# Patient Record
Sex: FEMALE | Race: WHITE | Employment: PART TIME | URBAN - METROPOLITAN AREA
[De-identification: names, ages, dates, MRNs, and addresses within clinical notes are randomized per-mention and may not be internally consistent; named-entity substitution may affect disease eponyms.]

---

## 2017-01-01 DIAGNOSIS — R25.2 CRAMP OF BOTH LOWER EXTREMITIES: ICD-10-CM

## 2017-01-01 DIAGNOSIS — R42 DIZZINESS: ICD-10-CM

## 2017-01-03 RX ORDER — MECLIZINE HCL 12.5 MG/1
TABLET ORAL
Qty: 30 TABLET | Refills: 0 | Status: SHIPPED | OUTPATIENT
Start: 2017-01-03 | End: 2017-02-11 | Stop reason: SDUPTHER

## 2017-01-09 RX ORDER — LEVOTHYROXINE SODIUM 88 UG/1
TABLET ORAL
Qty: 90 TABLET | Refills: 0 | Status: SHIPPED | OUTPATIENT
Start: 2017-01-09 | End: 2017-04-23 | Stop reason: SDUPTHER

## 2017-01-11 ENCOUNTER — TELEPHONE (OUTPATIENT)
Dept: WOUND CARE | Age: 57
End: 2017-01-11

## 2017-01-23 RX ORDER — ONDANSETRON 4 MG/1
TABLET, FILM COATED ORAL
Qty: 30 TABLET | Refills: 0 | Status: SHIPPED | OUTPATIENT
Start: 2017-01-23 | End: 2017-04-24

## 2017-01-27 ENCOUNTER — TELEPHONE (OUTPATIENT)
Dept: INTERNAL MEDICINE CLINIC | Age: 57
End: 2017-01-27

## 2017-01-27 RX ORDER — ATORVASTATIN CALCIUM 40 MG/1
TABLET, FILM COATED ORAL
Qty: 90 TABLET | Refills: 3 | Status: SHIPPED | OUTPATIENT
Start: 2017-01-27 | End: 2018-01-21 | Stop reason: SDUPTHER

## 2017-01-28 ENCOUNTER — OFFICE VISIT (OUTPATIENT)
Dept: INTERNAL MEDICINE CLINIC | Age: 57
End: 2017-01-28

## 2017-01-28 VITALS
SYSTOLIC BLOOD PRESSURE: 132 MMHG | TEMPERATURE: 98.9 F | HEIGHT: 64 IN | RESPIRATION RATE: 16 BRPM | BODY MASS INDEX: 43.54 KG/M2 | WEIGHT: 255 LBS | DIASTOLIC BLOOD PRESSURE: 76 MMHG

## 2017-01-28 DIAGNOSIS — J06.9 VIRAL UPPER RESPIRATORY TRACT INFECTION: ICD-10-CM

## 2017-01-28 PROCEDURE — 99213 OFFICE O/P EST LOW 20 MIN: CPT | Performed by: INTERNAL MEDICINE

## 2017-02-01 ENCOUNTER — IMMUNIZATION (OUTPATIENT)
Dept: INTERNAL MEDICINE CLINIC | Age: 57
End: 2017-02-01

## 2017-02-01 DIAGNOSIS — E53.8 VITAMIN B12 DEFICIENCY: Primary | ICD-10-CM

## 2017-02-01 PROCEDURE — 96372 THER/PROPH/DIAG INJ SC/IM: CPT | Performed by: INTERNAL MEDICINE

## 2017-02-01 RX ORDER — CYANOCOBALAMIN 1000 UG/ML
1000 INJECTION INTRAMUSCULAR; SUBCUTANEOUS
Status: DISCONTINUED | OUTPATIENT
Start: 2017-02-01 | End: 2019-10-01

## 2017-02-01 RX ADMIN — CYANOCOBALAMIN 1000 MCG: 1000 INJECTION INTRAMUSCULAR; SUBCUTANEOUS at 11:12

## 2017-02-10 ENCOUNTER — OFFICE VISIT (OUTPATIENT)
Dept: PAIN MANAGEMENT | Age: 57
End: 2017-02-10

## 2017-02-10 VITALS
SYSTOLIC BLOOD PRESSURE: 119 MMHG | BODY MASS INDEX: 43.43 KG/M2 | HEART RATE: 93 BPM | DIASTOLIC BLOOD PRESSURE: 66 MMHG | WEIGHT: 253 LBS

## 2017-02-10 DIAGNOSIS — G89.4 CHRONIC PAIN SYNDROME: ICD-10-CM

## 2017-02-10 DIAGNOSIS — M16.11 PRIMARY OSTEOARTHRITIS OF RIGHT HIP: ICD-10-CM

## 2017-02-10 DIAGNOSIS — M51.36 DDD (DEGENERATIVE DISC DISEASE), LUMBAR: ICD-10-CM

## 2017-02-10 DIAGNOSIS — R51.9 CHRONIC NONINTRACTABLE HEADACHE, UNSPECIFIED HEADACHE TYPE: ICD-10-CM

## 2017-02-10 DIAGNOSIS — M47.899 FACET SYNDROME: ICD-10-CM

## 2017-02-10 DIAGNOSIS — M79.7 FIBROMYALGIA: ICD-10-CM

## 2017-02-10 DIAGNOSIS — G89.29 CHRONIC NONINTRACTABLE HEADACHE, UNSPECIFIED HEADACHE TYPE: ICD-10-CM

## 2017-02-10 DIAGNOSIS — M17.10 PRIMARY LOCALIZED OSTEOARTHROSIS, LOWER LEG, UNSPECIFIED LATERALITY: ICD-10-CM

## 2017-02-10 PROCEDURE — 99213 OFFICE O/P EST LOW 20 MIN: CPT | Performed by: INTERNAL MEDICINE

## 2017-02-10 RX ORDER — FENTANYL 25 UG/H
1 PATCH TRANSDERMAL
Qty: 10 PATCH | Refills: 0 | Status: SHIPPED | OUTPATIENT
Start: 2017-02-10 | End: 2017-03-10 | Stop reason: ALTCHOICE

## 2017-02-10 RX ORDER — PREGABALIN 100 MG/1
CAPSULE ORAL
Qty: 90 CAPSULE | Refills: 0 | Status: SHIPPED | OUTPATIENT
Start: 2017-02-10 | End: 2017-03-10 | Stop reason: SDUPTHER

## 2017-02-10 RX ORDER — TIZANIDINE HYDROCHLORIDE 4 MG/1
CAPSULE, GELATIN COATED ORAL
Qty: 60 CAPSULE | Refills: 0 | Status: SHIPPED | OUTPATIENT
Start: 2017-02-10 | End: 2017-02-20 | Stop reason: SDUPTHER

## 2017-02-10 RX ORDER — MELOXICAM 15 MG/1
TABLET ORAL
Qty: 90 TABLET | Refills: 0 | Status: SHIPPED | OUTPATIENT
Start: 2017-02-10 | End: 2017-03-10 | Stop reason: SDUPTHER

## 2017-02-11 DIAGNOSIS — R25.2 CRAMP OF BOTH LOWER EXTREMITIES: ICD-10-CM

## 2017-02-11 DIAGNOSIS — R42 DIZZINESS: ICD-10-CM

## 2017-02-13 RX ORDER — MECLIZINE HCL 12.5 MG/1
TABLET ORAL
Qty: 30 TABLET | Refills: 0 | Status: SHIPPED | OUTPATIENT
Start: 2017-02-13 | End: 2017-03-19 | Stop reason: SDUPTHER

## 2017-02-18 ENCOUNTER — TELEPHONE (OUTPATIENT)
Dept: INTERNAL MEDICINE CLINIC | Age: 57
End: 2017-02-18

## 2017-02-20 ENCOUNTER — TELEPHONE (OUTPATIENT)
Dept: INTERNAL MEDICINE CLINIC | Age: 57
End: 2017-02-20

## 2017-02-20 ENCOUNTER — TELEPHONE (OUTPATIENT)
Dept: PAIN MANAGEMENT | Age: 57
End: 2017-02-20

## 2017-02-20 DIAGNOSIS — M79.7 FIBROMYALGIA: ICD-10-CM

## 2017-02-20 DIAGNOSIS — G89.4 CHRONIC PAIN SYNDROME: ICD-10-CM

## 2017-02-20 RX ORDER — TIZANIDINE HYDROCHLORIDE 4 MG/1
4 CAPSULE, GELATIN COATED ORAL 3 TIMES DAILY
Qty: 90 CAPSULE | Refills: 0 | Status: SHIPPED | OUTPATIENT
Start: 2017-02-20 | End: 2017-02-25 | Stop reason: SDUPTHER

## 2017-02-25 DIAGNOSIS — M79.7 FIBROMYALGIA: ICD-10-CM

## 2017-02-25 DIAGNOSIS — G89.4 CHRONIC PAIN SYNDROME: ICD-10-CM

## 2017-02-27 RX ORDER — TIZANIDINE 4 MG/1
TABLET ORAL
Qty: 270 TABLET | Refills: 0 | Status: SHIPPED | OUTPATIENT
Start: 2017-02-27 | End: 2017-03-10 | Stop reason: SDUPTHER

## 2017-03-06 RX ORDER — ONDANSETRON 4 MG/1
TABLET, FILM COATED ORAL
Qty: 30 TABLET | Refills: 0 | Status: SHIPPED | OUTPATIENT
Start: 2017-03-06 | End: 2017-04-04 | Stop reason: SDUPTHER

## 2017-03-10 ENCOUNTER — OFFICE VISIT (OUTPATIENT)
Dept: PAIN MANAGEMENT | Age: 57
End: 2017-03-10

## 2017-03-10 ENCOUNTER — TELEPHONE (OUTPATIENT)
Dept: INTERNAL MEDICINE CLINIC | Age: 57
End: 2017-03-10

## 2017-03-10 VITALS
WEIGHT: 253 LBS | DIASTOLIC BLOOD PRESSURE: 75 MMHG | BODY MASS INDEX: 43.43 KG/M2 | SYSTOLIC BLOOD PRESSURE: 126 MMHG | HEART RATE: 81 BPM

## 2017-03-10 DIAGNOSIS — R51.9 CHRONIC NONINTRACTABLE HEADACHE, UNSPECIFIED HEADACHE TYPE: ICD-10-CM

## 2017-03-10 DIAGNOSIS — D62 ACUTE BLOOD LOSS ANEMIA: Primary | ICD-10-CM

## 2017-03-10 DIAGNOSIS — M51.36 DDD (DEGENERATIVE DISC DISEASE), LUMBAR: ICD-10-CM

## 2017-03-10 DIAGNOSIS — M16.11 PRIMARY OSTEOARTHRITIS OF RIGHT HIP: ICD-10-CM

## 2017-03-10 DIAGNOSIS — G89.29 CHRONIC NONINTRACTABLE HEADACHE, UNSPECIFIED HEADACHE TYPE: ICD-10-CM

## 2017-03-10 DIAGNOSIS — G89.4 CHRONIC PAIN SYNDROME: ICD-10-CM

## 2017-03-10 DIAGNOSIS — M79.7 FIBROMYALGIA: ICD-10-CM

## 2017-03-10 DIAGNOSIS — M47.899 FACET SYNDROME: ICD-10-CM

## 2017-03-10 PROCEDURE — 99213 OFFICE O/P EST LOW 20 MIN: CPT | Performed by: INTERNAL MEDICINE

## 2017-03-10 RX ORDER — PREGABALIN 100 MG/1
CAPSULE ORAL
Qty: 90 CAPSULE | Refills: 0 | Status: SHIPPED | OUTPATIENT
Start: 2017-03-10 | End: 2017-04-07 | Stop reason: DRUGHIGH

## 2017-03-10 RX ORDER — MELOXICAM 15 MG/1
TABLET ORAL
Qty: 90 TABLET | Refills: 0 | Status: SHIPPED | OUTPATIENT
Start: 2017-03-10 | End: 2017-06-13 | Stop reason: SDUPTHER

## 2017-03-10 RX ORDER — TIZANIDINE 4 MG/1
TABLET ORAL
Qty: 270 TABLET | Refills: 0 | Status: SHIPPED | OUTPATIENT
Start: 2017-03-10 | End: 2017-06-13 | Stop reason: SDUPTHER

## 2017-03-11 LAB
A/G RATIO: 1.5 (ref 1.1–2.2)
ALBUMIN SERPL-MCNC: 3.9 G/DL (ref 3.4–5)
ALP BLD-CCNC: 129 U/L (ref 40–129)
ALT SERPL-CCNC: 12 U/L (ref 10–40)
ANION GAP SERPL CALCULATED.3IONS-SCNC: 15 MMOL/L (ref 3–16)
AST SERPL-CCNC: 13 U/L (ref 15–37)
BASOPHILS ABSOLUTE: 0 K/UL (ref 0–0.2)
BASOPHILS RELATIVE PERCENT: 0.6 %
BILIRUB SERPL-MCNC: <0.2 MG/DL (ref 0–1)
BUN BLDV-MCNC: 19 MG/DL (ref 7–20)
CALCIUM SERPL-MCNC: 9 MG/DL (ref 8.3–10.6)
CHLORIDE BLD-SCNC: 107 MMOL/L (ref 99–110)
CO2: 24 MMOL/L (ref 21–32)
CREAT SERPL-MCNC: 0.6 MG/DL (ref 0.6–1.1)
EOSINOPHILS ABSOLUTE: 0.3 K/UL (ref 0–0.6)
EOSINOPHILS RELATIVE PERCENT: 4.9 %
GFR AFRICAN AMERICAN: >60
GFR NON-AFRICAN AMERICAN: >60
GLOBULIN: 2.6 G/DL
GLUCOSE BLD-MCNC: 80 MG/DL (ref 70–99)
HCT VFR BLD CALC: 33.4 % (ref 36–48)
HEMOGLOBIN: 10.4 G/DL (ref 12–16)
LYMPHOCYTES ABSOLUTE: 1.5 K/UL (ref 1–5.1)
LYMPHOCYTES RELATIVE PERCENT: 27.3 %
MCH RBC QN AUTO: 22.7 PG (ref 26–34)
MCHC RBC AUTO-ENTMCNC: 31.1 G/DL (ref 31–36)
MCV RBC AUTO: 73 FL (ref 80–100)
MONOCYTES ABSOLUTE: 0.4 K/UL (ref 0–1.3)
MONOCYTES RELATIVE PERCENT: 7.8 %
NEUTROPHILS ABSOLUTE: 3.3 K/UL (ref 1.7–7.7)
NEUTROPHILS RELATIVE PERCENT: 59.4 %
PDW BLD-RTO: 21.7 % (ref 12.4–15.4)
PLATELET # BLD: 197 K/UL (ref 135–450)
PMV BLD AUTO: 9.7 FL (ref 5–10.5)
POTASSIUM SERPL-SCNC: 3.9 MMOL/L (ref 3.5–5.1)
RBC # BLD: 4.57 M/UL (ref 4–5.2)
SODIUM BLD-SCNC: 146 MMOL/L (ref 136–145)
TOTAL PROTEIN: 6.5 G/DL (ref 6.4–8.2)
WBC # BLD: 5.5 K/UL (ref 4–11)

## 2017-03-16 ENCOUNTER — OFFICE VISIT (OUTPATIENT)
Dept: ORTHOPEDIC SURGERY | Age: 57
End: 2017-03-16

## 2017-03-16 VITALS
HEIGHT: 64 IN | RESPIRATION RATE: 16 BRPM | HEART RATE: 86 BPM | DIASTOLIC BLOOD PRESSURE: 75 MMHG | SYSTOLIC BLOOD PRESSURE: 119 MMHG | BODY MASS INDEX: 43.19 KG/M2 | WEIGHT: 253 LBS | TEMPERATURE: 97.2 F

## 2017-03-16 DIAGNOSIS — M25.511 BILATERAL SHOULDER PAIN, UNSPECIFIED CHRONICITY: Primary | ICD-10-CM

## 2017-03-16 DIAGNOSIS — M25.512 BILATERAL SHOULDER PAIN, UNSPECIFIED CHRONICITY: Primary | ICD-10-CM

## 2017-03-16 PROCEDURE — 73030 X-RAY EXAM OF SHOULDER: CPT | Performed by: PHYSICIAN ASSISTANT

## 2017-03-16 PROCEDURE — 20610 DRAIN/INJ JOINT/BURSA W/O US: CPT | Performed by: PHYSICIAN ASSISTANT

## 2017-03-16 PROCEDURE — 99213 OFFICE O/P EST LOW 20 MIN: CPT | Performed by: PHYSICIAN ASSISTANT

## 2017-03-18 ASSESSMENT — ENCOUNTER SYMPTOMS
ABDOMINAL PAIN: 0
SHORTNESS OF BREATH: 0

## 2017-03-19 DIAGNOSIS — R25.2 CRAMP OF BOTH LOWER EXTREMITIES: ICD-10-CM

## 2017-03-19 DIAGNOSIS — R42 DIZZINESS: ICD-10-CM

## 2017-03-20 RX ORDER — MECLIZINE HCL 12.5 MG/1
TABLET ORAL
Qty: 30 TABLET | Refills: 0 | Status: SHIPPED | OUTPATIENT
Start: 2017-03-20 | End: 2017-04-24

## 2017-03-22 ENCOUNTER — OFFICE VISIT (OUTPATIENT)
Dept: INTERNAL MEDICINE CLINIC | Age: 57
End: 2017-03-22

## 2017-03-22 VITALS
BODY MASS INDEX: 45.93 KG/M2 | HEART RATE: 88 BPM | RESPIRATION RATE: 16 BRPM | HEIGHT: 64 IN | WEIGHT: 269 LBS | SYSTOLIC BLOOD PRESSURE: 130 MMHG | DIASTOLIC BLOOD PRESSURE: 76 MMHG

## 2017-03-22 DIAGNOSIS — E78.5 HYPERLIPIDEMIA, UNSPECIFIED HYPERLIPIDEMIA TYPE: Primary | Chronic | ICD-10-CM

## 2017-03-22 DIAGNOSIS — Z72.89 SELF MUTILATING BEHAVIOR: ICD-10-CM

## 2017-03-22 DIAGNOSIS — E03.4 HYPOTHYROIDISM DUE TO ACQUIRED ATROPHY OF THYROID: ICD-10-CM

## 2017-03-22 DIAGNOSIS — E53.8 VITAMIN B12 DEFICIENCY: ICD-10-CM

## 2017-03-22 LAB
CHOLESTEROL, TOTAL: 184 MG/DL (ref 0–199)
HDLC SERPL-MCNC: 96 MG/DL (ref 40–60)
LDL CHOLESTEROL CALCULATED: 72 MG/DL
T4 FREE: 1.1 NG/DL (ref 0.9–1.8)
TRIGL SERPL-MCNC: 78 MG/DL (ref 0–150)
TSH SERPL DL<=0.05 MIU/L-ACNC: 1.73 UIU/ML (ref 0.27–4.2)
VLDLC SERPL CALC-MCNC: 16 MG/DL

## 2017-03-22 PROCEDURE — 96372 THER/PROPH/DIAG INJ SC/IM: CPT | Performed by: INTERNAL MEDICINE

## 2017-03-22 PROCEDURE — 99214 OFFICE O/P EST MOD 30 MIN: CPT | Performed by: INTERNAL MEDICINE

## 2017-03-22 RX ORDER — CYANOCOBALAMIN 1000 UG/ML
1000 INJECTION INTRAMUSCULAR; SUBCUTANEOUS
Status: DISCONTINUED | OUTPATIENT
Start: 2017-03-22 | End: 2019-10-01

## 2017-03-22 RX ADMIN — CYANOCOBALAMIN 1000 MCG: 1000 INJECTION INTRAMUSCULAR; SUBCUTANEOUS at 14:13

## 2017-04-03 DIAGNOSIS — G89.4 CHRONIC PAIN SYNDROME: ICD-10-CM

## 2017-04-04 ENCOUNTER — TELEPHONE (OUTPATIENT)
Dept: INTERNAL MEDICINE CLINIC | Age: 57
End: 2017-04-04

## 2017-04-05 RX ORDER — ONDANSETRON 4 MG/1
TABLET, FILM COATED ORAL
Qty: 30 TABLET | Refills: 0 | Status: SHIPPED | OUTPATIENT
Start: 2017-04-05 | End: 2017-04-23 | Stop reason: SDUPTHER

## 2017-04-06 ENCOUNTER — OFFICE VISIT (OUTPATIENT)
Dept: INTERNAL MEDICINE CLINIC | Age: 57
End: 2017-04-06

## 2017-04-06 VITALS
BODY MASS INDEX: 46.74 KG/M2 | WEIGHT: 273.8 LBS | RESPIRATION RATE: 16 BRPM | DIASTOLIC BLOOD PRESSURE: 80 MMHG | SYSTOLIC BLOOD PRESSURE: 140 MMHG | HEIGHT: 64 IN | HEART RATE: 90 BPM

## 2017-04-06 DIAGNOSIS — K21.9 GASTROESOPHAGEAL REFLUX DISEASE, ESOPHAGITIS PRESENCE NOT SPECIFIED: ICD-10-CM

## 2017-04-06 PROCEDURE — 99213 OFFICE O/P EST LOW 20 MIN: CPT | Performed by: INTERNAL MEDICINE

## 2017-04-06 RX ORDER — ESOMEPRAZOLE MAGNESIUM 40 MG/1
40 CAPSULE, DELAYED RELEASE ORAL
Qty: 90 CAPSULE | Refills: 3 | Status: SHIPPED | OUTPATIENT
Start: 2017-04-06 | End: 2018-05-06 | Stop reason: SDUPTHER

## 2017-04-07 ENCOUNTER — OFFICE VISIT (OUTPATIENT)
Dept: PAIN MANAGEMENT | Age: 57
End: 2017-04-07

## 2017-04-07 VITALS
DIASTOLIC BLOOD PRESSURE: 68 MMHG | BODY MASS INDEX: 47.03 KG/M2 | HEART RATE: 88 BPM | WEIGHT: 274 LBS | SYSTOLIC BLOOD PRESSURE: 96 MMHG

## 2017-04-07 DIAGNOSIS — F33.1 MODERATE EPISODE OF RECURRENT MAJOR DEPRESSIVE DISORDER (HCC): ICD-10-CM

## 2017-04-07 DIAGNOSIS — M17.10 PRIMARY LOCALIZED OSTEOARTHROSIS, LOWER LEG, UNSPECIFIED LATERALITY: ICD-10-CM

## 2017-04-07 DIAGNOSIS — G89.29 CHRONIC NONINTRACTABLE HEADACHE, UNSPECIFIED HEADACHE TYPE: ICD-10-CM

## 2017-04-07 DIAGNOSIS — M51.36 DDD (DEGENERATIVE DISC DISEASE), LUMBAR: ICD-10-CM

## 2017-04-07 DIAGNOSIS — F51.01 PRIMARY INSOMNIA: ICD-10-CM

## 2017-04-07 DIAGNOSIS — M54.16 LUMBAR RADICULITIS: ICD-10-CM

## 2017-04-07 DIAGNOSIS — M79.7 FIBROMYALGIA: ICD-10-CM

## 2017-04-07 DIAGNOSIS — G89.4 CHRONIC PAIN SYNDROME: ICD-10-CM

## 2017-04-07 DIAGNOSIS — M16.11 PRIMARY OSTEOARTHRITIS OF RIGHT HIP: ICD-10-CM

## 2017-04-07 DIAGNOSIS — M47.899 FACET SYNDROME: ICD-10-CM

## 2017-04-07 DIAGNOSIS — R51.9 CHRONIC NONINTRACTABLE HEADACHE, UNSPECIFIED HEADACHE TYPE: ICD-10-CM

## 2017-04-07 DIAGNOSIS — G25.81 RESTLESS LEGS SYNDROME (RLS): ICD-10-CM

## 2017-04-07 PROCEDURE — 20553 NJX 1/MLT TRIGGER POINTS 3/>: CPT | Performed by: INTERNAL MEDICINE

## 2017-04-07 PROCEDURE — 99214 OFFICE O/P EST MOD 30 MIN: CPT | Performed by: INTERNAL MEDICINE

## 2017-04-07 RX ORDER — DULOXETIN HYDROCHLORIDE 30 MG/1
30 CAPSULE, DELAYED RELEASE ORAL DAILY
Qty: 30 CAPSULE | Refills: 0 | Status: SHIPPED | OUTPATIENT
Start: 2017-04-07 | End: 2017-05-05 | Stop reason: SDUPTHER

## 2017-04-07 RX ORDER — PREGABALIN 200 MG/1
200 CAPSULE ORAL 2 TIMES DAILY
Qty: 60 CAPSULE | Refills: 0 | Status: SHIPPED | OUTPATIENT
Start: 2017-04-07 | End: 2017-05-05 | Stop reason: SDUPTHER

## 2017-04-07 RX ORDER — TRIAMCINOLONE ACETONIDE 40 MG/ML
40 INJECTION, SUSPENSION INTRA-ARTICULAR; INTRAMUSCULAR ONCE
Status: COMPLETED | OUTPATIENT
Start: 2017-04-07 | End: 2017-04-07

## 2017-04-07 RX ADMIN — TRIAMCINOLONE ACETONIDE 40 MG: 40 INJECTION, SUSPENSION INTRA-ARTICULAR; INTRAMUSCULAR at 10:26

## 2017-04-12 RX ORDER — DULOXETIN HYDROCHLORIDE 30 MG/1
CAPSULE, DELAYED RELEASE ORAL
Qty: 90 CAPSULE | Refills: 0 | OUTPATIENT
Start: 2017-04-12

## 2017-04-12 RX ORDER — BUPROPION HYDROCHLORIDE 300 MG/1
TABLET ORAL
Qty: 90 TABLET | Refills: 0 | OUTPATIENT
Start: 2017-04-12

## 2017-04-17 ENCOUNTER — TELEPHONE (OUTPATIENT)
Dept: PAIN MANAGEMENT | Age: 57
End: 2017-04-17

## 2017-04-23 DIAGNOSIS — R42 DIZZINESS: ICD-10-CM

## 2017-04-23 DIAGNOSIS — R25.2 CRAMP OF BOTH LOWER EXTREMITIES: ICD-10-CM

## 2017-04-24 RX ORDER — LEVOTHYROXINE SODIUM 88 UG/1
TABLET ORAL
Qty: 90 TABLET | Refills: 0 | Status: SHIPPED | OUTPATIENT
Start: 2017-04-24 | End: 2017-07-21 | Stop reason: SDUPTHER

## 2017-04-24 RX ORDER — ONDANSETRON 4 MG/1
TABLET, FILM COATED ORAL
Qty: 30 TABLET | Refills: 0 | Status: SHIPPED | OUTPATIENT
Start: 2017-04-24 | End: 2017-05-29 | Stop reason: SDUPTHER

## 2017-04-24 RX ORDER — MECLIZINE HCL 12.5 MG/1
TABLET ORAL
Qty: 30 TABLET | Refills: 0 | Status: SHIPPED | OUTPATIENT
Start: 2017-04-24 | End: 2017-05-29 | Stop reason: SDUPTHER

## 2017-04-25 ENCOUNTER — TELEPHONE (OUTPATIENT)
Dept: INTERNAL MEDICINE CLINIC | Age: 57
End: 2017-04-25

## 2017-04-26 ENCOUNTER — TELEPHONE (OUTPATIENT)
Dept: ORTHOPEDIC SURGERY | Age: 57
End: 2017-04-26

## 2017-04-26 ENCOUNTER — TELEPHONE (OUTPATIENT)
Dept: PAIN MANAGEMENT | Age: 57
End: 2017-04-26

## 2017-04-26 RX ORDER — CEPHALEXIN 500 MG/1
CAPSULE ORAL
Qty: 12 CAPSULE | Refills: 1 | Status: SHIPPED | OUTPATIENT
Start: 2017-04-26 | End: 2017-11-30

## 2017-05-03 DIAGNOSIS — F33.1 MODERATE EPISODE OF RECURRENT MAJOR DEPRESSIVE DISORDER (HCC): ICD-10-CM

## 2017-05-03 DIAGNOSIS — G89.4 CHRONIC PAIN SYNDROME: ICD-10-CM

## 2017-05-04 RX ORDER — DULOXETIN HYDROCHLORIDE 30 MG/1
CAPSULE, DELAYED RELEASE ORAL
Qty: 30 CAPSULE | Refills: 0 | OUTPATIENT
Start: 2017-05-04

## 2017-05-05 ENCOUNTER — OFFICE VISIT (OUTPATIENT)
Dept: PAIN MANAGEMENT | Age: 57
End: 2017-05-05

## 2017-05-05 VITALS
SYSTOLIC BLOOD PRESSURE: 108 MMHG | BODY MASS INDEX: 48.06 KG/M2 | DIASTOLIC BLOOD PRESSURE: 74 MMHG | HEART RATE: 85 BPM | WEIGHT: 280 LBS

## 2017-05-05 DIAGNOSIS — M16.11 PRIMARY OSTEOARTHRITIS OF RIGHT HIP: ICD-10-CM

## 2017-05-05 DIAGNOSIS — G89.4 CHRONIC PAIN SYNDROME: ICD-10-CM

## 2017-05-05 DIAGNOSIS — M17.10 PRIMARY LOCALIZED OSTEOARTHROSIS, LOWER LEG, UNSPECIFIED LATERALITY: ICD-10-CM

## 2017-05-05 DIAGNOSIS — G25.81 RESTLESS LEGS SYNDROME (RLS): ICD-10-CM

## 2017-05-05 DIAGNOSIS — M54.16 LUMBAR RADICULITIS: ICD-10-CM

## 2017-05-05 DIAGNOSIS — M79.7 FIBROMYALGIA: ICD-10-CM

## 2017-05-05 DIAGNOSIS — M51.36 DDD (DEGENERATIVE DISC DISEASE), LUMBAR: ICD-10-CM

## 2017-05-05 DIAGNOSIS — M47.899 FACET SYNDROME: ICD-10-CM

## 2017-05-05 PROCEDURE — 99213 OFFICE O/P EST LOW 20 MIN: CPT | Performed by: INTERNAL MEDICINE

## 2017-05-05 RX ORDER — DULOXETIN HYDROCHLORIDE 30 MG/1
30 CAPSULE, DELAYED RELEASE ORAL DAILY
Qty: 30 CAPSULE | Refills: 0 | Status: SHIPPED | OUTPATIENT
Start: 2017-05-05 | End: 2017-05-05 | Stop reason: SDUPTHER

## 2017-05-05 RX ORDER — PREGABALIN 200 MG/1
200 CAPSULE ORAL 2 TIMES DAILY
Qty: 60 CAPSULE | Refills: 0 | Status: SHIPPED | OUTPATIENT
Start: 2017-05-05 | End: 2017-06-13 | Stop reason: SDUPTHER

## 2017-05-05 RX ORDER — HYDROCODONE BITARTRATE AND ACETAMINOPHEN 10; 325 MG/1; MG/1
1 TABLET ORAL EVERY 6 HOURS PRN
COMMUNITY
End: 2017-05-05

## 2017-05-05 RX ORDER — FENTANYL 25 UG/H
1 PATCH TRANSDERMAL
Qty: 10 PATCH | Refills: 0 | Status: SHIPPED | OUTPATIENT
Start: 2017-05-05 | End: 2017-06-04

## 2017-05-05 RX ORDER — DULOXETIN HYDROCHLORIDE 30 MG/1
CAPSULE, DELAYED RELEASE ORAL
Qty: 90 CAPSULE | Refills: 0 | Status: SHIPPED | OUTPATIENT
Start: 2017-05-05 | End: 2017-06-13 | Stop reason: SDUPTHER

## 2017-05-08 ENCOUNTER — TELEPHONE (OUTPATIENT)
Dept: INTERNAL MEDICINE CLINIC | Age: 57
End: 2017-05-08

## 2017-05-12 PROBLEM — L98.9 LEG SKIN LESION, LEFT: Status: ACTIVE | Noted: 2017-05-12

## 2017-05-12 ASSESSMENT — ENCOUNTER SYMPTOMS
SHORTNESS OF BREATH: 0
ABDOMINAL PAIN: 0

## 2017-05-13 ENCOUNTER — OFFICE VISIT (OUTPATIENT)
Dept: INTERNAL MEDICINE CLINIC | Age: 57
End: 2017-05-13

## 2017-05-13 VITALS
OXYGEN SATURATION: 97 % | BODY MASS INDEX: 49.09 KG/M2 | HEART RATE: 99 BPM | DIASTOLIC BLOOD PRESSURE: 80 MMHG | TEMPERATURE: 97.9 F | WEIGHT: 286 LBS | SYSTOLIC BLOOD PRESSURE: 130 MMHG | RESPIRATION RATE: 20 BRPM

## 2017-05-13 DIAGNOSIS — R07.89 CHEST DISCOMFORT: ICD-10-CM

## 2017-05-13 DIAGNOSIS — Z72.89 SELF-MUTILATION: Primary | ICD-10-CM

## 2017-05-13 DIAGNOSIS — I87.2 VENOUS (PERIPHERAL) INSUFFICIENCY: ICD-10-CM

## 2017-05-13 DIAGNOSIS — L98.9 LEG SKIN LESION, LEFT: ICD-10-CM

## 2017-05-13 PROCEDURE — 99213 OFFICE O/P EST LOW 20 MIN: CPT | Performed by: INTERNAL MEDICINE

## 2017-05-13 RX ORDER — FUROSEMIDE 40 MG/1
40 TABLET ORAL DAILY
Qty: 15 TABLET | Refills: 1 | Status: SHIPPED | OUTPATIENT
Start: 2017-05-13 | End: 2017-05-13 | Stop reason: SDUPTHER

## 2017-05-15 DIAGNOSIS — G89.4 CHRONIC PAIN SYNDROME: ICD-10-CM

## 2017-05-15 RX ORDER — FUROSEMIDE 40 MG/1
TABLET ORAL
Qty: 90 TABLET | Refills: 1 | Status: SHIPPED | OUTPATIENT
Start: 2017-05-15 | End: 2017-11-30

## 2017-05-16 RX ORDER — DULOXETIN HYDROCHLORIDE 30 MG/1
CAPSULE, DELAYED RELEASE ORAL
Qty: 30 CAPSULE | Refills: 0 | OUTPATIENT
Start: 2017-05-16

## 2017-05-29 DIAGNOSIS — R42 DIZZINESS: ICD-10-CM

## 2017-05-29 DIAGNOSIS — R25.2 CRAMP OF BOTH LOWER EXTREMITIES: ICD-10-CM

## 2017-05-30 RX ORDER — ONDANSETRON 4 MG/1
TABLET, FILM COATED ORAL
Qty: 30 TABLET | Refills: 0 | Status: SHIPPED | OUTPATIENT
Start: 2017-05-30 | End: 2017-07-03 | Stop reason: SDUPTHER

## 2017-05-30 RX ORDER — MECLIZINE HCL 12.5 MG/1
TABLET ORAL
Qty: 30 TABLET | Refills: 0 | Status: SHIPPED | OUTPATIENT
Start: 2017-05-30 | End: 2017-07-03 | Stop reason: SDUPTHER

## 2017-06-13 ENCOUNTER — OFFICE VISIT (OUTPATIENT)
Dept: PAIN MANAGEMENT | Age: 57
End: 2017-06-13

## 2017-06-13 VITALS
DIASTOLIC BLOOD PRESSURE: 100 MMHG | BODY MASS INDEX: 51.15 KG/M2 | SYSTOLIC BLOOD PRESSURE: 153 MMHG | HEART RATE: 86 BPM | WEIGHT: 293 LBS

## 2017-06-13 DIAGNOSIS — G89.4 CHRONIC PAIN SYNDROME: ICD-10-CM

## 2017-06-13 DIAGNOSIS — M17.10 PRIMARY LOCALIZED OSTEOARTHROSIS, LOWER LEG, UNSPECIFIED LATERALITY: ICD-10-CM

## 2017-06-13 DIAGNOSIS — G25.81 RESTLESS LEGS SYNDROME (RLS): ICD-10-CM

## 2017-06-13 DIAGNOSIS — M54.16 LUMBAR RADICULITIS: ICD-10-CM

## 2017-06-13 DIAGNOSIS — M79.7 FIBROMYALGIA: ICD-10-CM

## 2017-06-13 DIAGNOSIS — M47.899 FACET SYNDROME: ICD-10-CM

## 2017-06-13 DIAGNOSIS — M51.36 DDD (DEGENERATIVE DISC DISEASE), LUMBAR: ICD-10-CM

## 2017-06-13 DIAGNOSIS — M16.11 PRIMARY OSTEOARTHRITIS OF RIGHT HIP: ICD-10-CM

## 2017-06-13 PROCEDURE — 99213 OFFICE O/P EST LOW 20 MIN: CPT | Performed by: INTERNAL MEDICINE

## 2017-06-13 RX ORDER — DULOXETIN HYDROCHLORIDE 30 MG/1
CAPSULE, DELAYED RELEASE ORAL
Qty: 90 CAPSULE | Refills: 0 | Status: SHIPPED | OUTPATIENT
Start: 2017-06-13 | End: 2017-07-19 | Stop reason: SDUPTHER

## 2017-06-13 RX ORDER — MELOXICAM 15 MG/1
TABLET ORAL
Qty: 90 TABLET | Refills: 0 | Status: SHIPPED | OUTPATIENT
Start: 2017-06-13 | End: 2017-07-19 | Stop reason: SDUPTHER

## 2017-06-13 RX ORDER — HYDROCODONE BITARTRATE AND ACETAMINOPHEN 5; 325 MG/1; MG/1
1 TABLET ORAL EVERY 6 HOURS PRN
Qty: 50 TABLET | Refills: 0 | Status: SHIPPED | OUTPATIENT
Start: 2017-06-13 | End: 2017-10-03 | Stop reason: SDUPTHER

## 2017-06-13 RX ORDER — PREGABALIN 200 MG/1
200 CAPSULE ORAL 2 TIMES DAILY
Qty: 60 CAPSULE | Refills: 0 | Status: SHIPPED | OUTPATIENT
Start: 2017-06-13 | End: 2017-07-19 | Stop reason: ALTCHOICE

## 2017-06-13 RX ORDER — TIZANIDINE 4 MG/1
TABLET ORAL
Qty: 270 TABLET | Refills: 0 | Status: SHIPPED | OUTPATIENT
Start: 2017-06-13 | End: 2017-07-19 | Stop reason: SDUPTHER

## 2017-06-19 ENCOUNTER — TELEPHONE (OUTPATIENT)
Dept: PAIN MANAGEMENT | Age: 57
End: 2017-06-19

## 2017-06-28 ENCOUNTER — OFFICE VISIT (OUTPATIENT)
Dept: INTERNAL MEDICINE CLINIC | Age: 57
End: 2017-06-28

## 2017-06-28 VITALS
BODY MASS INDEX: 50.02 KG/M2 | OXYGEN SATURATION: 96 % | HEART RATE: 113 BPM | WEIGHT: 293 LBS | DIASTOLIC BLOOD PRESSURE: 80 MMHG | HEIGHT: 64 IN | SYSTOLIC BLOOD PRESSURE: 154 MMHG

## 2017-06-28 DIAGNOSIS — Z72.89 SELF MUTILATING BEHAVIOR: ICD-10-CM

## 2017-06-28 DIAGNOSIS — F33.2 MAJOR DEPRESSIVE DISORDER, RECURRENT, SEVERE WITHOUT PSYCHOTIC FEATURES (HCC): Primary | ICD-10-CM

## 2017-06-28 DIAGNOSIS — E66.01 MORBID OBESITY WITH BMI OF 50.0-59.9, ADULT (HCC): ICD-10-CM

## 2017-06-28 PROCEDURE — 99214 OFFICE O/P EST MOD 30 MIN: CPT | Performed by: INTERNAL MEDICINE

## 2017-06-28 RX ORDER — CEFUROXIME AXETIL 250 MG/1
250 TABLET ORAL 2 TIMES DAILY
Qty: 20 TABLET | Refills: 0 | Status: SHIPPED | OUTPATIENT
Start: 2017-06-28 | End: 2017-07-08

## 2017-06-28 ASSESSMENT — ENCOUNTER SYMPTOMS
EYES NEGATIVE: 1
GASTROINTESTINAL NEGATIVE: 1
RESPIRATORY NEGATIVE: 1

## 2017-07-03 DIAGNOSIS — R25.2 CRAMP OF BOTH LOWER EXTREMITIES: ICD-10-CM

## 2017-07-03 DIAGNOSIS — R42 DIZZINESS: ICD-10-CM

## 2017-07-03 RX ORDER — MECLIZINE HCL 12.5 MG/1
TABLET ORAL
Qty: 30 TABLET | Refills: 0 | Status: SHIPPED | OUTPATIENT
Start: 2017-07-03 | End: 2017-08-30 | Stop reason: SDUPTHER

## 2017-07-03 RX ORDER — ONDANSETRON 4 MG/1
TABLET, FILM COATED ORAL
Qty: 30 TABLET | Refills: 0 | Status: SHIPPED | OUTPATIENT
Start: 2017-07-03 | End: 2017-08-14 | Stop reason: SDUPTHER

## 2017-07-12 ENCOUNTER — OFFICE VISIT (OUTPATIENT)
Dept: INTERNAL MEDICINE CLINIC | Age: 57
End: 2017-07-12

## 2017-07-12 ENCOUNTER — TELEPHONE (OUTPATIENT)
Dept: INTERNAL MEDICINE CLINIC | Age: 57
End: 2017-07-12

## 2017-07-12 VITALS
SYSTOLIC BLOOD PRESSURE: 130 MMHG | RESPIRATION RATE: 16 BRPM | HEART RATE: 102 BPM | DIASTOLIC BLOOD PRESSURE: 72 MMHG | BODY MASS INDEX: 50.02 KG/M2 | OXYGEN SATURATION: 96 % | WEIGHT: 293 LBS | HEIGHT: 64 IN

## 2017-07-12 DIAGNOSIS — R60.9 EDEMA, UNSPECIFIED TYPE: ICD-10-CM

## 2017-07-12 DIAGNOSIS — I83.009 STASIS ULCER, UNSPECIFIED LATERALITY (HCC): ICD-10-CM

## 2017-07-12 DIAGNOSIS — L97.909 STASIS ULCER, UNSPECIFIED LATERALITY (HCC): ICD-10-CM

## 2017-07-12 DIAGNOSIS — I87.2 VENOUS (PERIPHERAL) INSUFFICIENCY: ICD-10-CM

## 2017-07-12 DIAGNOSIS — E78.5 HYPERLIPIDEMIA, UNSPECIFIED HYPERLIPIDEMIA TYPE: Primary | Chronic | ICD-10-CM

## 2017-07-12 LAB
ANION GAP SERPL CALCULATED.3IONS-SCNC: 14 MMOL/L (ref 3–16)
BUN BLDV-MCNC: 11 MG/DL (ref 7–20)
CALCIUM SERPL-MCNC: 8.7 MG/DL (ref 8.3–10.6)
CHLORIDE BLD-SCNC: 107 MMOL/L (ref 99–110)
CO2: 26 MMOL/L (ref 21–32)
CREAT SERPL-MCNC: 0.6 MG/DL (ref 0.6–1.1)
GFR AFRICAN AMERICAN: >60
GFR NON-AFRICAN AMERICAN: >60
GLUCOSE BLD-MCNC: 103 MG/DL (ref 70–99)
POTASSIUM SERPL-SCNC: 3.7 MMOL/L (ref 3.5–5.1)
SODIUM BLD-SCNC: 147 MMOL/L (ref 136–145)

## 2017-07-12 PROCEDURE — 99214 OFFICE O/P EST MOD 30 MIN: CPT | Performed by: INTERNAL MEDICINE

## 2017-07-12 ASSESSMENT — ENCOUNTER SYMPTOMS
ABDOMINAL PAIN: 0
SHORTNESS OF BREATH: 0

## 2017-07-19 ENCOUNTER — OFFICE VISIT (OUTPATIENT)
Dept: PAIN MANAGEMENT | Age: 57
End: 2017-07-19

## 2017-07-19 VITALS
SYSTOLIC BLOOD PRESSURE: 159 MMHG | BODY MASS INDEX: 52.7 KG/M2 | DIASTOLIC BLOOD PRESSURE: 84 MMHG | WEIGHT: 293 LBS | HEART RATE: 84 BPM

## 2017-07-19 DIAGNOSIS — M79.7 FIBROMYALGIA: ICD-10-CM

## 2017-07-19 DIAGNOSIS — M51.36 DDD (DEGENERATIVE DISC DISEASE), LUMBAR: ICD-10-CM

## 2017-07-19 DIAGNOSIS — M47.899 FACET SYNDROME: ICD-10-CM

## 2017-07-19 DIAGNOSIS — G89.4 CHRONIC PAIN SYNDROME: ICD-10-CM

## 2017-07-19 DIAGNOSIS — M16.11 PRIMARY OSTEOARTHRITIS OF RIGHT HIP: ICD-10-CM

## 2017-07-19 DIAGNOSIS — M54.16 LUMBAR RADICULITIS: ICD-10-CM

## 2017-07-19 DIAGNOSIS — G25.81 RESTLESS LEGS SYNDROME (RLS): ICD-10-CM

## 2017-07-19 PROCEDURE — 99213 OFFICE O/P EST LOW 20 MIN: CPT | Performed by: INTERNAL MEDICINE

## 2017-07-19 RX ORDER — MELOXICAM 15 MG/1
TABLET ORAL
Qty: 90 TABLET | Refills: 0 | Status: SHIPPED | OUTPATIENT
Start: 2017-07-19 | End: 2017-10-03 | Stop reason: SDUPTHER

## 2017-07-19 RX ORDER — TIZANIDINE 4 MG/1
TABLET ORAL
Qty: 270 TABLET | Refills: 0 | Status: SHIPPED | OUTPATIENT
Start: 2017-07-19 | End: 2017-10-03 | Stop reason: SDUPTHER

## 2017-07-19 RX ORDER — DULOXETIN HYDROCHLORIDE 30 MG/1
CAPSULE, DELAYED RELEASE ORAL
Qty: 90 CAPSULE | Refills: 0 | Status: SHIPPED | OUTPATIENT
Start: 2017-07-19 | End: 2017-11-30

## 2017-07-19 RX ORDER — PREGABALIN 100 MG/1
100 CAPSULE ORAL 2 TIMES DAILY
Qty: 60 CAPSULE | Refills: 3 | Status: SHIPPED | OUTPATIENT
Start: 2017-07-19 | End: 2017-10-03 | Stop reason: SDUPTHER

## 2017-07-22 RX ORDER — LEVOTHYROXINE SODIUM 88 UG/1
TABLET ORAL
Qty: 90 TABLET | Refills: 0 | Status: SHIPPED | OUTPATIENT
Start: 2017-07-22 | End: 2017-10-24 | Stop reason: SDUPTHER

## 2017-07-23 DIAGNOSIS — M79.7 FIBROMYALGIA: ICD-10-CM

## 2017-07-23 DIAGNOSIS — M47.899 FACET SYNDROME: ICD-10-CM

## 2017-07-23 DIAGNOSIS — M51.36 DDD (DEGENERATIVE DISC DISEASE), LUMBAR: ICD-10-CM

## 2017-07-23 DIAGNOSIS — M16.11 PRIMARY OSTEOARTHRITIS OF RIGHT HIP: ICD-10-CM

## 2017-07-23 DIAGNOSIS — G89.4 CHRONIC PAIN SYNDROME: ICD-10-CM

## 2017-07-25 ENCOUNTER — PROCEDURE VISIT (OUTPATIENT)
Dept: SURGERY | Age: 57
End: 2017-07-25

## 2017-07-25 ENCOUNTER — INITIAL CONSULT (OUTPATIENT)
Dept: SURGERY | Age: 57
End: 2017-07-25

## 2017-07-25 VITALS
HEART RATE: 74 BPM | SYSTOLIC BLOOD PRESSURE: 142 MMHG | WEIGHT: 291 LBS | DIASTOLIC BLOOD PRESSURE: 86 MMHG | BODY MASS INDEX: 49.68 KG/M2 | HEIGHT: 64 IN

## 2017-07-25 DIAGNOSIS — R60.0 BILATERAL LEG EDEMA: Primary | ICD-10-CM

## 2017-07-25 DIAGNOSIS — R60.0 LOCALIZED EDEMA: Primary | ICD-10-CM

## 2017-07-25 PROCEDURE — 93970 EXTREMITY STUDY: CPT | Performed by: SURGERY

## 2017-07-25 PROCEDURE — 99214 OFFICE O/P EST MOD 30 MIN: CPT | Performed by: SURGERY

## 2017-07-25 ASSESSMENT — ENCOUNTER SYMPTOMS
EYE ITCHING: 0
COLOR CHANGE: 0
EYE DISCHARGE: 0
ALLERGIC/IMMUNOLOGIC NEGATIVE: 1
RESPIRATORY NEGATIVE: 1
EYE PAIN: 0
EYE REDNESS: 0
PHOTOPHOBIA: 0
GASTROINTESTINAL NEGATIVE: 1

## 2017-07-26 RX ORDER — MELOXICAM 15 MG/1
TABLET ORAL
Qty: 90 TABLET | Refills: 0 | OUTPATIENT
Start: 2017-07-26

## 2017-07-31 RX ORDER — BETAMETHASONE DIPROPIONATE 0.5 MG/G
CREAM TOPICAL
Qty: 60 G | Refills: 0 | Status: SHIPPED | OUTPATIENT
Start: 2017-07-31 | End: 2018-10-25 | Stop reason: SDUPTHER

## 2017-08-08 ENCOUNTER — TELEPHONE (OUTPATIENT)
Dept: SURGERY | Age: 57
End: 2017-08-08

## 2017-08-14 DIAGNOSIS — R25.2 CRAMP OF BOTH LOWER EXTREMITIES: ICD-10-CM

## 2017-08-15 RX ORDER — ONDANSETRON 4 MG/1
TABLET, FILM COATED ORAL
Qty: 30 TABLET | Refills: 0 | Status: SHIPPED | OUTPATIENT
Start: 2017-08-15 | End: 2017-08-18 | Stop reason: SDUPTHER

## 2017-08-18 ENCOUNTER — TELEPHONE (OUTPATIENT)
Dept: BARIATRICS/WEIGHT MGMT | Age: 57
End: 2017-08-18

## 2017-08-18 RX ORDER — ONDANSETRON 4 MG/1
TABLET, FILM COATED ORAL
Qty: 60 TABLET | Refills: 5 | Status: SHIPPED | OUTPATIENT
Start: 2017-08-18 | End: 2018-09-14 | Stop reason: SDUPTHER

## 2017-08-25 ENCOUNTER — OFFICE VISIT (OUTPATIENT)
Dept: PAIN MANAGEMENT | Age: 57
End: 2017-08-25

## 2017-08-25 VITALS
BODY MASS INDEX: 49.26 KG/M2 | DIASTOLIC BLOOD PRESSURE: 72 MMHG | HEART RATE: 72 BPM | WEIGHT: 287 LBS | SYSTOLIC BLOOD PRESSURE: 112 MMHG

## 2017-08-25 DIAGNOSIS — M17.10 PRIMARY LOCALIZED OSTEOARTHROSIS, LOWER LEG, UNSPECIFIED LATERALITY: ICD-10-CM

## 2017-08-25 DIAGNOSIS — M47.899 FACET SYNDROME: ICD-10-CM

## 2017-08-25 DIAGNOSIS — M79.7 FIBROMYALGIA: ICD-10-CM

## 2017-08-25 DIAGNOSIS — M54.16 LUMBAR RADICULITIS: ICD-10-CM

## 2017-08-25 DIAGNOSIS — G89.4 CHRONIC PAIN SYNDROME: ICD-10-CM

## 2017-08-25 DIAGNOSIS — M16.11 PRIMARY OSTEOARTHRITIS OF RIGHT HIP: ICD-10-CM

## 2017-08-25 DIAGNOSIS — M51.36 DDD (DEGENERATIVE DISC DISEASE), LUMBAR: ICD-10-CM

## 2017-08-25 PROCEDURE — 99213 OFFICE O/P EST LOW 20 MIN: CPT | Performed by: INTERNAL MEDICINE

## 2017-08-30 ENCOUNTER — TELEPHONE (OUTPATIENT)
Dept: INTERNAL MEDICINE CLINIC | Age: 57
End: 2017-08-30

## 2017-08-30 DIAGNOSIS — R42 DIZZINESS: ICD-10-CM

## 2017-08-30 RX ORDER — ROPINIROLE 1 MG/1
TABLET, FILM COATED ORAL
Qty: 180 TABLET | Refills: 0 | Status: SHIPPED | OUTPATIENT
Start: 2017-08-30 | End: 2017-11-25 | Stop reason: SDUPTHER

## 2017-08-30 RX ORDER — DIPHENOXYLATE HYDROCHLORIDE AND ATROPINE SULFATE 2.5; .025 MG/1; MG/1
1 TABLET ORAL 4 TIMES DAILY PRN
Qty: 20 TABLET | Refills: 0 | OUTPATIENT
Start: 2017-08-30 | End: 2017-09-04

## 2017-08-30 RX ORDER — MECLIZINE HCL 12.5 MG/1
TABLET ORAL
Qty: 30 TABLET | Refills: 0 | Status: SHIPPED | OUTPATIENT
Start: 2017-08-30 | End: 2017-10-08 | Stop reason: SDUPTHER

## 2017-08-31 ENCOUNTER — OFFICE VISIT (OUTPATIENT)
Dept: ORTHOPEDIC SURGERY | Age: 57
End: 2017-08-31

## 2017-08-31 VITALS
RESPIRATION RATE: 16 BRPM | HEIGHT: 64 IN | TEMPERATURE: 97.7 F | HEART RATE: 95 BPM | WEIGHT: 287 LBS | BODY MASS INDEX: 49 KG/M2 | SYSTOLIC BLOOD PRESSURE: 151 MMHG | DIASTOLIC BLOOD PRESSURE: 93 MMHG

## 2017-08-31 DIAGNOSIS — M17.10 PRIMARY LOCALIZED OSTEOARTHROSIS, LOWER LEG, UNSPECIFIED LATERALITY: Primary | ICD-10-CM

## 2017-08-31 DIAGNOSIS — Z96.653 STATUS POST TOTAL BILATERAL KNEE REPLACEMENT: ICD-10-CM

## 2017-08-31 PROCEDURE — 99213 OFFICE O/P EST LOW 20 MIN: CPT | Performed by: ORTHOPAEDIC SURGERY

## 2017-09-07 ENCOUNTER — OFFICE VISIT (OUTPATIENT)
Dept: BARIATRICS/WEIGHT MGMT | Age: 57
End: 2017-09-07

## 2017-09-07 VITALS
BODY MASS INDEX: 47.46 KG/M2 | SYSTOLIC BLOOD PRESSURE: 124 MMHG | RESPIRATION RATE: 16 BRPM | HEIGHT: 64 IN | DIASTOLIC BLOOD PRESSURE: 76 MMHG | HEART RATE: 93 BPM | WEIGHT: 278 LBS

## 2017-09-07 DIAGNOSIS — E66.01 MORBID OBESITY WITH BMI OF 40.0-44.9, ADULT (HCC): Primary | ICD-10-CM

## 2017-09-07 DIAGNOSIS — Z98.84 S/P LAPAROSCOPIC SLEEVE GASTRECTOMY: ICD-10-CM

## 2017-09-07 DIAGNOSIS — E66.9 DIABETES MELLITUS TYPE 2 IN OBESE (HCC): ICD-10-CM

## 2017-09-07 DIAGNOSIS — E46 MALNUTRITION (HCC): ICD-10-CM

## 2017-09-07 DIAGNOSIS — E11.69 DIABETES MELLITUS TYPE 2 IN OBESE (HCC): ICD-10-CM

## 2017-09-07 DIAGNOSIS — E55.9 VITAMIN D DEFICIENCY: ICD-10-CM

## 2017-09-07 DIAGNOSIS — K21.9 GASTROESOPHAGEAL REFLUX DISEASE WITHOUT ESOPHAGITIS: ICD-10-CM

## 2017-09-07 PROCEDURE — 99213 OFFICE O/P EST LOW 20 MIN: CPT | Performed by: SURGERY

## 2017-09-08 DIAGNOSIS — G89.4 CHRONIC PAIN SYNDROME: ICD-10-CM

## 2017-09-08 DIAGNOSIS — M47.899 FACET SYNDROME: ICD-10-CM

## 2017-09-08 DIAGNOSIS — M79.7 FIBROMYALGIA: ICD-10-CM

## 2017-09-08 DIAGNOSIS — M16.11 PRIMARY OSTEOARTHRITIS OF RIGHT HIP: ICD-10-CM

## 2017-09-08 DIAGNOSIS — M51.36 DDD (DEGENERATIVE DISC DISEASE), LUMBAR: ICD-10-CM

## 2017-09-11 RX ORDER — DULOXETIN HYDROCHLORIDE 30 MG/1
CAPSULE, DELAYED RELEASE ORAL
Qty: 90 CAPSULE | Refills: 0 | Status: SHIPPED | OUTPATIENT
Start: 2017-09-11 | End: 2017-10-03 | Stop reason: SDUPTHER

## 2017-09-11 RX ORDER — MELOXICAM 15 MG/1
TABLET ORAL
Qty: 90 TABLET | Refills: 0 | Status: SHIPPED | OUTPATIENT
Start: 2017-09-11 | End: 2017-10-03 | Stop reason: SDUPTHER

## 2017-09-23 DIAGNOSIS — R25.2 CRAMP OF BOTH LOWER EXTREMITIES: ICD-10-CM

## 2017-09-26 ENCOUNTER — TELEPHONE (OUTPATIENT)
Dept: PAIN MANAGEMENT | Age: 57
End: 2017-09-26

## 2017-10-03 ENCOUNTER — OFFICE VISIT (OUTPATIENT)
Dept: PAIN MANAGEMENT | Age: 57
End: 2017-10-03

## 2017-10-03 VITALS
BODY MASS INDEX: 49.47 KG/M2 | DIASTOLIC BLOOD PRESSURE: 69 MMHG | HEART RATE: 91 BPM | WEIGHT: 288.2 LBS | SYSTOLIC BLOOD PRESSURE: 118 MMHG

## 2017-10-03 DIAGNOSIS — M54.16 LUMBAR RADICULITIS: ICD-10-CM

## 2017-10-03 DIAGNOSIS — M17.10 PRIMARY LOCALIZED OSTEOARTHROSIS, LOWER LEG, UNSPECIFIED LATERALITY: ICD-10-CM

## 2017-10-03 DIAGNOSIS — M51.36 DDD (DEGENERATIVE DISC DISEASE), LUMBAR: ICD-10-CM

## 2017-10-03 DIAGNOSIS — M79.7 FIBROMYALGIA: ICD-10-CM

## 2017-10-03 DIAGNOSIS — M16.11 PRIMARY OSTEOARTHRITIS OF RIGHT HIP: ICD-10-CM

## 2017-10-03 DIAGNOSIS — G89.4 CHRONIC PAIN SYNDROME: ICD-10-CM

## 2017-10-03 DIAGNOSIS — M47.899 FACET SYNDROME: ICD-10-CM

## 2017-10-03 PROCEDURE — 99213 OFFICE O/P EST LOW 20 MIN: CPT | Performed by: INTERNAL MEDICINE

## 2017-10-03 RX ORDER — DULOXETIN HYDROCHLORIDE 30 MG/1
CAPSULE, DELAYED RELEASE ORAL
Qty: 90 CAPSULE | Refills: 0 | Status: SHIPPED | OUTPATIENT
Start: 2017-10-03 | End: 2017-11-02 | Stop reason: SDUPTHER

## 2017-10-03 RX ORDER — MELOXICAM 15 MG/1
TABLET ORAL
Qty: 90 TABLET | Refills: 0 | Status: SHIPPED | OUTPATIENT
Start: 2017-10-03 | End: 2017-11-02 | Stop reason: SDUPTHER

## 2017-10-03 RX ORDER — MELOXICAM 15 MG/1
TABLET ORAL
Qty: 90 TABLET | Refills: 0 | Status: SHIPPED | OUTPATIENT
Start: 2017-10-03 | End: 2017-11-30

## 2017-10-03 RX ORDER — TIZANIDINE 4 MG/1
TABLET ORAL
Qty: 270 TABLET | Refills: 0 | Status: SHIPPED | OUTPATIENT
Start: 2017-10-03 | End: 2017-11-02 | Stop reason: SDUPTHER

## 2017-10-03 RX ORDER — PREGABALIN 100 MG/1
100 CAPSULE ORAL 2 TIMES DAILY
Qty: 60 CAPSULE | Refills: 0 | Status: SHIPPED | OUTPATIENT
Start: 2017-10-03 | End: 2017-11-02 | Stop reason: SDUPTHER

## 2017-10-03 RX ORDER — HYDROCODONE BITARTRATE AND ACETAMINOPHEN 5; 325 MG/1; MG/1
TABLET ORAL
Qty: 15 TABLET | Refills: 0 | Status: SHIPPED | OUTPATIENT
Start: 2017-10-03 | End: 2017-11-30

## 2017-10-03 NOTE — PROGRESS NOTES
Marcella Campbellhop  1960  [id number]    HISTORY OF PRESENT ILLNESS:  Ms. Kendy Serna is a 61 y.o. female returns for a follow up visit for multiple medical problems. Her current presenting problems are   1. Fibromyalgia    2. Chronic pain syndrome    3. Facet syndrome    4. DDD (degenerative disc disease), lumbar    5. Primary osteoarthritis of right hip    6. Lumbar radiculitis    . As per information/history obtained from the PADT(patient assessment and documentation tool) - She complains of pain in the shoulders Bilateral and lower back with radiation to the buttocks and knees Bilateral She rates the pain 8/10 and describes it as sharp, aching. Pain is made worse by: movement, walking, standing, sitting, bending, lifting. Current treatment regimen has helped relieve about 30% of the pain. She denies side effects from the current pain regimen. Patient reports that since the last follow up visit the physical functioning is worse, family/social relationships are unchanged, mood is worse and sleep patterns are worse, and that the overall functioning is worse. Patient denies neurological bowel or bladder. Patient denies misusing/abusing her narcotic pain medications or using any illegal drugs. There are No indicators for possible drug abuse, addiction or diversion problems. Upon obtaining the medical history from Ms. Kendy Serna regarding today's office visit for her presenting problems, Patient states she has been using Embeda 30 mg daily but has nothing for BTP. She states she was on Norco 1-2 per day before but did not get it last month, uses it PRN. Ms. Kendy Serna mentions her back hurts the most, it is hard to walk or stand for too long. She mentions she is working part time. ALLERGIES: Patients list of allergies were reviewed     MEDICATIONS: Ms. Kendy Serna list of medications were reviewed. Her current medications are   Outpatient Medications Prior to Visit   Medication Sig Dispense Refill    MAGNESIUM-OXIDE 400 (241.3 Mg) MG TABS tablet TAKE 1 TABLET BY MOUTH TWICE DAILY 60 tablet 0    DULoxetine (CYMBALTA) 30 MG extended release capsule TAKE 1 CAPSULE BY MOUTH DAILY 90 capsule 0    meloxicam (MOBIC) 15 MG tablet TAKE 1 TABLET BY MOUTH EVERY DAY 90 tablet 0    rOPINIRole (REQUIP) 1 MG tablet TAKE 2 TABLETS BY MOUTH EVERY NIGHT 180 tablet 0    meclizine (ANTIVERT) 12.5 MG tablet TAKE 1 TABLET BY MOUTH THREE TIMES DAILY AS NEEDED FOR DIZZINESS 30 tablet 0    morphine-naltrexone (EMBEDA) 30-1.2 MG CPCR Take 1 capsule by mouth daily as needed for Pain .  30 capsule 0    ondansetron (ZOFRAN) 4 MG tablet TAKE 1 TABLET BY MOUTH EVERY 8 HOURS AS NEEDED FOR NAUSEA OR VOMITING 60 tablet 5    betamethasone dipropionate (DIPROLENE) 0.05 % cream APPLY EXTERNALLY TO THE AFFECTED AREA TWICE DAILY 60 g 0    levothyroxine (SYNTHROID) 88 MCG tablet TAKE 1 TABLET BY MOUTH DAILY 90 tablet 0    pregabalin (LYRICA) 100 MG capsule Take 1 capsule by mouth 2 times daily 60 capsule 3    DULoxetine (CYMBALTA) 30 MG extended release capsule TAKE 1 CAPSULE BY MOUTH DAILY 90 capsule 0    tiZANidine (ZANAFLEX) 4 MG tablet TAKE 1 CAPSULE BY MOUTH THREE TIMES DAILY 270 tablet 0    meloxicam (MOBIC) 15 MG tablet TAKE 1 TABLET BY MOUTH DAILY 90 tablet 0    diclofenac (PENNSAID) 2 % SOLN Apply 2-3 pumps to hip BID 1 Bottle 0    furosemide (LASIX) 40 MG tablet TAKE 1 TABLET BY MOUTH DAILY AS NEEDED FOR SWELLING 90 tablet 1    cephALEXin (KEFLEX) 500 MG capsule 2 tablets by mouth 1 hour before and 1 hour after procedure 12 capsule 1    esomeprazole (NEXIUM) 40 MG delayed release capsule Take 1 capsule by mouth every morning (before breakfast) 90 capsule 3    atorvastatin (LIPITOR) 40 MG tablet TAKE 1 TABLET BY MOUTH DAILY 90 tablet 3    SLOW RELEASE IRON 47.5 MG TBCR TAKE 2 TABLETS BY MOUTH TWICE DAILY WITH MEALS 60 tablet 0    QUEtiapine (SEROQUEL) 400 MG tablet Take 500 mg by mouth nightly Takes a 100mg and 400mg tab- total 500mg      amphetamine-dextroamphetamine (ADDERALL) 20 MG tablet Take 20 mg by mouth 2 times daily       VORTIoxetine HBr (BRINTELLIX) 20 MG TABS tablet Take 20 mg by mouth daily       alosetron (LOTRONEX) 0.5 MG tablet Take 0.5 mg by mouth daily as needed (diarrhea). Facility-Administered Medications Prior to Visit   Medication Dose Route Frequency Provider Last Rate Last Dose    cyanocobalamin injection 1,000 mcg  1,000 mcg Intramuscular Q30 Days Sonia Plascencia MD   1,000 mcg at 03/22/17 1413    cyanocobalamin injection 1,000 mcg  1,000 mcg Intramuscular Q30 Days Sonia Plascencia MD   1,000 mcg at 02/01/17 1112       SOCIAL/FAMILY/PAST MEDICAL HISTORY: Ms. Baron Fontanez, family and past medical history was reviewed. REVIEW OF SYSTEMS:    Respiratory: Negative for apnea, chest tightness and shortness of breath or change in baseline breathing. Gastrointestinal: Negative for nausea, vomiting, abdominal pain, diarrhea, constipation, blood in stool and abdominal distention. PHYSICAL EXAM:   Nursing note and vitals reviewed. /69 (Site: Left Arm, Position: Sitting)  Pulse 91  Wt 288 lb 3.2 oz (130.7 kg)  LMP 01/15/2014  BMI 49.47 kg/m2  Constitutional: She appears well-developed and well-nourished. No acute distress. Left arm bandaged, self mutilation scars   Skin: Skin is warm and dry, good turgor. No rash noted. She is not diaphoretic. Cardiovascular: Normal rate, regular rhythm, normal heart sounds, and does not have murmur. Pulmonary/Chest: Effort normal. No respiratory distress. She does not have wheezes in the lung fields. She has no rales. Neurological/Psychiatric:She is alert and oriented to person, place, and time. Coordination is  normal. Her mood isAppropriate and affect is Flat/blunted . IMPRESSION:   1. Fibromyalgia    2. Chronic pain syndrome    3. Facet syndrome    4. DDD (degenerative disc disease), lumbar    5. Primary osteoarthritis of right hip    6.  Lumbar radiculitis        PLAN:  Informed verbal consent was obtained  -[place] record was obtained and reviewed  for the last one year and no indicators of drug misuse  were found. Any other controlled substance prescriptions  seen on the record have been accounted for, I am aware of the patient receiving these medications. Issac Goodrich [place] record will be rechecked as part of office protocol. -CBT techniques- relaxation therapies such as biofeedback, mindfulness based stress reduction, imagery, cognitive restructuring, problem solving discussed with patient  -Continue with current regimen, Norco PRN only for BTP   -She was advised weight reduction, diet changes- 800-1200 jessica diet, diet diary, exercising, nutritional  consult increased physical activity as tolerated  -Going for a S K walk, wants an injection in the back, will schedule her for a TPI   -Urine drug screen with GC/MS for opiates and drugs of abuse was ordered and will follow up on results. Current Outpatient Prescriptions   Medication Sig Dispense Refill    MAGNESIUM-OXIDE 400 (241.3 Mg) MG TABS tablet TAKE 1 TABLET BY MOUTH TWICE DAILY 60 tablet 0    DULoxetine (CYMBALTA) 30 MG extended release capsule TAKE 1 CAPSULE BY MOUTH DAILY 90 capsule 0    meloxicam (MOBIC) 15 MG tablet TAKE 1 TABLET BY MOUTH EVERY DAY 90 tablet 0    rOPINIRole (REQUIP) 1 MG tablet TAKE 2 TABLETS BY MOUTH EVERY NIGHT 180 tablet 0    meclizine (ANTIVERT) 12.5 MG tablet TAKE 1 TABLET BY MOUTH THREE TIMES DAILY AS NEEDED FOR DIZZINESS 30 tablet 0    morphine-naltrexone (EMBEDA) 30-1.2 MG CPCR Take 1 capsule by mouth daily as needed for Pain .  30 capsule 0    ondansetron (ZOFRAN) 4 MG tablet TAKE 1 TABLET BY MOUTH EVERY 8 HOURS AS NEEDED FOR NAUSEA OR VOMITING 60 tablet 5    betamethasone dipropionate (DIPROLENE) 0.05 % cream APPLY EXTERNALLY TO THE AFFECTED AREA TWICE DAILY 60 g 0    levothyroxine (SYNTHROID) 88 MCG tablet TAKE 1 TABLET BY MOUTH DAILY 90 tablet 0    pregabalin (LYRICA) 100 MG capsule Take 1 capsule by mouth 2 times daily 60 capsule 3    DULoxetine (CYMBALTA) 30 MG extended release capsule TAKE 1 CAPSULE BY MOUTH DAILY 90 capsule 0    tiZANidine (ZANAFLEX) 4 MG tablet TAKE 1 CAPSULE BY MOUTH THREE TIMES DAILY 270 tablet 0    meloxicam (MOBIC) 15 MG tablet TAKE 1 TABLET BY MOUTH DAILY 90 tablet 0    diclofenac (PENNSAID) 2 % SOLN Apply 2-3 pumps to hip BID 1 Bottle 0    furosemide (LASIX) 40 MG tablet TAKE 1 TABLET BY MOUTH DAILY AS NEEDED FOR SWELLING 90 tablet 1    cephALEXin (KEFLEX) 500 MG capsule 2 tablets by mouth 1 hour before and 1 hour after procedure 12 capsule 1    esomeprazole (NEXIUM) 40 MG delayed release capsule Take 1 capsule by mouth every morning (before breakfast) 90 capsule 3    atorvastatin (LIPITOR) 40 MG tablet TAKE 1 TABLET BY MOUTH DAILY 90 tablet 3    SLOW RELEASE IRON 47.5 MG TBCR TAKE 2 TABLETS BY MOUTH TWICE DAILY WITH MEALS 60 tablet 0    QUEtiapine (SEROQUEL) 400 MG tablet Take 500 mg by mouth nightly Takes a 100mg and 400mg tab- total 500mg      amphetamine-dextroamphetamine (ADDERALL) 20 MG tablet Take 20 mg by mouth 2 times daily       VORTIoxetine HBr (BRINTELLIX) 20 MG TABS tablet Take 20 mg by mouth daily       alosetron (LOTRONEX) 0.5 MG tablet Take 0.5 mg by mouth daily as needed (diarrhea). Current Facility-Administered Medications   Medication Dose Route Frequency Provider Last Rate Last Dose    cyanocobalamin injection 1,000 mcg  1,000 mcg Intramuscular Q30 Days Cinthia Bennett MD   1,000 mcg at 03/22/17 1413    cyanocobalamin injection 1,000 mcg  1,000 mcg Intramuscular Q30 Days Cinthia Bennett MD   1,000 mcg at 02/01/17 1112     I will continue her current medication regimen  which is part of the above treatment schedule. It has been helping with Ms. Yap's chronic  medical problems which for this visit include:   Diagnoses of Fibromyalgia, Chronic pain syndrome, Facet syndrome, DDD (degenerative disc this documentation as scribed by   Philomena Cervantes MA in my presence and it is both accurate and complete

## 2017-10-08 DIAGNOSIS — R42 DIZZINESS: ICD-10-CM

## 2017-10-09 DIAGNOSIS — E11.69 DIABETES MELLITUS TYPE 2 IN OBESE (HCC): ICD-10-CM

## 2017-10-09 DIAGNOSIS — Z98.84 S/P LAPAROSCOPIC SLEEVE GASTRECTOMY: ICD-10-CM

## 2017-10-09 DIAGNOSIS — E66.9 DIABETES MELLITUS TYPE 2 IN OBESE (HCC): ICD-10-CM

## 2017-10-09 DIAGNOSIS — K21.9 GASTROESOPHAGEAL REFLUX DISEASE WITHOUT ESOPHAGITIS: ICD-10-CM

## 2017-10-09 DIAGNOSIS — E55.9 VITAMIN D DEFICIENCY: ICD-10-CM

## 2017-10-09 DIAGNOSIS — E46 MALNUTRITION (HCC): ICD-10-CM

## 2017-10-09 DIAGNOSIS — E66.01 MORBID OBESITY WITH BMI OF 40.0-44.9, ADULT (HCC): ICD-10-CM

## 2017-10-09 LAB
A/G RATIO: 1.7 (ref 1.1–2.2)
ALBUMIN SERPL-MCNC: 4 G/DL (ref 3.4–5)
ALP BLD-CCNC: 128 U/L (ref 40–129)
ALT SERPL-CCNC: 28 U/L (ref 10–40)
ANION GAP SERPL CALCULATED.3IONS-SCNC: 15 MMOL/L (ref 3–16)
AST SERPL-CCNC: 31 U/L (ref 15–37)
BASOPHILS ABSOLUTE: 0 K/UL (ref 0–0.2)
BASOPHILS RELATIVE PERCENT: 0.5 %
BILIRUB SERPL-MCNC: 0.5 MG/DL (ref 0–1)
BUN BLDV-MCNC: 13 MG/DL (ref 7–20)
CALCIUM SERPL-MCNC: 9.3 MG/DL (ref 8.3–10.6)
CHLORIDE BLD-SCNC: 105 MMOL/L (ref 99–110)
CHOLESTEROL, TOTAL: 164 MG/DL (ref 0–199)
CO2: 26 MMOL/L (ref 21–32)
CREAT SERPL-MCNC: 0.7 MG/DL (ref 0.6–1.1)
EOSINOPHILS ABSOLUTE: 0.1 K/UL (ref 0–0.6)
EOSINOPHILS RELATIVE PERCENT: 2.7 %
FOLATE: 10.26 NG/ML (ref 4.78–24.2)
GFR AFRICAN AMERICAN: >60
GFR NON-AFRICAN AMERICAN: >60
GLOBULIN: 2.3 G/DL
GLUCOSE BLD-MCNC: 89 MG/DL (ref 70–99)
HCT VFR BLD CALC: 32.9 % (ref 36–48)
HDLC SERPL-MCNC: 60 MG/DL (ref 40–60)
HEMOGLOBIN: 10.2 G/DL (ref 12–16)
IRON SATURATION: 8 % (ref 15–50)
IRON: 32 UG/DL (ref 37–145)
LDL CHOLESTEROL CALCULATED: 80 MG/DL
LYMPHOCYTES ABSOLUTE: 1.4 K/UL (ref 1–5.1)
LYMPHOCYTES RELATIVE PERCENT: 25.6 %
MCH RBC QN AUTO: 22.2 PG (ref 26–34)
MCHC RBC AUTO-ENTMCNC: 31 G/DL (ref 31–36)
MCV RBC AUTO: 71.6 FL (ref 80–100)
MONOCYTES ABSOLUTE: 0.5 K/UL (ref 0–1.3)
MONOCYTES RELATIVE PERCENT: 8.7 %
NEUTROPHILS ABSOLUTE: 3.4 K/UL (ref 1.7–7.7)
NEUTROPHILS RELATIVE PERCENT: 62.5 %
PDW BLD-RTO: 22.9 % (ref 12.4–15.4)
PLATELET # BLD: 219 K/UL (ref 135–450)
PMV BLD AUTO: 9.2 FL (ref 5–10.5)
POTASSIUM SERPL-SCNC: 4 MMOL/L (ref 3.5–5.1)
RBC # BLD: 4.6 M/UL (ref 4–5.2)
SODIUM BLD-SCNC: 146 MMOL/L (ref 136–145)
TOTAL IRON BINDING CAPACITY: 404 UG/DL (ref 260–445)
TOTAL PROTEIN: 6.3 G/DL (ref 6.4–8.2)
TRIGL SERPL-MCNC: 120 MG/DL (ref 0–150)
TSH REFLEX: 1.45 UIU/ML (ref 0.27–4.2)
VITAMIN B-12: 303 PG/ML (ref 211–911)
VITAMIN D 25-HYDROXY: 18.9 NG/ML
VLDLC SERPL CALC-MCNC: 24 MG/DL
WBC # BLD: 5.5 K/UL (ref 4–11)

## 2017-10-09 RX ORDER — MECLIZINE HCL 12.5 MG/1
TABLET ORAL
Qty: 30 TABLET | Refills: 0 | Status: SHIPPED | OUTPATIENT
Start: 2017-10-09 | End: 2017-11-05 | Stop reason: SDUPTHER

## 2017-10-10 LAB
ESTIMATED AVERAGE GLUCOSE: 125.5 MG/DL
HBA1C MFR BLD: 6 %

## 2017-10-12 ENCOUNTER — TELEPHONE (OUTPATIENT)
Dept: BARIATRICS/WEIGHT MGMT | Age: 57
End: 2017-10-12

## 2017-10-16 ENCOUNTER — TELEPHONE (OUTPATIENT)
Dept: BARIATRICS/WEIGHT MGMT | Age: 57
End: 2017-10-16

## 2017-10-16 DIAGNOSIS — D50.9 IRON DEFICIENCY ANEMIA, UNSPECIFIED IRON DEFICIENCY ANEMIA TYPE: ICD-10-CM

## 2017-10-16 DIAGNOSIS — E55.9 VITAMIN D DEFICIENCY: ICD-10-CM

## 2017-10-16 DIAGNOSIS — E55.9 VITAMIN D DEFICIENCY: Primary | ICD-10-CM

## 2017-10-16 RX ORDER — ERGOCALCIFEROL 1.25 MG/1
50000 CAPSULE ORAL WEEKLY
Qty: 12 CAPSULE | Refills: 0 | Status: SHIPPED | OUTPATIENT
Start: 2017-10-16 | End: 2017-10-16 | Stop reason: SDUPTHER

## 2017-10-16 RX ORDER — ERGOCALCIFEROL 1.25 MG/1
CAPSULE ORAL
Qty: 13 CAPSULE | Refills: 0 | Status: SHIPPED | OUTPATIENT
Start: 2017-10-16 | End: 2019-02-18

## 2017-10-16 RX ORDER — FERROUS SULFATE 325(65) MG
325 TABLET ORAL
Qty: 90 TABLET | Refills: 0 | Status: SHIPPED | OUTPATIENT
Start: 2017-10-16 | End: 2019-02-18

## 2017-10-16 NOTE — TELEPHONE ENCOUNTER
Left message for patient in reference to reviewing her  lab results. If patient calls back please verify her pharmacy, the best number to reach her at and if they approve for me to leave a detailed voicemail if I am unable to reach them. Thanks.

## 2017-10-25 RX ORDER — LEVOTHYROXINE SODIUM 88 UG/1
TABLET ORAL
Qty: 90 TABLET | Refills: 0 | Status: SHIPPED | OUTPATIENT
Start: 2017-10-25 | End: 2018-01-23 | Stop reason: SDUPTHER

## 2017-10-26 ENCOUNTER — OFFICE VISIT (OUTPATIENT)
Dept: BARIATRICS/WEIGHT MGMT | Age: 57
End: 2017-10-26

## 2017-10-26 VITALS
BODY MASS INDEX: 47.89 KG/M2 | HEIGHT: 64 IN | DIASTOLIC BLOOD PRESSURE: 76 MMHG | SYSTOLIC BLOOD PRESSURE: 114 MMHG | HEART RATE: 84 BPM | WEIGHT: 280.5 LBS

## 2017-10-26 DIAGNOSIS — E66.01 MORBID OBESITY WITH BMI OF 40.0-44.9, ADULT (HCC): Primary | ICD-10-CM

## 2017-10-26 DIAGNOSIS — Z71.3 DIETARY COUNSELING AND SURVEILLANCE: ICD-10-CM

## 2017-10-26 PROCEDURE — 99214 OFFICE O/P EST MOD 30 MIN: CPT | Performed by: FAMILY MEDICINE

## 2017-10-26 RX ORDER — LURASIDONE HYDROCHLORIDE 120 MG/1
TABLET, FILM COATED ORAL
Refills: 0 | COMMUNITY
Start: 2017-10-10 | End: 2019-02-18

## 2017-10-26 ASSESSMENT — ENCOUNTER SYMPTOMS
RESPIRATORY NEGATIVE: 1
EYES NEGATIVE: 1
GASTROINTESTINAL NEGATIVE: 1

## 2017-10-26 NOTE — PROGRESS NOTES
Patient: Jessie Cruz                      Encounter Date: 10/26/2017    YOB: 1960               Age: 61 y.o.    /76   Pulse 84   Ht 5' 4\" (1.626 m)   Wt 280 lb 8 oz (127.2 kg)   LMP 01/15/2014   BMI 48.15 kg/m²                                          Body mass index is 48.15 kg/m². Chief Complaint   Patient presents with    Weight Management     New MWM, s/p sleeve 2014       HPI:    61 y.o. female presents to establish care and join our medical weight loss program. She has a long-standing  history of obesity which started gradually. The problem is severe. She is status post sleeve gastrectomy in May 2014 by Dr. Zach Larson. Net weight loss to date: 48 pounds. She has been gaining some of her weight back. Risk factors include annual weight gain of >2 lbs (1 kg)/ year and sedentary lifestyle. Aggravating factors include poor diet and lack of physical activity. She hasn't been following the postsurgical dietary recommendations. She is skipping a lot of her meals and not getting enough protein throughout the day. She still has restriction from the surgery, tolerates approximately 1 cup of food at a time. She is struggling with grazing in eating out of boredom. She is actively being treated by her psychiatrist for depression and self-mutilating behavior. Triggers for weight gain? [x] Stress   [] Illness   [] Medications   [] Travel    [] Injury     [] Nightshift work   [] Insomnia   [] Other    Food triggers:   [x] Stress   [x] Boredom   [] Fast food   [] Eating out   [] Seeking reward   [] Social     Have you ever taken medications to help you lose weight? [] Yes  [x] No    Dietitian's assessment reviewed and addressed with patient.        Reviewed:  [x] Nutrition and the importance of regular protein intake       [x] Hidden CHO/carbohydrate sources  [x] Alcohol use  [x] Tobacco use  [x] Drug use- Denies   [x] Importance of exercise and reducing sedentary time        Allergies   Allergen Reactions    Fish-Derived Products Anaphylaxis    Polysporin [Bacitracin-Polymyxin B] Other (See Comments) and Itching     Pt breaks out in blisters. Pt breaks out in blisters.     Lorazepam Other (See Comments)     \"went crazy\"    Neomycin-Polymyxin-Gramicidin     Shellfish-Derived Products Hives    Sulfa Antibiotics Hives         Current Outpatient Prescriptions:     LATUDA 120 MG tablet, TK 1 T PO QD AT DINNER, Disp: , Rfl: 0    levothyroxine (SYNTHROID) 88 MCG tablet, TAKE 1 TABLET BY MOUTH DAILY, Disp: 90 tablet, Rfl: 0    ferrous sulfate 325 (65 Fe) MG tablet, Take 1 tablet by mouth Daily with supper, Disp: 90 tablet, Rfl: 0    vitamin D (ERGOCALCIFEROL) 62177 units CAPS capsule, TAKE 1 CAPSULE BY MOUTH 1 TIME A WEEK FOR 12 DOSES, Disp: 13 capsule, Rfl: 0    meclizine (ANTIVERT) 12.5 MG tablet, TAKE 1 TABLET BY MOUTH THREE TIMES DAILY AS NEEDED FOR DIZZINESS, Disp: 30 tablet, Rfl: 0    morphine-naltrexone (EMBEDA) 30-1.2 MG CPCR, Take 1 capsule by mouth daily as needed for Pain ., Disp: 30 capsule, Rfl: 0    HYDROcodone-acetaminophen (NORCO) 5-325 MG per tablet, Take 1/2 tablet by mouth every 6 hours PRN max 2 per day., Disp: 15 tablet, Rfl: 0    meloxicam (MOBIC) 15 MG tablet, TAKE 1 TABLET BY MOUTH EVERY DAY, Disp: 90 tablet, Rfl: 0    pregabalin (LYRICA) 100 MG capsule, Take 1 capsule by mouth 2 times daily, Disp: 60 capsule, Rfl: 0    tiZANidine (ZANAFLEX) 4 MG tablet, TAKE 1 CAPSULE BY MOUTH THREE TIMES DAILY, Disp: 270 tablet, Rfl: 0    MAGNESIUM-OXIDE 400 (241.3 Mg) MG TABS tablet, TAKE 1 TABLET BY MOUTH TWICE DAILY, Disp: 60 tablet, Rfl: 0    rOPINIRole (REQUIP) 1 MG tablet, TAKE 2 TABLETS BY MOUTH EVERY NIGHT, Disp: 180 tablet, Rfl: 0    ondansetron (ZOFRAN) 4 MG tablet, TAKE 1 TABLET BY MOUTH EVERY 8 HOURS AS NEEDED FOR NAUSEA OR VOMITING, Disp: 60 tablet, Rfl: 5    betamethasone dipropionate (DIPROLENE) 0.05 % cream, APPLY EXTERNALLY TO THE Diarrhea    Rotator cuff (capsule) sprain    Knee joint replacement by other means    Pain in limb    Venous (peripheral) insufficiency    Ulcer of lower limb (HCC)    Pain    Chronic pain syndrome    Elbow tendinitis    Fibromyalgia    Depression    Atypical chest pain    Hiatal hernia    Osteoarthritis of hip    DDD (degenerative disc disease), lumbar    Facet syndrome    Sore throat    Fatigue    Allergic rhinitis    Prediabetes    Plantar fasciitis, right    S/P laparoscopic sleeve gastrectomy    Nausea & vomiting    Weakness    Major depressive disorder, recurrent episode, severe (HCC)    Bilateral leg edema    Morbid obesity with BMI of 40.0-44.9, adult ([facility])    Hypothyroid    Borderline personality disorder    Light headed    Acute blood loss anemia    Self-mutilation    Cellulitis    Dyspnea    Vertigo    Scalp cyst    Primary insomnia    BPPV (benign paroxysmal positional vertigo)    Cough    Dizzy    Laceration of left upper extremity    Acute cystitis without hematuria    Open wound of left upper arm    Viral upper respiratory tract infection    Lumbar radiculitis    Leg skin lesion, left    Edema       Past Medical History:   Diagnosis Date    Acquired hyperlipoproteinemia     Anxiety     Arthritis     Bilateral lower extremity edema     Bipolar 1 disorder (HCC)     Chronic pain syndrome     DDD (degenerative disc disease), lumbar     Depression     Depression     Disease of gallbladder     DJD (degenerative joint disease) of hip     Edema 12/29/2009    Elbow tendinitis     Facet syndrome     Fibromyalgia     GERD (gastroesophageal reflux disease)     Hiatal hernia     History of blood transfusion     anemia    Hyperlipidemia     Insomnia     Osteoarthritis     Prediabetes     Self mutilating behavior     cutter    Sleep apnea     no CPAP    Suicidal ideation     Thyroid disorder     hypothyroidism       Past Surgical History: Procedure Laterality Date    ABDOMEN SURGERY  5/28/2014    LAPAROSCOPIC SLEEVE GASTRECTOMY, EXTENSIVE LYSIS OF ADHESIONS    ANKLE SURGERY      ANKLE SURGERY      APPENDECTOMY      open , Ex-lap    CHOLECYSTECTOMY      open    COLONOSCOPY  4/16/2013    dr Anatoliy Frederick ENDOSCOPY, COLON, DIAGNOSTIC      FOOT SURGERY Right 05/27/2016    ARTHROPLASTY RIGHT 5TH DIGIT      HYSTEROSCOPY N/A 12-30-13    Hysteroscopy Dilitation and Curettage    JOINT REPLACEMENT Bilateral     knee    OTHER SURGICAL HISTORY  9/11/2015    ARTHROPLASTY 5TH DIGIT LEFT FOOT          SHOULDER SURGERY      right    SHOULDER SURGERY Right     SLEEVE GASTROPLASTY  May 28, 2014    [id number]    TOTAL KNEE ARTHROPLASTY  12/10/10    Right    TOTAL KNEE ARTHROPLASTY  3/30/10    Left    UPPER GASTROINTESTINAL ENDOSCOPY  4/16/2013    dr Troy Donaldson ENDOSCOPY  3/20/2014    dr Jass Alanis       Family History   Problem Relation Age of Onset    Heart Disease Mother     Heart Failure Mother     High Cholesterol Mother     High Blood Pressure Mother     Stroke Mother     Birth Defects Mother     Other Mother      VV    High Blood Pressure Father     COPD Father     Diabetes Brother     Stroke Brother     Stroke Maternal Grandmother     Arthritis Maternal Grandmother     Heart Failure Brother     Birth Defects Maternal Grandfather     Arthritis Paternal Grandmother        Review of Systems   HENT: Negative. Eyes: Negative. Respiratory: Negative. Cardiovascular: Negative. Gastrointestinal: Negative. Endocrine: Negative. Musculoskeletal: Negative. Neurological: Negative. Psychiatric/Behavioral: Negative. Physical Exam   Constitutional: She is oriented to person, place, and time. She appears well-developed and well-nourished. Neck: No thyromegaly present. Cardiovascular: Normal rate, regular rhythm and normal heart sounds.   Exam reveals no gallop

## 2017-10-26 NOTE — PATIENT INSTRUCTIONS
meal.  · Details about each meal (like eating out or at home, eating alone, or with friends or family). · What you do to be active. Look and plan. As you track, look for patterns that you may want to change. Take note of:  · When you eat and whether you skip meals. · How often you eat out. · How many fruits and vegetables you eat. · When you eat beyond feeling full. · When and why you eat for reasons other than being hungry. When you stray from your plan, don't get upset. Figure out what made you slip up and how you can fix it. Can you take medicines or have surgery to lose weight? Before your doctor will prescribe medicines or surgery, he or she will probably want you to be more active and follow your healthy eating plan for a period of time. These habits are key lifelong changes for managing your weight, with or without other medical treatment. And these changes can help you avoid weight-related health problems. Follow-up care is a key part of your treatment and safety. Be sure to make and go to all appointments, and call your doctor if you are having problems. It's also a good idea to know your test results and keep a list of the medicines you take. Where can you learn more? Go to https://[email or web address] [place] [employer] [email or web address]. org and sign in to your [employer] account. Enter N111 in the [employer]alar box to learn more about \"Learning About Obesity. \"     If you do not have an account, please click on the \"Sign Up Now\" link. Current as of: October 13, 2016  Content Version: 11.3  © 0550-2077 [employer]. Care instructions adapted under license by [facility] ([facility]). If you have questions about a medical condition or this instruction, always ask your healthcare professional. [address] any warranty or liability for your use of this information.

## 2017-10-26 NOTE — PROGRESS NOTES
Assessment  Nutritional Needs: RMR=(9.99 x 162.6) + (6.25 x 127.2) - (4.92 x 61 y.o.) -161= 1973 kcal x 1.4 (sedentary activity factor)= 2762 kcal - 1000 (for 2 lb weight loss/week)= 1762 kcal.    Plan  Plan/Recommendations: General weight loss/lifestyle modification strategies discussed (elicit support from others; identify saboteurs; non-food rewards, etc). Optifast:  Pt is not interested  Diet Medications:  Pt is interested     PES Statement:  Overweight/Obesity related to increased caloric intake and decreased physical activity as evidenced by BMI. Body mass index is 48.15 kg/m². .    Will follow up as necessary.     Raquel Anna

## 2017-10-27 ENCOUNTER — PROCEDURE VISIT (OUTPATIENT)
Dept: PAIN MANAGEMENT | Age: 57
End: 2017-10-27

## 2017-10-27 VITALS
WEIGHT: 280 LBS | HEART RATE: 91 BPM | BODY MASS INDEX: 47.8 KG/M2 | HEIGHT: 64 IN | DIASTOLIC BLOOD PRESSURE: 63 MMHG | SYSTOLIC BLOOD PRESSURE: 104 MMHG

## 2017-10-27 DIAGNOSIS — M79.7 FIBROMYALGIA: ICD-10-CM

## 2017-10-27 DIAGNOSIS — G89.4 CHRONIC PAIN SYNDROME: ICD-10-CM

## 2017-10-27 PROCEDURE — 20553 NJX 1/MLT TRIGGER POINTS 3/>: CPT | Performed by: INTERNAL MEDICINE

## 2017-10-27 PROCEDURE — 99024 POSTOP FOLLOW-UP VISIT: CPT | Performed by: INTERNAL MEDICINE

## 2017-10-27 RX ORDER — TRIAMCINOLONE ACETONIDE 40 MG/ML
40 INJECTION, SUSPENSION INTRA-ARTICULAR; INTRAMUSCULAR ONCE
Status: DISCONTINUED | OUTPATIENT
Start: 2017-10-27 | End: 2019-10-01

## 2017-10-27 NOTE — PROGRESS NOTES
Patient came in today with increase pain in low back pain. She complains of spasms, and difficulty to walk or do ADLs. Diagnosis Fibromyalgia. Informed verbal consent was obtained from the patient. Risks and benefits of the procedure were explained. Complications of the procedure and side effects of kenalog/ lidocaine were discussed with patient. Using 0.25% marcaine and 1cc of kenalog, the the tender trigger point areas in the  bilateral paraspinal lumbar muscles -erector spinae and longgissmus muscles were injected under aseptic condition. Mobilization attempted by stretching. Patient tolerated procedure well. Adv to apply ice today.

## 2017-10-29 DIAGNOSIS — R25.2 CRAMP OF BOTH LOWER EXTREMITIES: ICD-10-CM

## 2017-11-02 ENCOUNTER — OFFICE VISIT (OUTPATIENT)
Dept: PAIN MANAGEMENT | Age: 57
End: 2017-11-02

## 2017-11-02 VITALS
SYSTOLIC BLOOD PRESSURE: 106 MMHG | DIASTOLIC BLOOD PRESSURE: 62 MMHG | WEIGHT: 280 LBS | BODY MASS INDEX: 48.06 KG/M2 | HEART RATE: 94 BPM

## 2017-11-02 DIAGNOSIS — M47.899 FACET SYNDROME: ICD-10-CM

## 2017-11-02 DIAGNOSIS — M51.36 DDD (DEGENERATIVE DISC DISEASE), LUMBAR: ICD-10-CM

## 2017-11-02 DIAGNOSIS — G89.4 CHRONIC PAIN SYNDROME: ICD-10-CM

## 2017-11-02 DIAGNOSIS — M16.11 PRIMARY OSTEOARTHRITIS OF RIGHT HIP: ICD-10-CM

## 2017-11-02 DIAGNOSIS — M17.10 PRIMARY LOCALIZED OSTEOARTHROSIS, LOWER LEG, UNSPECIFIED LATERALITY: ICD-10-CM

## 2017-11-02 DIAGNOSIS — M54.16 LUMBAR RADICULITIS: ICD-10-CM

## 2017-11-02 DIAGNOSIS — M79.7 FIBROMYALGIA: ICD-10-CM

## 2017-11-02 PROCEDURE — 99213 OFFICE O/P EST LOW 20 MIN: CPT | Performed by: INTERNAL MEDICINE

## 2017-11-02 RX ORDER — MELOXICAM 15 MG/1
TABLET ORAL
Qty: 90 TABLET | Refills: 0 | Status: SHIPPED | OUTPATIENT
Start: 2017-11-02 | End: 2018-01-12

## 2017-11-02 RX ORDER — PREGABALIN 100 MG/1
100 CAPSULE ORAL 2 TIMES DAILY
Qty: 60 CAPSULE | Refills: 0 | Status: SHIPPED | OUTPATIENT
Start: 2017-11-02 | End: 2017-11-30 | Stop reason: SDUPTHER

## 2017-11-02 RX ORDER — DULOXETIN HYDROCHLORIDE 30 MG/1
CAPSULE, DELAYED RELEASE ORAL
Qty: 90 CAPSULE | Refills: 0 | Status: SHIPPED | OUTPATIENT
Start: 2017-11-02 | End: 2018-01-12 | Stop reason: SDUPTHER

## 2017-11-02 RX ORDER — TIZANIDINE 4 MG/1
TABLET ORAL
Qty: 270 TABLET | Refills: 0 | Status: SHIPPED | OUTPATIENT
Start: 2017-11-02 | End: 2018-01-12 | Stop reason: SDUPTHER

## 2017-11-02 NOTE — PROGRESS NOTES
Young Dose  1960  [id number]    HISTORY OF PRESENT ILLNESS:  Ms. Morris Sykes is a 61 y.o. female returns for a follow up visit for multiple medical problems. Her current presenting problems are   1. DDD (degenerative disc disease), lumbar    2. Chronic pain syndrome    3. Fibromyalgia    4. Primary osteoarthritis of right hip    5. Facet syndrome    6. Lumbar radiculitis    . As per information/history obtained from the PADT(patient assessment and documentation tool) - She complains of pain in the shoulders Bilateral and lower back with radiation to the buttocks and knees Right She rates the pain 6/10 and describes it as sharp, aching. Pain is made worse by: movement. Current treatment regimen has helped relieve about 50% of the pain. She denies side effects from the current pain regimen. Patient reports that since the last follow up visit the physical functioning is unchanged, family/social relationships are unchanged, mood is worse and sleep patterns are worse, and that the overall functioning is unchanged. Patient denies neurological bowel or bladder. Patient denies misusing/abusing her narcotic pain medications or using any illegal drugs. There are No indicators for possible drug abuse, addiction or diversion problems. Upon obtaining the medical history from Ms. Morris Sykes regarding today's office visit for her presenting problems, Patient states she did better with the injection. She states her back and legs have been doing better. Ms. Morris Sykes mentions using Embeda and Norco. She denies any side effects with the the medications. She mentions using Norco only very seldom, has 16 of the 15 pills left. Patient states her sleep is fair. Has fairly normal sleep latency. Averages about 4-6 hours of sleep a night. Denies any signs of sleep apnea. Feels somewhat rested in the morning. ALLERGIES: Patients list of allergies were reviewed     MEDICATIONS: Ms. Morris Sykes list of medications were reviewed. Her current medications are   Outpatient Medications Prior to Visit   Medication Sig Dispense Refill    MAGNESIUM-OXIDE 400 (241.3 Mg) MG TABS tablet TAKE 1 TABLET BY MOUTH TWICE DAILY 60 tablet 0    LATUDA 120 MG tablet TK 1 T PO QD AT DINNER  0    levothyroxine (SYNTHROID) 88 MCG tablet TAKE 1 TABLET BY MOUTH DAILY 90 tablet 0    ferrous sulfate 325 (65 Fe) MG tablet Take 1 tablet by mouth Daily with supper 90 tablet 0    vitamin D (ERGOCALCIFEROL) 87692 units CAPS capsule TAKE 1 CAPSULE BY MOUTH 1 TIME A WEEK FOR 12 DOSES 13 capsule 0    meclizine (ANTIVERT) 12.5 MG tablet TAKE 1 TABLET BY MOUTH THREE TIMES DAILY AS NEEDED FOR DIZZINESS 30 tablet 0    morphine-naltrexone (EMBEDA) 30-1.2 MG CPCR Take 1 capsule by mouth daily as needed for Pain . 30 capsule 0    HYDROcodone-acetaminophen (NORCO) 5-325 MG per tablet Take 1/2 tablet by mouth every 6 hours PRN max 2 per day.  15 tablet 0    DULoxetine (CYMBALTA) 30 MG extended release capsule TAKE 1 CAPSULE BY MOUTH DAILY 90 capsule 0    meloxicam (MOBIC) 15 MG tablet TAKE 1 TABLET BY MOUTH EVERY DAY 90 tablet 0    pregabalin (LYRICA) 100 MG capsule Take 1 capsule by mouth 2 times daily 60 capsule 0    tiZANidine (ZANAFLEX) 4 MG tablet TAKE 1 CAPSULE BY MOUTH THREE TIMES DAILY 270 tablet 0    meloxicam (MOBIC) 15 MG tablet TAKE 1 TABLET BY MOUTH DAILY 90 tablet 0    diclofenac (PENNSAID) 2 % SOLN Apply 2-3 pumps to hip BID 1 Bottle 0    rOPINIRole (REQUIP) 1 MG tablet TAKE 2 TABLETS BY MOUTH EVERY NIGHT 180 tablet 0    ondansetron (ZOFRAN) 4 MG tablet TAKE 1 TABLET BY MOUTH EVERY 8 HOURS AS NEEDED FOR NAUSEA OR VOMITING 60 tablet 5    betamethasone dipropionate (DIPROLENE) 0.05 % cream APPLY EXTERNALLY TO THE AFFECTED AREA TWICE DAILY 60 g 0    DULoxetine (CYMBALTA) 30 MG extended release capsule TAKE 1 CAPSULE BY MOUTH DAILY 90 capsule 0    furosemide (LASIX) 40 MG tablet TAKE 1 TABLET BY MOUTH DAILY AS NEEDED FOR SWELLING 90 tablet 1    cephALEXin (KEFLEX) meloxicam (MOBIC) 15 MG tablet TAKE 1 TABLET BY MOUTH EVERY DAY 90 tablet 0    pregabalin (LYRICA) 100 MG capsule Take 1 capsule by mouth 2 times daily 60 capsule 0    tiZANidine (ZANAFLEX) 4 MG tablet TAKE 1 CAPSULE BY MOUTH THREE TIMES DAILY 270 tablet 0    meloxicam (MOBIC) 15 MG tablet TAKE 1 TABLET BY MOUTH DAILY 90 tablet 0    diclofenac (PENNSAID) 2 % SOLN Apply 2-3 pumps to hip BID 1 Bottle 0    rOPINIRole (REQUIP) 1 MG tablet TAKE 2 TABLETS BY MOUTH EVERY NIGHT 180 tablet 0    ondansetron (ZOFRAN) 4 MG tablet TAKE 1 TABLET BY MOUTH EVERY 8 HOURS AS NEEDED FOR NAUSEA OR VOMITING 60 tablet 5    betamethasone dipropionate (DIPROLENE) 0.05 % cream APPLY EXTERNALLY TO THE AFFECTED AREA TWICE DAILY 60 g 0    DULoxetine (CYMBALTA) 30 MG extended release capsule TAKE 1 CAPSULE BY MOUTH DAILY 90 capsule 0    furosemide (LASIX) 40 MG tablet TAKE 1 TABLET BY MOUTH DAILY AS NEEDED FOR SWELLING 90 tablet 1    cephALEXin (KEFLEX) 500 MG capsule 2 tablets by mouth 1 hour before and 1 hour after procedure 12 capsule 1    esomeprazole (NEXIUM) 40 MG delayed release capsule Take 1 capsule by mouth every morning (before breakfast) 90 capsule 3    atorvastatin (LIPITOR) 40 MG tablet TAKE 1 TABLET BY MOUTH DAILY 90 tablet 3    QUEtiapine (SEROQUEL) 400 MG tablet Take 500 mg by mouth nightly Takes a 100mg and 400mg tab- total 500mg      amphetamine-dextroamphetamine (ADDERALL) 20 MG tablet Take 20 mg by mouth 2 times daily       VORTIoxetine HBr (BRINTELLIX) 20 MG TABS tablet Take 20 mg by mouth daily       alosetron (LOTRONEX) 0.5 MG tablet Take 0.5 mg by mouth daily as needed (diarrhea).        Current Facility-Administered Medications   Medication Dose Route Frequency Provider Last Rate Last Dose    triamcinolone acetonide (KENALOG-40) injection 40 mg  40 mg Intramuscular Once Mell Morales MD        cyanocobalamin injection 1,000 mcg  1,000 mcg Intramuscular Q30 Days Mabel Patel MD   1,000 mcg at 03/22/17 1413    cyanocobalamin injection 1,000 mcg  1,000 mcg Intramuscular Q30 Days Ermias Ochoa MD   1,000 mcg at 02/01/17 1112     I will continue her current medication regimen  which is part of the above treatment schedule. It has been helping with Ms. Yap's chronic  medical problems which for this visit include:   Diagnoses of DDD (degenerative disc disease), lumbar, Chronic pain syndrome, Fibromyalgia, Primary osteoarthritis of right hip, Facet syndrome, and Lumbar radiculitis were pertinent to this visit. Risks and benefits of the medications and other alternative treatments  including no treatment were discussed with the patient. The common side effects of these medications were also explained to the patient. Informed verbal consent was obtained. Goals of current treatment regimen include improvement in pain, restoration of functioning- with focus on improvement in physical performance, general activity, work or disability,emotional distress, health care utilization and  decreased medication consumption. Will continue to monitor progress towards achieving/maintaining therapeutic goals with special emphasis on  1. Improvement in perceived interfernce  of pain with ADL's. Ability to do home exercises independently. Ability to do household chores indoor and/or outdoor work and social and leisure activities. Improve psychosocial and physical functioning. - she is showing progression towards this treatment goal with the current regimen. She was advised against drinking alcohol with the narcotic pain medicines, advised against driving or handling machinery while adjusting the dose of medicines or if having cognitive  issues related to the current medications. Risk of overdose and death, if medicines not taken as prescribed, were also discussed. If the patient develops new symptoms or if the symptoms worsen, the patient should call the office.     While transcribing every attempt was made to maintain

## 2017-11-05 DIAGNOSIS — R42 DIZZINESS: ICD-10-CM

## 2017-11-06 RX ORDER — MECLIZINE HCL 12.5 MG/1
TABLET ORAL
Qty: 30 TABLET | Refills: 0 | Status: SHIPPED | OUTPATIENT
Start: 2017-11-06 | End: 2017-12-10 | Stop reason: SDUPTHER

## 2017-11-07 ENCOUNTER — TELEPHONE (OUTPATIENT)
Dept: INTERNAL MEDICINE CLINIC | Age: 57
End: 2017-11-07

## 2017-11-07 NOTE — TELEPHONE ENCOUNTER
Patient states she can see her heart beat in her right carotid artery but not her left, should she be concerned about it ? Please call her at [phone].

## 2017-11-25 RX ORDER — ROPINIROLE 1 MG/1
TABLET, FILM COATED ORAL
Qty: 180 TABLET | Refills: 0 | Status: SHIPPED | OUTPATIENT
Start: 2017-11-25 | End: 2018-03-05 | Stop reason: SDUPTHER

## 2017-11-30 ENCOUNTER — OFFICE VISIT (OUTPATIENT)
Dept: PAIN MANAGEMENT | Age: 57
End: 2017-11-30

## 2017-11-30 VITALS
HEART RATE: 83 BPM | DIASTOLIC BLOOD PRESSURE: 58 MMHG | SYSTOLIC BLOOD PRESSURE: 103 MMHG | WEIGHT: 281 LBS | BODY MASS INDEX: 48.23 KG/M2

## 2017-11-30 DIAGNOSIS — M51.36 DDD (DEGENERATIVE DISC DISEASE), LUMBAR: ICD-10-CM

## 2017-11-30 DIAGNOSIS — G89.4 CHRONIC PAIN SYNDROME: ICD-10-CM

## 2017-11-30 DIAGNOSIS — M16.11 PRIMARY OSTEOARTHRITIS OF RIGHT HIP: ICD-10-CM

## 2017-11-30 DIAGNOSIS — M47.899 FACET SYNDROME: ICD-10-CM

## 2017-11-30 DIAGNOSIS — M54.16 LUMBAR RADICULITIS: ICD-10-CM

## 2017-11-30 DIAGNOSIS — M79.7 FIBROMYALGIA: ICD-10-CM

## 2017-11-30 DIAGNOSIS — M17.10 PRIMARY LOCALIZED OSTEOARTHROSIS, LOWER LEG, UNSPECIFIED LATERALITY: ICD-10-CM

## 2017-11-30 DIAGNOSIS — M17.10 PRIMARY LOCALIZED OSTEOARTHROSIS OF LOWER LEG, UNSPECIFIED LATERALITY: ICD-10-CM

## 2017-11-30 PROCEDURE — 99213 OFFICE O/P EST LOW 20 MIN: CPT | Performed by: INTERNAL MEDICINE

## 2017-11-30 RX ORDER — PREGABALIN 100 MG/1
100 CAPSULE ORAL 2 TIMES DAILY
Qty: 60 CAPSULE | Refills: 0 | Status: SHIPPED | OUTPATIENT
Start: 2017-11-30 | End: 2018-01-12 | Stop reason: SDUPTHER

## 2017-11-30 RX ORDER — HYDROCODONE BITARTRATE AND ACETAMINOPHEN 5; 325 MG/1; MG/1
TABLET ORAL
Qty: 20 TABLET | Refills: 0 | Status: SHIPPED | OUTPATIENT
Start: 2017-11-30 | End: 2018-01-12 | Stop reason: SDUPTHER

## 2017-11-30 NOTE — PROGRESS NOTES
Take 20 mg by mouth daily       alosetron (LOTRONEX) 0.5 MG tablet Take 0.5 mg by mouth daily as needed (diarrhea).  HYDROcodone-acetaminophen (NORCO) 5-325 MG per tablet Take 1/2 tablet by mouth every 6 hours PRN max 2 per day. 15 tablet 0    meloxicam (MOBIC) 15 MG tablet TAKE 1 TABLET BY MOUTH DAILY 90 tablet 0    diclofenac (PENNSAID) 2 % SOLN Apply 2-3 pumps to hip BID 1 Bottle 0    DULoxetine (CYMBALTA) 30 MG extended release capsule TAKE 1 CAPSULE BY MOUTH DAILY 90 capsule 0    furosemide (LASIX) 40 MG tablet TAKE 1 TABLET BY MOUTH DAILY AS NEEDED FOR SWELLING 90 tablet 1    cephALEXin (KEFLEX) 500 MG capsule 2 tablets by mouth 1 hour before and 1 hour after procedure 12 capsule 1     Facility-Administered Medications Prior to Visit   Medication Dose Route Frequency Provider Last Rate Last Dose    triamcinolone acetonide (KENALOG-40) injection 40 mg  40 mg Intramuscular Once Salvador Villela MD        cyanocobalamin injection 1,000 mcg  1,000 mcg Intramuscular Q30 Days Janusz Marcos MD   1,000 mcg at 03/22/17 1413    cyanocobalamin injection 1,000 mcg  1,000 mcg Intramuscular Q30 Days Janusz Marcos MD   1,000 mcg at 02/01/17 1112       SOCIAL/FAMILY/PAST MEDICAL HISTORY: Ms. Denita Dneis, family and past medical history was reviewed. REVIEW OF SYSTEMS:    Respiratory: Negative for apnea, chest tightness and shortness of breath or change in baseline breathing. Gastrointestinal: Negative for nausea, vomiting, abdominal pain, diarrhea, constipation, blood in stool and abdominal distention. PHYSICAL EXAM:   Nursing note and vitals reviewed. BP (!) 103/58 (Site: Right Arm, Position: Sitting)   Pulse 83   Wt 281 lb (127.5 kg)   LMP 01/15/2014   Breastfeeding? No   BMI 48.23 kg/m²   Constitutional: She appears well-developed and well-nourished. No acute distress. Skin: Skin is warm and dry, good turgor. No rash noted. She is not diaphoretic.   Cardiovascular: Normal rate, regular rhythm, normal heart sounds, and does not have murmur. Pulmonary/Chest: Effort normal. No respiratory distress. She does not have wheezes in the lung fields. She has no rales. Neurological/Psychiatric:She is alert and oriented to person, place, and time. Coordination is  normal. Her mood isAppropriate and affect is Flat/blunted . IMPRESSION:   1. Chronic pain syndrome    2. Primary localized osteoarthrosis, lower leg, unspecified laterality    3. Facet syndrome    4. Lumbar radiculitis    5. DDD (degenerative disc disease), lumbar    6. Fibromyalgia    7. Primary osteoarthritis of right hip    8. Primary localized osteoarthrosis of lower leg, unspecified laterality        PLAN:  Informed verbal consent was obtained  -Continue with current regimen   -ROM/stretching exercises as advised   -Continue with Embeda and [place] PRN   -She was advised to increase fluids ( 5-7  glasses of fluid daily), limit caffeine, avoid cheese products, increase dietary fiber, increase activity and exercise as tolerated and relax regularly and enjoy meals  -Adv Biofeedback, relaxation and meditation techniques. Referral to psychologist for CBT was also discussed with patient     Current Outpatient Prescriptions   Medication Sig Dispense Refill    rOPINIRole (REQUIP) 1 MG tablet TAKE 2 TABLETS BY MOUTH EVERY NIGHT 180 tablet 0    meclizine (ANTIVERT) 12.5 MG tablet TAKE 1 TABLET BY MOUTH THREE TIMES DAILY AS NEEDED FOR DIZZINESS 30 tablet 0    morphine-naltrexone (EMBEDA) 30-1.2 MG CPCR Take 1 capsule by mouth daily as needed for Pain .  28 capsule 0    DULoxetine (CYMBALTA) 30 MG extended release capsule TAKE 1 CAPSULE BY MOUTH DAILY 90 capsule 0    meloxicam (MOBIC) 15 MG tablet TAKE 1 TABLET BY MOUTH EVERY DAY 90 tablet 0    pregabalin (LYRICA) 100 MG capsule Take 1 capsule by mouth 2 times daily 60 capsule 0    tiZANidine (ZANAFLEX) 4 MG tablet TAKE 1 CAPSULE BY MOUTH THREE TIMES DAILY 270 tablet 0    MAGNESIUM-OXIDE localized osteoarthrosis, lower leg, unspecified laterality, Facet syndrome, Lumbar radiculitis, DDD (degenerative disc disease), lumbar, Fibromyalgia, Primary osteoarthritis of right hip, and Primary localized osteoarthrosis of lower leg, unspecified laterality were pertinent to this visit. Risks and benefits of the medications and other alternative treatments  including no treatment were discussed with the patient. The common side effects of these medications were also explained to the patient. Informed verbal consent was obtained. Goals of current treatment regimen include improvement in pain, restoration of functioning- with focus on improvement in physical performance, general activity, work or disability,emotional distress, health care utilization and  decreased medication consumption. Will continue to monitor progress towards achieving/maintaining therapeutic goals with special emphasis on  1. Improvement in perceived interfernce  of pain with ADL's. Ability to do home exercises independently. Ability to do household chores indoor and/or outdoor work and social and leisure activities. Improve psychosocial and physical functioning. - she is showing progression towards this treatment goal with the current regimen. She was advised against drinking alcohol with the narcotic pain medicines, advised against driving or handling machinery while adjusting the dose of medicines or if having cognitive  issues related to the current medications. Risk of overdose and death, if medicines not taken as prescribed, were also discussed. If the patient develops new symptoms or if the symptoms worsen, the patient should call the office. While transcribing every attempt was made to maintain the accuracy of the note in terms of it's contents,there may have been some errors made inadvertently. Thank you for allowing me to participate in the care of this patient.     Liliane Mcclellan MD.    Cc: Maine Blanco MD    I, Philomena Cervantes am scribing for and in the presence of Dr. Lawyer Zhong.    11/30/17  9:27 AM  Morris Moses MA   I, Dr. Lawyer Zhong, personally performed the services described in this documentation as scribed by   Philomena Cervantes MA in my presence and it is both accurate and complete

## 2017-12-06 DIAGNOSIS — R25.2 CRAMP OF BOTH LOWER EXTREMITIES: ICD-10-CM

## 2017-12-10 DIAGNOSIS — R42 DIZZINESS: ICD-10-CM

## 2017-12-11 RX ORDER — MECLIZINE HCL 12.5 MG/1
TABLET ORAL
Qty: 30 TABLET | Refills: 0 | Status: SHIPPED | OUTPATIENT
Start: 2017-12-11 | End: 2019-03-05

## 2018-01-09 DIAGNOSIS — R25.2 CRAMP OF BOTH LOWER EXTREMITIES: ICD-10-CM

## 2018-01-12 ENCOUNTER — OFFICE VISIT (OUTPATIENT)
Dept: PAIN MANAGEMENT | Age: 58
End: 2018-01-12

## 2018-01-12 VITALS
BODY MASS INDEX: 48.06 KG/M2 | SYSTOLIC BLOOD PRESSURE: 109 MMHG | DIASTOLIC BLOOD PRESSURE: 71 MMHG | HEART RATE: 81 BPM | WEIGHT: 280 LBS

## 2018-01-12 DIAGNOSIS — G89.4 CHRONIC PAIN SYNDROME: ICD-10-CM

## 2018-01-12 DIAGNOSIS — M54.16 LUMBAR RADICULITIS: ICD-10-CM

## 2018-01-12 DIAGNOSIS — M47.899 FACET SYNDROME: ICD-10-CM

## 2018-01-12 DIAGNOSIS — M51.36 DDD (DEGENERATIVE DISC DISEASE), LUMBAR: ICD-10-CM

## 2018-01-12 DIAGNOSIS — Z72.89 SELF MUTILATING BEHAVIOR: ICD-10-CM

## 2018-01-12 DIAGNOSIS — F51.01 PRIMARY INSOMNIA: ICD-10-CM

## 2018-01-12 DIAGNOSIS — M16.11 PRIMARY OSTEOARTHRITIS OF RIGHT HIP: ICD-10-CM

## 2018-01-12 DIAGNOSIS — F33.1 MODERATE EPISODE OF RECURRENT MAJOR DEPRESSIVE DISORDER (HCC): ICD-10-CM

## 2018-01-12 DIAGNOSIS — M79.7 FIBROMYALGIA: ICD-10-CM

## 2018-01-12 LAB
BILIRUBIN URINE: NEGATIVE
BLOOD, URINE: NEGATIVE
CLARITY: ABNORMAL
COLOR: YELLOW
EPITHELIAL CELLS, UA: 3 /HPF (ref 0–5)
GLUCOSE URINE: NEGATIVE MG/DL
HYALINE CASTS: 1 /LPF (ref 0–8)
KETONES, URINE: NEGATIVE MG/DL
LEUKOCYTE ESTERASE, URINE: NEGATIVE
MICROSCOPIC EXAMINATION: YES
NITRITE, URINE: NEGATIVE
PH UA: 7.5
PROTEIN UA: NEGATIVE MG/DL
RBC UA: 3 /HPF (ref 0–4)
SPECIFIC GRAVITY UA: 1.02
URINE REFLEX TO CULTURE: ABNORMAL
URINE TYPE: ABNORMAL
UROBILINOGEN, URINE: 0.2 E.U./DL
WBC UA: 2 /HPF (ref 0–5)

## 2018-01-12 PROCEDURE — 20553 NJX 1/MLT TRIGGER POINTS 3/>: CPT | Performed by: INTERNAL MEDICINE

## 2018-01-12 PROCEDURE — 99214 OFFICE O/P EST MOD 30 MIN: CPT | Performed by: INTERNAL MEDICINE

## 2018-01-12 RX ORDER — HYDROCODONE BITARTRATE AND ACETAMINOPHEN 5; 325 MG/1; MG/1
TABLET ORAL
Qty: 20 TABLET | Refills: 0 | Status: SHIPPED | OUTPATIENT
Start: 2018-01-12 | End: 2018-02-09 | Stop reason: SDUPTHER

## 2018-01-12 RX ORDER — DICLOFENAC SODIUM 75 MG/1
75 TABLET, DELAYED RELEASE ORAL DAILY
Qty: 90 TABLET | Refills: 0 | Status: SHIPPED | OUTPATIENT
Start: 2018-01-12 | End: 2018-04-10 | Stop reason: SDUPTHER

## 2018-01-12 RX ORDER — TRIAMCINOLONE ACETONIDE 40 MG/ML
40 INJECTION, SUSPENSION INTRA-ARTICULAR; INTRAMUSCULAR ONCE
Status: COMPLETED | OUTPATIENT
Start: 2018-01-12 | End: 2018-01-12

## 2018-01-12 RX ORDER — TIZANIDINE 4 MG/1
TABLET ORAL
Qty: 270 TABLET | Refills: 0 | Status: SHIPPED | OUTPATIENT
Start: 2018-01-12 | End: 2018-05-03 | Stop reason: SDUPTHER

## 2018-01-12 RX ORDER — DICLOFENAC SODIUM 75 MG/1
75 TABLET, DELAYED RELEASE ORAL DAILY
Qty: 30 TABLET | Refills: 0 | Status: SHIPPED | OUTPATIENT
Start: 2018-01-12 | End: 2018-01-12 | Stop reason: SDUPTHER

## 2018-01-12 RX ORDER — PREGABALIN 100 MG/1
100 CAPSULE ORAL 2 TIMES DAILY
Qty: 60 CAPSULE | Refills: 0 | Status: SHIPPED | OUTPATIENT
Start: 2018-01-12 | End: 2018-02-09 | Stop reason: SDUPTHER

## 2018-01-12 RX ORDER — DULOXETIN HYDROCHLORIDE 30 MG/1
CAPSULE, DELAYED RELEASE ORAL
Qty: 90 CAPSULE | Refills: 0 | Status: SHIPPED | OUTPATIENT
Start: 2018-01-12 | End: 2018-02-09 | Stop reason: ALTCHOICE

## 2018-01-12 RX ADMIN — TRIAMCINOLONE ACETONIDE 40 MG: 40 INJECTION, SUSPENSION INTRA-ARTICULAR; INTRAMUSCULAR at 10:18

## 2018-01-12 NOTE — PROGRESS NOTES
acetonide (KENALOG-40) injection 40 mg; Inject 1 mL into the muscle once  -     pregabalin (LYRICA) 100 MG capsule; Take 1 capsule by mouth 2 times daily for 28 days. -     tiZANidine (ZANAFLEX) 4 MG tablet; TAKE 1 CAPSULE BY MOUTH THREE TIMES DAILY    Facet syndrome  -     morphine-naltrexone (EMBEDA) 30-1.2 MG CPCR; Take 1 capsule by mouth daily as needed for Pain for up to 28 days.  -     HYDROcodone-acetaminophen (NORCO) 5-325 MG per tablet; Take 1/2 tablet to 1 tablet by mouth every 6-8 hours PRN. Chronic pain syndrome  -     morphine-naltrexone (EMBEDA) 30-1.2 MG CPCR; Take 1 capsule by mouth daily as needed for Pain for up to 28 days.  -     HYDROcodone-acetaminophen (NORCO) 5-325 MG per tablet; Take 1/2 tablet to 1 tablet by mouth every 6-8 hours PRN.  -     Cancel: Urinalysis Reflex to Culture  -     [place] INJECT TRIGGER POINTS, 3 OR GREATER  -     triamcinolone acetonide (KENALOG-40) injection 40 mg; Inject 1 mL into the muscle once  -     Discontinue: diclofenac (VOLTAREN) 75 MG EC tablet; Take 1 tablet by mouth daily  -     pregabalin (LYRICA) 100 MG capsule; Take 1 capsule by mouth 2 times daily for 28 days. -     DULoxetine (CYMBALTA) 30 MG extended release capsule; TAKE 1 CAPSULE BY MOUTH DAILY  -     tiZANidine (ZANAFLEX) 4 MG tablet; TAKE 1 CAPSULE BY MOUTH THREE TIMES DAILY  -     Urinalysis Reflex to Culture    Primary osteoarthritis of right hip  -     morphine-naltrexone (EMBEDA) 30-1.2 MG CPCR; Take 1 capsule by mouth daily as needed for Pain for up to 28 days.  -     HYDROcodone-acetaminophen (NORCO) 5-325 MG per tablet; Take 1/2 tablet to 1 tablet by mouth every 6-8 hours PRN. Lumbar radiculitis  -     morphine-naltrexone (EMBEDA) 30-1.2 MG CPCR; Take 1 capsule by mouth daily as needed for Pain for up to 28 days.  -     HYDROcodone-acetaminophen (NORCO) 5-325 MG per tablet; Take 1/2 tablet to 1 tablet by mouth every 6-8 hours PRN.   -     [place] INJECT TRIGGER POINTS, 3 OR GREATER  -

## 2018-01-22 ENCOUNTER — TELEPHONE (OUTPATIENT)
Dept: PAIN MANAGEMENT | Age: 58
End: 2018-01-22

## 2018-01-22 RX ORDER — ATORVASTATIN CALCIUM 40 MG/1
TABLET, FILM COATED ORAL
Qty: 90 TABLET | Refills: 0 | Status: SHIPPED | OUTPATIENT
Start: 2018-01-22 | End: 2018-05-03 | Stop reason: SDUPTHER

## 2018-01-22 NOTE — TELEPHONE ENCOUNTER
Following up on overdue results for the urinalysis  ordered during her last office visit. [facility] will review the need for testing with the patient at her next monthly office visit.

## 2018-01-24 RX ORDER — LEVOTHYROXINE SODIUM 88 UG/1
TABLET ORAL
Qty: 90 TABLET | Refills: 0 | Status: SHIPPED | OUTPATIENT
Start: 2018-01-24 | End: 2018-05-06 | Stop reason: SDUPTHER

## 2018-01-30 ENCOUNTER — TELEPHONE (OUTPATIENT)
Dept: PAIN MANAGEMENT | Age: 58
End: 2018-01-30

## 2018-01-30 NOTE — TELEPHONE ENCOUNTER
Patient states at her last visit, she reported right sided pain and was given a shot as a result. She states her current pain regimen is not relieving this pain and she is wanting to know what else she can do. pls advise She would like to come to the [facility] to  scripts  she would like scripts sent to her [facility].

## 2018-01-31 RX ORDER — METHYLPREDNISOLONE 4 MG/1
4 TABLET ORAL SEE ADMIN INSTRUCTIONS
Qty: 1 KIT | Refills: 0 | Status: SHIPPED | OUTPATIENT
Start: 2018-01-31 | End: 2018-02-06

## 2018-02-05 DIAGNOSIS — M16.11 PRIMARY OSTEOARTHRITIS OF RIGHT HIP: ICD-10-CM

## 2018-02-05 DIAGNOSIS — M47.899 FACET SYNDROME: ICD-10-CM

## 2018-02-05 DIAGNOSIS — G89.4 CHRONIC PAIN SYNDROME: ICD-10-CM

## 2018-02-05 DIAGNOSIS — M51.36 DDD (DEGENERATIVE DISC DISEASE), LUMBAR: ICD-10-CM

## 2018-02-05 DIAGNOSIS — M79.7 FIBROMYALGIA: ICD-10-CM

## 2018-02-05 RX ORDER — MELOXICAM 15 MG/1
TABLET ORAL
Qty: 90 TABLET | Refills: 0 | OUTPATIENT
Start: 2018-02-05

## 2018-02-09 ENCOUNTER — OFFICE VISIT (OUTPATIENT)
Dept: PAIN MANAGEMENT | Age: 58
End: 2018-02-09

## 2018-02-09 VITALS
WEIGHT: 280 LBS | DIASTOLIC BLOOD PRESSURE: 65 MMHG | SYSTOLIC BLOOD PRESSURE: 97 MMHG | HEART RATE: 80 BPM | BODY MASS INDEX: 48.06 KG/M2

## 2018-02-09 DIAGNOSIS — M54.16 LUMBAR RADICULITIS: ICD-10-CM

## 2018-02-09 DIAGNOSIS — G89.4 CHRONIC PAIN SYNDROME: ICD-10-CM

## 2018-02-09 DIAGNOSIS — M51.36 DDD (DEGENERATIVE DISC DISEASE), LUMBAR: ICD-10-CM

## 2018-02-09 DIAGNOSIS — M16.11 PRIMARY OSTEOARTHRITIS OF RIGHT HIP: ICD-10-CM

## 2018-02-09 DIAGNOSIS — M47.899 FACET SYNDROME: ICD-10-CM

## 2018-02-09 DIAGNOSIS — M79.7 FIBROMYALGIA: ICD-10-CM

## 2018-02-09 PROCEDURE — 99213 OFFICE O/P EST LOW 20 MIN: CPT | Performed by: INTERNAL MEDICINE

## 2018-02-09 RX ORDER — HYDROCODONE BITARTRATE AND ACETAMINOPHEN 5; 325 MG/1; MG/1
TABLET ORAL
Qty: 20 TABLET | Refills: 0 | Status: SHIPPED | OUTPATIENT
Start: 2018-02-09 | End: 2018-03-12 | Stop reason: SDUPTHER

## 2018-02-09 RX ORDER — PREGABALIN 100 MG/1
100 CAPSULE ORAL 2 TIMES DAILY
Qty: 60 CAPSULE | Refills: 0 | Status: SHIPPED | OUTPATIENT
Start: 2018-02-09 | End: 2018-03-12 | Stop reason: SDUPTHER

## 2018-02-09 NOTE — PROGRESS NOTES
[facility]  1960  [id number]    HISTORY OF PRESENT ILLNESS:  Ms. Zoë Orellana is a 61 y.o. female returns for a follow up visit for multiple medical problems. Her current presenting problems are   1. Chronic pain syndrome    2. Fibromyalgia    3. Facet syndrome    4. Severe episode of recurrent major depressive disorder, without psychotic features ([address]    5. Moderate episode of recurrent major depressive disorder ([address]    6. Restless legs syndrome (RLS)    7. Gastroesophageal reflux disease without esophagitis    8. Primary osteoarthritis of right hip    9. DDD (degenerative disc disease), lumbar    10. Lumbar radiculitis    . As per information/history obtained from the PADT(patient assessment and documentation tool) - She complains of pain in the neck and lower back with radiation to the knees Bilateral She rates the pain 5/10 and describes it as sharp. Pain is made worse by: movement, walking, standing, sitting, bending, lifting. Current treatment regimen has helped relieve about 50% of the pain. She denies side effects from the current pain regimen. Patient reports that since the last follow up visit the physical functioning is unchanged, family/social relationships are unchanged, mood is worse and sleep patterns are unchanged, and that the overall functioning is unchanged. Patient denies neurological bowel or bladder. Patient denies misusing/abusing her narcotic pain medications or using any illegal drugs. There are No indicators for possible drug abuse, addiction or diversion problems. Upon obtaining the medical history from Ms. Zoë Orellana regarding today's office visit for her presenting problems, Patient reports she ran out of her medications, due to scheduling issues. She mentions she has a weekly pill box that she fills . She says she is using Emeda and norco       ALLERGIES: Patients list of allergies were reviewed     MEDICATIONS: Ms. Zoë Orellana list of medications were reviewed. Her current medications are   Outpatient Medications Prior to Visit   Medication Sig Dispense Refill    levothyroxine (SYNTHROID) 88 MCG tablet TAKE 1 TABLET BY MOUTH DAILY 90 tablet 0    atorvastatin (LIPITOR) 40 MG tablet TAKE 1 TABLET BY MOUTH DAILY 90 tablet 0    morphine-naltrexone (EMBEDA) 30-1.2 MG CPCR Take 1 capsule by mouth daily as needed for Pain for up to 28 days. 28 capsule 0    HYDROcodone-acetaminophen (NORCO) 5-325 MG per tablet Take 1/2 tablet to 1 tablet by mouth every 6-8 hours PRN. 20 tablet 0    pregabalin (LYRICA) 100 MG capsule Take 1 capsule by mouth 2 times daily for 28 days.  60 capsule 0    DULoxetine (CYMBALTA) 30 MG extended release capsule TAKE 1 CAPSULE BY MOUTH DAILY 90 capsule 0    tiZANidine (ZANAFLEX) 4 MG tablet TAKE 1 CAPSULE BY MOUTH THREE TIMES DAILY 270 tablet 0    diclofenac (VOLTAREN) 75 MG EC tablet TAKE 1 TABLET BY MOUTH DAILY 90 tablet 0    MAGNESIUM-OXIDE 400 (241.3 Mg) MG TABS tablet TAKE 1 TABLET BY MOUTH TWICE DAILY 60 tablet 0    meclizine (ANTIVERT) 12.5 MG tablet TAKE 1 TABLET BY MOUTH THREE TIMES DAILY AS NEEDED FOR DIZZINESS 30 tablet 0    rOPINIRole (REQUIP) 1 MG tablet TAKE 2 TABLETS BY MOUTH EVERY NIGHT 180 tablet 0    LATUDA 120 MG tablet TK 1 T PO QD AT DINNER  0    vitamin D (ERGOCALCIFEROL) 18521 units CAPS capsule TAKE 1 CAPSULE BY MOUTH 1 TIME A WEEK FOR 12 DOSES 13 capsule 0    ondansetron (ZOFRAN) 4 MG tablet TAKE 1 TABLET BY MOUTH EVERY 8 HOURS AS NEEDED FOR NAUSEA OR VOMITING 60 tablet 5    betamethasone dipropionate (DIPROLENE) 0.05 % cream APPLY EXTERNALLY TO THE AFFECTED AREA TWICE DAILY 60 g 0    esomeprazole (NEXIUM) 40 MG delayed release capsule Take 1 capsule by mouth every morning (before breakfast) 90 capsule 3    QUEtiapine (SEROQUEL) 400 MG tablet Take 500 mg by mouth nightly Takes a 100mg and 400mg tab- total 500mg      amphetamine-dextroamphetamine (ADDERALL) 20 MG tablet Take 20 mg by mouth 2 times daily       VORTIoxetine HBr (BRINTELLIX) vitamin D (ERGOCALCIFEROL) 04386 units CAPS capsule TAKE 1 CAPSULE BY MOUTH 1 TIME A WEEK FOR 12 DOSES 13 capsule 0    ondansetron (ZOFRAN) 4 MG tablet TAKE 1 TABLET BY MOUTH EVERY 8 HOURS AS NEEDED FOR NAUSEA OR VOMITING 60 tablet 5    betamethasone dipropionate (DIPROLENE) 0.05 % cream APPLY EXTERNALLY TO THE AFFECTED AREA TWICE DAILY 60 g 0    esomeprazole (NEXIUM) 40 MG delayed release capsule Take 1 capsule by mouth every morning (before breakfast) 90 capsule 3    QUEtiapine (SEROQUEL) 400 MG tablet Take 500 mg by mouth nightly Takes a 100mg and 400mg tab- total 500mg      amphetamine-dextroamphetamine (ADDERALL) 20 MG tablet Take 20 mg by mouth 2 times daily       VORTIoxetine HBr (BRINTELLIX) 20 MG TABS tablet Take 20 mg by mouth daily       alosetron (LOTRONEX) 0.5 MG tablet Take 0.5 mg by mouth daily as needed (diarrhea).  ferrous sulfate 325 (65 Fe) MG tablet Take 1 tablet by mouth Daily with supper 90 tablet 0     Current Facility-Administered Medications   Medication Dose Route Frequency Provider Last Rate Last Dose    triamcinolone acetonide (KENALOG-40) injection 40 mg  40 mg Intramuscular Once Jovita Washington MD        cyanocobalamin injection 1,000 mcg  1,000 mcg Intramuscular Q30 Days Han Murrieta MD   1,000 mcg at 03/22/17 1413    cyanocobalamin injection 1,000 mcg  1,000 mcg Intramuscular Q30 Days Han Murrieta MD   1,000 mcg at 02/01/17 1112     I will continue her current medication regimen  which is part of the above treatment schedule. It has been helping with Ms. Yap's chronic  medical problems which for this visit include:   Diagnoses of Chronic pain syndrome, Fibromyalgia, Facet syndrome, Severe episode of recurrent major depressive disorder, without psychotic features ([facility] [address] Moderate episode of recurrent major depressive disorder (HCC), Restless legs syndrome (RLS), Gastroesophageal reflux disease without esophagitis, Primary osteoarthritis of right hip, DDD (degenerative disc disease), lumbar, and Lumbar radiculitis were pertinent to this visit. Risks and benefits of the medications and other alternative treatments  including no treatment were discussed with the patient. The common side effects of these medications were also explained to the patient. Informed verbal consent was obtained. Goals of current treatment regimen include improvement in pain, restoration of functioning- with focus on improvement in physical performance, general activity, work or disability,emotional distress, health care utilization and  decreased medication consumption. Will continue to monitor progress towards achieving/maintaining therapeutic goals with special emphasis on  1. Improvement in perceived interfernce  of pain with ADL's. Ability to do home exercises independently. Ability to do household chores indoor and/or outdoor work and social and leisure activities. Improve psychosocial and physical functioning.- she is showing progression towards this treatment goal with the current regimen. She was advised against drinking alcohol with the narcotic pain medicines, advised against driving or handling machinery while adjusting the dose of medicines or if having cognitive  issues related to the current medications. Risk of overdose and death, if medicines not taken as prescribed, were also discussed. If the patient develops new symptoms or if the symptoms worsen, the patient should call the office. While transcribing every attempt was made to maintain the accuracy of the note in terms of it's contents,there may have been some errors made inadvertently. Thank you for allowing me to participate in the care of this patient. Patricia Pagan MD.    Cc: Terra Garcia MD    I, Tamia Robbins, am scribing for and in the presence of Dr. Patricia Pagan.    02/09/18  9:32 AM  SUMEET Munoz, Dr. Patricia Pagan, personally performed the services described in this

## 2018-02-18 DIAGNOSIS — R25.2 CRAMP OF BOTH LOWER EXTREMITIES: ICD-10-CM

## 2018-02-23 ENCOUNTER — OFFICE VISIT (OUTPATIENT)
Dept: INTERNAL MEDICINE CLINIC | Age: 58
End: 2018-02-23

## 2018-02-23 ENCOUNTER — HOSPITAL ENCOUNTER (OUTPATIENT)
Dept: OTHER | Age: 58
Discharge: HOME | End: 2018-02-23
Attending: INTERNAL MEDICINE | Admitting: INTERNAL MEDICINE

## 2018-02-23 VITALS
BODY MASS INDEX: 48.06 KG/M2 | RESPIRATION RATE: 16 BRPM | SYSTOLIC BLOOD PRESSURE: 154 MMHG | HEART RATE: 80 BPM | DIASTOLIC BLOOD PRESSURE: 84 MMHG | WEIGHT: 280 LBS

## 2018-02-23 DIAGNOSIS — S09.92XA INJURY OF NOSE, INITIAL ENCOUNTER: ICD-10-CM

## 2018-02-23 DIAGNOSIS — K21.9 GASTROESOPHAGEAL REFLUX DISEASE WITHOUT ESOPHAGITIS: Primary | ICD-10-CM

## 2018-02-23 PROCEDURE — 99213 OFFICE O/P EST LOW 20 MIN: CPT | Performed by: INTERNAL MEDICINE

## 2018-02-23 ASSESSMENT — ENCOUNTER SYMPTOMS
ABDOMINAL PAIN: 0
SHORTNESS OF BREATH: 0

## 2018-02-23 NOTE — PROGRESS NOTES
Subjective:      Patient ID: Treasure Faith is a 61 y.o. female. HPI  1. Gastroesophageal reflux disease without esophagitis --Stable--no new issues      2. Injury of nose, initial encounter --she ?? Fx?? --elbow to nose--2 weeks ago--persisting pain--no bleeding-- no prior issues--no blood thinners -- on chr narcotics for chr lbp --called for emergent appt        Review of Systems   Respiratory: Negative for shortness of breath. Cardiovascular: Negative for chest pain. Gastrointestinal: Negative for abdominal pain. Objective:   Physical Exam   HENT:   Lt bridge of nose--tr tender---no bruise-- air mvmt --ok    Eyes: Pupils are equal, round, and reactive to light. Pulmonary/Chest: Effort normal.   Abdominal: Soft. Assessment:      1. Gastroesophageal reflux disease without esophagitis --Continue current therapy      2.  Injury of nose, initial encounter --2 weeks trauma--persisting pain--xray --if fx--see ent --ice and aleve             Plan:

## 2018-02-24 ENCOUNTER — TELEPHONE (OUTPATIENT)
Dept: INTERNAL MEDICINE CLINIC | Age: 58
End: 2018-02-24

## 2018-02-26 ENCOUNTER — TELEPHONE (OUTPATIENT)
Dept: INTERNAL MEDICINE CLINIC | Age: 58
End: 2018-02-26

## 2018-03-06 RX ORDER — ROPINIROLE 1 MG/1
TABLET, FILM COATED ORAL
Qty: 180 TABLET | Refills: 0 | Status: SHIPPED | OUTPATIENT
Start: 2018-03-06 | End: 2018-04-10

## 2018-03-06 RX ORDER — ROPINIROLE 1 MG/1
TABLET, FILM COATED ORAL
Qty: 180 TABLET | Refills: 0 | Status: SHIPPED | OUTPATIENT
Start: 2018-03-06 | End: 2018-08-30 | Stop reason: SDUPTHER

## 2018-03-12 ENCOUNTER — OFFICE VISIT (OUTPATIENT)
Dept: PAIN MANAGEMENT | Age: 58
End: 2018-03-12

## 2018-03-12 VITALS
SYSTOLIC BLOOD PRESSURE: 139 MMHG | WEIGHT: 277 LBS | DIASTOLIC BLOOD PRESSURE: 88 MMHG | HEART RATE: 98 BPM | BODY MASS INDEX: 47.55 KG/M2

## 2018-03-12 DIAGNOSIS — G25.81 RESTLESS LEGS SYNDROME (RLS): ICD-10-CM

## 2018-03-12 DIAGNOSIS — M54.16 LUMBAR RADICULITIS: ICD-10-CM

## 2018-03-12 DIAGNOSIS — M16.11 PRIMARY OSTEOARTHRITIS OF RIGHT HIP: ICD-10-CM

## 2018-03-12 DIAGNOSIS — M47.899 FACET SYNDROME: ICD-10-CM

## 2018-03-12 DIAGNOSIS — M51.36 DDD (DEGENERATIVE DISC DISEASE), LUMBAR: ICD-10-CM

## 2018-03-12 DIAGNOSIS — M79.7 FIBROMYALGIA: ICD-10-CM

## 2018-03-12 DIAGNOSIS — G89.4 CHRONIC PAIN SYNDROME: ICD-10-CM

## 2018-03-12 PROCEDURE — 99213 OFFICE O/P EST LOW 20 MIN: CPT | Performed by: INTERNAL MEDICINE

## 2018-03-12 RX ORDER — PREGABALIN 100 MG/1
100 CAPSULE ORAL 2 TIMES DAILY
Qty: 60 CAPSULE | Refills: 0 | Status: SHIPPED | OUTPATIENT
Start: 2018-03-12 | End: 2018-04-10 | Stop reason: SDUPTHER

## 2018-03-12 RX ORDER — HYDROCODONE BITARTRATE AND ACETAMINOPHEN 5; 325 MG/1; MG/1
TABLET ORAL
Qty: 20 TABLET | Refills: 0 | Status: SHIPPED | OUTPATIENT
Start: 2018-03-12 | End: 2018-05-15 | Stop reason: SDUPTHER

## 2018-03-12 NOTE — PROGRESS NOTES
Supamindy Stauffer  1960  [id number]    HISTORY OF PRESENT ILLNESS:  Ms. Zoë Orellana is a 61 y.o. female returns for a follow up visit for multiple medical problems. Her current presenting problems are   1. Chronic pain syndrome    2. Fibromyalgia    3. Facet syndrome    4. Moderate episode of recurrent major depressive disorder ([facility] [address]    5. Restless legs syndrome (RLS)    6. Gastroesophageal reflux disease without esophagitis    7. DDD (degenerative disc disease), lumbar    8. Primary osteoarthritis of right hip    9. Primary insomnia    10. Lumbar radiculitis    . As per information/history obtained from the PADT(patient assessment and documentation tool) - She complains of pain in the neck and lower back with radiation to the shoulders Bilateral, buttocks and knees Bilateral She rates the pain 8/10 and describes it as sharp, aching. Pain is made worse by: movement, walking, standing, sitting, bending, lifting. Current treatment regimen has helped relieve about 40% of the pain. She denies side effects from the current pain regimen. Patient reports that since the last follow up visit the physical functioning is unchanged, family/social relationships are unchanged, mood is worse and sleep patterns are unchanged, and that the overall functioning is unchanged. Patient denies neurological bowel or bladder. Patient denies misusing/abusing her narcotic pain medications or using any illegal drugs. There are No indicators for possible drug abuse, addiction or diversion problems. Upon obtaining the medical history from Ms. Zoë Orellana regarding today's office visit for her presenting problems, Patient states she has been doing somewhat ok with the medications. She states her pain gets worse in the back is the neck, builds up to an headache and then goes away. Ms. Zoë Orellana mentions using muscle relaxer's with Voltaren. She mentions using Embeda with [place] PRN. Patient reports her weight has been stable.  She reports she is working

## 2018-03-21 ENCOUNTER — TELEPHONE (OUTPATIENT)
Dept: INTERNAL MEDICINE CLINIC | Age: 58
End: 2018-03-21

## 2018-04-10 ENCOUNTER — OFFICE VISIT (OUTPATIENT)
Dept: PAIN MANAGEMENT | Age: 58
End: 2018-04-10

## 2018-04-10 VITALS — HEART RATE: 102 BPM | DIASTOLIC BLOOD PRESSURE: 87 MMHG | SYSTOLIC BLOOD PRESSURE: 151 MMHG

## 2018-04-10 DIAGNOSIS — G25.81 RESTLESS LEGS SYNDROME (RLS): ICD-10-CM

## 2018-04-10 DIAGNOSIS — G89.4 CHRONIC PAIN SYNDROME: ICD-10-CM

## 2018-04-10 DIAGNOSIS — M51.36 DDD (DEGENERATIVE DISC DISEASE), LUMBAR: ICD-10-CM

## 2018-04-10 DIAGNOSIS — M54.16 LUMBAR RADICULITIS: ICD-10-CM

## 2018-04-10 DIAGNOSIS — M47.899 FACET SYNDROME: ICD-10-CM

## 2018-04-10 DIAGNOSIS — M16.11 PRIMARY OSTEOARTHRITIS OF RIGHT HIP: ICD-10-CM

## 2018-04-10 DIAGNOSIS — M79.7 FIBROMYALGIA: ICD-10-CM

## 2018-04-10 PROCEDURE — 99213 OFFICE O/P EST LOW 20 MIN: CPT | Performed by: INTERNAL MEDICINE

## 2018-04-10 RX ORDER — PREGABALIN 100 MG/1
100 CAPSULE ORAL 3 TIMES DAILY
Qty: 90 CAPSULE | Refills: 1 | Status: SHIPPED | OUTPATIENT
Start: 2018-04-10 | End: 2018-05-15 | Stop reason: SDUPTHER

## 2018-04-10 RX ORDER — DICLOFENAC SODIUM 75 MG/1
75 TABLET, DELAYED RELEASE ORAL DAILY
Qty: 90 TABLET | Refills: 0 | Status: SHIPPED | OUTPATIENT
Start: 2018-04-10 | End: 2018-05-15 | Stop reason: SDUPTHER

## 2018-04-13 ENCOUNTER — OFFICE VISIT (OUTPATIENT)
Dept: INTERNAL MEDICINE CLINIC | Age: 58
End: 2018-04-13

## 2018-04-13 VITALS
OXYGEN SATURATION: 98 % | HEART RATE: 86 BPM | BODY MASS INDEX: 47.63 KG/M2 | WEIGHT: 279 LBS | HEIGHT: 64 IN | DIASTOLIC BLOOD PRESSURE: 82 MMHG | TEMPERATURE: 98.6 F | RESPIRATION RATE: 16 BRPM | SYSTOLIC BLOOD PRESSURE: 140 MMHG

## 2018-04-13 DIAGNOSIS — L53.8 PALMAR ERYTHEMA: ICD-10-CM

## 2018-04-13 DIAGNOSIS — R31.9 HEMATURIA, UNSPECIFIED TYPE: Primary | ICD-10-CM

## 2018-04-13 LAB
BILIRUBIN, POC: NORMAL
BLOOD URINE, POC: NORMAL
CLARITY, POC: NORMAL
COLOR, POC: YELLOW
GLUCOSE URINE, POC: NEGATIVE
KETONES, POC: NORMAL
LEUKOCYTE EST, POC: NORMAL
NITRITE, POC: POSITIVE
PH, POC: 6
PROTEIN, POC: 100
SPECIFIC GRAVITY, POC: 1.02
UROBILINOGEN, POC: 1

## 2018-04-13 PROCEDURE — 99213 OFFICE O/P EST LOW 20 MIN: CPT | Performed by: INTERNAL MEDICINE

## 2018-04-13 PROCEDURE — 81002 URINALYSIS NONAUTO W/O SCOPE: CPT | Performed by: INTERNAL MEDICINE

## 2018-04-13 RX ORDER — DIAPER,BRIEF,INFANT-TODD,DISP
EACH MISCELLANEOUS
Qty: 1 TUBE | Refills: 1 | Status: SHIPPED | OUTPATIENT
Start: 2018-04-13 | End: 2018-04-20

## 2018-04-13 RX ORDER — PHENAZOPYRIDINE HYDROCHLORIDE 100 MG/1
100 TABLET, FILM COATED ORAL 3 TIMES DAILY PRN
Qty: 9 TABLET | Refills: 0 | Status: SHIPPED | OUTPATIENT
Start: 2018-04-13 | End: 2018-04-16

## 2018-04-13 RX ORDER — NITROFURANTOIN 25; 75 MG/1; MG/1
100 CAPSULE ORAL 2 TIMES DAILY
Qty: 6 CAPSULE | Refills: 0 | Status: SHIPPED | OUTPATIENT
Start: 2018-04-13 | End: 2018-04-16

## 2018-04-13 ASSESSMENT — PATIENT HEALTH QUESTIONNAIRE - PHQ9
SUM OF ALL RESPONSES TO PHQ9 QUESTIONS 1 & 2: 0
1. LITTLE INTEREST OR PLEASURE IN DOING THINGS: 0
2. FEELING DOWN, DEPRESSED OR HOPELESS: 0
SUM OF ALL RESPONSES TO PHQ QUESTIONS 1-9: 0

## 2018-04-13 ASSESSMENT — ENCOUNTER SYMPTOMS
EYES NEGATIVE: 1
GASTROINTESTINAL NEGATIVE: 1
RESPIRATORY NEGATIVE: 1

## 2018-04-15 LAB
ORGANISM: ABNORMAL
URINE CULTURE, ROUTINE: ABNORMAL
URINE CULTURE, ROUTINE: ABNORMAL

## 2018-04-16 ENCOUNTER — TELEPHONE (OUTPATIENT)
Dept: INTERNAL MEDICINE CLINIC | Age: 58
End: 2018-04-16

## 2018-05-03 DIAGNOSIS — F51.01 PRIMARY INSOMNIA: ICD-10-CM

## 2018-05-03 DIAGNOSIS — G89.4 CHRONIC PAIN SYNDROME: ICD-10-CM

## 2018-05-03 DIAGNOSIS — M79.7 FIBROMYALGIA: ICD-10-CM

## 2018-05-04 RX ORDER — ATORVASTATIN CALCIUM 40 MG/1
TABLET, FILM COATED ORAL
Qty: 90 TABLET | Refills: 0 | Status: SHIPPED | OUTPATIENT
Start: 2018-05-04 | End: 2018-07-31 | Stop reason: SDUPTHER

## 2018-05-04 RX ORDER — TIZANIDINE 4 MG/1
TABLET ORAL
Qty: 270 TABLET | Refills: 0 | Status: SHIPPED | OUTPATIENT
Start: 2018-05-04 | End: 2018-05-15 | Stop reason: SDUPTHER

## 2018-05-06 DIAGNOSIS — M51.36 DDD (DEGENERATIVE DISC DISEASE), LUMBAR: ICD-10-CM

## 2018-05-06 DIAGNOSIS — M47.899 FACET SYNDROME: ICD-10-CM

## 2018-05-06 DIAGNOSIS — M16.11 PRIMARY OSTEOARTHRITIS OF RIGHT HIP: ICD-10-CM

## 2018-05-06 DIAGNOSIS — K21.9 GASTROESOPHAGEAL REFLUX DISEASE, ESOPHAGITIS PRESENCE NOT SPECIFIED: ICD-10-CM

## 2018-05-06 DIAGNOSIS — M79.7 FIBROMYALGIA: ICD-10-CM

## 2018-05-06 DIAGNOSIS — G89.4 CHRONIC PAIN SYNDROME: ICD-10-CM

## 2018-05-07 RX ORDER — MELOXICAM 15 MG/1
TABLET ORAL
Qty: 90 TABLET | Refills: 0 | OUTPATIENT
Start: 2018-05-07

## 2018-05-07 RX ORDER — LEVOTHYROXINE SODIUM 88 UG/1
TABLET ORAL
Qty: 90 TABLET | Refills: 0 | Status: SHIPPED | OUTPATIENT
Start: 2018-05-07 | End: 2018-08-11 | Stop reason: SDUPTHER

## 2018-05-07 RX ORDER — ESOMEPRAZOLE MAGNESIUM 40 MG/1
CAPSULE, DELAYED RELEASE ORAL
Qty: 90 CAPSULE | Refills: 0 | Status: SHIPPED | OUTPATIENT
Start: 2018-05-07 | End: 2018-08-11 | Stop reason: SDUPTHER

## 2018-05-11 ENCOUNTER — OFFICE VISIT (OUTPATIENT)
Dept: INTERNAL MEDICINE CLINIC | Age: 58
End: 2018-05-11

## 2018-05-11 VITALS
BODY MASS INDEX: 47.94 KG/M2 | HEIGHT: 64 IN | DIASTOLIC BLOOD PRESSURE: 70 MMHG | WEIGHT: 280.8 LBS | HEART RATE: 130 BPM | RESPIRATION RATE: 16 BRPM | SYSTOLIC BLOOD PRESSURE: 130 MMHG

## 2018-05-11 DIAGNOSIS — H81.12 BENIGN PAROXYSMAL POSITIONAL VERTIGO OF LEFT EAR: Primary | ICD-10-CM

## 2018-05-11 PROCEDURE — 99213 OFFICE O/P EST LOW 20 MIN: CPT | Performed by: INTERNAL MEDICINE

## 2018-05-15 ENCOUNTER — OFFICE VISIT (OUTPATIENT)
Dept: PAIN MANAGEMENT | Age: 58
End: 2018-05-15

## 2018-05-15 VITALS
DIASTOLIC BLOOD PRESSURE: 73 MMHG | SYSTOLIC BLOOD PRESSURE: 118 MMHG | WEIGHT: 280 LBS | BODY MASS INDEX: 48.06 KG/M2 | HEART RATE: 82 BPM

## 2018-05-15 DIAGNOSIS — M79.7 FIBROMYALGIA: ICD-10-CM

## 2018-05-15 DIAGNOSIS — F51.01 PRIMARY INSOMNIA: ICD-10-CM

## 2018-05-15 DIAGNOSIS — M16.11 PRIMARY OSTEOARTHRITIS OF RIGHT HIP: ICD-10-CM

## 2018-05-15 DIAGNOSIS — G89.29 CHRONIC NONINTRACTABLE HEADACHE, UNSPECIFIED HEADACHE TYPE: ICD-10-CM

## 2018-05-15 DIAGNOSIS — G89.4 CHRONIC PAIN SYNDROME: ICD-10-CM

## 2018-05-15 DIAGNOSIS — R51.9 CHRONIC NONINTRACTABLE HEADACHE, UNSPECIFIED HEADACHE TYPE: ICD-10-CM

## 2018-05-15 DIAGNOSIS — M47.899 FACET SYNDROME: ICD-10-CM

## 2018-05-15 DIAGNOSIS — M51.36 DDD (DEGENERATIVE DISC DISEASE), LUMBAR: ICD-10-CM

## 2018-05-15 DIAGNOSIS — K59.03 DRUG-INDUCED CONSTIPATION: ICD-10-CM

## 2018-05-15 DIAGNOSIS — M54.16 LUMBAR RADICULITIS: ICD-10-CM

## 2018-05-15 PROCEDURE — 99214 OFFICE O/P EST MOD 30 MIN: CPT | Performed by: INTERNAL MEDICINE

## 2018-05-15 RX ORDER — HYDROCODONE BITARTRATE AND ACETAMINOPHEN 5; 325 MG/1; MG/1
TABLET ORAL
Qty: 15 TABLET | Refills: 0 | Status: SHIPPED | OUTPATIENT
Start: 2018-05-15 | End: 2018-06-12

## 2018-05-15 RX ORDER — TIZANIDINE 4 MG/1
TABLET ORAL
Qty: 270 TABLET | Refills: 0 | Status: SHIPPED | OUTPATIENT
Start: 2018-05-15 | End: 2018-06-26 | Stop reason: SDUPTHER

## 2018-05-15 RX ORDER — DICLOFENAC SODIUM 75 MG/1
75 TABLET, DELAYED RELEASE ORAL DAILY
Qty: 90 TABLET | Refills: 0 | Status: SHIPPED | OUTPATIENT
Start: 2018-05-15 | End: 2018-06-26 | Stop reason: SDUPTHER

## 2018-05-15 RX ORDER — LUBIPROSTONE 24 UG/1
24 CAPSULE, GELATIN COATED ORAL 2 TIMES DAILY WITH MEALS
Qty: 60 CAPSULE | Refills: 0 | Status: SHIPPED | OUTPATIENT
Start: 2018-05-15 | End: 2018-06-12 | Stop reason: SDUPTHER

## 2018-05-15 RX ORDER — PREGABALIN 100 MG/1
100 CAPSULE ORAL 3 TIMES DAILY
Qty: 90 CAPSULE | Refills: 0 | Status: SHIPPED | OUTPATIENT
Start: 2018-05-15 | End: 2018-06-26 | Stop reason: SDUPTHER

## 2018-06-12 DIAGNOSIS — K59.03 DRUG-INDUCED CONSTIPATION: ICD-10-CM

## 2018-06-12 DIAGNOSIS — G89.4 CHRONIC PAIN SYNDROME: ICD-10-CM

## 2018-06-14 RX ORDER — LUBIPROSTONE 24 UG/1
CAPSULE, GELATIN COATED ORAL
Qty: 60 CAPSULE | Refills: 0 | Status: SHIPPED | OUTPATIENT
Start: 2018-06-14 | End: 2018-06-26 | Stop reason: SDUPTHER

## 2018-06-19 ENCOUNTER — OFFICE VISIT (OUTPATIENT)
Dept: INTERNAL MEDICINE CLINIC | Age: 58
End: 2018-06-19

## 2018-06-19 VITALS
RESPIRATION RATE: 16 BRPM | DIASTOLIC BLOOD PRESSURE: 84 MMHG | HEART RATE: 90 BPM | OXYGEN SATURATION: 95 % | SYSTOLIC BLOOD PRESSURE: 130 MMHG | WEIGHT: 276 LBS | BODY MASS INDEX: 47.12 KG/M2 | TEMPERATURE: 98.4 F | HEIGHT: 64 IN

## 2018-06-19 DIAGNOSIS — E55.9 VITAMIN D DEFICIENCY: ICD-10-CM

## 2018-06-19 DIAGNOSIS — F60.3 BORDERLINE PERSONALITY DISORDER (HCC): Primary | ICD-10-CM

## 2018-06-19 DIAGNOSIS — E66.01 MORBID OBESITY WITH BMI OF 45.0-49.9, ADULT (HCC): ICD-10-CM

## 2018-06-19 DIAGNOSIS — R10.9 ABDOMINAL PAIN, UNSPECIFIED ABDOMINAL LOCATION: ICD-10-CM

## 2018-06-19 DIAGNOSIS — E03.4 HYPOTHYROIDISM DUE TO ACQUIRED ATROPHY OF THYROID: ICD-10-CM

## 2018-06-19 DIAGNOSIS — E78.00 HYPERCHOLESTEREMIA: ICD-10-CM

## 2018-06-19 DIAGNOSIS — J30.9 ALLERGIC RHINITIS, UNSPECIFIED CHRONICITY, UNSPECIFIED SEASONALITY, UNSPECIFIED TRIGGER: ICD-10-CM

## 2018-06-19 LAB
A/G RATIO: 2 (ref 1.1–2.2)
ALBUMIN SERPL-MCNC: 3.9 G/DL (ref 3.4–5)
ALP BLD-CCNC: 122 U/L (ref 40–129)
ALT SERPL-CCNC: 14 U/L (ref 10–40)
ANION GAP SERPL CALCULATED.3IONS-SCNC: 14 MMOL/L (ref 3–16)
AST SERPL-CCNC: 17 U/L (ref 15–37)
BILIRUB SERPL-MCNC: 0.4 MG/DL (ref 0–1)
BUN BLDV-MCNC: 13 MG/DL (ref 7–20)
CALCIUM SERPL-MCNC: 9 MG/DL (ref 8.3–10.6)
CHLORIDE BLD-SCNC: 104 MMOL/L (ref 99–110)
CHOLESTEROL, TOTAL: 157 MG/DL (ref 0–199)
CO2: 25 MMOL/L (ref 21–32)
CREAT SERPL-MCNC: 0.7 MG/DL (ref 0.6–1.1)
GFR AFRICAN AMERICAN: >60
GFR NON-AFRICAN AMERICAN: >60
GLOBULIN: 2 G/DL
GLUCOSE BLD-MCNC: 111 MG/DL (ref 70–99)
HDLC SERPL-MCNC: 74 MG/DL (ref 40–60)
LDL CHOLESTEROL CALCULATED: 70 MG/DL
POTASSIUM SERPL-SCNC: 3.9 MMOL/L (ref 3.5–5.1)
SODIUM BLD-SCNC: 143 MMOL/L (ref 136–145)
T4 FREE: 1.3 NG/DL (ref 0.9–1.8)
TOTAL PROTEIN: 5.9 G/DL (ref 6.4–8.2)
TRIGL SERPL-MCNC: 67 MG/DL (ref 0–150)
TSH SERPL DL<=0.05 MIU/L-ACNC: 2.07 UIU/ML (ref 0.27–4.2)
VITAMIN D 25-HYDROXY: 20.6 NG/ML
VLDLC SERPL CALC-MCNC: 13 MG/DL

## 2018-06-19 PROCEDURE — 99214 OFFICE O/P EST MOD 30 MIN: CPT | Performed by: INTERNAL MEDICINE

## 2018-06-19 ASSESSMENT — ENCOUNTER SYMPTOMS
ABDOMINAL PAIN: 0
SHORTNESS OF BREATH: 0

## 2018-06-22 ENCOUNTER — TELEPHONE (OUTPATIENT)
Dept: INTERNAL MEDICINE CLINIC | Age: 58
End: 2018-06-22

## 2018-06-26 ENCOUNTER — OFFICE VISIT (OUTPATIENT)
Dept: PAIN MANAGEMENT | Age: 58
End: 2018-06-26

## 2018-06-26 VITALS
WEIGHT: 276 LBS | SYSTOLIC BLOOD PRESSURE: 130 MMHG | HEART RATE: 76 BPM | BODY MASS INDEX: 47.38 KG/M2 | DIASTOLIC BLOOD PRESSURE: 80 MMHG

## 2018-06-26 DIAGNOSIS — G89.29 CHRONIC NONINTRACTABLE HEADACHE, UNSPECIFIED HEADACHE TYPE: ICD-10-CM

## 2018-06-26 DIAGNOSIS — M54.16 LUMBAR RADICULITIS: ICD-10-CM

## 2018-06-26 DIAGNOSIS — R51.9 CHRONIC NONINTRACTABLE HEADACHE, UNSPECIFIED HEADACHE TYPE: ICD-10-CM

## 2018-06-26 DIAGNOSIS — M17.10 PRIMARY LOCALIZED OSTEOARTHROSIS OF LOWER LEG, UNSPECIFIED LATERALITY: ICD-10-CM

## 2018-06-26 DIAGNOSIS — M79.7 FIBROMYALGIA: ICD-10-CM

## 2018-06-26 DIAGNOSIS — M47.899 FACET SYNDROME: ICD-10-CM

## 2018-06-26 DIAGNOSIS — G89.4 CHRONIC PAIN SYNDROME: ICD-10-CM

## 2018-06-26 DIAGNOSIS — K59.03 DRUG-INDUCED CONSTIPATION: ICD-10-CM

## 2018-06-26 DIAGNOSIS — M16.11 PRIMARY OSTEOARTHRITIS OF RIGHT HIP: ICD-10-CM

## 2018-06-26 DIAGNOSIS — M51.36 DDD (DEGENERATIVE DISC DISEASE), LUMBAR: ICD-10-CM

## 2018-06-26 DIAGNOSIS — F51.01 PRIMARY INSOMNIA: ICD-10-CM

## 2018-06-26 PROCEDURE — 99213 OFFICE O/P EST LOW 20 MIN: CPT | Performed by: INTERNAL MEDICINE

## 2018-06-26 RX ORDER — PREGABALIN 100 MG/1
100 CAPSULE ORAL 3 TIMES DAILY
Qty: 90 CAPSULE | Refills: 0 | Status: SHIPPED | OUTPATIENT
Start: 2018-06-26 | End: 2018-07-24 | Stop reason: SDUPTHER

## 2018-06-26 RX ORDER — FLUOXETINE HYDROCHLORIDE 20 MG/1
20 CAPSULE ORAL DAILY
Refills: 0 | COMMUNITY
Start: 2018-06-06 | End: 2018-06-26 | Stop reason: SDUPTHER

## 2018-06-26 RX ORDER — DICLOFENAC SODIUM 75 MG/1
75 TABLET, DELAYED RELEASE ORAL DAILY
Qty: 90 TABLET | Refills: 0 | Status: SHIPPED | OUTPATIENT
Start: 2018-06-26 | End: 2018-07-24 | Stop reason: SDUPTHER

## 2018-06-26 RX ORDER — FLUOXETINE HYDROCHLORIDE 20 MG/1
20 CAPSULE ORAL DAILY
Qty: 30 CAPSULE | Refills: 0 | Status: SHIPPED | OUTPATIENT
Start: 2018-06-26 | End: 2018-07-24 | Stop reason: SDUPTHER

## 2018-06-26 RX ORDER — LUBIPROSTONE 24 UG/1
CAPSULE, GELATIN COATED ORAL
Qty: 60 CAPSULE | Refills: 0 | Status: SHIPPED | OUTPATIENT
Start: 2018-06-26 | End: 2018-07-24 | Stop reason: SDUPTHER

## 2018-06-26 RX ORDER — TIZANIDINE 4 MG/1
TABLET ORAL
Qty: 270 TABLET | Refills: 0 | Status: SHIPPED | OUTPATIENT
Start: 2018-06-26 | End: 2018-09-04 | Stop reason: SDUPTHER

## 2018-07-11 ENCOUNTER — TELEPHONE (OUTPATIENT)
Dept: INTERNAL MEDICINE CLINIC | Age: 58
End: 2018-07-11

## 2018-07-11 NOTE — TELEPHONE ENCOUNTER
Pt asking if Dr Marly Trotter can see her Friday, she states Dr Marly Trotter said he wanted to see her because she fell and went to urgent care today and they were concerned about why she fell. She feels dizzy at times when she tries to stand up and this is why she fell, but she does not know what is causing the dizziness. She states she called today to try to see Dr Marly Trotter but was told he was full. I do not see any phone notes requesting appointment today, but she is aware Dr Marly Trotter is off tomorrow. I offered her to come in tomorrow since the dizziness is causing her to fall but she states she only wants to see Dr Marly Trotter for this. Can pt be worked in Friday? Only same day available.       #[phone]

## 2018-07-12 ENCOUNTER — OFFICE VISIT (OUTPATIENT)
Dept: INTERNAL MEDICINE CLINIC | Age: 58
End: 2018-07-12

## 2018-07-12 VITALS
TEMPERATURE: 98.2 F | HEART RATE: 72 BPM | BODY MASS INDEX: 46.2 KG/M2 | DIASTOLIC BLOOD PRESSURE: 60 MMHG | SYSTOLIC BLOOD PRESSURE: 100 MMHG | OXYGEN SATURATION: 97 % | WEIGHT: 270.6 LBS | HEIGHT: 64 IN

## 2018-07-12 DIAGNOSIS — H81.12 BENIGN PAROXYSMAL POSITIONAL VERTIGO OF LEFT EAR: Primary | ICD-10-CM

## 2018-07-12 PROCEDURE — 99214 OFFICE O/P EST MOD 30 MIN: CPT | Performed by: NURSE PRACTITIONER

## 2018-07-12 NOTE — PROGRESS NOTES
Department of Internal Medicine  Clinic Note    Date: 7/12/2018                                               Subjective/Objective:     Chief Complaint   Patient presents with    Fall     follow up to urgent care, had dizziness leading up to fal, urgent care called Dr. Roderick Augustin and Dr. Maicol Zurita wanted pt. to come in today, x2       HPI (location/radiation, quality, severity)   Patient is here for follow-up after becoming dizzy and falling. She was seen in urgent care ([facility]) for this yesterday. She did have a left ankle injury. Xray neg. Sts dizzy x3-4 months. Mostly with getting up from seated position. Starts within a couple min of standing. Increases with bending over. Describes as a weird \"whoozy\" feeling in her head. No spinning. Happens on and off, not daily. Approx 0-1 episodes per week. Lasts 4 minutes each time. Not assoc with cp, sob, headaches. No recent medication changes. No recent illnesses. No tinnitus, ear pain. Tobacco:  reports that she has never smoked. She has never used smokeless tobacco.  ETOH:  reports that she does not drink alcohol. Current Outpatient Prescriptions   Medication Sig Dispense Refill    morphine-naltrexone (EMBEDA) 30-1.2 MG CPCR Take 1 capsule by mouth daily as needed for Pain for up to 28 days. Saray Staples Date: 6/26/18 28 capsule 0    FLUoxetine (PROZAC) 20 MG capsule Take 1 capsule by mouth daily 30 capsule 0    lubiprostone (AMITIZA) 24 MCG capsule TAKE 1 CAPSULE BY MOUTH TWICE DAILY WITH MEALS 60 capsule 0    tiZANidine (ZANAFLEX) 4 MG tablet TAKE 1 TABLET BY MOUTH THREE TIMES DAILY 270 tablet 0    pregabalin (LYRICA) 100 MG capsule Take 1 capsule by mouth 3 times daily for 30 days. . 90 capsule 0    diclofenac (VOLTAREN) 75 MG EC tablet Take 1 tablet by mouth daily 90 tablet 0    esomeprazole (NEXIUM) 40 MG delayed release capsule TAKE 1 CAPSULE BY MOUTH EVERY MORNING BEFORE BREAKFAST 90 capsule 0    levothyroxine (SYNTHROID) 88

## 2018-07-24 ENCOUNTER — OFFICE VISIT (OUTPATIENT)
Dept: PAIN MANAGEMENT | Age: 58
End: 2018-07-24

## 2018-07-24 VITALS — DIASTOLIC BLOOD PRESSURE: 85 MMHG | HEART RATE: 72 BPM | SYSTOLIC BLOOD PRESSURE: 137 MMHG

## 2018-07-24 DIAGNOSIS — M16.0 PRIMARY OSTEOARTHRITIS OF BOTH HIPS: ICD-10-CM

## 2018-07-24 DIAGNOSIS — G89.4 CHRONIC PAIN SYNDROME: ICD-10-CM

## 2018-07-24 DIAGNOSIS — K59.03 DRUG-INDUCED CONSTIPATION: ICD-10-CM

## 2018-07-24 DIAGNOSIS — M16.11 PRIMARY OSTEOARTHRITIS OF RIGHT HIP: ICD-10-CM

## 2018-07-24 DIAGNOSIS — M79.7 FIBROMYALGIA: ICD-10-CM

## 2018-07-24 DIAGNOSIS — M17.10 PRIMARY LOCALIZED OSTEOARTHROSIS OF LOWER LEG, UNSPECIFIED LATERALITY: ICD-10-CM

## 2018-07-24 DIAGNOSIS — M54.16 LUMBAR RADICULITIS: ICD-10-CM

## 2018-07-24 DIAGNOSIS — M51.36 DDD (DEGENERATIVE DISC DISEASE), LUMBAR: ICD-10-CM

## 2018-07-24 DIAGNOSIS — M47.899 FACET SYNDROME: ICD-10-CM

## 2018-07-24 PROCEDURE — 99213 OFFICE O/P EST LOW 20 MIN: CPT | Performed by: INTERNAL MEDICINE

## 2018-07-24 RX ORDER — LUBIPROSTONE 24 UG/1
CAPSULE, GELATIN COATED ORAL
Qty: 60 CAPSULE | Refills: 0 | Status: SHIPPED | OUTPATIENT
Start: 2018-07-24 | End: 2018-09-04 | Stop reason: SDUPTHER

## 2018-07-24 RX ORDER — FLUOXETINE HYDROCHLORIDE 20 MG/1
20 CAPSULE ORAL DAILY
Qty: 30 CAPSULE | Refills: 0 | Status: SHIPPED | OUTPATIENT
Start: 2018-07-24 | End: 2018-10-30 | Stop reason: SDUPTHER

## 2018-07-24 RX ORDER — DICLOFENAC SODIUM 75 MG/1
75 TABLET, DELAYED RELEASE ORAL DAILY
Qty: 90 TABLET | Refills: 0 | Status: SHIPPED | OUTPATIENT
Start: 2018-07-24 | End: 2018-09-04 | Stop reason: SDUPTHER

## 2018-07-24 RX ORDER — PREGABALIN 100 MG/1
100 CAPSULE ORAL 3 TIMES DAILY
Qty: 90 CAPSULE | Refills: 0 | Status: SHIPPED | OUTPATIENT
Start: 2018-07-24 | End: 2018-09-04 | Stop reason: SDUPTHER

## 2018-07-24 NOTE — PROGRESS NOTES
John Patterson  1960  [id number]    HISTORY OF PRESENT ILLNESS:  Ms. Edd Villareal is a 61 y.o. female returns for a follow up visit for multiple medical problems. Her current presenting problems are   1. Fibromyalgia    2. Chronic pain syndrome    3. Facet syndrome    4. Primary osteoarthritis of both hips    5. DDD (degenerative disc disease), lumbar    . As per information/history obtained from the PADT(patient assessment and documentation tool) - She complains of pain in the shoulders Right, lower back and knees Bilateral with radiation to the buttocks, hips Right, ankles Right and feet Right She rates the pain 7/10 and describes it as throbbing. Pain is made worse by: nothing, movement, walking, standing, sitting, bending, lifting. Current treatment regimen has helped relieve about 50% of the pain. She denies side effects from the current pain regimen. Patient reports that since the last follow up visit the physical functioning is unchanged, family/social relationships are unchanged, mood is worse and sleep patterns are unchanged, and that the overall functioning is unchanged. Patient denies neurological bowel or bladder. Patient denies misusing/abusing her narcotic pain medications or using any illegal drugs. There are No indicators for possible drug abuse, addiction or diversion problems. Upon obtaining the medical history from Ms. Edd Villareal regarding today's office visit for her presenting problems, patient states she sprained her right ankle, she says she twisted and fell and hit her arm also. She has been complaint with her medications, she says she occasionally gets dizzy. She says she is using Lyrica 3 per day along with Zanaflex and Amitiza for constipation which is helping some. ALLERGIES: Patients list of allergies were reviewed     MEDICATIONS: Ms. Edd Villareal list of medications were reviewed. Her current medications are   Outpatient Medications Prior to Visit   Medication Sig Dispense Refill    mouth daily as needed (diarrhea).  ferrous sulfate 325 (65 Fe) MG tablet Take 1 tablet by mouth Daily with supper 90 tablet 0     Facility-Administered Medications Prior to Visit   Medication Dose Route Frequency Provider Last Rate Last Dose    triamcinolone acetonide (KENALOG-40) injection 40 mg  40 mg Intramuscular Once Geri Ortiz MD        cyanocobalamin injection 1,000 mcg  1,000 mcg Intramuscular Q30 Days Nilson Cmaarillo MD   1,000 mcg at 03/22/17 1413    cyanocobalamin injection 1,000 mcg  1,000 mcg Intramuscular Q30 Days Nilson Camarillo MD   1,000 mcg at 02/01/17 1112       SOCIAL/FAMILY/PAST MEDICAL HISTORY: Ms. Day Bush, family and past medical history was reviewed. REVIEW OF SYSTEMS:    Respiratory: Negative for apnea, chest tightness and shortness of breath or change in baseline breathing. Gastrointestinal: Negative for nausea, vomiting, abdominal pain, diarrhea, constipation, blood in stool and abdominal distention. PHYSICAL EXAM:   Nursing note and vitals reviewed. /85 (Site: Left Arm, Position: Sitting, Cuff Size: Large Adult)   Pulse 72   LMP 01/15/2014   Constitutional: She appears well-developed and well-nourished. No acute distress. Skin: Skin is warm and dry, good turgor. No rash noted. She is not diaphoretic. Cardiovascular: Normal rate, regular rhythm, normal heart sounds, and does not have murmur. Pulmonary/Chest: Effort normal. No respiratory distress. She does not have wheezes in the lung fields. She has no rales. Neurological/Psychiatric:She is alert and oriented to person, place, and time. Coordination is  normal. Her mood isAppropriate and affect is Neutral/Euthymic(normal) . IMPRESSION:   1. Fibromyalgia    2. Chronic pain syndrome    3. Facet syndrome    4. Primary osteoarthritis of both hips    5. DDD (degenerative disc disease), lumbar    6. Primary osteoarthritis of right hip    7. Lumbar radiculitis    8.  Primary localized TABLET BY MOUTH THREE TIMES DAILY AS NEEDED FOR DIZZINESS 30 tablet 0    LATUDA 120 MG tablet TK 1 T PO QD AT DINNER  0    vitamin D (ERGOCALCIFEROL) 76718 units CAPS capsule TAKE 1 CAPSULE BY MOUTH 1 TIME A WEEK FOR 12 DOSES 13 capsule 0    ondansetron (ZOFRAN) 4 MG tablet TAKE 1 TABLET BY MOUTH EVERY 8 HOURS AS NEEDED FOR NAUSEA OR VOMITING 60 tablet 5    betamethasone dipropionate (DIPROLENE) 0.05 % cream APPLY EXTERNALLY TO THE AFFECTED AREA TWICE DAILY 60 g 0    QUEtiapine (SEROQUEL) 400 MG tablet Take 500 mg by mouth nightly Takes a 100mg and 400mg tab- total 500mg      amphetamine-dextroamphetamine (ADDERALL) 20 MG tablet Take 20 mg by mouth 2 times daily       VORTIoxetine HBr (BRINTELLIX) 20 MG TABS tablet Take 10 mg by mouth daily       alosetron (LOTRONEX) 0.5 MG tablet Take 0.5 mg by mouth daily as needed (diarrhea).  ferrous sulfate 325 (65 Fe) MG tablet Take 1 tablet by mouth Daily with supper 90 tablet 0     Current Facility-Administered Medications   Medication Dose Route Frequency Provider Last Rate Last Dose    triamcinolone acetonide (KENALOG-40) injection 40 mg  40 mg Intramuscular Once Krystal Galvan MD        cyanocobalamin injection 1,000 mcg  1,000 mcg Intramuscular Q30 Days Iain Oneil MD   1,000 mcg at 03/22/17 1413    cyanocobalamin injection 1,000 mcg  1,000 mcg Intramuscular Q30 Days Iain Oneil MD   1,000 mcg at 02/01/17 1112     I will continue her current medication regimen  which is part of the above treatment schedule. It has been helping with Ms. Yap's chronic  medical problems which for this visit include:   Diagnoses of Fibromyalgia, Chronic pain syndrome, Facet syndrome, Primary osteoarthritis of both hips, and DDD (degenerative disc disease), lumbar were pertinent to this visit. Risks and benefits of the medications and other alternative treatments  including no treatment were discussed with the patient. The common side effects of these medications were also explained to the patient. Informed verbal consent was obtained. Goals of current treatment regimen include improvement in pain, restoration of functioning- with focus on improvement in physical performance, general activity, work or disability,emotional distress, health care utilization and  decreased medication consumption. Will continue to monitor progress towards achieving/maintaining therapeutic goals with special emphasis on  1. Improvement in perceived interfernce  of pain with ADL's. Ability to do home exercises independently. Ability to do household chores indoor and/or outdoor work and social and leisure activities. Improve psychosocial and physical functioning. - she is showing progression towards this treatment goal with the current regimen    She was advised against drinking alcohol with the narcotic pain medicines, advised against driving or handling machinery while adjusting the dose of medicines or if having cognitive  issues related to the current medications. Risk of overdose and death, if medicines not taken as prescribed, were also discussed. If the patient develops new symptoms or if the symptoms worsen, the patient should call the office. While transcribing every attempt was made to maintain the accuracy of the note in terms of it's contents,there may have been some errors made inadvertently. Thank you for allowing me to participate in the care of this patient. Radha Jaramillo MD.    Cc: MD PEDRO Rich Olden Aline, scribing for in the presence  of Dr. Radha Jaramillo.   07/24/18  3:22 PM  Fly Creekmorena Cain.  Ghazala Garcia Assistant  I, Dr. Radha Jaramillo, personally performed the services described in this documentation as scribed by  Maxime Mai MA in my presence and it is both accurate and complete

## 2018-07-31 RX ORDER — ATORVASTATIN CALCIUM 40 MG/1
TABLET, FILM COATED ORAL
Qty: 90 TABLET | Refills: 0 | Status: SHIPPED | OUTPATIENT
Start: 2018-07-31 | End: 2018-11-08 | Stop reason: SDUPTHER

## 2018-08-02 ENCOUNTER — OFFICE VISIT (OUTPATIENT)
Dept: INTERNAL MEDICINE CLINIC | Age: 58
End: 2018-08-02

## 2018-08-02 VITALS
WEIGHT: 269 LBS | BODY MASS INDEX: 46.17 KG/M2 | OXYGEN SATURATION: 98 % | DIASTOLIC BLOOD PRESSURE: 66 MMHG | SYSTOLIC BLOOD PRESSURE: 108 MMHG | HEART RATE: 70 BPM

## 2018-08-02 DIAGNOSIS — S93.401A SPRAIN OF RIGHT ANKLE, UNSPECIFIED LIGAMENT, INITIAL ENCOUNTER: ICD-10-CM

## 2018-08-02 DIAGNOSIS — R73.03 PREDIABETES: ICD-10-CM

## 2018-08-02 DIAGNOSIS — M25.571 ACUTE RIGHT ANKLE PAIN: Primary | ICD-10-CM

## 2018-08-02 PROCEDURE — 99214 OFFICE O/P EST MOD 30 MIN: CPT | Performed by: INTERNAL MEDICINE

## 2018-08-02 ASSESSMENT — ENCOUNTER SYMPTOMS
ABDOMINAL PAIN: 0
DIARRHEA: 0
SORE THROAT: 0
WHEEZING: 0
VOMITING: 0
SHORTNESS OF BREATH: 0
TROUBLE SWALLOWING: 0
NAUSEA: 0

## 2018-08-02 NOTE — PROGRESS NOTES
Department of Internal Medicine  Clinic Note    Date: 8/2/2018                                               Subjective/Objective:     Chief Complaint   Patient presents with    Ankle Injury     sprained ankle 2 wks ago, seen in [facility] ER, no fx, given a boot to wear for 1 wk. still having pain and sl swelling rt lateral malleolus. HPI: Pt presents today for evaluation of recent ankle injury. She was dizzy and lost her balance and fell in the bathroom. She twisted her ankle. Pt is currently endorsing lateral ankle pain that radiates proximally. Initial injury occurred ~2.5 wks ago and the pan has not improved. Pt currently following with pain specialist. She was advised to refer to them for pain management. Pt will follow up with podiatry and have repeat Xray done in their office.           Patient Active Problem List    Diagnosis Date Noted    Cellulitis 05/06/2015     Priority: High    Acute blood loss anemia 04/10/2015     Priority: High    Major depressive disorder, recurrent episode, severe ([address] 08/30/2014     Priority: High    Self mutilating behavior 09/27/2011     Priority: High    Hypothyroid 03/05/2015     Priority: Medium    Chronic pain syndrome      Priority: Medium    Depression      Priority: Medium    Abdominal pain 06/19/2018    Drug-induced constipation 05/15/2018    Nasal trauma 02/23/2018    Edema 07/12/2017    Leg skin lesion, left 05/12/2017    Lumbar radiculitis 04/07/2017    Viral upper respiratory tract infection 01/28/2017    Open wound of left upper arm     Acute cystitis without hematuria 08/09/2016    Laceration of left upper extremity 06/28/2016    Cough 04/06/2016    Dizzy 04/06/2016    BPPV (benign paroxysmal positional vertigo) 03/29/2016    Primary insomnia 11/12/2015    Scalp cyst     Vertigo 09/21/2015    Dyspnea 08/01/2015    Self-mutilation     Light headed 03/30/2015    Borderline personality disorder 03/06/2015    Morbid obesity with BMI of 40.0-44.9, adult ([facility] [address] 02/10/2015    Bilateral leg edema 09/09/2014    Nausea & vomiting 06/25/2014    Weakness 06/25/2014    S/P laparoscopic sleeve gastrectomy 06/17/2014    Plantar fasciitis, right 04/16/2014    Prediabetes     Sore throat 03/04/2014    Fatigue 03/04/2014    Allergic rhinitis 03/04/2014    Osteoarthritis of hip     DDD (degenerative disc disease), lumbar     Facet syndrome     Atypical chest pain 01/15/2014    Hiatal hernia 01/15/2014    Elbow tendinitis     Fibromyalgia     Pain 10/29/2013    Pain in limb 09/12/2013    Venous (peripheral) insufficiency 09/12/2013    Ulcer of lower limb ([facility] [address] 09/12/2013    Rotator cuff (capsule) sprain 07/08/2013    Knee joint replacement by other means 07/08/2013    Diarrhea 03/26/2013    GERD (gastroesophageal reflux disease)     Radial nerve entrapment 01/08/2013    Left Lateral epicondylitis (tennis elbow) 12/17/2012    Headache 08/13/2012    Bilateral lower extremity edema     Leg swelling 07/09/2012    Vitamin B12 deficiency 06/12/2012    Paresthesia 06/08/2012    Shoulder pain 05/15/2012    Suicidal ideation 04/22/2012    Posterior tibial tendonitis, bilateral 03/13/2012    URTI (acute upper respiratory infection) 01/24/2012    Skin growth 01/20/2012    Restless legs syndrome (RLS) 11/21/2011    Pre-op evaluation 10/24/2011    Hypothyroidism     Hyperlipidemia 09/23/2011    Primary localized osteoarthrosis, lower leg 01/29/2010       Social History:   TOBACCO:   reports that she has never smoked. She has never used smokeless tobacco.     ETOH:   reports that she does not drink alcohol.     Past Medical History:   Diagnosis Date    Acquired hyperlipoproteinemia     Anxiety     Arthritis     Bilateral lower extremity edema     Bipolar 1 disorder (HCC)     Chronic pain syndrome     DDD (degenerative disc disease), lumbar     Depression     Depression     Disease of gallbladder     DJD (degenerative joint Exam   Constitutional: She is oriented to person, place, and time. She appears well-developed and well-nourished. No distress. HENT:   Head: Normocephalic and atraumatic. Right Ear: Hearing, tympanic membrane and external ear normal.   Left Ear: Hearing, tympanic membrane and external ear normal.   Eyes: Conjunctivae and lids are normal. Pupils are equal, round, and reactive to light. No scleral icterus. Neck: Trachea normal and normal range of motion. No hepatojugular reflux and no JVD present. Carotid bruit is not present. No thyromegaly present. Cardiovascular: Normal rate, regular rhythm, normal heart sounds and intact distal pulses. Exam reveals no friction rub. No murmur heard. Pulmonary/Chest: Effort normal and breath sounds normal. No respiratory distress. Abdominal: Soft. Normal appearance and bowel sounds are normal. She exhibits no distension. There is no tenderness. Musculoskeletal: Normal range of motion. She exhibits no edema. Feet:    Lymphadenopathy:     She has no cervical adenopathy. Neurological: She is alert and oriented to person, place, and time. She has normal strength and normal reflexes. No cranial nerve deficit or sensory deficit. Skin: Skin is warm and dry. No rash noted. She is not diaphoretic. No cyanosis. Nails show no clubbing. Psychiatric: She has a normal mood and affect. Her speech is normal and behavior is normal.       Assessment/Plan     1. Acute right ankle pain  - will f/u with podiatry and have repeat xray done in their office  - will f/u with pain management specialist for breakthrough pain    2. Sprain of right ankle, unspecified ligament, initial encounter  - will f/u with podiatry and have repeat xray done in their office  - will f/u with pain management specialist for breakthrough pain    3.  Prediabetes  - Diabetic Foot Exam    Orders Placed This Encounter   Procedures    Diabetic Foot Exam     Return if symptoms worsen or fail to improve. Sameera Pierson MD     8/2/2018  12:33 PM    Documentation was done using voice recognition dragon software. Every effort was made to ensure accuracy; however, inadvertent unintentional computerized transcription errors may be present.

## 2018-08-11 DIAGNOSIS — K21.9 GASTROESOPHAGEAL REFLUX DISEASE, ESOPHAGITIS PRESENCE NOT SPECIFIED: ICD-10-CM

## 2018-08-13 RX ORDER — LEVOTHYROXINE SODIUM 88 UG/1
TABLET ORAL
Qty: 90 TABLET | Refills: 0 | Status: SHIPPED | OUTPATIENT
Start: 2018-08-13 | End: 2018-11-14 | Stop reason: SDUPTHER

## 2018-08-13 RX ORDER — ESOMEPRAZOLE MAGNESIUM 40 MG/1
CAPSULE, DELAYED RELEASE ORAL
Qty: 90 CAPSULE | Refills: 0 | Status: SHIPPED | OUTPATIENT
Start: 2018-08-13 | End: 2018-11-14 | Stop reason: SDUPTHER

## 2018-08-27 ENCOUNTER — OFFICE VISIT (OUTPATIENT)
Dept: ORTHOPEDIC SURGERY | Age: 58
End: 2018-08-27

## 2018-08-27 VITALS
TEMPERATURE: 97.2 F | HEIGHT: 64 IN | WEIGHT: 269 LBS | SYSTOLIC BLOOD PRESSURE: 95 MMHG | DIASTOLIC BLOOD PRESSURE: 66 MMHG | HEART RATE: 74 BPM | BODY MASS INDEX: 45.93 KG/M2

## 2018-08-27 DIAGNOSIS — Z96.653 HISTORY OF TOTAL BILATERAL KNEE REPLACEMENT: Primary | ICD-10-CM

## 2018-08-27 PROCEDURE — 99212 OFFICE O/P EST SF 10 MIN: CPT | Performed by: PHYSICIAN ASSISTANT

## 2018-08-29 PROBLEM — Z96.653 HISTORY OF TOTAL BILATERAL KNEE REPLACEMENT: Status: ACTIVE | Noted: 2018-08-29

## 2018-08-30 RX ORDER — ROPINIROLE 1 MG/1
TABLET, FILM COATED ORAL
Qty: 180 TABLET | Refills: 0 | Status: SHIPPED | OUTPATIENT
Start: 2018-08-30 | End: 2018-11-25 | Stop reason: SDUPTHER

## 2018-09-04 ENCOUNTER — OFFICE VISIT (OUTPATIENT)
Dept: PAIN MANAGEMENT | Age: 58
End: 2018-09-04

## 2018-09-04 VITALS
WEIGHT: 269 LBS | DIASTOLIC BLOOD PRESSURE: 82 MMHG | BODY MASS INDEX: 46.17 KG/M2 | HEART RATE: 79 BPM | SYSTOLIC BLOOD PRESSURE: 126 MMHG

## 2018-09-04 DIAGNOSIS — M16.11 PRIMARY OSTEOARTHRITIS OF RIGHT HIP: ICD-10-CM

## 2018-09-04 DIAGNOSIS — M54.16 LUMBAR RADICULITIS: ICD-10-CM

## 2018-09-04 DIAGNOSIS — K59.03 DRUG-INDUCED CONSTIPATION: ICD-10-CM

## 2018-09-04 DIAGNOSIS — M16.0 PRIMARY OSTEOARTHRITIS OF BOTH HIPS: ICD-10-CM

## 2018-09-04 DIAGNOSIS — M79.7 FIBROMYALGIA: ICD-10-CM

## 2018-09-04 DIAGNOSIS — G89.4 CHRONIC PAIN SYNDROME: ICD-10-CM

## 2018-09-04 DIAGNOSIS — M77.8 ELBOW TENDINITIS: ICD-10-CM

## 2018-09-04 DIAGNOSIS — M17.10 PRIMARY LOCALIZED OSTEOARTHROSIS OF LOWER LEG, UNSPECIFIED LATERALITY: ICD-10-CM

## 2018-09-04 DIAGNOSIS — M51.36 DDD (DEGENERATIVE DISC DISEASE), LUMBAR: ICD-10-CM

## 2018-09-04 DIAGNOSIS — M47.899 FACET SYNDROME: ICD-10-CM

## 2018-09-04 PROCEDURE — 99213 OFFICE O/P EST LOW 20 MIN: CPT | Performed by: INTERNAL MEDICINE

## 2018-09-04 RX ORDER — PREGABALIN 100 MG/1
100 CAPSULE ORAL 3 TIMES DAILY
Qty: 90 CAPSULE | Refills: 0 | Status: SHIPPED | OUTPATIENT
Start: 2018-09-04 | End: 2018-10-02 | Stop reason: SDUPTHER

## 2018-09-04 RX ORDER — DICLOFENAC SODIUM 75 MG/1
75 TABLET, DELAYED RELEASE ORAL DAILY
Qty: 90 TABLET | Refills: 0 | Status: SHIPPED | OUTPATIENT
Start: 2018-09-04 | End: 2018-11-28 | Stop reason: SDUPTHER

## 2018-09-04 RX ORDER — TIZANIDINE 4 MG/1
TABLET ORAL
Qty: 270 TABLET | Refills: 0 | Status: SHIPPED | OUTPATIENT
Start: 2018-09-04 | End: 2018-10-30 | Stop reason: SDUPTHER

## 2018-09-04 RX ORDER — LUBIPROSTONE 24 UG/1
CAPSULE, GELATIN COATED ORAL
Qty: 60 CAPSULE | Refills: 0 | Status: SHIPPED | OUTPATIENT
Start: 2018-09-04 | End: 2018-10-02 | Stop reason: SDUPTHER

## 2018-09-04 NOTE — PROGRESS NOTES
using Amitza. ALLERGIES: Patients list of allergies were reviewed     MEDICATIONS: Ms. Deepika Musa list of medications were reviewed. Her current medications are   Outpatient Medications Prior to Visit   Medication Sig Dispense Refill    rOPINIRole (REQUIP) 1 MG tablet TAKE 2 TABLETS BY MOUTH EVERY NIGHT 180 tablet 0    esomeprazole (NEXIUM) 40 MG delayed release capsule TAKE 1 CAPSULE BY MOUTH EVERY MORNING BEFORE BREAKFAST 90 capsule 0    levothyroxine (SYNTHROID) 88 MCG tablet TAKE 1 TABLET BY MOUTH DAILY 90 tablet 0    atorvastatin (LIPITOR) 40 MG tablet TAKE 1 TABLET BY MOUTH DAILY 90 tablet 0    FLUoxetine (PROZAC) 20 MG capsule Take 1 capsule by mouth daily 30 capsule 0    lubiprostone (AMITIZA) 24 MCG capsule TAKE 1 CAPSULE BY MOUTH TWICE DAILY WITH MEALS 60 capsule 0    diclofenac (VOLTAREN) 75 MG EC tablet Take 1 tablet by mouth daily 90 tablet 0    tiZANidine (ZANAFLEX) 4 MG tablet TAKE 1 TABLET BY MOUTH THREE TIMES DAILY 270 tablet 0    MAGNESIUM-OXIDE 400 (241.3 Mg) MG TABS tablet TAKE 1 TABLET BY MOUTH TWICE DAILY 60 tablet 0    meclizine (ANTIVERT) 12.5 MG tablet TAKE 1 TABLET BY MOUTH THREE TIMES DAILY AS NEEDED FOR DIZZINESS 30 tablet 0    LATUDA 120 MG tablet TK 1 T PO QD AT DINNER  0    vitamin D (ERGOCALCIFEROL) 66801 units CAPS capsule TAKE 1 CAPSULE BY MOUTH 1 TIME A WEEK FOR 12 DOSES 13 capsule 0    ondansetron (ZOFRAN) 4 MG tablet TAKE 1 TABLET BY MOUTH EVERY 8 HOURS AS NEEDED FOR NAUSEA OR VOMITING 60 tablet 5    betamethasone dipropionate (DIPROLENE) 0.05 % cream APPLY EXTERNALLY TO THE AFFECTED AREA TWICE DAILY 60 g 0    QUEtiapine (SEROQUEL) 400 MG tablet Take 500 mg by mouth nightly Takes a 100mg and 400mg tab- total 500mg      amphetamine-dextroamphetamine (ADDERALL) 20 MG tablet Take 20 mg by mouth 2 times daily       pregabalin (LYRICA) 100 MG capsule Take 1 capsule by mouth 3 times daily for 30 days. . 90 capsule 0    ferrous sulfate 325 (65 Fe) MG tablet Take 1 tablet by (ZOFRAN) 4 MG tablet TAKE 1 TABLET BY MOUTH EVERY 8 HOURS AS NEEDED FOR NAUSEA OR VOMITING 60 tablet 5    betamethasone dipropionate (DIPROLENE) 0.05 % cream APPLY EXTERNALLY TO THE AFFECTED AREA TWICE DAILY 60 g 0    QUEtiapine (SEROQUEL) 400 MG tablet Take 500 mg by mouth nightly Takes a 100mg and 400mg tab- total 500mg      amphetamine-dextroamphetamine (ADDERALL) 20 MG tablet Take 20 mg by mouth 2 times daily       pregabalin (LYRICA) 100 MG capsule Take 1 capsule by mouth 3 times daily for 30 days. . 90 capsule 0    ferrous sulfate 325 (65 Fe) MG tablet Take 1 tablet by mouth Daily with supper 90 tablet 0     Current Facility-Administered Medications   Medication Dose Route Frequency Provider Last Rate Last Dose    triamcinolone acetonide (KENALOG-40) injection 40 mg  40 mg Intramuscular Once Lu Norwood MD        cyanocobalamin injection 1,000 mcg  1,000 mcg Intramuscular Q30 Days Shira Katz MD   1,000 mcg at 03/22/17 1413    cyanocobalamin injection 1,000 mcg  1,000 mcg Intramuscular Q30 Days Shira Katz MD   1,000 mcg at 02/01/17 1112     I will continue her current medication regimen  which is part of the above treatment schedule. It has been helping with Ms. Yap's chronic  medical problems which for this visit include:   Diagnoses of Chronic pain syndrome, Facet syndrome (HCC), Restless legs syndrome (RLS), Gastroesophageal reflux disease without esophagitis, Elbow tendinitis, Fibromyalgia, DDD (degenerative disc disease), lumbar, Primary osteoarthritis of both hips, S/P laparoscopic sleeve gastrectomy, Primary insomnia, and Drug-induced constipation were pertinent to this visit. Risks and benefits of the medications and other alternative treatments  including no treatment were discussed with the patient. The common side effects of these medications were also explained to the patient. Informed verbal consent was obtained.    Goals of current treatment regimen include improvement in pain, restoration of functioning- with focus on improvement in physical performance, general activity, work or disability,emotional distress, health care utilization and  decreased medication consumption. Will continue to monitor progress towards achieving/maintaining therapeutic goals with special emphasis on  1. Improvement in perceived interfernce  of pain with ADL's. Ability to do home exercises independently. Ability to do household chores indoor and/or outdoor work and social and leisure activities. Improve psychosocial and physical functioning. - she is showing progression towards this treatment goal with the current regimen. She was advised against drinking alcohol with the narcotic pain medicines, advised against driving or handling machinery while adjusting the dose of medicines or if having cognitive  issues related to the current medications. Risk of overdose and death, if medicines not taken as prescribed, were also discussed. If the patient develops new symptoms or if the symptoms worsen, the patient should call the office. While transcribing every attempt was made to maintain the accuracy of the note in terms of it's contents,there may have been some errors made inadvertently. Thank you for allowing me to participate in the care of this patient. Sanjuanita Gale MD.    Cc: MD PEDRO Vance, Beti Rodriges, scribing for in the presence  of Dr. Sanjuanita Gale. 09/04/18  3:27 PM  Marilynn Burton Assistant  I, Dr. Sanjuanita Gale, personally performed the services described in this documentation as scribed by  Beti Rodriges MA in my presence and it is both accurate and complete

## 2018-09-14 RX ORDER — ONDANSETRON 4 MG/1
TABLET, FILM COATED ORAL
Qty: 60 TABLET | Refills: 0 | Status: SHIPPED | OUTPATIENT
Start: 2018-09-14 | End: 2018-11-14 | Stop reason: SDUPTHER

## 2018-09-18 ENCOUNTER — TELEPHONE (OUTPATIENT)
Dept: INTERNAL MEDICINE CLINIC | Age: 58
End: 2018-09-18

## 2018-09-18 NOTE — TELEPHONE ENCOUNTER
Priscilla Read   #[phone]  Fax #[phone]    Sending revised care plan and intervention that patient will be working on for health goals and intervention  Need feedback and recommendations

## 2018-10-02 ENCOUNTER — OFFICE VISIT (OUTPATIENT)
Dept: PAIN MANAGEMENT | Age: 58
End: 2018-10-02
Payer: COMMERCIAL

## 2018-10-02 VITALS
WEIGHT: 269 LBS | DIASTOLIC BLOOD PRESSURE: 76 MMHG | HEART RATE: 75 BPM | SYSTOLIC BLOOD PRESSURE: 116 MMHG | BODY MASS INDEX: 46.17 KG/M2

## 2018-10-02 DIAGNOSIS — M54.16 LUMBAR RADICULITIS: ICD-10-CM

## 2018-10-02 DIAGNOSIS — M79.7 FIBROMYALGIA: ICD-10-CM

## 2018-10-02 DIAGNOSIS — M47.899 FACET SYNDROME: ICD-10-CM

## 2018-10-02 DIAGNOSIS — M51.36 DDD (DEGENERATIVE DISC DISEASE), LUMBAR: ICD-10-CM

## 2018-10-02 DIAGNOSIS — K59.03 DRUG-INDUCED CONSTIPATION: ICD-10-CM

## 2018-10-02 DIAGNOSIS — M16.11 PRIMARY OSTEOARTHRITIS OF RIGHT HIP: ICD-10-CM

## 2018-10-02 DIAGNOSIS — G89.4 CHRONIC PAIN SYNDROME: ICD-10-CM

## 2018-10-02 DIAGNOSIS — M17.10 PRIMARY LOCALIZED OSTEOARTHROSIS OF LOWER LEG, UNSPECIFIED LATERALITY: ICD-10-CM

## 2018-10-02 PROCEDURE — 99213 OFFICE O/P EST LOW 20 MIN: CPT | Performed by: INTERNAL MEDICINE

## 2018-10-02 RX ORDER — LUBIPROSTONE 24 UG/1
CAPSULE, GELATIN COATED ORAL
Qty: 60 CAPSULE | Refills: 0 | Status: SHIPPED | OUTPATIENT
Start: 2018-10-02 | End: 2018-10-30 | Stop reason: SDUPTHER

## 2018-10-02 RX ORDER — PREGABALIN 100 MG/1
100 CAPSULE ORAL 3 TIMES DAILY
Qty: 90 CAPSULE | Refills: 0 | Status: SHIPPED | OUTPATIENT
Start: 2018-10-02 | End: 2018-10-30 | Stop reason: SDUPTHER

## 2018-10-02 NOTE — PROGRESS NOTES
osteoarthrosis of lower leg, unspecified laterality, Moderate episode of recurrent major depressive disorder (HCC), Restless legs syndrome (RLS), Gastroesophageal reflux disease without esophagitis, and Primary insomnia were pertinent to this visit. Risks and benefits of the medications and other alternative treatments  including no treatment were discussed with the patient. The common side effects of these medications were also explained to the patient. Informed verbal consent was obtained. Goals of current treatment regimen include improvement in pain, restoration of functioning- with focus on improvement in physical performance, general activity, work or disability,emotional distress, health care utilization and  decreased medication consumption. Will continue to monitor progress towards achieving/maintaining therapeutic goals with special emphasis on  1. Improvement in perceived interfernce  of pain with ADL's. Ability to do home exercises independently. Ability to do household chores indoor and/or outdoor work and social and leisure activities. Improve psychosocial and physical functioning. - she is showing progression towards this treatment goal with the current regimen. She was advised against drinking alcohol with the narcotic pain medicines, advised against driving or handling machinery while adjusting the dose of medicines or if having cognitive  issues related to the current medications. Risk of overdose and death, if medicines not taken as prescribed, were also discussed. If the patient develops new symptoms or if the symptoms worsen, the patient should call the office. While transcribing every attempt was made to maintain the accuracy of the note in terms of it's contents,there may have been some errors made inadvertently. Thank you for allowing me to participate in the care of this patient.     Kenneth Suárez MD.    Cc: Lane Bray MD    I, Wily Manzano, scribing for in the presence  of

## 2018-10-26 RX ORDER — BETAMETHASONE DIPROPIONATE 0.5 MG/G
CREAM TOPICAL
Qty: 60 G | Refills: 0 | Status: SHIPPED | OUTPATIENT
Start: 2018-10-26 | End: 2019-02-28 | Stop reason: SDUPTHER

## 2018-10-30 ENCOUNTER — OFFICE VISIT (OUTPATIENT)
Dept: PAIN MANAGEMENT | Age: 58
End: 2018-10-30
Payer: COMMERCIAL

## 2018-10-30 VITALS
BODY MASS INDEX: 46.35 KG/M2 | DIASTOLIC BLOOD PRESSURE: 83 MMHG | HEART RATE: 83 BPM | SYSTOLIC BLOOD PRESSURE: 134 MMHG | WEIGHT: 270 LBS

## 2018-10-30 DIAGNOSIS — M17.10 PRIMARY LOCALIZED OSTEOARTHROSIS OF LOWER LEG, UNSPECIFIED LATERALITY: ICD-10-CM

## 2018-10-30 DIAGNOSIS — M79.7 FIBROMYALGIA: ICD-10-CM

## 2018-10-30 DIAGNOSIS — M16.11 PRIMARY OSTEOARTHRITIS OF RIGHT HIP: ICD-10-CM

## 2018-10-30 DIAGNOSIS — M51.36 DDD (DEGENERATIVE DISC DISEASE), LUMBAR: ICD-10-CM

## 2018-10-30 DIAGNOSIS — M47.899 FACET SYNDROME: ICD-10-CM

## 2018-10-30 DIAGNOSIS — M54.16 LUMBAR RADICULITIS: ICD-10-CM

## 2018-10-30 DIAGNOSIS — G89.4 CHRONIC PAIN SYNDROME: ICD-10-CM

## 2018-10-30 PROCEDURE — 99213 OFFICE O/P EST LOW 20 MIN: CPT | Performed by: INTERNAL MEDICINE

## 2018-10-30 RX ORDER — PREGABALIN 100 MG/1
100 CAPSULE ORAL 3 TIMES DAILY
Qty: 90 CAPSULE | Refills: 0 | Status: SHIPPED | OUTPATIENT
Start: 2018-10-30 | End: 2018-11-28 | Stop reason: SDUPTHER

## 2018-10-30 RX ORDER — FLUOXETINE HYDROCHLORIDE 20 MG/1
20 CAPSULE ORAL DAILY
Qty: 30 CAPSULE | Refills: 0 | Status: SHIPPED | OUTPATIENT
Start: 2018-10-30 | End: 2019-01-15

## 2018-10-30 RX ORDER — TIZANIDINE 4 MG/1
TABLET ORAL
Qty: 270 TABLET | Refills: 0 | Status: SHIPPED | OUTPATIENT
Start: 2018-10-30 | End: 2018-11-28 | Stop reason: SDUPTHER

## 2018-10-30 RX ORDER — LUBIPROSTONE 24 UG/1
CAPSULE, GELATIN COATED ORAL
Qty: 60 CAPSULE | Refills: 0 | Status: SHIPPED | OUTPATIENT
Start: 2018-10-30 | End: 2018-11-28 | Stop reason: SDUPTHER

## 2018-10-30 NOTE — PROGRESS NOTES
(KENALOG-40) injection 40 mg  40 mg Intramuscular Once Dorota Foster MD        cyanocobalamin injection 1,000 mcg  1,000 mcg Intramuscular Q30 Days Gabriella Singer MD   1,000 mcg at 03/22/17 1413    cyanocobalamin injection 1,000 mcg  1,000 mcg Intramuscular Q30 Days Gabriella Singer MD   1,000 mcg at 02/01/17 1112       SOCIAL/FAMILY/PAST MEDICAL HISTORY: Ms. Ovi Lane, family and past medical history was reviewed. REVIEW OF SYSTEMS:    Respiratory: Negative for apnea, chest tightness and shortness of breath or change in baseline breathing. Gastrointestinal: Negative for nausea, vomiting, abdominal pain, diarrhea, constipation, blood in stool and abdominal distention. PHYSICAL EXAM:   Nursing note and vitals reviewed. /83   Pulse 83   Wt 270 lb (122.5 kg)   LMP 01/15/2014   BMI 46.35 kg/m²   Constitutional: She appears well-developed and well-nourished. No acute distress. Skin: Skin is warm and dry, good turgor. No rash noted. She is not diaphoretic. Cardiovascular: Normal rate, regular rhythm, normal heart sounds, and does not have murmur. Pulmonary/Chest: Effort normal. No respiratory distress. She does not have wheezes in the lung fields. She has no rales. Neurological/Psychiatric:She is alert and oriented to person, place, and time. Coordination is  normal. Her mood isAppropriate and affect is Flat/blunted and Anxious . IMPRESSION:   1. Chronic pain syndrome    2. Facet syndrome ([address]    3. Primary localized osteoarthrosis of lower leg, unspecified laterality    4. Fibromyalgia    5. DDD (degenerative disc disease), lumbar    6. Lumbar radiculitis    7. Primary osteoarthritis of right hip        PLAN:  Informed verbal consent was obtained  -[place] was obtained and reviewed  for the last one year and no indicators of drug misuse  were found.  Any other controlled substance prescriptions  seen on the record have been accounted for, I am aware of the patient receiving these medications. Duane Bourdon [place] record will be rechecked as part of office protocol.   -She was advised weight reduction, diet changes- 800-1200 jessica diet, diet diary, exercising, nutritional  consult increased physical activity as tolerated  -She was advised to increase fluids ( 5-7  glasses of fluid daily), limit caffeine, avoid cheese products, increase dietary fiber, increase activity and exercise as tolerated and relax regularly and enjoy meals, continue with Amitiza   -Patient's urine drug screen results with GC/MS confirmation were obtained and reviewed and were negative for any illicit drugs. Prescribed medications were within acceptable range.  -Discussed use, benefit, and side effects of prescribed medications. Barriers to medication compliance addressed. All patient questions answered. Pt voiced understanding.   -Duragesic patches were discarded per office protocol  -advised to walk 20-30 minutes daily     Current Outpatient Prescriptions   Medication Sig Dispense Refill    betamethasone dipropionate (DIPROLENE) 0.05 % cream APPLY EXTERNALLY TO THE AFFECTED AREA TWICE DAILY 60 g 0    lubiprostone (AMITIZA) 24 MCG capsule TAKE 1 CAPSULE BY MOUTH TWICE DAILY WITH MEALS 60 capsule 0    pregabalin (LYRICA) 100 MG capsule Take 1 capsule by mouth 3 times daily for 30 days. . 90 capsule 0    morphine-naltrexone (EMBEDA) 30-1.2 MG CPCR Take 1 capsule by mouth daily as needed for Pain for up to 28 days. . 28 capsule 0    ondansetron (ZOFRAN) 4 MG tablet TAKE 1 TABLET BY MOUTH EVERY 8 HOURS AS NEEDED FOR NAUSEA OR VOMITING 60 tablet 0    diclofenac (VOLTAREN) 75 MG EC tablet Take 1 tablet by mouth daily 90 tablet 0    tiZANidine (ZANAFLEX) 4 MG tablet TAKE 1 TABLET BY MOUTH THREE TIMES DAILY 270 tablet 0    rOPINIRole (REQUIP) 1 MG tablet TAKE 2 TABLETS BY MOUTH EVERY NIGHT 180 tablet 0    esomeprazole (NEXIUM) 40 MG delayed release capsule TAKE 1 CAPSULE BY MOUTH EVERY MORNING BEFORE BREAKFAST 90 Drug-induced constipation were pertinent to this visit. Risks and benefits of the medications and other alternative treatments  including no treatment were discussed with the patient. The common side effects of these medications were also explained to the patient. Informed verbal consent was obtained. Goals of current treatment regimen include improvement in pain, restoration of functioning- with focus on improvement in physical performance, general activity, work or disability,emotional distress, health care utilization and  decreased medication consumption. Will continue to monitor progress towards achieving/maintaining therapeutic goals with special emphasis on  1. Improvement in perceived interfernce  of pain with ADL's. Ability to do home exercises independently. Ability to do household chores indoor and/or outdoor work and social and leisure activities. Improve psychosocial and physical functioning. - she is showing progression towards this treatment goal with the current regimen. She was advised against drinking alcohol with the narcotic pain medicines, advised against driving or handling machinery while adjusting the dose of medicines or if having cognitive  issues related to the current medications. Risk of overdose and death, if medicines not taken as prescribed, were also discussed. If the patient develops new symptoms or if the symptoms worsen, the patient should call the office. While transcribing every attempt was made to maintain the accuracy of the note in terms of it's contents,there may have been some errors made inadvertently. Thank you for allowing me to participate in the care of this patient.     Dominick Reynolds MD.    Cc: Sharri Mccann MD

## 2018-11-08 RX ORDER — ATORVASTATIN CALCIUM 40 MG/1
TABLET, FILM COATED ORAL
Qty: 90 TABLET | Refills: 0 | Status: SHIPPED | OUTPATIENT
Start: 2018-11-08 | End: 2019-02-04 | Stop reason: SDUPTHER

## 2018-11-12 ENCOUNTER — HOSPITAL ENCOUNTER (OUTPATIENT)
Dept: CT IMAGING | Age: 58
Discharge: HOME | End: 2018-11-12
Payer: COMMERCIAL

## 2018-11-12 DIAGNOSIS — R31.9 HEMATURIA, UNSPECIFIED TYPE: ICD-10-CM

## 2018-11-12 DIAGNOSIS — R10.9 FLANK PAIN: ICD-10-CM

## 2018-11-12 PROCEDURE — 74176 CT ABD & PELVIS W/O CONTRAST: CPT

## 2018-11-14 DIAGNOSIS — K21.9 GASTROESOPHAGEAL REFLUX DISEASE, ESOPHAGITIS PRESENCE NOT SPECIFIED: ICD-10-CM

## 2018-11-14 RX ORDER — ESOMEPRAZOLE MAGNESIUM 40 MG/1
CAPSULE, DELAYED RELEASE ORAL
Qty: 90 CAPSULE | Refills: 0 | Status: SHIPPED | OUTPATIENT
Start: 2018-11-14 | End: 2019-02-13 | Stop reason: SDUPTHER

## 2018-11-14 RX ORDER — LEVOTHYROXINE SODIUM 88 UG/1
TABLET ORAL
Qty: 90 TABLET | Refills: 0 | Status: SHIPPED | OUTPATIENT
Start: 2018-11-14 | End: 2019-02-09 | Stop reason: SDUPTHER

## 2018-11-14 RX ORDER — ONDANSETRON 4 MG/1
TABLET, FILM COATED ORAL
Qty: 60 TABLET | Refills: 0 | Status: SHIPPED | OUTPATIENT
Start: 2018-11-14 | End: 2019-01-14 | Stop reason: SDUPTHER

## 2018-11-26 RX ORDER — ROPINIROLE 1 MG/1
TABLET, FILM COATED ORAL
Qty: 180 TABLET | Refills: 0 | Status: SHIPPED | OUTPATIENT
Start: 2018-11-26 | End: 2019-02-11 | Stop reason: SDUPTHER

## 2018-11-28 ENCOUNTER — OFFICE VISIT (OUTPATIENT)
Dept: PAIN MANAGEMENT | Age: 58
End: 2018-11-28
Payer: COMMERCIAL

## 2018-11-28 VITALS
WEIGHT: 270 LBS | SYSTOLIC BLOOD PRESSURE: 94 MMHG | HEART RATE: 80 BPM | DIASTOLIC BLOOD PRESSURE: 59 MMHG | BODY MASS INDEX: 46.35 KG/M2

## 2018-11-28 DIAGNOSIS — M54.16 LUMBAR RADICULITIS: ICD-10-CM

## 2018-11-28 DIAGNOSIS — M51.36 DDD (DEGENERATIVE DISC DISEASE), LUMBAR: ICD-10-CM

## 2018-11-28 DIAGNOSIS — G89.4 CHRONIC PAIN SYNDROME: ICD-10-CM

## 2018-11-28 DIAGNOSIS — M16.11 PRIMARY OSTEOARTHRITIS OF RIGHT HIP: ICD-10-CM

## 2018-11-28 DIAGNOSIS — M47.899 FACET SYNDROME: ICD-10-CM

## 2018-11-28 DIAGNOSIS — M79.7 FIBROMYALGIA: ICD-10-CM

## 2018-11-28 PROCEDURE — 99213 OFFICE O/P EST LOW 20 MIN: CPT | Performed by: INTERNAL MEDICINE

## 2018-11-28 RX ORDER — LUBIPROSTONE 24 UG/1
CAPSULE, GELATIN COATED ORAL
Qty: 60 CAPSULE | Refills: 0 | Status: SHIPPED | OUTPATIENT
Start: 2018-11-28 | End: 2019-01-15

## 2018-11-28 RX ORDER — TIZANIDINE 4 MG/1
TABLET ORAL
Qty: 270 TABLET | Refills: 0 | Status: SHIPPED | OUTPATIENT
Start: 2018-11-28 | End: 2019-01-15 | Stop reason: SDUPTHER

## 2018-11-28 RX ORDER — PREGABALIN 100 MG/1
100 CAPSULE ORAL 3 TIMES DAILY
Qty: 90 CAPSULE | Refills: 0 | Status: SHIPPED | OUTPATIENT
Start: 2018-11-28 | End: 2018-12-27 | Stop reason: SDUPTHER

## 2018-11-28 RX ORDER — DICLOFENAC SODIUM 75 MG/1
75 TABLET, DELAYED RELEASE ORAL DAILY
Qty: 90 TABLET | Refills: 0 | Status: SHIPPED | OUTPATIENT
Start: 2018-11-28 | End: 2019-01-15 | Stop reason: SDUPTHER

## 2018-11-28 NOTE — PROGRESS NOTES
rOPINIRole (REQUIP) 1 MG tablet TAKE 2 TABLETS BY MOUTH EVERY NIGHT 180 tablet 0    levothyroxine (SYNTHROID) 88 MCG tablet TAKE 1 TABLET BY MOUTH DAILY 90 tablet 0    ondansetron (ZOFRAN) 4 MG tablet TAKE 1 TABLET BY MOUTH EVERY 8 HOURS AS NEEDED FOR NAUSEA OR VOMITING 60 tablet 0    esomeprazole (NEXIUM) 40 MG delayed release capsule TAKE 1 CAPSULE BY MOUTH EVERY MORNING BEFORE BREAKFAST 90 capsule 0    atorvastatin (LIPITOR) 40 MG tablet TAKE 1 TABLET BY MOUTH DAILY 90 tablet 0    FLUoxetine (PROZAC) 20 MG capsule Take 1 capsule by mouth daily 30 capsule 0    betamethasone dipropionate (DIPROLENE) 0.05 % cream APPLY EXTERNALLY TO THE AFFECTED AREA TWICE DAILY 60 g 0    MAGNESIUM-OXIDE 400 (241.3 Mg) MG TABS tablet TAKE 1 TABLET BY MOUTH TWICE DAILY 60 tablet 0    meclizine (ANTIVERT) 12.5 MG tablet TAKE 1 TABLET BY MOUTH THREE TIMES DAILY AS NEEDED FOR DIZZINESS 30 tablet 0    LATUDA 120 MG tablet TK 1 T PO QD AT DINNER  0    vitamin D (ERGOCALCIFEROL) 78827 units CAPS capsule TAKE 1 CAPSULE BY MOUTH 1 TIME A WEEK FOR 12 DOSES 13 capsule 0    QUEtiapine (SEROQUEL) 400 MG tablet Take 500 mg by mouth nightly Takes a 100mg and 400mg tab- total 500mg      amphetamine-dextroamphetamine (ADDERALL) 20 MG tablet Take 20 mg by mouth 2 times daily       lubiprostone (AMITIZA) 24 MCG capsule TAKE 1 CAPSULE BY MOUTH TWICE DAILY WITH MEALS 60 capsule 0    pregabalin (LYRICA) 100 MG capsule Take 1 capsule by mouth 3 times daily for 30 days. . 90 capsule 0    tiZANidine (ZANAFLEX) 4 MG tablet TAKE 1 TABLET BY MOUTH THREE TIMES DAILY 270 tablet 0    diclofenac (VOLTAREN) 75 MG EC tablet Take 1 tablet by mouth daily 90 tablet 0    ferrous sulfate 325 (65 Fe) MG tablet Take 1 tablet by mouth Daily with supper 90 tablet 0     Facility-Administered Medications Prior to Visit   Medication Dose Route Frequency Provider Last Rate Last Dose    triamcinolone acetonide (KENALOG-40) injection 40 mg  40 mg Intramuscular mouth Daily with supper 90 tablet 0     Current Facility-Administered Medications   Medication Dose Route Frequency Provider Last Rate Last Dose    triamcinolone acetonide (KENALOG-40) injection 40 mg  40 mg Intramuscular Once Inna Mendoza MD        cyanocobalamin injection 1,000 mcg  1,000 mcg Intramuscular Q30 Days Ericka Escobar MD   1,000 mcg at 03/22/17 1413    cyanocobalamin injection 1,000 mcg  1,000 mcg Intramuscular Q30 Days Ericka Escobar MD   1,000 mcg at 02/01/17 1112     I will continue her current medication regimen  which is part of the above treatment schedule. It has been helping with Ms. Yap's chronic  medical problems which for this visit include:   Diagnoses of Chronic pain syndrome, Facet syndrome ([address] Severe episode of recurrent major depressive disorder, without psychotic features ([address] Moderate episode of recurrent major depressive disorder (HCC), Restless legs syndrome (RLS), Gastroesophageal reflux disease without esophagitis, Fibromyalgia, DDD (degenerative disc disease), lumbar, Lumbar radiculitis, Drug-induced constipation, Primary localized osteoarthrosis of lower leg, unspecified laterality, and Primary osteoarthritis of right hip were pertinent to this visit. Risks and benefits of the medications and other alternative treatments  including no treatment were discussed with the patient. The common side effects of these medications were also explained to the patient. Informed verbal consent was obtained. Goals of current treatment regimen include improvement in pain, restoration of functioning- with focus on improvement in physical performance, general activity, work or disability,emotional distress, health care utilization and  decreased medication consumption. Will continue to monitor progress towards achieving/maintaining therapeutic goals with special emphasis on  1. Improvement in perceived interfernce  of pain with ADL's.  Ability to do home exercises

## 2018-12-13 PROBLEM — M54.9 BACK PAIN: Status: ACTIVE | Noted: 2018-12-13

## 2018-12-13 PROBLEM — R06.02 SOB (SHORTNESS OF BREATH): Status: ACTIVE | Noted: 2018-12-13

## 2018-12-21 ENCOUNTER — HOSPITAL ENCOUNTER (EMERGENCY)
Age: 58
Discharge: HOME | End: 2018-12-22
Attending: EMERGENCY MEDICINE
Payer: COMMERCIAL

## 2018-12-21 ENCOUNTER — APPOINTMENT (OUTPATIENT)
Dept: CT IMAGING | Age: 58
End: 2018-12-21
Payer: COMMERCIAL

## 2018-12-21 ENCOUNTER — APPOINTMENT (OUTPATIENT)
Dept: GENERAL RADIOLOGY | Age: 58
End: 2018-12-21
Payer: COMMERCIAL

## 2018-12-21 DIAGNOSIS — R07.9 CHEST PAIN, UNSPECIFIED TYPE: Primary | ICD-10-CM

## 2018-12-21 LAB
B-TYPE NATRIURETIC PEPTIDE: 41 PG/ML (ref 0–99.9)
BACTERIA: ABNORMAL /HPF
BASOPHILS ABSOLUTE: 0 K/UL (ref 0–0.2)
BASOPHILS RELATIVE PERCENT: 0.6 %
BILIRUBIN URINE: ABNORMAL MG/DL
BLOOD, URINE: NEGATIVE
CALCIUM IONIZED: 1.12 MMOL/L (ref 1.12–1.32)
CLARITY: ABNORMAL
CO2: 23 MMOL/L (ref 21–32)
COLOR: ABNORMAL
CRYSTALS, UA: ABNORMAL /HPF
EKG ATRIAL RATE: 81 BPM
EKG ATRIAL RATE: 88 BPM
EKG DIAGNOSIS: NORMAL
EKG DIAGNOSIS: NORMAL
EKG P AXIS: 35 DEGREES
EKG P AXIS: 67 DEGREES
EKG P-R INTERVAL: 130 MS
EKG P-R INTERVAL: 140 MS
EKG Q-T INTERVAL: 380 MS
EKG Q-T INTERVAL: 402 MS
EKG QRS DURATION: 76 MS
EKG QRS DURATION: 76 MS
EKG QTC CALCULATION (BAZETT): 459 MS
EKG QTC CALCULATION (BAZETT): 466 MS
EKG R AXIS: 32 DEGREES
EKG R AXIS: 62 DEGREES
EKG T AXIS: 29 DEGREES
EKG T AXIS: 59 DEGREES
EKG VENTRICULAR RATE: 81 BPM
EKG VENTRICULAR RATE: 88 BPM
EOSINOPHILS ABSOLUTE: 0 K/UL (ref 0–0.6)
EOSINOPHILS RELATIVE PERCENT: 0.6 %
EPITHELIAL CELLS, UA: ABNORMAL /HPF
GFR AFRICAN AMERICAN: >60
GFR NON-AFRICAN AMERICAN: >60
GLUCOSE BLD-MCNC: 129 MG/DL (ref 70–99)
GLUCOSE URINE: NEGATIVE MG/DL
HCT VFR BLD CALC: 36.1 % (ref 36–48)
HEMOGLOBIN: 11.2 G/DL (ref 12–16)
KETONES, URINE: 15 MG/DL
LEUKOCYTE ESTERASE, URINE: ABNORMAL
LYMPHOCYTES ABSOLUTE: 1.4 K/UL (ref 1–5.1)
LYMPHOCYTES RELATIVE PERCENT: 19 %
MCH RBC QN AUTO: 23.5 PG (ref 26–34)
MCHC RBC AUTO-ENTMCNC: 31.1 G/DL (ref 31–36)
MCV RBC AUTO: 75.6 FL (ref 80–100)
MICROSCOPIC EXAMINATION: YES
MONOCYTES ABSOLUTE: 0.6 K/UL (ref 0–1.3)
MONOCYTES RELATIVE PERCENT: 8.1 %
MUCUS: ABNORMAL /LPF
NEUTROPHILS ABSOLUTE: 5.1 K/UL (ref 1.7–7.7)
NEUTROPHILS RELATIVE PERCENT: 71.7 %
NITRITE, URINE: NEGATIVE
PDW BLD-RTO: 20.1 % (ref 12.4–15.4)
PERFORMED ON: ABNORMAL
PERFORMED ON: NORMAL
PH UA: 6
PLATELET # BLD: 298 K/UL (ref 135–450)
PMV BLD AUTO: 8.2 FL (ref 5–10.5)
POC ANION GAP: 13 (ref 10–20)
POC BUN: 14 MG/DL (ref 7–18)
POC CHLORIDE: 105 MMOL/L (ref 99–110)
POC CREATININE: 0.8 MG/DL (ref 0.6–1.1)
POC POTASSIUM: 3.4 MMOL/L (ref 3.5–5.1)
POC SAMPLE TYPE: ABNORMAL
POC SAMPLE TYPE: NORMAL
POC SODIUM: 141 MMOL/L (ref 136–145)
POC TROPONIN I: 0 NG/ML (ref 0–0.1)
PROTEIN UA: 30 MG/DL
RBC # BLD: 4.77 M/UL (ref 4–5.2)
RBC UA: ABNORMAL /HPF (ref 0–2)
SPECIFIC GRAVITY UA: >=1.03
TROPONIN: <0.01 NG/ML
UROBILINOGEN, URINE: 1 E.U./DL
WBC # BLD: 7.2 K/UL (ref 4–11)
WBC UA: ABNORMAL /HPF (ref 0–5)

## 2018-12-21 PROCEDURE — 83880 ASSAY OF NATRIURETIC PEPTIDE: CPT

## 2018-12-21 PROCEDURE — 71275 CT ANGIOGRAPHY CHEST: CPT

## 2018-12-21 PROCEDURE — 6360000004 HC RX CONTRAST MEDICATION: Performed by: EMERGENCY MEDICINE

## 2018-12-21 PROCEDURE — 71046 X-RAY EXAM CHEST 2 VIEWS: CPT

## 2018-12-21 PROCEDURE — 6370000000 HC RX 637 (ALT 250 FOR IP): Performed by: EMERGENCY MEDICINE

## 2018-12-21 PROCEDURE — 80047 BASIC METABLC PNL IONIZED CA: CPT

## 2018-12-21 PROCEDURE — 93005 ELECTROCARDIOGRAM TRACING: CPT | Performed by: EMERGENCY MEDICINE

## 2018-12-21 PROCEDURE — 2580000003 HC RX 258: Performed by: EMERGENCY MEDICINE

## 2018-12-21 PROCEDURE — 84484 ASSAY OF TROPONIN QUANT: CPT

## 2018-12-21 PROCEDURE — 6360000002 HC RX W HCPCS: Performed by: EMERGENCY MEDICINE

## 2018-12-21 PROCEDURE — 85025 COMPLETE CBC W/AUTO DIFF WBC: CPT

## 2018-12-21 PROCEDURE — 96376 TX/PRO/DX INJ SAME DRUG ADON: CPT

## 2018-12-21 PROCEDURE — 81001 URINALYSIS AUTO W/SCOPE: CPT

## 2018-12-21 PROCEDURE — 74174 CTA ABD&PLVS W/CONTRAST: CPT

## 2018-12-21 PROCEDURE — 99285 EMERGENCY DEPT VISIT HI MDM: CPT

## 2018-12-21 PROCEDURE — 96374 THER/PROPH/DIAG INJ IV PUSH: CPT

## 2018-12-21 RX ORDER — ROPINIROLE 1 MG/1
1 TABLET, FILM COATED ORAL ONCE
Status: COMPLETED | OUTPATIENT
Start: 2018-12-21 | End: 2018-12-21

## 2018-12-21 RX ORDER — SODIUM CHLORIDE 0.9 % (FLUSH) 0.9 %
10 SYRINGE (ML) INJECTION PRN
Status: DISCONTINUED | OUTPATIENT
Start: 2018-12-21 | End: 2018-12-22 | Stop reason: HOSPADM

## 2018-12-21 RX ORDER — MORPHINE SULFATE 4 MG/ML
4 INJECTION, SOLUTION INTRAMUSCULAR; INTRAVENOUS ONCE
Status: COMPLETED | OUTPATIENT
Start: 2018-12-21 | End: 2018-12-21

## 2018-12-21 RX ORDER — SODIUM CHLORIDE 0.9 % (FLUSH) 0.9 %
10 SYRINGE (ML) INJECTION EVERY 12 HOURS SCHEDULED
Status: DISCONTINUED | OUTPATIENT
Start: 2018-12-21 | End: 2018-12-22 | Stop reason: HOSPADM

## 2018-12-21 RX ADMIN — MORPHINE SULFATE 4 MG: 4 INJECTION INTRAVENOUS at 14:38

## 2018-12-21 RX ADMIN — MORPHINE SULFATE 4 MG: 4 INJECTION INTRAVENOUS at 22:45

## 2018-12-21 RX ADMIN — IOPAMIDOL 80 ML: 755 INJECTION, SOLUTION INTRAVENOUS at 13:20

## 2018-12-21 RX ADMIN — Medication 10 ML: at 22:45

## 2018-12-21 RX ADMIN — ROPINIROLE HYDROCHLORIDE 1 MG: 1 TABLET, FILM COATED ORAL at 13:00

## 2018-12-21 ASSESSMENT — HEART SCORE: ECG: 0

## 2018-12-21 ASSESSMENT — PAIN SCALES - GENERAL
PAINLEVEL_OUTOF10: 5
PAINLEVEL_OUTOF10: 9
PAINLEVEL_OUTOF10: 5

## 2018-12-21 NOTE — FLOWSHEET NOTE
12/21/18 1137   Encounter Summary   Services provided to: Patient and family together   Referral/Consult From: Rounding   Continue Visiting (seen 12/21/18, ALENA. )   Complexity of Encounter Moderate   Length of Encounter 15 minutes   Routine   Type Initial   Assessment Approachable   Intervention Nurtured hope   Outcome Expressed gratitude

## 2018-12-21 NOTE — ED PROVIDER NOTES
[id number]          ATTENDING PHYSICIAN NOTE       Date of evaluation: 12/21/2018    Chief Complaint     Chest Pain and Back Pain      History of Present Illness     Washington Lyles is a 60 y.o. female with a past medical history of hypothyroidism, degenerative joint disease, prediabetes who presents today with anterior substernal chest pain that started today. She states approximately 30 minutes ago she was at work she was working in the back of a kitchen store and started having dull anterior chest pain that radiated to her back. It did not radiate down her arm was initially 7 out of 10. She called EMS they gave her aspirin 324 mg as well as nitroglycerin tablet. The vitreous  Tablet decrease the pain from a 725. She endorses a dull anterior chest pain currently that is 5 out of 10. She denies any lower extremity swelling no shortness of breath no pleuritic pain with this. No recent illness. 2/2016 coronary angiogram  Left ventricular pressure 30 mmHg  Aortic pressure 128 mmHg  Coronary anatomy:   The left main coronary artery is normal.   Left anterior descending artery is normal  Circumflex artery is normal.  The right coronary artery is a dominant vessel and normal.   Left ventriculogram shows ejection fraction of 65%. Normal wall motion.     1/2016 Nuc stress:       Left ventricular ejection fraction of 67 %. The LV wall motion is normal.   Small area of apical thinning that may represent diaphragmatic attenuation   however this could represent ischemia in the distribution of the distal LAD       Review of Systems     Review of Systems  A full 10 point review of systems was obtained. Pertinent positives and negatives as documented in the HPI, otherwise all other systems were reviewed and were negative. Past Medical, Surgical, Family, and Social History     She has a past medical history of Acquired hyperlipoproteinemia; Anxiety;  Arthritis; Bilateral lower extremity edema; Bipolar 1 disorder ([facility] [address] Chronic pain syndrome; DDD (degenerative disc disease), lumbar; Depression; Depression; Disease of gallbladder; DJD (degenerative joint disease) of hip; Edema; Elbow tendinitis; Facet syndrome ([facility] [address] Fibromyalgia; GERD (gastroesophageal reflux disease); Hiatal hernia; History of blood transfusion; Hyperlipidemia; Insomnia; Osteoarthritis; Prediabetes; Self mutilating behavior; Sleep apnea; Suicidal ideation; and Thyroid disorder. She has a past surgical history that includes Ankle surgery; Appendectomy; Total knee arthroplasty (12/10/10); Total knee arthroplasty (3/30/10); shoulder surgery; Colonoscopy (4/16/2013); Upper gastrointestinal endoscopy (4/16/2013); Cholecystectomy; hysteroscopy (N/A, 12-30-13); Upper gastrointestinal endoscopy (3/20/2014); Dilation and curettage of uterus; joint replacement (Bilateral); Abdomen surgery (5/28/2014); Sleeve Gastroplasty (May 28, 2014); Endoscopy, colon, diagnostic; shoulder surgery (Right); Ankle surgery; other surgical history (9/11/2015); and Foot surgery (Right, 05/27/2016). Her family history includes Arthritis in her maternal grandmother and paternal grandmother; Birth Defects in her maternal grandfather and mother; COPD in her father; Diabetes in her brother; Heart Disease in her mother; Heart Failure in her brother and mother; High Blood Pressure in her father and mother; High Cholesterol in her mother; Other in her mother; Stroke in her brother, maternal grandmother, and mother. She reports that she has never smoked. She has never used smokeless tobacco. She reports that she does not drink alcohol or use drugs.     Medications     Previous Medications    AMPHETAMINE-DEXTROAMPHETAMINE (ADDERALL) 20 MG TABLET    Take 20 mg by mouth 2 times daily     ATORVASTATIN (LIPITOR) 40 MG TABLET    TAKE 1 TABLET BY MOUTH DAILY    BETAMETHASONE DIPROPIONATE (DIPROLENE) 0.05 % CREAM    APPLY EXTERNALLY TO THE AFFECTED AREA TWICE respirations, clear to auscultation bilaterally  Cardiovascular: Regular rate and rhythm, 2+ radial pulses bilaterally  Abdominal: Nondistended abdomen, soft, nontender without rebound or gaurding  Skin: Warm, dry well perfused, no rashes  Extremities: no obvious deformities, no tenderness to palpation diffusely  Neurologic:  Alert and oriented, speech is clear and intact without dysarthria, gait is intact  Psychologic: appropriate mood and affect    Diagnostic Results     EKG   Normal sinus rhythm, ventricular rate 88 normal intervals and normal axis, no ST or T-wave changes no signs of ischemia. No changes from previous EKG in 2016. RADIOLOGY:  CTA CHEST W CONTRAST   Final Result      1. No evidence of aortic dissection or acute vascular abnormality of the chest, abdomen, or pelvis. CTA ABDOMEN PELVIS W CONTRAST   Final Result      1. No evidence of aortic dissection or acute vascular abnormality of the chest, abdomen, or pelvis. XR CHEST STANDARD (2 VW)   Final Result   1. Normal chest with no interval changes. 2. Mild tortuous aorta. 3. Heart size is accentuated due to projection technique.       Stress/Rest NM Myocardial SPECT - do not uncheck    (Results Pending)       LABS:   Results for orders placed or performed during the hospital encounter of 12/21/18   CBC Auto Differential   Result Value Ref Range    WBC 7.2 4.0 - 11.0 K/uL    RBC 4.77 4.00 - 5.20 M/uL    Hemoglobin 11.2 (L) 12.0 - 16.0 g/dL    Hematocrit 36.1 36.0 - 48.0 %    MCV 75.6 (L) 80.0 - 100.0 fL    MCH 23.5 (L) 26.0 - 34.0 pg    MCHC 31.1 31.0 - 36.0 g/dL    RDW 20.1 (H) 12.4 - 15.4 %    Platelets 078 164 - 551 K/uL    MPV 8.2 5.0 - 10.5 fL    Neutrophils % 71.7 %    Lymphocytes % 19.0 %    Monocytes % 8.1 %    Eosinophils % 0.6 %    Basophils % 0.6 %    Neutrophils # 5.1 1.7 - 7.7 K/uL    Lymphocytes # 1.4 1.0 - 5.1 K/uL    Monocytes # 0.6 0.0 - 1.3 K/uL    Eosinophils # 0.0 0.0 - 0.6 K/uL    Basophils # 0.0 0.0 - 0.2 degrees    T Axis 59 degrees    Diagnosis       EKG performed in ER and to be interpreted by ER physician. Confirmed by MD, ER ([id number]),  Demetria Escalante ([id number]) on 12/21/2018 11:32:52 AM       ED BEDSIDE ULTRASOUND:      RECENT VITALS:  BP: (!) 160/99,Temp: 99 °F (37.2 °C), Pulse: 76, Resp: 15, SpO2: 98 %     Procedures       ED Course     Nursing Notes, Past Medical Hx, Past Surgical Hx, Social Hx,Allergies, and Family Hx were reviewed. The patient was given the following medications:  Orders Placed This Encounter   Medications    rOPINIRole (REQUIP) tablet 1 mg    iopamidol (ISOVUE-370) 76 % injection 80 mL    morphine (PF) injection 4 mg    regadenoson (LEXISCAN) injection 0.4 mg    sodium chloride flush 0.9 % injection 10 mL    sodium chloride flush 0.9 % injection 10 mL       CONSULTS:  None    MEDICAL DECISIONMAKING / ASSESSMENT / Diogenes Heredia is a 60 y.o. female in no acute distress with chest pain radiated into her back. Here EKG troponin were within normal limits. Chest x-ray unremarkable. CTA of the chest abdomen pelvis to evaluate for dissection was unremarkable. She was given nitroglycerin which decreased the pain to a 5. Was given morphine which decreased the pain to 2. Here she is stable but given her heart score of 4 significant family history will place the patient in observation unit for stress test in the morning. This was all discussed with her she is in agreement with plan. Clinical Impression     1. Chest pain, unspecified type        Disposition     PATIENT REFERRED TO:  No follow-up provider specified.     DISCHARGE MEDICATIONS:  New Prescriptions    No medications on file       Peng Lam MD  12/21/18 [id number]

## 2018-12-21 NOTE — ED PROVIDER NOTES
 Bilateral lower extremity edema     Bipolar 1 disorder (HCC)     Chronic pain syndrome     DDD (degenerative disc disease), lumbar     Depression     Depression     Disease of gallbladder     DJD (degenerative joint disease) of hip     Edema 12/29/2009    Elbow tendinitis     Facet syndrome (HCC)     Fibromyalgia     GERD (gastroesophageal reflux disease)     Hiatal hernia     History of blood transfusion     anemia    Hyperlipidemia     Insomnia     Osteoarthritis     Prediabetes     Self mutilating behavior     cutter    Sleep apnea     no CPAP    Suicidal ideation     Thyroid disorder     hypothyroidism     Past Surgical History:   Procedure Laterality Date    ABDOMEN SURGERY  5/28/2014    LAPAROSCOPIC SLEEVE GASTRECTOMY, EXTENSIVE LYSIS OF ADHESIONS    ANKLE SURGERY      ANKLE SURGERY      APPENDECTOMY      open , Ex-lap    CHOLECYSTECTOMY      open    COLONOSCOPY  4/16/2013    dr Terrell Bravo ENDOSCOPY, COLON, DIAGNOSTIC      FOOT SURGERY Right 05/27/2016    ARTHROPLASTY RIGHT 5TH DIGIT      HYSTEROSCOPY N/A 12-30-13    Hysteroscopy Dilitation and Curettage    JOINT REPLACEMENT Bilateral     knee    OTHER SURGICAL HISTORY  9/11/2015    ARTHROPLASTY 5TH DIGIT LEFT FOOT          SHOULDER SURGERY      right    SHOULDER SURGERY Right     SLEEVE GASTROPLASTY  May 28, 2014    Rosa Jackelin    TOTAL KNEE ARTHROPLASTY  12/10/10    Right    TOTAL KNEE ARTHROPLASTY  3/30/10    Left    UPPER GASTROINTESTINAL ENDOSCOPY  4/16/2013    dr Sanjana Timmons ENDOSCOPY  3/20/2014    dr Lucy Ponce     Family History   Problem Relation Age of Onset    Heart Disease Mother     Heart Failure Mother     High Cholesterol Mother     High Blood Pressure Mother     Stroke Mother     Birth Defects Mother     Other Mother         VV    High Blood Pressure Father     COPD Father     Diabetes Brother     Stroke Brother     Stroke Maternal TAKE 1 TABLET BY MOUTH THREE TIMES DAILY    VITAMIN D (ERGOCALCIFEROL) 98488 UNITS CAPS CAPSULE    TAKE 1 CAPSULE BY MOUTH 1 TIME A WEEK FOR 12 DOSES          Review of Systems      Review of Systems  A full 10 point review of systems was obtained. Pertinent positives and negatives as documented in the HPI, otherwise all other systems were reviewed and were negative.      Physical Examination      Physical Exam   General: Well appearing in NAD  HEENT:  head is atraumatic, sclera are clear, oropharynx is nonerythematous, mucus membranes are moist  Neck: Trachea midline  Chest: Nonlabored respirations, clear to auscultation bilaterally  Cardiovascular: Regular rate and rhythm, 2+ radial pulses bilaterally  Abdominal: Nondistended abdomen, soft, nontender without rebound or gaurding  Skin: Warm, dry well perfused, no rashes  Extremities: no obvious deformities, no tenderness to palpation diffusely  Neurologic:  Alert and oriented, speech is clear and intact without dysarthria, gait is intact  Psychologic: appropriate mood and affect     Kelly Brown MD  12/21/18 [id number]

## 2018-12-22 VITALS
WEIGHT: 265 LBS | DIASTOLIC BLOOD PRESSURE: 89 MMHG | SYSTOLIC BLOOD PRESSURE: 145 MMHG | BODY MASS INDEX: 45.49 KG/M2 | OXYGEN SATURATION: 98 % | HEART RATE: 74 BPM | RESPIRATION RATE: 16 BRPM | TEMPERATURE: 98.7 F

## 2018-12-22 LAB
EKG ATRIAL RATE: 73 BPM
EKG ATRIAL RATE: 78 BPM
EKG ATRIAL RATE: 79 BPM
EKG DIAGNOSIS: NORMAL
EKG P AXIS: 41 DEGREES
EKG P AXIS: 55 DEGREES
EKG P AXIS: 57 DEGREES
EKG P-R INTERVAL: 136 MS
EKG P-R INTERVAL: 136 MS
EKG P-R INTERVAL: 146 MS
EKG Q-T INTERVAL: 388 MS
EKG Q-T INTERVAL: 398 MS
EKG Q-T INTERVAL: 412 MS
EKG QRS DURATION: 76 MS
EKG QRS DURATION: 78 MS
EKG QRS DURATION: 82 MS
EKG QTC CALCULATION (BAZETT): 444 MS
EKG QTC CALCULATION (BAZETT): 453 MS
EKG QTC CALCULATION (BAZETT): 453 MS
EKG R AXIS: 22 DEGREES
EKG R AXIS: 46 DEGREES
EKG R AXIS: 47 DEGREES
EKG T AXIS: 23 DEGREES
EKG T AXIS: 38 DEGREES
EKG T AXIS: 45 DEGREES
EKG VENTRICULAR RATE: 73 BPM
EKG VENTRICULAR RATE: 78 BPM
EKG VENTRICULAR RATE: 79 BPM
LV EF: 73 %
LVEF MODALITY: NORMAL
TROPONIN: <0.01 NG/ML

## 2018-12-22 PROCEDURE — 3430000000 HC RX DIAGNOSTIC RADIOPHARMACEUTICAL: Performed by: EMERGENCY MEDICINE

## 2018-12-22 PROCEDURE — 6370000000 HC RX 637 (ALT 250 FOR IP): Performed by: EMERGENCY MEDICINE

## 2018-12-22 PROCEDURE — 78452 HT MUSCLE IMAGE SPECT MULT: CPT

## 2018-12-22 PROCEDURE — 6360000002 HC RX W HCPCS: Performed by: EMERGENCY MEDICINE

## 2018-12-22 PROCEDURE — 3430000000 HC RX DIAGNOSTIC RADIOPHARMACEUTICAL

## 2018-12-22 PROCEDURE — A9502 TC99M TETROFOSMIN: HCPCS | Performed by: EMERGENCY MEDICINE

## 2018-12-22 PROCEDURE — 93005 ELECTROCARDIOGRAM TRACING: CPT | Performed by: EMERGENCY MEDICINE

## 2018-12-22 PROCEDURE — 84484 ASSAY OF TROPONIN QUANT: CPT

## 2018-12-22 PROCEDURE — 2580000003 HC RX 258: Performed by: EMERGENCY MEDICINE

## 2018-12-22 PROCEDURE — A9502 TC99M TETROFOSMIN: HCPCS

## 2018-12-22 PROCEDURE — 93017 CV STRESS TEST TRACING ONLY: CPT

## 2018-12-22 RX ORDER — ROPINIROLE 2 MG/1
2 TABLET, FILM COATED ORAL ONCE
Status: COMPLETED | OUTPATIENT
Start: 2018-12-22 | End: 2018-12-22

## 2018-12-22 RX ADMIN — Medication 10 ML: at 09:37

## 2018-12-22 RX ADMIN — Medication 10 ML: at 08:03

## 2018-12-22 RX ADMIN — TETROFOSMIN 10 MILLICURIE: 1.38 INJECTION, POWDER, LYOPHILIZED, FOR SOLUTION INTRAVENOUS at 08:02

## 2018-12-22 RX ADMIN — REGADENOSON 0.4 MG: 0.08 INJECTION, SOLUTION INTRAVENOUS at 09:37

## 2018-12-22 RX ADMIN — TETROFOSMIN 30 MILLICURIE: 1.38 INJECTION, POWDER, LYOPHILIZED, FOR SOLUTION INTRAVENOUS at 09:37

## 2018-12-22 RX ADMIN — ROPINIROLE 2 MG: 2 TABLET, FILM COATED ORAL at 04:16

## 2018-12-22 NOTE — ED NOTES
Bed: A10-10  Expected date:   Expected time:   Means of arrival:   Comments:  Jaiden Ellis RN  12/21/18 [id number]

## 2018-12-26 ENCOUNTER — TELEPHONE (OUTPATIENT)
Dept: PAIN MANAGEMENT | Age: 58
End: 2018-12-26

## 2018-12-26 DIAGNOSIS — M47.899 FACET SYNDROME: ICD-10-CM

## 2018-12-26 DIAGNOSIS — M16.11 PRIMARY OSTEOARTHRITIS OF RIGHT HIP: ICD-10-CM

## 2018-12-26 DIAGNOSIS — G89.4 CHRONIC PAIN SYNDROME: ICD-10-CM

## 2018-12-26 DIAGNOSIS — M79.7 FIBROMYALGIA: ICD-10-CM

## 2018-12-26 DIAGNOSIS — M54.16 LUMBAR RADICULITIS: ICD-10-CM

## 2018-12-26 DIAGNOSIS — M51.36 DDD (DEGENERATIVE DISC DISEASE), LUMBAR: ICD-10-CM

## 2018-12-27 RX ORDER — PREGABALIN 100 MG/1
100 CAPSULE ORAL 3 TIMES DAILY
Qty: 60 CAPSULE | Refills: 0
Start: 2018-12-27 | End: 2019-01-15 | Stop reason: SDUPTHER

## 2018-12-27 RX ORDER — PREGABALIN 100 MG/1
100 CAPSULE ORAL 3 TIMES DAILY
Qty: 90 CAPSULE | Refills: 0 | Status: SHIPPED | OUTPATIENT
Start: 2018-12-27 | End: 2018-12-27 | Stop reason: CLARIF

## 2018-12-27 RX ORDER — TRAMADOL HYDROCHLORIDE 50 MG/1
50 TABLET ORAL EVERY 6 HOURS PRN
Qty: 50 TABLET | Refills: 0
Start: 2018-12-27 | End: 2019-01-15 | Stop reason: SDUPTHER

## 2019-01-14 RX ORDER — ONDANSETRON 4 MG/1
TABLET, FILM COATED ORAL
Qty: 60 TABLET | Refills: 0 | Status: SHIPPED | OUTPATIENT
Start: 2019-01-14 | End: 2019-03-18 | Stop reason: SDUPTHER

## 2019-01-15 ENCOUNTER — OFFICE VISIT (OUTPATIENT)
Dept: PAIN MANAGEMENT | Age: 59
End: 2019-01-15
Payer: COMMERCIAL

## 2019-01-15 VITALS — WEIGHT: 275 LBS | BODY MASS INDEX: 47.2 KG/M2

## 2019-01-15 DIAGNOSIS — M72.2 PLANTAR FASCIITIS, RIGHT: ICD-10-CM

## 2019-01-15 DIAGNOSIS — M47.899 FACET SYNDROME: ICD-10-CM

## 2019-01-15 DIAGNOSIS — G89.4 CHRONIC PAIN SYNDROME: ICD-10-CM

## 2019-01-15 DIAGNOSIS — M54.16 LUMBAR RADICULITIS: ICD-10-CM

## 2019-01-15 DIAGNOSIS — M79.7 FIBROMYALGIA: ICD-10-CM

## 2019-01-15 DIAGNOSIS — M51.36 DDD (DEGENERATIVE DISC DISEASE), LUMBAR: ICD-10-CM

## 2019-01-15 DIAGNOSIS — M16.11 PRIMARY OSTEOARTHRITIS OF RIGHT HIP: ICD-10-CM

## 2019-01-15 PROCEDURE — 99213 OFFICE O/P EST LOW 20 MIN: CPT | Performed by: INTERNAL MEDICINE

## 2019-01-15 RX ORDER — TRAMADOL HYDROCHLORIDE 50 MG/1
50 TABLET ORAL EVERY 6 HOURS PRN
Qty: 70 TABLET | Refills: 0 | Status: SHIPPED | OUTPATIENT
Start: 2019-01-15 | End: 2019-02-12 | Stop reason: SDUPTHER

## 2019-01-15 RX ORDER — DICLOFENAC SODIUM 75 MG/1
75 TABLET, DELAYED RELEASE ORAL DAILY
Qty: 90 TABLET | Refills: 0 | Status: SHIPPED | OUTPATIENT
Start: 2019-01-15 | End: 2019-03-12 | Stop reason: SDUPTHER

## 2019-01-15 RX ORDER — PREGABALIN 100 MG/1
100 CAPSULE ORAL 3 TIMES DAILY
Qty: 90 CAPSULE | Refills: 0 | Status: SHIPPED | OUTPATIENT
Start: 2019-01-15 | End: 2019-02-12 | Stop reason: SDUPTHER

## 2019-01-15 RX ORDER — TIZANIDINE 4 MG/1
TABLET ORAL
Qty: 270 TABLET | Refills: 0 | Status: SHIPPED | OUTPATIENT
Start: 2019-01-15 | End: 2019-03-12 | Stop reason: SDUPTHER

## 2019-01-15 RX ORDER — PREGABALIN 100 MG/1
100 CAPSULE ORAL 3 TIMES DAILY
Qty: 60 CAPSULE | Refills: 0 | Status: SHIPPED | OUTPATIENT
Start: 2019-01-15 | End: 2019-01-15

## 2019-01-21 ENCOUNTER — HOSPITAL ENCOUNTER (EMERGENCY)
Age: 59
Discharge: HOME | End: 2019-01-21
Attending: EMERGENCY MEDICINE
Payer: COMMERCIAL

## 2019-01-21 VITALS
SYSTOLIC BLOOD PRESSURE: 197 MMHG | DIASTOLIC BLOOD PRESSURE: 102 MMHG | WEIGHT: 273.2 LBS | TEMPERATURE: 98.8 F | OXYGEN SATURATION: 99 % | HEART RATE: 87 BPM | RESPIRATION RATE: 14 BRPM | BODY MASS INDEX: 46.89 KG/M2

## 2019-01-21 DIAGNOSIS — S51.812A LACERATION OF LEFT FOREARM, INITIAL ENCOUNTER: Primary | ICD-10-CM

## 2019-01-21 PROCEDURE — 99283 EMERGENCY DEPT VISIT LOW MDM: CPT

## 2019-02-05 RX ORDER — ATORVASTATIN CALCIUM 40 MG/1
TABLET, FILM COATED ORAL
Qty: 90 TABLET | Refills: 0 | Status: SHIPPED | OUTPATIENT
Start: 2019-02-05 | End: 2019-05-07 | Stop reason: SDUPTHER

## 2019-02-11 RX ORDER — LEVOTHYROXINE SODIUM 88 UG/1
TABLET ORAL
Qty: 90 TABLET | Refills: 0 | Status: SHIPPED | OUTPATIENT
Start: 2019-02-11 | End: 2019-05-17 | Stop reason: SDUPTHER

## 2019-02-12 ENCOUNTER — OFFICE VISIT (OUTPATIENT)
Dept: PAIN MANAGEMENT | Age: 59
End: 2019-02-12
Payer: COMMERCIAL

## 2019-02-12 VITALS
HEART RATE: 80 BPM | SYSTOLIC BLOOD PRESSURE: 118 MMHG | WEIGHT: 277 LBS | BODY MASS INDEX: 47.55 KG/M2 | DIASTOLIC BLOOD PRESSURE: 79 MMHG

## 2019-02-12 DIAGNOSIS — M47.899 FACET SYNDROME: ICD-10-CM

## 2019-02-12 DIAGNOSIS — G89.4 CHRONIC PAIN SYNDROME: ICD-10-CM

## 2019-02-12 DIAGNOSIS — M54.16 LUMBAR RADICULITIS: ICD-10-CM

## 2019-02-12 DIAGNOSIS — M79.7 FIBROMYALGIA: ICD-10-CM

## 2019-02-12 DIAGNOSIS — M16.11 PRIMARY OSTEOARTHRITIS OF RIGHT HIP: ICD-10-CM

## 2019-02-12 DIAGNOSIS — M51.36 DDD (DEGENERATIVE DISC DISEASE), LUMBAR: ICD-10-CM

## 2019-02-12 PROCEDURE — 99213 OFFICE O/P EST LOW 20 MIN: CPT | Performed by: INTERNAL MEDICINE

## 2019-02-12 RX ORDER — TRAMADOL HYDROCHLORIDE 50 MG/1
50 TABLET ORAL EVERY 6 HOURS PRN
Qty: 70 TABLET | Refills: 0 | Status: SHIPPED | OUTPATIENT
Start: 2019-02-12 | End: 2019-03-12 | Stop reason: SDUPTHER

## 2019-02-12 RX ORDER — ROPINIROLE 1 MG/1
TABLET, FILM COATED ORAL
Qty: 180 TABLET | Refills: 0 | Status: SHIPPED | OUTPATIENT
Start: 2019-02-12 | End: 2019-05-05 | Stop reason: SDUPTHER

## 2019-02-12 RX ORDER — PREGABALIN 100 MG/1
100 CAPSULE ORAL 3 TIMES DAILY
Qty: 90 CAPSULE | Refills: 0 | Status: SHIPPED | OUTPATIENT
Start: 2019-02-12 | End: 2019-03-12 | Stop reason: SDUPTHER

## 2019-02-13 DIAGNOSIS — K21.9 GASTROESOPHAGEAL REFLUX DISEASE, ESOPHAGITIS PRESENCE NOT SPECIFIED: ICD-10-CM

## 2019-02-13 RX ORDER — ESOMEPRAZOLE MAGNESIUM 40 MG/1
CAPSULE, DELAYED RELEASE ORAL
Qty: 90 CAPSULE | Refills: 0 | Status: SHIPPED | OUTPATIENT
Start: 2019-02-13 | End: 2019-05-11 | Stop reason: SDUPTHER

## 2019-02-18 ENCOUNTER — HOSPITAL ENCOUNTER (OUTPATIENT)
Dept: WOUND CARE | Age: 59
Discharge: HOME | End: 2019-02-18
Payer: COMMERCIAL

## 2019-02-18 VITALS
DIASTOLIC BLOOD PRESSURE: 85 MMHG | TEMPERATURE: 97.8 F | HEART RATE: 71 BPM | SYSTOLIC BLOOD PRESSURE: 123 MMHG | RESPIRATION RATE: 18 BRPM

## 2019-02-18 DIAGNOSIS — S41.102A OPEN WOUND OF LEFT UPPER ARM, INITIAL ENCOUNTER: Primary | ICD-10-CM

## 2019-02-18 DIAGNOSIS — Z72.89 SELF-MUTILATION: ICD-10-CM

## 2019-02-18 PROCEDURE — 99202 OFFICE O/P NEW SF 15 MIN: CPT | Performed by: SPECIALIST

## 2019-02-18 PROCEDURE — 11042 DBRDMT SUBQ TIS 1ST 20SQCM/<: CPT | Performed by: SPECIALIST

## 2019-02-18 PROCEDURE — 99213 OFFICE O/P EST LOW 20 MIN: CPT

## 2019-02-18 PROCEDURE — 11042 DBRDMT SUBQ TIS 1ST 20SQCM/<: CPT

## 2019-02-18 PROCEDURE — 6370000000 HC RX 637 (ALT 250 FOR IP): Performed by: SPECIALIST

## 2019-02-18 RX ORDER — LIDOCAINE HYDROCHLORIDE 40 MG/ML
2.5 SOLUTION TOPICAL ONCE
Status: COMPLETED | OUTPATIENT
Start: 2019-02-18 | End: 2019-02-18

## 2019-02-18 RX ADMIN — LIDOCAINE HYDROCHLORIDE 2.5 ML: 40 SOLUTION TOPICAL at 08:43

## 2019-02-24 ENCOUNTER — APPOINTMENT (OUTPATIENT)
Dept: GENERAL RADIOLOGY | Age: 59
End: 2019-02-24
Payer: COMMERCIAL

## 2019-02-24 ENCOUNTER — HOSPITAL ENCOUNTER (EMERGENCY)
Age: 59
Discharge: HOME | End: 2019-02-24
Attending: EMERGENCY MEDICINE
Payer: COMMERCIAL

## 2019-02-24 VITALS
WEIGHT: 283.8 LBS | BODY MASS INDEX: 48.45 KG/M2 | SYSTOLIC BLOOD PRESSURE: 133 MMHG | TEMPERATURE: 97.8 F | HEART RATE: 68 BPM | RESPIRATION RATE: 17 BRPM | DIASTOLIC BLOOD PRESSURE: 61 MMHG | HEIGHT: 64 IN | OXYGEN SATURATION: 98 %

## 2019-02-24 DIAGNOSIS — S80.01XA CONTUSION OF RIGHT KNEE, INITIAL ENCOUNTER: ICD-10-CM

## 2019-02-24 DIAGNOSIS — M47.816 ARTHRITIS, LUMBAR SPINE: Primary | ICD-10-CM

## 2019-02-24 DIAGNOSIS — Z91.81 HISTORY OF FALL: ICD-10-CM

## 2019-02-24 PROCEDURE — 73562 X-RAY EXAM OF KNEE 3: CPT

## 2019-02-24 PROCEDURE — 72100 X-RAY EXAM L-S SPINE 2/3 VWS: CPT

## 2019-02-24 PROCEDURE — 99283 EMERGENCY DEPT VISIT LOW MDM: CPT

## 2019-02-24 ASSESSMENT — PAIN DESCRIPTION - FREQUENCY: FREQUENCY: CONTINUOUS

## 2019-02-24 ASSESSMENT — PAIN SCALES - GENERAL: PAINLEVEL_OUTOF10: 7

## 2019-02-24 ASSESSMENT — PAIN DESCRIPTION - DESCRIPTORS: DESCRIPTORS: ACHING

## 2019-02-24 ASSESSMENT — PAIN DESCRIPTION - LOCATION: LOCATION: ANKLE;BACK;KNEE

## 2019-02-24 ASSESSMENT — PAIN DESCRIPTION - PAIN TYPE: TYPE: ACUTE PAIN

## 2019-02-25 ENCOUNTER — HOSPITAL ENCOUNTER (OUTPATIENT)
Dept: WOUND CARE | Age: 59
Discharge: HOME | End: 2019-02-25
Payer: COMMERCIAL

## 2019-02-25 VITALS
RESPIRATION RATE: 18 BRPM | SYSTOLIC BLOOD PRESSURE: 106 MMHG | TEMPERATURE: 98.2 F | DIASTOLIC BLOOD PRESSURE: 71 MMHG | HEART RATE: 75 BPM

## 2019-02-25 DIAGNOSIS — S41.102D OPEN WOUND OF LEFT UPPER ARM, SUBSEQUENT ENCOUNTER: Primary | ICD-10-CM

## 2019-02-25 PROCEDURE — 97597 DBRDMT OPN WND 1ST 20 CM/<: CPT | Performed by: SPECIALIST

## 2019-02-25 PROCEDURE — 6370000000 HC RX 637 (ALT 250 FOR IP): Performed by: SPECIALIST

## 2019-02-25 PROCEDURE — 97597 DBRDMT OPN WND 1ST 20 CM/<: CPT

## 2019-02-25 RX ORDER — LIDOCAINE HYDROCHLORIDE 40 MG/ML
2.5 SOLUTION TOPICAL ONCE
Status: COMPLETED | OUTPATIENT
Start: 2019-02-25 | End: 2019-02-25

## 2019-02-25 RX ORDER — PROPRANOLOL HYDROCHLORIDE 10 MG/1
10 TABLET ORAL 3 TIMES DAILY
Status: ON HOLD | COMMUNITY
End: 2019-10-01

## 2019-02-25 RX ADMIN — LIDOCAINE HYDROCHLORIDE 2.5 ML: 40 SOLUTION TOPICAL at 08:18

## 2019-02-25 ASSESSMENT — PAIN DESCRIPTION - PAIN TYPE: TYPE: ACUTE PAIN

## 2019-02-25 ASSESSMENT — PAIN DESCRIPTION - LOCATION: LOCATION: ARM

## 2019-02-25 ASSESSMENT — PAIN DESCRIPTION - ORIENTATION: ORIENTATION: LEFT

## 2019-02-25 ASSESSMENT — PAIN SCALES - GENERAL: PAINLEVEL_OUTOF10: 2

## 2019-02-26 PROBLEM — S39.012A LUMBAR STRAIN: Status: ACTIVE | Noted: 2019-02-26

## 2019-02-28 RX ORDER — BETAMETHASONE DIPROPIONATE 0.5 MG/G
CREAM TOPICAL
Qty: 60 G | Refills: 0 | Status: ON HOLD | OUTPATIENT
Start: 2019-02-28 | End: 2019-10-01

## 2019-03-04 ENCOUNTER — HOSPITAL ENCOUNTER (OUTPATIENT)
Dept: WOUND CARE | Age: 59
Discharge: HOME | End: 2019-03-04
Payer: COMMERCIAL

## 2019-03-04 VITALS
RESPIRATION RATE: 18 BRPM | HEART RATE: 57 BPM | DIASTOLIC BLOOD PRESSURE: 58 MMHG | TEMPERATURE: 97.8 F | SYSTOLIC BLOOD PRESSURE: 96 MMHG

## 2019-03-04 DIAGNOSIS — S41.102D OPEN WOUND OF LEFT UPPER ARM, SUBSEQUENT ENCOUNTER: ICD-10-CM

## 2019-03-04 DIAGNOSIS — Z72.89 SELF-MUTILATION: Primary | ICD-10-CM

## 2019-03-04 PROCEDURE — 11042 DBRDMT SUBQ TIS 1ST 20SQCM/<: CPT | Performed by: SPECIALIST

## 2019-03-04 PROCEDURE — 11042 DBRDMT SUBQ TIS 1ST 20SQCM/<: CPT

## 2019-03-04 PROCEDURE — 6370000000 HC RX 637 (ALT 250 FOR IP): Performed by: SPECIALIST

## 2019-03-04 RX ORDER — LIDOCAINE HYDROCHLORIDE 40 MG/ML
2.5 SOLUTION TOPICAL ONCE
Status: COMPLETED | OUTPATIENT
Start: 2019-03-04 | End: 2019-03-04

## 2019-03-04 RX ADMIN — LIDOCAINE HYDROCHLORIDE 2.5 ML: 40 SOLUTION TOPICAL at 08:26

## 2019-03-11 ENCOUNTER — HOSPITAL ENCOUNTER (OUTPATIENT)
Dept: WOUND CARE | Age: 59
Discharge: HOME | End: 2019-03-11
Payer: COMMERCIAL

## 2019-03-11 VITALS
RESPIRATION RATE: 16 BRPM | HEART RATE: 64 BPM | TEMPERATURE: 97.9 F | SYSTOLIC BLOOD PRESSURE: 100 MMHG | DIASTOLIC BLOOD PRESSURE: 62 MMHG

## 2019-03-11 DIAGNOSIS — S41.102D OPEN WOUND OF LEFT UPPER ARM, SUBSEQUENT ENCOUNTER: Primary | ICD-10-CM

## 2019-03-11 PROCEDURE — 97597 DBRDMT OPN WND 1ST 20 CM/<: CPT

## 2019-03-11 PROCEDURE — 6370000000 HC RX 637 (ALT 250 FOR IP): Performed by: SPECIALIST

## 2019-03-11 PROCEDURE — 97597 DBRDMT OPN WND 1ST 20 CM/<: CPT | Performed by: SPECIALIST

## 2019-03-11 RX ORDER — LIDOCAINE HYDROCHLORIDE 40 MG/ML
2.5 SOLUTION TOPICAL ONCE
Status: COMPLETED | OUTPATIENT
Start: 2019-03-11 | End: 2019-03-11

## 2019-03-11 RX ADMIN — LIDOCAINE HYDROCHLORIDE 2.5 ML: 40 SOLUTION TOPICAL at 09:18

## 2019-03-11 ASSESSMENT — PAIN SCALES - GENERAL: PAINLEVEL_OUTOF10: 0

## 2019-03-12 ENCOUNTER — OFFICE VISIT (OUTPATIENT)
Dept: PAIN MANAGEMENT | Age: 59
End: 2019-03-12
Payer: COMMERCIAL

## 2019-03-12 VITALS
DIASTOLIC BLOOD PRESSURE: 80 MMHG | WEIGHT: 280 LBS | BODY MASS INDEX: 48.06 KG/M2 | HEART RATE: 60 BPM | SYSTOLIC BLOOD PRESSURE: 133 MMHG

## 2019-03-12 DIAGNOSIS — G89.4 CHRONIC PAIN SYNDROME: ICD-10-CM

## 2019-03-12 DIAGNOSIS — M51.36 DDD (DEGENERATIVE DISC DISEASE), LUMBAR: ICD-10-CM

## 2019-03-12 DIAGNOSIS — M54.16 LUMBAR RADICULITIS: ICD-10-CM

## 2019-03-12 DIAGNOSIS — G25.81 RESTLESS LEGS SYNDROME (RLS): ICD-10-CM

## 2019-03-12 DIAGNOSIS — M16.11 PRIMARY OSTEOARTHRITIS OF RIGHT HIP: ICD-10-CM

## 2019-03-12 DIAGNOSIS — R60.0 BILATERAL LEG EDEMA: ICD-10-CM

## 2019-03-12 DIAGNOSIS — F33.1 MODERATE EPISODE OF RECURRENT MAJOR DEPRESSIVE DISORDER (HCC): ICD-10-CM

## 2019-03-12 DIAGNOSIS — M47.899 FACET SYNDROME: ICD-10-CM

## 2019-03-12 DIAGNOSIS — F51.01 PRIMARY INSOMNIA: ICD-10-CM

## 2019-03-12 DIAGNOSIS — K59.03 DRUG-INDUCED CONSTIPATION: ICD-10-CM

## 2019-03-12 DIAGNOSIS — M16.0 PRIMARY OSTEOARTHRITIS OF BOTH HIPS: ICD-10-CM

## 2019-03-12 DIAGNOSIS — M79.7 FIBROMYALGIA: ICD-10-CM

## 2019-03-12 PROCEDURE — 99214 OFFICE O/P EST MOD 30 MIN: CPT | Performed by: INTERNAL MEDICINE

## 2019-03-12 RX ORDER — PREGABALIN 100 MG/1
100 CAPSULE ORAL 3 TIMES DAILY
Qty: 90 CAPSULE | Refills: 0 | Status: SHIPPED | OUTPATIENT
Start: 2019-03-12 | End: 2019-04-09 | Stop reason: SDUPTHER

## 2019-03-12 RX ORDER — TIZANIDINE 4 MG/1
TABLET ORAL
Qty: 270 TABLET | Refills: 0 | Status: SHIPPED | OUTPATIENT
Start: 2019-03-12 | End: 2019-04-09 | Stop reason: SDUPTHER

## 2019-03-12 RX ORDER — DICLOFENAC SODIUM 75 MG/1
75 TABLET, DELAYED RELEASE ORAL DAILY
Qty: 90 TABLET | Refills: 0 | Status: SHIPPED | OUTPATIENT
Start: 2019-03-12 | End: 2019-04-09 | Stop reason: SDUPTHER

## 2019-03-12 RX ORDER — HYDROCHLOROTHIAZIDE 25 MG/1
25 TABLET ORAL DAILY
Qty: 30 TABLET | Refills: 1 | Status: SHIPPED | OUTPATIENT
Start: 2019-03-12 | End: 2019-04-09 | Stop reason: SDUPTHER

## 2019-03-12 RX ORDER — TRAMADOL HYDROCHLORIDE 50 MG/1
50 TABLET ORAL EVERY 6 HOURS PRN
Qty: 70 TABLET | Refills: 0 | Status: SHIPPED | OUTPATIENT
Start: 2019-03-12 | End: 2019-04-09 | Stop reason: SDUPTHER

## 2019-03-14 ENCOUNTER — OFFICE VISIT (OUTPATIENT)
Dept: ENT CLINIC | Age: 59
End: 2019-03-14
Payer: COMMERCIAL

## 2019-03-14 VITALS
BODY MASS INDEX: 48.83 KG/M2 | DIASTOLIC BLOOD PRESSURE: 73 MMHG | WEIGHT: 286 LBS | HEIGHT: 64 IN | SYSTOLIC BLOOD PRESSURE: 115 MMHG | TEMPERATURE: 98.3 F | HEART RATE: 62 BPM

## 2019-03-14 DIAGNOSIS — R42 VERTIGO: Primary | ICD-10-CM

## 2019-03-14 PROCEDURE — 99204 OFFICE O/P NEW MOD 45 MIN: CPT | Performed by: OTOLARYNGOLOGY

## 2019-03-14 ASSESSMENT — ENCOUNTER SYMPTOMS
VOMITING: 0
DIARRHEA: 0
EYE DISCHARGE: 0
COLOR CHANGE: 0
CHEST TIGHTNESS: 0
SINUS PAIN: 0
SHORTNESS OF BREATH: 0
COUGH: 0
CHOKING: 0
STRIDOR: 0
TROUBLE SWALLOWING: 0
EYE ITCHING: 0
EYE REDNESS: 0
BACK PAIN: 0
VOICE CHANGE: 0
EYE PAIN: 0
RHINORRHEA: 0
SORE THROAT: 0
WHEEZING: 0
NAUSEA: 0
SINUS PRESSURE: 0
CONSTIPATION: 0
APNEA: 0
BLOOD IN STOOL: 0
FACIAL SWELLING: 0

## 2019-03-19 RX ORDER — ONDANSETRON 4 MG/1
TABLET, FILM COATED ORAL
Qty: 60 TABLET | Refills: 0 | Status: SHIPPED | OUTPATIENT
Start: 2019-03-19 | End: 2019-05-30 | Stop reason: SDUPTHER

## 2019-03-27 ENCOUNTER — HOSPITAL ENCOUNTER (OUTPATIENT)
Dept: WOUND CARE | Age: 59
Discharge: HOME | End: 2019-03-27
Payer: COMMERCIAL

## 2019-03-27 VITALS
HEART RATE: 56 BPM | DIASTOLIC BLOOD PRESSURE: 56 MMHG | TEMPERATURE: 97.5 F | SYSTOLIC BLOOD PRESSURE: 106 MMHG | RESPIRATION RATE: 18 BRPM

## 2019-03-27 DIAGNOSIS — Z72.89 SELF-MUTILATION: Primary | ICD-10-CM

## 2019-03-27 DIAGNOSIS — S41.102D OPEN WOUND OF LEFT UPPER ARM, SUBSEQUENT ENCOUNTER: ICD-10-CM

## 2019-03-27 PROCEDURE — 99211 OFF/OP EST MAY X REQ PHY/QHP: CPT

## 2019-03-27 PROCEDURE — 99212 OFFICE O/P EST SF 10 MIN: CPT | Performed by: SPECIALIST

## 2019-04-09 ENCOUNTER — OFFICE VISIT (OUTPATIENT)
Dept: PAIN MANAGEMENT | Age: 59
End: 2019-04-09
Payer: COMMERCIAL

## 2019-04-09 VITALS
DIASTOLIC BLOOD PRESSURE: 67 MMHG | SYSTOLIC BLOOD PRESSURE: 107 MMHG | HEART RATE: 65 BPM | BODY MASS INDEX: 48.58 KG/M2 | WEIGHT: 283 LBS

## 2019-04-09 DIAGNOSIS — M54.16 LUMBAR RADICULITIS: ICD-10-CM

## 2019-04-09 DIAGNOSIS — G89.4 CHRONIC PAIN SYNDROME: ICD-10-CM

## 2019-04-09 DIAGNOSIS — M16.0 PRIMARY OSTEOARTHRITIS OF BOTH HIPS: ICD-10-CM

## 2019-04-09 DIAGNOSIS — M16.11 PRIMARY OSTEOARTHRITIS OF RIGHT HIP: ICD-10-CM

## 2019-04-09 DIAGNOSIS — M51.36 DDD (DEGENERATIVE DISC DISEASE), LUMBAR: ICD-10-CM

## 2019-04-09 DIAGNOSIS — M47.899 FACET SYNDROME: ICD-10-CM

## 2019-04-09 DIAGNOSIS — M79.7 FIBROMYALGIA: ICD-10-CM

## 2019-04-09 PROCEDURE — 20553 NJX 1/MLT TRIGGER POINTS 3/>: CPT | Performed by: INTERNAL MEDICINE

## 2019-04-09 PROCEDURE — 99213 OFFICE O/P EST LOW 20 MIN: CPT | Performed by: INTERNAL MEDICINE

## 2019-04-09 RX ORDER — PREGABALIN 100 MG/1
100 CAPSULE ORAL 3 TIMES DAILY
Qty: 90 CAPSULE | Refills: 0 | Status: SHIPPED | OUTPATIENT
Start: 2019-04-09 | End: 2019-05-07 | Stop reason: SDUPTHER

## 2019-04-09 RX ORDER — TIZANIDINE 4 MG/1
TABLET ORAL
Qty: 270 TABLET | Refills: 0 | Status: SHIPPED | OUTPATIENT
Start: 2019-04-09 | End: 2019-05-07 | Stop reason: SDUPTHER

## 2019-04-09 RX ORDER — HYDROCHLOROTHIAZIDE 25 MG/1
25 TABLET ORAL DAILY
Qty: 30 TABLET | Refills: 1 | Status: SHIPPED | OUTPATIENT
Start: 2019-04-09 | End: 2019-05-07 | Stop reason: SDUPTHER

## 2019-04-09 RX ORDER — DICLOFENAC SODIUM 75 MG/1
75 TABLET, DELAYED RELEASE ORAL DAILY
Qty: 90 TABLET | Refills: 0 | Status: SHIPPED | OUTPATIENT
Start: 2019-04-09 | End: 2019-05-07 | Stop reason: SDUPTHER

## 2019-04-09 RX ORDER — TRAMADOL HYDROCHLORIDE 50 MG/1
50 TABLET ORAL EVERY 6 HOURS PRN
Qty: 84 TABLET | Refills: 0 | Status: SHIPPED | OUTPATIENT
Start: 2019-04-09 | End: 2019-05-07 | Stop reason: SDUPTHER

## 2019-04-09 RX ORDER — TRIAMCINOLONE ACETONIDE 40 MG/ML
40 INJECTION, SUSPENSION INTRA-ARTICULAR; INTRAMUSCULAR ONCE
Status: COMPLETED | OUTPATIENT
Start: 2019-04-09 | End: 2019-04-09

## 2019-04-09 RX ADMIN — TRIAMCINOLONE ACETONIDE 40 MG: 40 INJECTION, SUSPENSION INTRA-ARTICULAR; INTRAMUSCULAR at 11:02

## 2019-04-09 NOTE — PROGRESS NOTES
Nannette Khan  1960  [id number]      HISTORY OF PRESENT ILLNESS:  Ms. Rissa Angel is a 60 y.o. female returns for a follow up visit for pain management  She has a diagnosis of   1. Chronic pain syndrome    2. Facet syndrome ([facility] [address]    3. Fibromyalgia    4. DDD (degenerative disc disease), lumbar    5. Lumbar radiculitis    6. Drug-induced constipation    7. Primary insomnia    8. Primary osteoarthritis of both hips    9. Restless legs syndrome (RLS)    10. Moderate episode of recurrent major depressive disorder ([facility] [address]    . She complains of pain in the neck, right shoulder, bilateral knees She rates the pain 7/10 and describes it as throbbing. Current treatment regimen has helped relieve about 30% of the pain. She denies any side effects from the current pain regimen. Patient reports that since the last follow up visit the physical functioning is worse, family/social relationships are unchanged, mood is worse sleep patterns are worse, and that the overall functioning is worse. Patient denies misusing/abusing her narcotic pain medications or using any illegal drugs. There are No indicators for possible drug abuse, addiction or diversion problems. Patient is complaining of increase pain in the mid/lower back going into buttock but not in the legs. Ms. Rissa Angel reports she is trying to lose weight, she is on a diet. She mentions she is using Ultram 3 per day. Patient is complaining of constipation symptoms. ALLERGIES: Patients list of allergies were reviewed     MEDICATIONS: Ms. Rissa Angel list of medications were reviewed. Her current medications are   Outpatient Medications Prior to Visit   Medication Sig Dispense Refill    meclizine (ANTIVERT) 25 MG tablet TAKE 1 TABLET BY MOUTH THREE TIMES DAILY AS NEEDED FOR DIZZINESS 30 tablet 0    ondansetron (ZOFRAN) 4 MG tablet TAKE 1 TABLET BY MOUTH EVERY 8 HOURS AS NEEDED FOR NAUSEA OR VOMITING 60 tablet 0    diclofenac (VOLTAREN) 75 MG EC tablet Take 1 tablet by mouth Negative for nausea, vomiting, abdominal pain, diarrhea, constipation, blood in stool and abdominal distention. PHYSICAL EXAM:   Nursing note and vitals reviewed. /67   Pulse 65   Wt 283 lb (128.4 kg)   LMP 01/15/2014   BMI 48.58 kg/m²   Constitutional: She appears well-developed and well-nourished. No acute distress. Skin: Skin is warm and dry, good turgor. No rash noted. She is not diaphoretic. Cardiovascular: Normal rate, regular rhythm, normal heart sounds, and does not have murmur. Pulmonary/Chest: Effort normal. No respiratory distress. She does not have wheezes in the lung fields. She has no rales. Neurological/Psychiatric:She is alert and oriented to person, place, and time. Coordination is  normal.  Her mood isAppropriate and affect is Flat/blunted and Anxious . Other: Back: +taunt bands with TPI's, decrease ROM    IMPRESSION:   1. Fibromyalgia    2. Chronic pain syndrome    3. Facet syndrome ([address]    4. DDD (degenerative disc disease), lumbar    5. Lumbar radiculitis    6. Primary osteoarthritis of both hips    7. Primary osteoarthritis of right hip        PLAN:  Informed verbal consent was obtained  -continue with current opioid regimen   -Informed verbal consent was obtained from the patient. Risks and benefits of the procedure were explained. Complications of the procedure and side effects of kenalog/ lidocaine were discussed with patient. Using 0.25% marcaine and 1cc of kenalog, the the tender trigger point areas in the  bilateral paraspinal lumbar muscles -erector spinae and longgissmus muscles were injected under aseptic condition. Mobilization attempted by stretching. Patient tolerated procedure well.  Adv to apply ice today.   -back stretching exercises as advised  -She was advised weight reduction, diet changes- 800-1200 jessica diet, diet diary, exercising, nutritional  consult increased physical activity as tolerated  -walking/stretching exercises as advised       Current Outpatient Medications   Medication Sig Dispense Refill    meclizine (ANTIVERT) 25 MG tablet TAKE 1 TABLET BY MOUTH THREE TIMES DAILY AS NEEDED FOR DIZZINESS 30 tablet 0    ondansetron (ZOFRAN) 4 MG tablet TAKE 1 TABLET BY MOUTH EVERY 8 HOURS AS NEEDED FOR NAUSEA OR VOMITING 60 tablet 0    diclofenac (VOLTAREN) 75 MG EC tablet Take 1 tablet by mouth daily 90 tablet 0    tiZANidine (ZANAFLEX) 4 MG tablet TAKE 1 TABLET BY MOUTH THREE TIMES DAILY 270 tablet 0    pregabalin (LYRICA) 100 MG capsule Take 1 capsule by mouth 3 times daily for 30 days. 90 capsule 0    traMADol (ULTRAM) 50 MG tablet Take 1 tablet by mouth every 6 hours as needed for Pain (max 2-3/day) for up to 28 days.  70 tablet 0    hydrochlorothiazide (HYDRODIURIL) 25 MG tablet Take 1 tablet by mouth daily 30 tablet 1    betamethasone dipropionate (DIPROLENE) 0.05 % cream APPLY EXTERNALLY TO THE AFFECTED AREA TWICE DAILY 60 g 0    propranolol (INDERAL) 10 MG tablet Take 10 mg by mouth 3 times daily      vitamin D (ERGOCALCIFEROL) 21939 units CAPS capsule TAKE 1 CAPSULE BY MOUTH 1 TIME A WEEK 13 capsule 0    esomeprazole (NEXIUM) 40 MG delayed release capsule TAKE 1 CAPSULE BY MOUTH EVERY MORNING BEFORE BREAKFAST 90 capsule 0    rOPINIRole (REQUIP) 1 MG tablet TAKE 2 TABLETS BY MOUTH EVERY NIGHT 180 tablet 0    levothyroxine (SYNTHROID) 88 MCG tablet TAKE 1 TABLET BY MOUTH DAILY 90 tablet 0    atorvastatin (LIPITOR) 40 MG tablet TAKE 1 TABLET BY MOUTH DAILY 90 tablet 0    QUEtiapine (SEROQUEL) 400 MG tablet Take 500 mg by mouth nightly Takes a 100mg and 400mg tab- total 500mg       Current Facility-Administered Medications   Medication Dose Route Frequency Provider Last Rate Last Dose    triamcinolone acetonide (KENALOG-40) injection 40 mg  40 mg Intramuscular Once Justin Benítez MD        cyanocobalamin injection 1,000 mcg  1,000 mcg Intramuscular Q30 Days Chuck Hernandez MD   1,000 mcg at 03/22/17 1413    cyanocobalamin injection 1,000 discussed. If the patient develops new symptoms or if the symptoms worsen, the patient should call the office. While transcribing every attempt was made to maintain the accuracy of the note in terms of it's contents,there may have been some errors made inadvertently. Thank you for allowing me to participate in the care of this patient.     David Keating MD.    Cc: Drake Krishnan MD

## 2019-04-18 ENCOUNTER — OFFICE VISIT (OUTPATIENT)
Dept: AUDIOLOGY | Age: 59
End: 2019-04-18
Payer: COMMERCIAL

## 2019-04-18 DIAGNOSIS — R42 VERTIGO: Primary | ICD-10-CM

## 2019-04-18 PROCEDURE — 92557 COMPREHENSIVE HEARING TEST: CPT | Performed by: AUDIOLOGIST

## 2019-04-18 PROCEDURE — 92550 TYMPANOMETRY & REFLEX THRESH: CPT | Performed by: AUDIOLOGIST

## 2019-04-18 NOTE — PROGRESS NOTES
See scan audiogram and tympanogram.   ENG was not completed on this date because patient did not discontinue her medication.    Patient is reschedule for ENG

## 2019-04-23 ENCOUNTER — OFFICE VISIT (OUTPATIENT)
Dept: AUDIOLOGY | Age: 59
End: 2019-04-23
Payer: COMMERCIAL

## 2019-04-23 DIAGNOSIS — R42 DIZZINESS AND GIDDINESS: Primary | ICD-10-CM

## 2019-04-23 PROCEDURE — 92537 CALORIC VSTBLR TEST W/REC: CPT | Performed by: AUDIOLOGIST

## 2019-04-23 PROCEDURE — 92540 BASIC VESTIBULAR EVALUATION: CPT | Performed by: AUDIOLOGIST

## 2019-04-23 PROCEDURE — 92547 SUPPLEMENTAL ELECTRICAL TEST: CPT | Performed by: AUDIOLOGIST

## 2019-05-07 ENCOUNTER — OFFICE VISIT (OUTPATIENT)
Dept: ENT CLINIC | Age: 59
End: 2019-05-07
Payer: COMMERCIAL

## 2019-05-07 ENCOUNTER — OFFICE VISIT (OUTPATIENT)
Dept: PAIN MANAGEMENT | Age: 59
End: 2019-05-07
Payer: COMMERCIAL

## 2019-05-07 VITALS
WEIGHT: 277 LBS | BODY MASS INDEX: 47.29 KG/M2 | SYSTOLIC BLOOD PRESSURE: 99 MMHG | HEIGHT: 64 IN | HEART RATE: 60 BPM | DIASTOLIC BLOOD PRESSURE: 64 MMHG

## 2019-05-07 VITALS
HEIGHT: 65 IN | HEART RATE: 71 BPM | BODY MASS INDEX: 46.15 KG/M2 | SYSTOLIC BLOOD PRESSURE: 101 MMHG | DIASTOLIC BLOOD PRESSURE: 70 MMHG | WEIGHT: 277 LBS

## 2019-05-07 DIAGNOSIS — M54.16 LUMBAR RADICULITIS: ICD-10-CM

## 2019-05-07 DIAGNOSIS — R42 VERTIGO: Primary | ICD-10-CM

## 2019-05-07 DIAGNOSIS — M79.7 FIBROMYALGIA: ICD-10-CM

## 2019-05-07 DIAGNOSIS — M51.36 DDD (DEGENERATIVE DISC DISEASE), LUMBAR: ICD-10-CM

## 2019-05-07 DIAGNOSIS — G89.4 CHRONIC PAIN SYNDROME: ICD-10-CM

## 2019-05-07 DIAGNOSIS — M16.0 PRIMARY OSTEOARTHRITIS OF BOTH HIPS: ICD-10-CM

## 2019-05-07 DIAGNOSIS — M16.11 PRIMARY OSTEOARTHRITIS OF RIGHT HIP: ICD-10-CM

## 2019-05-07 DIAGNOSIS — M47.899 FACET SYNDROME: ICD-10-CM

## 2019-05-07 PROCEDURE — 99213 OFFICE O/P EST LOW 20 MIN: CPT | Performed by: INTERNAL MEDICINE

## 2019-05-07 PROCEDURE — 99213 OFFICE O/P EST LOW 20 MIN: CPT | Performed by: OTOLARYNGOLOGY

## 2019-05-07 PROCEDURE — 20553 NJX 1/MLT TRIGGER POINTS 3/>: CPT | Performed by: INTERNAL MEDICINE

## 2019-05-07 RX ORDER — PREGABALIN 100 MG/1
100 CAPSULE ORAL 3 TIMES DAILY
Qty: 90 CAPSULE | Refills: 0 | Status: SHIPPED | OUTPATIENT
Start: 2019-05-07 | End: 2019-06-11 | Stop reason: SDUPTHER

## 2019-05-07 RX ORDER — HYDROCHLOROTHIAZIDE 25 MG/1
25 TABLET ORAL DAILY
Qty: 30 TABLET | Refills: 1 | Status: SHIPPED | OUTPATIENT
Start: 2019-05-07 | End: 2019-07-16

## 2019-05-07 RX ORDER — DICLOFENAC SODIUM 75 MG/1
75 TABLET, DELAYED RELEASE ORAL DAILY
Qty: 90 TABLET | Refills: 0 | Status: SHIPPED | OUTPATIENT
Start: 2019-05-07 | End: 2019-07-16 | Stop reason: SDUPTHER

## 2019-05-07 RX ORDER — TRIAMCINOLONE ACETONIDE 40 MG/ML
40 INJECTION, SUSPENSION INTRA-ARTICULAR; INTRAMUSCULAR ONCE
Status: COMPLETED | OUTPATIENT
Start: 2019-05-07 | End: 2019-05-07

## 2019-05-07 RX ORDER — TIZANIDINE 4 MG/1
TABLET ORAL
Qty: 270 TABLET | Refills: 0 | Status: SHIPPED | OUTPATIENT
Start: 2019-05-07 | End: 2019-08-13 | Stop reason: SDUPTHER

## 2019-05-07 RX ORDER — TRAMADOL HYDROCHLORIDE 50 MG/1
50 TABLET ORAL EVERY 6 HOURS PRN
Qty: 105 TABLET | Refills: 0 | Status: SHIPPED | OUTPATIENT
Start: 2019-05-07 | End: 2019-06-06

## 2019-05-07 RX ADMIN — TRIAMCINOLONE ACETONIDE 40 MG: 40 INJECTION, SUSPENSION INTRA-ARTICULAR; INTRAMUSCULAR at 11:52

## 2019-05-07 NOTE — PROGRESS NOTES
ALLERGIES: Patients list of allergies were reviewed     MEDICATIONS: Ms. Yeimi Carroll list of medications were reviewed. Her current medications are   Outpatient Medications Prior to Visit   Medication Sig Dispense Refill    meclizine (ANTIVERT) 25 MG tablet TAKE 1 TABLET BY MOUTH THREE TIMES DAILY AS NEEDED FOR DIZZINESS 30 tablet 0    rOPINIRole (REQUIP) 1 MG tablet TAKE 2 TABLETS BY MOUTH EVERY NIGHT 180 tablet 0    diclofenac (VOLTAREN) 75 MG EC tablet Take 1 tablet by mouth daily 90 tablet 0    tiZANidine (ZANAFLEX) 4 MG tablet TAKE 1 TABLET BY MOUTH THREE TIMES DAILY 270 tablet 0    pregabalin (LYRICA) 100 MG capsule Take 1 capsule by mouth 3 times daily for 30 days. 90 capsule 0    traMADol (ULTRAM) 50 MG tablet Take 1 tablet by mouth every 6 hours as needed for Pain (max 3/day) for up to 28 days.  84 tablet 0    hydrochlorothiazide (HYDRODIURIL) 25 MG tablet Take 1 tablet by mouth daily 30 tablet 1    ondansetron (ZOFRAN) 4 MG tablet TAKE 1 TABLET BY MOUTH EVERY 8 HOURS AS NEEDED FOR NAUSEA OR VOMITING 60 tablet 0    betamethasone dipropionate (DIPROLENE) 0.05 % cream APPLY EXTERNALLY TO THE AFFECTED AREA TWICE DAILY 60 g 0    propranolol (INDERAL) 10 MG tablet Take 10 mg by mouth 3 times daily      vitamin D (ERGOCALCIFEROL) 37751 units CAPS capsule TAKE 1 CAPSULE BY MOUTH 1 TIME A WEEK 13 capsule 0    esomeprazole (NEXIUM) 40 MG delayed release capsule TAKE 1 CAPSULE BY MOUTH EVERY MORNING BEFORE BREAKFAST 90 capsule 0    levothyroxine (SYNTHROID) 88 MCG tablet TAKE 1 TABLET BY MOUTH DAILY 90 tablet 0    atorvastatin (LIPITOR) 40 MG tablet TAKE 1 TABLET BY MOUTH DAILY 90 tablet 0    QUEtiapine (SEROQUEL) 400 MG tablet Take 500 mg by mouth nightly Takes a 100mg and 400mg tab- total 500mg       Facility-Administered Medications Prior to Visit   Medication Dose Route Frequency Provider Last Rate Last Dose    triamcinolone acetonide (KENALOG-40) injection 40 mg  40 mg Intramuscular Once Neeraj MD Beth        cyanocobalamin injection 1,000 mcg  1,000 mcg Intramuscular Q30 Days Marito Cope MD   1,000 mcg at 03/22/17 1413    cyanocobalamin injection 1,000 mcg  1,000 mcg Intramuscular Q30 Days Marito Cope MD   1,000 mcg at 02/01/17 1112       SOCIAL/FAMILY/PAST MEDICAL HISTORY: Ms. Heather Tate, family and past medical history was reviewed. REVIEW OF SYSTEMS:    Respiratory: Negative for apnea, chest tightness and shortness of breath or change in baseline breathing. Gastrointestinal: Negative for nausea, vomiting, abdominal pain, diarrhea, constipation, blood in stool and abdominal distention. PHYSICAL EXAM:   Nursing note and vitals reviewed. BP 99/64 (Site: Right Lower Arm, Position: Sitting, Cuff Size: Large Adult)   Pulse 60   Ht 5' 4\" (1.626 m)   Wt 277 lb (125.6 kg)   LMP 01/15/2014   BMI 47.55 kg/m²   Constitutional: She appears well-developed and well-nourished. No acute distress. Skin: Skin is warm and dry, good turgor. No rash noted. She is not diaphoretic. Cardiovascular: Normal rate, regular rhythm, normal heart sounds, and does not have murmur. Pulmonary/Chest: Effort normal. No respiratory distress. She does not have wheezes in the lung fields. She has no rales. Neurological/Psychiatric:She is alert and oriented to person, place, and time. Coordination is  normal.  Her mood isAppropriate and affect is Neutral/Euthymic(normal) . Other: + limp. Back: + taut bands with TPS    IMPRESSION:   1. Fibromyalgia    2. Chronic pain syndrome    3. Facet syndrome ([address]    4. DDD (degenerative disc disease), lumbar    5. Lumbar radiculitis    6. Primary osteoarthritis of both hips        PLAN:  Informed verbal consent was obtained  -continue with current opioid regimen  -Adv Biofeedback, relaxation and meditation techniques.  Referral to psychologist for CBT was also discussed with patient  -She was advised weight reduction, diet changes- 800-1200 jessica diet, diet diary, exercising, nutritional  consult increased physical activity as tolerated  -back stretching exercises were given  -Informed verbal consent was obtained from the patient. Risks and benefits of the procedure were explained. Complications of the procedure and side effects of kenalog/ lidocaine were discussed with patient. Using 0.25% marcaine and 1cc of kenalog, the the tender trigger point areas in the  bilateral paraspinal lumbar muscles -erector spinae and longgissmus muscles were injected under aseptic condition. Mobilization attempted by stretching. Patient tolerated procedure well. Adv to apply ice today. Current Outpatient Medications   Medication Sig Dispense Refill    meclizine (ANTIVERT) 25 MG tablet TAKE 1 TABLET BY MOUTH THREE TIMES DAILY AS NEEDED FOR DIZZINESS 30 tablet 0    rOPINIRole (REQUIP) 1 MG tablet TAKE 2 TABLETS BY MOUTH EVERY NIGHT 180 tablet 0    diclofenac (VOLTAREN) 75 MG EC tablet Take 1 tablet by mouth daily 90 tablet 0    tiZANidine (ZANAFLEX) 4 MG tablet TAKE 1 TABLET BY MOUTH THREE TIMES DAILY 270 tablet 0    pregabalin (LYRICA) 100 MG capsule Take 1 capsule by mouth 3 times daily for 30 days. 90 capsule 0    traMADol (ULTRAM) 50 MG tablet Take 1 tablet by mouth every 6 hours as needed for Pain (max 3/day) for up to 28 days.  84 tablet 0    hydrochlorothiazide (HYDRODIURIL) 25 MG tablet Take 1 tablet by mouth daily 30 tablet 1    ondansetron (ZOFRAN) 4 MG tablet TAKE 1 TABLET BY MOUTH EVERY 8 HOURS AS NEEDED FOR NAUSEA OR VOMITING 60 tablet 0    betamethasone dipropionate (DIPROLENE) 0.05 % cream APPLY EXTERNALLY TO THE AFFECTED AREA TWICE DAILY 60 g 0    propranolol (INDERAL) 10 MG tablet Take 10 mg by mouth 3 times daily      vitamin D (ERGOCALCIFEROL) 37719 units CAPS capsule TAKE 1 CAPSULE BY MOUTH 1 TIME A WEEK 13 capsule 0    esomeprazole (NEXIUM) 40 MG delayed release capsule TAKE 1 CAPSULE BY MOUTH EVERY MORNING BEFORE BREAKFAST 90 capsule 0    levothyroxine (SYNTHROID) 88 MCG tablet TAKE 1 TABLET BY MOUTH DAILY 90 tablet 0    atorvastatin (LIPITOR) 40 MG tablet TAKE 1 TABLET BY MOUTH DAILY 90 tablet 0    QUEtiapine (SEROQUEL) 400 MG tablet Take 500 mg by mouth nightly Takes a 100mg and 400mg tab- total 500mg       Current Facility-Administered Medications   Medication Dose Route Frequency Provider Last Rate Last Dose    triamcinolone acetonide (KENALOG-40) injection 40 mg  40 mg Intramuscular Once Mónica Huston MD        cyanocobalamin injection 1,000 mcg  1,000 mcg Intramuscular Q30 Days Chiquita Mcfarland MD   1,000 mcg at 03/22/17 1413    cyanocobalamin injection 1,000 mcg  1,000 mcg Intramuscular Q30 Days Chiquita Mcfarland MD   1,000 mcg at 02/01/17 1112     I will continue her current medication regimen  which is part of the above treatment schedule. It has been helping with Ms. Yap's chronic  medical problems which for this visit include:   Diagnoses of Chronic pain syndrome, Fibromyalgia, Facet syndrome ([address] DDD (degenerative disc disease), lumbar, Lumbar radiculitis, Primary osteoarthritis of both hips, Drug-induced constipation, Primary insomnia, Restless legs syndrome (RLS), and Moderate episode of recurrent major depressive disorder ([address] were pertinent to this visit. Risks and benefits of the medications and other alternative treatments  including no treatment were discussed with the patient. The common side effects of these medications were also explained to the patient. Informed verbal consent was obtained. Goals of current treatment regimen include improvement in pain, restoration of functioning- with focus on improvement in physical performance, general activity, work or disability,emotional distress, health care utilization and  decreased medication consumption. Will continue to monitor progress towards achieving/maintaining therapeutic goals with special emphasis on  1. Improvement in perceived interfernce  of pain with ADL's.  Ability to do

## 2019-05-08 RX ORDER — ATORVASTATIN CALCIUM 40 MG/1
TABLET, FILM COATED ORAL
Qty: 90 TABLET | Refills: 0 | Status: SHIPPED | OUTPATIENT
Start: 2019-05-08 | End: 2019-08-06 | Stop reason: SDUPTHER

## 2019-05-11 DIAGNOSIS — K21.9 GASTROESOPHAGEAL REFLUX DISEASE, ESOPHAGITIS PRESENCE NOT SPECIFIED: ICD-10-CM

## 2019-05-13 RX ORDER — ESOMEPRAZOLE MAGNESIUM 40 MG/1
CAPSULE, DELAYED RELEASE ORAL
Qty: 90 CAPSULE | Refills: 0 | Status: SHIPPED | OUTPATIENT
Start: 2019-05-13 | End: 2019-08-10 | Stop reason: SDUPTHER

## 2019-05-17 RX ORDER — LEVOTHYROXINE SODIUM 88 UG/1
TABLET ORAL
Qty: 90 TABLET | Refills: 0 | Status: SHIPPED | OUTPATIENT
Start: 2019-05-17 | End: 2019-08-14 | Stop reason: SDUPTHER

## 2019-05-28 ENCOUNTER — HOSPITAL ENCOUNTER (OUTPATIENT)
Dept: PHYSICAL THERAPY | Age: 59
Setting detail: THERAPIES SERIES
Discharge: HOME | End: 2019-05-28
Payer: COMMERCIAL

## 2019-05-28 PROCEDURE — 97162 PT EVAL MOD COMPLEX 30 MIN: CPT

## 2019-05-28 PROCEDURE — 95992 CANALITH REPOSITIONING PROC: CPT

## 2019-05-28 NOTE — PROGRESS NOTES
Physical Therapy Vestibular Rehabilitation Evaluation    Date:  2019    Patient Name:  Florencia Melara    :  1960  MRN: [id number]  Restrictions/Precautions:    Medical/Treatment Diagnosis Information:  · Diagnosis: R42 Vertigo   · Dizziness   Insurance/Certification information:  PT Insurance Information: Sera Rosales ([address] [place] [address])  Physician Information:  Referring Practitioner: Dr. Josue Garcia    Time in:   [phone]     Time out: 1800  Total Time:  79  ______________________________________________________________________    SUBJECTIVE:      Referral Date: 19  Onset Date: -2019  Chief Complaint: \"everything around my feels like it's moving\"  Setting in which symptoms first occurred: noticed more when got out of car (sedan) and started walking, got a \"sensation in head\" and knew she was going to get dizzy so would lean against car. Description of symptoms: [x] Vertigo [x]  Off-balance [] Lightheadedness  Symptoms are getting:  [] Better [] Worse  [x] Same [] Episodic  Description of Spells:  [] Constant [x] Spontaneous [x] Induced by motion [x] Induced by position changes [x] Other:  Stand up, lay down, sometimes when first gets up to walk, sometimes while sitting still   Length of time spells occur: [] Seconds [x] Minutes  [] Hours [] Days [] Other:   What increases symptoms? See above  What decreases symptoms?  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 Has crutches and RW from [employer]. H/o motion sensitivity mostly w/ boats; enjoys roller coasters. OBJECTIVE:   BP R UE sitting (adult long large cuff): 104/60  BP R UE standin/74    Musculoskeletal Screen  Cervical spine complaints: base of skull hears \"crinkling\" w/ head movement  Cervical Pain: sharp pain randomly for 4 months at central occiput   Cervical spine ROM:  []  WNL [x] Impaired: slightly limited B side bending and B rotation, no pain    LE ROM: WFL, presents w/ bruising throughout B anterior knees from recent fall.   Of note, pt also presents w/ mult healing abrasions on L forearm (per med record she is being treated in wound clinic for self-inflicted cuts)      LEFT RIGHT    Hip      Knee      Ankle       LE Strength:    LEFT RIGHT    Hip flexion      Hip IR      Hip ER      Knee extension      Knee flexion      Ankle       Posture:  Very rounded shoulders w/ forward head    Somatosensory:  Light touch:  [x] Normal [] Impaired Comments:    Coordination:  Rapid alternating movements: [] Normal [] Dysdiadokinesia   Finger to Nose:   [x] Normal [] Dysmetric  Heel to Shin:    [] Normal [] Ataxic    Postural Control Tests:  Clinical Test of Sensory Interaction for Balance (CTSIB) performed in Romberg stance  CONDITION TIME STRATEGY SWAY    Eyes open, firm surface 30      Eyes closed, firm surface 30 Ankles, hips delayed A-P    Eyes open, foam surface       Eyes closed, foam surface          Rhomber sec   Sharpened rhomber (post LOB at 10 sec and 15 sec, self recovers)  SLS: L 7 sec; R 2 sec  Tandem stance: 30 sec, L posterior     Gait:    Assistive Device: none     Orthosis: none   At self-initiated pace: Sabi: WNL   SELENE: WNL        Arm swing: WNL   Head/trunk rotation: slightly limited      Straight path: yes  Swerves: no            Staggers: no    Side-steps: no   Gait speed:     Oculomotor/Vestibular Examination:    Spontaneous nystagmus:  [] Left  [] Right [x] Absent  Gaze-Evoked nystagmus with fixation present:   Primary [] Present [] Absent   Right  [] Present [x] Absent    Left  [x] Present [] Absent (+ c/o diploplia)  VOR Head Thrust Test: [] Normal [x] Abnormal  Comments: slight catch up saccade when moving R to L  VOR Cancellation:  [] Normal [x] Abnormal Comments: R to L  Slight nystagmus  Smooth Pursuit:  [x] Normal [] Abnormal Comments:  diploplia in upper and lower L quadrants (worse upper than lower)   Saccades:   [x] Normal [] Abnormal Comments:   Convergence:   [] Normal [] Abnormal Comments:   Static Visual Acuity:   last opthamologist 1 month ago for new glasses   Dynamic Visual Acuity:    Positional Testing  Test of provocation: [x] Negative  [] Positive     R Hallpike-Caleb maneuver:    Nystagmus:  [] Yes  [x] No  [] Duration:       [] Direction:    Vertigo:  [x] Yes  [] No  [x] Duration:  45 sec (similar to chief complaint, but not exact, felt \"fuzzy in head\")   L Hallpike-[place] maneuver:   Nystagmus:  [] Yes  [x] No  [] Duration:       [] Direction:    Vertigo:  [x] Yes  [] No  [x] Duration:  35 seconds  Supine roll head right:   Nystagmus:  [] Yes  [x] No  [] Duration:       [] Direction:    Vertigo:  [x] Yes  [] No  [] Duration: 10-15 sec  Supine roll head left:   Nystagmus:  [] Yes  [x] No  [] Duration:       [] Direction:    Vertigo:  [x] Yes  [] No  [] Duration: 10-15 sec  Out of all testing, pt reports R caleb-hallpike was the worst. Therefor trialed R eply to address worst symptoms.      Outcome Measures  Dizziness Handicap Index (DHI)    50%  Functional Gait Assessment (FGA)    TBA  Beckham Balance Scale        Timed Get \"Up and Go\"       5 time sit-to-stand        Activities Specific Balance Confidence Scale (ABC)    Motion Sensitivity Quotient (MSQ)      Visual Vertigo Analogue Scale (VVAS)       ASSESSMENT:    Pt is 60 y.o. Female w/ approx. 4 month h/o dizziness described as feeling that things are moving around her, but not true spinning or light headedness w/ no precipitating event. She has sustained 4 falls due to the dizziness. Pt presents w/ static balance deficits, most limited w/ eyes closed. Due to unclear results of positional testing (symptomatic w/ all, but no objective signs) and mixed peripheral and central signs on oculomotor assessment w/ h/o diploplia that may have started around the same time as dizziness pt may require further medical assessment. Also plan to further assess neuro screen next visit and Beckham Balance and/or FGA as well as encouraged pt to follow up w/ previous referral to neurology. Encouraged pt to use RW if continues to feel unsteady while walking (SPC would not be enough support most likely, although not fully assessed today) to reduce fall risk in the interim. Pt will benefit from skilled PT to further assess vestibular and balance deficits, provide education and progress balance training, compensatory strategies, habituation and gaze stabilization exercises 00depending on further assessment.        Rehab Potential   [] Excellent [] Good [x] Fair  [] Poor  Problem List/Functional Limitations:    [] BPPV    [] Right [] Posteror Canal [] Canalithiasis    [] Left  [] Horizontal Canal [] Cupulolithiasis   [x] Decreased Gaze Stabilization  [x] Increased Motion Sensitivity   [] Unilateral Vestibular Hypofunction  [x] Bilateral Vestibular Hypofunction   [] Gait Instability  [] Decreased tolerance for ADLs   [] Decreased ROM  [] Decreased Strength   [x] Decreased Balance [] Other:     Treatment/Activity Tolerance:   [x] Patient tolerated treatment well [] Patient limited by fatique  [] Patient limited by pain  [] Patient limited by other medical complications  [] Other: Goals:      Long term goals  Time Frame for Long term goals : 4 weeks  Long term goal 1: Pt will score <= 20% on DHI. Long term goal 2: Pt will reduce episodes of dizziness to <=1x/ week. Long term goal 3: Pt will report no falls over the course of 4 weeks. Long term goal 4: Pt will score in low fall risk range for Beckham and/or FGA. Pt's goal: \"get rid of dizziness\"    Treatment Plan:    Today's Treatment:    [] See flowsheet   [x] Patient treated with canalith repositioning maneuver   [x] Education materials provided on BPPV/Vestibular Dysfunction   [x] Precautions provided and patient to follow precautions for next 24 hours in regards to BPPV management   [] Written home exercise instructions   [] Other:    Plan: 2 x/week for 4 weeks   [x] Home Exercise Program  [x] Canalith Repositioning Maneuvers   [x] Gaze Stabilization Exercises [x] Habituation Exercises   [x] Neuromuscular Re-Education [x] Clinic-based Vestibular/Balance Therapy   [x] Patient Education   [] Other:   [x] Patient agrees with Plan of Care    Signature:  Rashida Capellan, PT, DPT

## 2019-05-29 ENCOUNTER — HOSPITAL ENCOUNTER (EMERGENCY)
Age: 59
Discharge: HOME | End: 2019-05-29
Attending: EMERGENCY MEDICINE
Payer: COMMERCIAL

## 2019-05-29 VITALS
DIASTOLIC BLOOD PRESSURE: 72 MMHG | HEIGHT: 64 IN | TEMPERATURE: 99 F | RESPIRATION RATE: 17 BRPM | SYSTOLIC BLOOD PRESSURE: 155 MMHG | HEART RATE: 85 BPM | OXYGEN SATURATION: 97 % | WEIGHT: 287.9 LBS | BODY MASS INDEX: 49.15 KG/M2

## 2019-05-29 DIAGNOSIS — S51.812A LACERATION OF LEFT FOREARM, INITIAL ENCOUNTER: Primary | ICD-10-CM

## 2019-05-29 DIAGNOSIS — I10 ESSENTIAL HYPERTENSION: ICD-10-CM

## 2019-05-29 DIAGNOSIS — R45.88 NON-SUICIDAL SELF HARM: ICD-10-CM

## 2019-05-29 DIAGNOSIS — Z92.29 UP TO DATE WITH DIPHTHERIA-TETANUS VACCINATION: ICD-10-CM

## 2019-05-29 PROCEDURE — 4500000023 HC ED LEVEL 3 PROCEDURE

## 2019-05-29 PROCEDURE — 99283 EMERGENCY DEPT VISIT LOW MDM: CPT

## 2019-05-29 ASSESSMENT — PAIN DESCRIPTION - DESCRIPTORS: DESCRIPTORS: SHARP

## 2019-05-29 ASSESSMENT — PAIN DESCRIPTION - LOCATION: LOCATION: ARM

## 2019-05-29 ASSESSMENT — PAIN SCALES - GENERAL: PAINLEVEL_OUTOF10: 2

## 2019-05-29 ASSESSMENT — PAIN DESCRIPTION - FREQUENCY: FREQUENCY: CONTINUOUS

## 2019-05-29 ASSESSMENT — PAIN DESCRIPTION - PAIN TYPE: TYPE: ACUTE PAIN

## 2019-05-29 ASSESSMENT — PAIN DESCRIPTION - ORIENTATION: ORIENTATION: LEFT

## 2019-05-29 NOTE — PLAN OF CARE
pain/tenderness/paresthesias, reducing swelling/inflammation/tightness, improving soft tissue extensibility, and/or to increase muscle tone/strength):     [] Ultrasound  [] Electrical Stimulation        [] Cervical Traction [] Lumbar Traction    ? [] Cold/hotpack [] Iontophoresis   Other:   [x]  Vestibular rehab              [x] Home Exercise Program                         [x] Canalith Repositioning Maneuvers              [x] Gaze Stabilization Exercises             [x] Habituation Exercises              [x] Neuromuscular Re-Education            [x] Clinic-based Vestibular/Balance Therapy              [x] Patient Education                               [] Other:            Assessment:     Pt is 60 y.o. Female w/ approx. 4 month h/o dizziness described as feeling that things are moving around her, but not true spinning or light headedness w/ no precipitating event. She has sustained 4 falls due to the dizziness. Pt presents w/ static balance deficits, most limited w/ eyes closed. Pt with unclear results of positional testing (symptomatic w/ all, but no objective signs) and mixed peripheral and central signs on oculomotor assessment w/ h/o diploplia that may have started around the same time as dizziness. Plan to further assess neuro screen next visit and Beckham Balance and/or FGA as well as already encouraged pt to follow up w/ previous referral to neurology. Encouraged pt to use RW if continues to feel unsteady while walking (SPC would not be enough support most likely, although not fully assessed today) to reduce fall risk in the interim. Pt's psychiatric history may also impact her potential. Pt will benefit from skilled PT to further assess vestibular and balance deficits, provide education and progress balance training, compensatory strategies, habituation and gaze stabilization exercises depending on further assessment.       Goals:  Long term goals  Time Frame for Long term goals : 4 weeks  Long term goal 1: Pt will score <= 20% on DHI. Long term goal 2: Pt will reduce episodes of dizziness to <=1x/ week. Long term goal 3: Pt will report no falls over the course of 4 weeks. Long term goal 4: Pt will score in low fall risk range for Beckham and/or FGA. Frequency/Duration:  # Days per week: [] 1 day # Weeks: [] 1 week [] 5 weeks     [x] 2 days? [] 2 weeks [] 6 weeks     [] 3 days   [] 3 weeks [] 7 weeks     [] 4 days   [x] 4 weeks [] 8 weeks    Rehab Potential: [] Excellent [] Good [x] Fair  [] Poor       Electronically signed by:  Vineet Bazan, PT, DPT        If you have any questions or concerns, please don't hesitate to call.   Thank you for your referral.      Physician Signature:________________________________Date:__________________  By signing above, therapists plan is approved by physician

## 2019-05-29 NOTE — FLOWSHEET NOTE
Physical Therapy Daily Treatment Note- See PT EVAL  Date:  2019    Patient Name:  Jenniffer Garcia    :  1960  MRN: [id number]  Restrictions/Precautions:    Medical/Treatment Diagnosis Information:  · Diagnosis: R42 Vertigo  ·  Dizziness  Insurance/Certification information:  PT Insurance Information: Anisha Nelson ([address] [place] [address])  Physician Information:  Referring Practitioner: Dr. Jose G Mccord MD Follow-up: Y  Plan of care signed (Y/N):  N  Visit# / total visits:    Pain level: 0/10     Progress Note: []  Yes  [x]  No  Next due by: Visit #8 or 19      Subjective:  See PT EVAL    Objective:  Observation:   Test measurements:  FGA and/or Gregory Cake, neuro screen    Exercises:  Exercise/Equipment Resistance/Repetitions Other comments                                                                           Other Therapeutic Activities:      Home Exercise Program:       Manual Treatments:      Modalities:      Timed Code Treatment Minutes:  CRT x 1    Total Treatment Minutes:   10    Treatment/Activity Tolerance:  [x] Patient tolerated treatment well [] Patient limited by fatigue  [] Patient limited by pain  [] Patient limited by other medical complications  [] Other:     Assessment:  Pt is 60 y.o. Female w/ approx. 4 month h/o dizziness described as feeling that things are moving around her, but not true spinning or light headedness w/ no precipitating event. She has sustained 4 falls due to the dizziness. Pt presents w/ static balance deficits, most limited w/ eyes closed. Due to unclear results of positional testing (symptomatic w/ all, but no objective signs) and mixed peripheral and central signs on oculomotor assessment w/ h/o diploplia that may have started around the same time as dizziness pt may require further medical assessment. Also plan to further assess neuro screen next visit and Beckham Balance and/or FGA as well as encouraged pt to follow up w/ previous referral to neurology.   Encouraged pt to use RW if continues to feel unsteady while walking (SPC would not be enough support most likely, although not fully assessed today) to reduce fall risk in the interim. Pt will benefit from skilled PT to further assess vestibular and balance deficits, provide education and progress balance training, compensatory strategies, habituation and gaze stabilization exercises 00depending on further assessment. Prognosis: [] Good [x] Fair  [] Poor    Patient Requires Follow-up: [x] Yes  [] No    Goals:  Long term goals  Time Frame for Long term goals : 4 weeks  Long term goal 1: Pt will score <= 20% on DHI. Long term goal 2: Pt will reduce episodes of dizziness to <=1x/ week. Long term goal 3: Pt will report no falls over the course of 4 weeks. Long term goal 4: Pt will score in low fall risk range for Beckham and/or FGA.      Plan:   [] Continue per plan of care [] Alter current plan (see comments)  [x] Plan of care initiated [] Hold pending MD visit [] Discharge    Plan for Next Session:   Finish balance assessment, neuro screen, gaze stabilization, habituation    Electronically signed by:  Julius Travis DPT

## 2019-05-30 RX ORDER — ONDANSETRON 4 MG/1
TABLET, FILM COATED ORAL
Qty: 60 TABLET | Refills: 0 | Status: SHIPPED | OUTPATIENT
Start: 2019-05-30 | End: 2019-08-05

## 2019-05-30 NOTE — ED PROVIDER NOTES
CHIEF COMPLAINT  Laceration (to left forearm on knief)      HISTORY OF PRESENT ILLNESS  Reyes Phillips is a 60 y.o. female presents to the ED with lacerations to left forearm, states she is a long-term cutter and has done this before, it was a self-inflicted injury, because she is depressed and became stressed, denies suicidal or homicidal ideation, used a clean kitchen knife, states she is up-to-date on tetanus within the past 5 years, occurred about 30 minutes prior to arrival, no diabetes, no history of poor healing wounds. No other complaints, modifying factors or associated symptoms. I have reviewed the following from the nursing documentation.     Past Medical History:   Diagnosis Date    Acquired hyperlipoproteinemia     Allergic rhinitis     Anxiety     Arthritis     Bilateral lower extremity edema     Bipolar 1 disorder (HCC)     Chronic pain syndrome     DDD (degenerative disc disease), lumbar     Depression     Depression     Disease of gallbladder     Dizziness     DJD (degenerative joint disease) of hip     Edema 12/29/2009    Elbow tendinitis     Facet syndrome (HCC)     Fibromyalgia     Fracture of nasal bone     GERD (gastroesophageal reflux disease)     Headache     Hiatal hernia     History of blood transfusion     anemia    Hyperlipidemia     Insomnia     Osteoarthritis     Prediabetes     Rash     Self mutilating behavior     cutter    Sleep apnea     no CPAP    Suicidal ideation     Thyroid disorder     hypothyroidism     Past Surgical History:   Procedure Laterality Date    ABDOMEN SURGERY  5/28/2014    LAPAROSCOPIC SLEEVE GASTRECTOMY, EXTENSIVE LYSIS OF ADHESIONS    ANKLE SURGERY      ANKLE SURGERY      APPENDECTOMY      open , Ex-lap    CHOLECYSTECTOMY      open    COLONOSCOPY  4/16/2013    dr Aquiles Loja ENDOSCOPY, COLON, DIAGNOSTIC      FOOT SURGERY Right 05/27/2016    ARTHROPLASTY RIGHT 5TH DIGIT      HYSTEROSCOPY N/A 12-30-13    Hysteroscopy Dilitation and Curettage    JOINT REPLACEMENT Bilateral     knee    OTHER SURGICAL HISTORY  9/11/2015    ARTHROPLASTY 5TH DIGIT LEFT FOOT          SHOULDER SURGERY      right    SHOULDER SURGERY Right     SLEEVE GASTROPLASTY  May 28, 2014    [address]    TOTAL KNEE ARTHROPLASTY  12/10/10    Right    TOTAL KNEE ARTHROPLASTY  3/30/10    Left    UPPER GASTROINTESTINAL ENDOSCOPY  4/16/2013    dr Day Olson ENDOSCOPY  3/20/2014    dr Pineda Herny     Family History   Problem Relation Age of Onset    Heart Disease Mother     Heart Failure Mother     High Cholesterol Mother     High Blood Pressure Mother     Stroke Mother     Birth Defects Mother     Other Mother         VV    High Blood Pressure Father     COPD Father     Diabetes Brother     Stroke Brother     Stroke Maternal Grandmother     Arthritis Maternal Grandmother     Heart Failure Brother     Birth Defects Maternal Grandfather     Arthritis Paternal Grandmother      Social History     Socioeconomic History    Marital status: Single     Spouse name: Not on file    Number of children: 0    Years of education: 12    Highest education level: Not on file   Occupational History    Occupation: [employer]     Comment: unemployed   Social Needs    Financial resource strain: Not on file    Food insecurity:     Worry: Not on file     Inability: Not on file   [email or web address] needs:     Medical: Not on file     Non-medical: Not on file   Tobacco Use    Smoking status: Never Smoker    Smokeless tobacco: Never Used    Tobacco comment: Never smoked   Substance and Sexual Activity    Alcohol use: No     Alcohol/week: 0.0 oz    Drug use: No    Sexual activity: Not Currently   Lifestyle    Physical activity:     Days per week: Not on file     Minutes per session: Not on file    Stress: Not on file   Relationships    Social connections:     Talks on phone: Not on file     Gets together: Not on file     Attends Baptism service: Not on file     Active member of club or organization: Not on file     Attends meetings of clubs or organizations: Not on file     Relationship status: Not on file    Intimate partner violence:     Fear of current or ex partner: Not on file     Emotionally abused: Not on file     Physically abused: Not on file     Forced sexual activity: Not on file   Other Topics Concern    Not on file   Social History Narrative    Lives by herself with her cat     Current Facility-Administered Medications   Medication Dose Route Frequency Provider Last Rate Last Dose    triamcinolone acetonide (KENALOG-40) injection 40 mg  40 mg Intramuscular Once Tongjudah Gavin MD        cyanocobalamin injection 1,000 mcg  1,000 mcg Intramuscular Q30 Days Roberth Magdaleno MD   1,000 mcg at 03/22/17 1413    cyanocobalamin injection 1,000 mcg  1,000 mcg Intramuscular Q30 Days Roberth Magdaleno MD   1,000 mcg at 02/01/17 1112     Current Outpatient Medications   Medication Sig Dispense Refill    meclizine (ANTIVERT) 25 MG tablet TAKE 1 TABLET BY MOUTH THREE TIMES DAILY AS NEEDED FOR DIZZINESS 30 tablet 0    ARIPiprazole (ABILIFY) 10 MG tablet TK 1 T PO QAM  3    levothyroxine (SYNTHROID) 88 MCG tablet TAKE 1 TABLET BY MOUTH DAILY 90 tablet 0    esomeprazole (NEXIUM) 40 MG delayed release capsule TAKE 1 CAPSULE BY MOUTH EVERY MORNING BEFORE BREAKFAST 90 capsule 0    atorvastatin (LIPITOR) 40 MG tablet TAKE 1 TABLET BY MOUTH DAILY 90 tablet 0    diclofenac (VOLTAREN) 75 MG EC tablet Take 1 tablet by mouth daily 90 tablet 0    tiZANidine (ZANAFLEX) 4 MG tablet TAKE 1 TABLET BY MOUTH THREE TIMES DAILY 270 tablet 0    pregabalin (LYRICA) 100 MG capsule Take 1 capsule by mouth 3 times daily for 30 days. 90 capsule 0    traMADol (ULTRAM) 50 MG tablet Take 1 tablet by mouth every 6 hours as needed for Pain (max 3/day) for up to 30 days.  105 tablet 0    hydrochlorothiazide (HYDRODIURIL) 25 MG tablet Take 1 tablet by mouth daily 30 tablet 1    rOPINIRole (REQUIP) 1 MG tablet TAKE 2 TABLETS BY MOUTH EVERY NIGHT 180 tablet 0    ondansetron (ZOFRAN) 4 MG tablet TAKE 1 TABLET BY MOUTH EVERY 8 HOURS AS NEEDED FOR NAUSEA OR VOMITING 60 tablet 0    betamethasone dipropionate (DIPROLENE) 0.05 % cream APPLY EXTERNALLY TO THE AFFECTED AREA TWICE DAILY 60 g 0    propranolol (INDERAL) 10 MG tablet Take 10 mg by mouth 3 times daily      vitamin D (ERGOCALCIFEROL) 11656 units CAPS capsule TAKE 1 CAPSULE BY MOUTH 1 TIME A WEEK 13 capsule 0    QUEtiapine (SEROQUEL) 400 MG tablet Take 500 mg by mouth nightly Takes a 100mg and 400mg tab- total 500mg       Allergies   Allergen Reactions    Polysporin [Bacitracin-Polymyxin B] Other (See Comments) and Itching     Pt breaks out in blisters. Pt breaks out in blisters.  Lorazepam Other (See Comments)     \"went crazy\"    Neomycin-Polymyxin-Gramicidin     Sulfa Antibiotics Hives       REVIEW OF SYSTEMS  10 systems reviewed, pertinent positives per HPI otherwise noted to be negative. PHYSICAL EXAM  BP (!) 155/72   Pulse 85   Temp 99 °F (37.2 °C)   Resp 17   Ht 5' 4\" (1.626 m)   Wt 130.6 kg (287 lb 14.4 oz)   LMP 01/15/2014   SpO2 97%   BMI 49.42 kg/m²   GENERAL APPEARANCE: Awake and alert. Cooperative. No acute distress, obese  HEAD: Normocephalic. Atraumatic. EYES: PERRL. EOM's grossly intact. ENT: Mucous membranes are moist.   NECK: Supple. HEART: RRR. No murmurs  LUNGS: Respirations nonlabored. Lungs are clear to auscultation bilaterally. EXTREMITIES: No peripheral edema. Moves all extremities equally. All extremities neurovascularly intact. Multiple well-healed scars from previous lacerations, 2+ radial pulse left arm, distal sensation intact, tendon exam intact, full range of motion of left wrist and digits  SKIN: Warm and dry.   Lacerations ×2 to left dorsal forearm, see laceration repair note below, depth was to subcutaneous tissue but no noted muscle or tendon involvement  NEUROLOGICAL: Alert and oriented. No gross facial drooping. Strength 5/5, sensation intact. No truncal ataxia. Normal speech  PSYCHIATRIC: Depressed mood and flat affect. Patient Name: Quan Wright   Room/Bed: 06/06  Laceration Repair Procedure Note  Indication: Laceration L dorsal forearm    Procedure: The patient was placed in the appropriate position and anesthesia around the laceration was not performed at the patient's request. The area was then cleansed using hibiClens. The 2 lacerations were closed with 4-0 Prolene using one horizontal mattress and 3 simple interrupted sutures. A second laceration was closed at the same time since they were in close proximity within half centimeter/parallel to each other. The wound area was then dressed with a sterile dressing, gauze and Kerlix. Total repaired wound length: one was 5 cm. the second was 3 cm approximately    Other Items: Suture count: 4    The patient tolerated the procedure well. Complications: None    ED COURSE/MDM  Patient seen and evaluated. Old records reviewed. Labs and imaging reviewed and results discussed with patient. 59-year-old female with 2 self-inflicted lacerations to superficial left arm, long-term cutter, lacerations repaired with sutures, denies suicidal ideation, states she has a psychiatrist she can follow up with, offered psychiatric evaluation and she declined, states she is up-to-date on tetanus within the past 5 years, Strict return precautions given, will return if any worsening symptoms or new concerns, patient verbalized understanding of plan, felt comfortable going home. CLINICAL IMPRESSION  1. Laceration of left forearm, initial encounter    2. Up to date with diphtheria-tetanus vaccination    3. Non-suicidal self harm    4. Essential hypertension        Blood pressure (!) 155/72, pulse 85, temperature 99 °F (37.2 °C), resp.  rate 17, height 5' 4\" (1.626 m), weight 130.6 kg (287 lb 14.4 oz), last menstrual period 01/15/2014, SpO2 97 %, not currently breastfeeding. Efrain Eden was discharged to home in stable condition.                    [facility],   05/30/19 6775

## 2019-05-30 NOTE — ED NOTES
Cleaned and irrigated left forearm laceration with HIBI clens and NaCl solution (250 ml). Pt tolerated well.      [address] [place], [place]  05/29/19 7275

## 2019-05-30 NOTE — ED NOTES
Pt dc/d with instructions in stable condition, ambulatory to lobby. Home per ride.       Kalia Devi RN  05/29/19 [id number]

## 2019-06-04 ENCOUNTER — HOSPITAL ENCOUNTER (OUTPATIENT)
Dept: PHYSICAL THERAPY | Age: 59
Setting detail: THERAPIES SERIES
Discharge: HOME | End: 2019-06-04
Payer: COMMERCIAL

## 2019-06-04 PROCEDURE — 97530 THERAPEUTIC ACTIVITIES: CPT

## 2019-06-04 PROCEDURE — 97112 NEUROMUSCULAR REEDUCATION: CPT

## 2019-06-04 PROCEDURE — 95992 CANALITH REPOSITIONING PROC: CPT

## 2019-06-04 NOTE — FLOWSHEET NOTE
Physical Therapy Daily Treatment Note  Date:  2019    Patient Name:  Mlika Hooker    :  1960  MRN: [id number]  Restrictions/Precautions:    Medical/Treatment Diagnosis Information:  · Diagnosis: R42 Vertigo  ·  Dizziness  Insurance/Certification information:  PT Insurance Information: Anisha Nelson ([address] [place] [address])  Physician Information:  Referring Practitioner: Dr. Ilir Hanley MD Follow-up: Y  Plan of care signed (Y/N):  Y  Visit# / total visits:  28  Pain level: 0/10     Progress Note: []  Yes  [x]  No  Next due by: Visit #8 or 19      Subjective:  Pt reports no changes in dizziness. No new falls. Has been taking Meclizine since her last visit. Gets a little dizzy when turns quick or comes back up from bending over or while walking for exercise, denies as room spinning, mostly a \"weird feeling\" in her head. Hasn't been that bad in awhile. Objective:  Observation:   Pt has appt w/ neurology, Dr. Leslie Carrington, on 19    Test measurements:    FGA:   ()  Static standing Eyes open, foam surface: 30 sec (ankle strategies)  Static standing Eyes closed, foam surface: 30 sec (ankle strategies, no significant increase in sway)  Clonus: none  Reflexes:  Unable to test at knees due to bruising and knee pain, unable to detect at L achilles  Proprioception: WNL B ankles      Exercises:  Exercise/Equipment Resistance/Repetitions Other comments                       NMR     SLS 3x, each  8 sec R; 3 sec L    Tandem stance 3x each,   20+ sec each                                          Other Therapeutic Activities:      R maya-hallpike: 40 sec vertigo, no nystagmus  CRT x 1: R eply (mild dizziness w/ L sidelying, resolved within 30 sec and none upon sitting up). Gave precautions: no \"yes\" motion for 20 min. And no bending head forward for 24 hours. Discussed means of emptying  at work this afternoon without bending head forward.      Home Exercise Program:     19: SLS, tandem habituation, static and dynamic balance training    Electronically signed by:  Kristy Davidson DPT

## 2019-06-10 ENCOUNTER — TELEPHONE (OUTPATIENT)
Dept: PAIN MANAGEMENT | Age: 59
End: 2019-06-10

## 2019-06-11 ENCOUNTER — HOSPITAL ENCOUNTER (OUTPATIENT)
Dept: PHYSICAL THERAPY | Age: 59
Setting detail: THERAPIES SERIES
Discharge: HOME | End: 2019-06-11
Payer: COMMERCIAL

## 2019-06-11 ENCOUNTER — OFFICE VISIT (OUTPATIENT)
Dept: PAIN MANAGEMENT | Age: 59
End: 2019-06-11
Payer: COMMERCIAL

## 2019-06-11 VITALS
WEIGHT: 290 LBS | SYSTOLIC BLOOD PRESSURE: 99 MMHG | BODY MASS INDEX: 49.78 KG/M2 | HEART RATE: 64 BPM | DIASTOLIC BLOOD PRESSURE: 63 MMHG

## 2019-06-11 DIAGNOSIS — M54.16 LUMBAR RADICULITIS: ICD-10-CM

## 2019-06-11 DIAGNOSIS — M16.11 PRIMARY OSTEOARTHRITIS OF RIGHT HIP: ICD-10-CM

## 2019-06-11 DIAGNOSIS — G89.4 CHRONIC PAIN SYNDROME: ICD-10-CM

## 2019-06-11 DIAGNOSIS — M16.0 PRIMARY OSTEOARTHRITIS OF BOTH HIPS: ICD-10-CM

## 2019-06-11 DIAGNOSIS — M79.7 FIBROMYALGIA: ICD-10-CM

## 2019-06-11 DIAGNOSIS — M51.36 DDD (DEGENERATIVE DISC DISEASE), LUMBAR: ICD-10-CM

## 2019-06-11 DIAGNOSIS — M47.899 FACET SYNDROME: ICD-10-CM

## 2019-06-11 PROCEDURE — 97530 THERAPEUTIC ACTIVITIES: CPT

## 2019-06-11 PROCEDURE — 99213 OFFICE O/P EST LOW 20 MIN: CPT | Performed by: INTERNAL MEDICINE

## 2019-06-11 PROCEDURE — 95992 CANALITH REPOSITIONING PROC: CPT

## 2019-06-11 PROCEDURE — 97112 NEUROMUSCULAR REEDUCATION: CPT

## 2019-06-11 RX ORDER — HYDROCODONE BITARTRATE AND ACETAMINOPHEN 5; 325 MG/1; MG/1
.5-1 TABLET ORAL EVERY 6 HOURS PRN
Qty: 90 TABLET | Refills: 0 | Status: SHIPPED | OUTPATIENT
Start: 2019-06-11 | End: 2019-07-16 | Stop reason: SDUPTHER

## 2019-06-11 RX ORDER — PREGABALIN 100 MG/1
100 CAPSULE ORAL 3 TIMES DAILY
Qty: 90 CAPSULE | Refills: 0 | Status: SHIPPED | OUTPATIENT
Start: 2019-06-11 | End: 2019-07-16 | Stop reason: SDUPTHER

## 2019-06-11 NOTE — FLOWSHEET NOTE
Physical Therapy Daily Treatment Note  Date:  2019    Patient Name:  Devi Howell    :  1960  MRN: [id number]  Restrictions/Precautions:    Medical/Treatment Diagnosis Information:  · Diagnosis: R42 Vertigo  ·  Dizziness  Insurance/Certification information:  PT Insurance Information: Anisha Nelson ([address] [place] [address])  Physician Information:  Referring Practitioner: Dr. Howard Gimenez MD Follow-up: Y  Plan of care signed (Y/N):  Y  Visit# / total visits:  3/8  Pain level: 0/10     Progress Note: []  Yes  [x]  No  Next due by: Visit #8 or 19      Subjective: No dizziness when sitting, still dizziness w/ bending over and not bed when turning head, maybe just a little dizzy. No dizziness when lying on R side. Pt reports no episodes of severe dizziness/spinning in the last week. Pt reports she has trouble w/ HEP, falls backwards mostly. Objective:  Observation:   Pt no showed for appt w/ neurology, Dr. Nancy Zarate, on 19. Pt reports she forgot. Due to ongoing diplopia, encouraged pt to reschedule appt. Test measurements:     B ankle MMT: DF, Ever, Inver all 5/5; PF >=3/5  Due to very minimal to no ankle reactions on R assessed strength. Pt had recalled that she had previous ankle surgery on L, however upon inspection she has a surgical scar on medial R ankle. Exercises:  Exercise/Equipment Resistance/Repetitions Other comments                       NMR     SLS 3x, each  2 sec R; 9 sec L No R ankle reactions compared to WNL on L. Tandem stance 3x each,   20+ sec each          Habituation:  Seated repeated Head to knees (center)         amb w/ head turns, high knees, tandem, backwards, direction changes, 180* turns 40' x 2+ No dizziness, except w/ 180* turns to R reports feeling a little \"different in the head\" but no true dizziness.    Unable to amb tandem more than 1-2 steps, but able to self correct w/ stepping reactions                    Other Therapeutic Activities:      R maya-hallpike: <5 sec vertigo, no nystagmus  CRT x 1: R eply. Gave precautions: no \"yes\" motion for 20 min. And no bending head forward for 24 hours. Habituation- educ on seated head towards knees (center) and sit back up. Home Exercise Program:     6/4/19: SLS, tandem stance. educ on safe set-up in a corner w/ chair back for support as needed. Reviewed rationale for precautions following CRT. 6/11/19: habituation- seated head bending down in the center, return to sitting up. (to start tomorrow afternoon). Reviewed and added SLS again due to pt reports she doesn't have a picture. Manual Treatments:      Modalities:      Timed Code Treatment Minutes:  NM 22; TA 10 CRT x 1    Total Treatment Minutes:   45    Treatment/Activity Tolerance:  [x] Patient tolerated treatment well [] Patient limited by fatigue  [] Patient limited by pain  [] Patient limited by other medical complications  [] Other:     Assessment: Pt seems to be getting full resolution of R BPPV. Her dynamic balance is impacted when when turning 180* R, however has no true LOB, just feels a little \"off\". She demonstrates poor R ankle reactions, issued SLS again for HEP as well as habituation for ongoing dizziness w/ bending head forward. Pt will benefit from skilled PT to further assess vestibular and balance deficits, provide education and progress balance training, compensatory strategies, habituation and gaze stabilization exercises depending on further assessment. Prognosis: [] Good [x] Fair  [] Poor    Patient Requires Follow-up: [x] Yes  [] No    Goals:  Long term goals  Time Frame for Long term goals : 4 weeks  Long term goal 1: Pt will score <= 20% on DHI. Long term goal 2: Pt will reduce episodes of dizziness to <=1x/ week. Long term goal 3: Pt will report no falls over the course of 4 weeks. Long term goal 4: Pt will score in low fall risk range for Beckham and/or FGA.      Plan:   [x] Continue per plan of care [] Marlys Saint current plan (see comments)  [] Plan of care initiated [] Hold pending MD visit [] Discharge    Plan for Next Session:   Assess response to CRT, gaze stabilization, habituation, static and dynamic balance training    Electronically signed by:  Daya Thompson DPT

## 2019-06-11 NOTE — PROGRESS NOTES
Quynh Leslie  1960  [id number]      HISTORY OF PRESENT ILLNESS:  Ms. Joanne Weinberg is a 60 y.o. female returns for a follow up visit for pain management  She has a diagnosis of   1. Chronic pain syndrome    2. Fibromyalgia    3. Facet syndrome    4. DDD (degenerative disc disease), lumbar    5. Lumbar radiculitis    6. Primary osteoarthritis of both hips    7. Drug-induced constipation    8. Primary insomnia    9. Restless legs syndrome (RLS)    10. Moderate episode of recurrent major [facility] disorder ([facility]    . She complains of pain in the lower back, buttocks, bilateral hips, bilateral knees She rates the pain 8/10 and describes it as throbbing. Current treatment regimen has helped relieve about 30% of the pain. She denies any side effects from the current pain regimen. Patient reports that since the last follow up visit the physical functioning is worse, family/social relationships are unchanged, mood is worse sleep patterns are worse, and that the overall functioning is worse. Patient denies misusing/abusing her narcotic pain medications or using any illegal drugs. There are No indicators for possible drug abuse, addiction or diversion problems. Ms. Joanne eWinberg states she has been doing fair, managing somewhat with the medications. She says her back has been hurting more. She mentions she is using NSAID's and muscle relaxer's along with Ultram, she feels it is not helping as well, using it 2-3 per day. Patient denies any constipation symptoms. ALLERGIES: Patients list of allergies were reviewed     MEDICATIONS: Ms. Joanne Weinberg list of medications were reviewed. Her current medications are   Outpatient Medications Prior to Visit   Medication Sig Dispense Refill    ondansetron (ZOFRAN) 4 MG tablet TAKE 1 TABLET BY MOUTH EVERY 8 HOURS AS NEEDED FOR NAUSEA OR VOMITING 60 tablet 0    meclizine (ANTIVERT) 25 MG tablet TAKE 1 TABLET BY MOUTH THREE TIMES DAILY AS NEEDED FOR DIZZINESS 30 tablet 0    ARIPiprazole (ABILIFY) 10 MG tablet TK 1 T PO QAM  3    levothyroxine (SYNTHROID) 88 MCG tablet TAKE 1 TABLET BY MOUTH DAILY 90 tablet 0    esomeprazole (NEXIUM) 40 MG delayed release capsule TAKE 1 CAPSULE BY MOUTH EVERY MORNING BEFORE BREAKFAST 90 capsule 0    atorvastatin (LIPITOR) 40 MG tablet TAKE 1 TABLET BY MOUTH DAILY 90 tablet 0    diclofenac (VOLTAREN) 75 MG EC tablet Take 1 tablet by mouth daily 90 tablet 0    tiZANidine (ZANAFLEX) 4 MG tablet TAKE 1 TABLET BY MOUTH THREE TIMES DAILY 270 tablet 0    hydrochlorothiazide (HYDRODIURIL) 25 MG tablet Take 1 tablet by mouth daily 30 tablet 1    rOPINIRole (REQUIP) 1 MG tablet TAKE 2 TABLETS BY MOUTH EVERY NIGHT 180 tablet 0    betamethasone dipropionate (DIPROLENE) 0.05 % cream APPLY EXTERNALLY TO THE AFFECTED AREA TWICE DAILY 60 g 0    propranolol (INDERAL) 10 MG tablet Take 10 mg by mouth 3 times daily      vitamin D (ERGOCALCIFEROL) 89294 units CAPS capsule TAKE 1 CAPSULE BY MOUTH 1 TIME A WEEK 13 capsule 0    QUEtiapine (SEROQUEL) 400 MG tablet Take 500 mg by mouth nightly Takes a 100mg and 400mg tab- total 500mg      pregabalin (LYRICA) 100 MG capsule Take 1 capsule by mouth 3 times daily for 30 days. 90 capsule 0     Facility-Administered Medications Prior to Visit   Medication Dose Route Frequency Provider Last Rate Last Dose    triamcinolone acetonide (KENALOG-40) injection 40 mg  40 mg Intramuscular Once Aquiles Strauss MD        cyanocobalamin injection 1,000 mcg  1,000 mcg Intramuscular Q30 Days Mariola Alberto MD   1,000 mcg at 03/22/17 1413    cyanocobalamin injection 1,000 mcg  1,000 mcg Intramuscular Q30 Days Mariola Alberto MD   1,000 mcg at 02/01/17 1112       SOCIAL/FAMILY/PAST MEDICAL HISTORY: Ms. Donna Mccord, family and past medical history was reviewed. REVIEW OF SYSTEMS:    Respiratory: Negative for apnea, chest tightness and shortness of breath or change in baseline breathing.     Gastrointestinal: Negative for nausea, vomiting, abdominal pain, diarrhea, constipation, blood in stool and abdominal distention. PHYSICAL EXAM:   Nursing note and vitals reviewed. BP 99/63   Pulse 64   Wt 290 lb (131.5 kg)   LMP 01/15/2014   BMI 49.78 kg/m²   Constitutional: She appears well-developed and well-nourished. No acute distress. Skin: Skin is warm and dry, good turgor. No rash noted. She is not diaphoretic. Cardiovascular: Normal rate, regular rhythm, normal heart sounds, and does not have murmur. Pulmonary/Chest: Effort normal. No respiratory distress. She does not have wheezes in the lung fields. She has no rales. Neurological/Psychiatric:She is alert and oriented to person, place, and time. Coordination is  normal.  Her mood isAppropriate and affect is Flat/blunted and Anxious . IMPRESSION:   1. Chronic pain syndrome    2. Fibromyalgia    3. Facet syndrome    4. DDD (degenerative disc disease), lumbar    5. Lumbar radiculitis    6. Primary osteoarthritis of both hips        PLAN:  Informed verbal consent was obtained  -continue with the current treatment regimen  -Adv Biofeedback, relaxation and meditation techniques.  Referral to psychologist for CBT was also discussed with patient  -d/c Ultram and switch to 2-3 per day  -back stretching exercises as advised   -She was advised weight reduction, diet changes- 800-1200 jessica diet, diet diary, exercising, nutritional  consult increased physical activity as tolerated       Current Outpatient Medications   Medication Sig Dispense Refill    ondansetron (ZOFRAN) 4 MG tablet TAKE 1 TABLET BY MOUTH EVERY 8 HOURS AS NEEDED FOR NAUSEA OR VOMITING 60 tablet 0    meclizine (ANTIVERT) 25 MG tablet TAKE 1 TABLET BY MOUTH THREE TIMES DAILY AS NEEDED FOR DIZZINESS 30 tablet 0    ARIPiprazole (ABILIFY) 10 MG tablet TK 1 T PO QAM  3    levothyroxine (SYNTHROID) 88 MCG tablet TAKE 1 TABLET BY MOUTH DAILY 90 tablet 0    esomeprazole (NEXIUM) 40 MG delayed release capsule TAKE 1 CAPSULE BY MOUTH EVERY MORNING BEFORE BREAKFAST 90 capsule 0    atorvastatin (LIPITOR) 40 MG tablet TAKE 1 TABLET BY MOUTH DAILY 90 tablet 0    diclofenac (VOLTAREN) 75 MG EC tablet Take 1 tablet by mouth daily 90 tablet 0    tiZANidine (ZANAFLEX) 4 MG tablet TAKE 1 TABLET BY MOUTH THREE TIMES DAILY 270 tablet 0    hydrochlorothiazide (HYDRODIURIL) 25 MG tablet Take 1 tablet by mouth daily 30 tablet 1    rOPINIRole (REQUIP) 1 MG tablet TAKE 2 TABLETS BY MOUTH EVERY NIGHT 180 tablet 0    betamethasone dipropionate (DIPROLENE) 0.05 % cream APPLY EXTERNALLY TO THE AFFECTED AREA TWICE DAILY 60 g 0    propranolol (INDERAL) 10 MG tablet Take 10 mg by mouth 3 times daily      vitamin D (ERGOCALCIFEROL) 32156 units CAPS capsule TAKE 1 CAPSULE BY MOUTH 1 TIME A WEEK 13 capsule 0    QUEtiapine (SEROQUEL) 400 MG tablet Take 500 mg by mouth nightly Takes a 100mg and 400mg tab- total 500mg      pregabalin (LYRICA) 100 MG capsule Take 1 capsule by mouth 3 times daily for 30 days. 90 capsule 0     Current Facility-Administered Medications   Medication Dose Route Frequency Provider Last Rate Last Dose    triamcinolone acetonide (KENALOG-40) injection 40 mg  40 mg Intramuscular Once Weston Zhong MD        cyanocobalamin injection 1,000 mcg  1,000 mcg Intramuscular Q30 Days Kailee Garcia MD   1,000 mcg at 03/22/17 1413    cyanocobalamin injection 1,000 mcg  1,000 mcg Intramuscular Q30 Days Kailee Garcia MD   1,000 mcg at 02/01/17 1112     I will continue her current medication regimen  which is part of the above treatment schedule. It has been helping with Ms. Yap's chronic  medical problems which for this visit include: The primary encounter diagnosis was Chronic pain syndrome.  Diagnoses of Fibromyalgia, Facet syndrome, DDD (degenerative disc disease), lumbar, Lumbar radiculitis, Primary osteoarthritis of both hips, Drug-induced constipation, Primary insomnia, Restless legs syndrome (RLS), and Moderate episode of recurrent major depressive disorder ([facility] were also pertinent to this visit. Risks and benefits of the medications and other alternative treatments  including no treatment were discussed with the patient. The common side effects of these medications were also explained to the patient. Informed verbal consent was obtained. Goals of current treatment regimen include improvement in pain, restoration of functioning- with focus on improvement in physical performance, general activity, work or disability,emotional distress, health care utilization and  decreased medication consumption. Will continue to monitor progress towards achieving/maintaining therapeutic goals with special emphasis on  1. Improvement in perceived interfernce  of pain with ADL's. Ability to do home exercises independently. Ability to do household chores indoor and/or outdoor work and social and leisure activities. Improve psychosocial and physical functioning. - she is showing progression towards this treatment goal with the current regimen. She was advised against drinking alcohol with the narcotic pain medicines, advised against driving or handling machinery while adjusting the dose of medicines or if having cognitive  issues related to the current medications. Risk of overdose and death, if medicines not taken as prescribed, were also discussed. If the patient develops new symptoms or if the symptoms worsen, the patient should call the office. While transcribing every attempt was made to maintain the accuracy of the note in terms of it's contents,there may have been some errors made inadvertently. Thank you for allowing me to participate in the care of this patient. Chani Maradiaga MD.    Cc: Rama Mcdermott MD    I, Velvet Canavan, scribing for in the presence  of Dr. Chani Maradiaga.   06/11/19  10:35 AM  Heidi Summers, Dr. Chani Maradiaga, personally performed the services described in this documentation as

## 2019-06-13 ENCOUNTER — HOSPITAL ENCOUNTER (OUTPATIENT)
Dept: PHYSICAL THERAPY | Age: 59
Setting detail: THERAPIES SERIES
Discharge: HOME | End: 2019-06-13
Payer: COMMERCIAL

## 2019-06-13 PROCEDURE — 97112 NEUROMUSCULAR REEDUCATION: CPT

## 2019-06-13 NOTE — FLOWSHEET NOTE
Physical Therapy Daily Treatment Note  Date:  2019    Patient Name:  Lu Morris    :  1960  MRN: [id number]  Restrictions/Precautions:    Medical/Treatment Diagnosis Information:  · Diagnosis: R42 Vertigo  ·  Dizziness  Insurance/Certification information:  PT Insurance Information: Anisha Nelson ([address] [place] [address])  Physician Information:  Referring Practitioner: Dr. Keyana Albarran MD Follow-up: Y  Plan of care signed (Y/N):  Y  Visit# / total visits:  4/8  Pain level: 0/10      Progress Note: []  Yes  []  No  Next due by:     Subjective: Pt reports when she walked in from car and when stood up to walk back to gym had sudden light headedness. Denies spinning sensation since last session. Pt also denies drinking any water today. Objective:  Observation:   Pt no showed for appt w/ neurology, Dr. Giancarlo Piper, on 19. Pt reports she forgot. Due to ongoing diplopia, encouraged pt to reschedule neurology appt. And gave phone number for Dr. Giancarlo Piper. Test measurements:     BP R UE sittin/68  BP R UE standing 2 min: 114/70    FGA: 24/30 (avg for age is 28/35)    DHI: 6%      Exercises:  Exercise/Equipment Resistance/Repetitions Other comments                       NMR     SLS 3x, each  3 sec R; 10 sec L No R ankle reactions compared to WNL on L. Habituation:  Seated repeated Head to knees (center) Reviewed for HEP                         Other Therapeutic Activities:    Habituation- educ on seated head towards knees (center) and sit back up. Encouraged to continue to attempt for 3 more days, if no improvement then discontinue exercise. Home Exercise Program:     19: SLS, tandem stance. educ on safe set-up in a corner w/ chair back for support as needed. Reviewed rationale for precautions following CRT. 19: habituation- seated head bending down in the center, return to sitting up. (to start tomorrow afternoon).  Reviewed and added SLS again due to pt reports she doesn't have a picture. Manual Treatments:      Modalities:      Timed Code Treatment Minutes:  OG 41; Total Treatment Minutes:   30    Treatment/Activity Tolerance:  [x] Patient tolerated treatment well [] Patient limited by fatigue  [] Patient limited by pain  [] Patient limited by other medical complications  [] Other:     Assessment: Pt met 4/4 LT goals. Pt has had no incidence of vertigo in the past 2 weeks. Pt demonstrated slight improvement in FGA score by 2 points, but still remains lower than average age. Pt denies falls in past 3 weeks. Pt c/o intermittent light headedness that seem to be mostly associated w/ transitioning from sit to stand most likely attributed to orthostatic hypotension in combination w/ possible dehydration. Educ pt on need to improve to more consistent sufficient water intake and importance of following up w/ neurologist due to ongoing diplopia. Pt given habituation exercises to aid in resolution of residual dizziness w/ head movement. Pt in agreement w/ discharge today. Prognosis: [] Good [x] Fair  [] Poor    Patient Requires Follow-up: [] Yes  [x] No    Goals:  Long term goals  Time Frame for Long term goals : 4 weeks  Long term goal 1: Pt will score <= 20% on DHI. Met, 6% impaired. Long term goal 2: Pt will reduce episodes of dizziness to <=1x/ week. -Met  Long term goal 3: Pt will report no falls over the course of 4 weeks. -Met  Long term goal 4: Pt will score in low fall risk range for Beckham and/or FGA. - Met (low fall risk, but below average for age)    Plan:   [] Continue per plan of care [] Alter current plan (see comments)  [] Plan of care initiated [] Hold pending MD visit [x] Discharge        Electronically signed by:  Daisy Alexander DPT

## 2019-06-17 NOTE — DISCHARGE SUMMARY
Outpatient Physical Therapy Phone: [phone] Fax: [phone]    Discharge Date: 19    To: Dr. Reyna Sands  From: Rashida Caepllan, PT, DPT    Patient: Maicol Evans     : 1960 MRN: [id number]  Medical/Treatment Diagnosis Information:  · Diagnosis: R42 Vertigo  ·  Dizziness  Physical Therapy Discharge Note    Time Period for Report:  19-19    Total Visits to date:   4   Cancels/No-shows to date: 0    Plan of Care/Treatment to date:  [] Therapeutic Exercise  [x] Therapeutic Activity   [] Gait Training  [x] Neuromuscular Re-education  [] Manual Therapy  [] Modalities:         [] Ultrasound  [] Electrical Stimulation            [] Cervical Traction [] Lumbar Traction    ? [] Cold/hotpack [] Iontophoresis   [x]  Vestibular rehab              [x] Home Exercise Program                         [x] Canalith Repositioning Maneuvers              [] Gaze Stabilization Exercises             [x] Habituation Exercises              [x] Neuromuscular Re-Education            [x] Clinic-based Vestibular/Balance Therapy              [x] Patient Education                               [] Other:             Comments:    [] Pt did not adhere to Plan of Care/did not return for follow-up visits. Pain (Eval) 0   Pain (D/C) 0     Functional Outcome used:     Functional Outcome (Eval) DHI 50% impaired , FGA 22/30   Functional Outcome (D/C) DHI 6% impaired, FGA 24/30       Progress towards goals:    Pt met 4/4 LT goals. Pt has had no incidence of vertigo in the past 2 weeks. Pt demonstrated slight improvement in FGA score by 2 points, but still remains slightly below average for age. Pt denies falls in past 3 weeks. Pt c/o intermittent light headedness that seems to be mostly associated w/ transitioning from sit to stand most likely attributed to orthostatic hypotension in combination w/low water intake.  Educ pt on need to improve to more consistent sufficient water intake and importance of following up w/ neurologist due to ongoing diplopia. Pt given habituation exercises to aid in resolution of residual dizziness w/ head movement. Pt in agreement w/ discharge today.         Goal Status:  [x] Achieved [] Partially Achieved  [] Not Achieved     Patient Status: [x] Patient now discharged      Electronically signed by:  Christian Strickland, PT, DPT

## 2019-06-19 ENCOUNTER — APPOINTMENT (OUTPATIENT)
Dept: PHYSICAL THERAPY | Age: 59
End: 2019-06-19
Payer: COMMERCIAL

## 2019-06-20 ENCOUNTER — APPOINTMENT (OUTPATIENT)
Dept: PHYSICAL THERAPY | Age: 59
End: 2019-06-20
Payer: COMMERCIAL

## 2019-06-22 ENCOUNTER — HOSPITAL ENCOUNTER (EMERGENCY)
Age: 59
Discharge: HOME | End: 2019-06-22
Attending: EMERGENCY MEDICINE
Payer: COMMERCIAL

## 2019-06-22 VITALS
TEMPERATURE: 98.6 F | BODY MASS INDEX: 50.02 KG/M2 | RESPIRATION RATE: 18 BRPM | HEART RATE: 66 BPM | SYSTOLIC BLOOD PRESSURE: 144 MMHG | DIASTOLIC BLOOD PRESSURE: 101 MMHG | OXYGEN SATURATION: 97 % | HEIGHT: 64 IN | WEIGHT: 293 LBS

## 2019-06-22 DIAGNOSIS — I10 ESSENTIAL HYPERTENSION: ICD-10-CM

## 2019-06-22 DIAGNOSIS — Z78.9 NOT UP TO DATE WITH DIPHTHERIA-TETANUS VACCINATION: ICD-10-CM

## 2019-06-22 DIAGNOSIS — S51.812A LACERATION OF SKIN OF LEFT FOREARM, INITIAL ENCOUNTER: Primary | ICD-10-CM

## 2019-06-22 DIAGNOSIS — Z72.89 DELIBERATE SELF-CUTTING: ICD-10-CM

## 2019-06-22 PROCEDURE — 99283 EMERGENCY DEPT VISIT LOW MDM: CPT

## 2019-06-22 PROCEDURE — 4500000023 HC ED LEVEL 3 PROCEDURE

## 2019-06-22 RX ORDER — CEPHALEXIN 500 MG/1
500 CAPSULE ORAL 2 TIMES DAILY
Qty: 14 CAPSULE | Refills: 0 | Status: SHIPPED | OUTPATIENT
Start: 2019-06-22 | End: 2019-06-29

## 2019-06-22 ASSESSMENT — PAIN DESCRIPTION - LOCATION: LOCATION: ARM

## 2019-06-22 ASSESSMENT — PAIN DESCRIPTION - DESCRIPTORS: DESCRIPTORS: ACHING

## 2019-06-22 ASSESSMENT — PAIN DESCRIPTION - PAIN TYPE: TYPE: ACUTE PAIN

## 2019-06-22 ASSESSMENT — PAIN SCALES - GENERAL: PAINLEVEL_OUTOF10: 5

## 2019-06-22 ASSESSMENT — PAIN DESCRIPTION - ORIENTATION: ORIENTATION: LEFT

## 2019-06-22 NOTE — ED PROVIDER NOTES
CHIEF COMPLAINT  Laceration      HISTORY OF PRESENT ILLNESS  Jenniffer Garcia is a 60 y.o. female presents to the ED with lacerations to her left forearm, no active bleeding, performed this around 11 PM tonight, deliberate self cutting, she is a frequent cutter, denies suicidal ideation, she just cuts to help with stress, has been having money issues recently, long history of depression, states she is seeing a therapist and taking her medications. Was recently on antibiotics for root canal, but not currently, she has cut the same area on her left arm multiple times in the past, has a lot of scarring, and was just here a few weeks ago, and has been seen at [facility] recently for depression, no other complaints, modifying factors or associated symptoms. I have reviewed the following from the nursing documentation.     Past Medical History:   Diagnosis Date    Acquired hyperlipoproteinemia     Allergic rhinitis     Anxiety     Arthritis     Bilateral lower extremity edema     Bipolar 1 disorder (HCC)     Chronic pain syndrome     DDD (degenerative disc disease), lumbar     Depression     Depression     Disease of gallbladder     Dizziness     DJD (degenerative joint disease) of hip     Edema 12/29/2009    Elbow tendinitis     Facet syndrome     Fibromyalgia     Fracture of nasal bone     GERD (gastroesophageal reflux disease)     Headache     Hiatal hernia     History of blood transfusion     anemia    Hyperlipidemia     Insomnia     Osteoarthritis     Prediabetes     Rash     Self mutilating behavior     cutter    Sleep apnea     no CPAP    Suicidal ideation     Thyroid disorder     hypothyroidism     Past Surgical History:   Procedure Laterality Date    ABDOMEN SURGERY  5/28/2014    LAPAROSCOPIC SLEEVE GASTRECTOMY, EXTENSIVE LYSIS OF ADHESIONS    ANKLE SURGERY      ANKLE SURGERY      APPENDECTOMY      open , Ex-lap    CHOLECYSTECTOMY      open    COLONOSCOPY  4/16/2013    dr Miguel Wayne Minutes per session: Not on file    Stress: Not on file   Relationships    Social connections:     Talks on phone: Not on file     Gets together: Not on file     Attends [employer] service: Not on file     Active member of club or organization: Not on file     Attends meetings of clubs or organizations: Not on file     Relationship status: Not on file    Intimate partner violence:     Fear of current or ex partner: Not on file     Emotionally abused: Not on file     Physically abused: Not on file     Forced sexual activity: Not on file   Other Topics Concern    Not on file   Social History Narrative    Lives by herself with her cat     Current Facility-Administered Medications   Medication Dose Route Frequency Provider Last Rate Last Dose    Tetanus-Diphth-Acell Pertussis (BOOSTRIX) injection 0.5 mL  0.5 mL Intramuscular Once Rosy Civil, DO        triamcinolone acetonide (KENALOG-40) injection 40 mg  40 mg Intramuscular Once Aquiles Strauss MD        cyanocobalamin injection 1,000 mcg  1,000 mcg Intramuscular Q30 Days Mariola Alberto MD   1,000 mcg at 03/22/17 1413    cyanocobalamin injection 1,000 mcg  1,000 mcg Intramuscular Q30 Days Mariola Alberto MD   1,000 mcg at 02/01/17 1112     Current Outpatient Medications   Medication Sig Dispense Refill    cephALEXin (KEFLEX) 500 MG capsule Take 1 capsule by mouth 2 times daily for 7 days 14 capsule 0    meclizine (ANTIVERT) 25 MG tablet TAKE 1 TABLET BY MOUTH THREE TIMES DAILY AS NEEDED FOR DIZZINESS 30 tablet 0    HYDROcodone-acetaminophen (NORCO) 5-325 MG per tablet Take 0.5-1 tablets by mouth every 6 hours as needed for Pain (max 2-3 per day) for up to 35 days. 90 tablet 0    pregabalin (LYRICA) 100 MG capsule Take 1 capsule by mouth 3 times daily for 30 days.  90 capsule 0    ondansetron (ZOFRAN) 4 MG tablet TAKE 1 TABLET BY MOUTH EVERY 8 HOURS AS NEEDED FOR NAUSEA OR VOMITING 60 tablet 0    ARIPiprazole (ABILIFY) 10 MG tablet TK 1 T PO QAM  3  levothyroxine (SYNTHROID) 88 MCG tablet TAKE 1 TABLET BY MOUTH DAILY 90 tablet 0    esomeprazole (NEXIUM) 40 MG delayed release capsule TAKE 1 CAPSULE BY MOUTH EVERY MORNING BEFORE BREAKFAST 90 capsule 0    atorvastatin (LIPITOR) 40 MG tablet TAKE 1 TABLET BY MOUTH DAILY 90 tablet 0    diclofenac (VOLTAREN) 75 MG EC tablet Take 1 tablet by mouth daily 90 tablet 0    tiZANidine (ZANAFLEX) 4 MG tablet TAKE 1 TABLET BY MOUTH THREE TIMES DAILY 270 tablet 0    hydrochlorothiazide (HYDRODIURIL) 25 MG tablet Take 1 tablet by mouth daily 30 tablet 1    rOPINIRole (REQUIP) 1 MG tablet TAKE 2 TABLETS BY MOUTH EVERY NIGHT 180 tablet 0    betamethasone dipropionate (DIPROLENE) 0.05 % cream APPLY EXTERNALLY TO THE AFFECTED AREA TWICE DAILY 60 g 0    propranolol (INDERAL) 10 MG tablet Take 10 mg by mouth 3 times daily      vitamin D (ERGOCALCIFEROL) 42048 units CAPS capsule TAKE 1 CAPSULE BY MOUTH 1 TIME A WEEK 13 capsule 0    QUEtiapine (SEROQUEL) 400 MG tablet Take 500 mg by mouth nightly Takes a 100mg and 400mg tab- total 500mg       Allergies   Allergen Reactions    Polysporin [Bacitracin-Polymyxin B] Other (See Comments) and Itching     Pt breaks out in blisters. Pt breaks out in blisters.  Lorazepam Other (See Comments)     \"went crazy\"    Neomycin-Polymyxin-Gramicidin     Sulfa Antibiotics Hives       REVIEW OF SYSTEMS  10 systems reviewed, pertinent positives per HPI otherwise noted to be negative. PHYSICAL EXAM  BP (!) 144/101   Pulse 66   Temp 98.6 °F (37 °C) (Oral)   Resp 18   Ht 5' 4\" (1.626 m)   Wt (!) 136.3 kg (300 lb 8 oz)   LMP 01/15/2014   SpO2 97%   BMI 51.58 kg/m²   GENERAL APPEARANCE: Awake and alert. Cooperative. No acute distress, obese  HEAD: Normocephalic. Atraumatic. EYES: PERRL. EOM's grossly intact. ENT: Mucous membranes are moist.   NECK: Supple. HEART: RRR. No murmurs  LUNGS: Respirations nonlabored. Lungs are clear to auscultation bilaterally.    EXTREMITIES: No peripheral edema. Moves all 4 extremities  SKIN: Warm and dry. No acute rashes. See laceration repair note regarding details of lacerations to left dorsal forearm, adjacent to this was an area of approximately 3 cm x 4 cm healing wound, slight fibrinous exudate but otherwise healthy granulation tissue, no purulence/drainage, no surrounding cellulitic changes, but there are multiple areas of healed scar tissue surrounding the entire proximal dorsal forearm and volar forearm  NEUROLOGICAL: Alert and oriented. No gross facial drooping. Strength 5/5, sensation intact. No truncal ataxia. Normal speech  PSYCHIATRIC: depressed mood, flat affect, denies suicidal ideation    PROCEDURES:  Patient Name: Asher Staton  Record Number: [id number]  Date: 6/22/2019   Time: 1:37 AM   Room/Bed: 06/06  Laceration Repair Procedure Note  Indication: Laceration x2 to L dorsal forearm, linear. parallel approximately 1 cm apart    Procedure: The patient was placed in the appropriate position and anesthesia around the laceration was not performed at the patient's request. The area was then cleansed with Shur-Clens and draped in a sterile fashion. The first 4 cm laceration was closed with 4-0 Ethilon using interrupted sutures. A second 4 cm laceration was closed with 4-0 Ethilon using interrupted sutures. The wound area was then dressed with bacitracin, tape and nonadherent pad. Total repaired wound length: 8 cm. Other Items: Suture count: 9    The patient tolerated the procedure well. Complications: None    ED COURSE/MDM  Patient seen and evaluated. Old records reviewed. Labs and imaging reviewed and results discussed with patient.    65-year-old female with superficial lacerations to her left forearm, these were repaired with sutures, discussed that with her significant scarring she should likely leave the sutures 10 to 14 days instead of 7 since the wound dehisced after she remove the sutures herself after 7 days on the previous cutting episode, she denies suicidal or homicidal ideation, future oriented, and contracts for safety, plans to follow-up with her [facility] therapist, declined tetanus update, given Keflex to help with the poorly healing wound, she states there has been purulent drainage though I do not see any active drainage after wound care was performed here, she stated I could try triple antibiotic ointment on the wound but it may cause a local reaction, she was given gauze/dressings for home, strict return precautions given, will return if any worsening symptoms or new concerns, patient verbalized understanding of plan, felt comfortable going home. Plan of care discussed with patient and family. Patient and family in agreement with plan. Orders Placed This Encounter   Procedures    Wound care     Orders Placed This Encounter   Medications    Tetanus-Diphth-Acell Pertussis (BOOSTRIX) injection 0.5 mL    cephALEXin (KEFLEX) 500 MG capsule     Sig: Take 1 capsule by mouth 2 times daily for 7 days     Dispense:  14 capsule     Refill:  0     ED Course as of Jun 22 0134   Sat Jun 22, 2019   0109 Patient declined tetanus booster.    [SY]      ED Course User Index  [SY] Alexander Rodriguez DO       Patient was given scripts for the following medications. I counseled patient how to take these medications. New Prescriptions    CEPHALEXIN (KEFLEX) 500 MG CAPSULE    Take 1 capsule by mouth 2 times daily for 7 days       CLINICAL IMPRESSION  1. Laceration of skin of left forearm, initial encounter    2. Deliberate self-cutting    3. Not up to date with diphtheria-tetanus vaccination    4. Essential hypertension        Blood pressure (!) 144/101, pulse 66, temperature 98.6 °F (37 °C), temperature source Oral, resp. rate 18, height 5' 4\" (1.626 m), weight (!) 136.3 kg (300 lb 8 oz), last menstrual period 01/15/2014, SpO2 97 %, not currently breastfeeding.     Isi Whitaker was discharged to home in stable

## 2019-06-22 NOTE — ED NOTES
Patient continues to deny feeling suicidal. Pt given d/c instructions with return verbalization including medications, emphasis on f/u, to return with s/s of infection. Pt states that she feels fine to drive home. Pt ambulated to lobby with steady gait.      Yasmin Danielle RN  06/22/19 7501

## 2019-06-22 NOTE — ED NOTES
EMD suturing wound. Pt  states that she does not want a Tetanus injection.      Adilene Cain RN  06/22/19 2029

## 2019-07-16 ENCOUNTER — OFFICE VISIT (OUTPATIENT)
Dept: PAIN MANAGEMENT | Age: 59
End: 2019-07-16
Payer: COMMERCIAL

## 2019-07-16 VITALS
BODY MASS INDEX: 49.26 KG/M2 | DIASTOLIC BLOOD PRESSURE: 67 MMHG | HEART RATE: 75 BPM | WEIGHT: 287 LBS | SYSTOLIC BLOOD PRESSURE: 111 MMHG

## 2019-07-16 DIAGNOSIS — R60.0 BILATERAL LEG EDEMA: ICD-10-CM

## 2019-07-16 DIAGNOSIS — M16.11 PRIMARY OSTEOARTHRITIS OF RIGHT HIP: ICD-10-CM

## 2019-07-16 DIAGNOSIS — M51.36 DDD (DEGENERATIVE DISC DISEASE), LUMBAR: ICD-10-CM

## 2019-07-16 DIAGNOSIS — M54.16 LUMBAR RADICULITIS: ICD-10-CM

## 2019-07-16 DIAGNOSIS — M16.0 PRIMARY OSTEOARTHRITIS OF BOTH HIPS: ICD-10-CM

## 2019-07-16 DIAGNOSIS — G89.4 CHRONIC PAIN SYNDROME: ICD-10-CM

## 2019-07-16 DIAGNOSIS — M47.899 FACET SYNDROME: ICD-10-CM

## 2019-07-16 DIAGNOSIS — M79.7 FIBROMYALGIA: ICD-10-CM

## 2019-07-16 PROCEDURE — 99213 OFFICE O/P EST LOW 20 MIN: CPT | Performed by: INTERNAL MEDICINE

## 2019-07-16 RX ORDER — HYDROCODONE BITARTRATE AND ACETAMINOPHEN 5; 325 MG/1; MG/1
.5-1 TABLET ORAL EVERY 6 HOURS PRN
Qty: 70 TABLET | Refills: 0 | Status: SHIPPED | OUTPATIENT
Start: 2019-07-16 | End: 2019-08-13 | Stop reason: SDUPTHER

## 2019-07-16 RX ORDER — PREGABALIN 100 MG/1
100 CAPSULE ORAL 3 TIMES DAILY
Qty: 90 CAPSULE | Refills: 0 | Status: SHIPPED | OUTPATIENT
Start: 2019-07-16 | End: 2019-08-13 | Stop reason: SDUPTHER

## 2019-07-16 RX ORDER — DICLOFENAC SODIUM 75 MG/1
75 TABLET, DELAYED RELEASE ORAL DAILY
Qty: 90 TABLET | Refills: 0 | Status: SHIPPED | OUTPATIENT
Start: 2019-07-16 | End: 2019-08-13 | Stop reason: SDUPTHER

## 2019-07-16 NOTE — PROGRESS NOTES
HYDROcodone-acetaminophen (NORCO) 5-325 MG per tablet Take 0.5-1 tablets by mouth every 6 hours as needed for Pain (max 2-3 per day) for up to 35 days. 90 tablet 0    ondansetron (ZOFRAN) 4 MG tablet TAKE 1 TABLET BY MOUTH EVERY 8 HOURS AS NEEDED FOR NAUSEA OR VOMITING 60 tablet 0    ARIPiprazole (ABILIFY) 10 MG tablet TK 1 T PO QAM  3    levothyroxine (SYNTHROID) 88 MCG tablet TAKE 1 TABLET BY MOUTH DAILY 90 tablet 0    esomeprazole (NEXIUM) 40 MG delayed release capsule TAKE 1 CAPSULE BY MOUTH EVERY MORNING BEFORE BREAKFAST 90 capsule 0    atorvastatin (LIPITOR) 40 MG tablet TAKE 1 TABLET BY MOUTH DAILY 90 tablet 0    diclofenac (VOLTAREN) 75 MG EC tablet Take 1 tablet by mouth daily 90 tablet 0    tiZANidine (ZANAFLEX) 4 MG tablet TAKE 1 TABLET BY MOUTH THREE TIMES DAILY 270 tablet 0    rOPINIRole (REQUIP) 1 MG tablet TAKE 2 TABLETS BY MOUTH EVERY NIGHT 180 tablet 0    betamethasone dipropionate (DIPROLENE) 0.05 % cream APPLY EXTERNALLY TO THE AFFECTED AREA TWICE DAILY 60 g 0    propranolol (INDERAL) 10 MG tablet Take 10 mg by mouth 3 times daily      vitamin D (ERGOCALCIFEROL) 10676 units CAPS capsule TAKE 1 CAPSULE BY MOUTH 1 TIME A WEEK 13 capsule 0    QUEtiapine (SEROQUEL) 400 MG tablet Take 500 mg by mouth nightly Takes a 100mg and 400mg tab- total 500mg      pregabalin (LYRICA) 100 MG capsule Take 1 capsule by mouth 3 times daily for 30 days.  90 capsule 0     Current Facility-Administered Medications   Medication Dose Route Frequency Provider Last Rate Last Dose    triamcinolone acetonide (KENALOG-40) injection 40 mg  40 mg Intramuscular Once Tom Patel MD        cyanocobalamin injection 1,000 mcg  1,000 mcg Intramuscular Q30 Days Olga Bruner MD   1,000 mcg at 03/22/17 1413    cyanocobalamin injection 1,000 mcg  1,000 mcg Intramuscular Q30 Days Olga Bruner MD   1,000 mcg at 02/01/17 1112     I will continue her current medication regimen  which is part of the above treatment

## 2019-07-25 ENCOUNTER — HOSPITAL ENCOUNTER (EMERGENCY)
Age: 59
Discharge: HOME | End: 2019-07-26
Attending: EMERGENCY MEDICINE
Payer: COMMERCIAL

## 2019-07-25 ENCOUNTER — APPOINTMENT (OUTPATIENT)
Dept: GENERAL RADIOLOGY | Age: 59
End: 2019-07-25
Payer: COMMERCIAL

## 2019-07-25 DIAGNOSIS — I10 ESSENTIAL HYPERTENSION: ICD-10-CM

## 2019-07-25 DIAGNOSIS — R60.9 DEPENDENT EDEMA: ICD-10-CM

## 2019-07-25 DIAGNOSIS — R60.0 BILATERAL LEG EDEMA: Primary | ICD-10-CM

## 2019-07-25 LAB
BASOPHILS ABSOLUTE: 0.1 K/UL (ref 0–0.2)
BASOPHILS RELATIVE PERCENT: 1.1 %
EOSINOPHILS ABSOLUTE: 0.3 K/UL (ref 0–0.6)
EOSINOPHILS RELATIVE PERCENT: 4.2 %
HCT VFR BLD CALC: 31.7 % (ref 36–48)
HEMOGLOBIN: 9.9 G/DL (ref 12–16)
LYMPHOCYTES ABSOLUTE: 1.7 K/UL (ref 1–5.1)
LYMPHOCYTES RELATIVE PERCENT: 24.1 %
MCH RBC QN AUTO: 23.5 PG (ref 26–34)
MCHC RBC AUTO-ENTMCNC: 31.3 G/DL (ref 31–36)
MCV RBC AUTO: 75.2 FL (ref 80–100)
MONOCYTES ABSOLUTE: 0.8 K/UL (ref 0–1.3)
MONOCYTES RELATIVE PERCENT: 11 %
NEUTROPHILS ABSOLUTE: 4.2 K/UL (ref 1.7–7.7)
NEUTROPHILS RELATIVE PERCENT: 59.6 %
PDW BLD-RTO: 19.9 % (ref 12.4–15.4)
PLATELET # BLD: 265 K/UL (ref 135–450)
PMV BLD AUTO: 8.7 FL (ref 5–10.5)
RBC # BLD: 4.22 M/UL (ref 4–5.2)
WBC # BLD: 7.1 K/UL (ref 4–11)

## 2019-07-25 PROCEDURE — 93005 ELECTROCARDIOGRAM TRACING: CPT | Performed by: EMERGENCY MEDICINE

## 2019-07-25 PROCEDURE — 84484 ASSAY OF TROPONIN QUANT: CPT

## 2019-07-25 PROCEDURE — 85025 COMPLETE CBC W/AUTO DIFF WBC: CPT

## 2019-07-25 PROCEDURE — 80053 COMPREHEN METABOLIC PANEL: CPT

## 2019-07-25 PROCEDURE — 83880 ASSAY OF NATRIURETIC PEPTIDE: CPT

## 2019-07-25 PROCEDURE — 71046 X-RAY EXAM CHEST 2 VIEWS: CPT

## 2019-07-25 PROCEDURE — 99284 EMERGENCY DEPT VISIT MOD MDM: CPT

## 2019-07-25 ASSESSMENT — PAIN DESCRIPTION - PAIN TYPE: TYPE: ACUTE PAIN

## 2019-07-25 ASSESSMENT — PAIN DESCRIPTION - LOCATION: LOCATION: LEG

## 2019-07-25 ASSESSMENT — PAIN DESCRIPTION - FREQUENCY: FREQUENCY: CONTINUOUS

## 2019-07-25 ASSESSMENT — PAIN DESCRIPTION - DESCRIPTORS: DESCRIPTORS: ACHING;THROBBING

## 2019-07-25 ASSESSMENT — PAIN SCALES - GENERAL: PAINLEVEL_OUTOF10: 5

## 2019-07-25 ASSESSMENT — PAIN DESCRIPTION - ORIENTATION: ORIENTATION: RIGHT;LEFT

## 2019-07-26 VITALS
SYSTOLIC BLOOD PRESSURE: 162 MMHG | OXYGEN SATURATION: 99 % | TEMPERATURE: 98.7 F | HEIGHT: 64 IN | WEIGHT: 291.7 LBS | RESPIRATION RATE: 12 BRPM | HEART RATE: 80 BPM | BODY MASS INDEX: 49.8 KG/M2 | DIASTOLIC BLOOD PRESSURE: 102 MMHG

## 2019-07-26 LAB
A/G RATIO: 1.4 (ref 1.1–2.2)
ALBUMIN SERPL-MCNC: 4.1 G/DL (ref 3.4–5)
ALP BLD-CCNC: 145 U/L (ref 40–129)
ALT SERPL-CCNC: 18 U/L (ref 10–40)
ANION GAP SERPL CALCULATED.3IONS-SCNC: 10 MMOL/L (ref 3–16)
AST SERPL-CCNC: 20 U/L (ref 15–37)
BILIRUB SERPL-MCNC: 0.5 MG/DL (ref 0–1)
BUN BLDV-MCNC: 17 MG/DL (ref 7–20)
CALCIUM SERPL-MCNC: 9.4 MG/DL (ref 8.3–10.6)
CHLORIDE BLD-SCNC: 105 MMOL/L (ref 99–110)
CO2: 28 MMOL/L (ref 21–32)
CREAT SERPL-MCNC: 0.7 MG/DL (ref 0.6–1.1)
EKG ATRIAL RATE: 78 BPM
EKG DIAGNOSIS: NORMAL
EKG P AXIS: 51 DEGREES
EKG P-R INTERVAL: 146 MS
EKG Q-T INTERVAL: 416 MS
EKG QRS DURATION: 82 MS
EKG QTC CALCULATION (BAZETT): 474 MS
EKG R AXIS: 30 DEGREES
EKG T AXIS: 22 DEGREES
EKG VENTRICULAR RATE: 78 BPM
GFR AFRICAN AMERICAN: >60
GFR NON-AFRICAN AMERICAN: >60
GLOBULIN: 3 G/DL
GLUCOSE BLD-MCNC: 93 MG/DL (ref 70–99)
POTASSIUM SERPL-SCNC: 4.4 MMOL/L (ref 3.5–5.1)
PRO-BNP: 392 PG/ML (ref 0–124)
SODIUM BLD-SCNC: 143 MMOL/L (ref 136–145)
TOTAL PROTEIN: 7.1 G/DL (ref 6.4–8.2)
TROPONIN: <0.01 NG/ML

## 2019-07-26 PROCEDURE — 93010 ELECTROCARDIOGRAM REPORT: CPT | Performed by: INTERNAL MEDICINE

## 2019-07-26 RX ORDER — HYDROCHLOROTHIAZIDE 25 MG/1
25 TABLET ORAL EVERY MORNING
Qty: 5 TABLET | Refills: 1 | Status: SHIPPED | OUTPATIENT
Start: 2019-07-26 | End: 2019-08-13 | Stop reason: ALTCHOICE

## 2019-07-26 NOTE — ED NOTES
Pt to ed with c/o arturo leg swelling and pain/itching past few days.  Exam per MD.     Omayra Blue, RN  07/25/19 4925

## 2019-07-26 NOTE — ED PROVIDER NOTES
tablet 0    tiZANidine (ZANAFLEX) 4 MG tablet TAKE 1 TABLET BY MOUTH THREE TIMES DAILY 270 tablet 0    rOPINIRole (REQUIP) 1 MG tablet TAKE 2 TABLETS BY MOUTH EVERY NIGHT 180 tablet 0    betamethasone dipropionate (DIPROLENE) 0.05 % cream APPLY EXTERNALLY TO THE AFFECTED AREA TWICE DAILY 60 g 0    propranolol (INDERAL) 10 MG tablet Take 10 mg by mouth 3 times daily      vitamin D (ERGOCALCIFEROL) 47627 units CAPS capsule TAKE 1 CAPSULE BY MOUTH 1 TIME A WEEK 13 capsule 0    QUEtiapine (SEROQUEL) 400 MG tablet Take 500 mg by mouth nightly Takes a 100mg and 400mg tab- total 500mg       Allergies   Allergen Reactions    Polysporin [Bacitracin-Polymyxin B] Other (See Comments) and Itching     Pt breaks out in blisters. Pt breaks out in blisters.  Lorazepam Other (See Comments)     \"went crazy\"    Neomycin-Polymyxin-Gramicidin     Sulfa Antibiotics Hives       REVIEW OF SYSTEMS  10 systems reviewed, pertinent positives per HPI otherwise noted to be negative. PHYSICAL EXAM  BP (!) 162/102   Pulse 80   Temp 98.7 °F (37.1 °C) (Oral)   Resp 12   Ht 5' 4\" (1.626 m)   Wt 132.3 kg (291 lb 11.2 oz)   LMP 01/15/2014   SpO2 99%   BMI 50.07 kg/m²   GENERAL APPEARANCE: Awake and alert. Cooperative. No acute distress. HEAD: Normocephalic. Atraumatic. EYES: PERRL. EOM's grossly intact. ENT: Mucous membranes are moist.   NECK: Supple, trachea midline. HEART: RRR. Normal S1S2, no rubs, gallops, or murmurs noted  LUNGS: Respirations unlabored. CTAB. Good air exchange. No wheezes, rales, or rhonchi. Speaking comfortably in full sentences. ABDOMEN: Soft. Non-distended. Non-tender. No guarding or rebound. Normal bowel sounds. EXTREMITIES: No peripheral edema. MAEE. No acute deformities. SKIN: Warm and dry. No acute rashes. NEUROLOGICAL: Alert and oriented X 3. CN II-XII intact. No gross facial drooping. Strength 5/5, sensation intact. Normal coordination. No pronator drift.   Gait normal.   PSYCHIATRIC:

## 2019-08-05 RX ORDER — ONDANSETRON 4 MG/1
TABLET, FILM COATED ORAL
Qty: 60 TABLET | Refills: 0 | Status: SHIPPED | OUTPATIENT
Start: 2019-08-05 | End: 2019-09-30 | Stop reason: CLARIF

## 2019-08-05 RX ORDER — ONDANSETRON 4 MG/1
TABLET, FILM COATED ORAL
Qty: 60 TABLET | Refills: 0 | Status: SHIPPED | OUTPATIENT
Start: 2019-08-05 | End: 2020-01-24

## 2019-08-06 RX ORDER — ATORVASTATIN CALCIUM 40 MG/1
TABLET, FILM COATED ORAL
Qty: 90 TABLET | Refills: 0 | Status: SHIPPED | OUTPATIENT
Start: 2019-08-06 | End: 2019-11-16 | Stop reason: SDUPTHER

## 2019-08-10 DIAGNOSIS — K21.9 GASTROESOPHAGEAL REFLUX DISEASE, ESOPHAGITIS PRESENCE NOT SPECIFIED: ICD-10-CM

## 2019-08-12 RX ORDER — ESOMEPRAZOLE MAGNESIUM 40 MG/1
CAPSULE, DELAYED RELEASE ORAL
Qty: 90 CAPSULE | Refills: 0 | Status: SHIPPED | OUTPATIENT
Start: 2019-08-12 | End: 2020-01-13

## 2019-08-13 ENCOUNTER — OFFICE VISIT (OUTPATIENT)
Dept: PAIN MANAGEMENT | Age: 59
End: 2019-08-13
Payer: COMMERCIAL

## 2019-08-13 VITALS
BODY MASS INDEX: 51.32 KG/M2 | WEIGHT: 293 LBS | SYSTOLIC BLOOD PRESSURE: 136 MMHG | HEART RATE: 66 BPM | DIASTOLIC BLOOD PRESSURE: 88 MMHG

## 2019-08-13 DIAGNOSIS — M51.36 DDD (DEGENERATIVE DISC DISEASE), LUMBAR: ICD-10-CM

## 2019-08-13 DIAGNOSIS — M16.11 PRIMARY OSTEOARTHRITIS OF RIGHT HIP: ICD-10-CM

## 2019-08-13 DIAGNOSIS — M47.899 FACET SYNDROME: ICD-10-CM

## 2019-08-13 DIAGNOSIS — M72.2 PLANTAR FASCIITIS, RIGHT: ICD-10-CM

## 2019-08-13 DIAGNOSIS — M17.10 PRIMARY LOCALIZED OSTEOARTHROSIS OF LOWER LEG, UNSPECIFIED LATERALITY: ICD-10-CM

## 2019-08-13 DIAGNOSIS — M54.16 LUMBAR RADICULITIS: ICD-10-CM

## 2019-08-13 DIAGNOSIS — M16.0 PRIMARY OSTEOARTHRITIS OF BOTH HIPS: ICD-10-CM

## 2019-08-13 DIAGNOSIS — M77.8 ELBOW TENDINITIS: ICD-10-CM

## 2019-08-13 DIAGNOSIS — M79.7 FIBROMYALGIA: ICD-10-CM

## 2019-08-13 DIAGNOSIS — G89.4 CHRONIC PAIN SYNDROME: ICD-10-CM

## 2019-08-13 PROCEDURE — 99213 OFFICE O/P EST LOW 20 MIN: CPT | Performed by: INTERNAL MEDICINE

## 2019-08-13 RX ORDER — HYDROCODONE BITARTRATE AND ACETAMINOPHEN 5; 325 MG/1; MG/1
.5-1 TABLET ORAL EVERY 6 HOURS PRN
Qty: 70 TABLET | Refills: 0 | Status: SHIPPED | OUTPATIENT
Start: 2019-08-13 | End: 2019-09-11 | Stop reason: SDUPTHER

## 2019-08-13 RX ORDER — PREGABALIN 100 MG/1
100 CAPSULE ORAL 3 TIMES DAILY
Qty: 90 CAPSULE | Refills: 0 | Status: SHIPPED | OUTPATIENT
Start: 2019-08-13 | End: 2019-09-11 | Stop reason: SDUPTHER

## 2019-08-13 RX ORDER — TIZANIDINE 4 MG/1
TABLET ORAL
Qty: 270 TABLET | Refills: 0 | Status: SHIPPED | OUTPATIENT
Start: 2019-08-13 | End: 2019-09-11 | Stop reason: SDUPTHER

## 2019-08-13 RX ORDER — DICLOFENAC SODIUM 75 MG/1
75 TABLET, DELAYED RELEASE ORAL DAILY
Qty: 90 TABLET | Refills: 0 | Status: SHIPPED | OUTPATIENT
Start: 2019-08-13 | End: 2019-09-11 | Stop reason: SDUPTHER

## 2019-08-14 RX ORDER — LEVOTHYROXINE SODIUM 88 UG/1
TABLET ORAL
Qty: 90 TABLET | Refills: 0 | Status: SHIPPED | OUTPATIENT
Start: 2019-08-14 | End: 2019-12-09 | Stop reason: SDUPTHER

## 2019-08-16 ENCOUNTER — TELEPHONE (OUTPATIENT)
Dept: INTERNAL MEDICINE CLINIC | Age: 59
End: 2019-08-16

## 2019-08-19 PROBLEM — I10 ESSENTIAL HYPERTENSION: Status: ACTIVE | Noted: 2019-08-19

## 2019-08-27 ENCOUNTER — OFFICE VISIT (OUTPATIENT)
Dept: ORTHOPEDIC SURGERY | Age: 59
End: 2019-08-27
Payer: COMMERCIAL

## 2019-08-27 VITALS
DIASTOLIC BLOOD PRESSURE: 70 MMHG | TEMPERATURE: 97.7 F | HEART RATE: 71 BPM | HEIGHT: 64 IN | SYSTOLIC BLOOD PRESSURE: 110 MMHG | BODY MASS INDEX: 50.02 KG/M2 | WEIGHT: 293 LBS

## 2019-08-27 DIAGNOSIS — Z96.653 HISTORY OF TOTAL BILATERAL KNEE REPLACEMENT: Primary | ICD-10-CM

## 2019-08-27 PROCEDURE — 99214 OFFICE O/P EST MOD 30 MIN: CPT | Performed by: PHYSICIAN ASSISTANT

## 2019-08-28 NOTE — PROGRESS NOTES
Grandmother     Arthritis Maternal Grandmother     Heart Failure Brother     Birth Defects Maternal Grandfather     Arthritis Paternal Grandmother        Past Surgical History:   Procedure Laterality Date    ABDOMEN SURGERY  5/28/2014    LAPAROSCOPIC SLEEVE GASTRECTOMY, EXTENSIVE LYSIS OF ADHESIONS    ANKLE SURGERY      ANKLE SURGERY      APPENDECTOMY      open , Ex-lap    CHOLECYSTECTOMY      open    COLONOSCOPY  4/16/2013    dr Cecelia Jade ENDOSCOPY, COLON, DIAGNOSTIC      FOOT SURGERY Right 05/27/2016    ARTHROPLASTY RIGHT 5TH DIGIT      HYSTEROSCOPY N/A 12-30-13    Hysteroscopy Dilitation and Curettage    JOINT REPLACEMENT Bilateral     knee    OTHER SURGICAL HISTORY  9/11/2015    ARTHROPLASTY 5TH DIGIT LEFT FOOT          SHOULDER SURGERY      right    SHOULDER SURGERY Right     SLEEVE GASTROPLASTY  May 28, 2014    Dasybil    TOTAL KNEE ARTHROPLASTY  12/10/10    Right    TOTAL KNEE ARTHROPLASTY  3/30/10    Left    UPPER GASTROINTESTINAL ENDOSCOPY  4/16/2013    dr Eduar Pickering ENDOSCOPY  3/20/2014    dr Treasure Guerra History     Occupational History    Occupation: [employer]     Comment: unemployed   Tobacco Use    Smoking status: Never Smoker    Smokeless tobacco: Never Used    Tobacco comment: Never smoked   Substance and Sexual Activity    Alcohol use: No     Alcohol/week: 0.0 standard drinks    Drug use: No    Sexual activity: Not Currently       Current Outpatient Medications   Medication Sig Dispense Refill    meclizine (ANTIVERT) 25 MG tablet TAKE 1 TABLET BY MOUTH THREE TIMES DAILY AS NEEDED FOR DIZZINESS 30 tablet 0    lisinopril (PRINIVIL;ZESTRIL) 20 MG tablet Take 1 tablet by mouth daily 90 tablet 1    levothyroxine (SYNTHROID) 88 MCG tablet TAKE 1 TABLET BY MOUTH DAILY 90 tablet 0    HYDROcodone-acetaminophen (NORCO) 5-325 MG per tablet Take 0.5-1 tablets by mouth every 6 hours as needed for Pain (max 2-3 Ht 5' 4\" (1.626 m)   Wt 299 lb 6.4 oz (135.8 kg)   LMP 01/15/2014   BMI 51.39 kg/m²        Examination of the left knee shows: There is  no obvious deformity. There is not erythema. There is not soft tissue swelling. There is not an effusion. AROM-  Extension 0                     Flexion  115  There is mild discomfort associated with ROM testing. Medial joint line is somewhat tender to palpation. Lateral joint line is somewhat tender to palpation. There is no crepitus along the joint line with ROM testing. There is not instability with varus or valgus stress testing. Anterior Drawer test is Negative. Extensor Mechanism is  intact. Examination of the right knee shows: There is  no obvious deformity. There is not erythema. There is not soft tissue swelling. There is not an effusion. AROM-  Extension 0                     Flexion  115  There is mild discomfort associated with ROM testing. Medial joint line is somewhat tender to palpation. Lateral joint line is somewhat tender to palpation. There is no crepitus along the joint line with ROM testing. There is not instability with varus or valgus stress testing. Anterior Drawer test is Negative. Extensor Mechanism is  intact. Examination of the lower extremities are intact with sensation to light touch, motor testing 4+ to 5/5 in all major motor groups including hip abductors, hip adductors, Quadriceps, hamstring, dorsi flexors and EHL testing. Normal heel to toe gait. Examination of the upper and lower extremities shows intact perfusion to all extremities, no cyanosis, digits are warm to touch, capillary refill is less than 2 seconds. No significant edema noted. Examination of the skin reveals the skin to be intact without lacerations, abrasions, significant erythema, rashes or skin lesions. X Rays: performed in the office today:   AP, Lateral and Sunrise of the bilateral Knee:  There is a bilateral cemented

## 2019-09-11 ENCOUNTER — OFFICE VISIT (OUTPATIENT)
Dept: PAIN MANAGEMENT | Age: 59
End: 2019-09-11
Payer: COMMERCIAL

## 2019-09-11 VITALS
WEIGHT: 293 LBS | SYSTOLIC BLOOD PRESSURE: 137 MMHG | DIASTOLIC BLOOD PRESSURE: 88 MMHG | BODY MASS INDEX: 51.67 KG/M2 | HEART RATE: 89 BPM

## 2019-09-11 DIAGNOSIS — M47.899 FACET SYNDROME: ICD-10-CM

## 2019-09-11 DIAGNOSIS — M77.8 ELBOW TENDINITIS: ICD-10-CM

## 2019-09-11 DIAGNOSIS — R60.0 BILATERAL LEG EDEMA: ICD-10-CM

## 2019-09-11 DIAGNOSIS — G89.4 CHRONIC PAIN SYNDROME: ICD-10-CM

## 2019-09-11 DIAGNOSIS — G25.81 RESTLESS LEGS SYNDROME (RLS): ICD-10-CM

## 2019-09-11 DIAGNOSIS — M79.7 FIBROMYALGIA: ICD-10-CM

## 2019-09-11 DIAGNOSIS — M51.36 DDD (DEGENERATIVE DISC DISEASE), LUMBAR: ICD-10-CM

## 2019-09-11 DIAGNOSIS — M54.16 LUMBAR RADICULITIS: ICD-10-CM

## 2019-09-11 DIAGNOSIS — M72.2 PLANTAR FASCIITIS, RIGHT: ICD-10-CM

## 2019-09-11 DIAGNOSIS — M16.0 PRIMARY OSTEOARTHRITIS OF BOTH HIPS: ICD-10-CM

## 2019-09-11 DIAGNOSIS — M16.11 PRIMARY OSTEOARTHRITIS OF RIGHT HIP: ICD-10-CM

## 2019-09-11 PROCEDURE — 20553 NJX 1/MLT TRIGGER POINTS 3/>: CPT | Performed by: INTERNAL MEDICINE

## 2019-09-11 PROCEDURE — 99213 OFFICE O/P EST LOW 20 MIN: CPT | Performed by: INTERNAL MEDICINE

## 2019-09-11 RX ORDER — DICLOFENAC SODIUM 75 MG/1
75 TABLET, DELAYED RELEASE ORAL DAILY
Qty: 90 TABLET | Refills: 0 | Status: ON HOLD | OUTPATIENT
Start: 2019-09-11 | End: 2019-10-02 | Stop reason: HOSPADM

## 2019-09-11 RX ORDER — PREGABALIN 100 MG/1
100 CAPSULE ORAL 3 TIMES DAILY
Qty: 90 CAPSULE | Refills: 0 | Status: SHIPPED | OUTPATIENT
Start: 2019-09-11 | End: 2019-10-08 | Stop reason: SDUPTHER

## 2019-09-11 RX ORDER — TIZANIDINE 4 MG/1
TABLET ORAL
Qty: 270 TABLET | Refills: 0 | Status: SHIPPED | OUTPATIENT
Start: 2019-09-11 | End: 2019-10-08 | Stop reason: CLARIF

## 2019-09-11 RX ORDER — HYDROCODONE BITARTRATE AND ACETAMINOPHEN 5; 325 MG/1; MG/1
.5-1 TABLET ORAL EVERY 6 HOURS PRN
Qty: 70 TABLET | Refills: 0 | Status: SHIPPED | OUTPATIENT
Start: 2019-09-11 | End: 2019-10-08 | Stop reason: SDUPTHER

## 2019-09-11 RX ORDER — TRIAMCINOLONE ACETONIDE 40 MG/ML
40 INJECTION, SUSPENSION INTRA-ARTICULAR; INTRAMUSCULAR ONCE
Status: COMPLETED | OUTPATIENT
Start: 2019-09-11 | End: 2019-09-11

## 2019-09-11 RX ADMIN — TRIAMCINOLONE ACETONIDE 40 MG: 40 INJECTION, SUSPENSION INTRA-ARTICULAR; INTRAMUSCULAR at 16:32

## 2019-09-28 ENCOUNTER — HOSPITAL ENCOUNTER (EMERGENCY)
Age: 59
Discharge: HOME | DRG: 683 | End: 2019-09-28
Attending: EMERGENCY MEDICINE
Payer: COMMERCIAL

## 2019-09-28 VITALS
SYSTOLIC BLOOD PRESSURE: 142 MMHG | WEIGHT: 292.13 LBS | HEIGHT: 64 IN | RESPIRATION RATE: 18 BRPM | BODY MASS INDEX: 49.87 KG/M2 | DIASTOLIC BLOOD PRESSURE: 82 MMHG | OXYGEN SATURATION: 98 % | HEART RATE: 107 BPM | TEMPERATURE: 97.9 F

## 2019-09-28 DIAGNOSIS — G89.29 OTHER CHRONIC PAIN: Primary | ICD-10-CM

## 2019-09-28 PROCEDURE — 99283 EMERGENCY DEPT VISIT LOW MDM: CPT

## 2019-09-28 RX ORDER — LIDOCAINE 50 MG/G
1 PATCH TOPICAL DAILY
Qty: 10 PATCH | Refills: 0 | Status: SHIPPED | OUTPATIENT
Start: 2019-09-28 | End: 2019-09-30 | Stop reason: CLARIF

## 2019-09-28 ASSESSMENT — PAIN DESCRIPTION - LOCATION: LOCATION: BACK

## 2019-09-28 ASSESSMENT — PAIN SCALES - GENERAL: PAINLEVEL_OUTOF10: 10

## 2019-09-28 ASSESSMENT — PAIN DESCRIPTION - PAIN TYPE: TYPE: ACUTE PAIN

## 2019-09-28 ASSESSMENT — PAIN DESCRIPTION - DESCRIPTORS: DESCRIPTORS: SHARP

## 2019-09-28 ASSESSMENT — PAIN DESCRIPTION - ORIENTATION: ORIENTATION: RIGHT;LEFT;MID;LOWER

## 2019-09-30 ENCOUNTER — APPOINTMENT (OUTPATIENT)
Dept: GENERAL RADIOLOGY | Age: 59
DRG: 683 | End: 2019-09-30
Payer: COMMERCIAL

## 2019-09-30 ENCOUNTER — HOSPITAL ENCOUNTER (INPATIENT)
Age: 59
LOS: 2 days | Discharge: HOME | DRG: 683 | End: 2019-10-02
Attending: EMERGENCY MEDICINE | Admitting: INTERNAL MEDICINE
Payer: COMMERCIAL

## 2019-09-30 DIAGNOSIS — N17.9 AKI (ACUTE KIDNEY INJURY) (HCC): Primary | ICD-10-CM

## 2019-09-30 LAB
ANION GAP SERPL CALCULATED.3IONS-SCNC: 14 MMOL/L (ref 3–16)
BASE EXCESS VENOUS: -3 (ref -3–3)
BASOPHILS ABSOLUTE: 0.1 K/UL (ref 0–0.2)
BASOPHILS RELATIVE PERCENT: 0.8 %
BILIRUBIN URINE: NEGATIVE
BLOOD, URINE: NEGATIVE
BUN BLDV-MCNC: 49 MG/DL (ref 7–20)
CALCIUM SERPL-MCNC: 8.7 MG/DL (ref 8.3–10.6)
CHLORIDE BLD-SCNC: 105 MMOL/L (ref 99–110)
CLARITY: ABNORMAL
CO2: 21 MMOL/L (ref 21–32)
COLOR: YELLOW
CREAT SERPL-MCNC: 3.9 MG/DL (ref 0.6–1.2)
EKG ATRIAL RATE: 61 BPM
EKG DIAGNOSIS: NORMAL
EKG P AXIS: 10 DEGREES
EKG P-R INTERVAL: 142 MS
EKG Q-T INTERVAL: 428 MS
EKG QRS DURATION: 80 MS
EKG QTC CALCULATION (BAZETT): 430 MS
EKG R AXIS: 27 DEGREES
EKG T AXIS: 14 DEGREES
EKG VENTRICULAR RATE: 61 BPM
EOSINOPHILS ABSOLUTE: 0.1 K/UL (ref 0–0.6)
EOSINOPHILS RELATIVE PERCENT: 1.7 %
GFR AFRICAN AMERICAN: 14
GFR NON-AFRICAN AMERICAN: 12
GLUCOSE BLD-MCNC: 88 MG/DL (ref 70–99)
GLUCOSE URINE: NEGATIVE MG/DL
HCO3 VENOUS: 22.5 MMOL/L (ref 23–29)
HCT VFR BLD CALC: 33.2 % (ref 36–48)
HEMOGLOBIN: 10.3 G/DL (ref 12–16)
INR BLD: 0.91 (ref 0.86–1.14)
KETONES, URINE: NEGATIVE MG/DL
LACTATE: 0.72 MMOL/L (ref 0.4–2)
LEUKOCYTE ESTERASE, URINE: NEGATIVE
LYMPHOCYTES ABSOLUTE: 1.2 K/UL (ref 1–5.1)
LYMPHOCYTES RELATIVE PERCENT: 14.7 %
MCH RBC QN AUTO: 25.8 PG (ref 26–34)
MCHC RBC AUTO-ENTMCNC: 31.1 G/DL (ref 31–36)
MCV RBC AUTO: 82.8 FL (ref 80–100)
MICROSCOPIC EXAMINATION: ABNORMAL
MONOCYTES ABSOLUTE: 0.6 K/UL (ref 0–1.3)
MONOCYTES RELATIVE PERCENT: 7.3 %
NEUTROPHILS ABSOLUTE: 6 K/UL (ref 1.7–7.7)
NEUTROPHILS RELATIVE PERCENT: 75.5 %
NITRITE, URINE: NEGATIVE
O2 SAT, VEN: 57 %
PCO2, VEN: 41.1 MM HG (ref 40–50)
PDW BLD-RTO: 24.9 % (ref 12.4–15.4)
PERFORMED ON: ABNORMAL
PH UA: 6 (ref 5–8)
PH VENOUS: 7.35 (ref 7.35–7.45)
PLATELET # BLD: 173 K/UL (ref 135–450)
PMV BLD AUTO: 8.9 FL (ref 5–10.5)
PO2, VEN: 31 MM HG
POC SAMPLE TYPE: ABNORMAL
POTASSIUM REFLEX MAGNESIUM: 5.2 MMOL/L (ref 3.5–5.1)
PRO-BNP: 874 PG/ML (ref 0–124)
PROTEIN UA: NEGATIVE MG/DL
PROTHROMBIN TIME: 10.4 SEC (ref 9.8–13)
RBC # BLD: 4.01 M/UL (ref 4–5.2)
SODIUM BLD-SCNC: 140 MMOL/L (ref 136–145)
SPECIFIC GRAVITY UA: 1.01 (ref 1–1.03)
TCO2 CALC VENOUS: 24 MMOL/L
TROPONIN: <0.01 NG/ML
URINE REFLEX TO CULTURE: ABNORMAL
URINE TYPE: ABNORMAL
UROBILINOGEN, URINE: 0.2 E.U./DL
WBC # BLD: 7.9 K/UL (ref 4–11)

## 2019-09-30 PROCEDURE — 85610 PROTHROMBIN TIME: CPT

## 2019-09-30 PROCEDURE — 96360 HYDRATION IV INFUSION INIT: CPT

## 2019-09-30 PROCEDURE — 84484 ASSAY OF TROPONIN QUANT: CPT

## 2019-09-30 PROCEDURE — 84436 ASSAY OF TOTAL THYROXINE: CPT

## 2019-09-30 PROCEDURE — 99285 EMERGENCY DEPT VISIT HI MDM: CPT

## 2019-09-30 PROCEDURE — 93005 ELECTROCARDIOGRAM TRACING: CPT | Performed by: EMERGENCY MEDICINE

## 2019-09-30 PROCEDURE — 80048 BASIC METABOLIC PNL TOTAL CA: CPT

## 2019-09-30 PROCEDURE — 83605 ASSAY OF LACTIC ACID: CPT

## 2019-09-30 PROCEDURE — 85025 COMPLETE CBC W/AUTO DIFF WBC: CPT

## 2019-09-30 PROCEDURE — 84443 ASSAY THYROID STIM HORMONE: CPT

## 2019-09-30 PROCEDURE — 1200000000 HC SEMI PRIVATE

## 2019-09-30 PROCEDURE — 82803 BLOOD GASES ANY COMBINATION: CPT

## 2019-09-30 PROCEDURE — 83880 ASSAY OF NATRIURETIC PEPTIDE: CPT

## 2019-09-30 PROCEDURE — 81003 URINALYSIS AUTO W/O SCOPE: CPT

## 2019-09-30 PROCEDURE — 96361 HYDRATE IV INFUSION ADD-ON: CPT

## 2019-09-30 PROCEDURE — 2580000003 HC RX 258: Performed by: EMERGENCY MEDICINE

## 2019-09-30 PROCEDURE — 36415 COLL VENOUS BLD VENIPUNCTURE: CPT

## 2019-09-30 PROCEDURE — 71046 X-RAY EXAM CHEST 2 VIEWS: CPT

## 2019-09-30 PROCEDURE — 2580000003 HC RX 258: Performed by: STUDENT IN AN ORGANIZED HEALTH CARE EDUCATION/TRAINING PROGRAM

## 2019-09-30 RX ORDER — LAMOTRIGINE 150 MG/1
150 TABLET ORAL NIGHTLY
Refills: 0 | COMMUNITY
Start: 2019-09-27 | End: 2020-10-09 | Stop reason: ALTCHOICE

## 2019-09-30 RX ORDER — PANTOPRAZOLE SODIUM 40 MG/1
40 TABLET, DELAYED RELEASE ORAL
Status: DISCONTINUED | OUTPATIENT
Start: 2019-10-01 | End: 2019-10-02 | Stop reason: HOSPADM

## 2019-09-30 RX ORDER — ATOMOXETINE 40 MG/1
40 CAPSULE ORAL DAILY
Refills: 3 | COMMUNITY
Start: 2019-09-25 | End: 2020-10-09 | Stop reason: ALTCHOICE

## 2019-09-30 RX ORDER — ATORVASTATIN CALCIUM 40 MG/1
40 TABLET, FILM COATED ORAL DAILY
Status: DISCONTINUED | OUTPATIENT
Start: 2019-10-01 | End: 2019-10-02 | Stop reason: HOSPADM

## 2019-09-30 RX ORDER — ACETAMINOPHEN 325 MG/1
650 TABLET ORAL EVERY 4 HOURS PRN
Status: DISCONTINUED | OUTPATIENT
Start: 2019-09-30 | End: 2019-10-02 | Stop reason: HOSPADM

## 2019-09-30 RX ORDER — 0.9 % SODIUM CHLORIDE 0.9 %
500 INTRAVENOUS SOLUTION INTRAVENOUS ONCE
Status: COMPLETED | OUTPATIENT
Start: 2019-09-30 | End: 2019-09-30

## 2019-09-30 RX ORDER — ONDANSETRON 2 MG/ML
4 INJECTION INTRAMUSCULAR; INTRAVENOUS EVERY 6 HOURS PRN
Status: DISCONTINUED | OUTPATIENT
Start: 2019-09-30 | End: 2019-10-02 | Stop reason: HOSPADM

## 2019-09-30 RX ORDER — LEVOTHYROXINE SODIUM 88 UG/1
88 TABLET ORAL DAILY
Status: DISCONTINUED | OUTPATIENT
Start: 2019-10-01 | End: 2019-10-02 | Stop reason: HOSPADM

## 2019-09-30 RX ORDER — SODIUM CHLORIDE 0.9 % (FLUSH) 0.9 %
10 SYRINGE (ML) INJECTION PRN
Status: DISCONTINUED | OUTPATIENT
Start: 2019-09-30 | End: 2019-10-02 | Stop reason: HOSPADM

## 2019-09-30 RX ORDER — FLUOXETINE HYDROCHLORIDE 40 MG/1
40 CAPSULE ORAL DAILY
Refills: 3 | Status: ON HOLD | COMMUNITY
Start: 2019-09-01 | End: 2019-10-02 | Stop reason: HOSPADM

## 2019-09-30 RX ORDER — SODIUM CHLORIDE 0.9 % (FLUSH) 0.9 %
10 SYRINGE (ML) INJECTION EVERY 12 HOURS SCHEDULED
Status: DISCONTINUED | OUTPATIENT
Start: 2019-09-30 | End: 2019-10-02 | Stop reason: HOSPADM

## 2019-09-30 RX ORDER — FLUOXETINE HYDROCHLORIDE 20 MG/1
20 CAPSULE ORAL DAILY
Refills: 3 | COMMUNITY
Start: 2019-09-25 | End: 2020-10-09 | Stop reason: ALTCHOICE

## 2019-09-30 RX ORDER — SODIUM CHLORIDE 9 MG/ML
INJECTION, SOLUTION INTRAVENOUS CONTINUOUS
Status: ACTIVE | OUTPATIENT
Start: 2019-09-30 | End: 2019-10-01

## 2019-09-30 RX ADMIN — SODIUM CHLORIDE 500 ML: 9 INJECTION, SOLUTION INTRAVENOUS at 19:22

## 2019-09-30 RX ADMIN — Medication 10 ML: at 23:59

## 2019-09-30 RX ADMIN — SODIUM CHLORIDE 500 ML: 9 INJECTION, SOLUTION INTRAVENOUS at 17:15

## 2019-09-30 RX ADMIN — SODIUM CHLORIDE: 9 INJECTION, SOLUTION INTRAVENOUS at 23:59

## 2019-09-30 ASSESSMENT — ENCOUNTER SYMPTOMS
ABDOMINAL DISTENTION: 0
COUGH: 0
NAUSEA: 0
STRIDOR: 0
DIARRHEA: 0
EYES NEGATIVE: 1
CHOKING: 0
CHEST TIGHTNESS: 0
APNEA: 0
VOMITING: 0
SORE THROAT: 0
RHINORRHEA: 0
CONSTIPATION: 0
ABDOMINAL PAIN: 0
SHORTNESS OF BREATH: 0
WHEEZING: 0

## 2019-09-30 ASSESSMENT — PAIN SCALES - GENERAL: PAINLEVEL_OUTOF10: 0

## 2019-10-01 LAB
ANION GAP SERPL CALCULATED.3IONS-SCNC: 8 MMOL/L (ref 3–16)
ANION GAP SERPL CALCULATED.3IONS-SCNC: 9 MMOL/L (ref 3–16)
BASOPHILS ABSOLUTE: 0 K/UL (ref 0–0.2)
BASOPHILS RELATIVE PERCENT: 0.3 %
BUN BLDV-MCNC: 36 MG/DL (ref 7–20)
BUN BLDV-MCNC: 42 MG/DL (ref 7–20)
CALCIUM SERPL-MCNC: 8.3 MG/DL (ref 8.3–10.6)
CALCIUM SERPL-MCNC: 8.4 MG/DL (ref 8.3–10.6)
CHLORIDE BLD-SCNC: 107 MMOL/L (ref 99–110)
CHLORIDE BLD-SCNC: 107 MMOL/L (ref 99–110)
CO2: 24 MMOL/L (ref 21–32)
CO2: 24 MMOL/L (ref 21–32)
CREAT SERPL-MCNC: 1.8 MG/DL (ref 0.6–1.2)
CREAT SERPL-MCNC: 2.3 MG/DL (ref 0.6–1.2)
EOSINOPHILS ABSOLUTE: 0.1 K/UL (ref 0–0.6)
EOSINOPHILS RELATIVE PERCENT: 2.3 %
GFR AFRICAN AMERICAN: 26
GFR AFRICAN AMERICAN: 35
GFR NON-AFRICAN AMERICAN: 22
GFR NON-AFRICAN AMERICAN: 29
GLUCOSE BLD-MCNC: 104 MG/DL (ref 70–99)
GLUCOSE BLD-MCNC: 87 MG/DL (ref 70–99)
HCT VFR BLD CALC: 29.9 % (ref 36–48)
HEMOGLOBIN: 9.5 G/DL (ref 12–16)
LYMPHOCYTES ABSOLUTE: 1.3 K/UL (ref 1–5.1)
LYMPHOCYTES RELATIVE PERCENT: 25.1 %
MCH RBC QN AUTO: 25.7 PG (ref 26–34)
MCHC RBC AUTO-ENTMCNC: 31.6 G/DL (ref 31–36)
MCV RBC AUTO: 81.2 FL (ref 80–100)
MONOCYTES ABSOLUTE: 0.4 K/UL (ref 0–1.3)
MONOCYTES RELATIVE PERCENT: 7.3 %
NEUTROPHILS ABSOLUTE: 3.3 K/UL (ref 1.7–7.7)
NEUTROPHILS RELATIVE PERCENT: 65 %
PDW BLD-RTO: 24.2 % (ref 12.4–15.4)
PLATELET # BLD: 199 K/UL (ref 135–450)
PMV BLD AUTO: 8.9 FL (ref 5–10.5)
POTASSIUM REFLEX MAGNESIUM: 4.6 MMOL/L (ref 3.5–5.1)
POTASSIUM SERPL-SCNC: 4.6 MMOL/L (ref 3.5–5.1)
RBC # BLD: 3.68 M/UL (ref 4–5.2)
SODIUM BLD-SCNC: 139 MMOL/L (ref 136–145)
SODIUM BLD-SCNC: 140 MMOL/L (ref 136–145)
T4 TOTAL: 5.8 UG/DL (ref 4.5–10.9)
TSH SERPL DL<=0.05 MIU/L-ACNC: 2.76 UIU/ML (ref 0.27–4.2)
WBC # BLD: 5.1 K/UL (ref 4–11)

## 2019-10-01 PROCEDURE — 36415 COLL VENOUS BLD VENIPUNCTURE: CPT

## 2019-10-01 PROCEDURE — 1200000000 HC SEMI PRIVATE

## 2019-10-01 PROCEDURE — 97116 GAIT TRAINING THERAPY: CPT

## 2019-10-01 PROCEDURE — C8929 TTE W OR WO FOL WCON,DOPPLER: HCPCS

## 2019-10-01 PROCEDURE — 2580000003 HC RX 258: Performed by: STUDENT IN AN ORGANIZED HEALTH CARE EDUCATION/TRAINING PROGRAM

## 2019-10-01 PROCEDURE — 97535 SELF CARE MNGMENT TRAINING: CPT

## 2019-10-01 PROCEDURE — 97530 THERAPEUTIC ACTIVITIES: CPT

## 2019-10-01 PROCEDURE — 97161 PT EVAL LOW COMPLEX 20 MIN: CPT

## 2019-10-01 PROCEDURE — 85025 COMPLETE CBC W/AUTO DIFF WBC: CPT

## 2019-10-01 PROCEDURE — 6360000004 HC RX CONTRAST MEDICATION: Performed by: STUDENT IN AN ORGANIZED HEALTH CARE EDUCATION/TRAINING PROGRAM

## 2019-10-01 PROCEDURE — 97165 OT EVAL LOW COMPLEX 30 MIN: CPT

## 2019-10-01 PROCEDURE — 80048 BASIC METABOLIC PNL TOTAL CA: CPT

## 2019-10-01 PROCEDURE — 6360000002 HC RX W HCPCS: Performed by: STUDENT IN AN ORGANIZED HEALTH CARE EDUCATION/TRAINING PROGRAM

## 2019-10-01 PROCEDURE — 6370000000 HC RX 637 (ALT 250 FOR IP): Performed by: STUDENT IN AN ORGANIZED HEALTH CARE EDUCATION/TRAINING PROGRAM

## 2019-10-01 RX ORDER — LISINOPRIL 10 MG/1
10 TABLET ORAL DAILY
Status: ON HOLD | COMMUNITY
End: 2019-10-02 | Stop reason: SINTOL

## 2019-10-01 RX ORDER — PROPRANOLOL HYDROCHLORIDE 20 MG/1
20 TABLET ORAL 3 TIMES DAILY
COMMUNITY
End: 2020-10-09 | Stop reason: DRUGHIGH

## 2019-10-01 RX ORDER — FLUOXETINE HYDROCHLORIDE 20 MG/1
20 CAPSULE ORAL DAILY
Status: CANCELLED | OUTPATIENT
Start: 2019-10-01

## 2019-10-01 RX ORDER — 0.9 % SODIUM CHLORIDE 0.9 %
1000 INTRAVENOUS SOLUTION INTRAVENOUS ONCE
Status: COMPLETED | OUTPATIENT
Start: 2019-10-01 | End: 2019-10-01

## 2019-10-01 RX ORDER — SODIUM CHLORIDE 9 MG/ML
INJECTION, SOLUTION INTRAVENOUS CONTINUOUS
Status: ACTIVE | OUTPATIENT
Start: 2019-10-01 | End: 2019-10-01

## 2019-10-01 RX ORDER — QUETIAPINE FUMARATE 200 MG/1
200 TABLET, FILM COATED ORAL NIGHTLY
COMMUNITY

## 2019-10-01 RX ADMIN — ENOXAPARIN SODIUM 30 MG: 30 INJECTION SUBCUTANEOUS at 09:57

## 2019-10-01 RX ADMIN — SODIUM CHLORIDE: 9 INJECTION, SOLUTION INTRAVENOUS at 14:06

## 2019-10-01 RX ADMIN — PANTOPRAZOLE SODIUM 40 MG: 40 TABLET, DELAYED RELEASE ORAL at 09:57

## 2019-10-01 RX ADMIN — ATORVASTATIN CALCIUM 40 MG: 40 TABLET, FILM COATED ORAL at 09:57

## 2019-10-01 RX ADMIN — LEVOTHYROXINE SODIUM 88 MCG: 88 TABLET ORAL at 09:57

## 2019-10-01 RX ADMIN — PERFLUTREN 1.65 MG: 6.52 INJECTION, SUSPENSION INTRAVENOUS at 14:11

## 2019-10-01 RX ADMIN — SODIUM CHLORIDE 1000 ML: 9 INJECTION, SOLUTION INTRAVENOUS at 03:29

## 2019-10-01 RX ADMIN — Medication 10 ML: at 09:57

## 2019-10-01 ASSESSMENT — PAIN SCALES - GENERAL
PAINLEVEL_OUTOF10: 0

## 2019-10-02 VITALS
WEIGHT: 293 LBS | BODY MASS INDEX: 50.02 KG/M2 | SYSTOLIC BLOOD PRESSURE: 159 MMHG | OXYGEN SATURATION: 97 % | HEART RATE: 91 BPM | TEMPERATURE: 98.3 F | RESPIRATION RATE: 16 BRPM | HEIGHT: 64 IN | DIASTOLIC BLOOD PRESSURE: 76 MMHG

## 2019-10-02 PROBLEM — Z00.00 PREVENTATIVE HEALTH CARE: Status: ACTIVE | Noted: 2019-10-02

## 2019-10-02 LAB
ANION GAP SERPL CALCULATED.3IONS-SCNC: 14 MMOL/L (ref 3–16)
BASOPHILS ABSOLUTE: 0 K/UL (ref 0–0.2)
BASOPHILS RELATIVE PERCENT: 0.4 %
BUN BLDV-MCNC: 25 MG/DL (ref 7–20)
CALCIUM SERPL-MCNC: 9.2 MG/DL (ref 8.3–10.6)
CHLORIDE BLD-SCNC: 107 MMOL/L (ref 99–110)
CO2: 20 MMOL/L (ref 21–32)
CREAT SERPL-MCNC: 0.9 MG/DL (ref 0.6–1.2)
EOSINOPHILS ABSOLUTE: 0.1 K/UL (ref 0–0.6)
EOSINOPHILS RELATIVE PERCENT: 1.9 %
GFR AFRICAN AMERICAN: >60
GFR NON-AFRICAN AMERICAN: >60
GLUCOSE BLD-MCNC: 108 MG/DL (ref 70–99)
HCT VFR BLD CALC: 30.9 % (ref 36–48)
HEMOGLOBIN: 9.9 G/DL (ref 12–16)
LYMPHOCYTES ABSOLUTE: 1.6 K/UL (ref 1–5.1)
LYMPHOCYTES RELATIVE PERCENT: 28.6 %
MCH RBC QN AUTO: 25.9 PG (ref 26–34)
MCHC RBC AUTO-ENTMCNC: 32.2 G/DL (ref 31–36)
MCV RBC AUTO: 80.3 FL (ref 80–100)
MONOCYTES ABSOLUTE: 0.5 K/UL (ref 0–1.3)
MONOCYTES RELATIVE PERCENT: 8.5 %
NEUTROPHILS ABSOLUTE: 3.4 K/UL (ref 1.7–7.7)
NEUTROPHILS RELATIVE PERCENT: 60.6 %
PDW BLD-RTO: 24.3 % (ref 12.4–15.4)
PLATELET # BLD: 202 K/UL (ref 135–450)
PMV BLD AUTO: 8.9 FL (ref 5–10.5)
POTASSIUM REFLEX MAGNESIUM: 4.5 MMOL/L (ref 3.5–5.1)
RBC # BLD: 3.84 M/UL (ref 4–5.2)
SODIUM BLD-SCNC: 141 MMOL/L (ref 136–145)
WBC # BLD: 5.6 K/UL (ref 4–11)

## 2019-10-02 PROCEDURE — 2580000003 HC RX 258: Performed by: STUDENT IN AN ORGANIZED HEALTH CARE EDUCATION/TRAINING PROGRAM

## 2019-10-02 PROCEDURE — 36415 COLL VENOUS BLD VENIPUNCTURE: CPT

## 2019-10-02 PROCEDURE — 6370000000 HC RX 637 (ALT 250 FOR IP): Performed by: STUDENT IN AN ORGANIZED HEALTH CARE EDUCATION/TRAINING PROGRAM

## 2019-10-02 PROCEDURE — 80048 BASIC METABOLIC PNL TOTAL CA: CPT

## 2019-10-02 PROCEDURE — 6360000002 HC RX W HCPCS: Performed by: STUDENT IN AN ORGANIZED HEALTH CARE EDUCATION/TRAINING PROGRAM

## 2019-10-02 PROCEDURE — 90686 IIV4 VACC NO PRSV 0.5 ML IM: CPT | Performed by: INTERNAL MEDICINE

## 2019-10-02 PROCEDURE — G0008 ADMIN INFLUENZA VIRUS VAC: HCPCS | Performed by: INTERNAL MEDICINE

## 2019-10-02 PROCEDURE — 85025 COMPLETE CBC W/AUTO DIFF WBC: CPT

## 2019-10-02 PROCEDURE — 6360000002 HC RX W HCPCS: Performed by: INTERNAL MEDICINE

## 2019-10-02 RX ORDER — AMLODIPINE BESYLATE 5 MG/1
5 TABLET ORAL DAILY
Status: DISCONTINUED | OUTPATIENT
Start: 2019-10-03 | End: 2019-10-02 | Stop reason: HOSPADM

## 2019-10-02 RX ORDER — AMLODIPINE BESYLATE 5 MG/1
5 TABLET ORAL NIGHTLY
Qty: 30 TABLET | Refills: 3 | Status: SHIPPED | OUTPATIENT
Start: 2019-10-02 | End: 2019-10-08

## 2019-10-02 RX ORDER — UREA 10 %
3 LOTION (ML) TOPICAL NIGHTLY PRN
Status: DISCONTINUED | OUTPATIENT
Start: 2019-10-02 | End: 2019-10-02 | Stop reason: HOSPADM

## 2019-10-02 RX ADMIN — Medication 3 MG: at 03:11

## 2019-10-02 RX ADMIN — ENOXAPARIN SODIUM 40 MG: 40 INJECTION SUBCUTANEOUS at 09:33

## 2019-10-02 RX ADMIN — LEVOTHYROXINE SODIUM 88 MCG: 88 TABLET ORAL at 07:52

## 2019-10-02 RX ADMIN — INFLUENZA A VIRUS A/BRISBANE/02/2018 IVR-190 (H1N1) ANTIGEN (PROPIOLACTONE INACTIVATED), INFLUENZA A VIRUS A/KANSAS/14/2017 X-327 (H3N2) ANTIGEN (PROPIOLACTONE INACTIVATED), INFLUENZA B VIRUS B/MARYLAND/15/2016 ANTIGEN (PROPIOLACTONE INACTIVATED), INFLUENZA B VIRUS B/PHUKET/3073/2013 BVR-1B ANTIGEN (PROPIOLACTONE INACTIVATED) 0.5 ML: 15; 15; 15; 15 INJECTION, SUSPENSION INTRAMUSCULAR at 09:33

## 2019-10-02 RX ADMIN — PANTOPRAZOLE SODIUM 40 MG: 40 TABLET, DELAYED RELEASE ORAL at 07:51

## 2019-10-02 RX ADMIN — ATORVASTATIN CALCIUM 40 MG: 40 TABLET, FILM COATED ORAL at 09:33

## 2019-10-02 RX ADMIN — Medication 10 ML: at 09:34

## 2019-10-02 ASSESSMENT — PAIN SCALES - GENERAL
PAINLEVEL_OUTOF10: 0
PAINLEVEL_OUTOF10: 0

## 2019-10-03 ENCOUNTER — CARE COORDINATION (OUTPATIENT)
Dept: CASE MANAGEMENT | Age: 59
End: 2019-10-03

## 2019-10-08 ENCOUNTER — CARE COORDINATION (OUTPATIENT)
Dept: CASE MANAGEMENT | Age: 59
End: 2019-10-08

## 2019-10-08 ENCOUNTER — OFFICE VISIT (OUTPATIENT)
Dept: PAIN MANAGEMENT | Age: 59
End: 2019-10-08
Payer: COMMERCIAL

## 2019-10-08 VITALS
BODY MASS INDEX: 51.32 KG/M2 | WEIGHT: 293 LBS | SYSTOLIC BLOOD PRESSURE: 141 MMHG | HEART RATE: 87 BPM | DIASTOLIC BLOOD PRESSURE: 69 MMHG

## 2019-10-08 DIAGNOSIS — G89.4 CHRONIC PAIN SYNDROME: ICD-10-CM

## 2019-10-08 DIAGNOSIS — M54.16 LUMBAR RADICULITIS: ICD-10-CM

## 2019-10-08 DIAGNOSIS — M79.7 FIBROMYALGIA: ICD-10-CM

## 2019-10-08 DIAGNOSIS — M16.0 PRIMARY OSTEOARTHRITIS OF BOTH HIPS: ICD-10-CM

## 2019-10-08 DIAGNOSIS — M16.11 PRIMARY OSTEOARTHRITIS OF RIGHT HIP: ICD-10-CM

## 2019-10-08 DIAGNOSIS — M47.899 FACET SYNDROME: ICD-10-CM

## 2019-10-08 DIAGNOSIS — R60.0 BILATERAL LEG EDEMA: ICD-10-CM

## 2019-10-08 DIAGNOSIS — G25.81 RESTLESS LEGS SYNDROME (RLS): ICD-10-CM

## 2019-10-08 DIAGNOSIS — M51.36 DDD (DEGENERATIVE DISC DISEASE), LUMBAR: ICD-10-CM

## 2019-10-08 PROCEDURE — 99213 OFFICE O/P EST LOW 20 MIN: CPT | Performed by: INTERNAL MEDICINE

## 2019-10-08 RX ORDER — PREGABALIN 100 MG/1
100 CAPSULE ORAL 3 TIMES DAILY
Qty: 90 CAPSULE | Refills: 0 | Status: SHIPPED | OUTPATIENT
Start: 2019-10-08 | End: 2019-11-12 | Stop reason: SDUPTHER

## 2019-10-08 RX ORDER — HYDROCODONE BITARTRATE AND ACETAMINOPHEN 5; 325 MG/1; MG/1
.5-1 TABLET ORAL EVERY 6 HOURS PRN
Qty: 90 TABLET | Refills: 0 | Status: SHIPPED | OUTPATIENT
Start: 2019-10-08 | End: 2019-11-12 | Stop reason: SDUPTHER

## 2019-10-10 ENCOUNTER — CARE COORDINATION (OUTPATIENT)
Dept: CASE MANAGEMENT | Age: 59
End: 2019-10-10

## 2019-10-14 ENCOUNTER — CARE COORDINATION (OUTPATIENT)
Dept: CASE MANAGEMENT | Age: 59
End: 2019-10-14

## 2019-10-16 ENCOUNTER — CARE COORDINATION (OUTPATIENT)
Dept: CASE MANAGEMENT | Age: 59
End: 2019-10-16

## 2019-11-01 PROBLEM — Z00.00 PREVENTATIVE HEALTH CARE: Status: RESOLVED | Noted: 2019-10-02 | Resolved: 2019-11-01

## 2019-11-12 ENCOUNTER — OFFICE VISIT (OUTPATIENT)
Dept: PAIN MANAGEMENT | Age: 59
End: 2019-11-12
Payer: COMMERCIAL

## 2019-11-12 VITALS
DIASTOLIC BLOOD PRESSURE: 92 MMHG | HEART RATE: 72 BPM | WEIGHT: 289 LBS | BODY MASS INDEX: 49.61 KG/M2 | SYSTOLIC BLOOD PRESSURE: 163 MMHG

## 2019-11-12 DIAGNOSIS — M16.11 PRIMARY OSTEOARTHRITIS OF RIGHT HIP: ICD-10-CM

## 2019-11-12 DIAGNOSIS — R60.0 BILATERAL LEG EDEMA: ICD-10-CM

## 2019-11-12 DIAGNOSIS — M47.899 FACET SYNDROME: ICD-10-CM

## 2019-11-12 DIAGNOSIS — M79.7 FIBROMYALGIA: ICD-10-CM

## 2019-11-12 DIAGNOSIS — G25.81 RESTLESS LEGS SYNDROME (RLS): ICD-10-CM

## 2019-11-12 DIAGNOSIS — G89.4 CHRONIC PAIN SYNDROME: ICD-10-CM

## 2019-11-12 DIAGNOSIS — M16.0 PRIMARY OSTEOARTHRITIS OF BOTH HIPS: ICD-10-CM

## 2019-11-12 DIAGNOSIS — M51.36 DDD (DEGENERATIVE DISC DISEASE), LUMBAR: ICD-10-CM

## 2019-11-12 DIAGNOSIS — M54.16 LUMBAR RADICULITIS: ICD-10-CM

## 2019-11-12 PROCEDURE — 99213 OFFICE O/P EST LOW 20 MIN: CPT | Performed by: INTERNAL MEDICINE

## 2019-11-12 PROCEDURE — 20553 NJX 1/MLT TRIGGER POINTS 3/>: CPT | Performed by: INTERNAL MEDICINE

## 2019-11-12 RX ORDER — HYDROCODONE BITARTRATE AND ACETAMINOPHEN 5; 325 MG/1; MG/1
.5-1 TABLET ORAL EVERY 6 HOURS PRN
Qty: 70 TABLET | Refills: 0 | Status: SHIPPED | OUTPATIENT
Start: 2019-11-12 | End: 2019-12-11 | Stop reason: SDUPTHER

## 2019-11-12 RX ORDER — PREGABALIN 100 MG/1
100 CAPSULE ORAL 3 TIMES DAILY
Qty: 90 CAPSULE | Refills: 0 | Status: SHIPPED | OUTPATIENT
Start: 2019-11-12 | End: 2019-12-11 | Stop reason: SDUPTHER

## 2019-11-12 RX ORDER — TRIAMCINOLONE ACETONIDE 40 MG/ML
40 INJECTION, SUSPENSION INTRA-ARTICULAR; INTRAMUSCULAR ONCE
Status: COMPLETED | OUTPATIENT
Start: 2019-11-12 | End: 2019-11-12

## 2019-11-12 RX ADMIN — TRIAMCINOLONE ACETONIDE 40 MG: 40 INJECTION, SUSPENSION INTRA-ARTICULAR; INTRAMUSCULAR at 16:48

## 2019-11-15 RX ORDER — BETAMETHASONE DIPROPIONATE 0.5 MG/G
CREAM TOPICAL
Qty: 60 G | Refills: 0 | Status: SHIPPED | OUTPATIENT
Start: 2019-11-15 | End: 2020-02-19

## 2019-11-18 RX ORDER — ATORVASTATIN CALCIUM 40 MG/1
TABLET, FILM COATED ORAL
Qty: 90 TABLET | Refills: 0 | Status: SHIPPED | OUTPATIENT
Start: 2019-11-18 | End: 2020-02-24

## 2019-12-09 DIAGNOSIS — D64.9 ANEMIA, UNSPECIFIED TYPE: ICD-10-CM

## 2019-12-10 RX ORDER — QUINIDINE GLUCONATE 324 MG
TABLET, EXTENDED RELEASE ORAL
Qty: 60 TABLET | Refills: 0 | Status: SHIPPED | OUTPATIENT
Start: 2019-12-10 | End: 2020-01-13

## 2019-12-10 RX ORDER — LEVOTHYROXINE SODIUM 88 UG/1
TABLET ORAL
Qty: 90 TABLET | Refills: 0 | Status: SHIPPED | OUTPATIENT
Start: 2019-12-10 | End: 2020-03-23

## 2019-12-11 ENCOUNTER — OFFICE VISIT (OUTPATIENT)
Dept: PAIN MANAGEMENT | Age: 59
End: 2019-12-11
Payer: COMMERCIAL

## 2019-12-11 VITALS
WEIGHT: 293 LBS | HEART RATE: 73 BPM | BODY MASS INDEX: 52.01 KG/M2 | SYSTOLIC BLOOD PRESSURE: 139 MMHG | DIASTOLIC BLOOD PRESSURE: 79 MMHG

## 2019-12-11 DIAGNOSIS — M16.11 PRIMARY OSTEOARTHRITIS OF RIGHT HIP: ICD-10-CM

## 2019-12-11 DIAGNOSIS — M51.36 DDD (DEGENERATIVE DISC DISEASE), LUMBAR: ICD-10-CM

## 2019-12-11 DIAGNOSIS — M79.7 FIBROMYALGIA: ICD-10-CM

## 2019-12-11 DIAGNOSIS — M47.899 FACET SYNDROME: ICD-10-CM

## 2019-12-11 DIAGNOSIS — M16.0 PRIMARY OSTEOARTHRITIS OF BOTH HIPS: ICD-10-CM

## 2019-12-11 DIAGNOSIS — G89.4 CHRONIC PAIN SYNDROME: ICD-10-CM

## 2019-12-11 DIAGNOSIS — M54.16 LUMBAR RADICULITIS: ICD-10-CM

## 2019-12-11 PROCEDURE — 99213 OFFICE O/P EST LOW 20 MIN: CPT | Performed by: INTERNAL MEDICINE

## 2019-12-11 RX ORDER — PREGABALIN 100 MG/1
100 CAPSULE ORAL 3 TIMES DAILY
Qty: 90 CAPSULE | Refills: 0 | Status: SHIPPED | OUTPATIENT
Start: 2019-12-11 | End: 2020-01-15 | Stop reason: SDUPTHER

## 2019-12-11 RX ORDER — TIZANIDINE 4 MG/1
4 TABLET ORAL 2 TIMES DAILY
Qty: 60 TABLET | Refills: 0 | Status: SHIPPED | OUTPATIENT
Start: 2019-12-11 | End: 2020-01-10

## 2019-12-11 RX ORDER — HYDROCODONE BITARTRATE AND ACETAMINOPHEN 5; 325 MG/1; MG/1
.5-1 TABLET ORAL EVERY 6 HOURS PRN
Qty: 90 TABLET | Refills: 0 | Status: SHIPPED | OUTPATIENT
Start: 2019-12-11 | End: 2020-01-15 | Stop reason: ALTCHOICE

## 2019-12-26 ENCOUNTER — HOSPITAL ENCOUNTER (EMERGENCY)
Age: 59
Discharge: HOME | End: 2019-12-26
Attending: EMERGENCY MEDICINE
Payer: COMMERCIAL

## 2019-12-26 VITALS
OXYGEN SATURATION: 92 % | HEART RATE: 89 BPM | RESPIRATION RATE: 18 BRPM | TEMPERATURE: 97.8 F | HEIGHT: 64 IN | WEIGHT: 293 LBS | BODY MASS INDEX: 50.02 KG/M2 | DIASTOLIC BLOOD PRESSURE: 94 MMHG | SYSTOLIC BLOOD PRESSURE: 162 MMHG

## 2019-12-26 DIAGNOSIS — R60.0 BILATERAL LOWER EXTREMITY EDEMA: Primary | ICD-10-CM

## 2019-12-26 DIAGNOSIS — R79.89 ELEVATED BRAIN NATRIURETIC PEPTIDE (BNP) LEVEL: ICD-10-CM

## 2019-12-26 DIAGNOSIS — E77.8 HYPOPROTEINEMIA (HCC): ICD-10-CM

## 2019-12-26 LAB
A/G RATIO: 1.4 (ref 1.1–2.2)
ALBUMIN SERPL-MCNC: 3.6 G/DL (ref 3.4–5)
ALP BLD-CCNC: 128 U/L (ref 40–129)
ALT SERPL-CCNC: 17 U/L (ref 10–40)
ANION GAP SERPL CALCULATED.3IONS-SCNC: 12 MMOL/L (ref 3–16)
AST SERPL-CCNC: 14 U/L (ref 15–37)
BASOPHILS ABSOLUTE: 0.1 K/UL (ref 0–0.2)
BASOPHILS RELATIVE PERCENT: 0.8 %
BILIRUB SERPL-MCNC: <0.2 MG/DL (ref 0–1)
BUN BLDV-MCNC: 20 MG/DL (ref 7–20)
CALCIUM SERPL-MCNC: 9.1 MG/DL (ref 8.3–10.6)
CHLORIDE BLD-SCNC: 103 MMOL/L (ref 99–110)
CO2: 27 MMOL/L (ref 21–32)
CREAT SERPL-MCNC: 0.7 MG/DL (ref 0.6–1.2)
EOSINOPHILS ABSOLUTE: 0.2 K/UL (ref 0–0.6)
EOSINOPHILS RELATIVE PERCENT: 2 %
GFR AFRICAN AMERICAN: >60
GFR NON-AFRICAN AMERICAN: >60
GLOBULIN: 2.5 G/DL
GLUCOSE BLD-MCNC: 117 MG/DL (ref 70–99)
HCT VFR BLD CALC: 37.3 % (ref 36–48)
HEMOGLOBIN: 12.3 G/DL (ref 12–16)
LYMPHOCYTES ABSOLUTE: 1.6 K/UL (ref 1–5.1)
LYMPHOCYTES RELATIVE PERCENT: 19.9 %
MCH RBC QN AUTO: 29.4 PG (ref 26–34)
MCHC RBC AUTO-ENTMCNC: 33.1 G/DL (ref 31–36)
MCV RBC AUTO: 88.9 FL (ref 80–100)
MONOCYTES ABSOLUTE: 0.6 K/UL (ref 0–1.3)
MONOCYTES RELATIVE PERCENT: 7.6 %
NEUTROPHILS ABSOLUTE: 5.7 K/UL (ref 1.7–7.7)
NEUTROPHILS RELATIVE PERCENT: 69.7 %
PDW BLD-RTO: 16.4 % (ref 12.4–15.4)
PLATELET # BLD: 217 K/UL (ref 135–450)
PMV BLD AUTO: 8 FL (ref 5–10.5)
POTASSIUM REFLEX MAGNESIUM: 3.8 MMOL/L (ref 3.5–5.1)
PRO-BNP: 367 PG/ML (ref 0–124)
RBC # BLD: 4.19 M/UL (ref 4–5.2)
SODIUM BLD-SCNC: 142 MMOL/L (ref 136–145)
TOTAL PROTEIN: 6.1 G/DL (ref 6.4–8.2)
TROPONIN: <0.01 NG/ML
WBC # BLD: 8.2 K/UL (ref 4–11)

## 2019-12-26 PROCEDURE — 83880 ASSAY OF NATRIURETIC PEPTIDE: CPT

## 2019-12-26 PROCEDURE — 99284 EMERGENCY DEPT VISIT MOD MDM: CPT

## 2019-12-26 PROCEDURE — 85025 COMPLETE CBC W/AUTO DIFF WBC: CPT

## 2019-12-26 PROCEDURE — 80053 COMPREHEN METABOLIC PANEL: CPT

## 2019-12-26 PROCEDURE — 84484 ASSAY OF TROPONIN QUANT: CPT

## 2019-12-26 RX ORDER — FUROSEMIDE 20 MG/1
20 TABLET ORAL DAILY
Qty: 7 TABLET | Refills: 0 | Status: SHIPPED | OUTPATIENT
Start: 2019-12-26 | End: 2020-01-14

## 2019-12-26 ASSESSMENT — PAIN DESCRIPTION - PAIN TYPE: TYPE: ACUTE PAIN;CHRONIC PAIN

## 2019-12-26 ASSESSMENT — PAIN SCALES - GENERAL: PAINLEVEL_OUTOF10: 10

## 2019-12-26 ASSESSMENT — PAIN DESCRIPTION - LOCATION: LOCATION: LEG

## 2019-12-26 ASSESSMENT — PAIN DESCRIPTION - ORIENTATION: ORIENTATION: RIGHT;LEFT

## 2019-12-26 ASSESSMENT — PAIN DESCRIPTION - DESCRIPTORS: DESCRIPTORS: CONSTANT;DISCOMFORT

## 2019-12-31 ENCOUNTER — HOSPITAL ENCOUNTER (EMERGENCY)
Age: 59
Discharge: HOME | End: 2019-12-31
Attending: EMERGENCY MEDICINE
Payer: COMMERCIAL

## 2019-12-31 ENCOUNTER — APPOINTMENT (OUTPATIENT)
Dept: GENERAL RADIOLOGY | Age: 59
End: 2019-12-31
Payer: COMMERCIAL

## 2019-12-31 VITALS
HEART RATE: 80 BPM | SYSTOLIC BLOOD PRESSURE: 152 MMHG | WEIGHT: 293 LBS | OXYGEN SATURATION: 98 % | TEMPERATURE: 97.4 F | RESPIRATION RATE: 18 BRPM | BODY MASS INDEX: 52.85 KG/M2 | DIASTOLIC BLOOD PRESSURE: 86 MMHG

## 2019-12-31 PROCEDURE — 6370000000 HC RX 637 (ALT 250 FOR IP): Performed by: EMERGENCY MEDICINE

## 2019-12-31 PROCEDURE — 99283 EMERGENCY DEPT VISIT LOW MDM: CPT

## 2019-12-31 PROCEDURE — 73610 X-RAY EXAM OF ANKLE: CPT

## 2019-12-31 RX ADMIN — IBUPROFEN 600 MG: 200 TABLET, FILM COATED ORAL at 17:26

## 2019-12-31 ASSESSMENT — PAIN DESCRIPTION - LOCATION: LOCATION: ANKLE

## 2019-12-31 ASSESSMENT — PAIN SCALES - GENERAL
PAINLEVEL_OUTOF10: 8
PAINLEVEL_OUTOF10: 5
PAINLEVEL_OUTOF10: 8

## 2019-12-31 ASSESSMENT — PAIN DESCRIPTION - ORIENTATION: ORIENTATION: LEFT;POSTERIOR;OUTER

## 2019-12-31 ASSESSMENT — PAIN DESCRIPTION - PAIN TYPE: TYPE: ACUTE PAIN

## 2019-12-31 ASSESSMENT — PAIN DESCRIPTION - DESCRIPTORS: DESCRIPTORS: SHARP;ACHING

## 2020-01-09 ENCOUNTER — HOSPITAL ENCOUNTER (OUTPATIENT)
Dept: MRI IMAGING | Age: 60
Discharge: HOME | End: 2020-01-09
Payer: COMMERCIAL

## 2020-01-09 PROCEDURE — 73721 MRI JNT OF LWR EXTRE W/O DYE: CPT

## 2020-01-10 ENCOUNTER — OFFICE VISIT (OUTPATIENT)
Dept: ORTHOPEDIC SURGERY | Age: 60
End: 2020-01-10
Payer: COMMERCIAL

## 2020-01-10 ENCOUNTER — TELEPHONE (OUTPATIENT)
Dept: ORTHOPEDIC SURGERY | Age: 60
End: 2020-01-10

## 2020-01-10 VITALS
HEIGHT: 64 IN | HEART RATE: 66 BPM | WEIGHT: 293 LBS | RESPIRATION RATE: 16 BRPM | SYSTOLIC BLOOD PRESSURE: 134 MMHG | DIASTOLIC BLOOD PRESSURE: 87 MMHG | BODY MASS INDEX: 50.02 KG/M2

## 2020-01-10 PROCEDURE — 99214 OFFICE O/P EST MOD 30 MIN: CPT | Performed by: ORTHOPAEDIC SURGERY

## 2020-01-10 RX ORDER — NAPROXEN 500 MG/1
500 TABLET ORAL 2 TIMES DAILY WITH MEALS
Qty: 60 TABLET | Refills: 0 | Status: SHIPPED | OUTPATIENT
Start: 2020-01-10 | End: 2020-02-11

## 2020-01-10 NOTE — PROGRESS NOTES
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 HISTORY:  Ms. Amie Schmitt 60 y.o.  female presents today for the first visit for evaluation of left posterior heel pain which started to worsen 5 weeks ago.  She is complaining of achy tender pain that is not improving. Denies any injury or trauma. Pain is increase with standing and walking and shoe wear. Pain is sharp early in the morning with first few steps, dull achy pain by the end of the day. No radiation and no numbness and tingling sensation. No other complaint. She has tried over-the-counter ibuprofen with no improvement. She initially saw her primary care physician, Dr. Herman Lenz who sent her for an MRI and referred her here for further management. She also presented to [facility] on 12/31/2019 was x-rayed and instructed to follow-up with orthopedics. She was a past patient of our office and had bilateral total knee replacements done by Dr. Valerie Fyo.     Past Medical History:   Diagnosis Date    Acquired hyperlipoproteinemia     MARIO (acute kidney injury) ([facility] [address] 9/30/2019    Allergic rhinitis     Anxiety     Arthritis     Bilateral lower extremity edema     Bipolar 1 disorder (HCC)     Chronic pain syndrome     DDD (degenerative disc disease), lumbar     Depression     Depression     Disease of gallbladder     Dizziness     DJD (degenerative joint disease) of hip     Edema 12/29/2009    Elbow tendinitis     Essential hypertension 8/19/2019    Facet syndrome     Fibromyalgia     Fracture of nasal bone     GERD (gastroesophageal reflux disease)     Headache     Hiatal hernia     History of blood transfusion     anemia    Hyperlipidemia     Insomnia     Osteoarthritis     Prediabetes     Rash     Self mutilating behavior     cutter    Sleep apnea     no CPAP    Suicidal ideation     Thyroid disorder     hypothyroidism       Past Surgical History:   Procedure Laterality Date DAILY 60 g 0    rOPINIRole (REQUIP) 1 MG tablet TAKE 2 TABLETS BY MOUTH EVERY NIGHT 180 tablet 0    propranolol (INDERAL) 20 MG tablet Take 20 mg by mouth 3 times daily      QUEtiapine (SEROQUEL) 200 MG tablet Take 200 mg by mouth nightly      lamoTRIgine (LAMICTAL) 150 MG tablet Take 150 mg by mouth nightly  0    FLUoxetine (PROZAC) 20 MG capsule Take 20 mg by mouth daily  3    atomoxetine (STRATTERA) 40 MG capsule Take 40 mg by mouth daily  3    meclizine (ANTIVERT) 25 MG tablet TAKE 1 TABLET BY MOUTH THREE TIMES DAILY AS NEEDED FOR DIZZINESS 30 tablet 0    esomeprazole (NEXIUM) 40 MG delayed release capsule TAKE 1 CAPSULE BY MOUTH EVERY MORNING BEFORE BREAKFAST 90 capsule 0    ondansetron (ZOFRAN) 4 MG tablet TAKE 1 TABLET BY MOUTH EVERY 8 HOURS AS NEEDED FOR NAUSEA OR VOMITING 60 tablet 0    ARIPiprazole (ABILIFY) 10 MG tablet TK 1 T PO QAM  3    furosemide (LASIX) 20 MG tablet Take 1 tablet by mouth daily for 7 days 7 tablet 0     No current facility-administered medications on file prior to visit. Pertinent items are noted in HPI  Review of systems reviewed from Patient History Form dated on 1/10/2020 and available in the patient's chart under the Media tab. No change noted. PHYSICAL EXAMINATION:  Ms. Rafael Arevalo is a very pleasant 60 y.o.  female who presents today in no acute distress, awake, alert, and oriented. She is well dressed, nourished and  groomed. Patient with normal affect. Height is  5' 4\" (1.626 m), weight is (!) 306 lb (138.8 kg), Body mass index is 52.52 kg/m². Resting respiratory rate is 16. Examination of the gait, showed that the patient walks heel-toe with a non-antalgic gait and no limp.  Examination of both ankles showing a good range of motion.  She has dorsiflexion to about 5 degrees bilaterally, which increased with knee flexion.  She has intact sensation and good pedal pulses.  She has good strength in all four planes, including eversion, and has

## 2020-01-10 NOTE — TELEPHONE ENCOUNTER
Patient had additional questions regarding her ankle. She is requesting a call back.     Please call patient:  [phone]

## 2020-01-12 PROBLEM — M92.62 HAGLUND'S DEFORMITY OF LEFT HEEL: Status: ACTIVE | Noted: 2020-01-12

## 2020-01-12 PROBLEM — M76.62 ACHILLES TENDINITIS OF LEFT LOWER EXTREMITY: Status: ACTIVE | Noted: 2020-01-12

## 2020-01-13 RX ORDER — QUINIDINE GLUCONATE 324 MG
TABLET, EXTENDED RELEASE ORAL
Qty: 60 TABLET | Refills: 0 | Status: SHIPPED | OUTPATIENT
Start: 2020-01-13 | End: 2020-02-10

## 2020-01-13 RX ORDER — ESOMEPRAZOLE MAGNESIUM 40 MG/1
CAPSULE, DELAYED RELEASE ORAL
Qty: 90 CAPSULE | Refills: 0 | Status: SHIPPED | OUTPATIENT
Start: 2020-01-13 | End: 2020-04-10

## 2020-01-14 DIAGNOSIS — R60.0 BILATERAL LEG EDEMA: ICD-10-CM

## 2020-01-14 LAB
ANION GAP SERPL CALCULATED.3IONS-SCNC: 12 MMOL/L (ref 3–16)
BUN BLDV-MCNC: 13 MG/DL (ref 7–20)
CALCIUM SERPL-MCNC: 9.3 MG/DL (ref 8.3–10.6)
CHLORIDE BLD-SCNC: 103 MMOL/L (ref 99–110)
CO2: 28 MMOL/L (ref 21–32)
CREAT SERPL-MCNC: 0.8 MG/DL (ref 0.6–1.2)
GFR AFRICAN AMERICAN: >60
GFR NON-AFRICAN AMERICAN: >60
GLUCOSE BLD-MCNC: 77 MG/DL (ref 70–99)
POTASSIUM SERPL-SCNC: 4.5 MMOL/L (ref 3.5–5.1)
SODIUM BLD-SCNC: 143 MMOL/L (ref 136–145)

## 2020-01-15 ENCOUNTER — OFFICE VISIT (OUTPATIENT)
Dept: PAIN MANAGEMENT | Age: 60
End: 2020-01-15
Payer: COMMERCIAL

## 2020-01-15 VITALS
BODY MASS INDEX: 53.21 KG/M2 | WEIGHT: 293 LBS | DIASTOLIC BLOOD PRESSURE: 65 MMHG | HEART RATE: 77 BPM | SYSTOLIC BLOOD PRESSURE: 99 MMHG

## 2020-01-15 PROCEDURE — 99213 OFFICE O/P EST LOW 20 MIN: CPT | Performed by: INTERNAL MEDICINE

## 2020-01-15 RX ORDER — PREGABALIN 100 MG/1
100 CAPSULE ORAL 3 TIMES DAILY
Qty: 90 CAPSULE | Refills: 0 | Status: SHIPPED | OUTPATIENT
Start: 2020-01-15 | End: 2020-02-12 | Stop reason: SDUPTHER

## 2020-01-15 RX ORDER — CELECOXIB 200 MG/1
200 CAPSULE ORAL DAILY
Qty: 30 CAPSULE | Refills: 1 | Status: SHIPPED | OUTPATIENT
Start: 2020-01-15 | End: 2020-02-12 | Stop reason: SDUPTHER

## 2020-01-15 RX ORDER — CELECOXIB 200 MG/1
200 CAPSULE ORAL DAILY
Qty: 90 CAPSULE | OUTPATIENT
Start: 2020-01-15

## 2020-01-15 NOTE — PROGRESS NOTES
[facility]  1960  [id number]      HISTORY OF PRESENT ILLNESS:  Ms. Mary Mendosa is a 60 y.o. female returns for a follow up visit for pain management  She has a diagnosis of   1. Chronic pain syndrome    2. Primary osteoarthritis of both hips    3. Fibromyalgia    4. Lumbar radiculitis    5. Restless legs syndrome (RLS)    6. Primary osteoarthritis of right hip    7. Facet syndrome    8. DDD (degenerative disc disease), lumbar    9. Bilateral leg edema    10. Plantar fasciitis, right    11. Elbow tendinitis    12. Drug-induced constipation    13. Primary insomnia    . She complains of pain in the neck, bilateral shoulders, mid/lower back, left ankle/foot She rates the pain 7/10 and describes it as throbbing. Current treatment regimen has helped relieve about 30% of the pain. She denies any side effects from the current pain regimen. Patient reports that since the last follow up visit the physical functioning is worse, family/social relationships are unchanged, mood is worse sleep patterns are worse, and that the overall functioning is worse. Patient denies misusing/abusing her narcotic pain medications or using any illegal drugs. There are No indicators for possible drug abuse, addiction or diversion problems. Ms. Mary Mendosa states she has been pain in the left heel, \"hurts to walk. \" She reports she is currently not working, she is looking for a new job. She says her back pain is baseline, will increase physical activity she is having increased pain. She mentions she is using Norco, feels it is not helping. Patient says she is using OTC Aleve for her foot. ALLERGIES: Patients list of allergies were reviewed     MEDICATIONS: Ms. Mary Mendosa list of medications were reviewed. Her current medications are   Outpatient Medications Prior to Visit   Medication Sig Dispense Refill    Elastic Bandages & Supports ([facility]) [address] 1 each by Does not apply route daily 20mmHg knee high, closed toe, fit social, family and past medical history was reviewed. REVIEW OF SYSTEMS:    Respiratory: Negative for apnea, chest tightness and shortness of breath or change in baseline breathing. Gastrointestinal: Negative for nausea, vomiting, abdominal pain, diarrhea, constipation, blood in stool and abdominal distention. PHYSICAL EXAM:   Nursing note and vitals reviewed. BP 99/65   Pulse 77   Wt (!) 310 lb (140.6 kg)   LMP 01/15/2014   BMI 53.21 kg/m²   Constitutional: She appears well-developed and well-nourished. No acute distress. Skin: Skin is warm and dry, good turgor. No rash noted. She is not diaphoretic. Cardiovascular: Normal rate, regular rhythm, normal heart sounds, and does not have murmur. Pulmonary/Chest: Effort normal. No respiratory distress. She does not have wheezes in the lung fields. She has no rales. Neurological/Psychiatric:She is alert and oriented to person, place, and time. Coordination is  normal.  Her mood isAppropriate and affect is Flat/blunted and Anxious . Other: excoriation marks  IMPRESSION:   1. Chronic pain syndrome    2. Primary osteoarthritis of both hips    3. Fibromyalgia    4. Lumbar radiculitis    5. Primary osteoarthritis of right hip    6. Facet syndrome    7. DDD (degenerative disc disease), lumbar    8. Plantar fasciitis, right    9. Elbow tendinitis        PLAN:  Informed verbal consent was obtained  -continue with Lyrica 100 TID same dose  -d/c Norco  -start Celebrex 200 mg daily  -She was advised weight reduction, diet changes- 800-1200 jessica diet, diet diary, exercising, nutritional  consult increased physical activity as tolerated   -Most recent labs were reviewed and are within normal limits.  Will repeat them within next 9-12 months if there is no status change      Current Outpatient Medications   Medication Sig Dispense Refill    Elastic Bandages & Supports (MEDICAL COMPRESSION STOCKINGS) MISC 1 each by Does not apply route daily 20mmHg knee

## 2020-01-20 ENCOUNTER — TELEPHONE (OUTPATIENT)
Dept: ORTHOPEDIC SURGERY | Age: 60
End: 2020-01-20

## 2020-01-21 RX ORDER — DEXAMETHASONE SODIUM PHOSPHATE 4 MG/ML
INJECTION, SOLUTION INTRA-ARTICULAR; INTRALESIONAL; INTRAMUSCULAR; INTRAVENOUS; SOFT TISSUE
Qty: 30 ML | Refills: 0 | Status: SHIPPED | OUTPATIENT
Start: 2020-01-21 | End: 2020-03-04

## 2020-01-21 NOTE — TELEPHONE ENCOUNTER
Called and spoke to ΦΑΡΜΑΚΑΣ, informed her that we would like to go ahead and start her with physical therapy and to be done with iontophoresis. Patient in agreement to plan. Order for Pedrito PT in chart and RX sent to pharmacy.

## 2020-01-22 ENCOUNTER — OFFICE VISIT (OUTPATIENT)
Dept: SURGERY | Age: 60
End: 2020-01-22
Payer: COMMERCIAL

## 2020-01-22 VITALS
DIASTOLIC BLOOD PRESSURE: 86 MMHG | SYSTOLIC BLOOD PRESSURE: 152 MMHG | BODY MASS INDEX: 54.58 KG/M2 | WEIGHT: 293 LBS | HEART RATE: 75 BPM

## 2020-01-22 PROCEDURE — 99213 OFFICE O/P EST LOW 20 MIN: CPT | Performed by: NURSE PRACTITIONER

## 2020-01-22 PROCEDURE — 29580 STRAPPING UNNA BOOT: CPT | Performed by: NURSE PRACTITIONER

## 2020-01-22 NOTE — PROGRESS NOTES
Subjective:      Patient ID: Tresa Lake is a 60 y.o. female. Chief Complaint   Patient presents with    Leg Swelling     Patient here for bilateral leg swelling. Pain Assessment  Tresa Lake has a pain level on 0/10 scale:  7  Location:  Chronic pain of shoulder, mid/lower back and left ankle/foot  Description:  throbbing  Radiation:   No  Duration:  4 year(s)  Time:  intermittent    HPI     Edema  The edema is located in the bilateral lower extremities. The patient states the edema is recurrent. She has had the swelling in her lower legs for 1 month. She also reports a left heel bone spur and left Damion's tendonitis. The patient has not undergone any new diagnostic testing. Treatment so far includes: torsemide 60 mg daily. There are no ulcers of her lower extremities. She does have multiple, superficial cuts on her upper extremities. When I asked how those occurred, she replied, \"I am a cutter. \"      Review of Systems   Constitutional: Negative for chills and fever. Cardiovascular: Positive for leg swelling (bilateral). Musculoskeletal: Positive for arthralgias. Skin: Positive for wound (no LE ulcers; she has self-induced small cuts on her bilateral upper extremities). All other systems reviewed and are negative. Allergies   Allergen Reactions    Polysporin [Bacitracin-Polymyxin B] Other (See Comments) and Itching     Pt breaks out in blisters. Pt breaks out in blisters.  Lorazepam Other (See Comments)     \"went crazy\"    Neomycin-Polymyxin-Gramicidin     Sulfa Antibiotics Hives       Prior to Visit Medications    Medication Sig Taking? Authorizing Provider   dexamethasone (DECADRON) 4 MG/ML injection TO BE DONE WITH PHYSICAL THERAPY Yes Maged Ralph MD   pregabalin (LYRICA) 100 MG capsule Take 1 capsule by mouth 3 times daily for 30 days.  Yes Toi Gaytan MD   celecoxib (CELEBREX) 200 MG capsule Take 1 capsule by mouth daily Yes Toi Gaytan MD   Elastic Bandages & Supports (MEDICAL COMPRESSION STOCKINGS) MISC 1 each by Does not apply route daily 20mmHg knee high, closed toe, fit to size Yes MIGUEL ÁNGEL Ortega CNP   esomeprazole (NEXIUM) 40 MG delayed release capsule TAKE 1 CAPSULE BY MOUTH EVERY [address] Yes Tawana Hoang MD   ferrous gluconate (FERGON) 240 (27 Fe) MG tablet TAKE 1 TABLET BY MOUTH TWICE DAILY Yes Tawana Hoang MD   naproxen (NAPROSYN) 500 MG tablet Take 1 tablet by mouth 2 times daily (with meals) Yes Melissa Metcalf MD   vitamin D (ERGOCALCIFEROL) 1.25 MG (27231 UT) CAPS capsule TAKE 1 CAPSULE BY MOUTH 1 TIME A WEEK Yes Tawana Hoang MD   torsemide (DEMADEX) 20 MG tablet TAKE 2 TABLETS BY MOUTH EVERY MORNING FOR 10 DAYS Yes Tawana Hoang MD   levothyroxine (SYNTHROID) 88 MCG tablet TAKE 1 TABLET BY MOUTH DAILY Yes MIGUEL ÁNGEL Ortega CNP   atorvastatin (LIPITOR) 40 MG tablet TAKE 1 TABLET BY MOUTH DAILY Yes MIGUEL ÁNGEL De León CNP   betamethasone dipropionate (DIPROLENE) 0.05 % cream APPLY EXTERNALLY TO THE AFFECTED AREA TWICE DAILY Yes Tawana Hoang MD   rOPINIRole (REQUIP) 1 MG tablet TAKE 2 TABLETS BY MOUTH EVERY NIGHT Yes Tawana Hoang MD   propranolol (INDERAL) 20 MG tablet Take 20 mg by mouth 3 times daily Yes Historical Provider, MD   QUEtiapine (SEROQUEL) 200 MG tablet Take 200 mg by mouth nightly Yes Historical Provider, MD   lamoTRIgine (LAMICTAL) 150 MG tablet Take 150 mg by mouth nightly Yes Historical Provider, MD   FLUoxetine (PROZAC) 20 MG capsule Take 20 mg by mouth daily Yes Historical Provider, MD   atomoxetine (STRATTERA) 40 MG capsule Take 40 mg by mouth daily Yes Historical Provider, MD   meclizine (ANTIVERT) 25 MG tablet TAKE 1 TABLET BY MOUTH THREE TIMES DAILY AS NEEDED FOR DIZZINESS Yes Tawana Hoang MD   ARIPiprazole (ABILIFY) 10 MG tablet TK 1 T PO QAM Yes Historical Provider, MD   ondansetron (ZOFRAN) 4 MG tablet TAKE 1 TABLET BY MOUTH EVERY 8 HOURS AS NEEDED FOR NAUSEA OR applied. PATIENT EDUCATION focused on need for Unna boot therapy for compression. They support vascular problems, help to heal leg ulcers and control leg swelling. The dressings can be worn up to a week before they need changed. Patient must keep the Unna boots dry. Patient has a prescription for compression stockings, but understands that we need to get the edema until control prior to purchasing new compression stockings. The stockings are used to maintain the reduction in LE swelling we typically achieve with the use of Unna boots. Plan:         Return in about 1 week (around 1/29/2020) for [address] [place], [place] [address]Guzman Dykes MA, am scribing for and in the presence of Rizwan Pickard CNP on this date of 01/22/20 at 3:27 PM    I Rizwan Pickard CNP, personally performed the services described in this documentation as scribed by the Medical Assistant Rosa M Dejesus in my presence and it is both accurate and complete.

## 2020-01-24 ENCOUNTER — HOSPITAL ENCOUNTER (OUTPATIENT)
Dept: PHYSICAL THERAPY | Age: 60
Setting detail: THERAPIES SERIES
Discharge: HOME | End: 2020-01-24
Payer: COMMERCIAL

## 2020-01-24 PROCEDURE — 97140 MANUAL THERAPY 1/> REGIONS: CPT | Performed by: PHYSICAL THERAPIST

## 2020-01-24 PROCEDURE — 97162 PT EVAL MOD COMPLEX 30 MIN: CPT | Performed by: PHYSICAL THERAPIST

## 2020-01-24 PROCEDURE — 97110 THERAPEUTIC EXERCISES: CPT | Performed by: PHYSICAL THERAPIST

## 2020-01-24 RX ORDER — ONDANSETRON 4 MG/1
TABLET, FILM COATED ORAL
Qty: 60 TABLET | Refills: 0 | Status: SHIPPED | OUTPATIENT
Start: 2020-01-24 | End: 2020-04-06

## 2020-01-24 NOTE — FLOWSHEET NOTE
Add site info      Physical Therapy Treatment Note/ Progress Report:           Date:  2020    Patient Name:  Kaz Altamirano    :  1960  MRN: [id number]  Restrictions/Precautions:    Medical/Treatment Diagnosis Information:  · Diagnosis: left heel pain - m79.672  · Treatment Diagnosis: left heel pain - H98.863  Insurance/Certification information:     Physician Information:  Referring Practitioner: dr Yesika Mcneill   Has the plan of care been signed (Y/N):        []  Yes  [x]  No     Date of Patient follow up with Physician:     Is this a Progress Report:     []  Yes  [x]  No        If Yes:  Date Range for reporting period:  Beginning   Ending     Progress report will be due (10 Rx or 30 days whichever is less):        Recertification will be due (POC Duration  / 90 days whichever is less):        Visit # Insurance Allowable Auth Required   1   []  Yes []  No        Functional Scale:     Date assessed:        Latex Allergy:  [x]NO      []YES  Preferred Language for Healthcare:   [x]English       []other:      Pain level:  7/10     SUBJECTIVE:  See eval    OBJECTIVE: See eval   Observation:    Test measurements:      RESTRICTIONS/PRECAUTIONS:      Exercises/Interventions:       Therapeutic Ex (05315) Sets/sec Reps Notes/CUES   Seated belt stretch   5x 10\"     Seated crossover belt stretch   5 x 20\"     slt brd   4x    Seated wobble boards   30x each     Heel slides   8x     Prone LL/LD   5'                 Education   7'                      Manual Intervention (56095)      Prom/stm   15'                                                                                                                                             Therapeutic Exercise and NMR EXR  [x] (98673) Provided verbal/tactile cueing for activities related to strengthening, flexibility, endurance, ROM for improvements in LE, proximal hip, and core control with self care, mobility, lifting, ambulation.   [x] (95650) Provided verbal/tactile cueing for activities related to improving balance, coordination, kinesthetic sense, posture, motor skill, proprioception to assist with LE, proximal hip, and core control in self-care, mobility, lifting, ambulation and eccentric single leg control. NMR and Therapeutic Activities:    [x] (17723 or 12654) Provided verbal/tactile cueing for activities related to improving balance, coordination, kinesthetic sense, posture, motor skill, proprioception and motor activation to allow for proper function of core, proximal hip and LE with self-care and ADLs and functional mobility.   [] (53512) Gait Re-education- Provided training and instruction to the patient for proper LE, core and proximal hip recruitment and positioning and eccentric body weight control with ambulation re-education including up and down stairs     Home Exercise Program:    [x] (92123) Reviewed/Progressed HEP activities related to strengthening, flexibility, endurance, ROM of core, proximal hip and LE for functional self-care, mobility, lifting and ambulation/stair navigation   [] (34061) Reviewed/Progressed HEP activities related to improving balance, coordination, kinesthetic sense, posture, motor skill, proprioception of core, proximal hip and LE for self-care, mobility, lifting, and ambulation/stair navigation      Manual Treatments:  PROM / STM / Oscillations-Mobs:  G-I, II, III, IV (PA's, Inf., Post.)  [] (73203) Provided manual therapy to mobilize LE, proximal hip and/or LS spine soft tissue/joints for the purpose of modulating pain, promoting relaxation, increasing ROM, reducing/eliminating soft tissue swelling/inflammation/restriction, improving soft tissue extensibility and allowing for proper ROM for normal function with self-care, mobility, lifting and ambulation. Modalities:     [] GAME READY (VASO)- for significant edema, swelling, pain control.      Charges:  Timed Code Treatment Minutes: 28'    Total Treatment Minutes: 76'

## 2020-01-24 NOTE — PLAN OF CARE
[facility], [address] [facility] [address] [facility], [address] [place] [address] Ave, [address]  Phone: [phone]   Fax:     [phone]                                                       Physical Therapy Certification    Dear Referring Practitioner: dr Jamari Marks ,    We had the pleasure of evaluating the following patient for physical therapy services at [address] [place] [address]. A summary of our findings can be found in the initial assessment below. This includes our plan of care. If you have any questions or concerns regarding these findings, please do not hesitate to contact me at the office phone number checked above.   Thank you for the referral.       Physician Signature:_______________________________Date:__________________  By signing above (or electronic signature), therapists plan is approved by physician      Patient: Chester Altman   : 1960   MRN: [id number]  Referring Physician: Referring Practitioner: dr Jamari Marks       Evaluation Date: 2020      Medical Diagnosis Information:  Diagnosis: left heel pain - m79.672   Treatment Diagnosis: left heel pain - m79.672                                         Insurance information:       Precautions/ Contra-indications:    Latex Allergy:  [x]NO      []YES  Preferred Language for Healthcare:   [x]English       []other:    SUBJECTIVE: Patient stated complaint    Relevant Medical History:  Thyroid disorder  hypothyroidism Provider   Suicidal ideation   Provider   Sleep apnea  no CPAP Provider   Self mutilating behavior  cutter Provider   Rash   Provider   Prediabetes   Provider   Osteoarthritis   Provider   Insomnia   Provider   Hyperlipidemia   Provider   History of blood transfusion  anemia Provider   Hiatal hernia   Provider   Headache   Provider   GERD (gastroesophageal reflux disease)   Provider   Fracture of nasal bone   Provider Fibromyalgia   Provider   Facet syndrome   Provider   Essential hypertension 8/19/2019  Provider   Elbow tendinitis   Provider   Edema 12/29/2009  Provider   DJD (degenerative joint disease) of hip   Provider   Dizziness   Provider   Disease of gallbladder   Provider   Depression   Provider   Depression   Provider   DDD (degenerative disc disease), lumbar   Provider   Chronic pain syndrome   Provider   Bipolar 1 disorder [facility])   Provider   Bilateral lower extremity edema   Provider   Arthritis   Provider   Anxiety   Provider   Allergic rhinitis                                                        Functional Disability Index: 58% lefs   Pain Scale: 7/10    Easing factors: rest  Provocative factors:    Weightbearing     Night Pain:  -  - complained of night pain associated with sleep position. Type:   - Constant        Paresthesia complaints:   - no paresthesia complaints     Functional Limitations/Impairments:   - Standing   - Walking      - Squatting/bending    - Stairs             - ADL's   - Sports/Recreation         Occupation/School:   - sedentary occupation   -Living Status/Prior Level of Function: This patient was independent in ADL's and IADL's prior to onset of symptoms. Sport/recreational activities:     - resistive training   - cardiovascular training        PACEMAKER:  - Denied having a pacemaker that would contraindicate the use of electrical modalities. METAL IMPLANTS:  - Denied metal implants that would contraindicate the use of thermal modalities. CANCER HISTORY:  - Denied a history of cancer that would contraindicate thermal modalities.     OBJECTIVE:        Joint mobility:     Hypo      Palpation: + point tenderness @ achilles (moderate)     Functional Mobility/Transfers:     Posture:     Circumferential Measures:    ROM:      Strength/Neuromuscular control:  4/5 left ankle strength     Bandages/Dressings/Incisions:     Gait: decreased left stance time - moderate     Dermatomal Sensation:  - Dermatomal sensation was intact to light touch bilaterally throughout upper and lower extremities. Deep tendon reflexes:  - DTR's were symmetrical and intact throughout. Orthopedic Special Tests:                [x] Patient history, allergies, meds reviewed. Medical chart reviewed. See intake form. Review Of Systems (ROS):  [x]Performed Review of systems (Integumentary, CardioPulmonary, Neurological) by intake and observation. Intake form has been scanned into medical record. Patient has been instructed to contact their primary care physician regarding ROS issues if not already being addressed at this time. Objective Review of Systems:  Cognitive, Communication, Behavior and Learning:  - The patient was alert, spoke coherently and was oriented to person, place and time. Cardiopulmonary:  Edema:     Integumentary:  Nail beds:  Skin appearance:    Co-morbidities/Complexities (which may affect course of rehabilitation):   []Morbid obesity (E66.01)   []Uncontrolled HTN (I10) []DM type 1(E10.65) or 2 (E11.65) []RA(M05.9)/OA(M19.91)  []None  []other:    MEDICARE CAP EXCEPTION DOCUMENTATION  I certify that this patient meets one of the below criteria necessary for becoming an exception to the Medicare cap on therapy services:  []  The patient has a condition that has a direct and significant impact on the need for therapy. (Significantly impacts the rate of recovery)  []  The patient has a complexity that has a direct and significant impact on the need for therapy. (Significantly impacts the rate of recovery and is associated with a primary condition.)       []  The patient has associated variables that influence the amount of treatment to include:  Social support, self-efficacy/motivation, prognosis, time since onset/acuity. []  The patient has generalized musculoskeletal conditions or a condition affecting multiple sites that will have a direct impact on the rate of recovery.   []  The patient had a prior episode of outpatient therapy during this calendar year for a different condition. []  The patient has a mental or cognitive disorder in addition to the condition being treated that will have a direct and significant impact on the rate of recovery. Falls Risk Assessment (30 days):   [] Falls Risk assessed and no intervention required.   [] Falls Risk assessed and Patient requires intervention due to being higher risk   TUG score (>12s at risk):     [] Falls education provided, including         G-Codes:       ASSESSMENT:    The patient demonstrated at least  % but less than  % impairment, limitation or restriction in:   - walking and moving around (mobility)       Functional Impairments:     [x]Noted lumbar/proximal hip/LE hypomobility   [x]Decreased LE functional ROM   [x]Decreased core/proximal hip strength and neuromuscular control   [x]Decreased LE functional strength   [x]Reduced balance/proprioceptive control   []other:      Functional Activity Limitations (from functional questionnaire and intake)   [x]Reduced ability to tolerate prolonged functional positions   [x]Reduced ability or difficulty with changes of positions or transfers between positions   [x]Reduced ability to maintain good posture and demonstrate good body mechanics with sitting, bending, and lifting   [x]Reduced ability to sleep   [x] Reduced ability or tolerance with driving and/or computer work   [x]Reduced ability to perform lifting, carrying tasks   [x]Reduced ability to squat   [x]Reduced ability to forward bend   [x]Reduced ability to ambulate prolonged functional periods/distances/surfaces   [x]Reduced ability to ascend/descend stairs   [x]Reduced ability to run, hop or jump    Participation Restrictions   []Reduced participation in self care activities   []Reduced participation in home management activities   []Reduced participation in work activities   []Reduced participation in social activities. []Reduced participation in sport activities. Classification :    []Signs/symptoms consistent with post-surgical status including decreased ROM, strength and function. []Signs/symptoms consistent with joint sprain/strain   []Signs/symptoms consistent with patella-femoral syndrome   []Signs/symptoms consistent with knee OA/hip OA   []Signs/symptoms consistent with internal derangement of knee/Hip   []Signs/symptoms consistent with functional hip weakness/NMR control      [x]Signs/symptoms consistent with tendinitis/tendinosis    []signs/symptoms consistent with pathology which may benefit from Dry needling      []other:    Classification :   - ligament or other connective tissue dysfunction. (Pattern 4D)  - localized inflammation.  (Pattern 4E)      Prognosis/Rehab Potential:       - Fair         Factors affecting rehab potential:  -- associated co-morbidities    Tolerance of evaluation/treatment:   -- Good   Physical Therapy Evaluation Complexity Justification  [x] A history of present problem with:  [x] no personal factors and/or comorbidities that impact the plan of care;  []1-2 personal factors and/or comorbidities that impact the plan of care  []3 personal factors and/or comorbidities that impact the plan of care  [x] An examination of body systems using standardized tests and measures addressing any of the following: body structures and functions (impairments), activity limitations, and/or participation restrictions;:  [x] a total of 1-2 or more elements   [] a total of 3 or more elements   [] a total of 4 or more elements   [x] A clinical presentation with:  [] stable and/or uncomplicated characteristics   [x] evolving clinical presentation with changing characteristics  [] unstable and unpredictable characteristics;   [x] Clinical decision making of [] low, [] moderate, [] high complexity using standardized patient assessment instrument and/or measurable assessment of functional

## 2020-01-28 ENCOUNTER — HOSPITAL ENCOUNTER (OUTPATIENT)
Dept: PHYSICAL THERAPY | Age: 60
Setting detail: THERAPIES SERIES
Discharge: HOME | End: 2020-01-28
Payer: COMMERCIAL

## 2020-01-28 PROCEDURE — 97110 THERAPEUTIC EXERCISES: CPT | Performed by: PHYSICAL THERAPIST

## 2020-01-28 PROCEDURE — 97140 MANUAL THERAPY 1/> REGIONS: CPT | Performed by: PHYSICAL THERAPIST

## 2020-01-29 NOTE — FLOWSHEET NOTE
[facility], [address], [address]  Phone: [phone]   Fax:     [phone]        Physical Therapy Treatment Note/ Progress Report:           Date:  2020  Patient Name:  Ronald Tsai    :  1960  MRN: [id number]  Restrictions/Precautions:    Medical/Treatment Diagnosis Information:  · Diagnosis: left heel pain - m79.672  · Treatment Diagnosis: left heel pain - X58.476  Insurance/Certification information:     Physician Information:  Referring Practitioner: dr Dave Lugo   Has the plan of care been signed (Y/N):        []  Yes  [x]  No     Date of Patient follow up with Physician:     Is this a Progress Report:     []  Yes  [x]  No        If Yes:  Date Range for reporting period:  Beginning   Ending     Progress report will be due (10 Rx or 30 days whichever is less):        Recertification will be due (POC Duration  / 90 days whichever is less):        Visit # Insurance Allowable Auth Required   2 caresource (auth required)   []  Yes []  No        Functional Scale:     Date assessed:        Latex Allergy:  [x]NO      []YES  Preferred Language for Healthcare:   [x]English       []other:      Pain level:  4/10     SUBJECTIVE:    \"I am doing a little better. A little sore. Darlean Cork \"     OBJECTIVE:      Observation: moderate tenderness    Test measurements:      RESTRICTIONS/PRECAUTIONS:      Exercises/Interventions:       Therapeutic Ex (04749) Sets/sec Reps Notes/CUES   Seated belt stretch   5x 10\"     Seated crossover belt stretch   5 x 20\"     slt brd   4x    Seated wobble boards   30x each     Heel slides   8x     Prone LL/LD   5'                 Bike   6'                       Manual Intervention (55756)      Prom/stm   15'                                                                                                                                             Therapeutic Exercise and NMR EXR  [x] (96601) Provided verbal/tactile cueing for activities related to strengthening, flexibility, endurance, ROM for improvements in LE, proximal hip, and core control with self care, mobility, lifting, ambulation. [x] (17406) Provided verbal/tactile cueing for activities related to improving balance, coordination, kinesthetic sense, posture, motor skill, proprioception to assist with LE, proximal hip, and core control in self-care, mobility, lifting, ambulation and eccentric single leg control.      NMR and Therapeutic Activities:    [x] (52688 or 00455) Provided verbal/tactile cueing for activities related to improving balance, coordination, kinesthetic sense, posture, motor skill, proprioception and motor activation to allow for proper function of core, proximal hip and LE with self-care and ADLs and functional mobility.   [] (87488) Gait Re-education- Provided training and instruction to the patient for proper LE, core and proximal hip recruitment and positioning and eccentric body weight control with ambulation re-education including up and down stairs     Home Exercise Program:    [x] (56089) Reviewed/Progressed HEP activities related to strengthening, flexibility, endurance, ROM of core, proximal hip and LE for functional self-care, mobility, lifting and ambulation/stair navigation   [] (00343) Reviewed/Progressed HEP activities related to improving balance, coordination, kinesthetic sense, posture, motor skill, proprioception of core, proximal hip and LE for self-care, mobility, lifting, and ambulation/stair navigation      Manual Treatments:  PROM / STM / Oscillations-Mobs:  G-I, II, III, IV (PA's, Inf., Post.)  [] (75083) Provided manual therapy to mobilize LE, proximal hip and/or LS spine soft tissue/joints for the purpose of modulating pain, promoting relaxation, increasing ROM, reducing/eliminating soft tissue swelling/inflammation/restriction, improving soft tissue extensibility and allowing for proper ROM for

## 2020-01-30 ENCOUNTER — HOSPITAL ENCOUNTER (EMERGENCY)
Age: 60
Discharge: HOME | End: 2020-01-31
Attending: EMERGENCY MEDICINE
Payer: COMMERCIAL

## 2020-01-30 ENCOUNTER — HOSPITAL ENCOUNTER (OUTPATIENT)
Dept: PHYSICAL THERAPY | Age: 60
Setting detail: THERAPIES SERIES
Discharge: HOME | End: 2020-01-30
Payer: COMMERCIAL

## 2020-01-30 ENCOUNTER — OFFICE VISIT (OUTPATIENT)
Dept: SURGERY | Age: 60
End: 2020-01-30
Payer: COMMERCIAL

## 2020-01-30 VITALS
HEART RATE: 75 BPM | DIASTOLIC BLOOD PRESSURE: 104 MMHG | WEIGHT: 293 LBS | SYSTOLIC BLOOD PRESSURE: 191 MMHG | BODY MASS INDEX: 52.87 KG/M2

## 2020-01-30 PROCEDURE — 97140 MANUAL THERAPY 1/> REGIONS: CPT | Performed by: PHYSICAL THERAPIST

## 2020-01-30 PROCEDURE — 99285 EMERGENCY DEPT VISIT HI MDM: CPT

## 2020-01-30 PROCEDURE — 29580 STRAPPING UNNA BOOT: CPT | Performed by: NURSE PRACTITIONER

## 2020-01-30 PROCEDURE — 97110 THERAPEUTIC EXERCISES: CPT | Performed by: PHYSICAL THERAPIST

## 2020-01-30 NOTE — PROGRESS NOTES
Subjective:      Patient ID: Benedict Kelly is a 60 y.o. female. Chief Complaint   Patient presents with    Leg Swelling     Patient here for bilateral leg swelling check and Unna Boot change. Pain Assessment  Benedict Kelly has a pain level on 0/10 scale:  8  Location:  Chronic pain of shoulder, mid/lower back and left ankle/foot  Description:  throbbing  Radiation:   No  Duration:  4 year(s)  Time:  intermittent    HPI     Edema  The edema is located in the bilateral lower extremities. The patient states the edema is recurrent. She has had the swelling in her lower legs for 1 month. She also reports a left heel bone spur and left Westgate's tendonitis. The patient has not undergone any new diagnostic testing. Treatment so far includes: bilateral Unna boots and torsemide 60 mg daily. There are no ulcers of her lower extremities. Review of Systems   Constitutional: Negative for chills and fever. Cardiovascular: Positive for leg swelling (improved with bilateral Unna boots). Musculoskeletal: Positive for arthralgias. Skin: Positive for wound (no LE ulcers; she has self-induced small cuts on her bilateral upper extremities). All other systems reviewed and are negative. Allergies   Allergen Reactions    Polysporin [Bacitracin-Polymyxin B] Other (See Comments) and Itching     Pt breaks out in blisters. Pt breaks out in blisters.  Lorazepam Other (See Comments)     \"went crazy\"    Neomycin-Polymyxin-Gramicidin     Sulfa Antibiotics Hives       Prior to Visit Medications    Medication Sig Taking? Authorizing Provider   ondansetron (ZOFRAN) 4 MG tablet TAKE 1 TABLET BY MOUTH EVERY 8 HOURS AS NEEDED FOR NAUSEA OR VOMITING Yes Gabriela Batres MD   dexamethasone (DECADRON) 4 MG/ML injection TO BE DONE WITH PHYSICAL THERAPY Yes Leti Monique MD   pregabalin (LYRICA) 100 MG capsule Take 1 capsule by mouth 3 times daily for 30 days.  Yes Geovany Smith MD   celecoxib (CELEBREX) 200 MG capsule Take 1 capsule by mouth daily Yes Madalyn Boston MD   Elastic Bandages & Supports (MEDICAL COMPRESSION STOCKINGS) [address] 1 each by Does not apply route daily 20mmHg knee high, closed toe, fit to size Yes MIGUEL ÁNGEL Hess CNP   esomeprazole (NEXIUM) 40 MG delayed release capsule TAKE 1 CAPSULE BY MOUTH EVERY [address] Yes Brody Carlos MD   ferrous gluconate (FERGON) 240 (27 Fe) MG tablet TAKE 1 TABLET BY MOUTH TWICE DAILY Yes Brody Carlos MD   naproxen (NAPROSYN) 500 MG tablet Take 1 tablet by mouth 2 times daily (with meals) Yes Kate Negro MD   vitamin D (ERGOCALCIFEROL) 1.25 MG (97950 UT) CAPS capsule TAKE 1 CAPSULE BY MOUTH 1 TIME A WEEK Yes Brody Carlos MD   torsemide (DEMADEX) 20 MG tablet TAKE 2 TABLETS BY MOUTH EVERY MORNING FOR 10 DAYS Yes Brody Carlos MD   levothyroxine (SYNTHROID) 88 MCG tablet TAKE 1 TABLET BY MOUTH DAILY Yes MIGUEL ÁNGEL Hess CNP   atorvastatin (LIPITOR) 40 MG tablet TAKE 1 TABLET BY MOUTH DAILY Yes MIGUEL ÁNGEL De León CNP   betamethasone dipropionate (DIPROLENE) 0.05 % cream APPLY EXTERNALLY TO THE AFFECTED AREA TWICE DAILY Yes Brody Carlos MD   rOPINIRole (REQUIP) 1 MG tablet TAKE 2 TABLETS BY MOUTH EVERY NIGHT Yes Brody Carlos MD   propranolol (INDERAL) 20 MG tablet Take 20 mg by mouth 3 times daily Yes Historical Provider, MD   QUEtiapine (SEROQUEL) 200 MG tablet Take 200 mg by mouth nightly Yes Historical Provider, MD   lamoTRIgine (LAMICTAL) 150 MG tablet Take 150 mg by mouth nightly Yes Historical Provider, MD   FLUoxetine (PROZAC) 20 MG capsule Take 20 mg by mouth daily Yes Historical Provider, MD   atomoxetine (STRATTERA) 40 MG capsule Take 40 mg by mouth daily Yes Historical Provider, MD   meclizine (ANTIVERT) 25 MG tablet TAKE 1 TABLET BY MOUTH THREE TIMES DAILY AS NEEDED FOR DIZZINESS Yes Brody Carlos MD   ARIPiprazole (ABILIFY) 10 MG tablet TK 1 T PO QAM Yes Historical Provider, MD       History

## 2020-01-30 NOTE — PATIENT INSTRUCTIONS
Return for Call the office as needed. Maria M Recinos PATIENT EDUCATION focused on need for continued Unna boot therapy for compression. It supports vascular problems, helps to heal leg ulcers and controls leg swelling. The dressing can be worn up to a week before it needs changed. Patient must keep the Unna boot dry.

## 2020-01-31 ENCOUNTER — APPOINTMENT (OUTPATIENT)
Dept: GENERAL RADIOLOGY | Age: 60
End: 2020-01-31
Payer: COMMERCIAL

## 2020-01-31 VITALS
RESPIRATION RATE: 16 BRPM | HEART RATE: 80 BPM | DIASTOLIC BLOOD PRESSURE: 86 MMHG | HEIGHT: 64 IN | TEMPERATURE: 97.9 F | OXYGEN SATURATION: 98 % | SYSTOLIC BLOOD PRESSURE: 161 MMHG | WEIGHT: 293 LBS | BODY MASS INDEX: 50.02 KG/M2

## 2020-01-31 LAB
ANION GAP SERPL CALCULATED.3IONS-SCNC: 13 MMOL/L (ref 3–16)
BASOPHILS ABSOLUTE: 0.1 K/UL (ref 0–0.2)
BASOPHILS RELATIVE PERCENT: 0.7 %
BUN BLDV-MCNC: 19 MG/DL (ref 7–20)
CALCIUM SERPL-MCNC: 9.6 MG/DL (ref 8.3–10.6)
CHLORIDE BLD-SCNC: 104 MMOL/L (ref 99–110)
CO2: 28 MMOL/L (ref 21–32)
CREAT SERPL-MCNC: 0.7 MG/DL (ref 0.6–1.2)
EKG ATRIAL RATE: 81 BPM
EKG DIAGNOSIS: NORMAL
EKG P AXIS: 44 DEGREES
EKG P-R INTERVAL: 142 MS
EKG Q-T INTERVAL: 390 MS
EKG QRS DURATION: 78 MS
EKG QTC CALCULATION (BAZETT): 453 MS
EKG R AXIS: 27 DEGREES
EKG T AXIS: 13 DEGREES
EKG VENTRICULAR RATE: 81 BPM
EOSINOPHILS ABSOLUTE: 0.2 K/UL (ref 0–0.6)
EOSINOPHILS RELATIVE PERCENT: 3.3 %
GFR AFRICAN AMERICAN: >60
GFR NON-AFRICAN AMERICAN: >60
GLUCOSE BLD-MCNC: 96 MG/DL (ref 70–99)
HCT VFR BLD CALC: 41.7 % (ref 36–48)
HEMOGLOBIN: 13.8 G/DL (ref 12–16)
LYMPHOCYTES ABSOLUTE: 1.7 K/UL (ref 1–5.1)
LYMPHOCYTES RELATIVE PERCENT: 24.1 %
MAGNESIUM: 2.3 MG/DL (ref 1.8–2.4)
MCH RBC QN AUTO: 29.6 PG (ref 26–34)
MCHC RBC AUTO-ENTMCNC: 33.2 G/DL (ref 31–36)
MCV RBC AUTO: 89.3 FL (ref 80–100)
MONOCYTES ABSOLUTE: 0.5 K/UL (ref 0–1.3)
MONOCYTES RELATIVE PERCENT: 7.3 %
NEUTROPHILS ABSOLUTE: 4.7 K/UL (ref 1.7–7.7)
NEUTROPHILS RELATIVE PERCENT: 64.6 %
PDW BLD-RTO: 15.7 % (ref 12.4–15.4)
PLATELET # BLD: 239 K/UL (ref 135–450)
PMV BLD AUTO: 8.4 FL (ref 5–10.5)
POTASSIUM SERPL-SCNC: 3.8 MMOL/L (ref 3.5–5.1)
RBC # BLD: 4.67 M/UL (ref 4–5.2)
SODIUM BLD-SCNC: 145 MMOL/L (ref 136–145)
TROPONIN: <0.01 NG/ML
WBC # BLD: 7.2 K/UL (ref 4–11)

## 2020-01-31 PROCEDURE — 2580000003 HC RX 258: Performed by: EMERGENCY MEDICINE

## 2020-01-31 PROCEDURE — 93010 ELECTROCARDIOGRAM REPORT: CPT | Performed by: INTERNAL MEDICINE

## 2020-01-31 PROCEDURE — 84484 ASSAY OF TROPONIN QUANT: CPT

## 2020-01-31 PROCEDURE — 85025 COMPLETE CBC W/AUTO DIFF WBC: CPT

## 2020-01-31 PROCEDURE — 80048 BASIC METABOLIC PNL TOTAL CA: CPT

## 2020-01-31 PROCEDURE — 93005 ELECTROCARDIOGRAM TRACING: CPT | Performed by: EMERGENCY MEDICINE

## 2020-01-31 PROCEDURE — 71046 X-RAY EXAM CHEST 2 VIEWS: CPT

## 2020-01-31 PROCEDURE — 83735 ASSAY OF MAGNESIUM: CPT

## 2020-01-31 PROCEDURE — 6370000000 HC RX 637 (ALT 250 FOR IP): Performed by: EMERGENCY MEDICINE

## 2020-01-31 RX ORDER — MECLIZINE HYDROCHLORIDE 25 MG/1
25 TABLET ORAL ONCE
Status: COMPLETED | OUTPATIENT
Start: 2020-01-31 | End: 2020-01-31

## 2020-01-31 RX ORDER — PROPRANOLOL HYDROCHLORIDE 20 MG/1
20 TABLET ORAL ONCE
Status: DISCONTINUED | OUTPATIENT
Start: 2020-01-31 | End: 2020-01-31

## 2020-01-31 RX ORDER — 0.9 % SODIUM CHLORIDE 0.9 %
1000 INTRAVENOUS SOLUTION INTRAVENOUS ONCE
Status: COMPLETED | OUTPATIENT
Start: 2020-01-31 | End: 2020-01-31

## 2020-01-31 RX ADMIN — SODIUM CHLORIDE 1000 ML: 9 INJECTION, SOLUTION INTRAVENOUS at 01:17

## 2020-01-31 RX ADMIN — METOPROLOL TARTRATE 50 MG: 25 TABLET ORAL at 03:01

## 2020-01-31 RX ADMIN — MECLIZINE HYDROCHLORIDE 25 MG: 25 TABLET ORAL at 02:37

## 2020-01-31 ASSESSMENT — ENCOUNTER SYMPTOMS
ABDOMINAL PAIN: 0
WHEEZING: 0
SHORTNESS OF BREATH: 1
COLOR CHANGE: 0
VOMITING: 0
STRIDOR: 0
FACIAL SWELLING: 0
TROUBLE SWALLOWING: 0
NAUSEA: 0
VOICE CHANGE: 0

## 2020-01-31 NOTE — FLOWSHEET NOTE
[facility], [address], [address]  Phone: [phone]   Fax:     [phone]        Physical Therapy Treatment Note/ Progress Report:           Date:  2020  Patient Name:  Jeremy Benedict    :  1960  MRN: [id number]  Restrictions/Precautions:    Medical/Treatment Diagnosis Information:  · Diagnosis: left heel pain - m79.672  · Treatment Diagnosis: left heel pain - O32.955  Insurance/Certification information:     Physician Information:  Referring Practitioner: dr Marissa Parra   Has the plan of care been signed (Y/N):        []  Yes  [x]  No     Date of Patient follow up with Physician:     Is this a Progress Report:     []  Yes  [x]  No        If Yes:  Date Range for reporting period:  Beginning   Ending     Progress report will be due (10 Rx or 30 days whichever is less):        Recertification will be due (POC Duration  / 90 days whichever is less):        Visit # Insurance Allowable Auth Required   3 caresource (auth required)   []  Yes []  No        Functional Scale:     Date assessed:        Latex Allergy:  [x]NO      []YES  Preferred Language for Healthcare:   [x]English       []other:      Pain level:  410     SUBJECTIVE:    \"I am feeling a little looser. [place] \"     OBJECTIVE:      Observation: moderate tenderness    Test measurements:      RESTRICTIONS/PRECAUTIONS:      Exercises/Interventions:       Therapeutic Ex (66921) Sets/sec Reps Notes/CUES   Seated belt stretch   5x 10\"     Seated crossover belt stretch   5 x 20\"     slt brd   4x    Seated wobble boards   30x each     Heel slides   8x     Prone LL/LD   5'                 Bike   6'                       Manual Intervention (95732)      Prom/stm   15'                                                                                                                                             Therapeutic Exercise and NMR EXR  [x] (09427) Provided verbal/tactile cueing for activities related to strengthening, flexibility, endurance, ROM for improvements in LE, proximal hip, and core control with self care, mobility, lifting, ambulation. [x] (47974) Provided verbal/tactile cueing for activities related to improving balance, coordination, kinesthetic sense, posture, motor skill, proprioception to assist with LE, proximal hip, and core control in self-care, mobility, lifting, ambulation and eccentric single leg control.      NMR and Therapeutic Activities:    [x] (61187 or 34278) Provided verbal/tactile cueing for activities related to improving balance, coordination, kinesthetic sense, posture, motor skill, proprioception and motor activation to allow for proper function of core, proximal hip and LE with self-care and ADLs and functional mobility.   [] (16555) Gait Re-education- Provided training and instruction to the patient for proper LE, core and proximal hip recruitment and positioning and eccentric body weight control with ambulation re-education including up and down stairs     Home Exercise Program:    [x] (44349) Reviewed/Progressed HEP activities related to strengthening, flexibility, endurance, ROM of core, proximal hip and LE for functional self-care, mobility, lifting and ambulation/stair navigation   [] (14358) Reviewed/Progressed HEP activities related to improving balance, coordination, kinesthetic sense, posture, motor skill, proprioception of core, proximal hip and LE for self-care, mobility, lifting, and ambulation/stair navigation      Manual Treatments:  PROM / STM / Oscillations-Mobs:  G-I, II, III, IV (PA's, Inf., Post.)  [] (06827) Provided manual therapy to mobilize LE, proximal hip and/or LS spine soft tissue/joints for the purpose of modulating pain, promoting relaxation, increasing ROM, reducing/eliminating soft tissue swelling/inflammation/restriction, improving soft tissue extensibility and allowing for proper ROM for normal Adjusted        Overall Progression Towards Functional goals/ Treatment Progress Update:  [] Patient is progressing as expected towards functional goals listed. [] Progression is slowed due to complexities/Impairments listed. [] Progression has been slowed due to co-morbidities. [x] Plan just implemented, too soon to assess goals progression <30days   [] Goals require adjustment due to lack of progress  [] Patient is not progressing as expected and requires additional follow up with physician  [] Other    Prognosis for POC: [x] Good [] Fair  [] Poor      Patient requires continued skilled intervention: [x] Yes  [] No    Treatment/Activity Tolerance:  [x] Patient able to complete treatment  [] Patient limited by fatigue  [] Patient limited by pain    [] Patient limited by other medical complications  [] Other:         Return to Play: (if applicable)   []  Stage 1: Intro to Strength   []  Stage 2: Return to Run and Strength   []  Stage 3: Return to Jump and Strength   []  Stage 4: Dynamic Strength and Agility   []  Stage 5: Sport Specific Training     []  Ready to Return to Play, Meets All Above Stages   []  Not Ready for Return to Sports   Comments:                               PLAN: See eval  [x] Continue per plan of care [] Alter current plan (see comments above)  [] Plan of care initiated [] Hold pending MD visit [] Discharge      Electronically signed by:  Wan Winn PT ,  MPT   Note: If patient does not return for scheduled/ recommended follow up visits, this note will serve as a discharge from care along with most recent update on progress.

## 2020-01-31 NOTE — ED PROVIDER NOTES
Positive for palpitations. Negative for chest pain. Gastrointestinal: Negative for abdominal pain, nausea and vomiting. Genitourinary: Negative for dysuria and vaginal bleeding. Musculoskeletal: Negative for neck pain and neck stiffness. Skin: Negative for color change and wound. Neurological: Positive for light-headedness. Negative for seizures and syncope. Psychiatric/Behavioral: Negative for self-injury and suicidal ideas. Except as noted above the remainder of the review of systems was reviewed and negative.        PAST MEDICAL HISTORY     Past Medical History:   Diagnosis Date    Acquired hyperlipoproteinemia     MARIO (acute kidney injury) ([facility] [address] 9/30/2019    Allergic rhinitis     Anxiety     Arthritis     Bilateral lower extremity edema     Bipolar 1 disorder (HCC)     Chronic pain syndrome     DDD (degenerative disc disease), lumbar     Depression     Depression     Disease of gallbladder     Dizziness     DJD (degenerative joint disease) of hip     Edema 12/29/2009    Elbow tendinitis     Essential hypertension 8/19/2019    Facet syndrome     Fibromyalgia     Fracture of nasal bone     GERD (gastroesophageal reflux disease)     Headache     Hiatal hernia     History of blood transfusion     anemia    Hyperlipidemia     Insomnia     Osteoarthritis     Prediabetes     Rash     Self mutilating behavior     cutter    Sleep apnea     no CPAP    Suicidal ideation     Thyroid disorder     hypothyroidism         SURGICAL HISTORY       Past Surgical History:   Procedure Laterality Date    ABDOMEN SURGERY  5/28/2014    LAPAROSCOPIC SLEEVE GASTRECTOMY, EXTENSIVE LYSIS OF ADHESIONS    ANKLE SURGERY      ANKLE SURGERY      APPENDECTOMY      open , Ex-lap    CHOLECYSTECTOMY      open    COLONOSCOPY  04/16/2013    dr Shima Martinez 2018     DILATION AND CURETTAGE OF UTERUS      ENDOSCOPY, COLON, DIAGNOSTIC      FOOT SURGERY Right 05/27/2016    ARTHROPLASTY RIGHT 5TH DIGIT      HYSTEROSCOPY N/A 12-30-13    Hysteroscopy Dilitation and Curettage    JOINT REPLACEMENT Bilateral     knee    OTHER SURGICAL HISTORY  9/11/2015    ARTHROPLASTY 5TH DIGIT LEFT FOOT          SHOULDER SURGERY      right    SHOULDER SURGERY Right     SLEEVE GASTROPLASTY  May 28, 2014    Rosi    TOTAL KNEE ARTHROPLASTY  12/10/10    Right    TOTAL KNEE ARTHROPLASTY  3/30/10    Left    UPPER GASTROINTESTINAL ENDOSCOPY  4/16/2013    dr Thomas Gama ENDOSCOPY  3/20/2014    dr Ora Louis       Previous Medications    ARIPIPRAZOLE (ABILIFY) 10 MG TABLET    TK 1 T PO QAM    ATOMOXETINE (STRATTERA) 40 MG CAPSULE    Take 40 mg by mouth daily    ATORVASTATIN (LIPITOR) 40 MG TABLET    TAKE 1 TABLET BY MOUTH DAILY    BETAMETHASONE DIPROPIONATE (DIPROLENE) 0.05 % CREAM    APPLY EXTERNALLY TO THE AFFECTED AREA TWICE DAILY    CELECOXIB (CELEBREX) 200 MG CAPSULE    Take 1 capsule by mouth daily    DEXAMETHASONE (DECADRON) 4 MG/ML INJECTION    TO BE DONE WITH PHYSICAL THERAPY    ELASTIC BANDAGES & SUPPORTS (MEDICAL COMPRESSION STOCKINGS) MISC    1 each by Does not apply route daily 20mmHg knee high, closed toe, fit to size    ESOMEPRAZOLE (NEXIUM) 40 MG DELAYED RELEASE CAPSULE    TAKE 1 CAPSULE BY MOUTH EVERY MORNING BEFORE BREAKFAST    FERROUS GLUCONATE (FERGON) 240 (27 FE) MG TABLET    TAKE 1 TABLET BY MOUTH TWICE DAILY    FLUOXETINE (PROZAC) 20 MG CAPSULE    Take 20 mg by mouth daily    LAMOTRIGINE (LAMICTAL) 150 MG TABLET    Take 150 mg by mouth nightly    LEVOTHYROXINE (SYNTHROID) 88 MCG TABLET    TAKE 1 TABLET BY MOUTH DAILY    MECLIZINE (ANTIVERT) 25 MG TABLET    TAKE 1 TABLET BY MOUTH THREE TIMES DAILY AS NEEDED FOR DIZZINESS    NAPROXEN (NAPROSYN) 500 MG TABLET    Take 1 tablet by mouth 2 times daily (with meals)    ONDANSETRON (ZOFRAN) 4 MG TABLET    TAKE 1 TABLET BY MOUTH EVERY 8 HOURS AS NEEDED FOR NAUSEA OR VOMITING    PREGABALIN (LYRICA) 100 MG CAPSULE deficit present. Mental Status: She is alert and oriented to person, place, and time. Cranial Nerves: No cranial nerve deficit. Comments: No facial droop. Normal speech. 5/5 strength in all 4 extremities. Normal gait. Normal sensation equal in all 4 extremities. Negative romberg. Normal finger to nose and heel to shin. DIAGNOSTIC RESULTS     EKG:All EKG's are interpreted by the Emergency Department Physician who either signs or Co-signs this chart in the absence of a cardiologist.    The Ekg interpreted by me shows  sinus arrhythmia with a rate of 81  Axis is   Normal  QTc is  453ms  Intervals and Durations are unremarkable. ST Segments: normal  Mild increase in rate with no other significant changes from previous EKG dated 9/30/19      RADIOLOGY:     Interpretation per the Radiologist below, if available at the time of this note:    XR CHEST STANDARD (2 VW)   Final Result     Mild peribronchial thickening. No consolidation. LABS:  Labs Reviewed   CBC WITH AUTO DIFFERENTIAL - Abnormal; Notable for the following components:       Result Value    RDW 15.7 (*)     All other components within normal limits    Narrative:     Performed at:  [facility]  [employer],  [address]   Phone [phone]   BASIC METABOLIC PANEL    Narrative:     Performed at:  [facility]  [employer]   Phone [phone]   TROPONIN    Narrative:     Performed at:  [facility]  [employer],  [address]   Phone [phone]   MAGNESIUM    Narrative:     Performed at:  [facility]  [employer],  [address]   Phone [phone]       All otherlabs were within normal range or not returned as of this dictation.     EMERGENCY DEPARTMENT COURSE and DIFFERENTIAL

## 2020-02-04 ENCOUNTER — HOSPITAL ENCOUNTER (OUTPATIENT)
Dept: PHYSICAL THERAPY | Age: 60
Setting detail: THERAPIES SERIES
Discharge: HOME | End: 2020-02-04
Payer: COMMERCIAL

## 2020-02-04 PROCEDURE — 97110 THERAPEUTIC EXERCISES: CPT | Performed by: PHYSICAL THERAPIST

## 2020-02-04 PROCEDURE — 97140 MANUAL THERAPY 1/> REGIONS: CPT | Performed by: PHYSICAL THERAPIST

## 2020-02-06 NOTE — FLOWSHEET NOTE
[facility], [address], [address]  Phone: [phone]   Fax:     [phone]        Physical Therapy Treatment Note/ Progress Report:           Date:  2020  Patient Name:  Kaz Altamirano    :  1960  MRN: [id number]  Restrictions/Precautions:    Medical/Treatment Diagnosis Information:  · Diagnosis: left heel pain - m79.672  · Treatment Diagnosis: left heel pain - N50.421  Insurance/Certification information:     Physician Information:  Referring Practitioner: dr Yesika Mcneill   Has the plan of care been signed (Y/N):        []  Yes  [x]  No     Date of Patient follow up with Physician:     Is this a Progress Report:     []  Yes  [x]  No        If Yes:  Date Range for reporting period:  Beginning   Ending     Progress report will be due (10 Rx or 30 days whichever is less):        Recertification will be due (POC Duration  / 90 days whichever is less):        Visit # Insurance Allowable Auth Required   4 caresource (auth required)   []  Yes []  No        Functional Scale:     Date assessed:        Latex Allergy:  [x]NO      []YES  Preferred Language for Healthcare:   [x]English       []other:      Pain level:  4/10     SUBJECTIVE:    \"I am a little more flexible\"    OBJECTIVE:      Observation: moderate point tenderness    Test measurements:      RESTRICTIONS/PRECAUTIONS:      Exercises/Interventions:       Therapeutic Ex (28474) Sets/sec Reps Notes/CUES   Seated belt stretch   5x 10\"     Seated crossover belt stretch   5 x 20\"     slt brd   4x    Seated wobble boards   30x each     Heel slides   8x     Prone LL/LD   5'     Seated ll/ld  5'     Ankle 4 ways   30x mr     Bike   6'                       Manual Intervention (45435)      Prom/stm   15'                                                                                                                                             Therapeutic Exercise and for proper ROM for normal function with self-care, mobility, lifting and ambulation. Modalities:     [] GAME READY (VASO)- for significant edema, swelling, pain control. Charges:  Timed Code Treatment Minutes: 40'    Total Treatment Minutes: 36'       [] EVAL (LOW) 35352 (typically 20 minutes face-to-face)  [] EVAL (MOD) 24231 (typically 30 minutes face-to-face)  [] EVAL (HIGH) 77815 (typically 45 minutes face-to-face)  [] RE-EVAL     [x] QA(46502) x   2 [] IONTO  [] NMR (21689) x     [] VASO  [x] Manual (14510) x   1  [] Other:  [] TA x      [] Mech Traction (61898)  [] ES(attended) (18691)      [] ES (un) (06858):         ASSESSMENT:  See eval      GOALS:   Patient stated goal:      [] Progressing: [] Met: [] Not Met: [] Adjusted    Therapist goals for Patient:   Short Term Goals: To be achieved in: 2 weeks  1. Independent in HEP and progression per patient tolerance, in order to prevent re-injury. [x] Progressing: [] Met: [] Not Met: [] Adjusted   2. Patient will have a decrease in pain to facilitate improvement in movement, function, and ADLs as indicated by Functional Deficits. [x] Progressing: [] Met: [] Not Met: [] Adjusted    Long Term Goals: To be achieved in  8   weeks  - The patient is expected to demonstrate less than  27% impairment, limitation or restriction in:  - walking and moving around (mobility)  - Patient will demonstrate increased passive and active ROM to full, symmetrical and painfree to allow for proper joint functioning as indicated by patients Functional Deficits. [x] Progressing: [] Met: [] Not Met: [] Adjusted  - Patient will demonstrate an increase in Strength to full and painfree to resistance testing to allow for proper functional mobility as indicated by patients Functional Deficits. [x] Progressing: [] Met: [] Not Met: [] Adjusted  - Patient will return to desired, higher level,  functional activities without increased symptoms or restriction.         [x] Progressing: [] Met: [] Not Met: [] Adjusted        Overall Progression Towards Functional goals/ Treatment Progress Update:  [] Patient is progressing as expected towards functional goals listed. [] Progression is slowed due to complexities/Impairments listed. [] Progression has been slowed due to co-morbidities. [x] Plan just implemented, too soon to assess goals progression <30days   [] Goals require adjustment due to lack of progress  [] Patient is not progressing as expected and requires additional follow up with physician  [] Other    Prognosis for POC: [x] Good [] Fair  [] Poor      Patient requires continued skilled intervention: [x] Yes  [] No    Treatment/Activity Tolerance:  [x] Patient able to complete treatment  [] Patient limited by fatigue  [] Patient limited by pain    [] Patient limited by other medical complications  [] Other:         Return to Play: (if applicable)   []  Stage 1: Intro to Strength   []  Stage 2: Return to Run and Strength   []  Stage 3: Return to Jump and Strength   []  Stage 4: Dynamic Strength and Agility   []  Stage 5: Sport Specific Training     []  Ready to Return to Play, Meets All Above Stages   []  Not Ready for Return to Sports   Comments:                               PLAN: See eval  [x] Continue per plan of care [] Alter current plan (see comments above)  [] Plan of care initiated [] Hold pending MD visit [] Discharge      Electronically signed by:  Guillermina Reaves PT ,  MPT   Note: If patient does not return for scheduled/ recommended follow up visits, this note will serve as a discharge from care along with most recent update on progress.

## 2020-02-07 ENCOUNTER — HOSPITAL ENCOUNTER (OUTPATIENT)
Dept: PHYSICAL THERAPY | Age: 60
Setting detail: THERAPIES SERIES
Discharge: HOME | End: 2020-02-07
Payer: COMMERCIAL

## 2020-02-07 PROCEDURE — 97140 MANUAL THERAPY 1/> REGIONS: CPT | Performed by: PHYSICAL THERAPIST

## 2020-02-07 PROCEDURE — 97110 THERAPEUTIC EXERCISES: CPT | Performed by: PHYSICAL THERAPIST

## 2020-02-08 NOTE — FLOWSHEET NOTE
[facility], [address], [address]  Phone: [phone]   Fax:     [phone]        Physical Therapy Treatment Note/ Progress Report:           Date:  2020  Patient Name:  Romulo Canseco    :  1960  MRN: [id number]  Restrictions/Precautions:    Medical/Treatment Diagnosis Information:  · Diagnosis: left heel pain - m79.672  · Treatment Diagnosis: left heel pain - M37.965  Insurance/Certification information:     Physician Information:  Referring Practitioner: dr Yuri Bahena   Has the plan of care been signed (Y/N):        []  Yes  [x]  No     Date of Patient follow up with Physician:     Is this a Progress Report:     []  Yes  [x]  No        If Yes:  Date Range for reporting period:  Beginning   Ending     Progress report will be due (10 Rx or 30 days whichever is less):        Recertification will be due (POC Duration  / 90 days whichever is less):        Visit # Insurance Allowable Auth Required   5 caresource (auth required)   []  Yes []  No        Functional Scale:     Date assessed:        Latex Allergy:  [x]NO      []YES  Preferred Language for Healthcare:   [x]English       []other:      Pain level:  4/10     SUBJECTIVE:    \"still sore sometimes\"    OBJECTIVE:      Observation: moderate point tenderness    Test measurements:      RESTRICTIONS/PRECAUTIONS:      Exercises/Interventions:       Therapeutic Ex (80169) Sets/sec Reps Notes/CUES   Seated belt stretch   5x 10\"     Seated crossover belt stretch   5 x 20\"     slt brd   4x    Seated wobble boards   30x each     Heel slides   8x     Prone LL/LD   5'     Seated ll/ld  5'     Ankle 4 ways   30x mr     Bike   6'                       Manual Intervention (77387)      Prom/stm   15'                                                                                                                                             Therapeutic Exercise and NMR EXR  [x] (14194) Provided verbal/tactile cueing for activities related to strengthening, flexibility, endurance, ROM for improvements in LE, proximal hip, and core control with self care, mobility, lifting, ambulation. [x] (73742) Provided verbal/tactile cueing for activities related to improving balance, coordination, kinesthetic sense, posture, motor skill, proprioception to assist with LE, proximal hip, and core control in self-care, mobility, lifting, ambulation and eccentric single leg control.      NMR and Therapeutic Activities:    [x] (13125 or 58442) Provided verbal/tactile cueing for activities related to improving balance, coordination, kinesthetic sense, posture, motor skill, proprioception and motor activation to allow for proper function of core, proximal hip and LE with self-care and ADLs and functional mobility.   [] (12651) Gait Re-education- Provided training and instruction to the patient for proper LE, core and proximal hip recruitment and positioning and eccentric body weight control with ambulation re-education including up and down stairs     Home Exercise Program:    [x] (30189) Reviewed/Progressed HEP activities related to strengthening, flexibility, endurance, ROM of core, proximal hip and LE for functional self-care, mobility, lifting and ambulation/stair navigation   [] (08898) Reviewed/Progressed HEP activities related to improving balance, coordination, kinesthetic sense, posture, motor skill, proprioception of core, proximal hip and LE for self-care, mobility, lifting, and ambulation/stair navigation      Manual Treatments:  PROM / STM / Oscillations-Mobs:  G-I, II, III, IV (PA's, Inf., Post.)  [] (49836) Provided manual therapy to mobilize LE, proximal hip and/or LS spine soft tissue/joints for the purpose of modulating pain, promoting relaxation, increasing ROM, reducing/eliminating soft tissue swelling/inflammation/restriction, improving soft tissue extensibility and allowing for

## 2020-02-10 RX ORDER — QUINIDINE GLUCONATE 324 MG
TABLET, EXTENDED RELEASE ORAL
Qty: 60 TABLET | Refills: 0 | Status: SHIPPED | OUTPATIENT
Start: 2020-02-10 | End: 2020-03-23

## 2020-02-11 ENCOUNTER — TELEPHONE (OUTPATIENT)
Dept: ORTHOPEDIC SURGERY | Age: 60
End: 2020-02-11

## 2020-02-11 ENCOUNTER — HOSPITAL ENCOUNTER (OUTPATIENT)
Dept: PHYSICAL THERAPY | Age: 60
Setting detail: THERAPIES SERIES
Discharge: HOME | End: 2020-02-11
Payer: COMMERCIAL

## 2020-02-11 PROCEDURE — 97140 MANUAL THERAPY 1/> REGIONS: CPT | Performed by: PHYSICAL THERAPIST

## 2020-02-11 PROCEDURE — 97110 THERAPEUTIC EXERCISES: CPT | Performed by: PHYSICAL THERAPIST

## 2020-02-11 RX ORDER — NAPROXEN 500 MG/1
TABLET ORAL
Qty: 60 TABLET | Refills: 0 | Status: SHIPPED | OUTPATIENT
Start: 2020-02-11 | End: 2020-02-12

## 2020-02-12 ENCOUNTER — OFFICE VISIT (OUTPATIENT)
Dept: PAIN MANAGEMENT | Age: 60
End: 2020-02-12
Payer: COMMERCIAL

## 2020-02-12 VITALS
WEIGHT: 293 LBS | HEART RATE: 62 BPM | SYSTOLIC BLOOD PRESSURE: 127 MMHG | BODY MASS INDEX: 53.04 KG/M2 | DIASTOLIC BLOOD PRESSURE: 72 MMHG

## 2020-02-12 PROCEDURE — 99213 OFFICE O/P EST LOW 20 MIN: CPT | Performed by: INTERNAL MEDICINE

## 2020-02-12 RX ORDER — CELECOXIB 200 MG/1
200 CAPSULE ORAL DAILY
Qty: 30 CAPSULE | Refills: 1 | Status: SHIPPED | OUTPATIENT
Start: 2020-02-12 | End: 2020-05-12 | Stop reason: SDUPTHER

## 2020-02-12 RX ORDER — PREGABALIN 100 MG/1
100 CAPSULE ORAL 3 TIMES DAILY
Qty: 90 CAPSULE | Refills: 1 | Status: SHIPPED | OUTPATIENT
Start: 2020-02-12 | End: 2020-05-12 | Stop reason: SDUPTHER

## 2020-02-13 ENCOUNTER — HOSPITAL ENCOUNTER (OUTPATIENT)
Dept: PHYSICAL THERAPY | Age: 60
Setting detail: THERAPIES SERIES
Discharge: HOME | End: 2020-02-13
Payer: COMMERCIAL

## 2020-02-13 PROCEDURE — 97140 MANUAL THERAPY 1/> REGIONS: CPT | Performed by: PHYSICAL THERAPIST

## 2020-02-13 PROCEDURE — 97035 APP MDLTY 1+ULTRASOUND EA 15: CPT | Performed by: PHYSICAL THERAPIST

## 2020-02-13 RX ORDER — CELECOXIB 200 MG/1
200 CAPSULE ORAL DAILY
Qty: 90 CAPSULE | OUTPATIENT
Start: 2020-02-13

## 2020-02-14 NOTE — FLOWSHEET NOTE
cueing for activities related to strengthening, flexibility, endurance, ROM for improvements in LE, proximal hip, and core control with self care, mobility, lifting, ambulation. [x] (07432) Provided verbal/tactile cueing for activities related to improving balance, coordination, kinesthetic sense, posture, motor skill, proprioception to assist with LE, proximal hip, and core control in self-care, mobility, lifting, ambulation and eccentric single leg control.      NMR and Therapeutic Activities:    [x] (56873 or 50166) Provided verbal/tactile cueing for activities related to improving balance, coordination, kinesthetic sense, posture, motor skill, proprioception and motor activation to allow for proper function of core, proximal hip and LE with self-care and ADLs and functional mobility.   [] (10381) Gait Re-education- Provided training and instruction to the patient for proper LE, core and proximal hip recruitment and positioning and eccentric body weight control with ambulation re-education including up and down stairs     Home Exercise Program:    [x] (25166) Reviewed/Progressed HEP activities related to strengthening, flexibility, endurance, ROM of core, proximal hip and LE for functional self-care, mobility, lifting and ambulation/stair navigation   [] (55042) Reviewed/Progressed HEP activities related to improving balance, coordination, kinesthetic sense, posture, motor skill, proprioception of core, proximal hip and LE for self-care, mobility, lifting, and ambulation/stair navigation      Manual Treatments:  PROM / STM / Oscillations-Mobs:  G-I, II, III, IV (PA's, Inf., Post.)  [] (25323) Provided manual therapy to mobilize LE, proximal hip and/or LS spine soft tissue/joints for the purpose of modulating pain, promoting relaxation, increasing ROM, reducing/eliminating soft tissue swelling/inflammation/restriction, improving soft tissue extensibility and allowing for proper ROM for normal function with self-care, mobility, lifting and ambulation. Modalities:     [] GAME READY (VASO)- for significant edema, swelling, pain control. Charges:  Timed Code Treatment Minutes: 35'    Total Treatment Minutes: 28'       [] EVAL (LOW) 69587 (typically 20 minutes face-to-face)  [] EVAL (MOD) 57286 (typically 30 minutes face-to-face)  [] EVAL (HIGH) 91408 (typically 45 minutes face-to-face)  [] RE-EVAL     [] EF(39715) x    [] IONTO  [] NMR (57365) x     [] VASO  [x] Manual (82822) x   1  [x] Other: u/s - 8' con't (3mhz, 1.3 w/cm2)   [] TA x      [] Mech Traction (22279)  [] ES(attended) (43815)      [] ES (un) (21280):         ASSESSMENT:  See eval      GOALS:   Patient stated goal:      [] Progressing: [] Met: [] Not Met: [] Adjusted    Therapist goals for Patient:   Short Term Goals: To be achieved in: 2 weeks  1. Independent in HEP and progression per patient tolerance, in order to prevent re-injury. [x] Progressing: [] Met: [] Not Met: [] Adjusted   2. Patient will have a decrease in pain to facilitate improvement in movement, function, and ADLs as indicated by Functional Deficits. [x] Progressing: [] Met: [] Not Met: [] Adjusted    Long Term Goals: To be achieved in  8   weeks  - The patient is expected to demonstrate less than  27% impairment, limitation or restriction in:  - walking and moving around (mobility)  - Patient will demonstrate increased passive and active ROM to full, symmetrical and painfree to allow for proper joint functioning as indicated by patients Functional Deficits. [x] Progressing: [] Met: [] Not Met: [] Adjusted  - Patient will demonstrate an increase in Strength to full and painfree to resistance testing to allow for proper functional mobility as indicated by patients Functional Deficits. [x] Progressing: [] Met: [] Not Met: [] Adjusted  - Patient will return to desired, higher level,  functional activities without increased symptoms or restriction.         [x] Progressing: [] Met: [] Not Met: [] Adjusted        Overall Progression Towards Functional goals/ Treatment Progress Update:  [] Patient is progressing as expected towards functional goals listed. [] Progression is slowed due to complexities/Impairments listed. [] Progression has been slowed due to co-morbidities. [x] Plan just implemented, too soon to assess goals progression <30days   [] Goals require adjustment due to lack of progress  [] Patient is not progressing as expected and requires additional follow up with physician  [] Other    Prognosis for POC: [x] Good [] Fair  [] Poor      Patient requires continued skilled intervention: [x] Yes  [] No    Treatment/Activity Tolerance:  [x] Patient able to complete treatment  [] Patient limited by fatigue  [] Patient limited by pain    [] Patient limited by other medical complications  [] Other:         Return to Play: (if applicable)   []  Stage 1: Intro to Strength   []  Stage 2: Return to Run and Strength   []  Stage 3: Return to Jump and Strength   []  Stage 4: Dynamic Strength and Agility   []  Stage 5: Sport Specific Training     []  Ready to Return to Play, Meets All Above Stages   []  Not Ready for Return to Sports   Comments:                               PLAN: See eval  [x] Continue per plan of care [] Alter current plan (see comments above)  [] Plan of care initiated [] Hold pending MD visit [] Discharge      Electronically signed by:  Tony Moore PT ,  MPT   Note: If patient does not return for scheduled/ recommended follow up visits, this note will serve as a discharge from care along with most recent update on progress.

## 2020-02-18 ENCOUNTER — HOSPITAL ENCOUNTER (OUTPATIENT)
Dept: PHYSICAL THERAPY | Age: 60
Setting detail: THERAPIES SERIES
Discharge: HOME | End: 2020-02-18
Payer: COMMERCIAL

## 2020-02-18 PROCEDURE — 97140 MANUAL THERAPY 1/> REGIONS: CPT | Performed by: PHYSICAL THERAPIST

## 2020-02-18 PROCEDURE — 97110 THERAPEUTIC EXERCISES: CPT | Performed by: PHYSICAL THERAPIST

## 2020-02-18 PROCEDURE — 97035 APP MDLTY 1+ULTRASOUND EA 15: CPT | Performed by: PHYSICAL THERAPIST

## 2020-02-19 RX ORDER — BETAMETHASONE DIPROPIONATE 0.5 MG/G
CREAM TOPICAL
Qty: 60 G | Refills: 0 | Status: SHIPPED | OUTPATIENT
Start: 2020-02-19 | End: 2020-03-04

## 2020-02-19 NOTE — FLOWSHEET NOTE
Exercise and NMR EXR  [x] (01921) Provided verbal/tactile cueing for activities related to strengthening, flexibility, endurance, ROM for improvements in LE, proximal hip, and core control with self care, mobility, lifting, ambulation. [x] (19818) Provided verbal/tactile cueing for activities related to improving balance, coordination, kinesthetic sense, posture, motor skill, proprioception to assist with LE, proximal hip, and core control in self-care, mobility, lifting, ambulation and eccentric single leg control.      NMR and Therapeutic Activities:    [x] (49692 or 49909) Provided verbal/tactile cueing for activities related to improving balance, coordination, kinesthetic sense, posture, motor skill, proprioception and motor activation to allow for proper function of core, proximal hip and LE with self-care and ADLs and functional mobility.   [] (91779) Gait Re-education- Provided training and instruction to the patient for proper LE, core and proximal hip recruitment and positioning and eccentric body weight control with ambulation re-education including up and down stairs     Home Exercise Program:    [x] (60177) Reviewed/Progressed HEP activities related to strengthening, flexibility, endurance, ROM of core, proximal hip and LE for functional self-care, mobility, lifting and ambulation/stair navigation   [] (35761) Reviewed/Progressed HEP activities related to improving balance, coordination, kinesthetic sense, posture, motor skill, proprioception of core, proximal hip and LE for self-care, mobility, lifting, and ambulation/stair navigation      Manual Treatments:  PROM / STM / Oscillations-Mobs:  G-I, II, III, IV (PA's, Inf., Post.)  [] (91870) Provided manual therapy to mobilize LE, proximal hip and/or LS spine soft tissue/joints for the purpose of modulating pain, promoting relaxation, increasing ROM, reducing/eliminating soft tissue swelling/inflammation/restriction, improving soft tissue extensibility and allowing for proper ROM for normal function with self-care, mobility, lifting and ambulation. Modalities:     [] GAME READY (VASO)- for significant edema, swelling, pain control. Charges:  Timed Code Treatment Minutes: 43'    Total Treatment Minutes: 43'       [] EVAL (LOW) 96984 (typically 20 minutes face-to-face)  [] EVAL (MOD) 58183 (typically 30 minutes face-to-face)  [] EVAL (HIGH) 56755 (typically 45 minutes face-to-face)  [] RE-EVAL     [x] NK(28143) x   1 [] IONTO  [] NMR (81054) x     [] VASO  [x] Manual (30850) x   1  [x] Other: u/s - 8' con't (3mhz, 1.3 w/cm2)   [] TA x      [] Mech Traction (18429)  [] ES(attended) (39783)      [] ES (un) (86016):         ASSESSMENT:  Tenderness continues but improvement noted       GOALS:   Patient stated goal:      [] Progressing: [] Met: [] Not Met: [] Adjusted    Therapist goals for Patient:   Short Term Goals: To be achieved in: 2 weeks  1. Independent in HEP and progression per patient tolerance, in order to prevent re-injury. [x] Progressing: [] Met: [] Not Met: [] Adjusted   2. Patient will have a decrease in pain to facilitate improvement in movement, function, and ADLs as indicated by Functional Deficits. [x] Progressing: [] Met: [] Not Met: [] Adjusted    Long Term Goals: To be achieved in  8   weeks  - The patient is expected to demonstrate less than  27% impairment, limitation or restriction in:  - walking and moving around (mobility)  - Patient will demonstrate increased passive and active ROM to full, symmetrical and painfree to allow for proper joint functioning as indicated by patients Functional Deficits. [x] Progressing: [] Met: [] Not Met: [] Adjusted  - Patient will demonstrate an increase in Strength to full and painfree to resistance testing to allow for proper functional mobility as indicated by patients Functional Deficits.   [x] Progressing: [] Met: [] Not Met: [] Adjusted  - Patient will return to desired, higher level, functional activities without increased symptoms or restriction. [x] Progressing: [] Met: [] Not Met: [] Adjusted        Overall Progression Towards Functional goals/ Treatment Progress Update:  [] Patient is progressing as expected towards functional goals listed. [] Progression is slowed due to complexities/Impairments listed. [] Progression has been slowed due to co-morbidities. [x] Plan just implemented, too soon to assess goals progression <30days   [] Goals require adjustment due to lack of progress  [] Patient is not progressing as expected and requires additional follow up with physician  [] Other    Prognosis for POC: [x] Good [] Fair  [] Poor      Patient requires continued skilled intervention: [x] Yes  [] No    Treatment/Activity Tolerance:  [x] Patient able to complete treatment  [] Patient limited by fatigue  [] Patient limited by pain    [] Patient limited by other medical complications  [] Other:         Return to Play: (if applicable)   []  Stage 1: Intro to Strength   []  Stage 2: Return to Run and Strength   []  Stage 3: Return to Jump and Strength   []  Stage 4: Dynamic Strength and Agility   []  Stage 5: Sport Specific Training     []  Ready to Return to Play, Meets All Above Stages   []  Not Ready for Return to Sports   Comments:                               PLAN: See eval  [x] Continue per plan of care [] Alter current plan (see comments above)  [] Plan of care initiated [] Hold pending MD visit [] Discharge      Electronically signed by:  Cathleen Gustafson PT ,  MPT   Note: If patient does not return for scheduled/ recommended follow up visits, this note will serve as a discharge from care along with most recent update on progress.

## 2020-02-21 ENCOUNTER — OFFICE VISIT (OUTPATIENT)
Dept: ORTHOPEDIC SURGERY | Age: 60
End: 2020-02-21
Payer: COMMERCIAL

## 2020-02-21 VITALS
HEART RATE: 56 BPM | HEIGHT: 64 IN | BODY MASS INDEX: 50.02 KG/M2 | DIASTOLIC BLOOD PRESSURE: 72 MMHG | RESPIRATION RATE: 16 BRPM | WEIGHT: 293 LBS | SYSTOLIC BLOOD PRESSURE: 115 MMHG

## 2020-02-21 PROCEDURE — 99214 OFFICE O/P EST MOD 30 MIN: CPT | Performed by: ORTHOPAEDIC SURGERY

## 2020-02-21 NOTE — PROGRESS NOTES
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 HISTORY:  Ms. Ladi Kowalski 60 y.o.  female presents today for f/u evaluation of left posterior heel pain which started to worsen Nov 2019.  She is still complaining of sharp pain that is not improving even with PT. Denies any injury or trauma. Pain is increase with standing and walking and shoe wear. Pain is sharp early in the morning with first few steps, dull achy pain by the end of the day. No radiation and no numbness and tingling sensation. No other complaint. She has tried over-the-counter ibuprofen with no improvement. She initially saw her primary care physician, Dr. Dionicio Mcpherson who sent her for an MRI and referred her here for further management. She also presented to [address] [place] [address] ER on 12/31/2019 was x-rayed and instructed to follow-up with orthopedics. She was a past patient of our office and had bilateral total knee replacements done by Dr. Rhett Blanco.     Past Medical History:   Diagnosis Date    Acquired hyperlipoproteinemia     MARIO (acute kidney injury) ([facility] [address] 9/30/2019    Allergic rhinitis     Anxiety     Arthritis     Bilateral lower extremity edema     Bipolar 1 disorder (HCC)     Chronic pain syndrome     DDD (degenerative disc disease), lumbar     Depression     Depression     Disease of gallbladder     Dizziness     DJD (degenerative joint disease) of hip     Edema 12/29/2009    Elbow tendinitis     Essential hypertension 8/19/2019    Facet syndrome     Fibromyalgia     Fracture of nasal bone     GERD (gastroesophageal reflux disease)     Headache     Hiatal hernia     History of blood transfusion     anemia    Hyperlipidemia     Insomnia     Osteoarthritis     Prediabetes     Rash     Self mutilating behavior     cutter    Sleep apnea     no CPAP    Suicidal ideation     Thyroid disorder     hypothyroidism       Past Surgical History:   Procedure Laterality Date    organization: Not on file     Attends meetings of clubs or organizations: Not on file     Relationship status: Not on file    Intimate partner violence:     Fear of current or ex partner: Not on file     Emotionally abused: Not on file     Physically abused: Not on file     Forced sexual activity: Not on file   Other Topics Concern    Not on file   Social History Narrative    Lives by herself with her cat       Family History   Problem Relation Age of Onset    Heart Disease Mother     Heart Failure Mother     High Cholesterol Mother     High Blood Pressure Mother     Stroke Mother     Birth Defects Mother     Other Mother         VV    High Blood Pressure Father     COPD Father     Diabetes Brother     Stroke Brother     Stroke Maternal Grandmother     Arthritis Maternal Grandmother     Heart Failure Brother     Birth Defects Maternal Grandfather     Arthritis Paternal Grandmother        Current Outpatient Medications on File Prior to Visit   Medication Sig Dispense Refill    betamethasone dipropionate (DIPROLENE) 0.05 % cream APPLY EXTERNALLY TO THE AFFECTED AREA TWICE DAILY 60 g 0    pregabalin (LYRICA) 100 MG capsule Take 1 capsule by mouth 3 times daily for 30 days. 90 capsule 1    celecoxib (CELEBREX) 200 MG capsule Take 1 capsule by mouth daily 30 capsule 1    ferrous gluconate (FERGON) 240 (27 Fe) MG tablet TAKE 1 TABLET BY MOUTH TWICE DAILY 60 tablet 0    rOPINIRole (REQUIP) 1 MG tablet TAKE 2 TABLETS BY MOUTH EVERY NIGHT 180 tablet 0    nystatin (MYCOSTATIN) 524681 UNIT/GM cream Apply topically 2 times daily.  30 g 0    ondansetron (ZOFRAN) 4 MG tablet TAKE 1 TABLET BY MOUTH EVERY 8 HOURS AS NEEDED FOR NAUSEA OR VOMITING 60 tablet 0    dexamethasone (DECADRON) 4 MG/ML injection TO BE DONE WITH PHYSICAL THERAPY 30 mL 0    Elastic Bandages & Supports (MEDICAL COMPRESSION STOCKINGS) MISC 1 each by Does not apply route daily 20mmHg knee high, closed toe, fit to size 1 each 0    She has good strength in all four planes, including eversion, and has moderate tenderness on deep palpation over the left posterior calcaneal tubercle, with prominent Lacey's compared to the other side.  The ankles are stable to drawer test bilaterally, equally.      IMAGING:  Xray's were reviewed.  3 views of the left foot taken on 12/31/2019 in [facility], and showed no acute fracture. Lacey's deformity with calcific insertinal Achillis tendinosis. MRI dated 1/9/2020 was reviewed and showed Achilles tendinosis with calcaneal spurring. 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 PLAN: I discussed with the patient the treatment options. We recommended continue stretching exercises of the calf which was taught to the patient today. She will take NSAIDS Naprosyn. Use backless shoes. Since she is continuing to have significant symptoms, I believe she may benefit from surgery left Achillis 2nry repair with suture bridge and partial calcaneus excision. I discussed the risks and benefits of surgery with the patient, including but not limited to infection, bleeding, pain, injury to nerves or blood vessels failure of the surgery and need for additional surgery. All the patient's questions were answered. We discussed an expected post-operative course. She  is understanding of this and wishes to proceed.        Martha Camejo MD

## 2020-02-21 NOTE — LETTER
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

## 2020-02-21 NOTE — LETTER
[facility]  Surgery Scheduling Form:      DEMOGRAPHICS:                                                                                                              .    Patient Name:  Chaz Farris  Patient :  1960   Patient SS#:      Patient Phone:  [phone] (home)  Alt. Patient Phone:    Patient Address:  [place]    PCP:  Satinder Fish MD  Insurance:    Payor/Plan Subscr  Sex Relation Sub. Ins. ID Effective Group Num   1. Sixto NATION 1960 Female  [id number] 5/1/15                                    [address] [address]     Insurance ID Number:  DIAGNOSIS & PROCEDURE:                                                                                            .  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 Secondary repair of achilles tendon [id number], [id number]  Location:  [place]  Surgeon:  Arya Gilliam MD    SCHEDULING INFORMATION:                                                                                         .  Surgeon's Scheduling Instruction:  3/9/2020  Requested Date:  3.9.2020  OR Time:  7:30am Patient Arrival Time:  6:00am  OR Time Required:  60  Minutes  Anesthesia:  General    SA Required:  Yes  Equipment:  Arthrex  Mini C-Arm:  Yes    Standard C-Arm:  No  Status:  Outpatient    PAT Required:  Yes  Latex Allergy:  unknown    Defibrilator or Pacemaker:  unknown  Isolation Precautions:  unknown  Comments: 1501 Josiah Fan MD 20   BILLING INFORMATION:                                                                                                    .    Procedure:       CPT Code Modifier                  Pre-Certification:

## 2020-02-24 RX ORDER — ATORVASTATIN CALCIUM 40 MG/1
TABLET, FILM COATED ORAL
Qty: 90 TABLET | Refills: 0 | Status: SHIPPED | OUTPATIENT
Start: 2020-02-24 | End: 2020-05-26

## 2020-03-06 ENCOUNTER — ANESTHESIA EVENT (OUTPATIENT)
Dept: OPERATING ROOM | Age: 60
End: 2020-03-06
Payer: MEDICARE

## 2020-03-09 ENCOUNTER — HOSPITAL ENCOUNTER (OUTPATIENT)
Age: 60
Setting detail: OUTPATIENT SURGERY
Discharge: HOME | End: 2020-03-09
Attending: ORTHOPAEDIC SURGERY | Admitting: ORTHOPAEDIC SURGERY
Payer: MEDICARE

## 2020-03-09 ENCOUNTER — ANESTHESIA (OUTPATIENT)
Dept: OPERATING ROOM | Age: 60
End: 2020-03-09
Payer: MEDICARE

## 2020-03-09 VITALS
TEMPERATURE: 95.7 F | OXYGEN SATURATION: 94 % | SYSTOLIC BLOOD PRESSURE: 138 MMHG | DIASTOLIC BLOOD PRESSURE: 69 MMHG | RESPIRATION RATE: 11 BRPM

## 2020-03-09 VITALS
TEMPERATURE: 98 F | DIASTOLIC BLOOD PRESSURE: 89 MMHG | HEART RATE: 68 BPM | OXYGEN SATURATION: 96 % | HEIGHT: 64 IN | RESPIRATION RATE: 14 BRPM | BODY MASS INDEX: 50.02 KG/M2 | SYSTOLIC BLOOD PRESSURE: 163 MMHG | WEIGHT: 293 LBS

## 2020-03-09 PROCEDURE — 3700000001 HC ADD 15 MINUTES (ANESTHESIA): Performed by: ORTHOPAEDIC SURGERY

## 2020-03-09 PROCEDURE — 2580000003 HC RX 258: Performed by: ANESTHESIOLOGY

## 2020-03-09 PROCEDURE — 2500000003 HC RX 250 WO HCPCS: Performed by: NURSE ANESTHETIST, CERTIFIED REGISTERED

## 2020-03-09 PROCEDURE — 6360000002 HC RX W HCPCS: Performed by: NURSE ANESTHETIST, CERTIFIED REGISTERED

## 2020-03-09 PROCEDURE — 3600000014 HC SURGERY LEVEL 4 ADDTL 15MIN: Performed by: ORTHOPAEDIC SURGERY

## 2020-03-09 PROCEDURE — 6360000002 HC RX W HCPCS: Performed by: ORTHOPAEDIC SURGERY

## 2020-03-09 PROCEDURE — 7100000010 HC PHASE II RECOVERY - FIRST 15 MIN: Performed by: ORTHOPAEDIC SURGERY

## 2020-03-09 PROCEDURE — 27654 REPAIR OF ACHILLES TENDON: CPT | Performed by: ORTHOPAEDIC SURGERY

## 2020-03-09 PROCEDURE — 7100000000 HC PACU RECOVERY - FIRST 15 MIN: Performed by: ORTHOPAEDIC SURGERY

## 2020-03-09 PROCEDURE — 27654 REPAIR OF ACHILLES TENDON: CPT | Performed by: NURSE PRACTITIONER

## 2020-03-09 PROCEDURE — 3700000000 HC ANESTHESIA ATTENDED CARE: Performed by: ORTHOPAEDIC SURGERY

## 2020-03-09 PROCEDURE — 7100000001 HC PACU RECOVERY - ADDTL 15 MIN: Performed by: ORTHOPAEDIC SURGERY

## 2020-03-09 PROCEDURE — C1713 ANCHOR/SCREW BN/BN,TIS/BN: HCPCS | Performed by: ORTHOPAEDIC SURGERY

## 2020-03-09 PROCEDURE — 3600000004 HC SURGERY LEVEL 4 BASE: Performed by: ORTHOPAEDIC SURGERY

## 2020-03-09 PROCEDURE — 7100000011 HC PHASE II RECOVERY - ADDTL 15 MIN: Performed by: ORTHOPAEDIC SURGERY

## 2020-03-09 PROCEDURE — 28118 REMOVAL OF HEEL BONE: CPT | Performed by: ORTHOPAEDIC SURGERY

## 2020-03-09 PROCEDURE — 2500000003 HC RX 250 WO HCPCS: Performed by: ORTHOPAEDIC SURGERY

## 2020-03-09 PROCEDURE — 2580000003 HC RX 258: Performed by: ORTHOPAEDIC SURGERY

## 2020-03-09 PROCEDURE — 28118 REMOVAL OF HEEL BONE: CPT | Performed by: NURSE PRACTITIONER

## 2020-03-09 PROCEDURE — 2709999900 HC NON-CHARGEABLE SUPPLY: Performed by: ORTHOPAEDIC SURGERY

## 2020-03-09 PROCEDURE — 6360000002 HC RX W HCPCS: Performed by: ANESTHESIOLOGY

## 2020-03-09 DEVICE — PACK SUT ANCHR BIOCOMPOSITE SWIVELOCK CONVENIENCE DRL GUID: Type: IMPLANTABLE DEVICE | Site: ACHILLES TENDON | Status: FUNCTIONAL

## 2020-03-09 RX ORDER — DEXAMETHASONE SODIUM PHOSPHATE 4 MG/ML
INJECTION, SOLUTION INTRA-ARTICULAR; INTRALESIONAL; INTRAMUSCULAR; INTRAVENOUS; SOFT TISSUE PRN
Status: DISCONTINUED | OUTPATIENT
Start: 2020-03-09 | End: 2020-03-09 | Stop reason: SDUPTHER

## 2020-03-09 RX ORDER — HYDROCODONE BITARTRATE AND ACETAMINOPHEN 5; 325 MG/1; MG/1
1 TABLET ORAL EVERY 6 HOURS PRN
Qty: 12 TABLET | Refills: 0 | Status: SHIPPED | OUTPATIENT
Start: 2020-03-09 | End: 2020-03-12

## 2020-03-09 RX ORDER — MIDAZOLAM HYDROCHLORIDE 1 MG/ML
INJECTION INTRAMUSCULAR; INTRAVENOUS PRN
Status: DISCONTINUED | OUTPATIENT
Start: 2020-03-09 | End: 2020-03-09 | Stop reason: SDUPTHER

## 2020-03-09 RX ORDER — FENTANYL CITRATE 50 UG/ML
INJECTION, SOLUTION INTRAMUSCULAR; INTRAVENOUS PRN
Status: DISCONTINUED | OUTPATIENT
Start: 2020-03-09 | End: 2020-03-09 | Stop reason: SDUPTHER

## 2020-03-09 RX ORDER — SODIUM CHLORIDE 9 MG/ML
INJECTION, SOLUTION INTRAVENOUS CONTINUOUS
Status: DISCONTINUED | OUTPATIENT
Start: 2020-03-09 | End: 2020-03-09 | Stop reason: HOSPADM

## 2020-03-09 RX ORDER — ROCURONIUM BROMIDE 10 MG/ML
INJECTION, SOLUTION INTRAVENOUS PRN
Status: DISCONTINUED | OUTPATIENT
Start: 2020-03-09 | End: 2020-03-09 | Stop reason: SDUPTHER

## 2020-03-09 RX ORDER — SODIUM CHLORIDE 0.9 % (FLUSH) 0.9 %
10 SYRINGE (ML) INJECTION PRN
Status: DISCONTINUED | OUTPATIENT
Start: 2020-03-09 | End: 2020-03-09 | Stop reason: HOSPADM

## 2020-03-09 RX ORDER — FENTANYL CITRATE 50 UG/ML
25 INJECTION, SOLUTION INTRAMUSCULAR; INTRAVENOUS EVERY 5 MIN PRN
Status: DISCONTINUED | OUTPATIENT
Start: 2020-03-09 | End: 2020-03-09 | Stop reason: HOSPADM

## 2020-03-09 RX ORDER — FENTANYL CITRATE 50 UG/ML
50 INJECTION, SOLUTION INTRAMUSCULAR; INTRAVENOUS EVERY 5 MIN PRN
Status: DISCONTINUED | OUTPATIENT
Start: 2020-03-09 | End: 2020-03-09 | Stop reason: HOSPADM

## 2020-03-09 RX ORDER — HYDROCODONE BITARTRATE AND ACETAMINOPHEN 5; 325 MG/1; MG/1
1 TABLET ORAL PRN
Status: DISCONTINUED | OUTPATIENT
Start: 2020-03-09 | End: 2020-03-09 | Stop reason: HOSPADM

## 2020-03-09 RX ORDER — ONDANSETRON 2 MG/ML
4 INJECTION INTRAMUSCULAR; INTRAVENOUS
Status: DISCONTINUED | OUTPATIENT
Start: 2020-03-09 | End: 2020-03-09 | Stop reason: HOSPADM

## 2020-03-09 RX ORDER — BUPIVACAINE HYDROCHLORIDE 5 MG/ML
INJECTION, SOLUTION EPIDURAL; INTRACAUDAL
Status: COMPLETED | OUTPATIENT
Start: 2020-03-09 | End: 2020-03-09

## 2020-03-09 RX ORDER — LIDOCAINE HYDROCHLORIDE 20 MG/ML
INJECTION, SOLUTION EPIDURAL; INFILTRATION; INTRACAUDAL; PERINEURAL PRN
Status: DISCONTINUED | OUTPATIENT
Start: 2020-03-09 | End: 2020-03-09 | Stop reason: SDUPTHER

## 2020-03-09 RX ORDER — SODIUM CHLORIDE 0.9 % (FLUSH) 0.9 %
10 SYRINGE (ML) INJECTION EVERY 12 HOURS SCHEDULED
Status: DISCONTINUED | OUTPATIENT
Start: 2020-03-09 | End: 2020-03-09 | Stop reason: HOSPADM

## 2020-03-09 RX ORDER — CEPHALEXIN 500 MG/1
500 CAPSULE ORAL 4 TIMES DAILY
Qty: 20 CAPSULE | Refills: 0 | Status: SHIPPED | OUTPATIENT
Start: 2020-03-09 | End: 2020-03-14

## 2020-03-09 RX ORDER — HYDRALAZINE HYDROCHLORIDE 20 MG/ML
5 INJECTION INTRAMUSCULAR; INTRAVENOUS EVERY 10 MIN PRN
Status: DISCONTINUED | OUTPATIENT
Start: 2020-03-09 | End: 2020-03-09 | Stop reason: HOSPADM

## 2020-03-09 RX ORDER — SUCCINYLCHOLINE/SOD CL,ISO/PF 200MG/10ML
SYRINGE (ML) INTRAVENOUS PRN
Status: DISCONTINUED | OUTPATIENT
Start: 2020-03-09 | End: 2020-03-09 | Stop reason: SDUPTHER

## 2020-03-09 RX ORDER — PROPOFOL 10 MG/ML
INJECTION, EMULSION INTRAVENOUS PRN
Status: DISCONTINUED | OUTPATIENT
Start: 2020-03-09 | End: 2020-03-09 | Stop reason: SDUPTHER

## 2020-03-09 RX ORDER — HYDROCODONE BITARTRATE AND ACETAMINOPHEN 5; 325 MG/1; MG/1
2 TABLET ORAL PRN
Status: DISCONTINUED | OUTPATIENT
Start: 2020-03-09 | End: 2020-03-09 | Stop reason: HOSPADM

## 2020-03-09 RX ORDER — ONDANSETRON 2 MG/ML
INJECTION INTRAMUSCULAR; INTRAVENOUS PRN
Status: DISCONTINUED | OUTPATIENT
Start: 2020-03-09 | End: 2020-03-09 | Stop reason: SDUPTHER

## 2020-03-09 RX ORDER — MEPERIDINE HYDROCHLORIDE 25 MG/ML
12.5 INJECTION INTRAMUSCULAR; INTRAVENOUS; SUBCUTANEOUS ONCE
Status: DISCONTINUED | OUTPATIENT
Start: 2020-03-09 | End: 2020-03-09 | Stop reason: HOSPADM

## 2020-03-09 RX ORDER — PROMETHAZINE HYDROCHLORIDE 25 MG/ML
6.25 INJECTION, SOLUTION INTRAMUSCULAR; INTRAVENOUS
Status: DISCONTINUED | OUTPATIENT
Start: 2020-03-09 | End: 2020-03-09 | Stop reason: HOSPADM

## 2020-03-09 RX ADMIN — PROPOFOL 200 MG: 10 INJECTION, EMULSION INTRAVENOUS at 08:06

## 2020-03-09 RX ADMIN — SODIUM CHLORIDE: 9 INJECTION, SOLUTION INTRAVENOUS at 06:43

## 2020-03-09 RX ADMIN — LIDOCAINE HYDROCHLORIDE 60 MG: 20 INJECTION, SOLUTION EPIDURAL; INFILTRATION; INTRACAUDAL; PERINEURAL at 08:06

## 2020-03-09 RX ADMIN — SUGAMMADEX 200 MG: 100 INJECTION, SOLUTION INTRAVENOUS at 09:24

## 2020-03-09 RX ADMIN — ONDANSETRON 4 MG: 2 INJECTION INTRAMUSCULAR; INTRAVENOUS at 08:02

## 2020-03-09 RX ADMIN — HYDROMORPHONE HYDROCHLORIDE 0.5 MG: 1 INJECTION, SOLUTION INTRAMUSCULAR; INTRAVENOUS; SUBCUTANEOUS at 08:48

## 2020-03-09 RX ADMIN — FENTANYL CITRATE 50 MCG: 50 INJECTION INTRAMUSCULAR; INTRAVENOUS at 08:06

## 2020-03-09 RX ADMIN — ROCURONIUM BROMIDE 5 MG: 10 INJECTION INTRAVENOUS at 08:06

## 2020-03-09 RX ADMIN — FENTANYL CITRATE 50 MCG: 50 INJECTION INTRAMUSCULAR; INTRAVENOUS at 08:28

## 2020-03-09 RX ADMIN — Medication 3 G: at 08:03

## 2020-03-09 RX ADMIN — HYDRALAZINE HYDROCHLORIDE 5 MG: 20 INJECTION INTRAMUSCULAR; INTRAVENOUS at 10:54

## 2020-03-09 RX ADMIN — HYDROMORPHONE HYDROCHLORIDE 0.25 MG: 1 INJECTION, SOLUTION INTRAMUSCULAR; INTRAVENOUS; SUBCUTANEOUS at 08:41

## 2020-03-09 RX ADMIN — HYDROMORPHONE HYDROCHLORIDE 0.25 MG: 1 INJECTION, SOLUTION INTRAMUSCULAR; INTRAVENOUS; SUBCUTANEOUS at 08:54

## 2020-03-09 RX ADMIN — ROCURONIUM BROMIDE 35 MG: 10 INJECTION INTRAVENOUS at 08:28

## 2020-03-09 RX ADMIN — PROPOFOL 30 MG: 10 INJECTION, EMULSION INTRAVENOUS at 09:26

## 2020-03-09 RX ADMIN — PROPOFOL 20 MG: 10 INJECTION, EMULSION INTRAVENOUS at 08:48

## 2020-03-09 RX ADMIN — DEXAMETHASONE SODIUM PHOSPHATE 10 MG: 4 INJECTION, SOLUTION INTRAMUSCULAR; INTRAVENOUS at 08:38

## 2020-03-09 RX ADMIN — MIDAZOLAM 2 MG: 1 INJECTION INTRAMUSCULAR; INTRAVENOUS at 08:02

## 2020-03-09 RX ADMIN — Medication 160 MG: at 08:06

## 2020-03-09 RX ADMIN — HYDRALAZINE HYDROCHLORIDE 5 MG: 20 INJECTION INTRAMUSCULAR; INTRAVENOUS at 10:20

## 2020-03-09 ASSESSMENT — PULMONARY FUNCTION TESTS
PIF_VALUE: 27
PIF_VALUE: 26
PIF_VALUE: 26
PIF_VALUE: 33
PIF_VALUE: 22
PIF_VALUE: 19
PIF_VALUE: 31
PIF_VALUE: 26
PIF_VALUE: 20
PIF_VALUE: 29
PIF_VALUE: 27
PIF_VALUE: 3
PIF_VALUE: 30
PIF_VALUE: 20
PIF_VALUE: 25
PIF_VALUE: 26
PIF_VALUE: 23
PIF_VALUE: 19
PIF_VALUE: 25
PIF_VALUE: 4
PIF_VALUE: 26
PIF_VALUE: 5
PIF_VALUE: 29
PIF_VALUE: 26
PIF_VALUE: 25
PIF_VALUE: 20
PIF_VALUE: 30
PIF_VALUE: 27
PIF_VALUE: 26
PIF_VALUE: 19
PIF_VALUE: 27
PIF_VALUE: 26
PIF_VALUE: 29
PIF_VALUE: 25
PIF_VALUE: 27
PIF_VALUE: 20
PIF_VALUE: 26
PIF_VALUE: 20
PIF_VALUE: 26
PIF_VALUE: 20
PIF_VALUE: 25
PIF_VALUE: 26
PIF_VALUE: 20
PIF_VALUE: 20
PIF_VALUE: 28
PIF_VALUE: 26
PIF_VALUE: 25
PIF_VALUE: 26
PIF_VALUE: 27
PIF_VALUE: 34
PIF_VALUE: 19
PIF_VALUE: 23
PIF_VALUE: 26
PIF_VALUE: 1
PIF_VALUE: 26
PIF_VALUE: 20
PIF_VALUE: 26
PIF_VALUE: 30
PIF_VALUE: 0
PIF_VALUE: 0
PIF_VALUE: 26
PIF_VALUE: 26
PIF_VALUE: 25
PIF_VALUE: 25
PIF_VALUE: 27
PIF_VALUE: 20
PIF_VALUE: 2
PIF_VALUE: 25
PIF_VALUE: 12
PIF_VALUE: 26
PIF_VALUE: 26
PIF_VALUE: 27
PIF_VALUE: 25
PIF_VALUE: 26
PIF_VALUE: 26
PIF_VALUE: 29
PIF_VALUE: 20
PIF_VALUE: 25
PIF_VALUE: 20
PIF_VALUE: 26
PIF_VALUE: 26
PIF_VALUE: 27
PIF_VALUE: 29
PIF_VALUE: 30
PIF_VALUE: 20
PIF_VALUE: 26
PIF_VALUE: 23
PIF_VALUE: 36
PIF_VALUE: 26
PIF_VALUE: 5
PIF_VALUE: 4
PIF_VALUE: 31
PIF_VALUE: 27
PIF_VALUE: 26
PIF_VALUE: 20

## 2020-03-09 ASSESSMENT — PAIN SCALES - GENERAL
PAINLEVEL_OUTOF10: 0

## 2020-03-09 ASSESSMENT — PAIN DESCRIPTION - DESCRIPTORS: DESCRIPTORS: ACHING;DISCOMFORT;SORE

## 2020-03-09 ASSESSMENT — PAIN - FUNCTIONAL ASSESSMENT
PAIN_FUNCTIONAL_ASSESSMENT: PREVENTS OR INTERFERES SOME ACTIVE ACTIVITIES AND ADLS
PAIN_FUNCTIONAL_ASSESSMENT: 0-10

## 2020-03-09 NOTE — PROGRESS NOTES
/92. Pt denies pain. Spoke to Dr. Jamil Dupont re: HTN. Ok to send pt to phase II. Phase II nurse to call Dr. Jamil Dupont if BP starts to elevate again. Will call for phase II room.
/96. Repeated Hydralazine for BP.
/98. Notified Dr. Betsy Colbert. Hydralazine 5 mg IV given per order for HTN.
No answer at phase II. Will call back for post-op room.
Pt is on hold for bed in phase II.
Pt to go to room 1408.
Up to chair. Ramon well. Elevated left leg. Voided per bathroom. Ramon po snack and drink well.
For your safety, please do not wear any jewelry or body piercing's on the day of surgery. All jewelry must be removed. If you have dentures, they will be removed before going to operating room. For your convenience, we will provide you with a container. If you wear contact lenses or glasses, they will be removed, please bring a case for them. If you have a living will and a durable power of  for healthcare, please bring in a copy. As part of our patient safety program to minimize surgical site infections, we ask you to do the following:    · Please notify your surgeon if you develop any illness between         now and the  day of your surgery. · This includes a cough, cold, fever, sore throat, nausea,         or vomiting, and diarrhea, etc.  ·  Please notify your surgeon if you experience dizziness, shortness         of breath or blurred vision between now and the time of your surgery. Do not shave your operative site 96 hours prior to surgery. For face and neck surgery, men may use an electric razor 48 hours   prior to surgery. You may shower the night before surgery or the morning of   your surgery with an antibacterial soap. You will need to bring a photo ID and insurance card    [facility] has an onsite pharmacy, would you like to utilize our pharmacy     If you will be staying overnight and use a C-pap machine, please bring   your C-pap to hospital     Our goal is to provide you with excellent care, therefore, visitors will be limited to two(2) in the room at a time so that we may focus on providing this care for you. Please contact pre-admission testing if you have any further questions. [facility] phone number:  [phone] PAT fax number:  [phone]  Please note these are generalized instructions for all surgical cases, you may be provided with more specific instructions according to your surgery.

## 2020-03-09 NOTE — H&P
GASTROINTESTINAL ENDOSCOPY  4/16/2013    dr Serina Denis ENDOSCOPY  3/20/2014    dr Jaime Frederick     No current facility-administered medications on file prior to encounter. Current Outpatient Medications on File Prior to Encounter   Medication Sig Dispense Refill    pregabalin (LYRICA) 100 MG capsule Take 1 capsule by mouth 3 times daily for 30 days. 90 capsule 1    celecoxib (CELEBREX) 200 MG capsule Take 1 capsule by mouth daily 30 capsule 1    ferrous gluconate (FERGON) 240 (27 Fe) MG tablet TAKE 1 TABLET BY MOUTH TWICE DAILY 60 tablet 0    rOPINIRole (REQUIP) 1 MG tablet TAKE 2 TABLETS BY MOUTH EVERY NIGHT 180 tablet 0    nystatin (MYCOSTATIN) 326193 UNIT/GM cream Apply topically 2 times daily.  30 g 0    ondansetron (ZOFRAN) 4 MG tablet TAKE 1 TABLET BY MOUTH EVERY 8 HOURS AS NEEDED FOR NAUSEA OR VOMITING 60 tablet 0    esomeprazole (NEXIUM) 40 MG delayed release capsule TAKE 1 CAPSULE BY MOUTH EVERY MORNING BEFORE BREAKFAST 90 capsule 0    vitamin D (ERGOCALCIFEROL) 1.25 MG (56522 UT) CAPS capsule TAKE 1 CAPSULE BY MOUTH 1 TIME A WEEK 13 capsule 3    torsemide (DEMADEX) 20 MG tablet TAKE 2 TABLETS BY MOUTH EVERY MORNING FOR 10 DAYS 180 tablet 1    levothyroxine (SYNTHROID) 88 MCG tablet TAKE 1 TABLET BY MOUTH DAILY 90 tablet 0    propranolol (INDERAL) 20 MG tablet Take 20 mg by mouth 3 times daily      QUEtiapine (SEROQUEL) 200 MG tablet Take 200 mg by mouth nightly      lamoTRIgine (LAMICTAL) 150 MG tablet Take 150 mg by mouth nightly  0    FLUoxetine (PROZAC) 20 MG capsule Take 20 mg by mouth daily  3    atomoxetine (STRATTERA) 40 MG capsule Take 40 mg by mouth daily  3    meclizine (ANTIVERT) 25 MG tablet TAKE 1 TABLET BY MOUTH THREE TIMES DAILY AS NEEDED FOR DIZZINESS 30 tablet 0    ARIPiprazole (ABILIFY) 10 MG tablet TK 1 T PO QAM  3       Allergies   Allergen Reactions    Polysporin [Bacitracin-Polymyxin B] Other (See Comments) and Itching     Pt breaks out in

## 2020-03-11 NOTE — OP NOTE
[address] [facility]                                OPERATIVE REPORT    PATIENT NAME: Yessica Quezada                    :        1960  MED REC NO:   [id number]                          ROOM:  ACCOUNT NO:   [de-identified]                           ADMIT DATE: 2020  PROVIDER:     Cain Huerta MD      DATE OF PROCEDURE:  2020    PRIMARY CARE PHYSICIAN:  Maritza Lane MD    PREOPERATIVE DIAGNOSES:  1. Left Achilles distal tendinopathy. 2.  Left calcaneus large Lacey's deformity. POSTOPERATIVE DIAGNOSES:  1. Left Achilles distal tendinopathy. 2.  Left calcaneus large Lacey's deformity. OPERATION PERFORMED:  1. Left Achilles tendon secondary repair using a SutureBridge. 2.  Partial excision of calcaneus. SURGEON:  Cain Huerta MD    ASSISTANT:  Jess Meeks CNP    ANESTHESIA:  General anesthesia. ESTIMATED BLOOD LOSS:  Minimal.    COMPLICATIONS:  None. TOURNIQUET:  Left upper thigh, 350 mmHg. IMPLANTS USED:  Arthrex SutureBridge for secondary repair of the  Achilles tendon. INDICATIONS:  This is a 69-year-old white female morbidly obese with a  BMI of 52, who has been complaining of left posterior heel pain. She  failed nonoperative treatment including physical therapy. All risks,  benefits, and alternatives were discussed with the patient. She elected  to proceed with the surgical treatment. Given the patient's Body mass index is 51.67 kg/m². added significant challenge to the procedure. It required significant physical and mental effort. It required 80% more time for such procedure. OPERATIVE PROCEDURE:  The patient's left ankle was marked front and  back. She received 3 gm of Ancef IV preoperatively given her size. The  patient was then brought to the operating room and underwent general  endotracheal anesthesia.   A well-padded tourniquet was placed, left  upper thigh.  She was then placed in a prone position. All bony  prominences were well padded. At this point, we had the left lower  extremity prepped and draped in regular sterile routine fashion. A  time-out was called, confirming the patient's name, site, and procedure. Esmarch was used for exsanguination and tourniquet was inflated to 350  mmHg. A posterolateral incision was made over the Achilles tendon. Careful dissection was performed identifying and protecting the sural  nerve. The paratenon was kept intact and attached to the subcutaneous  tissue. During the dissection, given the patient's size, she started  having venous bleeder and we elected to let the tourniquet down. Overall, it was significantly challenging, physically and mentally very  difficult given the patient's size. We then re-inflated the tourniquet after using an Esmarch to now 400. That worked for a while but then started to have venous bleeder again  and we elected to proceed without tourniquet. We detached the Achilles  tendon off the posterior aspect of the calcaneus. We then debrided the  Achilles tendon removing all the bad part of the tendon. We then used a  saw to perform a partial excision of the calcaneus down to good bone. We then smoothened up the edges both medially and laterally as well as  the superior aspect removing the Lacey's deformity. At this point, we used an Arthrex SutureBridge. We put the first two  anchors in the upper part of the tuberosity of the calcaneus and then we  brought the Achilles tendon back through the suture back in place and  then we placed other two anchors distally for good stabilization of the  Achilles tendon. After we finished the secondary repair of the Achilles  tendon, we tested the repair. It was found to be very strong. At this point, we irrigated the incision copiously with normal saline  mixed with gentamicin.   We closed the paratenon with 2-0 Vicryl, subcu  with 3-0 Vicryl and skin with 4-0 Monocryl. The Steri-Strips were then  applied. Dressing was then applied in the form of Xeroform, 4x4,  sterile Webril, and a splint was applied. The patient tolerated the procedure well and was taken to the recovery  in stable condition. Cecelia Galarza CNP was 1st Assist given the nature of the procedure that needed advanced assistance. POSTOPERATIVE PLAN:  The patient will be discharged home. She will be  nonweightbearing for at least six weeks.         Serina Zapata MD    D: 03/10/2020 13:40:02       T: 03/10/2020 15:21:19     /[email or web address]  Job#: [id number]     Doc#: [id number]    CC:  Karena Ramirez MD

## 2020-03-20 ENCOUNTER — TELEPHONE (OUTPATIENT)
Dept: ORTHOPEDIC SURGERY | Age: 60
End: 2020-03-20

## 2020-03-20 ENCOUNTER — OFFICE VISIT (OUTPATIENT)
Dept: ORTHOPEDIC SURGERY | Age: 60
End: 2020-03-20
Payer: MEDICARE

## 2020-03-20 VITALS — RESPIRATION RATE: 16 BRPM | WEIGHT: 293 LBS | HEIGHT: 64 IN | BODY MASS INDEX: 50.02 KG/M2

## 2020-03-20 PROCEDURE — 99024 POSTOP FOLLOW-UP VISIT: CPT | Performed by: NURSE PRACTITIONER

## 2020-03-20 PROCEDURE — L4361 PNEUMA/VAC WALK BOOT PRE OTS: HCPCS | Performed by: ORTHOPAEDIC SURGERY

## 2020-03-20 PROCEDURE — APPNB15 APP NON BILLABLE TIME 0-15 MINS: Performed by: NURSE PRACTITIONER

## 2020-03-20 NOTE — TELEPHONE ENCOUNTER
Called and spoke with patient she was confused on when she was supposed to follow up (she thought it was 8 weeks) and to remain NON WB for just 2 more weeks. I let her know per CHANCE Ruiz's note from today 3.20.2020 that she is wanting \"patient to remain NON WB for 6 weeks to work on ROM and to return back in 6 weeks to the office for additional views. \"    Patient understood and we moved her appt from the 8 week jerry to the 6 week jerry from today.

## 2020-03-23 RX ORDER — QUINIDINE GLUCONATE 324 MG
TABLET, EXTENDED RELEASE ORAL
Qty: 60 TABLET | Refills: 0 | Status: SHIPPED | OUTPATIENT
Start: 2020-03-23 | End: 2020-10-09 | Stop reason: ALTCHOICE

## 2020-03-23 RX ORDER — LEVOTHYROXINE SODIUM 88 UG/1
TABLET ORAL
Qty: 90 TABLET | Refills: 0 | Status: SHIPPED | OUTPATIENT
Start: 2020-03-23 | End: 2020-06-22

## 2020-04-06 RX ORDER — ONDANSETRON 4 MG/1
TABLET, FILM COATED ORAL
Qty: 60 TABLET | Refills: 0 | Status: SHIPPED | OUTPATIENT
Start: 2020-04-06 | End: 2020-06-15

## 2020-04-10 RX ORDER — ESOMEPRAZOLE MAGNESIUM 40 MG/1
CAPSULE, DELAYED RELEASE ORAL
Qty: 90 CAPSULE | Refills: 0 | Status: SHIPPED | OUTPATIENT
Start: 2020-04-10 | End: 2020-07-14

## 2020-04-13 ENCOUNTER — TELEPHONE (OUTPATIENT)
Dept: ORTHOPEDIC SURGERY | Age: 60
End: 2020-04-13

## 2020-04-13 NOTE — TELEPHONE ENCOUNTER
Ysitie 30 please advise. Is patient to remain 6 weeks NWB from surgery of from last visit.     Left foot partal Lacey's calcaneus excision, 2ry repair Achillis tendon  DOS: 03/09/2020- Suture Bridge    Last OV 03/30/2020    Thank you

## 2020-04-20 ENCOUNTER — TELEPHONE (OUTPATIENT)
Dept: ORTHOPEDIC SURGERY | Age: 60
End: 2020-04-20

## 2020-04-20 NOTE — TELEPHONE ENCOUNTER
Patient would like to know since she is able to walk on her tip toes can she start driving now? Please call back to discuss.   # [phone]

## 2020-04-27 RX ORDER — BETAMETHASONE DIPROPIONATE 0.5 MG/G
CREAM TOPICAL
Qty: 60 G | Refills: 0 | Status: SHIPPED | OUTPATIENT
Start: 2020-04-27 | End: 2020-07-20

## 2020-04-28 PROBLEM — R30.0 DYSURIA: Status: ACTIVE | Noted: 2020-04-28

## 2020-05-06 ENCOUNTER — OFFICE VISIT (OUTPATIENT)
Dept: ORTHOPEDIC SURGERY | Age: 60
End: 2020-05-06

## 2020-05-06 VITALS — BODY MASS INDEX: 50.02 KG/M2 | RESPIRATION RATE: 16 BRPM | TEMPERATURE: 96.4 F | HEIGHT: 64 IN | WEIGHT: 293 LBS

## 2020-05-06 PROCEDURE — 99024 POSTOP FOLLOW-UP VISIT: CPT | Performed by: NURSE PRACTITIONER

## 2020-05-06 NOTE — PROGRESS NOTES
DIAGNOSIS:  Left foot partal Lacey's calcaneus excision, 2ry repair Achillis tendon. DATE OF SURGERY: 3/9/2020, Suture bridge    HISTORY OF PRESENT ILLNESS:  Ms. Nancy Mathias 60 y.o.  female who came in today for 8 weeks postoperative visit. The patient denies any significant pain in the left heel. She has been in a boot , and TTWB. She rates pain a 0/10 VAS. She has no pain and is very happy with the surgery. No numbness or tingling sensation. No fever or Chills. PHYSICAL EXAMINATION:  The incision healing well . No signs of any erythema or drainage, no swelling. She has no pain with the active or passive range of motion of the left ankle and subtalar, but decrease ROM. She has intact sensation distally, and she is neurovascularly intact. IMAGING:  Two views left calcaneus taken today in the officeshowed removal of Lacey's, no acute fracture. IMPRESSION:  8 weeks out from left foot partal Lacey's calcaneus excision, 2ry repair Achillis tendon, and doing very well. PLAN: She will be WBAT in the boot and gradually decrease each level heel lift, and work on ROM and strengthening exercises. Off the boot in 1-2 weeks. No heavy impact activities. I discussed with the patient that I think that she would really benefit from a course of physical therapy for further strengthening and stretching. An Rx for physical therapy was given to the patient. The patient will come back for a follow up in 6 weeks. At that time, we will take 2 views of the left calcaneus.        Maria L Williamson, APRN - CNP

## 2020-05-12 ENCOUNTER — OFFICE VISIT (OUTPATIENT)
Dept: PAIN MANAGEMENT | Age: 60
End: 2020-05-12
Payer: COMMERCIAL

## 2020-05-12 VITALS — SYSTOLIC BLOOD PRESSURE: 101 MMHG | DIASTOLIC BLOOD PRESSURE: 61 MMHG | HEART RATE: 63 BPM | TEMPERATURE: 97.8 F

## 2020-05-12 PROCEDURE — 99213 OFFICE O/P EST LOW 20 MIN: CPT | Performed by: INTERNAL MEDICINE

## 2020-05-12 RX ORDER — CELECOXIB 200 MG/1
200 CAPSULE ORAL DAILY
Qty: 90 CAPSULE | OUTPATIENT
Start: 2020-05-12

## 2020-05-12 RX ORDER — PREGABALIN 100 MG/1
100 CAPSULE ORAL 3 TIMES DAILY
Qty: 90 CAPSULE | Refills: 1 | Status: SHIPPED | OUTPATIENT
Start: 2020-05-12 | End: 2020-06-09 | Stop reason: SDUPTHER

## 2020-05-12 RX ORDER — TRAMADOL HYDROCHLORIDE 50 MG/1
50 TABLET ORAL EVERY 6 HOURS PRN
Qty: 50 TABLET | Refills: 0 | Status: SHIPPED | OUTPATIENT
Start: 2020-05-12 | End: 2020-06-09 | Stop reason: SDUPTHER

## 2020-05-12 RX ORDER — CELECOXIB 200 MG/1
200 CAPSULE ORAL DAILY
Qty: 30 CAPSULE | Refills: 1 | Status: SHIPPED | OUTPATIENT
Start: 2020-05-12 | End: 2020-06-09 | Stop reason: SDUPTHER

## 2020-05-12 NOTE — PROGRESS NOTES
BREAKFAST 90 capsule 0    ondansetron (ZOFRAN) 4 MG tablet TAKE 1 TABLET BY MOUTH EVERY 8 HOURS AS NEEDED FOR NAUSEA OR VOMITING 60 tablet 0    levothyroxine (SYNTHROID) 88 MCG tablet TAKE 1 TABLET BY MOUTH DAILY 90 tablet 0    ferrous gluconate (FERGON) 240 (27 Fe) MG tablet TAKE 1 TABLET BY MOUTH TWICE DAILY 60 tablet 0    atorvastatin (LIPITOR) 40 MG tablet TAKE 1 TABLET BY MOUTH DAILY 90 tablet 0    celecoxib (CELEBREX) 200 MG capsule Take 1 capsule by mouth daily 30 capsule 1    nystatin (MYCOSTATIN) 516744 UNIT/GM cream Apply topically 2 times daily. 30 g 0    vitamin D (ERGOCALCIFEROL) 1.25 MG (28412 UT) CAPS capsule TAKE 1 CAPSULE BY MOUTH 1 TIME A WEEK 13 capsule 3    torsemide (DEMADEX) 20 MG tablet TAKE 2 TABLETS BY MOUTH EVERY MORNING FOR 10 DAYS 180 tablet 1    propranolol (INDERAL) 20 MG tablet Take 20 mg by mouth 3 times daily      QUEtiapine (SEROQUEL) 200 MG tablet Take 200 mg by mouth nightly      lamoTRIgine (LAMICTAL) 150 MG tablet Take 150 mg by mouth nightly  0    FLUoxetine (PROZAC) 20 MG capsule Take 20 mg by mouth daily  3    atomoxetine (STRATTERA) 40 MG capsule Take 40 mg by mouth daily  3    meclizine (ANTIVERT) 25 MG tablet TAKE 1 TABLET BY MOUTH THREE TIMES DAILY AS NEEDED FOR DIZZINESS 30 tablet 0    ARIPiprazole (ABILIFY) 10 MG tablet TK 1 T PO QAM  3    pregabalin (LYRICA) 100 MG capsule Take 1 capsule by mouth 3 times daily for 30 days. 90 capsule 1     No current facility-administered medications for this visit. I will continue her current medication regimen  which is part of the above treatment schedule. It has been helping with Ms. Yap's chronic  medical problems which for this visit include:   Diagnoses of Chronic pain syndrome, Primary osteoarthritis of both hips, Fibromyalgia, Lumbar radiculitis, DDD (degenerative disc disease), lumbar, Plantar fasciitis, right, Elbow tendinitis, Restless legs syndrome (RLS), and Facet syndrome were pertinent to this

## 2020-05-14 ENCOUNTER — HOSPITAL ENCOUNTER (OUTPATIENT)
Dept: PHYSICAL THERAPY | Age: 60
Setting detail: THERAPIES SERIES
Discharge: HOME | End: 2020-05-14
Payer: COMMERCIAL

## 2020-05-14 PROCEDURE — 97110 THERAPEUTIC EXERCISES: CPT

## 2020-05-14 PROCEDURE — 97161 PT EVAL LOW COMPLEX 20 MIN: CPT

## 2020-05-14 PROCEDURE — 97530 THERAPEUTIC ACTIVITIES: CPT

## 2020-05-14 NOTE — PROGRESS NOTES
Physical Therapy  Initial Assessment  Date: 2020  Patient Name: Mattie Iraheta  MRN: [id number]  : 1960     Treatment Diagnosis: L ankle pain, dec ROM, dec strength, dec balance, dec gait     Restrictions  Position Activity Restriction  Other position/activity restrictions: mod fall risk - pt denies falls but reports LOB when amb without boot due to weakness    Subjective   General  Chart Reviewed: Yes  Patient assessed for rehabilitation services?: Yes  Additional Pertinent Hx: anxiety, bipolar, HTN, B TKR, R shoulder sx, fibromyalgia, depression/suicidal   Family / Caregiver Present: No  Referring Practitioner: Dr. Gudelia Ruelas  Referral Date : 20  Diagnosis: L achilles tendonitis   General Comment  Comments: pt owns walker ( advised amb with walker if needed initially due to concerns with LOB when initially amb without boot)   PT Visit Information  Onset Date: 20  PT Insurance Information: Anisha Nelson  Total # of Visits Approved: 8  Total # of Visits to Date: 1  Progress Note Counter: 1/10  Subjective  Subjective: Pt with L ankle surgery on 3/9/20 to remove bone spur and Achilles repair. She is currently amb with boot, has weaned off the lifts and is ok to wean out of the boot as tolerated. Pain is 1-2/10 at rest since weaning off lifts, 4/10 with walking in boot and without boot. Pt notes using B crutches right now with amb without boot; elevation daily, but not icing; denies N/T.       Objective     Observation/Palpation  Palpation: TTP bottom of L heel   Observation: amb without boot to table with hand held assist; incision healing well with small internal suture coming out distal incision     AROM LLE (degrees)  L Ankle Dorsiflexion 0-20: +5 GS/ + 8 soleus  L Ankle Plantar Flexion 0-45: 35  L Ankle Forefoot Inversion 0-40: 30  L Ankle Forefoot Eversion 0-20: 20    Strength LLE  L Ankle Dorsiflexion: 5/5  L Ankle Plantar Flexion: 4+/5(manual resistance)  L Ankle Inversion: 5/5  L Ankle

## 2020-05-14 NOTE — FLOWSHEET NOTE
COVID-19 pandemic. Pt notes she just spoke to her therapist last Friday 5/8 but her therapist is unaware of her current emotional status and hasn't told her everything. PT encouraged pt to inform her therapist at her next session/call of her current suicidal thoughts and that pt might need to be seen by MD more consistently than every 3-4 months. Exercises:  Exercise/Equipment Resistance/Repetitions Other comments    * exercise with shoe on - pt called and notified to bring gym shoe next session  3/9/20 - removal of bone spur and Achilles repair L  * ok to wean off boot/AD as amaya   Rec Bike      GSS incline  Sol str incline     Airex:  S/s wt shifts  Diagonal wt shifts  NBOS balance      Tandem balance     SLS     HR/TR     Mini squat     Baps PWB     Step up  Step down     Gait over cones           TB 4 way           STM post Achilles and heel     CP At home per pt       Other Therapeutic Activities:  Pt was educated on PT POC, Diagnosis, Prognosis, pathomechanics as well as frequency and duration of scheduling future physical therapy appointments. Time was also taken on this day to answer all patient questions and participation in PT. Reviewed appointment policy in detail with patient and patient verbalized understanding. Home Exercise Program: Patient was instructed in the following for HEP: AP, inv/evr, cw/ccw, ABCs, toe flex/ext, towel calf str, can str/roll, icing daily . Patient verbalized/demonstrated understanding and was issued written handout. Charges: Therapeutic Exercise:  [x] (81954) Provided verbal/tactile cueing for activities to restore or maintain strength, flexibility, endurance, ROM for improvements with self-care, mobility, lifting and ambulation.     Neuromuscular Re-Education  [] (50029) Provided verbal/tactile cueing for activities to restore or maintain balance, coordination, kinesthetic sense, posture, motor skill, proprioception for self-care, mobility, lifting, and

## 2020-05-26 ENCOUNTER — HOSPITAL ENCOUNTER (OUTPATIENT)
Dept: PHYSICAL THERAPY | Age: 60
Setting detail: THERAPIES SERIES
Discharge: HOME | End: 2020-05-26
Payer: COMMERCIAL

## 2020-05-26 PROCEDURE — 97110 THERAPEUTIC EXERCISES: CPT

## 2020-05-26 RX ORDER — ATORVASTATIN CALCIUM 40 MG/1
TABLET, FILM COATED ORAL
Qty: 90 TABLET | Refills: 0 | Status: SHIPPED | OUTPATIENT
Start: 2020-05-26 | End: 2020-08-19

## 2020-05-28 ENCOUNTER — HOSPITAL ENCOUNTER (OUTPATIENT)
Dept: PHYSICAL THERAPY | Age: 60
Setting detail: THERAPIES SERIES
Discharge: HOME | End: 2020-05-28
Payer: COMMERCIAL

## 2020-05-28 PROCEDURE — 97110 THERAPEUTIC EXERCISES: CPT

## 2020-05-28 NOTE — FLOWSHEET NOTE
therapist weekly. She notes that the weekly therapy sessions are via the telephone during the COVID-19 pandemic. Pt notes she just spoke to her therapist last Friday 5/8 but her therapist is unaware of her current emotional status and hasn't told her everything. PT encouraged pt to inform her therapist at her next session/call of her current suicidal thoughts and that pt might need to be seen by MD more consistently than every 3-4 months. Exercises:  Exercise/Equipment Resistance/Repetitions Other comments     3/9/20 - removal of bone spur and Achilles repair L  * ok to wean off boot/AD as amaya   NuStep  L1 x 5 min     GSS incline 3 x 20 sec     Airex:  S/s wt shifts  Diagonal wt shifts  NBOS balance    X 20  X 20   X 30 sec     Tandem balance X 20 sec L/R    SLS 3 x 10 sec L    HR/TR X 10 with R help for HR    Mini squat X 10     Baps PWB L2 4 way x 10 each     Step up  Step down 4\"x 10   4\" x 5          Leg press  add             Line walk   Retro walk  Cross over walk/carioca  Length of ll bars x 2  X 2  X 2  P) add to hep     Gumdrop L foot cw/ccw x 10 each     TB 4 way  Lime grn TB x 20 each Lime green TB issued for home 5/26         STM post Achilles and heel Not needed - no TTP today and minimal pain  * pt showing signs of possible beginning stages of cellulitis B (edema, redness and weeping skin); pt advised to contact PCP - 5/28/20 -receiving treatment    CP X 15 min      Other Therapeutic Activities:  Pt was educated on PT POC, Diagnosis, Prognosis, pathomechanics as well as frequency and duration of scheduling future physical therapy appointments. Time was also taken on this day to answer all patient questions and participation in PT. Reviewed appointment policy in detail with patient and patient verbalized understanding. Home Exercise Program: Patient was instructed in the following for HEP: AP, inv/evr, cw/ccw, ABCs, toe flex/ext, towel calf str, can str/roll, icing daily .  Patient [phone]) x   3  [] Aquatic (92088) x  [] NMR (75424)   x  [] Aquatic Group (75069) x  [] Manual (83294) x    [] Ultrasound (02992) x  [] TA (55436) x [] Mech Traction (08552)  [] Ionto (76262)           [] ES (un) (13783):   [] Vasopump (16376) [] Other:      Assessment:  [x] Patient tolerated treatment well [] Patient limited by fatigue  [] Patient limited by pain  [] Patient limited by other medical complications  [] Other:     Prognosis: [x] Good [] Fair  [] Poor    Goals:    Short term goals  Time Frame for Short term goals: 2 wks  Short term goal 1: overall pain improvement of 20-30% per pt   Short term goal 2: pt independent with hep   Short term goal 3: pt amb without boot with AD demonstrating good tech       Long term goals  Time Frame for Long term goals : discharge  Long term goal 1: overall pain improvement of 50-60% per pt for ease with standing/WB amaya   Long term goal 2: DF ROM to 10, inv to 35 for improved gait tech   Long term goal 3: pt amb short distances without AD and cane for long ambulation      Patient Requires Follow-up: [x] Yes  [] No    Plan:   [x] Continue per plan of care [] Alter current plan (see comments)  [] Plan of care initiated [] Hold pending MD visit [] Discharge    Plan for Next Session:  Add above as stated; add above as indicated/ leg press    Electronically signed by:  Jeff Murray, [id number]

## 2020-06-02 ENCOUNTER — HOSPITAL ENCOUNTER (OUTPATIENT)
Dept: PHYSICAL THERAPY | Age: 60
Setting detail: THERAPIES SERIES
Discharge: HOME | End: 2020-06-02
Payer: COMMERCIAL

## 2020-06-02 PROCEDURE — 97110 THERAPEUTIC EXERCISES: CPT

## 2020-06-02 NOTE — FLOWSHEET NOTE
Physical Therapy Daily Treatment Note  Date:  2020    Patient Name:  Ephraim Walker    :  1960  MRN: [id number]    Restrictions/Precautions: Position Activity Restriction  Other position/activity restrictions: mod fall risk - pt denies falls but reports LOB when amb without boot due to weakness    Pertinent Medical History: Additional Pertinent Hx: anxiety, bipolar, HTN, B TKR, R shoulder sx, fibromyalgia, depression/suicidal     Medical/Treatment Diagnosis Information:  · Diagnosis: L achilles tendonitis   · Treatment Diagnosis: L ankle pain, dec ROM, dec strength, dec balance, dec gait     Insurance/Certification information:  PT Insurance Information: [employer] - requires precert after eval; auth 8 visits -20  Physician Information:  Referring Practitioner: Dr. Qi Orellana of care signed (Y/N):  Sent to inbox    Visit# / total visits:    Pain level: 1-2/10     Functional Outcomes Measure: at eval  Test: LEFS  Score: 34    History of Injury: Subjective  Subjective: Pt with L ankle surgery on 3/9/20 to remove bone spur and Achilles repair. She is currently amb with boot, has weaned off the lifts and is ok to wean out of the boot as tolerated. Pain is 1-2/10 at rest since weaning off lifts, 4/10 with walking in boot and without boot. Pt notes using B crutches right now with amb without boot; elevation daily, but not icing; denies N/T. Subjective:   Walked around [place] for about 1 hour yesterday and the bottom of her heel is a little sore today. Pt saw PCP who agreed that B LE were showing signs of cellulitis and put her on antibiotic and diuretic. Objective:   Observation:    Test measurements:      ** Pt indicated feelings of depression/hopelessness on the initial PT intake form. Follow up was done with the [place]-Suicide Severity Rating Scale and pt was indicating a need for patient safety precautions.   Discussed this with pt and she notes that she currently sees a Psychiatrist every 3-4 months and speaks to a therapist weekly. She notes that the weekly therapy sessions are via the telephone during the COVID-19 pandemic. Pt notes she just spoke to her therapist last Friday 5/8 but her therapist is unaware of her current emotional status and hasn't told her everything. PT encouraged pt to inform her therapist at her next session/call of her current suicidal thoughts and that pt might need to be seen by MD more consistently than every 3-4 months. Exercises:  Exercise/Equipment Resistance/Repetitions Other comments     3/9/20 - removal of bone spur and Achilles repair L  * ok to wean off boot/AD as amaya   NuStep  L1 x 5 min     GSS incline 3 x 20 sec     Airex:  S/s wt shifts  Diagonal wt shifts  NBOS balance    X 20  X 20   X 30 sec     Tandem balance X 20 sec L/R    SLS 3 x 10 sec L    HR/TR X 10 with R help for HR    Mini squat X 10     Baps with B feet on L2 4 way x 5 each     Step up  Step down 4\" x 10   4\" x 10          Leg press 30# x 10 L only               Line walk   Retro walk  Cross over walk/carioca  Length of ll bars x 2  X 2  X 2      Gumdrop L foot cw/ccw x 10 each     TB 4 way  Lime grn TB x 20 each Lime green TB issued for home 5/26    Seated soleus press With 7# weight on knee 2 x 10          STM post Achilles and heel Not needed - no TTP today and minimal pain  * pt showing signs of possible beginning stages of cellulitis B (edema, redness and weeping skin); pt advised to contact PCP - 5/28/20 -receiving treatment    CP X 15 min      Other Therapeutic Activities:  Pt was educated on PT POC, Diagnosis, Prognosis, pathomechanics as well as frequency and duration of scheduling future physical therapy appointments. Time was also taken on this day to answer all patient questions and participation in PT. Reviewed appointment policy in detail with patient and patient verbalized understanding.      Home Exercise Program: Patient was instructed in the following for HEP: AP, inv/evr, cw/ccw, ABCs, toe flex/ext, towel calf str, can str/roll, icing daily . Patient verbalized/demonstrated understanding and was issued written handout. 5/26 - added TB, balance, HR/TR, mini squats  to hep   5/28 - added SLB to hep  6/2 - added line walk, retro walk and braiding walk to hep along counter     Charges: Therapeutic Exercise:  [x] (12237) Provided verbal/tactile cueing for activities to restore or maintain strength, flexibility, endurance, ROM for improvements with self-care, mobility, lifting and ambulation. Neuromuscular Re-Education  [] (10198) Provided verbal/tactile cueing for activities to restore or maintain balance, coordination, kinesthetic sense, posture, motor skill, proprioception for self-care, mobility, lifting, and ambulation. Therapeutic Activities:    [x] (23999) Provided verbal/tactile cueing to address functional limitations related to loss of mobility, strength, balance, and coordination.      Gait Training:  [] (97736) Provided training and instruction to the patient for proper postural muscle recruitment and positioning with ambulation re-education     Home Exercise Program:    [x] (86909) Reviewed/Progressed HEP activities related to strengthening, flexibility, endurance, ROM for functional self-care, mobility, lifting and ambulation   [] (75791) Reviewed/Progressed HEP activities related to improving balance, coordination, kinesthetic sense, posture, motor skill, proprioception for self-care, mobility, lifting, and ambulation      Manual Treatments:  MFR / STM / Oscillations-Mobs:  G-I, II, III, IV / Manipulation / MLD  [] (87750) Provided manual therapy to mobilize  soft tissue/joints/fluid for the purpose of modulating pain, promoting relaxation, increasing ROM, reducing/eliminating soft tissue swelling/inflammation/restriction, improving soft tissue extensibility and allowing for proper ROM for normal function with self- care, mobility, lifting and ambulation.       Timed Code Treatment Minutes: 40   Total Treatment Minutes: 55     [] EVAL (LOW) 12992   [] EVAL (MOD) 95616   [] EVAL (HIGH) 63016   [] RE-EVAL   [x] TE (95534) x   3  [] Aquatic (29519) x  [] NMR (40683)   x  [] Aquatic Group (80377) x  [] Manual (61851) x    [] Ultrasound (21432) x  [] TA (90276) x [] Mech Traction (07629)  [] Ionto (43877)           [] ES (un) (00268):   [] Vasopump (94772) [] Other:      Assessment:  [x] Patient tolerated treatment well [] Patient limited by fatigue  [] Patient limited by pain  [] Patient limited by other medical complications  [] Other:     Prognosis: [x] Good [] Fair  [] Poor    Goals:    Short term goals  Time Frame for Short term goals: 2 wks  Short term goal 1: overall pain improvement of 20-30% per pt   Short term goal 2: pt independent with hep   Short term goal 3: pt amb without boot with AD demonstrating good tech       Long term goals  Time Frame for Long term goals : discharge  Long term goal 1: overall pain improvement of 50-60% per pt for ease with standing/WB amaya   Long term goal 2: DF ROM to 10, inv to 35 for improved gait tech   Long term goal 3: pt amb short distances without AD and cane for long ambulation      Patient Requires Follow-up: [x] Yes  [] No    Plan:   [x] Continue per plan of care [] Alter current plan (see comments)  [] Plan of care initiated [] Hold pending MD visit [] Discharge    Plan for Next Session:  Add above as stated; progress reps/wts as amaya   Electronically signed by:  Chrissie Bazan, [facility], [id number]

## 2020-06-04 ENCOUNTER — HOSPITAL ENCOUNTER (OUTPATIENT)
Dept: PHYSICAL THERAPY | Age: 60
Setting detail: THERAPIES SERIES
Discharge: HOME | End: 2020-06-04
Payer: COMMERCIAL

## 2020-06-04 PROCEDURE — 97110 THERAPEUTIC EXERCISES: CPT

## 2020-06-04 PROCEDURE — 97140 MANUAL THERAPY 1/> REGIONS: CPT

## 2020-06-08 ENCOUNTER — TELEPHONE (OUTPATIENT)
Dept: ADMINISTRATIVE | Age: 60
End: 2020-06-08

## 2020-06-08 NOTE — TELEPHONE ENCOUNTER
Called and spoke with patient. She has been having increased pain in her heel for about a week and she is unsure if she re-injured it but would like to be checked. Moved her original 6/17 appointment up to 6/10 with Dr. Radha Cardona.

## 2020-06-09 ENCOUNTER — VIRTUAL VISIT (OUTPATIENT)
Dept: PAIN MANAGEMENT | Age: 60
End: 2020-06-09
Payer: COMMERCIAL

## 2020-06-09 ENCOUNTER — HOSPITAL ENCOUNTER (OUTPATIENT)
Dept: PHYSICAL THERAPY | Age: 60
Setting detail: THERAPIES SERIES
Discharge: HOME | End: 2020-06-09
Payer: COMMERCIAL

## 2020-06-09 PROCEDURE — 99213 OFFICE O/P EST LOW 20 MIN: CPT | Performed by: INTERNAL MEDICINE

## 2020-06-09 PROCEDURE — 97140 MANUAL THERAPY 1/> REGIONS: CPT

## 2020-06-09 PROCEDURE — 97035 APP MDLTY 1+ULTRASOUND EA 15: CPT

## 2020-06-09 PROCEDURE — 97110 THERAPEUTIC EXERCISES: CPT

## 2020-06-09 RX ORDER — PREGABALIN 100 MG/1
100 CAPSULE ORAL 3 TIMES DAILY
Qty: 90 CAPSULE | Refills: 1 | Status: SHIPPED | OUTPATIENT
Start: 2020-06-09 | End: 2020-07-07 | Stop reason: SDUPTHER

## 2020-06-09 RX ORDER — TRAMADOL HYDROCHLORIDE 50 MG/1
50 TABLET ORAL EVERY 6 HOURS PRN
Qty: 50 TABLET | Refills: 0 | Status: SHIPPED | OUTPATIENT
Start: 2020-06-09 | End: 2020-07-07 | Stop reason: SDUPTHER

## 2020-06-09 RX ORDER — CELECOXIB 200 MG/1
200 CAPSULE ORAL DAILY
Qty: 30 CAPSULE | Refills: 1 | Status: SHIPPED | OUTPATIENT
Start: 2020-06-09 | End: 2020-07-07 | Stop reason: SDUPTHER

## 2020-06-09 NOTE — FLOWSHEET NOTE
cup/cushion and purchase of new, supportive gym shoes     Pt saw PCP who agreed that B LE were showing signs of cellulitis and put her on antibiotic and diuretic. Objective:   Observation:    Test measurements:      ** Pt indicated feelings of depression/hopelessness on the initial PT intake form. Follow up was done with the [place]-Suicide Severity Rating Scale and pt was indicating a need for patient safety precautions. Discussed this with pt and she notes that she currently sees a Psychiatrist every 3-4 months and speaks to a therapist weekly. She notes that the weekly therapy sessions are via the telephone during the COVID-19 pandemic. Pt notes she just spoke to her therapist last Friday 5/8 but her therapist is unaware of her current emotional status and hasn't told her everything. PT encouraged pt to inform her therapist at her next session/call of her current suicidal thoughts and that pt might need to be seen by MD more consistently than every 3-4 months.       * 6/9 - some exercises held or modified due to inc pain c/o     Exercises:  Exercise/Equipment Resistance/Repetitions Other comments     3/9/20 - removal of bone spur and Achilles repair L  * ok to wean off boot/AD as amaya   NuStep  L1 x 5 min     GSS incline 3 x 20 sec     Airex:  S/s wt shifts  Diagonal wt shifts  NBOS balance    X 20  X 20   X 30 sec     Tandem balance X 20 sec L/R    SLS     HR/TR X 10 with R help for HR    Mini squat X 10     Baps with L foot only L2 4 way x 10 each     Step up  Step down 4\" x 10   4\" x 10          Leg press seat 7                Line walk   Retro walk  Cross over walk/carioca         Gumdrop L foot cw/ccw x 10 each     TB 4 way   Lime green TB issued for home 5/26    Seated soleus press With 7# weight on knee 2 x 10          STM post Achilles and heel STM x 8 min with relief focus mostly on heel  * pt showing signs of possible beginning stages of cellulitis B (edema, redness and weeping skin); pt

## 2020-06-09 NOTE — PROGRESS NOTES
before this onset of heel pain  * DF 12  Inv 35  * Pt amb without boot or AD, but PT rec use of std cane R UE now with onset of severe heel pain   * also rec use of gel heel cup/cushion and purchase of new, supportive gym shoes   * Pt moved up her MD f/u to 6/10 due to onset of inc pain      Progression Towards Functional goals:  [x] Patient is progressing as expected towards functional goals listed. [x] Progression is slowed due to recent flare up  [] Progression has been slowed due to co-morbidities. [] Plan just implemented, too soon to assess goals progression  [] Other:      Rehab Potential: [] Excellent [x] Good [] Fair  [] Poor     Goal Status:  [] Achieved [x] Partially Achieved  [] Not Achieved     Current Frequency/Duration:  # Days per week: [] 1 day # Weeks: [] 1 week [x] 4 weeks      [x] 2 days   [] 2 weeks [] 5 weeks      [] 3 days   [] 3 weeks [] 6 weeks     Patient Status: [] Continue per initial plan of Care     [] Patient now discharged     [x] Additional visits requested, Please re-certify for additional visits:      Requested frequency/duration:  2X/week for 4weeks    Electronically signed by:  Chilo Bush, [address] [place] [address]  If you have any questions or concerns, please don't hesitate to call.   Thank you for your referral.    Physician Signature:________________________________Date:__________________  By signing above, therapists plan is approved by physician

## 2020-06-09 NOTE — PROGRESS NOTES
161145 UNIT/GM cream Apply topically 2 times daily. 30 g 0    vitamin D (ERGOCALCIFEROL) 1.25 MG (97088 UT) CAPS capsule TAKE 1 CAPSULE BY MOUTH 1 TIME A WEEK 13 capsule 3    torsemide (DEMADEX) 20 MG tablet TAKE 2 TABLETS BY MOUTH EVERY MORNING FOR 10 DAYS 180 tablet 1    propranolol (INDERAL) 20 MG tablet Take 20 mg by mouth 3 times daily      QUEtiapine (SEROQUEL) 200 MG tablet Take 200 mg by mouth nightly      lamoTRIgine (LAMICTAL) 150 MG tablet Take 150 mg by mouth nightly  0    FLUoxetine (PROZAC) 20 MG capsule Take 20 mg by mouth daily  3    atomoxetine (STRATTERA) 40 MG capsule Take 40 mg by mouth daily  3    meclizine (ANTIVERT) 25 MG tablet TAKE 1 TABLET BY MOUTH THREE TIMES DAILY AS NEEDED FOR DIZZINESS 30 tablet 0    ARIPiprazole (ABILIFY) 10 MG tablet TK 1 T PO QAM  3     No facility-administered medications prior to visit. SOCIAL/FAMILY/PAST MEDICAL HISTORY: Ms. Marianna Pedraza, family and past medical history was reviewed. REVIEW OF SYSTEMS:    Respiratory: Negative for apnea, chest tightness and shortness of breath or change in baseline breathing. Gastrointestinal: Negative for nausea, vomiting, abdominal pain, diarrhea, constipation, blood in stool and abdominal distention. PHYSICAL EXAM:   Nursing note and vitals per patient reviewed. LMP 01/15/2014    Constitutional: She appears well-developed and well-nourished. No acute distress. No respiratory distress. Skin: Skin appears to be warm and dry. No rashes or any other marks noted. She is not diaphoretic. Respiratory/Pulmonary: NO conversational dyspnea, no accessory muscle use, no coughing during exam. She does not appear to be in labored breathing. Neurological/Psychiatric:She is alert and oriented to person, place, and time. Coordination is  normal.  Her mood isAppropriate and affect is Neutral/Euthymic(normal). Musculoskeletal / Extremities: Range of motion is normal. Gait is normal, assistive devices use: none. MOUTH EVERY NIGHT 180 tablet 0    betamethasone dipropionate (DIPROLENE) 0.05 % cream APPLY EXTERNALLY TO THE AFFECTED AREA TWICE DAILY 60 g 0    esomeprazole (NEXIUM) 40 MG delayed release capsule TAKE 1 CAPSULE BY MOUTH EVERY MORNING BEFORE BREAKFAST 90 capsule 0    ondansetron (ZOFRAN) 4 MG tablet TAKE 1 TABLET BY MOUTH EVERY 8 HOURS AS NEEDED FOR NAUSEA OR VOMITING 60 tablet 0    levothyroxine (SYNTHROID) 88 MCG tablet TAKE 1 TABLET BY MOUTH DAILY 90 tablet 0    ferrous gluconate (FERGON) 240 (27 Fe) MG tablet TAKE 1 TABLET BY MOUTH TWICE DAILY 60 tablet 0    nystatin (MYCOSTATIN) 271213 UNIT/GM cream Apply topically 2 times daily. 30 g 0    vitamin D (ERGOCALCIFEROL) 1.25 MG (82388 UT) CAPS capsule TAKE 1 CAPSULE BY MOUTH 1 TIME A WEEK 13 capsule 3    torsemide (DEMADEX) 20 MG tablet TAKE 2 TABLETS BY MOUTH EVERY MORNING FOR 10 DAYS 180 tablet 1    propranolol (INDERAL) 20 MG tablet Take 20 mg by mouth 3 times daily      QUEtiapine (SEROQUEL) 200 MG tablet Take 200 mg by mouth nightly      lamoTRIgine (LAMICTAL) 150 MG tablet Take 150 mg by mouth nightly  0    FLUoxetine (PROZAC) 20 MG capsule Take 20 mg by mouth daily  3    atomoxetine (STRATTERA) 40 MG capsule Take 40 mg by mouth daily  3    meclizine (ANTIVERT) 25 MG tablet TAKE 1 TABLET BY MOUTH THREE TIMES DAILY AS NEEDED FOR DIZZINESS 30 tablet 0    ARIPiprazole (ABILIFY) 10 MG tablet TK 1 T PO QAM  3     No current facility-administered medications for this visit. I will continue her current medication regimen  which is part of the above treatment schedule. It has been helping with Ms. Yap's chronic  medical problems which for this visit include:   Diagnoses of Chronic pain syndrome, Primary osteoarthritis of both hips, Fibromyalgia, Lumbar radiculitis, DDD (degenerative disc disease), lumbar, Elbow tendinitis, Facet syndrome, Primary osteoarthritis of right hip, and Plantar fasciitis, right were pertinent to this visit.    Risks and

## 2020-06-10 ENCOUNTER — OFFICE VISIT (OUTPATIENT)
Dept: ORTHOPEDIC SURGERY | Age: 60
End: 2020-06-10
Payer: COMMERCIAL

## 2020-06-10 VITALS — HEIGHT: 64 IN | TEMPERATURE: 97.3 F | WEIGHT: 293 LBS | BODY MASS INDEX: 50.02 KG/M2 | RESPIRATION RATE: 16 BRPM

## 2020-06-10 PROBLEM — M72.2 PLANTAR FASCIITIS OF LEFT FOOT: Status: ACTIVE | Noted: 2020-06-10

## 2020-06-10 PROCEDURE — 99214 OFFICE O/P EST MOD 30 MIN: CPT | Performed by: ORTHOPAEDIC SURGERY

## 2020-06-10 NOTE — PROGRESS NOTES
[facility] OR    ANKLE SURGERY      ANKLE SURGERY      APPENDECTOMY      open , Ex-lap    CHOLECYSTECTOMY      open    COLONOSCOPY  04/16/2013    dr Chioma Mack 2018     DILATION AND CURETTAGE OF UTERUS      ENDOSCOPY, COLON, DIAGNOSTIC      FOOT SURGERY Right 05/27/2016    ARTHROPLASTY RIGHT 5TH DIGIT      HYSTEROSCOPY N/A 12-30-13    Hysteroscopy Dilitation and Curettage    JOINT REPLACEMENT Bilateral     knee    OTHER SURGICAL HISTORY  9/11/2015    ARTHROPLASTY 5TH DIGIT LEFT FOOT          SHOULDER SURGERY      right    SHOULDER SURGERY Right     SLEEVE GASTROPLASTY  May 28, 2014    Dasybil    TOTAL KNEE ARTHROPLASTY  12/10/10    Right    TOTAL KNEE ARTHROPLASTY  3/30/10    Left    UPPER GASTROINTESTINAL ENDOSCOPY  4/16/2013    dr Eric Turner ENDOSCOPY  3/20/2014    dr Ismael Quinonez       Social History     Socioeconomic History    Marital status: Single     Spouse name: Not on file    Number of children: 0    Years of education: 12    Highest education level: Not on file   Occupational History    Occupation: [employer]     Comment: unemployed   Social Needs    Financial resource strain: Not on file    Food insecurity     Worry: Not on file     Inability: Not on file   [employer] needs     Medical: Not on file     Non-medical: Not on file   Tobacco Use    Smoking status: Never Smoker    Smokeless tobacco: Never Used    Tobacco comment: Never smoked   Substance and Sexual Activity    Alcohol use: No     Alcohol/week: 0.0 standard drinks    Drug use: No    Sexual activity: Not Currently   Lifestyle    Physical activity     Days per week: Not on file     Minutes per session: Not on file    Stress: Not on file   Relationships    Social connections     Talks on phone: Not on file     Gets together: Not on file     Attends Sabianism service: Not on file     Active member of club or organization: Not on file     Attends meetings of clubs or organizations: Not on file bilaterally, equally. Good strength, and no instability both upper and lower extremities. IMAGING:  Two views left calcaneus taken today in the officeshowed removal of Lacey's, no acute fracture. IMPRESSION:    1-3 months out from left foot partal Lacey's calcaneus excision, 2ry repair Achillis tendon, and doing very well. 2-Left plantar fasciitis-new    PLAN: She will be WBAT and was instructed to continue to work on ROM and strengthening exercises. We instructed her to stretch the plantar fascia, which was demonstrated to her today in office. Use soft orthotics, heel cup. The patient will come back for a follow up in 6 weeks or as needed. At that time, we will take 2 views of the left calcaneus.        Charli Alfaro MD

## 2020-06-15 RX ORDER — CELECOXIB 200 MG/1
200 CAPSULE ORAL DAILY
Qty: 90 CAPSULE | OUTPATIENT
Start: 2020-06-15

## 2020-06-15 RX ORDER — ONDANSETRON 4 MG/1
TABLET, FILM COATED ORAL
Qty: 60 TABLET | Refills: 0 | Status: SHIPPED | OUTPATIENT
Start: 2020-06-15 | End: 2020-08-10

## 2020-06-16 ENCOUNTER — HOSPITAL ENCOUNTER (OUTPATIENT)
Dept: PHYSICAL THERAPY | Age: 60
Setting detail: THERAPIES SERIES
Discharge: HOME | End: 2020-06-16
Payer: COMMERCIAL

## 2020-06-16 PROCEDURE — 97035 APP MDLTY 1+ULTRASOUND EA 15: CPT

## 2020-06-16 PROCEDURE — 97110 THERAPEUTIC EXERCISES: CPT

## 2020-06-16 PROCEDURE — 97140 MANUAL THERAPY 1/> REGIONS: CPT

## 2020-06-16 NOTE — FLOWSHEET NOTE
current plan (see comments)  [] Plan of care initiated [] Hold pending MD visit [] Discharge    Plan for Next Session:  Add above as stated;cont SMT and US to PF insertion; will submit for more PT approval   Electronically signed by:  Jeff Arellano, [id number]

## 2020-06-18 ENCOUNTER — HOSPITAL ENCOUNTER (OUTPATIENT)
Dept: PHYSICAL THERAPY | Age: 60
Setting detail: THERAPIES SERIES
Discharge: HOME | End: 2020-06-18
Payer: COMMERCIAL

## 2020-06-18 PROCEDURE — 97140 MANUAL THERAPY 1/> REGIONS: CPT

## 2020-06-18 PROCEDURE — 97035 APP MDLTY 1+ULTRASOUND EA 15: CPT

## 2020-06-18 PROCEDURE — 97110 THERAPEUTIC EXERCISES: CPT

## 2020-06-18 NOTE — FLOWSHEET NOTE
Physical Therapy Daily Treatment Note  Date:  2020    Patient Name:  Jonathan Kirk    :  1960  MRN: [id number]    Restrictions/Precautions: Position Activity Restriction  Other position/activity restrictions: mod fall risk - pt denies falls but reports LOB when amb without boot due to weakness    Pertinent Medical History: Additional Pertinent Hx: anxiety, bipolar, HTN, B TKR, R shoulder sx, fibromyalgia, depression/suicidal     Medical/Treatment Diagnosis Information:  · Diagnosis: L achilles tendonitis   · Treatment Diagnosis: L ankle pain, dec ROM, dec strength, dec balance, dec gait     Insurance/Certification information:  PT Insurance Information: [employer] - requires precert after eval; auth 8 visits -20  Physician Information:  Referring Practitioner: Dr. Rolanda Huerta of care signed (Y/N):  Sent to inbox    Visit# / total visits:    Pain level: 1/10     Functional Outcomes Measure: at eval  Test: LEFS  Score: 34    History of Injury: Subjective  Subjective: Pt with L ankle surgery on 3/9/20 to remove bone spur and Achilles repair. She is currently amb with boot, has weaned off the lifts and is ok to wean out of the boot as tolerated. Pain is 1-2/10 at rest since weaning off lifts, 4/10 with walking in boot and without boot. Pt notes using B crutches right now with amb without boot; elevation daily, but not icing; denies N/T. Subjective:  Felt really good after last session. Only c/o an \"annoying stiffness\" right now.      Progress Note ( 20 )  * Pt was initially feeling great with minimal pain ( 1-2/10 ) but has had a flare up recently this weekend with pain into the 5-6/10 range  * Pt reports felt she was feeling 60% better (except for inability to do HR) before this onset of heel pain  * DF 12  Inv 35  * Pt amb without boot or AD, but PT rec use of std cane R UE now with onset of severe heel pain   * also rec use of gel heel cup/cushion and purchase of new, supportive gym shoes     Pt saw PCP who agreed that B LE were showing signs of cellulitis and put her on antibiotic and diuretic. Objective:   Observation:    Test measurements:      ** Pt indicated feelings of depression/hopelessness on the initial PT intake form. Follow up was done with the [place]-Suicide Severity Rating Scale and pt was indicating a need for patient safety precautions. Discussed this with pt and she notes that she currently sees a Psychiatrist every 3-4 months and speaks to a therapist weekly. She notes that the weekly therapy sessions are via the telephone during the COVID-19 pandemic. Pt notes she just spoke to her therapist last Friday 5/8 but her therapist is unaware of her current emotional status and hasn't told her everything. PT encouraged pt to inform her therapist at her next session/call of her current suicidal thoughts and that pt might need to be seen by MD more consistently than every 3-4 months.           Exercises:  Exercise/Equipment Resistance/Repetitions Other comments     3/9/20 - removal of bone spur and Achilles repair L  * ok to wean off boot/AD as amaya   NuStep  L1 x 5 min     GSS incline  Sol str  Step PF str 3 x 20 sec   3 x 20 sec   3 x 20 sec     Leg press seat 7 30# 2 x 10 L only    Airex:  S/s wt shifts  Diagonal wt shifts  NBOS balance   Tandem balance   X 20  X 20   X 30 sec   X 20 sec L/R    SLS 2 x 20 sec L    HR/TR X 10 with R help for HR    Mini squat      Baps with B feet onL2 4 way x 10 each     Step up  Step down 4\" x 10   4\" x 10     Wobble f/b  LE staggered with L in front and pushing x 10     Line walk   Retro walk  Cross over/carioca  Length of ll bars x 2  X 2  X 2       Gumdrop L foot cw/ccw x 10 each     TB 4 way   Lime green TB issued for home 5/26    Seated soleus press With 7# weight on knee 2 x 10          STM to PF insertion  STM x 8 min with relief focus mostly on heel    * pt showing signs of possible beginning stages of cellulitis B (edema, redness and weeping skin); pt advised to contact PCP - 5/28/20 -receiving treatment    US 50% 1.5w/cm2 to PF insertion   x 8 min     CP at home      Other Therapeutic Activities:  Pt was educated on PT POC, Diagnosis, Prognosis, pathomechanics as well as frequency and duration of scheduling future physical therapy appointments. Time was also taken on this day to answer all patient questions and participation in PT. Reviewed appointment policy in detail with patient and patient verbalized understanding. Home Exercise Program: Patient was instructed in the following for HEP: AP, inv/evr, cw/ccw, ABCs, toe flex/ext, towel calf str, can str/roll, icing daily . Patient verbalized/demonstrated understanding and was issued written handout. 5/26 - added TB, balance, HR/TR, mini squats  to hep   5/28 - added SLB to hep  6/2 - added line walk, retro walk and braiding walk to hep along counter     Charges: Therapeutic Exercise:  [x] (68648) Provided verbal/tactile cueing for activities to restore or maintain strength, flexibility, endurance, ROM for improvements with self-care, mobility, lifting and ambulation. Neuromuscular Re-Education  [] (69031) Provided verbal/tactile cueing for activities to restore or maintain balance, coordination, kinesthetic sense, posture, motor skill, proprioception for self-care, mobility, lifting, and ambulation. Therapeutic Activities:    [x] (01278) Provided verbal/tactile cueing to address functional limitations related to loss of mobility, strength, balance, and coordination.      Gait Training:  [] (39389) Provided training and instruction to the patient for proper postural muscle recruitment and positioning with ambulation re-education     Home Exercise Program:    [x] (47767) Reviewed/Progressed HEP activities related to strengthening, flexibility, endurance, ROM for functional self-care, mobility, lifting and ambulation   [] (74202) Reviewed/Progressed HEP activities related to improving balance, coordination, kinesthetic sense, posture, motor skill, proprioception for self-care, mobility, lifting, and ambulation      Manual Treatments:  MFR / STM / Oscillations-Mobs:  G-I, II, III, IV / Manipulation / MLD  [] (93970) Provided manual therapy to mobilize  soft tissue/joints/fluid for the purpose of modulating pain, promoting relaxation, increasing ROM, reducing/eliminating soft tissue swelling/inflammation/restriction, improving soft tissue extensibility and allowing for proper ROM for normal function with self- care, mobility, lifting and ambulation. Timed Code Treatment Minutes: 55   Total Treatment Minutes: 55     [] EVAL (LOW) 02772   [] EVAL (MOD) 08176   [] EVAL (HIGH) 92456   [] RE-EVAL   [x] TE (06856) x   2 [] Aquatic (40818) x  [] NMR (74479)   x  [] Aquatic Group (69048) x  [x] Manual (39620) x  1  [x] Ultrasound (82185) x 1  [] TA (88350) x [] Mech Traction (53121)  [] Ionto (84207)           [] ES (un) (79273):   [] Vasopump (05753) [] Other:      Assessment:  [x] Patient tolerated treatment well [] Patient limited by fatigue  [] Patient limited by pain  [] Patient limited by other medical complications  [x] Other:  Functional assessment: \"I was able to go up and down 5 steps. \"    Prognosis: [x] Good [] Fair  [] Poor    Goals:    Short term goals  Time Frame for Short term goals: 2 wks  Short term goal 1: overall pain improvement of 20-30% per pt   Short term goal 2: pt independent with hep   Short term goal 3: pt amb without boot with AD demonstrating good tech       Long term goals  Time Frame for Long term goals : discharge  Long term goal 1: overall pain improvement of 50-60% per pt for ease with standing/WB amaya   Long term goal 2: DF ROM to 10, inv to 35 for improved gait tech   Long term goal 3: pt amb short distances without AD and cane for long ambulation      Patient Requires Follow-up: [x] Yes  [] No    Plan:   [x] Continue per plan of care [] Dearl

## 2020-06-22 RX ORDER — LEVOTHYROXINE SODIUM 88 UG/1
TABLET ORAL
Qty: 90 TABLET | Refills: 0 | Status: SHIPPED | OUTPATIENT
Start: 2020-06-22 | End: 2020-09-23

## 2020-06-30 ENCOUNTER — HOSPITAL ENCOUNTER (OUTPATIENT)
Dept: PHYSICAL THERAPY | Age: 60
Setting detail: THERAPIES SERIES
Discharge: HOME | End: 2020-06-30
Payer: COMMERCIAL

## 2020-06-30 PROCEDURE — 97110 THERAPEUTIC EXERCISES: CPT

## 2020-06-30 PROCEDURE — 97140 MANUAL THERAPY 1/> REGIONS: CPT

## 2020-06-30 NOTE — FLOWSHEET NOTE
Physical Therapy Daily Treatment Note  Date:  2020    Patient Name:  Alan Geiger    :  1960  MRN: [id number]    Restrictions/Precautions: Position Activity Restriction  Other position/activity restrictions: mod fall risk - pt denies falls but reports LOB when amb without boot due to weakness    Pertinent Medical History: Additional Pertinent Hx: anxiety, bipolar, HTN, B TKR, R shoulder sx, fibromyalgia, depression/suicidal     Medical/Treatment Diagnosis Information:  · Diagnosis: L achilles tendonitis   · Treatment Diagnosis: L ankle pain, dec ROM, dec strength, dec balance, dec gait     Insurance/Certification information:  PT Insurance Information: [employer] - requires precert after eval; auth 8 more visits 20-20  Physician Information:  Referring Practitioner: Dr. Etelvina Patino of care signed (Y/N):  Sent to inbox    Visit# / total visits:    Pain level: 0/10     Functional Outcomes Measure: at eval  Test: LEFS  Score: 34    History of Injury: Subjective  Subjective: Pt with L ankle surgery on 3/9/20 to remove bone spur and Achilles repair. She is currently amb with boot, has weaned off the lifts and is ok to wean out of the boot as tolerated. Pain is 1-2/10 at rest since weaning off lifts, 4/10 with walking in boot and without boot. Pt notes using B crutches right now with amb without boot; elevation daily, but not icing; denies N/T. Subjective: For the most part, pain has been minimal.  She c/o mostly of \"tightness and weakness\" with ADLs. Every now and then she notes some soreness at the end of the day while sitting/watching TV. Pt reminded that this is a good time to ice the foot.        Progress Note ( 20 )  * Pt was initially feeling great with minimal pain ( 1-2/10 ) but has had a flare up recently this weekend with pain into the 5-6/10 range  * Pt reports felt she was feeling 60% better (except for inability to do HR) before this onset of heel pain  * DF 12 Inv 35  * Pt amb without boot or AD, but PT rec use of std cane R UE now with onset of severe heel pain   * also rec use of gel heel cup/cushion and purchase of new, supportive gym shoes     Pt saw PCP who agreed that B LE were showing signs of cellulitis and put her on antibiotic and diuretic. Objective:   Observation:    Test measurements:      ** Pt indicated feelings of depression/hopelessness on the initial PT intake form. Follow up was done with the [place]-Suicide Severity Rating Scale and pt was indicating a need for patient safety precautions. Discussed this with pt and she notes that she currently sees a Psychiatrist every 3-4 months and speaks to a therapist weekly. She notes that the weekly therapy sessions are via the telephone during the COVID-19 pandemic. Pt notes she just spoke to her therapist last Friday 5/8 but her therapist is unaware of her current emotional status and hasn't told her everything. PT encouraged pt to inform her therapist at her next session/call of her current suicidal thoughts and that pt might need to be seen by MD more consistently than every 3-4 months.           Exercises:  Exercise/Equipment Resistance/Repetitions Other comments     3/9/20 - removal of bone spur and Achilles repair L  * ok to wean off boot/AD as amaya   NuStep  L2 x 5 min     GSS incline  Sol str  Step PF str 3 x 20 sec   3 x 20 sec   3 x 20 sec     Leg press seat 8  gastroc press seat 8 30# 2 x 10 L only  20# 1 x 10 L only     Airex:  S/s wt shifts  Diagonal wt shifts  NBOS balance   Tandem balance  SLS     X 30 sec   X 20 sec L/R  3 x 10 sec L    HR X 10     Mini squat      Baps with B feet onL2 4 way x 10 each     Step up  Step down 4\" x 10   4\" x 10          Line walk   Retro walk  Cross over/carioca  Length of ll bars x 2  X 2  X 2       Gumdrop balance  Right side up x 1 min B feet on     TB 4 way   Lime green TB issued for home 5/26    Seated soleus press With 7# weight on knee 2 x 10 STM to PF insertion  STM x 8 min with relief focus mostly on heel - still tight and with TTP   * pt showing signs of possible beginning stages of cellulitis B (edema, redness and weeping skin); pt advised to contact PCP - 5/28/20 -receiving treatment    US 50% 1.5w/cm2 to PF insertion   6/30 - held due to 0/10 pain c/o    CP X 15 min      Other Therapeutic Activities:  Pt was educated on PT POC, Diagnosis, Prognosis, pathomechanics as well as frequency and duration of scheduling future physical therapy appointments. Time was also taken on this day to answer all patient questions and participation in PT. Reviewed appointment policy in detail with patient and patient verbalized understanding. Home Exercise Program: Patient was instructed in the following for HEP: AP, inv/evr, cw/ccw, ABCs, toe flex/ext, towel calf str, can str/roll, icing daily . Patient verbalized/demonstrated understanding and was issued written handout. 5/26 - added TB, balance, HR/TR, mini squats  to hep   5/28 - added SLB to hep  6/2 - added line walk, retro walk and braiding walk to hep along counter     Charges: Therapeutic Exercise:  [x] (87245) Provided verbal/tactile cueing for activities to restore or maintain strength, flexibility, endurance, ROM for improvements with self-care, mobility, lifting and ambulation. Neuromuscular Re-Education  [] (35419) Provided verbal/tactile cueing for activities to restore or maintain balance, coordination, kinesthetic sense, posture, motor skill, proprioception for self-care, mobility, lifting, and ambulation. Therapeutic Activities:    [x] (27677) Provided verbal/tactile cueing to address functional limitations related to loss of mobility, strength, balance, and coordination.      Gait Training:  [] (67564) Provided training and instruction to the patient for proper postural muscle recruitment and positioning with ambulation re-education     Home Exercise Program:    [x] (15558) gait tech   Long term goal 3: pt amb short distances without AD and cane for long ambulation      Patient Requires Follow-up: [x] Yes  [] No    Plan:   [x] Continue per plan of care [] Alter current plan (see comments)  [] Plan of care initiated [] Hold pending MD visit [] Discharge    Plan for Next Session:  Add above as stated;assess without US  Electronically signed by:  Franco Garza [place], [id number]

## 2020-07-02 ENCOUNTER — HOSPITAL ENCOUNTER (OUTPATIENT)
Dept: PHYSICAL THERAPY | Age: 60
Setting detail: THERAPIES SERIES
Discharge: HOME | End: 2020-07-02
Payer: COMMERCIAL

## 2020-07-02 PROCEDURE — 97110 THERAPEUTIC EXERCISES: CPT

## 2020-07-02 PROCEDURE — 97140 MANUAL THERAPY 1/> REGIONS: CPT

## 2020-07-02 NOTE — FLOWSHEET NOTE
Physical Therapy Daily Treatment Note  Date:  2020    Patient Name:  Aleena Gale    :  1960  MRN: [id number]    Restrictions/Precautions: Position Activity Restriction  Other position/activity restrictions: mod fall risk - pt denies falls but reports LOB when amb without boot due to weakness    Pertinent Medical History: Additional Pertinent Hx: anxiety, bipolar, HTN, B TKR, R shoulder sx, fibromyalgia, depression/suicidal     Medical/Treatment Diagnosis Information:  · Diagnosis: L achilles tendonitis   · Treatment Diagnosis: L ankle pain, dec ROM, dec strength, dec balance, dec gait     Insurance/Certification information:  PT Insurance Information: [employer] - requires precert after eval; auth 8 more visits 20-20  Physician Information:  Referring Practitioner: Dr. Nancy Rizvi of care signed (Y/N):  Sent to inbox    Visit# / total visits:  10/16  Pain level: 1-2/10     Functional Outcomes Measure: at eval  Test: LEFS  Score: 34    History of Injury: Subjective  Subjective: Pt with L ankle surgery on 3/9/20 to remove bone spur and Achilles repair. She is currently amb with boot, has weaned off the lifts and is ok to wean out of the boot as tolerated. Pain is 1-2/10 at rest since weaning off lifts, 4/10 with walking in boot and without boot. Pt notes using B crutches right now with amb without boot; elevation daily, but not icing; denies N/T. Subjective: Pt cut the front yard grass yesterday with soreness after and still some today.        Progress Note ( 20 )  * Pt was initially feeling great with minimal pain ( 1-2/10 ) but has had a flare up recently this weekend with pain into the 5-6/10 range  * Pt reports felt she was feeling 60% better (except for inability to do HR) before this onset of heel pain  * DF 12  Inv 35  * Pt amb without boot or AD, but PT rec use of std cane R UE now with onset of severe heel pain   * also rec use of gel heel cup/cushion and purchase of new, supportive gym shoes     Pt saw PCP who agreed that B LE were showing signs of cellulitis and put her on antibiotic and diuretic. Objective:   Observation:    Test measurements:      ** Pt indicated feelings of depression/hopelessness on the initial PT intake form. Follow up was done with the [place]-Suicide Severity Rating Scale and pt was indicating a need for patient safety precautions. Discussed this with pt and she notes that she currently sees a Psychiatrist every 3-4 months and speaks to a therapist weekly. She notes that the weekly therapy sessions are via the telephone during the COVID-19 pandemic. Pt notes she just spoke to her therapist last Friday 5/8 but her therapist is unaware of her current emotional status and hasn't told her everything. PT encouraged pt to inform her therapist at her next session/call of her current suicidal thoughts and that pt might need to be seen by MD more consistently than every 3-4 months.           Exercises:  Exercise/Equipment Resistance/Repetitions Other comments     3/9/20 - removal of bone spur and Achilles repair L  * ok to wean off boot/AD as amaya   NuStep  L2 x 5 min     GSS incline  Sol str  Step PF str 3 x 20 sec   3 x 20 sec   3 x 20 sec     Fitter      Leg press seat 8  gastroc press seat 8 30# 2 x 10 L only  20# 1 x 10 L only     Airex:  S/s wt shifts  Diagonal wt shifts  NBOS balance   Tandem balance  SLS     X 30 sec   X 30 sec L/R  3 x 10 sec L    HR X 10     Mini squat      Baps with B feet onL2 4 way x 10 each     Step up  Step down 4\" x 10   4\" x 10            Line walk   Retro walk  Cross over/carioca  Length of ll bars x 2  X 2  X 2       Gumdrop balance  Right side up x 1 min B feet on     TB 4 way   Lime green TB issued for home 5/26    Seated soleus press With 7# weight on knee 2 x 10          STM to PF insertion  STM x 8 min with relief focus mostly on heel - still tight and with mild TTP   * pt showing signs of possible beginning stages of cellulitis B (edema, redness and weeping skin); pt advised to contact PCP - 5/28/20 -receiving treatment    US 50% 1.5w/cm2 to PF insertion   amaya well without     CP   At home      Other Therapeutic Activities:  Pt was educated on PT POC, Diagnosis, Prognosis, pathomechanics as well as frequency and duration of scheduling future physical therapy appointments. Time was also taken on this day to answer all patient questions and participation in PT. Reviewed appointment policy in detail with patient and patient verbalized understanding. Home Exercise Program: Patient was instructed in the following for HEP: AP, inv/evr, cw/ccw, ABCs, toe flex/ext, towel calf str, can str/roll, icing daily . Patient verbalized/demonstrated understanding and was issued written handout. 5/26 - added TB, balance, HR/TR, mini squats  to hep   5/28 - added SLB to hep  6/2 - added line walk, retro walk and braiding walk to hep along counter     Charges: Therapeutic Exercise:  [x] (16107) Provided verbal/tactile cueing for activities to restore or maintain strength, flexibility, endurance, ROM for improvements with self-care, mobility, lifting and ambulation. Neuromuscular Re-Education  [] (90538) Provided verbal/tactile cueing for activities to restore or maintain balance, coordination, kinesthetic sense, posture, motor skill, proprioception for self-care, mobility, lifting, and ambulation. Therapeutic Activities:    [x] (38292) Provided verbal/tactile cueing to address functional limitations related to loss of mobility, strength, balance, and coordination.      Gait Training:  [] (91056) Provided training and instruction to the patient for proper postural muscle recruitment and positioning with ambulation re-education     Home Exercise Program:    [x] (37697) Reviewed/Progressed HEP activities related to strengthening, flexibility, endurance, ROM for functional self-care, mobility, lifting and ambulation   [] (67869)

## 2020-07-07 ENCOUNTER — HOSPITAL ENCOUNTER (OUTPATIENT)
Dept: PHYSICAL THERAPY | Age: 60
Setting detail: THERAPIES SERIES
Discharge: HOME | End: 2020-07-07
Payer: COMMERCIAL

## 2020-07-07 ENCOUNTER — OFFICE VISIT (OUTPATIENT)
Dept: PAIN MANAGEMENT | Age: 60
End: 2020-07-07
Payer: COMMERCIAL

## 2020-07-07 VITALS
TEMPERATURE: 97.7 F | WEIGHT: 293 LBS | BODY MASS INDEX: 51.49 KG/M2 | DIASTOLIC BLOOD PRESSURE: 73 MMHG | HEART RATE: 61 BPM | SYSTOLIC BLOOD PRESSURE: 128 MMHG

## 2020-07-07 PROCEDURE — 99213 OFFICE O/P EST LOW 20 MIN: CPT | Performed by: INTERNAL MEDICINE

## 2020-07-07 PROCEDURE — 97110 THERAPEUTIC EXERCISES: CPT

## 2020-07-07 PROCEDURE — 97140 MANUAL THERAPY 1/> REGIONS: CPT

## 2020-07-07 RX ORDER — PREGABALIN 100 MG/1
100 CAPSULE ORAL 3 TIMES DAILY
Qty: 90 CAPSULE | Refills: 1 | Status: SHIPPED | OUTPATIENT
Start: 2020-07-07 | End: 2020-08-04 | Stop reason: SDUPTHER

## 2020-07-07 RX ORDER — CELECOXIB 200 MG/1
200 CAPSULE ORAL DAILY
Qty: 30 CAPSULE | Refills: 1 | Status: SHIPPED | OUTPATIENT
Start: 2020-07-07 | End: 2020-08-04 | Stop reason: SDUPTHER

## 2020-07-07 RX ORDER — TRAMADOL HYDROCHLORIDE 50 MG/1
50 TABLET ORAL EVERY 6 HOURS PRN
Qty: 50 TABLET | Refills: 0 | Status: SHIPPED | OUTPATIENT
Start: 2020-07-07 | End: 2020-09-15 | Stop reason: SDUPTHER

## 2020-07-07 NOTE — PROGRESS NOTES
MG capsule Take 1 capsule by mouth daily 30 capsule 1    traMADol (ULTRAM) 50 MG tablet Take 1 tablet by mouth every 6 hours as needed for Pain (Max 1-2 per day) for up to 28 days. 50 tablet 0    atorvastatin (LIPITOR) 40 MG tablet TAKE 1 TABLET BY MOUTH DAILY 90 tablet 0    rOPINIRole (REQUIP) 1 MG tablet TAKE 2 TABLETS BY MOUTH EVERY NIGHT 180 tablet 0    betamethasone dipropionate (DIPROLENE) 0.05 % cream APPLY EXTERNALLY TO THE AFFECTED AREA TWICE DAILY 60 g 0    esomeprazole (NEXIUM) 40 MG delayed release capsule TAKE 1 CAPSULE BY MOUTH EVERY MORNING BEFORE BREAKFAST 90 capsule 0    ferrous gluconate (FERGON) 240 (27 Fe) MG tablet TAKE 1 TABLET BY MOUTH TWICE DAILY 60 tablet 0    nystatin (MYCOSTATIN) 577253 UNIT/GM cream Apply topically 2 times daily. 30 g 0    vitamin D (ERGOCALCIFEROL) 1.25 MG (74538 UT) CAPS capsule TAKE 1 CAPSULE BY MOUTH 1 TIME A WEEK 13 capsule 3    torsemide (DEMADEX) 20 MG tablet TAKE 2 TABLETS BY MOUTH EVERY MORNING FOR 10 DAYS 180 tablet 1    propranolol (INDERAL) 20 MG tablet Take 20 mg by mouth 3 times daily      QUEtiapine (SEROQUEL) 200 MG tablet Take 200 mg by mouth nightly      lamoTRIgine (LAMICTAL) 150 MG tablet Take 150 mg by mouth nightly  0    FLUoxetine (PROZAC) 20 MG capsule Take 20 mg by mouth daily  3    atomoxetine (STRATTERA) 40 MG capsule Take 40 mg by mouth daily  3    meclizine (ANTIVERT) 25 MG tablet TAKE 1 TABLET BY MOUTH THREE TIMES DAILY AS NEEDED FOR DIZZINESS 30 tablet 0    ARIPiprazole (ABILIFY) 10 MG tablet TK 1 T PO QAM  3     No current facility-administered medications for this visit. I will continue her current medication regimen  which is part of the above treatment schedule. It has been helping with Ms. Yap's chronic  medical problems which for this visit include:   Diagnoses of Chronic pain syndrome, Fibromyalgia, DDD (degenerative disc disease), lumbar, Lumbar radiculitis, Facet syndrome, Restless legs syndrome (RLS), Plantar fasciitis, right, and Primary osteoarthritis of both hips were pertinent to this visit. Risks and benefits of the medications and other alternative treatments  including no treatment were discussed with the patient. The common side effects of these medications were also explained to the patient. Informed verbal consent was obtained. Goals of current treatment regimen include improvement in pain, restoration of functioning- with focus on improvement in physical performance, general activity, work or disability,emotional distress, health care utilization and  decreased medication consumption. Will continue to monitor progress towards achieving/maintaining therapeutic goals with special emphasis on  1. Improvement in perceived interfernce  of pain with ADL's. Ability to do home exercises independently. Ability to do household chores indoor and/or outdoor work and social and leisure activities. Improve psychosocial and physical functioning. - she is showing progression towards this treatment goal with the current regimen. She was advised against drinking alcohol with the narcotic pain medicines, advised against driving or handling machinery while adjusting the dose of medicines or if having cognitive  issues related to the current medications. Risk of overdose and death, if medicines not taken as prescribed, were also discussed. If the patient develops new symptoms or if the symptoms worsen, the patient should call the office. While transcribing every attempt was made to maintain the accuracy of the note in terms of it's contents,there may have been some errors made inadvertently. Thank you for allowing me to participate in the care of this patient.     Eric Alarcon MD.    Cc: Cira Payne MD

## 2020-07-07 NOTE — FLOWSHEET NOTE
Physical Therapy Daily Treatment Note  Date:  2020    Patient Name:  Dottie Hendrix    :  1960  MRN: [id number]    Restrictions/Precautions: Position Activity Restriction  Other position/activity restrictions: mod fall risk - pt denies falls but reports LOB when amb without boot due to weakness    Pertinent Medical History: Additional Pertinent Hx: anxiety, bipolar, HTN, B TKR, R shoulder sx, fibromyalgia, depression/suicidal     Medical/Treatment Diagnosis Information:  · Diagnosis: L achilles tendonitis   · Treatment Diagnosis: L ankle pain, dec ROM, dec strength, dec balance, dec gait     Insurance/Certification information:  PT Insurance Information: [employer] - requires precert after eval; auth 8 more visits 20-20  Physician Information:  Referring Practitioner: Dr. Lux Code of care signed (Y/N):  Sent to inbox    Visit# / total visits:    Pain level: 0/10     Functional Outcomes Measure: at eval  Test: LEFS  Score: 34    History of Injury: Subjective  Subjective: Pt with L ankle surgery on 3/9/20 to remove bone spur and Achilles repair. She is currently amb with boot, has weaned off the lifts and is ok to wean out of the boot as tolerated. Pain is 1-2/10 at rest since weaning off lifts, 4/10 with walking in boot and without boot. Pt notes using B crutches right now with amb without boot; elevation daily, but not icing; denies N/T. Subjective: No c/o pain now.        Progress Note ( 20 )  * Pt was initially feeling great with minimal pain ( 1-2/10 ) but has had a flare up recently this weekend with pain into the 5-6/10 range  * Pt reports felt she was feeling 60% better (except for inability to do HR) before this onset of heel pain  * DF 12  Inv 35  * Pt amb without boot or AD, but PT rec use of std cane R UE now with onset of severe heel pain   * also rec use of gel heel cup/cushion and purchase of new, supportive gym shoes     Pt saw PCP who agreed that B LE were showing signs of cellulitis and put her on antibiotic and diuretic. Objective:   Observation:    Test measurements:      ** Pt indicated feelings of depression/hopelessness on the initial PT intake form. Follow up was done with the [place]-Suicide Severity Rating Scale and pt was indicating a need for patient safety precautions. Discussed this with pt and she notes that she currently sees a Psychiatrist every 3-4 months and speaks to a therapist weekly. She notes that the weekly therapy sessions are via the telephone during the COVID-19 pandemic. Pt notes she just spoke to her therapist last Friday 5/8 but her therapist is unaware of her current emotional status and hasn't told her everything. PT encouraged pt to inform her therapist at her next session/call of her current suicidal thoughts and that pt might need to be seen by MD more consistently than every 3-4 months.           Exercises:  Exercise/Equipment Resistance/Repetitions Other comments     3/9/20 - removal of bone spur and Achilles repair L  * ok to wean off boot/AD as amaya   NuStep  L2 x 5 min     GSS incline  Sol str  Step PF str   3 x 20 sec  No longer feeling a stretch on L with either incline str   Fitter  F/b s/b x 10 each 1 thin     Leg press seat 8  gastroc press seat 8 30# 2 x 10 L only  20# 1 x 10 L only     Airex:  S/s wt shifts  Diagonal wt shifts  NBOS balance   Tandem balance  SLS     X 30 sec   X 30 sec L/R  3 x 10 sec L    HR/TR X 10     Mini squat      Baps with B feet onL2 4 way x 10 each  7/7 - f/b and s/s with B feet on but then it started hurting so cw/ccw with L only on    Step up  Step down 6\" x 10   6\" x 5         Line walk   Retro walk  Cross over/carioca  Length of ll bars x 2  X 2  X 2       Gumdrop balance  Upside down x 30 sec B feet on     TB 4 way   Lime green TB issued for home 5/26    Seated soleus press With 7# weight on knee 3 x 10          STM to PF insertion  STM x 8 min with relief focus mostly on heel - still tight and with mild TTP  ( TTP decreased after a few minutes of STM)   US 50% 1.5w/cm2 to PF insertion   amaya well without     CP   At home      Other Therapeutic Activities:  Pt was educated on PT POC, Diagnosis, Prognosis, pathomechanics as well as frequency and duration of scheduling future physical therapy appointments. Time was also taken on this day to answer all patient questions and participation in PT. Reviewed appointment policy in detail with patient and patient verbalized understanding. Home Exercise Program: Patient was instructed in the following for HEP: AP, inv/evr, cw/ccw, ABCs, toe flex/ext, towel calf str, can str/roll, icing daily . Patient verbalized/demonstrated understanding and was issued written handout. 5/26 - added TB, balance, HR/TR, mini squats  to hep   5/28 - added SLB to hep  6/2 - added line walk, retro walk and braiding walk to hep along counter     Charges: Therapeutic Exercise:  [x] (84170) Provided verbal/tactile cueing for activities to restore or maintain strength, flexibility, endurance, ROM for improvements with self-care, mobility, lifting and ambulation. Neuromuscular Re-Education  [] (50083) Provided verbal/tactile cueing for activities to restore or maintain balance, coordination, kinesthetic sense, posture, motor skill, proprioception for self-care, mobility, lifting, and ambulation. Therapeutic Activities:    [x] (59454) Provided verbal/tactile cueing to address functional limitations related to loss of mobility, strength, balance, and coordination.      Gait Training:  [] (20434) Provided training and instruction to the patient for proper postural muscle recruitment and positioning with ambulation re-education     Home Exercise Program:    [x] (70783) Reviewed/Progressed HEP activities related to strengthening, flexibility, endurance, ROM for functional self-care, mobility, lifting and ambulation   [] (21815) Reviewed/Progressed HEP activities related to improving balance, coordination, kinesthetic sense, posture, motor skill, proprioception for self-care, mobility, lifting, and ambulation      Manual Treatments:  MFR / STM / Oscillations-Mobs:  G-I, II, III, IV / Manipulation / MLD  [] (43214) Provided manual therapy to mobilize  soft tissue/joints/fluid for the purpose of modulating pain, promoting relaxation, increasing ROM, reducing/eliminating soft tissue swelling/inflammation/restriction, improving soft tissue extensibility and allowing for proper ROM for normal function with self- care, mobility, lifting and ambulation. Timed Code Treatment Minutes: 55   Total Treatment Minutes: 55     [] EVAL (LOW) 59757   [] EVAL (MOD) 37421   [] EVAL (HIGH) 82257   [] RE-EVAL   [x] TE (38012) x   3 [] Aquatic (53459) x  [] NMR (96396)   x  [] Aquatic Group (75611) x  [x] Manual (60036) x  1  [] Ultrasound (41562) x   [] TA (40416) x [] Mech Traction (00050)  [] Ionto (62028)           [] ES (un) (26232):   [] Vasopump (15164) [] Other:      Assessment:  [x] Patient tolerated treatment well [] Patient limited by fatigue  [] Patient limited by pain  [] Patient limited by other medical complications  [x] Other:  Functional assessment: \"I was able to cut the grass with minimal pain. \"    Prognosis: [x] Good [] Fair  [] Poor    Goals:    Short term goals  Time Frame for Short term goals: 2 wks  Short term goal 1: overall pain improvement of 20-30% per pt   Short term goal 2: pt independent with hep   Short term goal 3: pt amb without boot with AD demonstrating good tech       Long term goals  Time Frame for Long term goals : discharge  Long term goal 1: overall pain improvement of 50-60% per pt for ease with standing/WB amaya   Long term goal 2: DF ROM to 10, inv to 35 for improved gait tech   Long term goal 3: pt amb short distances without AD and cane for long ambulation      Patient Requires Follow-up: [x] Yes  [] No    Plan:   [x] Continue per plan of care [] Alter current plan (see comments)  [] Plan of care initiated [] Hold pending MD visit [] Discharge    Plan for Next Session:  Add above as stated  Electronically signed by:  Ari Domínguez, [id number] [facility], [id number]

## 2020-07-09 ENCOUNTER — HOSPITAL ENCOUNTER (OUTPATIENT)
Dept: PHYSICAL THERAPY | Age: 60
Setting detail: THERAPIES SERIES
Discharge: HOME | End: 2020-07-09
Payer: COMMERCIAL

## 2020-07-09 PROCEDURE — 97140 MANUAL THERAPY 1/> REGIONS: CPT

## 2020-07-09 PROCEDURE — 97110 THERAPEUTIC EXERCISES: CPT

## 2020-07-09 NOTE — FLOWSHEET NOTE
Physical Therapy Daily Treatment Note  Date:  2020    Patient Name:  Catie Beyer    :  1960  MRN: [id number]    Restrictions/Precautions: Position Activity Restriction  Other position/activity restrictions: mod fall risk - pt denies falls but reports LOB when amb without boot due to weakness    Pertinent Medical History: Additional Pertinent Hx: anxiety, bipolar, HTN, B TKR, R shoulder sx, fibromyalgia, depression/suicidal     Medical/Treatment Diagnosis Information:  · Diagnosis: L achilles tendonitis   · Treatment Diagnosis: L ankle pain, dec ROM, dec strength, dec balance, dec gait     Insurance/Certification information:  PT Insurance Information: [employer] - requires precert after eval; auth 8 more visits 20-20  Physician Information:  Referring Practitioner: Dr. uLcie Lockwood of care signed (Y/N):  Sent to inbox    Visit# / total visits:    Pain level: 0/10     Functional Outcomes Measure: at eval  Test: LEFS  Score: 34    History of Injury: Subjective  Subjective: Pt with L ankle surgery on 3/9/20 to remove bone spur and Achilles repair. She is currently amb with boot, has weaned off the lifts and is ok to wean out of the boot as tolerated. Pain is 1-2/10 at rest since weaning off lifts, 4/10 with walking in boot and without boot. Pt notes using B crutches right now with amb without boot; elevation daily, but not icing; denies N/T. Subjective: No c/o pain now in heel. Sore in hamstring b/c she put together a bed platform and had to bring boxsprings down the stairs.         Progress Note ( 20 )  * Pt was initially feeling great with minimal pain ( 1-2/10 ) but has had a flare up recently this weekend with pain into the 5-6/10 range  * Pt reports felt she was feeling 60% better (except for inability to do HR) before this onset of heel pain  * DF 12  Inv 35  * Pt amb without boot or AD, but PT rec use of std cane R UE now with onset of severe heel pain   * also rec use of gel heel cup/cushion and purchase of new, supportive gym shoes     Pt saw PCP who agreed that B LE were showing signs of cellulitis and put her on antibiotic and diuretic. Objective:   Observation:    Test measurements:      ** Pt indicated feelings of depression/hopelessness on the initial PT intake form. Follow up was done with the [place]-Suicide Severity Rating Scale and pt was indicating a need for patient safety precautions. Discussed this with pt and she notes that she currently sees a Psychiatrist every 3-4 months and speaks to a therapist weekly. She notes that the weekly therapy sessions are via the telephone during the COVID-19 pandemic. Pt notes she just spoke to her therapist last Friday 5/8 but her therapist is unaware of her current emotional status and hasn't told her everything. PT encouraged pt to inform her therapist at her next session/call of her current suicidal thoughts and that pt might need to be seen by MD more consistently than every 3-4 months.           Exercises:  Exercise/Equipment Resistance/Repetitions Other comments     3/9/20 - removal of bone spur and Achilles repair L  * ok to wean off boot/AD as amaya   NuStep  L2 x 5 min     GSS incline  Sol str  Step PF str   3 x 20 sec  No longer feeling a stretch on L with either incline str   Fitter  F/b s/b x 10 each 1 thin     Leg press seat 8  gastroc press seat 8 30# 2 x 10 L only  20# 1 x 10 L only     Airex:  S/s wt shifts  Diagonal wt shifts  NBOS balance   Tandem balance  SLS     X 45 sec   X 30 sec L/R  3 x 10 sec L    HR/TR X 10     Mini squat      Baps with B feet onL2 4 way x 10 each  7/7 - f/b and s/s with B feet on but then it started hurting so cw/ccw with L only on    Step up  Step down 6\" x 10   6\" x 5         Line walk   Retro walk  Cross over/carioca  Length of ll bars x 2  X 2  X 2       Gumdrop balance  Upside down x 30 sec B feet on     TB 4 way   Lime green TB issued for home 5/26    Seated soleus press With 7# weight on knee 3 x 10          STM to PF insertion  STM x 8 min with relief focus mostly on heel - still tight and with mild TTP  ( TTP decreased after a few minutes of STM)   US 50% 1.5w/cm2 to PF insertion   amaya well without     CP   At home      Other Therapeutic Activities:  Pt was educated on PT POC, Diagnosis, Prognosis, pathomechanics as well as frequency and duration of scheduling future physical therapy appointments. Time was also taken on this day to answer all patient questions and participation in PT. Reviewed appointment policy in detail with patient and patient verbalized understanding. Home Exercise Program: Patient was instructed in the following for HEP: AP, inv/evr, cw/ccw, ABCs, toe flex/ext, towel calf str, can str/roll, icing daily . Patient verbalized/demonstrated understanding and was issued written handout. 5/26 - added TB, balance, HR/TR, mini squats  to hep   5/28 - added SLB to hep  6/2 - added line walk, retro walk and braiding walk to hep along counter     Charges: Therapeutic Exercise:  [x] (57466) Provided verbal/tactile cueing for activities to restore or maintain strength, flexibility, endurance, ROM for improvements with self-care, mobility, lifting and ambulation. Neuromuscular Re-Education  [] (33692) Provided verbal/tactile cueing for activities to restore or maintain balance, coordination, kinesthetic sense, posture, motor skill, proprioception for self-care, mobility, lifting, and ambulation. Therapeutic Activities:    [x] (36036) Provided verbal/tactile cueing to address functional limitations related to loss of mobility, strength, balance, and coordination.      Gait Training:  [] (95307) Provided training and instruction to the patient for proper postural muscle recruitment and positioning with ambulation re-education     Home Exercise Program:    [x] (32585) Reviewed/Progressed HEP activities related to strengthening, flexibility, endurance, ROM for functional self-care, mobility, lifting and ambulation   [] (98966) Reviewed/Progressed HEP activities related to improving balance, coordination, kinesthetic sense, posture, motor skill, proprioception for self-care, mobility, lifting, and ambulation      Manual Treatments:  MFR / STM / Oscillations-Mobs:  G-I, II, III, IV / Manipulation / MLD  [] (34479) Provided manual therapy to mobilize  soft tissue/joints/fluid for the purpose of modulating pain, promoting relaxation, increasing ROM, reducing/eliminating soft tissue swelling/inflammation/restriction, improving soft tissue extensibility and allowing for proper ROM for normal function with self- care, mobility, lifting and ambulation.       Timed Code Treatment Minutes: 48   Total Treatment Minutes: 48     [] EVAL (LOW) 32792   [] EVAL (MOD) 17991   [] EVAL (HIGH) 36631   [] RE-EVAL   [x] TE (10094) x  2 [] Aquatic (64133) x  [] NMR (91353)   x  [] Aquatic Group (61980) x  [x] Manual (89484) x  1  [] Ultrasound (94599) x   [] TA (08206) x [] Mech Traction (69848)  [] Ionto (34653)           [] ES (un) (17423):   [] Vasopump (88273) [] Other:      Assessment:  [x] Patient tolerated treatment well [] Patient limited by fatigue  [] Patient limited by pain  [] Patient limited by other medical complications  [x] Other:  Functional assessment: carried boxspring downstairs without heel pain    Prognosis: [x] Good [] Fair  [] Poor    Goals:    Short term goals  Time Frame for Short term goals: 2 wks  Short term goal 1: overall pain improvement of 20-30% per pt   Short term goal 2: pt independent with hep   Short term goal 3: pt amb without boot with AD demonstrating good tech       Long term goals  Time Frame for Long term goals : discharge  Long term goal 1: overall pain improvement of 50-60% per pt for ease with standing/WB amaya   Long term goal 2: DF ROM to 10, inv to 35 for improved gait tech   Long term goal 3: pt amb short distances without AD and cane for long ambulation      Patient Requires Follow-up: [x] Yes  [] No    Plan:   [x] Continue per plan of care [] Alter current plan (see comments)  [] Plan of care initiated [] Hold pending MD visit [] Discharge    Plan for Next Session:  Add above as stated  Electronically signed by:  Jeff Nichols, [id number]

## 2020-07-14 ENCOUNTER — HOSPITAL ENCOUNTER (OUTPATIENT)
Dept: PHYSICAL THERAPY | Age: 60
Setting detail: THERAPIES SERIES
Discharge: HOME | End: 2020-07-14
Payer: COMMERCIAL

## 2020-07-14 PROCEDURE — 97530 THERAPEUTIC ACTIVITIES: CPT

## 2020-07-14 PROCEDURE — 97110 THERAPEUTIC EXERCISES: CPT

## 2020-07-14 RX ORDER — ESOMEPRAZOLE MAGNESIUM 40 MG/1
CAPSULE, DELAYED RELEASE ORAL
Qty: 90 CAPSULE | Refills: 0 | Status: ON HOLD | OUTPATIENT
Start: 2020-07-14 | End: 2021-08-03 | Stop reason: ALTCHOICE

## 2020-07-14 NOTE — FLOWSHEET NOTE
calf weakness, but feels it is improving   * L calf MMT; unable to lift with L only; requires >50% help from R       Pt saw PCP who agreed that B LE were showing signs of cellulitis and put her on antibiotic and diuretic. Objective:   Observation:    Test measurements:      ** Pt indicated feelings of depression/hopelessness on the initial PT intake form. Follow up was done with the [place]-Suicide Severity Rating Scale and pt was indicating a need for patient safety precautions. Discussed this with pt and she notes that she currently sees a Psychiatrist every 3-4 months and speaks to a therapist weekly. She notes that the weekly therapy sessions are via the telephone during the COVID-19 pandemic. Pt notes she just spoke to her therapist last Friday 5/8 but her therapist is unaware of her current emotional status and hasn't told her everything. PT encouraged pt to inform her therapist at her next session/call of her current suicidal thoughts and that pt might need to be seen by MD more consistently than every 3-4 months.           Exercises:  Exercise/Equipment Resistance/Repetitions Other comments     3/9/20 - removal of bone spur and Achilles repair L  * ok to wean off boot/AD as amaya   NuStep  L2 x 5 min     GSS incline  Sol str  Step PF str   3 x 20 sec  No longer feeling a stretch on L with either incline str   Fitter  F/b s/b x 10 each 1 thin     Leg press seat 8  gastroc press seat 8 30# 2 x 10 L only  20# 2 x 10 L only     Airex:  S/s wt shifts  Diagonal wt shifts  NBOS balance   Tandem balance  SLS     X 45 sec   X 30 sec L/R  3 x 10 sec L    HR/TR X 10     Mini squat      Baps with B feet onL2 4 way x 10 each  7/7 - f/b and s/s with B feet on but then it started hurting so cw/ccw with L only on    Step up  Step down 6\" x 10   6\" x 5         Line walk   Retro walk  Cross over/carioca  Length of ll bars x 2  X 2  X 2       Gumdrop balance  Upside down x 30 sec B feet on     TB 4 way   Lime green TB issued for home 5/26    Seated soleus press With 7# weight on knee 3 x 10          STM to PF insertion  STM x 3-5 min * mild soreness initially, but resolved after a few minutes of massage   US 50% 1.5w/cm2 to PF insertion   amaya well without     CP   At home      Other Therapeutic Activities:  Pt was educated on PT POC, Diagnosis, Prognosis, pathomechanics as well as frequency and duration of scheduling future physical therapy appointments. Time was also taken on this day to answer all patient questions and participation in PT. Reviewed appointment policy in detail with patient and patient verbalized understanding. Home Exercise Program: Patient was instructed in the following for HEP: AP, inv/evr, cw/ccw, ABCs, toe flex/ext, towel calf str, can str/roll, icing daily . Patient verbalized/demonstrated understanding and was issued written handout. 5/26 - added TB, balance, HR/TR, mini squats  to hep   5/28 - added SLB to hep  6/2 - added line walk, retro walk and braiding walk to hep along counter     Charges: Therapeutic Exercise:  [x] (79985) Provided verbal/tactile cueing for activities to restore or maintain strength, flexibility, endurance, ROM for improvements with self-care, mobility, lifting and ambulation. Neuromuscular Re-Education  [] (43035) Provided verbal/tactile cueing for activities to restore or maintain balance, coordination, kinesthetic sense, posture, motor skill, proprioception for self-care, mobility, lifting, and ambulation. Therapeutic Activities:    [x] (81908) Provided verbal/tactile cueing to address functional limitations related to loss of mobility, strength, balance, and coordination.      Gait Training:  [] (99292) Provided training and instruction to the patient for proper postural muscle recruitment and positioning with ambulation re-education     Home Exercise Program:    [x] (13780) Reviewed/Progressed HEP activities related to strengthening, flexibility, endurance, ROM for functional self-care, mobility, lifting and ambulation   [] (70666) Reviewed/Progressed HEP activities related to improving balance, coordination, kinesthetic sense, posture, motor skill, proprioception for self-care, mobility, lifting, and ambulation      Manual Treatments:  MFR / STM / Oscillations-Mobs:  G-I, II, III, IV / Manipulation / MLD  [] (29947) Provided manual therapy to mobilize  soft tissue/joints/fluid for the purpose of modulating pain, promoting relaxation, increasing ROM, reducing/eliminating soft tissue swelling/inflammation/restriction, improving soft tissue extensibility and allowing for proper ROM for normal function with self- care, mobility, lifting and ambulation.       Timed Code Treatment Minutes: 45   Total Treatment Minutes: 45     [] EVAL (LOW) 97432   [] EVAL (MOD) 44741   [] EVAL (HIGH) 20973   [] RE-EVAL   [x] TE (59061) x  2 [] Aquatic (56067) x  [] NMR (74353)   x  [] Aquatic Group (15486) x  [] Manual (74065) x    [] Ultrasound (69591) x   [x] TA (11561) x 1 [] Mech Traction (35152)  [] Ionto (56405)           [] ES (un) (13261):   [] Vasopump (47567) [] Other:      Assessment:  [x] Patient tolerated treatment well [] Patient limited by fatigue  [] Patient limited by pain  [] Patient limited by other medical complications  [x] Other:  Functional assessment: amaya 30 min on nustep at gym with mild, intermittent pain    Prognosis: [x] Good [] Fair  [] Poor    Goals:    Short term goals  Time Frame for Short term goals: 2 wks  Short term goal 1: overall pain improvement of 20-30% per pt   Short term goal 2: pt independent with hep   Short term goal 3: pt amb without boot with AD demonstrating good tech       Long term goals  Time Frame for Long term goals : discharge  Long term goal 1: overall pain improvement of 50-60% per pt for ease with standing/WB amaya   Long term goal 2: DF ROM to 10, inv to 35 for improved gait tech   Long term goal 3: pt amb short distances without AD and cane for long ambulation      Patient Requires Follow-up: [x] Yes  [] No    Plan:   [x] Continue per plan of care [] Alter current plan (see comments)  [] Plan of care initiated [] Hold pending MD visit [] Discharge    Plan for Next Session:  Add above as stated; probable d/c after next week   Electronically signed by:  Jeff Aguilar, [id number]

## 2020-07-16 ENCOUNTER — HOSPITAL ENCOUNTER (OUTPATIENT)
Dept: PHYSICAL THERAPY | Age: 60
Setting detail: THERAPIES SERIES
Discharge: HOME | End: 2020-07-16
Payer: COMMERCIAL

## 2020-07-16 PROCEDURE — 97110 THERAPEUTIC EXERCISES: CPT

## 2020-07-16 NOTE — FLOWSHEET NOTE
Physical Therapy Daily Treatment Note  Date:  2020    Patient Name:  Anju Farmer    :  1960  MRN: [id number]    Restrictions/Precautions: Position Activity Restriction  Other position/activity restrictions: mod fall risk - pt denies falls but reports LOB when amb without boot due to weakness    Pertinent Medical History: Additional Pertinent Hx: anxiety, bipolar, HTN, B TKR, R shoulder sx, fibromyalgia, depression/suicidal     Medical/Treatment Diagnosis Information:  · Diagnosis: L achilles tendonitis   · Treatment Diagnosis: L ankle pain, dec ROM, dec strength, dec balance, dec gait     Insurance/Certification information:  PT Insurance Information: [employer] - requires precert after eval; auth 8 more visits 20-20  Physician Information:  Referring Practitioner: Dr. Jocelyne Green of care signed (Y/N):  Sent to inbox    Visit# / total visits:    Pain level: 2/10     Functional Outcomes Measure: at eval  Test: LEFS  Score: 34    History of Injury: Subjective  Subjective: Pt with L ankle surgery on 3/9/20 to remove bone spur and Achilles repair. She is currently amb with boot, has weaned off the lifts and is ok to wean out of the boot as tolerated. Pain is 1-2/10 at rest since weaning off lifts, 4/10 with walking in boot and without boot. Pt notes using B crutches right now with amb without boot; elevation daily, but not icing; denies N/T. Subjective: Pt states B LE and L foot is just a little more irritated today. B LE are very red, she states she saw NP due to slow healing sore on leg from over a week and she is on antibiotics. Feels the inc soreness is from the overall irritation.        Progress Note ( 20 )  * Pt rates pain with typical ADLs and walking is 0/10 but depending on activity can get brief, intermittent pains ( 1-2/10 )  * Pt reports felt she feeling 70-75% better  * DF 12  Inv 40, evr 26  * MMT DF, Inv, evr, PF 5/5 no pain   * Pt amb without boot or AD  * to strengthening, flexibility, endurance, ROM for functional self-care, mobility, lifting and ambulation   [] (31447) Reviewed/Progressed HEP activities related to improving balance, coordination, kinesthetic sense, posture, motor skill, proprioception for self-care, mobility, lifting, and ambulation      Manual Treatments:  MFR / STM / Oscillations-Mobs:  G-I, II, III, IV / Manipulation / MLD  [] (96760) Provided manual therapy to mobilize  soft tissue/joints/fluid for the purpose of modulating pain, promoting relaxation, increasing ROM, reducing/eliminating soft tissue swelling/inflammation/restriction, improving soft tissue extensibility and allowing for proper ROM for normal function with self- care, mobility, lifting and ambulation.       Timed Code Treatment Minutes: 38   Total Treatment Minutes: 38     [] EVAL (LOW) 37373   [] EVAL (MOD) 89957   [] EVAL (HIGH) 23090   [] RE-EVAL   [x] TE (23776) x  3 [] Aquatic (24463) x  [] NMR (77123)   x  [] Aquatic Group (86688) x  [] Manual (63936) x    [] Ultrasound (82329) x   [] TA (97489) x  [] Mech Traction (09884)  [] Ionto (18985)           [] ES (un) (14338):   [] Vasopump (94211) [] Other:      Assessment:  [x] Patient tolerated treatment well [] Patient limited by fatigue  [] Patient limited by pain  [] Patient limited by other medical complications  [x] Other:  Functional assessment: amaya 30 min on nustep at gym with mild, intermittent pain    Prognosis: [x] Good [] Fair  [] Poor    Goals:    Short term goals  Time Frame for Short term goals: 2 wks  Short term goal 1: overall pain improvement of 20-30% per pt   Short term goal 2: pt independent with hep   Short term goal 3: pt amb without boot with AD demonstrating good tech       Long term goals  Time Frame for Long term goals : discharge  Long term goal 1: overall pain improvement of 50-60% per pt for ease with standing/WB amaya   Long term goal 2: DF ROM to 10, inv to 35 for improved gait tech   Long term goal 3: pt amb short distances without AD and cane for long ambulation      Patient Requires Follow-up: [x] Yes  [] No    Plan:   [x] Continue per plan of care [] Alter current plan (see comments)  [] Plan of care initiated [] Hold pending MD visit [] Discharge    Plan for Next Session:  Add above as stated; probable d/c after next week   Electronically signed by:  Ari Domínguez, [address] [place] [address], [id number]

## 2020-07-20 RX ORDER — BETAMETHASONE DIPROPIONATE 0.5 MG/G
CREAM TOPICAL
Qty: 60 G | Refills: 0 | Status: SHIPPED | OUTPATIENT
Start: 2020-07-20 | End: 2020-09-23

## 2020-07-21 ENCOUNTER — HOSPITAL ENCOUNTER (OUTPATIENT)
Dept: PHYSICAL THERAPY | Age: 60
Setting detail: THERAPIES SERIES
Discharge: HOME | End: 2020-07-21
Payer: COMMERCIAL

## 2020-07-21 PROCEDURE — 97110 THERAPEUTIC EXERCISES: CPT

## 2020-07-23 ENCOUNTER — HOSPITAL ENCOUNTER (OUTPATIENT)
Dept: PHYSICAL THERAPY | Age: 60
Setting detail: THERAPIES SERIES
Discharge: HOME | End: 2020-07-23
Payer: COMMERCIAL

## 2020-07-23 PROCEDURE — 97110 THERAPEUTIC EXERCISES: CPT

## 2020-07-23 NOTE — FLOWSHEET NOTE
Physical Therapy Daily Treatment Note  Date:  2020    Patient Name:  Ben Rachel    :  1960  MRN: [id number]    Restrictions/Precautions: Position Activity Restriction  Other position/activity restrictions: mod fall risk - pt denies falls but reports LOB when amb without boot due to weakness    Pertinent Medical History: Additional Pertinent Hx: anxiety, bipolar, HTN, B TKR, R shoulder sx, fibromyalgia, depression/suicidal     Medical/Treatment Diagnosis Information:  · Diagnosis: L achilles tendonitis   · Treatment Diagnosis: L ankle pain, dec ROM, dec strength, dec balance, dec gait     Insurance/Certification information:  PT Insurance Information: [employer] - requires precert after eval; auth 8 more visits 20-20  Physician Information:  Referring Practitioner: Dr. Rosie Rojas of care signed (Y/N):  Sent to inbox    Visit# / total visits:     Pain level: 0/10     Functional Outcomes Measure: at eval  Test: LEFS  Score: 34    History of Injury: Subjective  Subjective: Pt with L ankle surgery on 3/9/20 to remove bone spur and Achilles repair. She is currently amb with boot, has weaned off the lifts and is ok to wean out of the boot as tolerated. Pain is 1-2/10 at rest since weaning off lifts, 4/10 with walking in boot and without boot. Pt notes using B crutches right now with amb without boot; elevation daily, but not icing; denies N/T. Subjective: Doing well. No questions, ready to d/c after today.        Progress Note ( 20 )  * Pt rates pain with typical ADLs and walking is 0/10 but depending on activity can get brief, intermittent pains ( 1-2/10 )  * Pt reports felt she feeling 70-75% better  * DF 12  Inv 40, evr 26  * MMT DF, Inv, evr, PF 5/5 no pain   * Pt amb without boot or AD  * pt notes she is able to do all ADLS without trouble, but the only thing still difficult is doing a heel raise due to her calf weakness, but feels it is improving   * L calf MMT; unable to lift with L only; requires >50% help from R          Objective:   Observation:    Test measurements:      ** Pt indicated feelings of depression/hopelessness on the initial PT intake form. Follow up was done with the Jamul-Suicide Severity Rating Scale and pt was indicating a need for patient safety precautions. Discussed this with pt and she notes that she currently sees a Psychiatrist every 3-4 months and speaks to a therapist weekly. She notes that the weekly therapy sessions are via the telephone during the COVID-19 pandemic. Pt notes she just spoke to her therapist last Friday 5/8 but her therapist is unaware of her current emotional status and hasn't told her everything. PT encouraged pt to inform her therapist at her next session/call of her current suicidal thoughts and that pt might need to be seen by MD more consistently than every 3-4 months.           Exercises:  Exercise/Equipment Resistance/Repetitions Other comments     3/9/20 - removal of bone spur and Achilles repair L  * ok to wean off boot/AD as amaya   NuStep 8 L2 x 5 min     GSS incline  Sol str  Step PF str   3 x 20 sec  No longer feeling a stretch on L with either incline str   Fitter  F/b s/b x 10 each 1 thin     Leg press seat 8  gastroc press seat 8 30# 2 x 10 L only  20# 2 x 10 L only     Airex:  S/s wt shifts  Diagonal wt shifts  NBOS balance   Tandem balance  SLS     X 45 sec   X 30 sec L/R  3 x 10 sec L    HR/TR X 10     Mini squat      Baps with B feet onL2 4 way x 10 each  7/7 - f/b and s/s with B feet on but then it started hurting so cw/ccw with L only on    Step up  Step down 6\" x 10   6\" x 10         Line walk   Retro walk  Cross over/carioca  Length of ll bars x 2  X 2  X 2       Gumdrop balance  Upside down x 30 sec B feet on     TB 4 way   Lime green TB issued for home 5/26    Seated soleus press With 7# weight on knee 3 x 10          STM to PF insertion    US 50% 1.5w/cm2 to PF insertion   amaya well without     CP related to improving balance, coordination, kinesthetic sense, posture, motor skill, proprioception for self-care, mobility, lifting, and ambulation      Manual Treatments:  MFR / STM / Oscillations-Mobs:  G-I, II, III, IV / Manipulation / MLD  [] (32359) Provided manual therapy to mobilize  soft tissue/joints/fluid for the purpose of modulating pain, promoting relaxation, increasing ROM, reducing/eliminating soft tissue swelling/inflammation/restriction, improving soft tissue extensibility and allowing for proper ROM for normal function with self- care, mobility, lifting and ambulation.       Timed Code Treatment Minutes: 40   Total Treatment Minutes: 40     [] EVAL (LOW) 17120   [] EVAL (MOD) 72950   [] EVAL (HIGH) 76894   [] RE-EVAL   [x] TE (23009) x  3 [] Aquatic (99786) x  [] NMR (16711)   x  [] Aquatic Group (39436) x  [] Manual (13878) x    [] Ultrasound (87636) x   [] TA (78567) x  [] Mech Traction (94385)  [] Ionto (00328)           [] ES (un) (17568):   [] Vasopump (61449) [] Other:      Assessment:  [x] Patient tolerated treatment well [] Patient limited by fatigue  [] Patient limited by pain  [] Patient limited by other medical complications  [x] Other:  Functional assessment: amaya 30 min on nustep at gym    Prognosis: [x] Good [] Fair  [] Poor    Goals:    Short term goals  Time Frame for Short term goals: 2 wks  Short term goal 1: overall pain improvement of 20-30% per pt   Short term goal 2: pt independent with hep   Short term goal 3: pt amb without boot with AD demonstrating good tech       Long term goals  Time Frame for Long term goals : discharge  Long term goal 1: overall pain improvement of 50-60% per pt for ease with standing/WB amaya   Long term goal 2: DF ROM to 10, inv to 35 for improved gait tech   Long term goal 3: pt amb short distances without AD and cane for long ambulation      Patient Requires Follow-up: [x] Yes  [] No    Plan:   [x] Continue per plan of care [] Alter current plan (see comments)  [] Plan of care initiated [] Hold pending MD visit [] Discharge    Plan for Next Session:  Add above as stated;  d/c  to hep   Electronically signed by:  Tori Cantu, [facility], [id number]

## 2020-07-23 NOTE — DISCHARGE SUMMARY
Outpatient Physical Therapy  [x] [facility]    Phone: [phone]   Fax: [phone]   [] [facility]  Phone: [phone]   Fax: [phone]  [] [facility]              Phone: [phone]   Fax: [phone]     Physical Therapy Progress/Discharge Note  Date: 2020        Patient Name:  Jocelynn Patel    :  1960  MRN: [id number]  Restrictions/Precautions: Position Activity Restriction  Other position/activity restrictions: mod fall risk - pt denies falls but reports LOB when amb without boot due to weakness    Pertinent Medical History: Additional Pertinent Hx: anxiety, bipolar, HTN, B TKR, R shoulder sx, fibromyalgia, depression/suicidal      Medical/Treatment Diagnosis Information:  · Diagnosis: L achilles tendonitis   · Treatment Diagnosis: L ankle pain, dec ROM, dec strength, dec balance, dec gait      Insurance/Certification information:  PT Insurance Information: Aegretchen Nelson - requires precert after eval; auth 8 visits -20  Physician Information:  Referring Practitioner: Dr. Haja Rutherford of care signed (Y/N):  Sent to inbox     Visit# / total visits:    Pain level:      0/10          Time Period for Report:   20-20  Cancels/No-shows to date:  0    Plan of Care/Treatment to date:  [x] Therapeutic Exercise    [x] Modalities:  [x] Therapeutic Activity     [x] Ultrasound  [] Electrical Stimulation  [x] Gait Training      [] Cervical Traction    [] Lumbar Traction  [] Neuromuscular Re-education  [x] Cold/hotpack [] Iontophoresis  [x] Instruction in HEP      Other:  [x] Manual Therapy       []    [] Aquatic Therapy       []                          Significant Findings At Last Visit/Comments:      Progress Note ( 20 )  * Pt rates pain with typical ADLs and walking is 0/10 but depending on activity can get brief, intermittent pains ( 1-2/10 )  * Pt reports felt she feeling 70-75% better  * DF 12  Inv 40, evr 26  * MMT DF, Inv, evr, PF 5/5 no pain   * Pt amb without boot or AD  * pt notes she is able to do all ADLS without trouble, but the only thing still difficult is doing a heel raise due to her calf weakness, but feels it is improving   * L calf MMT; unable to lift with L only; requires >50% help from R        Progression Towards Functional goals:  [x] Patient is progressing as expected towards functional goals listed. [] Progression is slowed due to recent flare up  [] Progression has been slowed due to co-morbidities. [] Plan just implemented, too soon to assess goals progression  [] Other:      Rehab Potential: [] Excellent [x] Good [] Fair  [] Poor     Goal Status:  [x] Achieved [x] Partially Achieved  [] Not Achieved     Current Frequency/Duration:  # Days per week: [] 1 day # Weeks: [] 1 week [x] 4 weeks      [x] 2 days   [] 2 weeks [] 5 weeks      [] 3 days   [] 3 weeks [] 6 weeks     Patient Status: [] Continue per initial plan of Care     [x] Patient now discharged with hep      [] Additional visits requested, Please re-certify for additional visits:        Electronically signed by:  Hossein Poole, [address] [place] [address]  If you have any questions or concerns, please don't hesitate to call.   Thank you for your referral.    Physician Signature:________________________________Date:__________________  By signing above, therapists plan is approved by physician

## 2020-08-04 ENCOUNTER — TELEMEDICINE (OUTPATIENT)
Dept: PAIN MANAGEMENT | Age: 60
End: 2020-08-04
Payer: COMMERCIAL

## 2020-08-04 PROCEDURE — 99213 OFFICE O/P EST LOW 20 MIN: CPT | Performed by: INTERNAL MEDICINE

## 2020-08-04 RX ORDER — PREGABALIN 100 MG/1
100 CAPSULE ORAL 3 TIMES DAILY
Qty: 90 CAPSULE | Refills: 0 | Status: SHIPPED | OUTPATIENT
Start: 2020-08-04 | End: 2020-10-13 | Stop reason: SDUPTHER

## 2020-08-04 RX ORDER — CELECOXIB 200 MG/1
200 CAPSULE ORAL DAILY
Qty: 90 CAPSULE | OUTPATIENT
Start: 2020-08-04

## 2020-08-04 RX ORDER — CELECOXIB 200 MG/1
200 CAPSULE ORAL DAILY
Qty: 30 CAPSULE | Refills: 1 | Status: SHIPPED | OUTPATIENT
Start: 2020-08-04 | End: 2020-10-13 | Stop reason: SDUPTHER

## 2020-08-04 NOTE — PROGRESS NOTES
TELE HEALTH VISIT (AUDIO-VISUAL)    Pursuant to the emergency declaration under the [facility], [facility] waiver authority and the [employer] Act, this Virtual  Visit was conducted, with patient's/legal guardian's consent, to reduce the patient's risk of exposure to COVID-19 and provide continuity of care for an established patient. Service is  provided through a video synchronous discussion virtually to substitute for in-person clinic visit. Due to this being a TeleHealth encounter (During AMSJN-04 public health emergency), evaluation of the following organ systems was limited: Vitals/Constitutional/EENT/Resp/CV/GI//MS/Neuro/Skin/Qqdt-Wcmo-Jcp. Aleena Row  1960  [id number]    Ms. Enrico Garner is being seen virtually for a follow up visit using one of the following techniques  Google Duo  Informed verbal consent to the virtual visit was obtained from Ms. Enrico Garner. Risks associated with HIPPA compliance with the virtual visit was explained to the patient. Ms. Enrico Garner is at her residence and Dr. Viktoriya Mckeon is in his office. HISTORY OF PRESENT ILLNESS:  Ms. Enrico Garner is a 60 y.o. female  being assessed for a follow up visit for pain management for evaluation of ongoing care regarding her symptoms and monitoring of compliance with long term use high risk medications. She has a diagnosis of   1. Chronic pain syndrome    2. Fibromyalgia    3. DDD (degenerative disc disease), lumbar    4. Lumbar radiculitis    5. Facet syndrome    6. Primary osteoarthritis of both hips    7. Elbow tendinitis    8. Plantar fasciitis, right    . She complains of pain in the lower back  with radiation to the shoulders Bilateral . She rates the pain 2/10 and describes it as sharp. Current treatment regimen has helped relieve about 70% of the pain. She denies any side effects from the current pain regimen.  Patient reports that since the last follow up visit the physical functioning is unchanged, family/social relationships are unchanged, mood is worse sleep patterns are unchanged, and that the overall functioning is unchanged. Patient denies misusing/abusing her narcotic pain medications or using any illegal drugs. There are No indicators for possible drug abuse, addiction or diversion problems. Ms. Trevin Garcia states she has been doing fair, pain has been about the same. Patient says her back has been hurting more from being more active. She reports she is not working currently. Patient has been compliant with her regimen. She mentions she is using Ultram 1-2 per day. Patient states she lives on her own and managing her ADL's. ALLERGIES: Patients list of allergies were reviewed     MEDICATIONS: Ms. Trevin Garcia list of medications were reviewed. Her current medications are   Outpatient Medications Prior to Visit   Medication Sig Dispense Refill    betamethasone dipropionate (DIPROLENE) 0.05 % cream APPLY EXTERNALLY TO THE AFFECTED AREA TWICE DAILY 60 g 0    esomeprazole (NEXIUM) 40 MG delayed release capsule TAKE 1 CAPSULE BY MOUTH EVERY MORNING BEFORE BREAKFAST 90 capsule 0    pregabalin (LYRICA) 100 MG capsule Take 1 capsule by mouth 3 times daily for 30 days. 90 capsule 1    celecoxib (CELEBREX) 200 MG capsule Take 1 capsule by mouth daily 30 capsule 1    traMADol (ULTRAM) 50 MG tablet Take 1 tablet by mouth every 6 hours as needed for Pain (Max 1-2 per day) for up to 28 days.  50 tablet 0    levothyroxine (SYNTHROID) 88 MCG tablet TAKE 1 TABLET BY MOUTH DAILY 90 tablet 0    ondansetron (ZOFRAN) 4 MG tablet TAKE 1 TABLET BY MOUTH EVERY 8 HOURS AS NEEDED FOR NAUSEA OR VOMITING 60 tablet 0    atorvastatin (LIPITOR) 40 MG tablet TAKE 1 TABLET BY MOUTH DAILY 90 tablet 0    rOPINIRole (REQUIP) 1 MG tablet TAKE 2 TABLETS BY MOUTH EVERY NIGHT 180 tablet 0    ferrous gluconate (FERGON) 240 (27 Fe) MG tablet TAKE 1 TABLET BY MOUTH TWICE DAILY 60 tablet 0    nystatin (MYCOSTATIN) 373067 UNIT/GM cream Apply topically 2 times daily. 30 g 0    vitamin D (ERGOCALCIFEROL) 1.25 MG (69411 UT) CAPS capsule TAKE 1 CAPSULE BY MOUTH 1 TIME A WEEK 13 capsule 3    torsemide (DEMADEX) 20 MG tablet TAKE 2 TABLETS BY MOUTH EVERY MORNING FOR 10 DAYS 180 tablet 1    propranolol (INDERAL) 20 MG tablet Take 20 mg by mouth 3 times daily      QUEtiapine (SEROQUEL) 200 MG tablet Take 200 mg by mouth nightly      lamoTRIgine (LAMICTAL) 150 MG tablet Take 150 mg by mouth nightly  0    FLUoxetine (PROZAC) 20 MG capsule Take 20 mg by mouth daily  3    atomoxetine (STRATTERA) 40 MG capsule Take 40 mg by mouth daily  3    meclizine (ANTIVERT) 25 MG tablet TAKE 1 TABLET BY MOUTH THREE TIMES DAILY AS NEEDED FOR DIZZINESS 30 tablet 0    ARIPiprazole (ABILIFY) 10 MG tablet TK 1 T PO QAM  3     No facility-administered medications prior to visit. SOCIAL/FAMILY/PAST MEDICAL HISTORY: Ms. Neo Arreola, family and past medical history was reviewed. REVIEW OF SYSTEMS:    Respiratory: Negative for apnea, chest tightness and shortness of breath or change in baseline breathing. Gastrointestinal: Negative for nausea, vomiting, abdominal pain, diarrhea, constipation, blood in stool and abdominal distention. PHYSICAL EXAM:   Nursing note and vitals per patient reviewed. LMP 01/15/2014    Constitutional: She appears well-developed and well-nourished. No acute distress. No respiratory distress. Skin: Skin appears to be warm and dry. No rashes or any other marks noted. She is not diaphoretic. Respiratory/Pulmonary: NO conversational dyspnea, no accessory muscle use, no coughing during exam. She does not appear to be in labored breathing. Neurological/Psychiatric:She is alert and oriented to person, place, and time. Coordination is  normal.  Her mood isAppropriate and affect is Neutral/Euthymic(normal).    Musculoskeletal / Extremities: Range of motion is normal. Gait is normal, assistive devices use: none. IMPRESSION:   1. Chronic pain syndrome    2. Fibromyalgia    3. DDD (degenerative disc disease), lumbar    4. Lumbar radiculitis    5. Facet syndrome    6. Primary osteoarthritis of both hips    7. Elbow tendinitis    8. Plantar fasciitis, right        PLAN:  Informed verbal consent regarding treatment was obtained  -continue with current opioid regimen Ultram 1-2 per day  -She was advised to increase fluids ( 5-7  glasses of fluid daily), limit caffeine, avoid cheese products, increase dietary fiber, increase activity and exercise as tolerated and relax regularly and enjoy meals   -She was advised weight reduction, diet changes- 800-1200 jessica diet, diet diary, exercising, nutritional  consult increased physical activity as tolerated   -walking/stretching exercises as advised    -Patient's urine drug screen results with GC/MS confirmation were obtained and reviewed and were negative for any illicit drugs. Prescribed medications were within acceptable range. Current Outpatient Medications   Medication Sig Dispense Refill    betamethasone dipropionate (DIPROLENE) 0.05 % cream APPLY EXTERNALLY TO THE AFFECTED AREA TWICE DAILY 60 g 0    esomeprazole (NEXIUM) 40 MG delayed release capsule TAKE 1 CAPSULE BY MOUTH EVERY MORNING BEFORE BREAKFAST 90 capsule 0    pregabalin (LYRICA) 100 MG capsule Take 1 capsule by mouth 3 times daily for 30 days. 90 capsule 1    celecoxib (CELEBREX) 200 MG capsule Take 1 capsule by mouth daily 30 capsule 1    traMADol (ULTRAM) 50 MG tablet Take 1 tablet by mouth every 6 hours as needed for Pain (Max 1-2 per day) for up to 28 days.  50 tablet 0    levothyroxine (SYNTHROID) 88 MCG tablet TAKE 1 TABLET BY MOUTH DAILY 90 tablet 0    ondansetron (ZOFRAN) 4 MG tablet TAKE 1 TABLET BY MOUTH EVERY 8 HOURS AS NEEDED FOR NAUSEA OR VOMITING 60 tablet 0    atorvastatin (LIPITOR) 40 MG tablet TAKE 1 TABLET BY MOUTH DAILY 90 tablet 0    rOPINIRole (REQUIP) 1 MG tablet TAKE 2

## 2020-08-10 RX ORDER — ONDANSETRON 4 MG/1
TABLET, FILM COATED ORAL
Qty: 60 TABLET | Refills: 0 | Status: SHIPPED | OUTPATIENT
Start: 2020-08-10 | End: 2020-10-12

## 2020-08-19 RX ORDER — ATORVASTATIN CALCIUM 40 MG/1
TABLET, FILM COATED ORAL
Qty: 90 TABLET | Refills: 0 | Status: SHIPPED | OUTPATIENT
Start: 2020-08-19 | End: 2021-01-12

## 2020-08-27 ENCOUNTER — OFFICE VISIT (OUTPATIENT)
Dept: ORTHOPEDIC SURGERY | Age: 60
End: 2020-08-27
Payer: COMMERCIAL

## 2020-08-27 VITALS — HEIGHT: 64 IN | BODY MASS INDEX: 50.02 KG/M2 | WEIGHT: 293 LBS | TEMPERATURE: 97.3 F

## 2020-08-27 PROCEDURE — 99213 OFFICE O/P EST LOW 20 MIN: CPT | Performed by: PHYSICIAN ASSISTANT

## 2020-08-27 NOTE — PROGRESS NOTES
Subjective:      Patient ID: Johnna Paez is a 60 y.o. female. Chief Complaint   Patient presents with    Follow-up     Bilateral knees        HPI: She is here for annual follow up on bilateral knee arthroplasty. The date of procedure(s) Left TKA 3/2010. Right TKA 12/2010. No new complaints or issues. There is minimal to no discomfort with ambulation. There is minimal to no discomfort at rest.   Steps can be performed in reciprocal fashion. Review of Systems:   A full list of the ROS have been reviewed. These are recorded and signed in the chart.     Past Medical History:   Diagnosis Date    Acquired hyperlipoproteinemia     MARIO (acute kidney injury) ([facility] [address] 9/30/2019    Allergic rhinitis     Anxiety     Arthritis     Bilateral lower extremity edema     Bipolar 1 disorder (HCC)     Chronic pain syndrome     DDD (degenerative disc disease), lumbar     Depression     Depression     Disease of gallbladder     Dizziness     DJD (degenerative joint disease) of hip     Edema 12/29/2009    Elbow tendinitis     Essential hypertension 8/19/2019    Facet syndrome     Fibromyalgia     Fracture of nasal bone     GERD (gastroesophageal reflux disease)     Headache     Hiatal hernia     History of blood transfusion     anemia    Hyperlipidemia     Insomnia     Osteoarthritis     Prediabetes     Rash     Self mutilating behavior     cutter pt states she has not done in months- in therapy    Sleep apnea     no CPAP    Suicidal ideation     Thyroid disorder     hypothyroidism       Family History   Problem Relation Age of Onset    Heart Disease Mother     Heart Failure Mother     High Cholesterol Mother     High Blood Pressure Mother     Stroke Mother     Birth Defects Mother     Other Mother         VV    High Blood Pressure Father     COPD Father     Diabetes Brother     Stroke Maternal Grandmother     Arthritis Maternal Grandmother     Heart Failure Brother     Stroke Brother     Birth Defects Maternal Grandfather     Arthritis Paternal Grandmother        Past Surgical History:   Procedure Laterality Date    ABDOMEN SURGERY  5/28/2014    LAPAROSCOPIC SLEEVE GASTRECTOMY, EXTENSIVE LYSIS OF ADHESIONS    ACHILLES TENDON SURGERY Left 3/9/2020    LEFT CALCANEOUS PARTIAL EXCISION, SECONDARY REPAIR OF ACHILLES TENDON performed by Ajay [facility]ler [facility]      open , Ex-lap    CHOLECYSTECTOMY      open    COLONOSCOPY  04/16/2013    dr Robinson Alvarez 2018     DILATION AND CURETTAGE OF UTERUS      ENDOSCOPY, COLON, DIAGNOSTIC      FOOT SURGERY Right 05/27/2016    ARTHROPLASTY RIGHT 5TH DIGIT      HYSTEROSCOPY N/A 12-30-13    Hysteroscopy Dilitation and Curettage    JOINT REPLACEMENT Bilateral     knee    OTHER SURGICAL HISTORY  9/11/2015    ARTHROPLASTY 5TH DIGIT LEFT FOOT          SHOULDER SURGERY      right    SHOULDER SURGERY Right     SLEEVE GASTROPLASTY  May 28, 2014    Dasybil    TOTAL KNEE ARTHROPLASTY  12/10/10    Right    TOTAL KNEE ARTHROPLASTY  3/30/10    Left    UPPER GASTROINTESTINAL ENDOSCOPY  4/16/2013    dr Corie Reaves ENDOSCOPY  3/20/2014    dr Carmen Maher History     Occupational History    Occupation: [employer]     Comment: unemployed   Tobacco Use    Smoking status: Never Smoker    Smokeless tobacco: Never Used    Tobacco comment: Never smoked   Substance and Sexual Activity    Alcohol use: No     Alcohol/week: 0.0 standard drinks    Drug use: No    Sexual activity: Not Currently       Current Outpatient Medications   Medication Sig Dispense Refill    atorvastatin (LIPITOR) 40 MG tablet TAKE 1 TABLET BY MOUTH DAILY 90 tablet 0    rOPINIRole (REQUIP) 1 MG tablet TAKE 2 TABLETS BY MOUTH EVERY NIGHT 180 tablet 0    ondansetron (ZOFRAN) 4 MG tablet TAKE 1 TABLET BY MOUTH EVERY 8 HOURS AS NEEDED FOR NAUSEA OR VOMITING 60 tablet 0    pregabalin (LYRICA) 100 MG capsule Take 1 capsule by mouth 3 times daily for 30 days. 90 capsule 0    celecoxib (CELEBREX) 200 MG capsule Take 1 capsule by mouth daily 30 capsule 1    betamethasone dipropionate (DIPROLENE) 0.05 % cream APPLY EXTERNALLY TO THE AFFECTED AREA TWICE DAILY 60 g 0    esomeprazole (NEXIUM) 40 MG delayed release capsule TAKE 1 CAPSULE BY MOUTH EVERY MORNING BEFORE BREAKFAST 90 capsule 0    levothyroxine (SYNTHROID) 88 MCG tablet TAKE 1 TABLET BY MOUTH DAILY 90 tablet 0    ferrous gluconate (FERGON) 240 (27 Fe) MG tablet TAKE 1 TABLET BY MOUTH TWICE DAILY 60 tablet 0    nystatin (MYCOSTATIN) 178654 UNIT/GM cream Apply topically 2 times daily. 30 g 0    vitamin D (ERGOCALCIFEROL) 1.25 MG (83877 UT) CAPS capsule TAKE 1 CAPSULE BY MOUTH 1 TIME A WEEK 13 capsule 3    torsemide (DEMADEX) 20 MG tablet TAKE 2 TABLETS BY MOUTH EVERY MORNING FOR 10 DAYS 180 tablet 1    propranolol (INDERAL) 20 MG tablet Take 20 mg by mouth 3 times daily      QUEtiapine (SEROQUEL) 200 MG tablet Take 200 mg by mouth nightly      lamoTRIgine (LAMICTAL) 150 MG tablet Take 150 mg by mouth nightly  0    FLUoxetine (PROZAC) 20 MG capsule Take 20 mg by mouth daily  3    atomoxetine (STRATTERA) 40 MG capsule Take 40 mg by mouth daily  3    meclizine (ANTIVERT) 25 MG tablet TAKE 1 TABLET BY MOUTH THREE TIMES DAILY AS NEEDED FOR DIZZINESS 30 tablet 0    ARIPiprazole (ABILIFY) 10 MG tablet TK 1 T PO QAM  3     No current facility-administered medications for this visit. Objective:     She is alert, oriented x 3, pleasant, well nourished, developed and in no acute distress. Temp 97.3 °F (36.3 °C)   Ht 5' 4\" (1.626 m)   Wt 300 lb (136.1 kg)   LMP 01/15/2014   BMI 51.49 kg/m²      KNEE EXAM:  Examination of the bilateral knee shows: There is  no obvious deformity. There is not erythema. There is minimal to no soft tissue swelling. There is no significant joint effusion.   AROM-  Extension-     Left 0   Right 0 Flexion-         Left 120   Right 120  There is no pain associated with ROM testing. Medial joint line is not tender to palpation. Lateral joint line is not tender to palpation. There is not crepitus along the joint line with ROM testing. There is no significant instability with varus or valgus stress testing. Anterior Drawer test is negative for instability. Extensor Mechanism is  intact. NEUROLOGICAL EXAM:  Examination of the lower extremities are intact with sensation to light touch, motor testing 4+ to 5/5 in all major motor groups including hip abductors, hip adductors, Quadriceps, hamstring, dorsi flexors and EHL testing. Normal heel to toe gait. VASCULAR EXAM:  Examination of the upper and lower extremities shows intact perfusion to all extremities, no cyanosis, digits are warm to touch, capillary refill is less than 2 seconds. No significant edema noted. SKIN:  Examination of the skin reveals the skin to be intact without lacerations, abrasions, significant erythema, rashes or skin lesions. X Rays: performed in the office today:   AP, Lateral and Sunrise of the bilateral Knees: There is a bilateral cemented total knee arthroplasty present. The alignment is satisfactory. She may have a little bit of polyethylene wear medial compartment of the right knee. No evidence of poly-wear on the left knee. No loosening of the prosthesis or ostial lysis. Assessment:       ICD-10-CM    1. History of total bilateral knee replacement  Z96.653 XR KNEE BILATERAL STANDING     XR KNEE LEFT (1-2 VIEWS)     XR KNEE RIGHT (1-2 VIEWS)    FXF  Left 3/2010  Right 12/2020          Plan:     The natural history of the patient's diagnosis as well as the treatment options were discussed in full and questions were answered. Risks and benefits of the treatment options also reviewed in detail.      Continue with a HEP-  A home exercise program was re-instructed today including ROM exercises and strengthening exercises. The patient verbalized understanding of these exercises as well as the importance of the exercise program to promote return of normal function. If pain intensifies or other problems arise you are to notify the office. Prophylactic antibiotics for any surgical or dental procedures. This is recommended for lifetime. Follow up yearly with x ray and clinical evaluations. Call or return to clinic prn if these symptoms worsen or fail to improve as anticipated.

## 2020-09-15 ENCOUNTER — VIRTUAL VISIT (OUTPATIENT)
Dept: PAIN MANAGEMENT | Age: 60
End: 2020-09-15
Payer: COMMERCIAL

## 2020-09-15 PROCEDURE — 99213 OFFICE O/P EST LOW 20 MIN: CPT | Performed by: INTERNAL MEDICINE

## 2020-09-15 RX ORDER — TRAMADOL HYDROCHLORIDE 50 MG/1
50 TABLET ORAL EVERY 6 HOURS PRN
Qty: 50 TABLET | Refills: 0 | Status: SHIPPED | OUTPATIENT
Start: 2020-09-15 | End: 2020-10-13 | Stop reason: SDUPTHER

## 2020-09-15 NOTE — PROGRESS NOTES
Patient reports that since the last follow up visit the physical functioning is unchanged, family/social relationships are unchanged, mood is unchanged sleep patterns are unchanged, and that the overall functioning is unchanged. Patient denies misusing/abusing her narcotic pain medications or using any illegal drugs. There are No indicators for possible drug abuse, addiction or diversion problems. Ms. Marge Johnson states she is doing fair, pain has been baseline, pain has been worse. She reports she is not working currently. Patient mention she is using Lyrica along with Celebrex and Ultram. Patient states her depression has been pretty bad, she is seeing her therapist.     ALLERGIES: Patients list of allergies were reviewed     MEDICATIONS: Ms. Marge Johnson list of medications were reviewed. Her current medications are   Outpatient Medications Prior to Visit   Medication Sig Dispense Refill    atorvastatin (LIPITOR) 40 MG tablet TAKE 1 TABLET BY MOUTH DAILY 90 tablet 0    rOPINIRole (REQUIP) 1 MG tablet TAKE 2 TABLETS BY MOUTH EVERY NIGHT 180 tablet 0    ondansetron (ZOFRAN) 4 MG tablet TAKE 1 TABLET BY MOUTH EVERY 8 HOURS AS NEEDED FOR NAUSEA OR VOMITING 60 tablet 0    celecoxib (CELEBREX) 200 MG capsule Take 1 capsule by mouth daily 30 capsule 1    betamethasone dipropionate (DIPROLENE) 0.05 % cream APPLY EXTERNALLY TO THE AFFECTED AREA TWICE DAILY 60 g 0    esomeprazole (NEXIUM) 40 MG delayed release capsule TAKE 1 CAPSULE BY MOUTH EVERY MORNING BEFORE BREAKFAST 90 capsule 0    levothyroxine (SYNTHROID) 88 MCG tablet TAKE 1 TABLET BY MOUTH DAILY 90 tablet 0    ferrous gluconate (FERGON) 240 (27 Fe) MG tablet TAKE 1 TABLET BY MOUTH TWICE DAILY 60 tablet 0    nystatin (MYCOSTATIN) 079964 UNIT/GM cream Apply topically 2 times daily.  30 g 0    vitamin D (ERGOCALCIFEROL) 1.25 MG (55791 UT) CAPS capsule TAKE 1 CAPSULE BY MOUTH 1 TIME A WEEK 13 capsule 3    torsemide (DEMADEX) 20 MG tablet TAKE 2 TABLETS BY MOUTH EVERY MORNING FOR 10 DAYS 180 tablet 1    propranolol (INDERAL) 20 MG tablet Take 20 mg by mouth 3 times daily      QUEtiapine (SEROQUEL) 200 MG tablet Take 200 mg by mouth nightly      lamoTRIgine (LAMICTAL) 150 MG tablet Take 150 mg by mouth nightly  0    FLUoxetine (PROZAC) 20 MG capsule Take 20 mg by mouth daily  3    atomoxetine (STRATTERA) 40 MG capsule Take 40 mg by mouth daily  3    meclizine (ANTIVERT) 25 MG tablet TAKE 1 TABLET BY MOUTH THREE TIMES DAILY AS NEEDED FOR DIZZINESS 30 tablet 0    ARIPiprazole (ABILIFY) 10 MG tablet TK 1 T PO QAM  3    pregabalin (LYRICA) 100 MG capsule Take 1 capsule by mouth 3 times daily for 30 days. 90 capsule 0     No facility-administered medications prior to visit. SOCIAL/FAMILY/PAST MEDICAL HISTORY: Ms. Tiffany Ball, family and past medical history was reviewed. REVIEW OF SYSTEMS:    Respiratory: Negative for apnea, chest tightness and shortness of breath or change in baseline breathing. Gastrointestinal: Negative for nausea, vomiting, abdominal pain, diarrhea, constipation, blood in stool and abdominal distention. PHYSICAL EXAM:   Nursing note and vitals per patient reviewed. LMP 01/15/2014    Constitutional: She appears well-developed and well-nourished. No acute distress. No respiratory distress. Skin: Skin appears to be warm and dry. No rashes or any other marks noted. She is not diaphoretic. Respiratory/Pulmonary: NO conversational dyspnea, no accessory muscle use, no coughing during exam. She does not appear to be in labored breathing. Neurological/Psychiatric:She is alert and oriented to person, place, and time. Coordination is  normal.  Her mood isAppropriate and affect is Neutral/Euthymic(normal). Musculoskeletal / Extremities: Range of motion is normal. Gait is normal, assistive devices use: none. IMPRESSION:   1. Chronic pain syndrome    2. Primary osteoarthritis of both hips    3. Fibromyalgia    4. Lumbar radiculitis    5.  DDD (degenerative disc disease), lumbar    6. Elbow tendinitis    7. Facet syndrome    8. Plantar fasciitis, right    9. Primary osteoarthritis of right hip        PLAN:  Informed verbal consent regarding treatment was obtained  -continue with current opioid regimen Ultram 1-2 per day  -continue with Lyrica and Celebrex  -She was advised weight reduction, diet changes- 800-1200 jessica diet, diet diary, exercising, nutritional  consult increased physical activity as tolerated   -walking as tolerated    Current Outpatient Medications   Medication Sig Dispense Refill    atorvastatin (LIPITOR) 40 MG tablet TAKE 1 TABLET BY MOUTH DAILY 90 tablet 0    rOPINIRole (REQUIP) 1 MG tablet TAKE 2 TABLETS BY MOUTH EVERY NIGHT 180 tablet 0    ondansetron (ZOFRAN) 4 MG tablet TAKE 1 TABLET BY MOUTH EVERY 8 HOURS AS NEEDED FOR NAUSEA OR VOMITING 60 tablet 0    celecoxib (CELEBREX) 200 MG capsule Take 1 capsule by mouth daily 30 capsule 1    betamethasone dipropionate (DIPROLENE) 0.05 % cream APPLY EXTERNALLY TO THE AFFECTED AREA TWICE DAILY 60 g 0    esomeprazole (NEXIUM) 40 MG delayed release capsule TAKE 1 CAPSULE BY MOUTH EVERY MORNING BEFORE BREAKFAST 90 capsule 0    levothyroxine (SYNTHROID) 88 MCG tablet TAKE 1 TABLET BY MOUTH DAILY 90 tablet 0    ferrous gluconate (FERGON) 240 (27 Fe) MG tablet TAKE 1 TABLET BY MOUTH TWICE DAILY 60 tablet 0    nystatin (MYCOSTATIN) 156085 UNIT/GM cream Apply topically 2 times daily.  30 g 0    vitamin D (ERGOCALCIFEROL) 1.25 MG (46861 UT) CAPS capsule TAKE 1 CAPSULE BY MOUTH 1 TIME A WEEK 13 capsule 3    torsemide (DEMADEX) 20 MG tablet TAKE 2 TABLETS BY MOUTH EVERY MORNING FOR 10 DAYS 180 tablet 1    propranolol (INDERAL) 20 MG tablet Take 20 mg by mouth 3 times daily      QUEtiapine (SEROQUEL) 200 MG tablet Take 200 mg by mouth nightly      lamoTRIgine (LAMICTAL) 150 MG tablet Take 150 mg by mouth nightly  0    FLUoxetine (PROZAC) 20 MG capsule Take 20 mg by mouth daily  3    machinery while adjusting the dose of medicines or if having cognitive  issues related to the current medications. Risk of overdose and death, if medicines not taken as prescribed, were also discussed. If the patient develops new symptoms or if the symptoms worsen, the patient should call the office. While transcribing every attempt was made to maintain the accuracy of the note in terms of it's contents,there may have been some errors made inadvertently. Thank you for allowing me to participate in the care of this patient.     Doris Berman MD.    Cc: Earl Peralta MD

## 2020-09-23 RX ORDER — BETAMETHASONE DIPROPIONATE 0.5 MG/G
CREAM TOPICAL
Qty: 60 G | Refills: 0 | Status: SHIPPED | OUTPATIENT
Start: 2020-09-23 | End: 2020-12-17

## 2020-09-23 RX ORDER — LEVOTHYROXINE SODIUM 88 UG/1
TABLET ORAL
Qty: 90 TABLET | Refills: 0 | Status: SHIPPED | OUTPATIENT
Start: 2020-09-23 | End: 2020-12-28

## 2020-10-09 ENCOUNTER — HOSPITAL ENCOUNTER (EMERGENCY)
Age: 60
Discharge: HOME | End: 2020-10-10
Attending: EMERGENCY MEDICINE
Payer: COMMERCIAL

## 2020-10-09 ENCOUNTER — APPOINTMENT (OUTPATIENT)
Dept: GENERAL RADIOLOGY | Age: 60
End: 2020-10-09
Payer: COMMERCIAL

## 2020-10-09 PROCEDURE — 99285 EMERGENCY DEPT VISIT HI MDM: CPT

## 2020-10-09 PROCEDURE — 93005 ELECTROCARDIOGRAM TRACING: CPT

## 2020-10-09 PROCEDURE — 71046 X-RAY EXAM CHEST 2 VIEWS: CPT

## 2020-10-09 RX ORDER — PROPRANOLOL HYDROCHLORIDE 40 MG/1
1 TABLET ORAL 3 TIMES DAILY
COMMUNITY
Start: 2020-08-27

## 2020-10-09 RX ORDER — DULOXETIN HYDROCHLORIDE 60 MG/1
1 CAPSULE, DELAYED RELEASE ORAL 2 TIMES DAILY
COMMUNITY
Start: 2020-08-27

## 2020-10-09 RX ORDER — ARIPIPRAZOLE 30 MG/1
30 TABLET ORAL DAILY
COMMUNITY
Start: 2020-09-04

## 2020-10-09 ASSESSMENT — PAIN DESCRIPTION - LOCATION: LOCATION: CHEST

## 2020-10-09 ASSESSMENT — PAIN DESCRIPTION - DESCRIPTORS: DESCRIPTORS: PRESSURE

## 2020-10-09 ASSESSMENT — PAIN DESCRIPTION - ORIENTATION: ORIENTATION: MID

## 2020-10-09 ASSESSMENT — PAIN DESCRIPTION - PAIN TYPE: TYPE: ACUTE PAIN

## 2020-10-09 ASSESSMENT — PAIN SCALES - GENERAL: PAINLEVEL_OUTOF10: 6

## 2020-10-10 VITALS
SYSTOLIC BLOOD PRESSURE: 129 MMHG | RESPIRATION RATE: 15 BRPM | WEIGHT: 293 LBS | TEMPERATURE: 98.1 F | BODY MASS INDEX: 50.02 KG/M2 | DIASTOLIC BLOOD PRESSURE: 79 MMHG | HEIGHT: 64 IN | HEART RATE: 88 BPM | OXYGEN SATURATION: 92 %

## 2020-10-10 LAB
ANION GAP SERPL CALCULATED.3IONS-SCNC: 10 MMOL/L (ref 3–16)
BASE EXCESS VENOUS: 3.5 MMOL/L (ref -2–3)
BASOPHILS ABSOLUTE: 0 K/UL (ref 0–0.2)
BASOPHILS RELATIVE PERCENT: 0.5 %
BUN BLDV-MCNC: 26 MG/DL (ref 7–20)
CALCIUM SERPL-MCNC: 9.2 MG/DL (ref 8.3–10.6)
CARBOXYHEMOGLOBIN: 1.4 % (ref 0–1.5)
CHLORIDE BLD-SCNC: 105 MMOL/L (ref 99–110)
CO2: 28 MMOL/L (ref 21–32)
CREAT SERPL-MCNC: 0.7 MG/DL (ref 0.6–1.2)
EKG ATRIAL RATE: 71 BPM
EKG ATRIAL RATE: 72 BPM
EKG ATRIAL RATE: 74 BPM
EKG DIAGNOSIS: NORMAL
EKG P AXIS: 11 DEGREES
EKG P AXIS: 13 DEGREES
EKG P AXIS: 36 DEGREES
EKG P-R INTERVAL: 144 MS
EKG P-R INTERVAL: 146 MS
EKG P-R INTERVAL: 154 MS
EKG Q-T INTERVAL: 392 MS
EKG Q-T INTERVAL: 398 MS
EKG Q-T INTERVAL: 404 MS
EKG QRS DURATION: 80 MS
EKG QRS DURATION: 84 MS
EKG QRS DURATION: 86 MS
EKG QTC CALCULATION (BAZETT): 425 MS
EKG QTC CALCULATION (BAZETT): 441 MS
EKG QTC CALCULATION (BAZETT): 442 MS
EKG R AXIS: 18 DEGREES
EKG R AXIS: 29 DEGREES
EKG R AXIS: 69 DEGREES
EKG T AXIS: 20 DEGREES
EKG T AXIS: 30 DEGREES
EKG T AXIS: 56 DEGREES
EKG VENTRICULAR RATE: 71 BPM
EKG VENTRICULAR RATE: 72 BPM
EKG VENTRICULAR RATE: 74 BPM
EOSINOPHILS ABSOLUTE: 0.3 K/UL (ref 0–0.6)
EOSINOPHILS RELATIVE PERCENT: 3.8 %
GFR AFRICAN AMERICAN: >60
GFR NON-AFRICAN AMERICAN: >60
GLUCOSE BLD-MCNC: 121 MG/DL (ref 70–99)
HCO3 VENOUS: 30.3 MMOL/L (ref 24–28)
HCT VFR BLD CALC: 39.6 % (ref 36–48)
HEMOGLOBIN, VEN, REDUCED: 41.4 %
HEMOGLOBIN: 12.9 G/DL (ref 12–16)
LV EF: 78 %
LVEF MODALITY: NORMAL
LYMPHOCYTES ABSOLUTE: 1.9 K/UL (ref 1–5.1)
LYMPHOCYTES RELATIVE PERCENT: 28 %
MCH RBC QN AUTO: 29.9 PG (ref 26–34)
MCHC RBC AUTO-ENTMCNC: 32.7 G/DL (ref 31–36)
MCV RBC AUTO: 91.5 FL (ref 80–100)
METHEMOGLOBIN VENOUS: 0.6 % (ref 0–1.5)
MONOCYTES ABSOLUTE: 0.6 K/UL (ref 0–1.3)
MONOCYTES RELATIVE PERCENT: 8.4 %
NEUTROPHILS ABSOLUTE: 4 K/UL (ref 1.7–7.7)
NEUTROPHILS RELATIVE PERCENT: 59.3 %
O2 SAT, VEN: 58 %
PCO2, VEN: 58.3 MMHG (ref 41–51)
PDW BLD-RTO: 16.2 % (ref 12.4–15.4)
PH VENOUS: 7.34 (ref 7.35–7.45)
PLATELET # BLD: 197 K/UL (ref 135–450)
PMV BLD AUTO: 8.9 FL (ref 5–10.5)
PO2, VEN: 35 MMHG (ref 25–40)
POTASSIUM REFLEX MAGNESIUM: 4 MMOL/L (ref 3.5–5.1)
PRO-BNP: 312 PG/ML (ref 0–124)
RBC # BLD: 4.32 M/UL (ref 4–5.2)
SODIUM BLD-SCNC: 143 MMOL/L (ref 136–145)
TCO2 CALC VENOUS: 32 MMOL/L
TROPONIN: <0.01 NG/ML
WBC # BLD: 6.7 K/UL (ref 4–11)

## 2020-10-10 PROCEDURE — 80048 BASIC METABOLIC PNL TOTAL CA: CPT

## 2020-10-10 PROCEDURE — 93017 CV STRESS TEST TRACING ONLY: CPT

## 2020-10-10 PROCEDURE — 96374 THER/PROPH/DIAG INJ IV PUSH: CPT

## 2020-10-10 PROCEDURE — 3430000000 HC RX DIAGNOSTIC RADIOPHARMACEUTICAL: Performed by: PHYSICIAN ASSISTANT

## 2020-10-10 PROCEDURE — 6370000000 HC RX 637 (ALT 250 FOR IP): Performed by: PHYSICIAN ASSISTANT

## 2020-10-10 PROCEDURE — 84484 ASSAY OF TROPONIN QUANT: CPT

## 2020-10-10 PROCEDURE — 85025 COMPLETE CBC W/AUTO DIFF WBC: CPT

## 2020-10-10 PROCEDURE — 83880 ASSAY OF NATRIURETIC PEPTIDE: CPT

## 2020-10-10 PROCEDURE — 78452 HT MUSCLE IMAGE SPECT MULT: CPT

## 2020-10-10 PROCEDURE — 2580000003 HC RX 258: Performed by: PHYSICIAN ASSISTANT

## 2020-10-10 PROCEDURE — 36415 COLL VENOUS BLD VENIPUNCTURE: CPT

## 2020-10-10 PROCEDURE — 6360000002 HC RX W HCPCS: Performed by: PHYSICIAN ASSISTANT

## 2020-10-10 PROCEDURE — A9502 TC99M TETROFOSMIN: HCPCS | Performed by: PHYSICIAN ASSISTANT

## 2020-10-10 PROCEDURE — 82803 BLOOD GASES ANY COMBINATION: CPT

## 2020-10-10 PROCEDURE — 93005 ELECTROCARDIOGRAM TRACING: CPT | Performed by: PHYSICIAN ASSISTANT

## 2020-10-10 RX ORDER — ASPIRIN 81 MG/1
324 TABLET, CHEWABLE ORAL ONCE
Status: COMPLETED | OUTPATIENT
Start: 2020-10-10 | End: 2020-10-10

## 2020-10-10 RX ORDER — DOBUTAMINE HYDROCHLORIDE 200 MG/100ML
10 INJECTION INTRAVENOUS CONTINUOUS
Status: DISCONTINUED | OUTPATIENT
Start: 2020-10-10 | End: 2020-10-10 | Stop reason: ALTCHOICE

## 2020-10-10 RX ORDER — SODIUM CHLORIDE 0.9 % (FLUSH) 0.9 %
10 SYRINGE (ML) INJECTION 2 TIMES DAILY
Status: DISCONTINUED | OUTPATIENT
Start: 2020-10-10 | End: 2020-10-10 | Stop reason: HOSPADM

## 2020-10-10 RX ORDER — DULOXETIN HYDROCHLORIDE 30 MG/1
60 CAPSULE, DELAYED RELEASE ORAL ONCE
Status: COMPLETED | OUTPATIENT
Start: 2020-10-10 | End: 2020-10-10

## 2020-10-10 RX ORDER — PREGABALIN 50 MG/1
100 CAPSULE ORAL ONCE
Status: COMPLETED | OUTPATIENT
Start: 2020-10-10 | End: 2020-10-10

## 2020-10-10 RX ORDER — QUETIAPINE FUMARATE 200 MG/1
200 TABLET, FILM COATED ORAL ONCE
Status: COMPLETED | OUTPATIENT
Start: 2020-10-10 | End: 2020-10-10

## 2020-10-10 RX ORDER — ATORVASTATIN CALCIUM 40 MG/1
40 TABLET, FILM COATED ORAL ONCE
Status: COMPLETED | OUTPATIENT
Start: 2020-10-10 | End: 2020-10-10

## 2020-10-10 RX ORDER — SODIUM CHLORIDE 0.9 % (FLUSH) 0.9 %
10 SYRINGE (ML) INJECTION PRN
Status: DISCONTINUED | OUTPATIENT
Start: 2020-10-10 | End: 2020-10-10 | Stop reason: HOSPADM

## 2020-10-10 RX ORDER — SODIUM CHLORIDE 0.9 % (FLUSH) 0.9 %
10 SYRINGE (ML) INJECTION EVERY 12 HOURS SCHEDULED
Status: DISCONTINUED | OUTPATIENT
Start: 2020-10-10 | End: 2020-10-10 | Stop reason: HOSPADM

## 2020-10-10 RX ORDER — ROPINIROLE 2 MG/1
2 TABLET, FILM COATED ORAL ONCE
Status: COMPLETED | OUTPATIENT
Start: 2020-10-10 | End: 2020-10-10

## 2020-10-10 RX ADMIN — TETROFOSMIN 33.2 MILLICURIE: 1.38 INJECTION, POWDER, LYOPHILIZED, FOR SOLUTION INTRAVENOUS at 08:54

## 2020-10-10 RX ADMIN — PREGABALIN 100 MG: 50 CAPSULE ORAL at 02:56

## 2020-10-10 RX ADMIN — QUETIAPINE FUMARATE 200 MG: 200 TABLET ORAL at 02:57

## 2020-10-10 RX ADMIN — ROPINIROLE HYDROCHLORIDE 2 MG: 2 TABLET, FILM COATED ORAL at 02:57

## 2020-10-10 RX ADMIN — REGADENOSON 0.4 MG: 0.08 INJECTION, SOLUTION INTRAVENOUS at 08:54

## 2020-10-10 RX ADMIN — ASPIRIN 324 MG: 81 TABLET, CHEWABLE ORAL at 02:57

## 2020-10-10 RX ADMIN — DULOXETINE HYDROCHLORIDE 60 MG: 30 CAPSULE, DELAYED RELEASE ORAL at 02:57

## 2020-10-10 RX ADMIN — Medication 10 ML: at 08:54

## 2020-10-10 RX ADMIN — ATORVASTATIN CALCIUM 40 MG: 40 TABLET, FILM COATED ORAL at 02:57

## 2020-10-10 ASSESSMENT — ENCOUNTER SYMPTOMS
SORE THROAT: 0
SHORTNESS OF BREATH: 1
NAUSEA: 1
CONSTIPATION: 0
VOMITING: 0
CHEST TIGHTNESS: 1
DIARRHEA: 0
VOMITING: 1
RHINORRHEA: 0
COUGH: 0
NAUSEA: 0
ABDOMINAL PAIN: 0

## 2020-10-10 ASSESSMENT — HEART SCORE: ECG: 0

## 2020-10-10 NOTE — ED NOTES
[facility] Emergency Department  EDObservation Admission Note   Emergency Physicians       Time Placed in ED Observation: DISPOSITION Ed Observation 10/10/2020 01:51:19 AM    Impression and Plan      In summary, Zaida Navarrete is admitted by to the [facility] for chest pain. Dr. watkins is the CDU admission attending. This patient has been risk-stratified based on the available history, physical exam, and related study findings. Admission toobservation status for further diagnosis/treatment/monitoring of chest pain is warranted clinically. This extendedperiod of observation is specifically required to determine the need for hospitalization. We will observe the patient for the following endpoints:    -Serial cardiac enzymes and EKGs  -Nuclear stress test    When met, appropriate disposition will be arranged. Diagnostic Evaluation      Diagnostic studies and ED interventions germane to this period of clinical observation willinclude:    - Initial EKG, chest x-ray, and troponin all of which were negative       Consultant(s)      None       Patient History      Zaida Navarrete is a 63 y.o. female who presented to the Emergency Department for evaluation of chest pain described as a pressure-like discomfort in her chest radiating up to her left neck with some mild associated shortness of breath. It was nonexertional and occurred at rest.  The acute evaluation included EKG, troponin, other labs and chest x-ray all of which were unremarkable. Upon admission to the Clinical Decision Unit, Zaida Navarrete was chest pain-free.       Past Medical History  Past Medical History:   Diagnosis Date    Acquired hyperlipoproteinemia     MARIO (acute kidney injury) ([facility] [address] 9/30/2019    Allergic rhinitis     Anxiety     Arthritis     Bilateral lower extremity edema     Bipolar 1 disorder (HCC)     Chronic pain syndrome     DDD (degenerative disc disease), lumbar     Depression     Depression     Disease of gallbladder     Dizziness     DJD (degenerative joint disease) of hip     Edema 12/29/2009    Elbow tendinitis     Essential hypertension 8/19/2019    Facet syndrome     Fibromyalgia     Fracture of nasal bone     GERD (gastroesophageal reflux disease)     Headache     Hiatal hernia     History of blood transfusion     anemia    Hyperlipidemia     Insomnia     Osteoarthritis     Prediabetes     Rash     Self mutilating behavior     cutter pt states she has not done in months- in therapy    Sleep apnea     no CPAP    Suicidal ideation     Thyroid disorder     hypothyroidism     Past Surgical History:   Procedure Laterality Date    ABDOMEN SURGERY  5/28/2014    LAPAROSCOPIC SLEEVE GASTRECTOMY, EXTENSIVE LYSIS OF ADHESIONS    ACHILLES TENDON SURGERY Left 3/9/2020    LEFT CALCANEOUS PARTIAL EXCISION, SECONDARY REPAIR OF ACHILLES TENDON performed by J[facility] at [facility]      open , Ex-lap    CHOLECYSTECTOMY      open    COLONOSCOPY  04/16/2013    dr Amaya Stephens 2018     DILATION AND CURETTAGE OF UTERUS      ENDOSCOPY, COLON, DIAGNOSTIC      FOOT SURGERY Right 05/27/2016    ARTHROPLASTY RIGHT 5TH DIGIT      HYSTEROSCOPY N/A 12-30-13    Hysteroscopy Dilitation and Curettage    JOINT REPLACEMENT Bilateral     knee    OTHER SURGICAL HISTORY  9/11/2015    ARTHROPLASTY 5TH DIGIT LEFT FOOT          SHOULDER SURGERY      right    SHOULDER SURGERY Right     SLEEVE GASTROPLASTY  May 28, 2014    [facility]    TOTAL KNEE ARTHROPLASTY  12/10/10    Right    TOTAL KNEE ARTHROPLASTY  3/30/10    Left    UPPER GASTROINTESTINAL ENDOSCOPY  4/16/2013    dr Morro Roger    UPPER GASTROINTESTINAL ENDOSCOPY  3/20/2014    dr Morro Roger     Family History   Problem Relation Age of Onset    Heart Disease Mother     Heart Failure Mother     High Cholesterol Mother     High Blood Pressure Mother     Stroke Mother  Birth Defects Mother     Other Mother         VV    High Blood Pressure Father     COPD Father     Diabetes Brother     Stroke Maternal Grandmother     Arthritis Maternal Grandmother     Heart Failure Brother     Stroke Brother     Birth Defects Maternal Grandfather     Arthritis Paternal Grandmother      Social History     Tobacco Use    Smoking status: Never Smoker    Smokeless tobacco: Never Used    Tobacco comment: Never smoked   Substance Use Topics    Alcohol use: No     Alcohol/week: 0.0 standard drinks    Drug use: No        Medications      Previous Medications    ARIPIPRAZOLE (ABILIFY) 20 MG TABLET    Take 1 tablet by mouth daily    ATORVASTATIN (LIPITOR) 40 MG TABLET    TAKE 1 TABLET BY MOUTH DAILY    BETAMETHASONE DIPROPIONATE (DIPROLENE) 0.05 % CREAM    APPLY EXTERNALLY TO THE AFFECTED AREA TWICE DAILY    CELECOXIB (CELEBREX) 200 MG CAPSULE    Take 1 capsule by mouth daily    CHOLECALCIFEROL (VITAMIN D3) 125 MCG (5000 UT) TABS    Take 1 tablet by mouth 2 times daily    DULOXETINE (CYMBALTA) 60 MG EXTENDED RELEASE CAPSULE    Take 1 capsule by mouth 2 times daily    ESOMEPRAZOLE (NEXIUM) 40 MG DELAYED RELEASE CAPSULE    TAKE 1 CAPSULE BY MOUTH EVERY MORNING BEFORE BREAKFAST    LEVOTHYROXINE (SYNTHROID) 88 MCG TABLET    TAKE 1 TABLET BY MOUTH DAILY    ONDANSETRON (ZOFRAN) 4 MG TABLET    TAKE 1 TABLET BY MOUTH EVERY 8 HOURS AS NEEDED FOR NAUSEA OR VOMITING    PREGABALIN (LYRICA) 100 MG CAPSULE    Take 1 capsule by mouth 3 times daily for 30 days. PROPRANOLOL (INDERAL) 40 MG TABLET    Take 1 tablet by mouth 3 times daily anxiety    QUETIAPINE (SEROQUEL) 200 MG TABLET    Take 200 mg by mouth nightly    ROPINIROLE (REQUIP) 1 MG TABLET    TAKE 2 TABLETS BY MOUTH EVERY NIGHT    TRAMADOL (ULTRAM) 50 MG TABLET    Take 1 tablet by mouth every 6 hours as needed for Pain (Max 1-2 per day) for up to 28 days.           Review of Systems      Review of Systems   Constitutional: Negative for chills and fever. Respiratory: Positive for chest tightness and shortness of breath. Negative for cough. Cardiovascular: Positive for chest pain. Gastrointestinal: Negative for nausea and vomiting. All other systems reviewed and are negative. Physical Examination      Physical Exam  Vitals signs and nursing note reviewed. Constitutional:       General: She is not in acute distress. Appearance: She is well-developed. She is not diaphoretic. Cardiovascular:      Rate and Rhythm: Normal rate and regular rhythm. Pulmonary:      Effort: Pulmonary effort is normal.      Breath sounds: Normal breath sounds. Chest:      Chest wall: No tenderness. Skin:     General: Skin is warm and dry. Neurological:      Mental Status: She is alert and oriented to person, place, and time.    Psychiatric:         Behavior: Behavior normal.              Elif Lackey MD  10/10/20 0600

## 2020-10-10 NOTE — ED NOTES
Pt c/o chest pressure, nausea and vomiting, cold sweats, pain L side of neck. Pt states that she was sitting down watching TV when the symptoms started. Pt states when she gets up she gets \"real real dizzy\".      Denita Acosta Page  10/09/20 [id number]

## 2020-10-10 NOTE — ED NOTES
Patient prepared for and ready to be discharged. Patient discharged at this time in no acute distress after verbalizing understanding of discharge instructions. Patient left after receiving After Visit Summary instructions.         Fernanda River RN  10/10/20 [id number]

## 2020-10-10 NOTE — ED PROVIDER NOTES
ED Attending Attestation Note     Date of evaluation: 10/9/2020    This patient was seen by the advance practice provider. I have seen and examined the patient, agree with the workup, evaluation, management and diagnosis. The care plan has been discussed. I have reviewed the ECG and concur with the SYDNEE's interpretation. My assessment reveals patient here with chest pain described as a pressure radiating to her neck. She also had some lightheadedness. Heart score is 3 and initial work-up is unremarkable but given risk factors, we feel the patient needs further risk stratification with stress test and she will be brought into ED observation for that study tomorrow. Jamal Bryant MD  10/10/20 [id number]

## 2020-10-10 NOTE — ED PROVIDER NOTES
[address] ENCOUNTER          PHYSICIAN ASSISTANT NOTE       Date of evaluation: 10/9/2020    Chief Complaint     Chest Pain (chest pressure started 1 hour pta, NSR on 12 lead, -SOB, -N/V)      History of Present Illness     Ki Dowd is a 63 y.o. female, with a history of anxiety, depression, GERD, hypothyroidism, obesity, fibromyalgia, hyperlipidemia, hypertension and bipolar disorder, who presents to the ED with complaints of midsternal chest pressure that started 1 hour prior to arrival while she was watching TV, radiates to the left posterior neck. She had not eaten anything prior to the onset of her pain. It has been constant with no aggravating or alleviating factors. She has noted increased bilateral lower extremity swelling over the last 2 weeks, started feeling short of breath just prior to arrival.  Also describes feeling lightheaded when she stood up this evening. She denies cough, fever or other UTI type symptoms. She rates her pain a 6 out of 10 in severity. Did not take anything for it prior to arrival.  She was nauseated and vomited once. Denies abdominal pain or changes in urinary or bowel habits. She otherwise has no complaints. Review of Systems     Review of Systems   Constitutional: Negative for chills and fever. HENT: Negative for congestion, rhinorrhea and sore throat. Respiratory: Positive for shortness of breath. Negative for cough. Cardiovascular: Positive for chest pain and leg swelling. Negative for palpitations. Gastrointestinal: Positive for nausea and vomiting (x 1). Negative for abdominal pain, constipation and diarrhea. Genitourinary: Negative for dysuria, frequency and urgency. Musculoskeletal: Negative for arthralgias and myalgias. Skin: Positive for rash (under breasts, being treated with steroid cream). Negative for wound. Neurological: Positive for light-headedness (postural).  Negative for dizziness and headaches. All other systems reviewed and are negative. Past Medical, Surgical, Family, and Social History     She has a past medical history of Acquired hyperlipoproteinemia, MARIO (acute kidney injury) ([facility] [address] Allergic rhinitis, Anxiety, Arthritis, Bilateral lower extremity edema, Bipolar 1 disorder ([facility] [address] Chronic pain syndrome, DDD (degenerative disc disease), lumbar, Depression, Depression, Disease of gallbladder, Dizziness, DJD (degenerative joint disease) of hip, Edema, Elbow tendinitis, Essential hypertension, Facet syndrome, Fibromyalgia, Fracture of nasal bone, GERD (gastroesophageal reflux disease), Headache, Hiatal hernia, History of blood transfusion, Hyperlipidemia, Insomnia, Osteoarthritis, Prediabetes, Rash, Self mutilating behavior, Sleep apnea, Suicidal ideation, and Thyroid disorder. She has a past surgical history that includes Ankle surgery; Appendectomy; Total knee arthroplasty (12/10/10); Total knee arthroplasty (3/30/10); shoulder surgery; Colonoscopy (04/16/2013); Upper gastrointestinal endoscopy (4/16/2013); Cholecystectomy; hysteroscopy (N/A, 12-30-13); Upper gastrointestinal endoscopy (3/20/2014); Dilation and curettage of uterus; joint replacement (Bilateral); Abdomen surgery (5/28/2014); Sleeve Gastroplasty (May 28, 2014); Endoscopy, colon, diagnostic; shoulder surgery (Right); Ankle surgery; other surgical history (9/11/2015); Foot surgery (Right, 05/27/2016); and Achilles tendon surgery (Left, 3/9/2020). Her family history includes Arthritis in her maternal grandmother and paternal grandmother; Birth Defects in her maternal grandfather and mother; COPD in her father; Diabetes in her brother; Heart Disease in her mother; Heart Failure in her brother and mother; High Blood Pressure in her father and mother; High Cholesterol in her mother; Other in her mother; Stroke in her brother, maternal grandmother, and mother. She reports that she has never smoked.  She has never used smokeless tobacco. She reports that she does not drink alcohol or use drugs. Medications     Previous Medications    ARIPIPRAZOLE (ABILIFY) 20 MG TABLET    Take 1 tablet by mouth daily    ATORVASTATIN (LIPITOR) 40 MG TABLET    TAKE 1 TABLET BY MOUTH DAILY    BETAMETHASONE DIPROPIONATE (DIPROLENE) 0.05 % CREAM    APPLY EXTERNALLY TO THE AFFECTED AREA TWICE DAILY    CELECOXIB (CELEBREX) 200 MG CAPSULE    Take 1 capsule by mouth daily    CHOLECALCIFEROL (VITAMIN D3) 125 MCG (5000 UT) TABS    Take 1 tablet by mouth 2 times daily    DULOXETINE (CYMBALTA) 60 MG EXTENDED RELEASE CAPSULE    Take 1 capsule by mouth 2 times daily    ESOMEPRAZOLE (NEXIUM) 40 MG DELAYED RELEASE CAPSULE    TAKE 1 CAPSULE BY MOUTH EVERY MORNING BEFORE BREAKFAST    LEVOTHYROXINE (SYNTHROID) 88 MCG TABLET    TAKE 1 TABLET BY MOUTH DAILY    ONDANSETRON (ZOFRAN) 4 MG TABLET    TAKE 1 TABLET BY MOUTH EVERY 8 HOURS AS NEEDED FOR NAUSEA OR VOMITING    PREGABALIN (LYRICA) 100 MG CAPSULE    Take 1 capsule by mouth 3 times daily for 30 days. PROPRANOLOL (INDERAL) 40 MG TABLET    Take 1 tablet by mouth 3 times daily anxiety    QUETIAPINE (SEROQUEL) 200 MG TABLET    Take 200 mg by mouth nightly    ROPINIROLE (REQUIP) 1 MG TABLET    TAKE 2 TABLETS BY MOUTH EVERY NIGHT    TRAMADOL (ULTRAM) 50 MG TABLET    Take 1 tablet by mouth every 6 hours as needed for Pain (Max 1-2 per day) for up to 28 days. Allergies     She is allergic to polysporin [bacitracin-polymyxin b]; lorazepam; neomycin-polymyxin-gramicidin; and sulfa antibiotics. Physical Exam     INITIAL VITALS: BP: (!) 147/84, Temp: 98.1 °F (36.7 °C), Pulse: 80, Resp: 13, SpO2: 94 %  Physical Exam  Vitals signs reviewed. Constitutional:       General: She is not in acute distress. Appearance: Normal appearance. She is well-developed. She is obese. HENT:      Head: Normocephalic and atraumatic.       Mouth/Throat:      Mouth: Mucous membranes are moist.   Eyes:      Extraocular Movements: Extraocular movements intact. Conjunctiva/sclera: Conjunctivae normal.   Neck:      Musculoskeletal: Normal range of motion and neck supple. Trachea: Phonation normal.   Cardiovascular:      Rate and Rhythm: Normal rate and regular rhythm. Heart sounds: No murmur. No friction rub. No gallop. Pulmonary:      Effort: Pulmonary effort is normal.      Breath sounds: No wheezing, rhonchi or rales. Abdominal:      General: There is no distension. Palpations: Abdomen is soft. Tenderness: There is no abdominal tenderness. Musculoskeletal:      Right lower le+ Pitting Edema present. Left lower le+ Pitting Edema present. Skin:     General: Skin is warm and dry. Findings: Rash (moist erythematous inframammary rash consistent with cutaneous candidiasis) present. No petechiae. Neurological:      Mental Status: She is alert and oriented to person, place, and time. Psychiatric:         Mood and Affect: Mood and affect normal.         Behavior: Behavior normal.         Diagnostic Results     EKG   Interpreted in conjunction with emergency department physician Farnaz Lacey MD  Rhythm: normal sinus   Rate: normal  Axis: normal  Ectopy: none  Conduction: normal  ST Segments: no acute change  T Waves: no acute change  Q Waves:nonspecific  Clinical Impression: no acute changes  Comparison:  2020      RECENT VITALS:  BP: (!) 147/84, Temp: 98.1 °F (36.7 °C), Pulse: 80, Resp: 13, SpO2: 94 %     ED Course     Nursing Notes, Past Medical Hx,Past Surgical Hx, Social Hx, Allergies, and Family Hx were reviewed.     The patient was given the following medications:  Orders Placed This Encounter   Medications    sodium chloride flush 0.9 % injection 10 mL    sodium chloride flush 0.9 % injection 10 mL    aspirin chewable tablet 324 mg    DISCONTD: DOBUTamine (DOBUTREX) 500 mg in dextrose 5 % 250 mL infusion     Order Specific Question:   Label Comment     Answer:   STRESS TEST ONLY  atorvastatin (LIPITOR) tablet 40 mg    DULoxetine (CYMBALTA) extended release capsule 60 mg    pregabalin (LYRICA) capsule 100 mg    QUEtiapine (SEROQUEL) tablet 200 mg    rOPINIRole (REQUIP) tablet 2 mg    regadenoson (LEXISCAN) injection 0.4 mg       CONSULTS:  None    MEDICAL DECISION MAKING / ASSESSMENT / Ayse Hernandez is a 63 y.o. female presenting with midsternal chest pressure associated with nausea, one episode of vomiting and shortness of breath. Her pain was not exertional, not pleuritic, not associated with oral intake and not positional.  Her nausea has resolved, she states her pain is just a discomfort at this time. She has risk factors for ACS including hypertension, hyperlipidemia, obesity and positive family history. IV access was established. Labs include an unremarkable CBC, renal panel and negative troponin. BNP is mildly elevated at 312. Chest x-ray shows mild lower lung opacities, possible atelectasis with developing infiltrate not excluded in the appropriate clinical setting. Patient has had no fever or cough, I do not feel that her chest x-ray is consistent with infectious process. The etiology of the patient's pain is unclear at this time. She has a heart score of 3. Given her symptoms, it was determined that an observation stay was appropriate for stress test.  Her last one was in December 2018 and normal.  She is given a regular strength aspirin as well as her evening home medications except for her propranolol which she last took at 10 AM.  She has not had any caffeine today. Her troponin will be trended, EKGs repeated x2. She is in stable condition upon waiting additional evaluation to rule out ACS. This patient was also evaluated by the attending physician. All care plans were discussed and agreed upon. Clinical Impression     1.  Chest pain, unspecified type        Disposition       DISPOSITION  ED Observation     JUANPABLO Dupree  10/10/20 [facility]

## 2020-10-10 NOTE — ED NOTES
Pt returned from stress test, no needs at this time, resting comfortably in bed     Karl Zavaleta, RN  10/10/20 6906

## 2020-10-11 NOTE — ED PROVIDER NOTES
[facility] Emergency Department  ED Observation Disposition Note   Emergency Physicians         Diagnostic Evaluation      Diagnostic studies germane to this period of clinical observation include:    - Serial EKG and troponins  - Myocardial stress test     Consultant(s) Final Recommendations      -Patient with negative EKG troponins and negative stress test     Impression and Plan      Suma Sheikh has been cared for according to the standard [facility] observation protocol for chest pain. This extended period of observation was specifically requiredto determine the need for hospitalization. Prior to discharge from observation, the final physical exam is documented above. Significant events during the course of observation based on the goals of the clinicalproblem list include:    -Serial EKGs and troponins  -Stress test    Based on the patient's condition and test results, the plan is to Discharge to home    Thetotal length of observation was 18 hours. Dr. Uli Paige is the CDU disposition attending. The patient will follow-up with:    - DR Aye Marti    As appropriate, please see the AVS for comprehensive discharge instructions. Subjective      Suma Sheikh hasundergone comprehensive diagnostic evaluation and therapeutic management in accordance with the CDU guidelines for chest pain. Based on her clinical response and diagnostic information obtained during this period of observation, the disposition is Discharge to home     Physical Examination      Physical Exam vitals unremarkable and patient in no acute distress. HEENT exam unremarkable chest clear cardiovascular exam unremarkable no peripheral edema.      Baljeet Dacosta MD  10/11/20 [id number]

## 2020-10-12 RX ORDER — ONDANSETRON 4 MG/1
TABLET, FILM COATED ORAL
Qty: 60 TABLET | Refills: 0 | Status: SHIPPED | OUTPATIENT
Start: 2020-10-12 | End: 2020-12-04

## 2020-10-13 ENCOUNTER — OFFICE VISIT (OUTPATIENT)
Dept: PAIN MANAGEMENT | Age: 60
End: 2020-10-13
Payer: COMMERCIAL

## 2020-10-13 VITALS
RESPIRATION RATE: 17 BRPM | HEART RATE: 88 BPM | OXYGEN SATURATION: 97 % | WEIGHT: 293 LBS | SYSTOLIC BLOOD PRESSURE: 122 MMHG | TEMPERATURE: 97.3 F | HEIGHT: 64 IN | BODY MASS INDEX: 50.02 KG/M2 | DIASTOLIC BLOOD PRESSURE: 91 MMHG

## 2020-10-13 PROCEDURE — 20553 NJX 1/MLT TRIGGER POINTS 3/>: CPT | Performed by: INTERNAL MEDICINE

## 2020-10-13 PROCEDURE — 99213 OFFICE O/P EST LOW 20 MIN: CPT | Performed by: INTERNAL MEDICINE

## 2020-10-13 RX ORDER — PREGABALIN 100 MG/1
100 CAPSULE ORAL 3 TIMES DAILY
Qty: 90 CAPSULE | Refills: 0 | Status: SHIPPED | OUTPATIENT
Start: 2020-10-13 | End: 2020-11-13 | Stop reason: SDUPTHER

## 2020-10-13 RX ORDER — TRAMADOL HYDROCHLORIDE 50 MG/1
50 TABLET ORAL EVERY 6 HOURS PRN
Qty: 50 TABLET | Refills: 0 | Status: SHIPPED | OUTPATIENT
Start: 2020-10-13 | End: 2020-11-13 | Stop reason: SDUPTHER

## 2020-10-13 RX ORDER — TRIAMCINOLONE ACETONIDE 40 MG/ML
40 INJECTION, SUSPENSION INTRA-ARTICULAR; INTRAMUSCULAR ONCE
Status: COMPLETED | OUTPATIENT
Start: 2020-10-13 | End: 2020-10-13

## 2020-10-13 RX ORDER — CELECOXIB 200 MG/1
200 CAPSULE ORAL DAILY
Qty: 30 CAPSULE | Refills: 1 | Status: SHIPPED | OUTPATIENT
Start: 2020-10-13 | End: 2020-11-13 | Stop reason: SDUPTHER

## 2020-10-13 RX ADMIN — TRIAMCINOLONE ACETONIDE 40 MG: 40 INJECTION, SUSPENSION INTRA-ARTICULAR; INTRAMUSCULAR at 15:01

## 2020-10-13 NOTE — PROGRESS NOTES
Elinor Belva  1960  [id number]      HISTORY OF PRESENT ILLNESS:  Ms. Lena Guo is a 63 y.o. female returns for a follow up visit for pain management  She has a diagnosis of   1. Primary osteoarthritis of both hips    2. Lumbar radiculitis    3. Fibromyalgia    4. Chronic pain syndrome    5. DDD (degenerative disc disease), lumbar    6. Elbow tendinitis    7. Facet syndrome    8. Plantar fasciitis, right    . She complains of pain in the [place] and right shoulder  She rates the pain 6/10 and describes it as sharp. Current treatment regimen has helped relieve about 50% of the pain. She denies any side effects from the current pain regimen. Patient reports that since the last follow up visit the physical functioning is worse, family/social relationships are unchanged, mood is worse sleep patterns are worse, and that the overall functioning is worse. Patient denies misusing/abusing her narcotic pain medications or using any illegal drugs. There are No indicators for possible drug abuse, addiction or diversion problems. Patient complains her back has been hurting a lot. She states its difficult to manage her ADLs and house chores. She mentions she has found a new job part time. She mentions she is using Ultram as needed only, and has pills left from before. ALLERGIES: Patients list of allergies were reviewed     MEDICATIONS: Ms. Lena Guo list of medications were reviewed. Her current medications are   Outpatient Medications Prior to Visit   Medication Sig Dispense Refill    ondansetron (ZOFRAN) 4 MG tablet TAKE 1 TABLET BY MOUTH EVERY 8 HOURS AS NEEDED FOR NAUSEA OR VOMITING 60 tablet 0    Cholecalciferol (VITAMIN D3) 125 MCG (5000 UT) TABS Take 1 tablet by mouth 2 times daily      DULoxetine (CYMBALTA) 60 MG extended release capsule Take 1 capsule by mouth 2 times daily      ARIPiprazole (ABILIFY) 20 MG tablet Take 1 tablet by mouth daily      propranolol (INDERAL) 40 MG tablet Take 1 tablet by mouth 3 times daily anxiety      levothyroxine (SYNTHROID) 88 MCG tablet TAKE 1 TABLET BY MOUTH DAILY 90 tablet 0    betamethasone dipropionate (DIPROLENE) 0.05 % cream APPLY EXTERNALLY TO THE AFFECTED AREA TWICE DAILY 60 g 0    traMADol (ULTRAM) 50 MG tablet Take 1 tablet by mouth every 6 hours as needed for Pain (Max 1-2 per day) for up to 28 days. 50 tablet 0    atorvastatin (LIPITOR) 40 MG tablet TAKE 1 TABLET BY MOUTH DAILY 90 tablet 0    rOPINIRole (REQUIP) 1 MG tablet TAKE 2 TABLETS BY MOUTH EVERY NIGHT 180 tablet 0    pregabalin (LYRICA) 100 MG capsule Take 1 capsule by mouth 3 times daily for 30 days. 90 capsule 0    celecoxib (CELEBREX) 200 MG capsule Take 1 capsule by mouth daily 30 capsule 1    esomeprazole (NEXIUM) 40 MG delayed release capsule TAKE 1 CAPSULE BY MOUTH EVERY MORNING BEFORE BREAKFAST 90 capsule 0    QUEtiapine (SEROQUEL) 200 MG tablet Take 200 mg by mouth nightly       No facility-administered medications prior to visit. SOCIAL/FAMILY/PAST MEDICAL HISTORY: Ms. Priti Franklin, family and past medical history was reviewed. REVIEW OF SYSTEMS:    Respiratory: Negative for apnea, chest tightness and shortness of breath or change in baseline breathing. Gastrointestinal: Negative for nausea, vomiting, abdominal pain, diarrhea, constipation, blood in stool and abdominal distention. PHYSICAL EXAM:   Nursing note and vitals reviewed. BP (!) 122/91   Pulse 88   Temp 97.3 °F (36.3 °C) (Infrared)   Resp 17   Ht 5' 4\" (1.626 m)   Wt 300 lb (136.1 kg)   LMP 01/15/2014   SpO2 97%   Breastfeeding No   BMI 51.49 kg/m²   Constitutional: She appears well-developed and well-nourished. No acute distress. Skin: Skin is warm and dry, good turgor. No rash noted. She is not diaphoretic. Cardiovascular: Normal rate, regular rhythm, normal heart sounds, and does not have murmur. Pulmonary/Chest: Effort normal. No respiratory distress.  She does not have wheezes in the lung fields. She has no rales. Neurological/Psychiatric:She is alert and oriented to person, place, and time. Coordination is  normal.  Her mood isAppropriate and affect is Neutral/Euthymic(normal) . Other: + limp, +1 edema LE   IMPRESSION:   1. Primary osteoarthritis of both hips    2. Lumbar radiculitis    3. Fibromyalgia    4. Chronic pain syndrome    5. DDD (degenerative disc disease), lumbar    6. Elbow tendinitis    7. Facet syndrome    8. Plantar fasciitis, right        PLAN:  Informed verbal consent was obtained  -[place] record was obtained and reviewed  for the last one year and no indicators of drug misuse  were found. Any other controlled substance prescriptions  seen on the record have been accounted for, I am aware of the patient receiving these medications. Maxi Ra [place] record will be rechecked as part of office protocol.    -She was advised to increase fluids ( 5-7  glasses of fluid daily), limit caffeine, avoid cheese products, increase dietary fiber, increase activity and exercise as tolerated and relax regularly and enjoy meals   -Walking/Stretching exercises as advised    -Informed verbal consent was obtained from the patient. Risks and benefits of the procedure were explained. Complications of the procedure and side effects of kenalog/ lidocaine were discussed with patient. Using 0.25% marcaine and 1cc of kenalog, the the tender trigger point areas in the  bilateral paraspinal lumbar muscles -erector spinae and longgissmus muscles were injected under aseptic condition. Mobilization attempted by stretching. Patient tolerated procedure well. Adv to apply ice today. -Back exercises given   Current Outpatient Medications   Medication Sig Dispense Refill    traMADol (ULTRAM) 50 MG tablet Take 1 tablet by mouth every 6 hours as needed for Pain (Max 1-2 per day) for up to 28 days. 50 tablet 0    pregabalin (LYRICA) 100 MG capsule Take 1 capsule by mouth 3 times daily for 30 days.  90 capsule 0    celecoxib (CELEBREX) 200 MG capsule Take 1 capsule by mouth daily 30 capsule 1    ondansetron (ZOFRAN) 4 MG tablet TAKE 1 TABLET BY MOUTH EVERY 8 HOURS AS NEEDED FOR NAUSEA OR VOMITING 60 tablet 0    Cholecalciferol (VITAMIN D3) 125 MCG (5000 UT) TABS Take 1 tablet by mouth 2 times daily      DULoxetine (CYMBALTA) 60 MG extended release capsule Take 1 capsule by mouth 2 times daily      ARIPiprazole (ABILIFY) 20 MG tablet Take 1 tablet by mouth daily      propranolol (INDERAL) 40 MG tablet Take 1 tablet by mouth 3 times daily anxiety      levothyroxine (SYNTHROID) 88 MCG tablet TAKE 1 TABLET BY MOUTH DAILY 90 tablet 0    betamethasone dipropionate (DIPROLENE) 0.05 % cream APPLY EXTERNALLY TO THE AFFECTED AREA TWICE DAILY 60 g 0    atorvastatin (LIPITOR) 40 MG tablet TAKE 1 TABLET BY MOUTH DAILY 90 tablet 0    rOPINIRole (REQUIP) 1 MG tablet TAKE 2 TABLETS BY MOUTH EVERY NIGHT 180 tablet 0    esomeprazole (NEXIUM) 40 MG delayed release capsule TAKE 1 CAPSULE BY MOUTH EVERY MORNING BEFORE BREAKFAST 90 capsule 0    QUEtiapine (SEROQUEL) 200 MG tablet Take 200 mg by mouth nightly       No current facility-administered medications for this visit. I will continue her current medication regimen  which is part of the above treatment schedule. It has been helping with Ms. Yap's chronic  medical problems which for this visit include:   Diagnoses of Primary osteoarthritis of both hips, Lumbar radiculitis, Fibromyalgia, Chronic pain syndrome, DDD (degenerative disc disease), lumbar, Elbow tendinitis, Facet syndrome, and Plantar fasciitis, right were pertinent to this visit. Risks and benefits of the medications and other alternative treatments  including no treatment were discussed with the patient. The common side effects of these medications were also explained to the patient. Informed verbal consent was obtained.    Goals of current treatment regimen include improvement in pain, restoration of functioning- with focus on improvement in physical performance, general activity, work or disability,emotional distress, health care utilization and  decreased medication consumption. Will continue to monitor progress towards achieving/maintaining therapeutic goals with special emphasis on  1. Improvement in perceived interfernce  of pain with ADL's. Ability to do home exercises independently. Ability to do household chores indoor and/or outdoor work and social and leisure activities. Improve psychosocial and physical functioning. - she is showing progression towards this treatment goal with the current regimen. She was advised against drinking alcohol with the narcotic pain medicines, advised against driving or handling machinery while adjusting the dose of medicines or if having cognitive  issues related to the current medications. Risk of overdose and death, if medicines not taken as prescribed, were also discussed. If the patient develops new symptoms or if the symptoms worsen, the patient should call the office. While transcribing every attempt was made to maintain the accuracy of the note in terms of it's contents,there may have been some errors made inadvertently. Thank you for allowing me to participate in the care of this patient.     Lion Ferguson MD.    Cc: Ovi Gautam MD

## 2020-10-20 PROBLEM — T14.8XXA BRUISING: Status: ACTIVE | Noted: 2020-10-20

## 2020-10-20 PROBLEM — E77.8 HYPOPROTEINEMIA (HCC): Status: ACTIVE | Noted: 2020-10-20

## 2020-10-21 ENCOUNTER — OFFICE VISIT (OUTPATIENT)
Dept: ORTHOPEDIC SURGERY | Age: 60
End: 2020-10-21
Payer: COMMERCIAL

## 2020-10-21 VITALS — TEMPERATURE: 97.2 F | BODY MASS INDEX: 50.02 KG/M2 | HEIGHT: 64 IN | WEIGHT: 293 LBS | RESPIRATION RATE: 17 BRPM

## 2020-10-21 PROCEDURE — 20551 NJX 1 TENDON ORIGIN/INSJ: CPT | Performed by: ORTHOPAEDIC SURGERY

## 2020-10-21 PROCEDURE — 99214 OFFICE O/P EST MOD 30 MIN: CPT | Performed by: ORTHOPAEDIC SURGERY

## 2020-10-21 RX ORDER — TRIAMCINOLONE ACETONIDE 40 MG/ML
40 INJECTION, SUSPENSION INTRA-ARTICULAR; INTRAMUSCULAR ONCE
Status: COMPLETED | OUTPATIENT
Start: 2020-10-21 | End: 2020-10-21

## 2020-10-21 RX ORDER — LIDOCAINE HYDROCHLORIDE 10 MG/ML
2 INJECTION, SOLUTION INFILTRATION; PERINEURAL ONCE
Status: COMPLETED | OUTPATIENT
Start: 2020-10-21 | End: 2020-10-21

## 2020-10-21 RX ADMIN — LIDOCAINE HYDROCHLORIDE 2 ML: 10 INJECTION, SOLUTION INFILTRATION; PERINEURAL at 13:15

## 2020-10-21 RX ADMIN — TRIAMCINOLONE ACETONIDE 40 MG: 40 INJECTION, SUSPENSION INTRA-ARTICULAR; INTRAMUSCULAR at 13:15

## 2020-10-21 NOTE — PROGRESS NOTES
DIAGNOSIS:    1-Left foot partal Lacey's calcaneus excision, 2ry repair Achillis tendon. 2-Left heel pain/ plantar fasciitis    DATE OF SURGERY: 3/9/2020, Suture bridge    HISTORY OF PRESENT ILLNESS:  Marva Castaneda 63 y.o.  female who came in today for postoperative visit and for worsening c/o left heel pain. She has been doing the plantar fascia stretching exercises and reports not much improvement. She has completed PT and WB with good improvement in her achilles pain. She rates pain a 0/10 VAS at her surgical site, but rates pain a 4/10 VAS on the bottom of her heel. Pain is worse first steps in the morning and dull achy by the end of the day. She is very happy with the surgery. No numbness or tingling sensation. No fever or Chills. She recently started a new job standing, and [employer]. Since she started working, her pain and the bottom of her left foot is worse.     Past Medical History:   Diagnosis Date    Acquired hyperlipoproteinemia     MARIO (acute kidney injury) ([facility] [address] 9/30/2019    Allergic rhinitis     Anxiety     Arthritis     Bilateral lower extremity edema     Bipolar 1 disorder (HCC)     Chronic pain syndrome     DDD (degenerative disc disease), lumbar     Depression     Depression     Disease of gallbladder     Dizziness     DJD (degenerative joint disease) of hip     Edema 12/29/2009    Elbow tendinitis     Essential hypertension 8/19/2019    Facet syndrome     Fibromyalgia     Fracture of nasal bone     GERD (gastroesophageal reflux disease)     Headache     Hiatal hernia     History of blood transfusion     anemia    Hyperlipidemia     Insomnia     Osteoarthritis     Prediabetes     Rash     Self mutilating behavior     cutter pt states she has not done in months- in therapy    Sleep apnea     no CPAP    Suicidal ideation     Thyroid disorder     hypothyroidism       Past Surgical History:   Procedure Laterality Date    ABDOMEN SURGERY  5/28/2014 phone: Not on file     Gets together: Not on file     Attends [employer] service: Not on file     Active member of club or organization: Not on file     Attends meetings of clubs or organizations: Not on file     Relationship status: Not on file    Intimate partner violence     Fear of current or ex partner: Not on file     Emotionally abused: Not on file     Physically abused: Not on file     Forced sexual activity: Not on file   Other Topics Concern    Not on file   Social History Narrative    Lives by herself with her cat       Family History   Problem Relation Age of Onset    Heart Disease Mother     Heart Failure Mother     High Cholesterol Mother     High Blood Pressure Mother     Stroke Mother     Birth Defects Mother     Other Mother         VV    High Blood Pressure Father     COPD Father     Diabetes Brother     Stroke Maternal Grandmother     Arthritis Maternal Grandmother     Heart Failure Brother     Stroke Brother     Birth Defects Maternal Grandfather     Arthritis Paternal Grandmother        Current Outpatient Medications on File Prior to Visit   Medication Sig Dispense Refill    traMADol (ULTRAM) 50 MG tablet Take 1 tablet by mouth every 6 hours as needed for Pain (Max 1-2 per day) for up to 28 days. 50 tablet 0    pregabalin (LYRICA) 100 MG capsule Take 1 capsule by mouth 3 times daily for 30 days.  90 capsule 0    celecoxib (CELEBREX) 200 MG capsule Take 1 capsule by mouth daily 30 capsule 1    ondansetron (ZOFRAN) 4 MG tablet TAKE 1 TABLET BY MOUTH EVERY 8 HOURS AS NEEDED FOR NAUSEA OR VOMITING 60 tablet 0    Cholecalciferol (VITAMIN D3) 125 MCG (5000 UT) TABS Take 1 tablet by mouth 2 times daily      DULoxetine (CYMBALTA) 60 MG extended release capsule Take 1 capsule by mouth 2 times daily      ARIPiprazole (ABILIFY) 20 MG tablet Take 1 tablet by mouth daily      propranolol (INDERAL) 40 MG tablet Take 1 tablet by mouth 3 times daily anxiety      levothyroxine (SYNTHROID) 88 MCG tablet TAKE 1 TABLET BY MOUTH DAILY 90 tablet 0    betamethasone dipropionate (DIPROLENE) 0.05 % cream APPLY EXTERNALLY TO THE AFFECTED AREA TWICE DAILY 60 g 0    atorvastatin (LIPITOR) 40 MG tablet TAKE 1 TABLET BY MOUTH DAILY 90 tablet 0    rOPINIRole (REQUIP) 1 MG tablet TAKE 2 TABLETS BY MOUTH EVERY NIGHT 180 tablet 0    esomeprazole (NEXIUM) 40 MG delayed release capsule TAKE 1 CAPSULE BY MOUTH EVERY MORNING BEFORE BREAKFAST 90 capsule 0    QUEtiapine (SEROQUEL) 200 MG tablet Take 200 mg by mouth nightly       No current facility-administered medications on file prior to visit. Pertinent items are noted in HPI  Review of systems reviewed from Patient History Form dated on 1/10/2020 and available in the patient's chart under the Media tab. No change noted. PHYSICAL EXAMINATION:  Ms. Colby Garcia is a very pleasant 63 y.o.  female who presents today in no acute distress, awake, alert, and oriented. She is well dressed, nourished and  groomed. Patient with normal affect. Height is  5' 4\" (1.626 m), weight is 300 lb (136.1 kg), Body mass index is 51.49 kg/m². Resting respiratory rate is 16. The patient walks with no limp. The incision healing well . No signs of any erythema or drainage, no swelling. She has no pain with the active or passive range of motion of the left ankle and subtalar, but decrease ROM. She has intact sensation distally, and she is neurovascularly intact. She has good strength in all four planes, including eversion, and has moderate tenderness on deep palpation over the medial calcaneal tubercle, compared to the other side. The ankles are stable to drawer test bilaterally, equally. Good strength, and no instability both upper and lower extremities. IMAGING:  Xray, 2 views left calcaneus taken today in the officeshowed removal of Lacey's, no acute fracture.     IMPRESSION:    1-6 months out from left foot partal Lacey's calcaneus

## 2020-11-13 ENCOUNTER — VIRTUAL VISIT (OUTPATIENT)
Dept: PAIN MANAGEMENT | Age: 60
End: 2020-11-13
Payer: COMMERCIAL

## 2020-11-13 PROCEDURE — 99213 OFFICE O/P EST LOW 20 MIN: CPT | Performed by: INTERNAL MEDICINE

## 2020-11-13 RX ORDER — PREGABALIN 100 MG/1
100 CAPSULE ORAL 3 TIMES DAILY
Qty: 90 CAPSULE | Refills: 0 | Status: SHIPPED | OUTPATIENT
Start: 2020-11-13 | End: 2020-12-18 | Stop reason: SDUPTHER

## 2020-11-13 RX ORDER — TRAMADOL HYDROCHLORIDE 50 MG/1
50 TABLET ORAL EVERY 6 HOURS PRN
Qty: 90 TABLET | Refills: 0 | Status: SHIPPED | OUTPATIENT
Start: 2020-11-13 | End: 2020-12-18 | Stop reason: SDUPTHER

## 2020-11-13 RX ORDER — CELECOXIB 200 MG/1
200 CAPSULE ORAL DAILY
Qty: 30 CAPSULE | Refills: 1 | Status: SHIPPED | OUTPATIENT
Start: 2020-11-13 | End: 2021-02-05

## 2020-11-13 NOTE — PROGRESS NOTES
release capsule TAKE 1 CAPSULE BY MOUTH EVERY MORNING BEFORE BREAKFAST 90 capsule 0    QUEtiapine (SEROQUEL) 200 MG tablet Take 200 mg by mouth nightly       No facility-administered medications prior to visit. SOCIAL/FAMILY/PAST MEDICAL HISTORY: Ms. Josse Falk, family and past medical history was reviewed. REVIEW OF SYSTEMS:    Respiratory: Negative for apnea, chest tightness and shortness of breath or change in baseline breathing. Gastrointestinal: Negative for nausea, vomiting, abdominal pain, diarrhea, constipation, blood in stool and abdominal distention. PHYSICAL EXAM:   Nursing note and vitals per patient reviewed. LMP 01/15/2014    Constitutional: She appears well-developed and well-nourished. No acute distress. No respiratory distress. Skin: Skin appears to be warm and dry. No rashes or any other marks noted. She is not diaphoretic. Respiratory/Pulmonary: NO conversational dyspnea, no accessory muscle use, no coughing during exam. She does not appear to be in labored breathing. Neurological/Psychiatric:She is alert and oriented to person, place, and time. Coordination is  normal.  Her mood isAppropriate and affect is Flat/blunted and Anxious. Musculoskeletal / Extremities: Range of motion is normal. Gait is normal, assistive devices use: none. IMPRESSION:   1. Chronic pain syndrome    2. Primary osteoarthritis of both hips    3. Fibromyalgia    4. Lumbar radiculitis    5. DDD (degenerative disc disease), lumbar    6. Elbow tendinitis    7. Facet syndrome    8. Plantar fasciitis, right    9. Primary osteoarthritis of right hip        PLAN:  Informed verbal consent regarding treatment was obtained  -continue with current opioid regimen Ultram, will increase to 2-3 per day  -Adv Biofeedback, relaxation and meditation techniques.  Referral to psychologist for CBT was also discussed with patient   -She was advised to increase fluids ( 5-7  glasses of fluid daily), limit caffeine, avoid cheese products, increase dietary fiber, increase activity and exercise as tolerated and relax regularly and enjoy meals   -walking/stretching exercises as advised    -She was advised weight reduction, diet changes- 800-1200 jessica diet, diet diary, exercising, nutritional  consult increased physical activity as tolerated      Current Outpatient Medications   Medication Sig Dispense Refill    pregabalin (LYRICA) 100 MG capsule Take 1 capsule by mouth 3 times daily for 30 days. 90 capsule 0    celecoxib (CELEBREX) 200 MG capsule Take 1 capsule by mouth daily 30 capsule 1    ondansetron (ZOFRAN) 4 MG tablet TAKE 1 TABLET BY MOUTH EVERY 8 HOURS AS NEEDED FOR NAUSEA OR VOMITING 60 tablet 0    Cholecalciferol (VITAMIN D3) 125 MCG (5000 UT) TABS Take 1 tablet by mouth 2 times daily      DULoxetine (CYMBALTA) 60 MG extended release capsule Take 1 capsule by mouth 2 times daily      ARIPiprazole (ABILIFY) 20 MG tablet Take 1 tablet by mouth daily      propranolol (INDERAL) 40 MG tablet Take 1 tablet by mouth 3 times daily anxiety      levothyroxine (SYNTHROID) 88 MCG tablet TAKE 1 TABLET BY MOUTH DAILY 90 tablet 0    betamethasone dipropionate (DIPROLENE) 0.05 % cream APPLY EXTERNALLY TO THE AFFECTED AREA TWICE DAILY 60 g 0    atorvastatin (LIPITOR) 40 MG tablet TAKE 1 TABLET BY MOUTH DAILY 90 tablet 0    rOPINIRole (REQUIP) 1 MG tablet TAKE 2 TABLETS BY MOUTH EVERY NIGHT 180 tablet 0    esomeprazole (NEXIUM) 40 MG delayed release capsule TAKE 1 CAPSULE BY MOUTH EVERY MORNING BEFORE BREAKFAST 90 capsule 0    QUEtiapine (SEROQUEL) 200 MG tablet Take 200 mg by mouth nightly       No current facility-administered medications for this visit. I will continue her current medication regimen  which is part of the above treatment schedule. It has been helping with Ms. Yap's chronic  medical problems which for this visit include:   Diagnoses of Chronic pain syndrome, Primary osteoarthritis of both hips, Fibromyalgia, Lumbar radiculitis, DDD (degenerative disc disease), lumbar, Elbow tendinitis, Facet syndrome, Plantar fasciitis, right, Primary osteoarthritis of right hip, and Restless legs syndrome (RLS) were pertinent to this visit. Risks and benefits of the medications and other alternative treatments  including no treatment were discussed with the patient. The common side effects of these medications were also explained to the patient. Informed verbal consent was obtained. Goals of current treatment regimen include improvement in pain, restoration of functioning- with focus on improvement in physical performance, general activity, work or disability,emotional distress, health care utilization and  decreased medication consumption. Will continue to monitor progress towards achieving/maintaining therapeutic goals with special emphasis on  1. Improvement in perceived interfernce  of pain with ADL's. Ability to do home exercises independently. Ability to do household chores indoor and/or outdoor work and social and leisure activities. Improve psychosocial and physical functioning. - she is showing progression towards this treatment goal with the current regimen. She was advised against drinking alcohol with the narcotic pain medicines, advised against driving or handling machinery while adjusting the dose of medicines or if having cognitive  issues related to the current medications. Risk of overdose and death, if medicines not taken as prescribed, were also discussed. If the patient develops new symptoms or if the symptoms worsen, the patient should call the office. While transcribing every attempt was made to maintain the accuracy of the note in terms of it's contents,there may have been some errors made inadvertently. Thank you for allowing me to participate in the care of this patient.     Yoly Londono MD.    Cc: Baylee Cabrera MD

## 2020-12-04 ENCOUNTER — APPOINTMENT (OUTPATIENT)
Dept: CT IMAGING | Age: 60
End: 2020-12-04
Payer: COMMERCIAL

## 2020-12-04 ENCOUNTER — HOSPITAL ENCOUNTER (EMERGENCY)
Age: 60
Discharge: HOME | End: 2020-12-04
Attending: EMERGENCY MEDICINE
Payer: COMMERCIAL

## 2020-12-04 VITALS
HEART RATE: 80 BPM | DIASTOLIC BLOOD PRESSURE: 110 MMHG | BODY MASS INDEX: 50.02 KG/M2 | SYSTOLIC BLOOD PRESSURE: 168 MMHG | TEMPERATURE: 98.9 F | WEIGHT: 293 LBS | HEIGHT: 64 IN | RESPIRATION RATE: 16 BRPM | OXYGEN SATURATION: 98 %

## 2020-12-04 LAB
A/G RATIO: 1.3 (ref 1.1–2.2)
ALBUMIN SERPL-MCNC: 3.6 G/DL (ref 3.4–5)
ALP BLD-CCNC: 103 U/L (ref 40–129)
ALT SERPL-CCNC: 16 U/L (ref 10–40)
ANION GAP SERPL CALCULATED.3IONS-SCNC: 9 MMOL/L (ref 3–16)
AST SERPL-CCNC: 17 U/L (ref 15–37)
BASOPHILS ABSOLUTE: 0 K/UL (ref 0–0.2)
BASOPHILS RELATIVE PERCENT: 0.7 %
BILIRUB SERPL-MCNC: 0.5 MG/DL (ref 0–1)
BUN BLDV-MCNC: 17 MG/DL (ref 7–20)
CALCIUM SERPL-MCNC: 9.5 MG/DL (ref 8.3–10.6)
CHLORIDE BLD-SCNC: 106 MMOL/L (ref 99–110)
CO2: 29 MMOL/L (ref 21–32)
CREAT SERPL-MCNC: 0.7 MG/DL (ref 0.6–1.2)
EOSINOPHILS ABSOLUTE: 0.1 K/UL (ref 0–0.6)
EOSINOPHILS RELATIVE PERCENT: 2.2 %
GFR AFRICAN AMERICAN: >60
GFR NON-AFRICAN AMERICAN: >60
GLOBULIN: 2.8 G/DL
GLUCOSE BLD-MCNC: 104 MG/DL (ref 70–99)
HCT VFR BLD CALC: 37.9 % (ref 36–48)
HEMOGLOBIN: 12.5 G/DL (ref 12–16)
LYMPHOCYTES ABSOLUTE: 1.1 K/UL (ref 1–5.1)
LYMPHOCYTES RELATIVE PERCENT: 16.1 %
MCH RBC QN AUTO: 30.5 PG (ref 26–34)
MCHC RBC AUTO-ENTMCNC: 33.1 G/DL (ref 31–36)
MCV RBC AUTO: 92.3 FL (ref 80–100)
MONOCYTES ABSOLUTE: 0.6 K/UL (ref 0–1.3)
MONOCYTES RELATIVE PERCENT: 8.6 %
NEUTROPHILS ABSOLUTE: 4.7 K/UL (ref 1.7–7.7)
NEUTROPHILS RELATIVE PERCENT: 72.4 %
PDW BLD-RTO: 16.5 % (ref 12.4–15.4)
PLATELET # BLD: 223 K/UL (ref 135–450)
PMV BLD AUTO: 8.2 FL (ref 5–10.5)
POTASSIUM REFLEX MAGNESIUM: 4.2 MMOL/L (ref 3.5–5.1)
RBC # BLD: 4.11 M/UL (ref 4–5.2)
SODIUM BLD-SCNC: 144 MMOL/L (ref 136–145)
TOTAL PROTEIN: 6.4 G/DL (ref 6.4–8.2)
WBC # BLD: 6.5 K/UL (ref 4–11)

## 2020-12-04 PROCEDURE — 70498 CT ANGIOGRAPHY NECK: CPT

## 2020-12-04 PROCEDURE — 99284 EMERGENCY DEPT VISIT MOD MDM: CPT

## 2020-12-04 PROCEDURE — 6360000004 HC RX CONTRAST MEDICATION: Performed by: EMERGENCY MEDICINE

## 2020-12-04 PROCEDURE — 93005 ELECTROCARDIOGRAM TRACING: CPT | Performed by: EMERGENCY MEDICINE

## 2020-12-04 PROCEDURE — 80053 COMPREHEN METABOLIC PANEL: CPT

## 2020-12-04 PROCEDURE — 85025 COMPLETE CBC W/AUTO DIFF WBC: CPT

## 2020-12-04 PROCEDURE — 36415 COLL VENOUS BLD VENIPUNCTURE: CPT

## 2020-12-04 PROCEDURE — 70450 CT HEAD/BRAIN W/O DYE: CPT

## 2020-12-04 RX ORDER — ONDANSETRON 4 MG/1
4 TABLET, FILM COATED ORAL EVERY 8 HOURS PRN
Qty: 20 TABLET | Refills: 0 | Status: SHIPPED | OUTPATIENT
Start: 2020-12-04 | End: 2020-12-28

## 2020-12-04 RX ORDER — MECLIZINE HYDROCHLORIDE 25 MG/1
25 TABLET ORAL 3 TIMES DAILY PRN
Qty: 15 TABLET | Refills: 0 | Status: SHIPPED | OUTPATIENT
Start: 2020-12-04 | End: 2020-12-14

## 2020-12-04 RX ADMIN — IOPAMIDOL 100 ML: 755 INJECTION, SOLUTION INTRAVENOUS at 16:40

## 2020-12-04 NOTE — ED NOTES
[address]     Flavia Melton RN  12/04/20 1834

## 2020-12-04 NOTE — ED NOTES
Walked pt from [address] [facility] to ED bed. Obtained VS. Pt wearing mask, medic wearing mask, gloves, safety glasses.      Laura Bell, EMT-P  12/04/20 3451

## 2020-12-04 NOTE — ED PROVIDER NOTES
CHIEF COMPLAINT  Dizziness (x1wk, worse when first standing or sitting. headache, neck pain.)      HISTORY OF PRESENT ILLNESS  Fernando Elise is a 63 y.o. female presents to the ED with dizziness. The patient states that when she lays down, changes positions she gets a spinning sensation, she also notices that she is seeing double when she looks to the left or the right. She also developed a severe headache while she was at work. She denies any nausea or vomiting, has no numbness, tingling or weakness in the arms or legs. She feels imbalanced when she is walking or standing. No other complaints, modifying factors or associated symptoms. I have reviewed the following from the nursing documentation.     Past Medical History:   Diagnosis Date    Acquired hyperlipoproteinemia     MARIO (acute kidney injury) ([facility] [address] 9/30/2019    Allergic rhinitis     Anxiety     Arthritis     Bilateral lower extremity edema     Bipolar 1 disorder (HCC)     Chronic pain syndrome     DDD (degenerative disc disease), lumbar     Depression     Depression     Disease of gallbladder     Dizziness     DJD (degenerative joint disease) of hip     Edema 12/29/2009    Elbow tendinitis     Essential hypertension 8/19/2019    Facet syndrome     Fibromyalgia     Fracture of nasal bone     GERD (gastroesophageal reflux disease)     Headache     Hiatal hernia     History of blood transfusion     anemia    Hyperlipidemia     Insomnia     Osteoarthritis     Prediabetes     Rash     Self mutilating behavior     cutter pt states she has not done in months- in therapy    Sleep apnea     no CPAP    Suicidal ideation     Thyroid disorder     hypothyroidism     Past Surgical History:   Procedure Laterality Date    ABDOMEN SURGERY  5/28/2014    LAPAROSCOPIC SLEEVE GASTRECTOMY, EXTENSIVE LYSIS OF ADHESIONS    ACHILLES TENDON SURGERY Left 3/9/2020    LEFT CALCANEOUS PARTIAL EXCISION, SECONDARY REPAIR OF ACHILLES TENDON performed by Odilon [facility] at [facility]      open , Ex-lap    CHOLECYSTECTOMY      open    COLONOSCOPY  04/16/2013    dr Tobi Christian 2018     DILATION AND CURETTAGE OF UTERUS      ENDOSCOPY, COLON, DIAGNOSTIC      FOOT SURGERY Right 05/27/2016    ARTHROPLASTY RIGHT 5TH DIGIT      HYSTEROSCOPY N/A 12-30-13    Hysteroscopy Dilitation and Curettage    JOINT REPLACEMENT Bilateral     knee    OTHER SURGICAL HISTORY  9/11/2015    ARTHROPLASTY 5TH DIGIT LEFT FOOT          SHOULDER SURGERY      right    SHOULDER SURGERY Right     SLEEVE GASTROPLASTY  May 28, 2014    Rosi    TOTAL KNEE ARTHROPLASTY  12/10/10    Right    TOTAL KNEE ARTHROPLASTY  3/30/10    Left    UPPER GASTROINTESTINAL ENDOSCOPY  4/16/2013    dr Aquiles Kaplan ENDOSCOPY  3/20/2014    dr Nba Bae     Family History   Problem Relation Age of Onset    Heart Disease Mother     Heart Failure Mother     High Cholesterol Mother     High Blood Pressure Mother     Stroke Mother     Birth Defects Mother     Other Mother         VV    High Blood Pressure Father     COPD Father     Diabetes Brother     Stroke Maternal Grandmother     Arthritis Maternal Grandmother     Heart Failure Brother     Stroke Brother     Birth Defects Maternal Grandfather     Arthritis Paternal Grandmother      Social History     Socioeconomic History    Marital status: Single     Spouse name: Not on file    Number of children: 0    Years of education: 12    Highest education level: Not on file   Occupational History    Occupation: [employer]     Comment: unemployed   Social Needs    Financial resource strain: Not on file    Food insecurity     Worry: Not on file     Inability: Not on file   [employer] needs     Medical: Not on file     Non-medical: Not on file   Tobacco Use    Smoking status: Never Smoker    Smokeless tobacco: Never Used    Tobacco comment: Never smoked Substance and Sexual Activity    Alcohol use: No     Alcohol/week: 0.0 standard drinks    Drug use: No    Sexual activity: Not Currently   Lifestyle    Physical activity     Days per week: Not on file     Minutes per session: Not on file    Stress: Not on file   Relationships    Social connections     Talks on phone: Not on file     Gets together: Not on file     Attends Cheondoism service: Not on file     Active member of club or organization: Not on file     Attends meetings of clubs or organizations: Not on file     Relationship status: Not on file    Intimate partner violence     Fear of current or ex partner: Not on file     Emotionally abused: Not on file     Physically abused: Not on file     Forced sexual activity: Not on file   Other Topics Concern    Not on file   Social History Narrative    Lives by herself with her cat     No current facility-administered medications for this encounter. Current Outpatient Medications   Medication Sig Dispense Refill    rOPINIRole (REQUIP) 1 MG tablet TAKE 2 TABLETS BY MOUTH EVERY NIGHT 180 tablet 0    pregabalin (LYRICA) 100 MG capsule Take 1 capsule by mouth 3 times daily for 30 days. 90 capsule 0    celecoxib (CELEBREX) 200 MG capsule Take 1 capsule by mouth daily 30 capsule 1    traMADol (ULTRAM) 50 MG tablet Take 1 tablet by mouth every 6 hours as needed for Pain (Max 1-2 per day) for up to 35 days.  90 tablet 0    ondansetron (ZOFRAN) 4 MG tablet TAKE 1 TABLET BY MOUTH EVERY 8 HOURS AS NEEDED FOR NAUSEA OR VOMITING 60 tablet 0    Cholecalciferol (VITAMIN D3) 125 MCG (5000 UT) TABS Take 1 tablet by mouth 2 times daily      DULoxetine (CYMBALTA) 60 MG extended release capsule Take 1 capsule by mouth 2 times daily      ARIPiprazole (ABILIFY) 20 MG tablet Take 1 tablet by mouth daily      propranolol (INDERAL) 40 MG tablet Take 1 tablet by mouth 3 times daily anxiety      levothyroxine (SYNTHROID) 88 MCG tablet TAKE 1 TABLET BY MOUTH DAILY 90 tablet 0    betamethasone dipropionate (DIPROLENE) 0.05 % cream APPLY EXTERNALLY TO THE AFFECTED AREA TWICE DAILY 60 g 0    atorvastatin (LIPITOR) 40 MG tablet TAKE 1 TABLET BY MOUTH DAILY 90 tablet 0    esomeprazole (NEXIUM) 40 MG delayed release capsule TAKE 1 CAPSULE BY MOUTH EVERY MORNING BEFORE BREAKFAST 90 capsule 0    QUEtiapine (SEROQUEL) 200 MG tablet Take 200 mg by mouth nightly       Allergies   Allergen Reactions    Polysporin [Bacitracin-Polymyxin B] Other (See Comments) and Itching     Pt breaks out in blisters. Pt breaks out in blisters.  Lorazepam Other (See Comments)     \"went crazy\"    Neomycin-Polymyxin-Gramicidin     Sulfa Antibiotics Hives       REVIEW OF SYSTEMS  10 systems reviewed, pertinent positives per HPI otherwise noted to be negative. PHYSICAL EXAM  BP (!) 162/102   Pulse 79   Temp 98.9 °F (37.2 °C) (Oral)   Resp 20   Ht 5' 4\" (1.626 m)   Wt (!) 329 lb 9.4 oz (149.5 kg)   LMP 01/15/2014   SpO2 96%   BMI 56.57 kg/m²   GENERAL APPEARANCE: Awake and alert. Cooperative. No acute distress  HEAD: Normocephalic. Atraumatic. EYES: PERRL. EOM's grossly intact. She has nystagmus with lateral movements and feels like she gets diplopia at the end of her lateral movements. ENT: Mucous membranes are moist.   NECK: Supple. No JVD. HEART: RRR. No murmurs  LUNGS: Respirations unlabored. Lungs clear bilaterally. ABDOMEN: Soft. Obese. Non-distended. Non-tender. No guarding or rebound. Normal bowel sounds. EXTREMITIES: No peripheral edema. Moves all extremities equally. All extremities neurovascularly intact. SKIN: Warm and dry. Patient has healing cuts on her arms from self harm. NEUROLOGICAL: Alert and oriented. No gross facial drooping. Cranial nerves II through XII are intact. NIH scale is 0. Strength 5/5, sensation intact. No truncal ataxia. Gait is normal.  2+ DTRs in the biceps and patellas bilaterally. No dysdiadochokinesia.   PSYCHIATRIC: Normal mood and affect. No SI or HI.  NIH Stroke Scale     Interval: Baseline  Time: 4:23 PM  Person Administering Scale: LADARIUS GALLARDO   Administer stroke scale items in the order listed. Record performance in each category after each subscale exam. Do not go back and change scores. Follow directions provided for each exam technique. Scores should reflect what the patient does, not what the clinician thinks the patient can do. The clinician should record answers while administering the exam and work quickly. Except where indicated, the patient should not be coached (i.e., repeated requests to patient to make a special effort). 1a  Level of consciousness: 0=alert; keenly responsive   1b. LOC questions:  0=Performs both tasks correctly   1c. LOC commands: 0=Performs both tasks correctly   2. Best Gaze: 0=normal   3. Visual: 0=No visual loss   4. Facial Palsy: 0=Normal symmetric movement   5a. Motor left arm: 0=No drift, limb holds 90 (or 45) degrees for full 10 seconds   5b. Motor right arm: 0=No drift, limb holds 90 (or 45) degrees for full 10 seconds   6a. motor left le=No drift, limb holds 90 (or 45) degrees for full 10 seconds   6b  Motor right le=No drift, limb holds 90 (or 45) degrees for full 10 seconds   7. Limb Ataxia: 0=Absent   8. Sensory: 0=Normal; no sensory loss   9. Best Language:  0=No aphasia, normal   10. Dysarthria: 0=Normal   11. Extinction and Inattention: 0=No abnormality         Total:  0     LABS  I have reviewed all labs for this visit.    Results for orders placed or performed during the hospital encounter of 20   CBC Auto Differential   Result Value Ref Range    WBC 6.5 4.0 - 11.0 K/uL    RBC 4.11 4.00 - 5.20 M/uL    Hemoglobin 12.5 12.0 - 16.0 g/dL    Hematocrit 37.9 36.0 - 48.0 %    MCV 92.3 80.0 - 100.0 fL    MCH 30.5 26.0 - 34.0 pg    MCHC 33.1 31.0 - 36.0 g/dL    RDW 16.5 (H) 12.4 - 15.4 %    Platelets 762 269 - 405 K/uL    MPV 8.2 5.0 - 10.5 fL    Neutrophils % 72.4 % Lymphocytes % 16.1 %    Monocytes % 8.6 %    Eosinophils % 2.2 %    Basophils % 0.7 %    Neutrophils Absolute 4.7 1.7 - 7.7 K/uL    Lymphocytes Absolute 1.1 1.0 - 5.1 K/uL    Monocytes Absolute 0.6 0.0 - 1.3 K/uL    Eosinophils Absolute 0.1 0.0 - 0.6 K/uL    Basophils Absolute 0.0 0.0 - 0.2 K/uL   Comprehensive Metabolic Panel w/ Reflex to MG   Result Value Ref Range    Sodium 144 136 - 145 mmol/L    Potassium reflex Magnesium 4.2 3.5 - 5.1 mmol/L    Chloride 106 99 - 110 mmol/L    CO2 29 21 - 32 mmol/L    Anion Gap 9 3 - 16    Glucose 104 (H) 70 - 99 mg/dL    BUN 17 7 - 20 mg/dL    CREATININE 0.7 0.6 - 1.2 mg/dL    GFR Non-African American >60 >60    GFR African American >60 >60    Calcium 9.5 8.3 - 10.6 mg/dL    Total Protein 6.4 6.4 - 8.2 g/dL    Alb 3.6 3.4 - 5.0 g/dL    Albumin/Globulin Ratio 1.3 1.1 - 2.2    Total Bilirubin 0.5 0.0 - 1.0 mg/dL    Alkaline Phosphatase 103 40 - 129 U/L    ALT 16 10 - 40 U/L    AST 17 15 - 37 U/L    Globulin 2.8 g/dL           RADIOLOGY  Ct Head Wo Contrast    Addendum Date: 12/4/2020    ADDENDUM: Critical results were called by Dr. Rossy Chaidez MD to [address] [place] [address] on 12/4/2020 at 16:45. Result Date: 12/4/2020  EXAMINATION: CT OF THE HEAD WITHOUT CONTRAST  12/4/2020 4:31 pm TECHNIQUE: CT of the head was performed without the administration of intravenous contrast. Dose modulation, iterative reconstruction, and/or weight based adjustment of the mA/kV was utilized to reduce the radiation dose to as low as reasonably achievable. COMPARISON: None. HISTORY: ORDERING SYSTEM PROVIDED HISTORY: vertigo, diplopia TECHNOLOGIST PROVIDED HISTORY: Reason for exam:->vertigo, diplopia Has a \"code stroke\" or \"stroke alert\" been called? ->No Reason for Exam: PT. C/O INTERMITTENT DIZZINESS X 1 WEEK, ALSO C/O EPISODES OF RADIATING HA, DENIES INJURY TO HEAD Acuity: Acute Type of Exam: Initial Relevant Medical/Surgical History: HX BIPOLAR 1 DISORDER, HX DEPRESSION, HX DIZZINESS FINDINGS: BRAIN/VENTRICLES: There is no acute intracranial hemorrhage, mass effect or midline shift. No abnormal extra-axial fluid collection. The gray-white differentiation is maintained without evidence of an acute infarct. There is no evidence of hydrocephalus. ORBITS: The visualized portion of the orbits demonstrate no acute abnormality. SINUSES: The visualized paranasal sinuses and mastoid air cells demonstrate no acute abnormality. SOFT TISSUES/SKULL:  No acute abnormality of the visualized skull or soft tissues. No acute intracranial abnormality. Cta Head Neck W Contrast    Result Date: 12/4/2020  EXAMINATION: CTA OF THE HEAD AND NECK WITH CONTRAST 12/4/2020 4:41 pm: TECHNIQUE: CTA of the head and neck was performed with the administration of intravenous contrast. Multiplanar reformatted images are provided for review. MIP images are provided for review. Stenosis of the internal carotid arteries measured using NASCET criteria. Dose modulation, iterative reconstruction, and/or weight based adjustment of the mA/kV was utilized to reduce the radiation dose to as low as reasonably achievable. COMPARISON: None. HISTORY: ORDERING SYSTEM PROVIDED HISTORY: vertigo, diplopia TECHNOLOGIST PROVIDED HISTORY: Reason for exam:->vertigo, diplopia Reason for Exam: Dizziness on and off x's 1 week Acuity: Acute Type of Exam: Initial Additional signs and symptoms: Pt says she had a very bad headache yesteday while at work, \"Worst ever headache\" Relevant Medical/Surgical History: Pt denies HTN, DM, kidney problems or surgeries to head, FINDINGS: CTA NECK: AORTIC ARCH/ARCH VESSELS: No dissection or arterial injury. No significant stenosis of the brachiocephalic or subclavian arteries. CAROTID ARTERIES: No dissection, arterial injury, or hemodynamically significant stenosis by NASCET criteria. VERTEBRAL ARTERIES: No dissection, arterial injury, or significant stenosis. SOFT TISSUES: The lung apices are clear.   No cervical or superior mediastinal lymphadenopathy. The larynx and pharynx are unremarkable. No acute abnormality of the salivary and thyroid glands. BONES: No acute osseous abnormality. CTA HEAD: ANTERIOR CIRCULATION: No significant stenosis of the intracranial internal carotid, anterior cerebral, or middle cerebral arteries. No aneurysm. POSTERIOR CIRCULATION: No significant stenosis of the vertebral, basilar, or posterior cerebral arteries. No aneurysm. OTHER: No dural venous sinus thrombosis on this non-dedicated study. BRAIN: No mass effect or midline shift. No extra-axial fluid collection. The gray-white differentiation is maintained. Unremarkable CTA of the head and neck. ED COURSE/MDM  Patient seen and evaluated. Old records reviewed. Labs and imaging reviewed and results discussed with patient. Patient came in with symptoms of vertigo concerns were for peripheral versus central cause, NIH scale of 0, nystagmus laterally, she had a headache days ago with this, CTA was done no aneurysms were noted, no evidence of stroke. This is high likelihood for peripheral source, will start on meclizine with Zofran and have follow-up with primary care in the next 3 to 5 days, patient is to return if having any worsening symptoms. Patient was given scripts for the following medications. I counseled patient how to take these medications. New Prescriptions    MECLIZINE (ANTIVERT) 25 MG TABLET    Take 1 tablet by mouth 3 times daily as needed for Dizziness    ONDANSETRON (ZOFRAN) 4 MG TABLET    Take 1 tablet by mouth every 8 hours as needed for Nausea       CLINICAL IMPRESSION  1. Benign paroxysmal positional vertigo, unspecified laterality        Blood pressure (!) 162/102, pulse 79, temperature 98.9 °F (37.2 °C), temperature source Oral, resp.  rate 20, height 5' 4\" (1.626 m), weight (!) 329 lb 9.4 oz (149.5 kg), last menstrual period 01/15/2014, SpO2 96 %, not currently breastfeeding. Stephanie Jasso was discharged to home in stable condition.                 Alan Preston MD  12/04/20 [id number]

## 2020-12-05 LAB
EKG ATRIAL RATE: 73 BPM
EKG DIAGNOSIS: NORMAL
EKG P AXIS: 45 DEGREES
EKG P-R INTERVAL: 150 MS
EKG Q-T INTERVAL: 396 MS
EKG QRS DURATION: 82 MS
EKG QTC CALCULATION (BAZETT): 436 MS
EKG R AXIS: 17 DEGREES
EKG T AXIS: 20 DEGREES
EKG VENTRICULAR RATE: 73 BPM

## 2020-12-05 PROCEDURE — 93010 ELECTROCARDIOGRAM REPORT: CPT | Performed by: INTERNAL MEDICINE

## 2020-12-05 NOTE — ED NOTES
Awake alert female was at [facility] and they sent her here for evaluation  Has had dizziness for 1 week  She feels like the ceiling is spinning     Leeanna Ledesma RN  12/04/20 2005

## 2020-12-05 NOTE — ED NOTES
Discharged to home  Aware how and why to take meds  To follow up with Dr Harish Garay early next week  To return any concerns.   Due to leg edema which is chronic encouraged to keep legs elevated  Wounds to left are are healing without drainage Walked out with ease     Ora Castro RN  12/04/20 2008

## 2020-12-16 PROBLEM — M46.90 INFLAMMATORY SPONDYLOPATHY (HCC): Status: ACTIVE | Noted: 2020-12-16

## 2020-12-17 RX ORDER — BETAMETHASONE DIPROPIONATE 0.5 MG/G
CREAM TOPICAL
Qty: 60 G | Refills: 0 | Status: SHIPPED | OUTPATIENT
Start: 2020-12-17 | End: 2021-02-10

## 2020-12-18 ENCOUNTER — VIRTUAL VISIT (OUTPATIENT)
Dept: PAIN MANAGEMENT | Age: 60
End: 2020-12-18
Payer: COMMERCIAL

## 2020-12-18 PROCEDURE — 99213 OFFICE O/P EST LOW 20 MIN: CPT | Performed by: INTERNAL MEDICINE

## 2020-12-18 RX ORDER — PREGABALIN 100 MG/1
100 CAPSULE ORAL 3 TIMES DAILY
Qty: 90 CAPSULE | Refills: 0 | Status: SHIPPED | OUTPATIENT
Start: 2020-12-18 | End: 2021-02-25 | Stop reason: SDUPTHER

## 2020-12-18 RX ORDER — TRAMADOL HYDROCHLORIDE 50 MG/1
50 TABLET ORAL EVERY 6 HOURS PRN
Qty: 90 TABLET | Refills: 0 | Status: SHIPPED | OUTPATIENT
Start: 2020-12-18 | End: 2021-01-22 | Stop reason: SDUPTHER

## 2020-12-18 RX ORDER — LIDOCAINE 36 MG/1
PATCH TOPICAL
Qty: 30 PATCH | Refills: 0 | Status: SHIPPED | OUTPATIENT
Start: 2020-12-18 | End: 2021-01-08

## 2020-12-18 NOTE — PROGRESS NOTES
TELE HEALTH VISIT (AUDIO-VISUAL)    Pursuant to the emergency declaration under the Formerly named [facility], Count includes the [facility] waiver authority and the [email or web address] and [employer] Act, this Virtual  Visit was conducted, with patient's/legal guardian's consent, to reduce the patient's risk of exposure to COVID-19 and provide continuity of care for an established patient. Service is  provided through a video synchronous discussion virtually to substitute for in-person clinic visit. Due to this being a TeleHealth encounter (During TKETN-23 public health emergency), evaluation of the following organ systems was limited: Vitals/Constitutional/EENT/Resp/CV/GI//MS/Neuro/Skin/Onyt-Baxq-Kgo. Ian De La Rosa  1960  [id number]    Ms. Petr Iraheta is being seen virtually for a follow up visit using one of the following techniques  Face time   Informed verbal consent to the virtual visit was obtained from Ms. Petr Iraheta. Risks associated with HIPPA compliance with the virtual visit was explained to the patient. Ms. Petr Iraheta is at her residence and Dr. Mami Reyes is in his office. HISTORY OF PRESENT ILLNESS:  Ms. Petr Iraheta is a 63 y.o. female returns for a follow up visit for multiple medical problems. Her current presenting problems are   1. Chronic pain syndrome    2. Primary osteoarthritis of both hips    3. Fibromyalgia    4. Lumbar radiculitis    5. DDD (degenerative disc disease), lumbar    6. Elbow tendinitis    7. Facet syndrome    8. Plantar fasciitis, right    9. Primary osteoarthritis of right hip    10. Restless legs syndrome (RLS)    . As per information/history obtained from the PADT(patient assessment and documentation tool) - She complains of pain in the lower back with radiation to the NA She rates the pain 7/10 and describes it as sharp. Pain is made worse by: nothing. Current treatment regimen has helped relieve about 50% of the pain. She denies side effects from the current pain regimen. Patient reports that since the last follow up visit the physical functioning is unchanged, family/social relationships are unchanged, mood is unchanged and sleep patterns are unchanged, and that the overall functioning is unchanged. Patient denies neurological bowel or bladder. Patient denies misusing/abusing her narcotic pain medications or using any illegal drugs. There are No indicators for possible drug abuse, addiction or diversion problems. Upon obtaining the medical history from Ms. Avril King regarding today's office visit for her presenting problems, Ms. Avril King states she has been doing worse, \"back is killing me. \" She states she is using Celebrex along with Ultram and Lyrica. Patient says the injection helps for a few weeks. Patient denies any cognitive side effects or GERD symptoms. Patient reports she is working part time still. ALLERGIES: Patients list of allergies were reviewed     MEDICATIONS: Ms. Avril King list of medications were reviewed. Her current medications are   Outpatient Medications Prior to Visit   Medication Sig Dispense Refill    betamethasone dipropionate (DIPROLENE) 0.05 % cream APPLY EXTERNALLY TO THE AFFECTED AREA TWICE DAILY 60 g 0    ondansetron (ZOFRAN) 4 MG tablet Take 1 tablet by mouth every 8 hours as needed for Nausea 20 tablet 0    rOPINIRole (REQUIP) 1 MG tablet TAKE 2 TABLETS BY MOUTH EVERY NIGHT 180 tablet 0    celecoxib (CELEBREX) 200 MG capsule Take 1 capsule by mouth daily 30 capsule 1 9. Primary osteoarthritis of right hip        PLAN:  Informed verbal consent was obtained  -continue with current opioid regimen Ultram 1-2 per day  -She was advised to increase fluids ( 5-7  glasses of fluid daily), limit caffeine, avoid cheese products, increase dietary fiber, increase activity and exercise as tolerated and relax regularly and enjoy meals   -start ZTlido to the back 12 hours on and 12 hours off  -She was advised weight reduction, diet changes- 800-1200 jessica diet, diet diary, exercising, nutritional  consult increased physical activity as tolerated      Current Outpatient Medications   Medication Sig Dispense Refill    betamethasone dipropionate (DIPROLENE) 0.05 % cream APPLY EXTERNALLY TO THE AFFECTED AREA TWICE DAILY 60 g 0    ondansetron (ZOFRAN) 4 MG tablet Take 1 tablet by mouth every 8 hours as needed for Nausea 20 tablet 0    rOPINIRole (REQUIP) 1 MG tablet TAKE 2 TABLETS BY MOUTH EVERY NIGHT 180 tablet 0    celecoxib (CELEBREX) 200 MG capsule Take 1 capsule by mouth daily 30 capsule 1    traMADol (ULTRAM) 50 MG tablet Take 1 tablet by mouth every 6 hours as needed for Pain (Max 1-2 per day) for up to 35 days.  90 tablet 0    Cholecalciferol (VITAMIN D3) 125 MCG (5000 UT) TABS Take 1 tablet by mouth 2 times daily      DULoxetine (CYMBALTA) 60 MG extended release capsule Take 1 capsule by mouth 2 times daily      ARIPiprazole (ABILIFY) 20 MG tablet Take 1 tablet by mouth daily      propranolol (INDERAL) 40 MG tablet Take 1 tablet by mouth 3 times daily anxiety      levothyroxine (SYNTHROID) 88 MCG tablet TAKE 1 TABLET BY MOUTH DAILY 90 tablet 0    atorvastatin (LIPITOR) 40 MG tablet TAKE 1 TABLET BY MOUTH DAILY 90 tablet 0    esomeprazole (NEXIUM) 40 MG delayed release capsule TAKE 1 CAPSULE BY MOUTH EVERY MORNING BEFORE BREAKFAST 90 capsule 0    QUEtiapine (SEROQUEL) 200 MG tablet Take 200 mg by mouth nightly  pregabalin (LYRICA) 100 MG capsule Take 1 capsule by mouth 3 times daily for 30 days. 90 capsule 0     No current facility-administered medications for this visit. I will continue her current medication regimen  which is part of the above treatment schedule. It has been helping with Ms. Yap's chronic  medical problems which for this visit include:   Diagnoses of Chronic pain syndrome, Primary osteoarthritis of both hips, Fibromyalgia, Lumbar radiculitis, DDD (degenerative disc disease), lumbar, Elbow tendinitis, Facet syndrome, Plantar fasciitis, right, Primary osteoarthritis of right hip, and Restless legs syndrome (RLS) were pertinent to this visit. Risks and benefits of the medications and other alternative treatments  including no treatment were discussed with the patient. The common side effects of these medications were also explained to the patient. Informed verbal consent was obtained. Goals of current treatment regimen include improvement in pain, restoration of functioning- with focus on improvement in physical performance, general activity, work or disability,emotional distress, health care utilization and  decreased medication consumption. Will continue to monitor progress towards achieving/maintaining therapeutic goals with special emphasis on  1. Improvement in perceived interfernce  of pain with ADL's. Ability to do home exercises independently. Ability to do household chores indoor and/or outdoor work and social and leisure activities. Improve psychosocial and physical functioning. - she is showing progression towards this treatment goal with the current regimen. She was advised against drinking alcohol with the narcotic pain medicines, advised against driving or handling machinery while adjusting the dose of medicines or if having cognitive  issues related to the current medications. Risk of overdose and death, if medicines not taken as prescribed, were also discussed. If the patient develops new symptoms or if the symptoms worsen, the patient should call the office. While transcribing every attempt was made to maintain the accuracy of the note in terms of it's contents,there may have been some errors made inadvertently. Thank you for allowing me to participate in the care of this patient.     Emely Crowley MD.    Cc: Manan Melvin MD

## 2020-12-28 RX ORDER — LEVOTHYROXINE SODIUM 88 UG/1
TABLET ORAL
Qty: 90 TABLET | Refills: 0 | Status: SHIPPED | OUTPATIENT
Start: 2020-12-28 | End: 2021-04-08

## 2020-12-28 RX ORDER — ONDANSETRON 4 MG/1
TABLET, FILM COATED ORAL
Qty: 60 TABLET | Refills: 0 | Status: SHIPPED | OUTPATIENT
Start: 2020-12-28 | End: 2021-01-26

## 2021-01-06 NOTE — PROGRESS NOTES
Mehreen Guo   1960, 63 y.o. female   [id number]       Referring Provider: Logan Martin MD  Referral Type: In an order in [address] [place] [address]    Reason for Visit: Evaluation of suspected change in hearing, tinnitus, or balance. ADULT AUDIOLOGIC EVALUATION      Mehreen Guo is a 63 y.o. female seen today, 1/11/2021 , for an initial audiologic evaluation. Patient was seen by Logan Martin MD following today's evaluation. AUDIOLOGIC AND OTHER PERTINENT MEDICAL HISTORY:      Mehreen Guo noted dizziness, imbalance and history of falls. Patient reports episodes of dizziness which she describes as a spinning sensation lasting seconds. This began about 3 weeks ago. Patient reports onset with laying straight back, rolling onto her left side, bending forward, and looking up. Patient reports a fall in the past week. Mehreen Guo denied otalgia, aural fullness, otorrhea, tinnitus, history of significant noise exposure, history of head trauma, history of ear surgery and family history of hearing loss. Date: 1/11/2021     IMPRESSIONS:      AU: Hearing WNL sloping to Mild SNHL notch at 3-4 kHz rising to normal hearing, Excellent WRS, Type A tymp      Hearing loss consistent with SNHL. Discussed hearing loss and dizziness with patient. Patient has a medicaid insurance plan. We are not in network with medicaid for hearing aid services. I provided the patient with contact information to the [facility] and [facility] to pursue amplification. Patient to follow medical recommendations per Logan Martin MD .    ASSESSMENT AND FINDINGS:     Otoscopy revealed: Clear ear canals bilaterally    RIGHT EAR:  Hearing Sensitivity: Normal hearing sensitivity with Mild sensorineural hearing loss notch at 3-4 kHz. Speech Recognition Threshold: 15 dB HL  Word Recognition: Excellent (%), based on NU-6 25-word list at 55 dBHL using recorded speech stimuli. Tympanometry: Normal peak pressure and compliance, Type A tympanogram, consistent with normal middle ear function. Acoustic Reflexes: Ipsilateral: Did not test. Contralateral: Did not test.    LEFT EAR:  Hearing Sensitivity: Normal hearing sensitivity with Mild sensorineural hearing loss notch at 3-4 kHz. Speech Recognition Threshold: 10 dB HL  Word Recognition: Excellent (%), based on NU-6 25-word list at 55 dBHL using recorded speech stimuli. Tympanometry: Normal peak pressure and compliance, Type A tympanogram, consistent with normal middle ear function. Acoustic Reflexes: Ipsilateral: Did not test. Contralateral: Did not test.    Reliability: Good  Transducer: Inserts    See scanned audiogram dated 1/11/2021  for results. PATIENT EDUCATION:       The following items were discussed with the patient:    - Good Communication Strategies   - Hearing Loss and Hearing Aids   - Tinnitus Management Strategies  - Noise-Induced Hearing Loss and use of Hearing Protection Devices (HPDs)    - Fall Risk and Prevention and  - Dizziness    Educational information was shared in the After Visit Summary. RECOMMENDATIONS:                                                                                                                                                                                                                                                            The following items are recommended based on patient report and results from today's appointment:   - Continue medical follow-up with Phyllis Leonard MD.   - Retest hearing as medically indicated and/or sooner if a change in hearing is noted. - If desired, schedule a Hearing Aid Evaluation (HAE) appointment to discuss hearing aid options.    - Utilize \"Good Communication Strategies\" as discussed to assist in speech understanding with communication partners

## 2021-01-08 ENCOUNTER — APPOINTMENT (OUTPATIENT)
Dept: GENERAL RADIOLOGY | Age: 61
End: 2021-01-08
Payer: COMMERCIAL

## 2021-01-08 ENCOUNTER — HOSPITAL ENCOUNTER (EMERGENCY)
Age: 61
Discharge: HOME | End: 2021-01-08
Attending: EMERGENCY MEDICINE
Payer: COMMERCIAL

## 2021-01-08 VITALS
RESPIRATION RATE: 12 BRPM | DIASTOLIC BLOOD PRESSURE: 88 MMHG | BODY MASS INDEX: 50.02 KG/M2 | HEART RATE: 75 BPM | HEIGHT: 64 IN | OXYGEN SATURATION: 99 % | SYSTOLIC BLOOD PRESSURE: 154 MMHG | TEMPERATURE: 98.2 F | WEIGHT: 293 LBS

## 2021-01-08 DIAGNOSIS — S80.02XA CONTUSION OF LEFT KNEE AND LOWER LEG, INITIAL ENCOUNTER: ICD-10-CM

## 2021-01-08 DIAGNOSIS — S80.12XA CONTUSION OF LEFT KNEE AND LOWER LEG, INITIAL ENCOUNTER: ICD-10-CM

## 2021-01-08 DIAGNOSIS — S70.02XA CONTUSION OF LEFT HIP, INITIAL ENCOUNTER: ICD-10-CM
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
## 2021-01-08 PROCEDURE — 99282 EMERGENCY DEPT VISIT SF MDM: CPT

## 2021-01-08 PROCEDURE — 73502 X-RAY EXAM HIP UNI 2-3 VIEWS: CPT

## 2021-01-08 PROCEDURE — 73562 X-RAY EXAM OF KNEE 3: CPT

## 2021-01-08 PROCEDURE — 73110 X-RAY EXAM OF WRIST: CPT

## 2021-01-08 RX ORDER — MECLIZINE HYDROCHLORIDE 25 MG/1
25 TABLET ORAL 3 TIMES DAILY PRN
COMMUNITY

## 2021-01-08 ASSESSMENT — PAIN SCALES - GENERAL: PAINLEVEL_OUTOF10: 8

## 2021-01-09 NOTE — ED NOTES
Pt dc/d with instructions in stable condition, ambulatory to lobby. Home per ride.       Thong Arthur RN  01/08/21 2920

## 2021-01-11 ENCOUNTER — PROCEDURE VISIT (OUTPATIENT)
Dept: AUDIOLOGY | Age: 61
End: 2021-01-11
Payer: COMMERCIAL

## 2021-01-11 ENCOUNTER — OFFICE VISIT (OUTPATIENT)
Dept: ENT CLINIC | Age: 61
End: 2021-01-11
Payer: COMMERCIAL

## 2021-01-11 VITALS
WEIGHT: 293 LBS | HEIGHT: 64 IN | BODY MASS INDEX: 50.02 KG/M2 | HEART RATE: 72 BPM | DIASTOLIC BLOOD PRESSURE: 90 MMHG | SYSTOLIC BLOOD PRESSURE: 158 MMHG | TEMPERATURE: 96.7 F

## 2021-01-11 DIAGNOSIS — H90.3 SENSORINEURAL HEARING LOSS (SNHL) OF BOTH EARS: Primary | ICD-10-CM

## 2021-01-11 DIAGNOSIS — R51.9 CHRONIC NONINTRACTABLE HEADACHE, UNSPECIFIED HEADACHE TYPE: ICD-10-CM

## 2021-01-11 DIAGNOSIS — H91.90 HEARING LOSS, UNSPECIFIED HEARING LOSS TYPE, UNSPECIFIED LATERALITY: Primary | ICD-10-CM

## 2021-01-11 DIAGNOSIS — R42 DIZZINESS: ICD-10-CM

## 2021-01-11 DIAGNOSIS — G89.29 CHRONIC NONINTRACTABLE HEADACHE, UNSPECIFIED HEADACHE TYPE: ICD-10-CM

## 2021-01-11 DIAGNOSIS — H81.12 BENIGN PAROXYSMAL POSITIONAL VERTIGO OF LEFT EAR: ICD-10-CM

## 2021-01-11 PROCEDURE — 92557 COMPREHENSIVE HEARING TEST: CPT | Performed by: AUDIOLOGIST

## 2021-01-11 PROCEDURE — 92567 TYMPANOMETRY: CPT | Performed by: AUDIOLOGIST

## 2021-01-11 PROCEDURE — 99214 OFFICE O/P EST MOD 30 MIN: CPT | Performed by: OTOLARYNGOLOGY

## 2021-01-11 PROCEDURE — 95992 CANALITH REPOSITIONING PROC: CPT | Performed by: OTOLARYNGOLOGY

## 2021-01-11 NOTE — PROGRESS NOTES
[facility]      Patient Name: Nhi Frank Record Number:  [id number]  Primary Care Physician:  Joel Arrieta MD  Date of Consultation: 1/11/2021          BRIEF HISTORY OF PRESENT ILLNESS  Wayne Rosa is a(n) 63 y.o. female who presents for evaluation of dizziness. Says that for the last few weeks she has had intermittent dizziness that seems to be associated with head movement. She said the longest that is lasted has been about a minute. She describes the dizziness as the room spinning. She does not know anything that triggered this. She does have headaches as well. The patient saw my partner Dr. Shaun Marroquin last year for dizziness. She had a fairly thorough work-up including balance testing. He recommended neurology referral, but the patient does not think she ever did this. She said that this current dizziness is distinctly different than her previous dizzy issues. No changes in her hearing.         Patient Active Problem List   Diagnosis    Primary localized osteoarthrosis, lower leg    Hyperlipidemia    Self mutilating behavior    Restless legs syndrome (RLS)    Skin growth    URTI (acute upper respiratory infection)    Posterior tibial tendonitis, bilateral    Suicidal ideation    Shoulder pain    Paresthesia    Vitamin B12 deficiency    Leg swelling    Bilateral lower extremity edema    Headache    Precordial pain    Left Lateral epicondylitis (tennis elbow)    Radial nerve entrapment    GERD (gastroesophageal reflux disease)    Diarrhea    Rotator cuff (capsule) sprain    Knee joint replacement by other means    Pain in limb    Venous (peripheral) insufficiency    Chronic pain syndrome    Elbow tendinitis    Fibromyalgia    Depression    Atypical chest pain    Hiatal hernia    Osteoarthritis of hip    DDD (degenerative disc disease), lumbar    Facet syndrome    Fatigue    Allergic rhinitis  Prediabetes    Plantar fasciitis, right    S/P laparoscopic sleeve gastrectomy    Nausea & vomiting    Weakness    Major depressive disorder, recurrent episode, severe (HCC)    Bilateral leg edema    Morbid obesity with BMI of 40.0-44.9, adult ([address]    Hypothyroid    Borderline personality disorder ([address]    Light headed    Acute blood loss anemia    Anemia    Self-mutilation    Cellulitis    Shortness of breath    Vertigo    Scalp cyst    Primary insomnia    BPPV (benign paroxysmal positional vertigo)    Laceration of left upper extremity    Acute cystitis without hematuria    Open wound of left upper arm    Viral upper respiratory tract infection    Lumbar radiculitis    Leg skin lesion, left    Edema    Nasal trauma    Drug-induced constipation    Abdominal pain    History of total bilateral knee replacement    SOB (shortness of breath)    Back pain    Lumbar strain    Essential hypertension    MARIO (acute kidney injury) ([address]    Lacey's deformity of left heel    Achilles tendinitis of left lower extremity    Dysuria    Plantar fasciitis of left foot    Hypoproteinemia (HCC)    Bruising    Inflammatory spondylopathy ([address]     Past Surgical History:   Procedure Laterality Date    ABDOMEN SURGERY  5/28/2014    LAPAROSCOPIC SLEEVE GASTRECTOMY, EXTENSIVE LYSIS OF ADHESIONS    ACHILLES TENDON SURGERY Left 3/9/2020    LEFT CALCANEOUS PARTIAL EXCISION, SECONDARY REPAIR OF ACHILLES TENDON performed by Yael Escobedo [facility] at [facility]      open , Ex-lap    CHOLECYSTECTOMY      open    COLONOSCOPY  04/16/2013    dr Kaleb Alexander 2018     DILATION AND CURETTAGE OF UTERUS      ENDOSCOPY, COLON, DIAGNOSTIC      FOOT SURGERY Right 05/27/2016    ARTHROPLASTY RIGHT 5TH DIGIT      HYSTEROSCOPY N/A 12-30-13    Hysteroscopy Dilitation and Curettage    JOINT REPLACEMENT Bilateral     knee    OTHER SURGICAL HISTORY  9/11/2015 ARTHROPLASTY 5TH DIGIT LEFT FOOT          SHOULDER SURGERY      right    SHOULDER SURGERY Right     SLEEVE GASTROPLASTY  May 28, 2014    [id number]    TOTAL KNEE ARTHROPLASTY  12/10/10    Right    TOTAL KNEE ARTHROPLASTY  3/30/10    Left    UPPER GASTROINTESTINAL ENDOSCOPY  4/16/2013    dr Nimco Jaramillo ENDOSCOPY  3/20/2014    dr Evan Johnson     Family History   Problem Relation Age of Onset    Heart Disease Mother     Heart Failure Mother     High Cholesterol Mother     High Blood Pressure Mother     Stroke Mother     Birth Defects Mother     Other Mother         VV    High Blood Pressure Father     COPD Father     Diabetes Brother     Stroke Maternal Grandmother     Arthritis Maternal Grandmother     Heart Failure Brother     Stroke Brother     Birth Defects Maternal Grandfather     Arthritis Paternal Grandmother      Social History     Socioeconomic History    Marital status: Single     Spouse name: Not on file    Number of children: 0    Years of education: 12    Highest education level: Not on file   Occupational History    Occupation: [employer]     Comment: unemployed   Social Needs    Financial resource strain: Not on file    Food insecurity     Worry: Not on file     Inability: Not on file   [employer] needs     Medical: Not on file     Non-medical: Not on file   Tobacco Use    Smoking status: Never Smoker    Smokeless tobacco: Never Used    Tobacco comment: Never smoked   Substance and Sexual Activity    Alcohol use: No     Alcohol/week: 0.0 standard drinks    Drug use: No    Sexual activity: Not Currently   Lifestyle    Physical activity     Days per week: Not on file     Minutes per session: Not on file    Stress: Not on file   Relationships    Social connections     Talks on phone: Not on file     Gets together: Not on file     Attends Buddhism service: Not on file     Active member of club or organization: Not on file Attends meetings of clubs or organizations: Not on file     Relationship status: Not on file    Intimate partner violence     Fear of current or ex partner: Not on file     Emotionally abused: Not on file     Physically abused: Not on file     Forced sexual activity: Not on file   Other Topics Concern    Not on file   Social History Narrative    Lives by herself with her cat       DRUG/FOOD ALLERGIES: Polysporin [bacitracin-polymyxin b], Lorazepam, Neomycin-polymyxin-gramicidin, and Sulfa antibiotics    CURRENT MEDICATIONS  Prior to Admission medications    Medication Sig Start Date End Date Taking? Authorizing Provider   meclizine (ANTIVERT) 25 MG tablet Take 25 mg by mouth 3 times daily as needed    Historical Provider, MD   diazePAM (VALIUM) 2 MG tablet Take 0.5 tablets by mouth every 12 hours for 10 days. 1/4/21 1/14/21  Matt Gomes MD   traMADol (ULTRAM) 50 MG tablet Take 1 tablet by mouth every 8 hours as needed for Pain for up to 7 days. Intended supply: 7 days. Take lowest dose possible to manage pain 1/4/21 1/11/21  Matt Gomes MD   hydroCHLOROthiazide (HYDRODIURIL) 25 MG tablet TAKE 1 TABLET BY MOUTH EVERY MORNING 12/29/20   Matt Gomes MD   ondansetron (ZOFRAN) 4 MG tablet TAKE 1 TABLET BY MOUTH EVERY 8 HOURS AS NEEDED FOR NAUSEA OR VOMITING 12/28/20   Matt Gomes MD   levothyroxine (SYNTHROID) 88 MCG tablet TAKE 1 TABLET BY MOUTH DAILY 12/28/20   Matt Gomes MD   pregabalin (LYRICA) 100 MG capsule Take 1 capsule by mouth 3 times daily for 30 days. 12/18/20 1/17/21  Mirela Sutton MD   traMADol (ULTRAM) 50 MG tablet Take 1 tablet by mouth every 6 hours as needed for Pain (Max 1-2 per day) for up to 35 days.  12/18/20 1/22/21  Mirela Sutton MD   betamethasone dipropionate (DIPROLENE) 0.05 % cream APPLY EXTERNALLY TO THE AFFECTED AREA TWICE DAILY 12/17/20   Matt Gomes MD   rOPINIRole (REQUIP) 1 MG tablet TAKE 2 TABLETS BY MOUTH EVERY NIGHT 11/18/20   Matt Gomes MD celecoxib (CELEBREX) 200 MG capsule Take 1 capsule by mouth daily 11/13/20   Bradley Doe MD   Cholecalciferol (VITAMIN D3) 125 MCG (5000 UT) TABS Take 1 tablet by mouth 2 times daily 9/2/20   Historical Provider, MD   DULoxetine (CYMBALTA) 60 MG extended release capsule Take 1 capsule by mouth 2 times daily 8/27/20   Historical Provider, MD   ARIPiprazole (ABILIFY) 20 MG tablet Take 1 tablet by mouth daily 9/4/20   Historical Provider, MD   propranolol (INDERAL) 40 MG tablet Take 1 tablet by mouth 3 times daily anxiety 8/27/20   Historical Provider, MD   atorvastatin (LIPITOR) 40 MG tablet TAKE 1 TABLET BY MOUTH DAILY 8/19/20   Aldair Lancaster MD   esomeprazole ([address]) 40 MG delayed release capsule TAKE 1 CAPSULE BY MOUTH EVERY MORNING BEFORE BREAKFAST 7/14/20   Aldair Lancaster MD   QUEtiapine (SEROQUEL) 200 MG tablet Take 200 mg by mouth nightly    Historical Provider, MD       REVIEW OF SYSTEMS  The following systems were reviewed and revealed the following in addition to any already discussed in the HPI:    CONSTITUTIONAL: no weight loss, no fever, no night sweats, no chills  EYES: no vision changes, no blurry vision  EARS: no changes in hearing, no otalgia  NOSE: no epistaxis, no rhinorrhea  RESPIRATORY: no  Difficulty breathing, no shortness of breath  CV: no chest pain, no Peripheral vascular disease  HEME: No coagulation disorder, no Bleeding disorder  NEURO: Vertigo, headaches  SKIN: No new rashes in the head and neck, no recent skin cancers  MOUTH: No new ulcers, no recent teeth infections  GASTROINTESTINAL: No diarrhea, stomach pain  PSYCH: No anxiety, no depression      PHYSICAL EXAM  BP (!) 158/90 (Site: Left Lower Arm, Position: Sitting, Cuff Size: Medium Adult)   Pulse 72   Temp 96.7 °F (35.9 °C) (Temporal)   Ht 5' 4\" (1.626 m)   Wt (!) 335 lb (152 kg)   LMP 01/15/2014   BMI 57.50 kg/m²     GENERAL: No Acute Distress, Alert and Oriented, no Hoarseness, strong voice EYES: EOMI, Anti-icteric  HENT:   Head: Normocephalic and atraumatic. Face:  Symmetric, facial nerve intact, no sinus tenderness  Right Ear: Normal external ear, normal external auditory canal, intact tympanic membrane with normal mobility and aerated middle ear  Left Ear: Normal external ear, normal external auditory canal, intact tympanic membrane with normal mobility and aerated middle ear  Mouth/Oral Cavity:  normal lips, Uvula is midline, no mucosal lesions, no trismus, normal dentition, normal salivary quality/flow  Oropharynx/Larynx:  normal oropharynx, normal tonsils; normal larynx/nasopharynx on mirror exam  Nose:Normal external nasal appearance. Anterior rhinoscopy shows a normal septum. Normal turbinates. Normal mucosa   NECK: Normal range of motion, no thyromegaly, trachea is midline, no lymphadenopathy, no neck masses, no crepitus  CHEST: Normal respiratory effort, no retractions, breathing comfortably  SKIN: No rashes, normal appearing skin, no evidence of skin lesions/tumors  Neuro:  cranial nerve II-XII intact; normal gait, positive Caleb-Hallpike with the left ear down, florid nystagmus  Cardio:  no edema    PROCEDURE  Epley maneuver  A left-sided Epley maneuver was performed. The patient had for nystagmus during this. A second Epley maneuver was then performed she had a significant decrease in her nystagmus as well as her subjective vertigo. Patient had an audiogram today that shows mild hearing loss only at 3 to 4 kHz bilaterally        ASSESSMENT/PLAN  1. Sensorineural hearing loss (SNHL) of both ears  This is unrelated to her dizziness. Seems to be stable.     2. Benign paroxysmal positional vertigo of left ear Patient has BPPV of the left ear. I performed an Epley maneuver today and she had quite a bit of improvement. I have given her a handout to do home Epley maneuvers. I would like to see her in 2 weeks. She still having episodes, I would have her see physical therapy for further treatment. 3. Chronic nonintractable headache, unspecified headache type  This does not seem to be related to the BPPV. Encouraged her to follow-up primary care for this. I have performed a head and neck physical exam personally or was physically present during the key or critical portions of the service. Medical Decision Making:   The following items were considered in medical decision making:  Independent review of images  Review / order clinical lab tests  Review / order radiology tests  Decision to obtain old records

## 2021-01-11 NOTE — PATIENT INSTRUCTIONS
Medicaid Hearing Aid Providers [facility]    These offices are known to work with some Medicaid plans for hearing aids. They may not work with ALL Medicaid plans. You should contact your insurance provider to find an audiologist for hearing aids near you. Main Office  [address] [place] [address]    Voice: [phone]  Video Phone: [phone]  Fax [phone]  Hours:  8:30-5:00  Monday through Friday [facility] Office  [address] [facility] [place] [address], [address] [place] [address] Blvd [address]    Voice [phone]  Fax [phone]    Hours: 8:30-noon and 1:00-5:00  Monday through Friday   [facility] OF [address] [facility]. [address]    Voice [phone]  Fax [phone]    Hours:  8:30-noon and 1:00-5:00  Monday through Friday         [address]  Phone: [phone]  Hours:  8:00-5:00  Monday through Friday [address])  [address] [place] [address] [facility] [place]   Phone: [phone]  Hours:  8:00-5:00  Monday through Friday     Dizziness: Care Instructions  Your Care Instructions  Dizziness is the feeling of unsteadiness or fuzziness in your head. It is different than having vertigo, which is a feeling that the room is spinning or that you are moving or falling. It is also different from lightheadedness, which is the feeling that you are about to faint. It can be hard to know what causes dizziness. Some people feel dizzy when they have migraine headaches. Sometimes bouts of flu can make you feel dizzy. Some medical conditions, such as heart problems or high blood pressure, can make you feel dizzy. Many medicines can cause dizziness, including medicines for high blood pressure, pain, or anxiety. If a medicine causes your symptoms, your doctor may recommend that you stop or change the medicine. If it is a problem with your heart, you may need medicine to help your heart work better. If there is no clear reason for your symptoms, your doctor may suggest watching and waiting for a while to see if the dizziness goes away on its own. Follow-up care is a key part of your treatment and safety. Be sure to make and go to all appointments, and call your doctor if you are having problems. It's also a good idea to know your test results and keep a list of the medicines you take. How can you care for yourself at home? · If your doctor recommends or prescribes medicine, take it exactly as directed. Call your doctor if you think you are having a problem with your medicine. · Do not drive while you feel dizzy. · Try to prevent falls. Steps you can take include:  ? Using nonskid mats, adding grab bars near the tub, and using night-lights. ? Clearing your home so that walkways are free of anything you might trip on.  ? Letting family and friends know that you have been feeling dizzy. This will help them know how to help you. When should you call for help? Call 911 anytime you think you may need emergency care. For example, call if:    · You passed out (lost consciousness). · You have dizziness along with symptoms of a heart attack. These may include:  ? Chest pain or pressure, or a strange feeling in the chest.  ? Sweating. ? Shortness of breath. ? Nausea or vomiting. ? Pain, pressure, or a strange feeling in the back, neck, jaw, or upper belly or in one or both shoulders or arms. ? Lightheadedness or sudden weakness. ? A fast or irregular heartbeat. · You have symptoms of a stroke. These may include:  ? Sudden numbness, tingling, weakness, or loss of movement in your face, arm, or leg, especially on only one side of your body. ? Sudden vision changes. ? Sudden trouble speaking. ? Sudden confusion or trouble understanding simple statements. ? Sudden problems with walking or balance. ? A sudden, severe headache that is different from past headaches. Call your doctor now or seek immediate medical care if:    · You feel dizzy and have a fever, headache, or ringing in your ears. · You have new or increased nausea and vomiting. · Your dizziness does not go away or comes back. Watch closely for changes in your health, and be sure to contact your doctor if:    · You do not get better as expected. Where can you learn more? Go to https://Solidia Technologiespepiceweb.Ohloh. org and sign in to your iVerse Media account. Enter K772 in the [employer]elStreet box to learn more about \"Dizziness: Care Instructions. \"     If you do not have an account, please click on the \"Sign Up Now\" link. Current as of: September 23, 2018  Content Version: 11.9  © 7319-2914 [employer]. Care instructions adapted under license by [facility] ([facility]). If you have questions about a medical condition or this instruction, always ask your healthcare professional. [address] any warranty or liability for your use of this information. Patient Education        Preventing Falls: Care Instructions  Your Care Instructions     Getting around your home safely can be a challenge if you have injuries or health problems that make it easy for you to fall. Loose rugs and furniture in walkways are among the dangers for many older people who have problems walking or who have poor eyesight. People who have conditions such as arthritis, osteoporosis, or dementia also have to be careful not to fall. You can make your home safer with a few simple measures. Follow-up care is a key part of your treatment and safety. Be sure to make and go to all appointments, and call your doctor if you are having problems. It's also a good idea to know your test results and keep a list of the medicines you take. How can you care for yourself at home? Taking care of yourself  · You may get dizzy if you do not drink enough water. To prevent dehydration, drink plenty of fluids, enough so that your urine is light yellow or clear like water. Choose water and other caffeine-free clear liquids. If you have kidney, heart, or liver disease and have to limit fluids, talk with your doctor before you increase the amount of fluids you drink. · Exercise regularly to improve your strength, muscle tone, and balance. Walk if you can. Swimming may be a good choice if you cannot walk easily. · Have your vision and hearing checked each year or any time you notice a change. If you have trouble seeing and hearing, you might not be able to avoid objects and could lose your balance. · Know the side effects of the medicines you take. Ask your doctor or pharmacist whether the medicines you take can affect your balance. Sleeping pills or sedatives can affect your balance. · Limit the amount of alcohol you drink. Alcohol can impair your balance and other senses. · Ask your doctor whether calluses or corns on your feet need to be removed. If you wear loose-fitting shoes because of calluses or corns, you can lose your balance and fall. · Talk to your doctor if you have numbness in your feet. Preventing falls at home  · Remove raised doorway thresholds, throw rugs, and clutter. Repair loose carpet or raised areas in the floor. · Move furniture and electrical cords to keep them out of walking paths. · Use nonskid floor wax, and wipe up spills right away, especially on ceramic tile floors. · If you use a walker or cane, put rubber tips on it. If you use crutches, clean the bottoms of them regularly with an abrasive pad, such as steel wool. · Keep your house well lit, especially Catheline Yovani, and outside walkways. Use night-lights in areas such as hallways and bathrooms. Add extra light switches or use remote switches (such as switches that go on or off when you clap your hands) to make it easier to turn lights on if you have to get up during the night. · Install sturdy handrails on stairways. · Move items in your cabinets so that the things you use a lot are on the lower shelves (about waist level). · Keep a cordless phone and a flashlight with new batteries by your bed. If possible, put a phone in each of the main rooms of your house, or carry a cell phone in case you fall and cannot reach a phone. Or, you can wear a device around your neck or wrist. You push a button that sends a signal for help. · Wear low-heeled shoes that fit well and give your feet good support. Use footwear with nonskid soles. Check the heels and soles of your shoes for wear. Repair or replace worn heels or soles. · Do not wear socks without shoes on wood floors. · Walk on the grass when the sidewalks are slippery. If you live in an area that gets snow and ice in the winter, sprinkle salt on slippery steps and sidewalks. Preventing falls in the bath  · Install grab bars and nonskid mats inside and outside your shower or tub and near the toilet and sinks. · Use shower chairs and bath benches. · Use a hand-held shower head that will allow you to sit while showering. · Get into a tub or shower by putting the weaker leg in first. Get out of a tub or shower with your strong side first.  · Repair loose toilet seats and consider installing a raised toilet seat to make getting on and off the toilet easier. · Keep your bathroom door unlocked while you are in the shower. Where can you learn more? Go to https://chpepiceweb.Vayyar. org and sign in to your cashcloudhart account. Enter [id number] [id number] [phone] in the [facility] box to learn more about \"Preventing Falls: Care Instructions. \"     If you do not have an account, please click on the \"Sign Up Now\" link. Current as of: April 15, 2020               Content Version: 12.6  © 2006-2020 [employer]. Care instructions adapted under license by [facility] ([facility]). If you have questions about a medical condition or this instruction, always ask your healthcare professional. [place] [address] any warranty or liability for your use of this information. Benign Paroxysmal Positional Vertigo (BPPV)        Benign paroxysmal positional vertigo (BPPV) is one of the most common causes of vertigo  the sudden sensation that you're spinning or that the inside of your head is spinning. Benign paroxysmal positional vertigo causes brief episodes of mild to intense dizziness. Benign paroxysmal positional vertigo is usually triggered by specific changes in the position of your head. This might occur when you tip your head up or down, when you lie down, or when you turn over or sit up in bed. Although benign paroxysmal positional vertigo can be a bothersome problem, it's rarely serious except when it increases the chance of falls. You can receive effective treatment for benign paroxysmal positional vertigo during a doctor's office visit. Symptoms  The signs and symptoms of benign paroxysmal positional vertigo (BPPV) may include:  ? Dizziness lasting seconds to minutes  ? A sense that you or your surroundings are spinning or moving (vertigo)   ? A loss of balance or unsteadiness   ? Nausea   ? Vomiting  The signs and symptoms of BPPV can come and go, with symptoms commonly lasting less than one minute. Episodes of benign paroxysmal positional vertigo can disappear for some time and then recur. Activities that bring about the signs and symptoms of BPPV can vary from person to person, but are almost always brought on by a change in the position of your head. Some people also feel out of balance when standing or walking. Abnormal rhythmic eye movements (nystagmus) usually accompany the symptoms of benign paroxysmal positional vertigo. When to see a doctor  Generally, see your doctor if you experience any recurrent, sudden, severe, or prolonged and unexplained dizziness or vertigo. Seek emergency care  Although it's uncommon for dizziness to signal a serious illness, see your doctor immediately if you experience dizziness or vertigo along with any of the following:  ? A new, different or severe headache   ? A fever   ? Double vision or loss of vision   ? Hearing loss   ? Trouble speaking   ? Leg or arm weakness   ? Loss of consciousness   ? Falling or difficulty walking   ? Numbness or tingling  The signs and symptoms listed above may signal a more serious problem. Causes    Inner ear and balance  Often, there's no known cause for BPPV. This is called idiopathic BPPV. When a cause can be determined, BPPV is often associated with a minor to severe blow to your head. Less common causes of BPPV include disorders that damage your inner ear or, rarely, damage that occurs during ear surgery or during prolonged positioning on your back, such as in a dentist chair. BPPV also has been associated with migraines. The ear's role  Inside your ear is a tiny organ called the vestibular labyrinth. It includes three loop-shaped structures (semicircular canals) that contain fluid and fine, hair-like sensors that monitor the rotation of your head. Other structures (otolith organs) in your ear monitor movements of your head  up and down, right and left, back and forth  and your head's position related to gravity. These otolith organs contain crystals that make you sensitive to gravity. For a variety of reasons, these crystals can become dislodged. When they become dislodged, they can move into one of the semicircular canals  especially while you're lying down. This causes the semicircular canal to become sensitive to head position changes it would normally not respond to, which is what makes you feel dizzy. Risk factors  Benign paroxysmal positional vertigo occurs most often in people age 48 and older, but can occur at any age. Benign paroxysmal positional vertigo is also more common in women than in men. A head injury or any other disorder of the balance organs of your ear may make you more susceptible to BPPV. Complications  Although benign paroxysmal positional vertigo (BPPV) is uncomfortable, it rarely causes complications. The dizziness of BPPV can make you unsteady, which may put you at greater risk of falling. Hearing Loss: Care Instructions  Your Care Instructions      Hearing loss is a sudden or slow decrease in how well you hear. It can range from mild to profound. Permanent hearing loss can occur with aging, and it can happen when you are exposed long-term to loud noise. Examples include listening to loud music, riding motorcycles, or being around other loud machines. Hearing loss can affect your work and home life. It can make you feel lonely or depressed. You may feel that you have lost your independence. But hearing aids and other devices can help you hear better and feel connected to others. Follow-up care is a key part of your treatment and safety. Be sure to make and go to all appointments, and call your doctor if you are having problems. It's also a good idea to know your test results and keep a list of the medicines you take. How can you care for yourself at home? · Avoid loud noises whenever possible. This helps keep your hearing from getting worse. Always wear hearing protection around loud noises. · If appropriate, wear hearing aid(s) as directed. It is recommended that hearing aids are worn during all waking hours to keep your brain active and give it access to the sounds it is missing. · If you are beginning your process with hearing aid(s), schedule a \"Hearing Aid Evaluation\" with an audiologist to discuss your lifestyle, features of hearing aid technology, and styles of hearing aids available. It is recommended that you contact your insurance company to determine if you have a hearing aid benefit, as this may dictate who you can see for these services. · Have hearing tests as your doctor suggests. They can show whether your hearing has changed. Your hearing aid may need to be adjusted. · Use other assistive devices as needed. These may include:  ? Telephone amplifiers and hearing aids that can connect to a television, stereo, radio, or microphone. ? Devices that use lights or vibrations. These alert you to the doorbell, a ringing telephone, or a baby monitor. ? Television closed-captioning. This shows the words at the bottom of the screen. Most new TVs can do this. ? TTY (text telephone). This lets you type messages back and forth on the telephone instead of talking or listening. These devices are also called TDD. When messages are typed on the keyboard, they are sent over the phone line to a receiving TTY. The message is shown on a monitor. · Use pagers, fax machines, text, and email if it is hard for you to communicate by telephone. · Try to learn a listening technique called speech-reading. It is not lip-reading. You pay attention to people's gestures, expressions, posture, and tone of voice. These clues can help you understand what a person is saying. Face the person you are talking to, and have him or her face you. Make sure the lighting is good. You need to see the other person's face clearly. · Think about counseling if you need help to adjust to your hearing loss. When should you call for help? Watch closely for changes in your health, and be sure to contact your doctor if:    · You think your hearing is getting worse. · You have new symptoms, such as dizziness or nausea. Noise-Induced Hearing Loss  What it is, and what you can do to prevent it      Exposure to loud sounds, in an occupational setting or recreational, can cause permanent hearing loss. Sound is measured in decibels (dB). Noise-induced hearing loss is the ONLY type of preventable hearing loss. Hearing loss related to noise exposure can occur at any age. There are small sensory cells, called inner and outer hair cells, within the inner ear (cochlea). These cells process the loudness (intensity) and pitch (frequency) of sound and send the signal to the brain via our auditory nerve (vestibulocochlear nerve, cranial nerve VIII). When these cells are damaged, they can result in permanent hearing loss and/or tinnitus. The hair cells responsible for high frequency sounds, like birds chirping, are most likely to be damaged due to loud sounds. The high frequency sounds are also very important for our clarity and understanding of speech. OCCUPATIONAL NOISE EXPOSURE RECREATIONAL NOISE EXPOSURE   Some jobs may have exposure to loud sounds in the workplace. These jobs may include but are not limited to:  ?    ? Factory settings  ? Manufacturing  ? Construction  ? Welding  ? Landscaping  ? Hairdressing/hairstyling  ? Musicians  ? Air Traffic   ? ... And more! Many activities outside of work may cause permanent hearing loss. These activities may include but are not limited to:  ? Lawnmowers, leaf blowers  ? Farming equipment and animals (such as pigs squealing)  ? Chainsaws and other power tools  ? Playing musical instruments and/or singing  ? Listening to music too loudly - at concerts, through stereo, through ear buds or headphones  ?  Attending sporting events ? Attending fireworks shows or using fireworks at home  ? Use of firearms  ? ... And more! REDUCE OR PROTECT YOUR EARS FROM NOISE EXPOSURE    To do your best to avoid noise-induced hearing loss, here are some tips:  ? Limit exposure to loud sounds. 85 dB (decibels) is safe for 8 hours. As sounds are louder, the length of time the sound is safe lessens. These numbers are cumulative across a 24-hour period. (NIOSH and CDC, 2002)  o 85 dB is safe for 8 hours  o 88 dB is safe for 4 hours  o 91 dB is safe for 2 hours  o 94 dB is safe for 1 hour  o 97 dB is safe for 30 minutes  o 100 dB is safe for 15 minutes  o 103 dB is safe for 7.5 minutes  o 106 dB is safe for 3.75 minutes  o 109 dB is safe for LESS THAN 2 minutes  o 112 dB is safe for LESS THAN 1 minute  o 115 dB is safe for ~ 30 seconds  o 130 dB can cause IMMEDIATE hearing loss  ? If you are unsure if a sound is too loud, consider checking the sound level with a \"sound level meter\". There are apps on smart devices that can measure the loudness of the sound. They are not as accurate as expensive equipment used by scientists, but it will give you a guesstimate of how loud the sound is, and if it may be damaging to your hearing. ? If you cannot avoid loud sounds, here are ways to reduce your exposure:  o 1. Wear hearing protection  ? Ear plugs and protective ear muffs can be used to reduce the intensity of the sound. The higher the NRR (noise reduction rating), the better reduction of the intensity of the sound   o 2. Turn the volume down  ? When listening to music, turn the volume down, especially when wearing ear buds or headphones. A good rule of thumb is to not go beyond the middle setting on your device. If you can't hear someone talking to you from arm's length away, your music may be at a level that it can cause damage. If someone else can hear your music from 3 feet away, it may also be at a level that it can cause damage. o 3. Walk away from the sound  ? If you do not have the ability to wear hearing protection or turn down the volume of the sound, you should do your best to move away from the source of the sound. ? Sound decreases in intensity as we move further from the source. The sound will decrease by 6 dB for every doubling of distance from the sound source. Exposure to these sounds may cause permanent damage to your hearing. If you suspect your hearing has changed, it is recommended that you have your hearing tested by your audiologist.    Jose Alberto Neff is a hearing aid? A hearing aid makes sounds louder. It can help some people with hearing problems to hear better. Hearing aids do not restore normal hearing. But they can make it easier to communicate by making sounds clearer. · Digital programmable hearing aids can adjust themselves to work best where you are at any time. You also have more choices in setting them up than with analog hearing aids. There are also different styles of hearing aids. · A behind-the-ear (BTE) hearing aid connects to a plastic ear mold that fits inside the outer ear. BTE hearing aids are used for all levels of hearing loss, especially very severe hearing loss. They may be better for children for safety and growth reasons. Poorly fitting BTE ear molds or a buildup of earwax may cause a whistling sound (feedback). · An in-the-ear (ITE) hearing aid fits in the outer part of the ear. It can be used by people with mild to severe hearing loss. ITE hearing aids can be used with other hearing devices, such as a telecoil that improves hearing during phone calls. ITE hearing aids can be damaged by earwax and fluid draining from the ear. Their small size may be hard for some people to handle. They are not often used in children because the case must be replaced as the child grows. · An in-the-canal (ITC) hearing aid fits into the ear canal. ITC hearing aids are used by people with mild to moderate hearing loss. They are made to fit the shape and the size of your ear canal. They can be damaged by earwax and fluid draining from the ear. Their small size may be hard for some people to handle. They are not made for children. You have some options if you have a hearing problem and are thinking about getting hearing aids. You can go to your doctor or an audiologist. He or she will do a hearing test and help you decide which type and style of hearing aid may be best for you. What else should I know about hearing aids? Find out if your insurance covers hearing aids. They can be expensive. Different types of hearing aids come with different costs. Also find out about a warranty or return policy in case you are not happy with your hearing aids. Follow-up care is a key part of your treatment and safety. Be sure to make and go to all appointments, and call your doctor if you are having problems. It's also a good idea to know your test results and keep a list of the medicines you take. Where can you learn more? Go to https://KitchfixpepicewStarport Systems.ZENTICKET. org and sign in to your CO2Nexus account. Enter B621 in the [employer] box to learn more about \"Learning About Hearing Aids. \"     If you do not have an account, please click on the \"Sign Up Now\" link. Current as of: October 21, 2018  Content Version: 12.1  © 2038-7047 [employer]. Care instructions adapted under license by [facility] ([facility]). If you have questions about a medical condition or this instruction, always ask your healthcare professional. [place] [address] any warranty or liability for your use of this information.

## 2021-01-12 RX ORDER — ATORVASTATIN CALCIUM 40 MG/1
TABLET, FILM COATED ORAL
Qty: 90 TABLET | Refills: 0 | Status: ON HOLD | OUTPATIENT
Start: 2021-01-12 | End: 2021-08-04 | Stop reason: SDUPTHER

## 2021-01-22 ENCOUNTER — VIRTUAL VISIT (OUTPATIENT)
Dept: PAIN MANAGEMENT | Age: 61
End: 2021-01-22
Payer: COMMERCIAL

## 2021-01-22 DIAGNOSIS — M47.899 FACET SYNDROME: ICD-10-CM

## 2021-01-22 DIAGNOSIS — M51.36 DDD (DEGENERATIVE DISC DISEASE), LUMBAR: ICD-10-CM

## 2021-01-22 DIAGNOSIS — M16.0 PRIMARY OSTEOARTHRITIS OF BOTH HIPS: ICD-10-CM

## 2021-01-22 DIAGNOSIS — G89.4 CHRONIC PAIN SYNDROME: ICD-10-CM

## 2021-01-22 DIAGNOSIS — M77.8 ELBOW TENDINITIS: ICD-10-CM

## 2021-01-22 DIAGNOSIS — M16.11 PRIMARY OSTEOARTHRITIS OF RIGHT HIP: ICD-10-CM

## 2021-01-22 DIAGNOSIS — M79.7 FIBROMYALGIA: ICD-10-CM

## 2021-01-22 DIAGNOSIS — M54.16 LUMBAR RADICULITIS: ICD-10-CM

## 2021-01-22 DIAGNOSIS — M72.2 PLANTAR FASCIITIS, RIGHT: ICD-10-CM

## 2021-01-22 PROCEDURE — 99213 OFFICE O/P EST LOW 20 MIN: CPT | Performed by: INTERNAL MEDICINE

## 2021-01-22 RX ORDER — TRAMADOL HYDROCHLORIDE 50 MG/1
50 TABLET ORAL EVERY 6 HOURS PRN
Qty: 90 TABLET | Refills: 0 | Status: SHIPPED | OUTPATIENT
Start: 2021-01-22 | End: 2021-02-25 | Stop reason: SDUPTHER

## 2021-01-22 NOTE — PROGRESS NOTES
TELE HEALTH VISIT (AUDIO-VISUAL)    Pursuant to the emergency declaration under the [facility], [facility] waiver authority and the [employer] Act, this Virtual  Visit was conducted, with patient's/legal guardian's consent, to reduce the patient's risk of exposure to COVID-19 and provide continuity of care for an established patient. Service is  provided through a video synchronous discussion virtually to substitute for in-person clinic visit. Due to this being a TeleHealth encounter (During ILSBE-26 public health emergency), evaluation of the following organ systems was limited: Vitals/Constitutional/EENT/Resp/CV/GI//MS/Neuro/Skin/Zubf-Abxd-Uci. Tiffanie Joseph  1960  [id number]    Ms. Kasia Lua is being seen virtually for a follow up visit using one of the following techniques   Doxy. me  Informed verbal consent to the virtual visit was obtained from Ms. Kasia Lua. Risks associated with HIPPA compliance with the virtual visit was explained to the patient. Ms. Kasia Lua is at her residence and Dr. Quintin Mccann is in his office. HISTORY OF PRESENT ILLNESS:  Ms. Kasia Lua is a 63 y.o. female returns for a follow up visit for multiple medical problems. Her current presenting problems are No diagnosis found. Lysbeth Carrel As per information/history obtained from the PADT(patient assessment and documentation tool) - She complains of pain in the lower back with radiation to the n/a She rates the pain 9/10 and describes it as sharp. Pain is made worse by: movement, standing, sitting, lifting. Current treatment regimen has helped relieve about 50% of the pain. She denies side effects from the current pain regimen. Patient reports that since the last follow up visit the physical functioning is unchanged, family/social relationships are unchanged, mood is unchanged and sleep patterns are unchanged, and that the overall functioning is unchanged. Patient denies neurological bowel or bladder. Patient denies misusing/abusing her narcotic pain medications or using any illegal drugs. There are No indicators for possible drug abuse, addiction or diversion problems. Upon obtaining the medical history from Ms. Peng Mccord regarding today's office visit for her presenting problems, Ms. Peng Mccord states she has been doing fair, pain has been baseline. Patient states her back hurts the most on working 3 days a week. She mentions she is using Celebrex along with Ultram 2-3 per day. Patient reports her weight has been stable. 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 ALLERGIES: Patients list of allergies were reviewed     MEDICATIONS: Ms. Peng Mccord list of medications were reviewed. Her current medications are   Outpatient Medications Prior to Visit   Medication Sig Dispense Refill    atorvastatin (LIPITOR) 40 MG tablet TAKE 1 TABLET BY MOUTH DAILY 90 tablet 0    meclizine (ANTIVERT) 25 MG tablet Take 25 mg by mouth 3 times daily as needed      hydroCHLOROthiazide (HYDRODIURIL) 25 MG tablet TAKE 1 TABLET BY MOUTH EVERY MORNING 15 tablet 0    ondansetron (ZOFRAN) 4 MG tablet TAKE 1 TABLET BY MOUTH EVERY 8 HOURS AS NEEDED FOR NAUSEA OR VOMITING 60 tablet 0    levothyroxine (SYNTHROID) 88 MCG tablet TAKE 1 TABLET BY MOUTH DAILY 90 tablet 0  traMADol (ULTRAM) 50 MG tablet Take 1 tablet by mouth every 6 hours as needed for Pain (Max 1-2 per day) for up to 35 days. 90 tablet 0    betamethasone dipropionate (DIPROLENE) 0.05 % cream APPLY EXTERNALLY TO THE AFFECTED AREA TWICE DAILY 60 g 0    rOPINIRole (REQUIP) 1 MG tablet TAKE 2 TABLETS BY MOUTH EVERY NIGHT 180 tablet 0    celecoxib (CELEBREX) 200 MG capsule Take 1 capsule by mouth daily 30 capsule 1    Cholecalciferol (VITAMIN D3) 125 MCG (5000 UT) TABS Take 1 tablet by mouth 2 times daily      DULoxetine (CYMBALTA) 60 MG extended release capsule Take 1 capsule by mouth 2 times daily      ARIPiprazole (ABILIFY) 20 MG tablet Take 1 tablet by mouth daily      propranolol (INDERAL) 40 MG tablet Take 1 tablet by mouth 3 times daily anxiety      esomeprazole (NEXIUM) 40 MG delayed release capsule TAKE 1 CAPSULE BY MOUTH EVERY MORNING BEFORE BREAKFAST 90 capsule 0    QUEtiapine (SEROQUEL) 200 MG tablet Take 200 mg by mouth nightly      pregabalin (LYRICA) 100 MG capsule Take 1 capsule by mouth 3 times daily for 30 days. 90 capsule 0     No facility-administered medications prior to visit. REVIEW OF SYSTEMS:    Respiratory: Negative for apnea, chest tightness and shortness of breath or change in baseline breathing. PHYSICAL EXAM:   Nursing note and vitals reviewed. LMP 01/15/2014   Constitutional: She appears well-developed and well-nourished. No acute distress. Cardiovascular: Normal rate, regular rhythm, normal heart sounds, and does not have murmur. Pulmonary/Chest: Effort normal. No respiratory distress. She does not have wheezes in the lung fields. She has no rales. Neurological/Psychiatric:She is alert and oriented to person, place, and time. Coordination is  normal.  Her mood isAppropriate and affect is Neutral/Euthymic(normal) . Her    IMPRESSION:   1. Chronic pain syndrome    2. Primary osteoarthritis of both hips    3. Fibromyalgia    4.  Lumbar radiculitis 5. DDD (degenerative disc disease), lumbar    6. Elbow tendinitis    7. Facet syndrome    8. Plantar fasciitis, right    9. Primary osteoarthritis of right hip        PLAN:  Informed verbal consent was obtained  -continue with current opioid regimen Ultram 2-3 per day  -She was advised to increase fluids ( 5-7  glasses of fluid daily), limit caffeine, avoid cheese products, increase dietary fiber, increase activity and exercise as tolerated and relax regularly and enjoy meals   -continue with Celebrex  -can increased Ultram to 3 per day on the days she works  -She was advised weight reduction, diet changes- 800-1200 jessica diet, diet diary, exercising, nutritional  consult increased physical activity as tolerated   -ROM/Stretching exercises as advised      Current Outpatient Medications   Medication Sig Dispense Refill    atorvastatin (LIPITOR) 40 MG tablet TAKE 1 TABLET BY MOUTH DAILY 90 tablet 0    meclizine (ANTIVERT) 25 MG tablet Take 25 mg by mouth 3 times daily as needed      hydroCHLOROthiazide (HYDRODIURIL) 25 MG tablet TAKE 1 TABLET BY MOUTH EVERY MORNING 15 tablet 0    ondansetron (ZOFRAN) 4 MG tablet TAKE 1 TABLET BY MOUTH EVERY 8 HOURS AS NEEDED FOR NAUSEA OR VOMITING 60 tablet 0    levothyroxine (SYNTHROID) 88 MCG tablet TAKE 1 TABLET BY MOUTH DAILY 90 tablet 0    traMADol (ULTRAM) 50 MG tablet Take 1 tablet by mouth every 6 hours as needed for Pain (Max 1-2 per day) for up to 35 days.  90 tablet 0    betamethasone dipropionate (DIPROLENE) 0.05 % cream APPLY EXTERNALLY TO THE AFFECTED AREA TWICE DAILY 60 g 0    rOPINIRole (REQUIP) 1 MG tablet TAKE 2 TABLETS BY MOUTH EVERY NIGHT 180 tablet 0    celecoxib (CELEBREX) 200 MG capsule Take 1 capsule by mouth daily 30 capsule 1    Cholecalciferol (VITAMIN D3) 125 MCG (5000 UT) TABS Take 1 tablet by mouth 2 times daily      DULoxetine (CYMBALTA) 60 MG extended release capsule Take 1 capsule by mouth 2 times daily  ARIPiprazole (ABILIFY) 20 MG tablet Take 1 tablet by mouth daily      propranolol (INDERAL) 40 MG tablet Take 1 tablet by mouth 3 times daily anxiety      esomeprazole (NEXIUM) 40 MG delayed release capsule TAKE 1 CAPSULE BY MOUTH EVERY MORNING BEFORE BREAKFAST 90 capsule 0    QUEtiapine (SEROQUEL) 200 MG tablet Take 200 mg by mouth nightly      pregabalin (LYRICA) 100 MG capsule Take 1 capsule by mouth 3 times daily for 30 days. 90 capsule 0     No current facility-administered medications for this visit. I will continue her current medication regimen  which is part of the above treatment schedule. It has been helping with Ms. Yap's chronic  medical problems which for this visit include: There were no encounter diagnoses. Risks and benefits of the medications and other alternative treatments  including no treatment were discussed with the patient. The common side effects of these medications were also explained to the patient. Informed verbal consent was obtained. Goals of current treatment regimen include improvement in pain, restoration of functioning- with focus on improvement in physical performance, general activity, work or disability,emotional distress, health care utilization and  decreased medication consumption. Will continue to monitor progress towards achieving/maintaining therapeutic goals with special emphasis on  1. Improvement in perceived interfernce  of pain with ADL's. Ability to do home exercises independently. Ability to do household chores indoor and/or outdoor work and social and leisure activities. Improve psychosocial and physical functioning. - she is showing progression towards this treatment goal with the current regimen. 2. Ability to focus/concentrate at work and perform the duties required of her at work  Sit through a workday without lower extremity symptoms. Stand 30-60 minutes without lower extremity symptoms. Ability to lift up to 10-20 lbs. Ability to go up and down stairs. Sit 30-60 minutes  Without having to stand up frequently. - she is maintaining/progressing towards her work related goals with the current regimen. She was advised against drinking alcohol with the narcotic pain medicines, advised against driving or handling machinery while adjusting the dose of medicines or if having cognitive  issues related to the current medications. Risk of overdose and death, if medicines not taken as prescribed, were also discussed. If the patient develops new symptoms or if the symptoms worsen, the patient should call the office. While transcribing every attempt was made to maintain the accuracy of the note in terms of it's contents,there may have been some errors made inadvertently. Thank you for allowing me to participate in the care of this patient.     Clifton Collado MD.    Cc: Luis Shay MD

## 2021-01-26 RX ORDER — ONDANSETRON 4 MG/1
TABLET, FILM COATED ORAL
Qty: 60 TABLET | Refills: 0 | Status: SHIPPED | OUTPATIENT
Start: 2021-01-26 | End: 2021-05-04

## 2021-01-28 ENCOUNTER — OFFICE VISIT (OUTPATIENT)
Dept: ENT CLINIC | Age: 61
End: 2021-01-28
Payer: COMMERCIAL

## 2021-01-28 VITALS
WEIGHT: 293 LBS | SYSTOLIC BLOOD PRESSURE: 161 MMHG | DIASTOLIC BLOOD PRESSURE: 97 MMHG | TEMPERATURE: 96.7 F | HEIGHT: 64 IN | HEART RATE: 99 BPM | BODY MASS INDEX: 50.02 KG/M2

## 2021-01-28 DIAGNOSIS — H81.12 BENIGN PAROXYSMAL POSITIONAL VERTIGO OF LEFT EAR: ICD-10-CM

## 2021-01-28 DIAGNOSIS — H90.3 SENSORINEURAL HEARING LOSS (SNHL) OF BOTH EARS: Primary | ICD-10-CM

## 2021-01-28 PROCEDURE — 99213 OFFICE O/P EST LOW 20 MIN: CPT | Performed by: OTOLARYNGOLOGY

## 2021-01-28 NOTE — PROGRESS NOTES
[facility]      Patient Name: Nhi Frank Record Number:  [id number]  Primary Care Physician:  Blayne Albarran MD  Date of Consultation: 1/28/2021          BRIEF HISTORY OF PRESENT ILLNESS  Erum Mora is a(n) 63 y.o. female who presents for follow-up of BPPV of the left ear. I saw her on January 11 and we did some Epley maneuvers. She has been doing these at home. She says she still getting dizzy. No new symptoms. No headaches since I saw her.           Patient Active Problem List   Diagnosis    Primary localized osteoarthrosis, lower leg    Hyperlipidemia    Self mutilating behavior    Restless legs syndrome (RLS)    Skin growth    URTI (acute upper respiratory infection)    Posterior tibial tendonitis, bilateral    Suicidal ideation    Shoulder pain    Paresthesia    Vitamin B12 deficiency    Leg swelling    Bilateral lower extremity edema    Headache    Precordial pain    Left Lateral epicondylitis (tennis elbow)    Radial nerve entrapment    GERD (gastroesophageal reflux disease)    Diarrhea    Rotator cuff (capsule) sprain    Knee joint replacement by other means    Pain in limb    Venous (peripheral) insufficiency    Chronic pain syndrome    Elbow tendinitis    Fibromyalgia    Depression    Atypical chest pain    Hiatal hernia    Osteoarthritis of hip    DDD (degenerative disc disease), lumbar    Facet syndrome    Fatigue    Allergic rhinitis    Prediabetes    Plantar fasciitis, right    S/P laparoscopic sleeve gastrectomy    Nausea & vomiting    Weakness    Major depressive disorder, recurrent episode, severe (HCC)    Bilateral leg edema    Morbid obesity with BMI of 40.0-44.9, adult (HCC)    Hypothyroid    Borderline personality disorder (HCC)    Light headed    Acute blood loss anemia    Anemia    Self-mutilation    Cellulitis    Shortness of breath    Vertigo    Scalp cyst  Primary insomnia    BPPV (benign paroxysmal positional vertigo)    Laceration of left upper extremity    Acute cystitis without hematuria    Open wound of left upper arm    Viral upper respiratory tract infection    Lumbar radiculitis    Leg skin lesion, left    Edema    Nasal trauma    Drug-induced constipation    Abdominal pain    History of total bilateral knee replacement    SOB (shortness of breath)    Back pain    Lumbar strain    Essential hypertension    [facility]) [facility] [address]    Lacey's deformity of left heel    Achilles tendinitis of left lower extremity    Dysuria    Plantar fasciitis of left foot    Hypoproteinemia (HCC)    Bruising    Inflammatory spondylopathy ([address]     Past Surgical History:   Procedure Laterality Date    ABDOMEN SURGERY  5/28/2014    LAPAROSCOPIC SLEEVE GASTRECTOMY, EXTENSIVE LYSIS OF ADHESIONS    ACHILLES TENDON SURGERY Left 3/9/2020    LEFT CALCANEOUS PARTIAL EXCISION, SECONDARY REPAIR OF ACHILLES TENDON performed by [facility]res [facility]      open , Ex-lap    CHOLECYSTECTOMY      open    COLONOSCOPY  04/16/2013    dr Cindy Fernandez 2018     DILATION AND CURETTAGE OF UTERUS      ENDOSCOPY, COLON, DIAGNOSTIC      FOOT SURGERY Right 05/27/2016    ARTHROPLASTY RIGHT 5TH DIGIT      HYSTEROSCOPY N/A 12-30-13    Hysteroscopy Dilitation and Curettage    JOINT REPLACEMENT Bilateral     knee    OTHER SURGICAL HISTORY  9/11/2015    ARTHROPLASTY 5TH DIGIT LEFT FOOT          SHOULDER SURGERY      right    SHOULDER SURGERY Right     SLEEVE GASTROPLASTY  May 28, 2014    Rosi    TOTAL KNEE ARTHROPLASTY  12/10/10    Right    TOTAL KNEE ARTHROPLASTY  3/30/10    Left    UPPER GASTROINTESTINAL ENDOSCOPY  4/16/2013    dr Chi Quach ENDOSCOPY  3/20/2014    dr Army Jimenes     Family History   Problem Relation Age of Onset    Heart Disease Mother  Heart Failure Mother     High Cholesterol Mother     High Blood Pressure Mother     Stroke Mother     Birth Defects Mother     Other Mother         VV    High Blood Pressure Father     COPD Father     Diabetes Brother     Stroke Maternal Grandmother     Arthritis Maternal Grandmother     Heart Failure Brother     Stroke Brother     Birth Defects Maternal Grandfather     Arthritis Paternal Grandmother      Social History     Socioeconomic History    Marital status: Single     Spouse name: Not on file    Number of children: 0    Years of education: 12    Highest education level: Not on file   Occupational History    Occupation: [employer]     Comment: unemployed   Social Needs    Financial resource strain: Not on file    Food insecurity     Worry: Not on file     Inability: Not on file   [employer] needs     Medical: Not on file     Non-medical: Not on file   Tobacco Use    Smoking status: Never Smoker    Smokeless tobacco: Never Used    Tobacco comment: Never smoked   Substance and Sexual Activity    Alcohol use: No     Alcohol/week: 0.0 standard drinks    Drug use: No    Sexual activity: Not Currently   Lifestyle    Physical activity     Days per week: Not on file     Minutes per session: Not on file    Stress: Not on file   Relationships    Social connections     Talks on phone: Not on file     Gets together: Not on file     Attends [employer] service: Not on file     Active member of club or organization: Not on file     Attends meetings of clubs or organizations: Not on file     Relationship status: Not on file    Intimate partner violence     Fear of current or ex partner: Not on file     Emotionally abused: Not on file     Physically abused: Not on file     Forced sexual activity: Not on file   Other Topics Concern    Not on file   Social History Narrative    Lives by herself with her cat DRUG/FOOD ALLERGIES: Polysporin [bacitracin-polymyxin b], Lorazepam, Neomycin-polymyxin-gramicidin, and Sulfa antibiotics    CURRENT MEDICATIONS  Prior to Admission medications    Medication Sig Start Date End Date Taking? Authorizing Provider   ondansetron (ZOFRAN) 4 MG tablet TAKE 1 TABLET BY MOUTH EVERY 8 HOURS AS NEEDED FOR NAUSEA OR VOMITING 1/26/21   Hilary Newman MD   traMADol (ULTRAM) 50 MG tablet Take 1 tablet by mouth every 6 hours as needed for Pain (Max 1-2 per day) for up to 35 days. 1/22/21 2/26/21  Belinda Galindo MD   atorvastatin (LIPITOR) 40 MG tablet TAKE 1 TABLET BY MOUTH DAILY 1/12/21   Hilary Newman MD   meclizine (ANTIVERT) 25 MG tablet Take 25 mg by mouth 3 times daily as needed    Historical Provider, MD   hydroCHLOROthiazide (HYDRODIURIL) 25 MG tablet TAKE 1 TABLET BY MOUTH EVERY MORNING 12/29/20   Hilary Newman MD   levothyroxine (SYNTHROID) 88 MCG tablet TAKE 1 TABLET BY MOUTH DAILY 12/28/20   Hilary Newman MD   pregabalin (LYRICA) 100 MG capsule Take 1 capsule by mouth 3 times daily for 30 days.  12/18/20 1/17/21  Belinda Galindo MD   betamethasone dipropionate (DIPROLENE) 0.05 % cream APPLY EXTERNALLY TO THE AFFECTED AREA TWICE DAILY 12/17/20   Hilary Newman MD   rOPINIRole (REQUIP) 1 MG tablet TAKE 2 TABLETS BY MOUTH EVERY NIGHT 11/18/20   Hilary Newman MD   celecoxib (CELEBREX) 200 MG capsule Take 1 capsule by mouth daily 11/13/20   Belinda Galindo MD   Cholecalciferol (VITAMIN D3) 125 MCG (5000 UT) TABS Take 1 tablet by mouth 2 times daily 9/2/20   Historical Provider, MD   DULoxetine (CYMBALTA) 60 MG extended release capsule Take 1 capsule by mouth 2 times daily 8/27/20   Historical Provider, MD   ARIPiprazole (ABILIFY) 20 MG tablet Take 1 tablet by mouth daily 9/4/20   Historical Provider, MD   propranolol (INDERAL) 40 MG tablet Take 1 tablet by mouth 3 times daily anxiety 8/27/20   Historical Provider, MD esomeprazole (NEXIUM) 40 MG delayed release capsule TAKE 1 CAPSULE BY MOUTH EVERY MORNING BEFORE BREAKFAST 7/14/20   Deborah Casarez MD   QUEtiapine (SEROQUEL) 200 MG tablet Take 200 mg by mouth nightly    Historical Provider, MD       REVIEW OF SYSTEMS  The following systems were reviewed and revealed the following in addition to any already discussed in the HPI:    CONSTITUTIONAL: no weight loss, no fever, no night sweats, no chills  EYES: no vision changes, no blurry vision  EARS: no changes in hearing, no otalgia  NOSE: no epistaxis, no rhinorrhea  RESPIRATORY: no  Difficulty breathing, no shortness of breath  CV: no chest pain, no Peripheral vascular disease  HEME: No coagulation disorder, no Bleeding disorder  NEURO: Dizziness  SKIN: No new rashes in the head and neck, no recent skin cancers  MOUTH: No new ulcers, no recent teeth infections  GASTROINTESTINAL: No diarrhea, stomach pain  PSYCH: No anxiety, no depression      PHYSICAL EXAM  BP (!) 161/97 (Site: Left Upper Arm, Position: Sitting, Cuff Size: Large Adult)   Pulse 99   Temp 96.7 °F (35.9 °C) (Temporal)   Ht 5' 4\" (1.626 m)   Wt (!) 328 lb (148.8 kg)   LMP 01/15/2014   BMI 56.30 kg/m²     GENERAL: Obese  EYES: EOMI, Anti-icteric  HENT:   Head: Normocephalic and atraumatic. Face:  Symmetric, facial nerve intact, no sinus tenderness  Right Ear: Normal external ear, normal external auditory canal, intact tympanic membrane with normal mobility and aerated middle ear  Left Ear: Normal external ear, normal external auditory canal, intact tympanic membrane with normal mobility and aerated middle ear  Mouth/Oral Cavity:  normal lips, Uvula is midline, no mucosal lesions, no trismus, normal dentition, normal salivary quality/flow  Oropharynx/Larynx:  normal oropharynx, normal tonsils; normal larynx/nasopharynx on mirror exam  Nose:Normal external nasal appearance. Anterior rhinoscopy shows a nomral septum. normal turbinates.   Normal mucosa

## 2021-02-05 DIAGNOSIS — M51.36 DDD (DEGENERATIVE DISC DISEASE), LUMBAR: ICD-10-CM

## 2021-02-05 DIAGNOSIS — M72.2 PLANTAR FASCIITIS, RIGHT: ICD-10-CM

## 2021-02-05 DIAGNOSIS — M77.8 ELBOW TENDINITIS: ICD-10-CM

## 2021-02-05 DIAGNOSIS — M47.899 FACET SYNDROME: ICD-10-CM

## 2021-02-05 DIAGNOSIS — M79.7 FIBROMYALGIA: ICD-10-CM

## 2021-02-05 DIAGNOSIS — G89.4 CHRONIC PAIN SYNDROME: ICD-10-CM

## 2021-02-05 DIAGNOSIS — M16.11 PRIMARY OSTEOARTHRITIS OF RIGHT HIP: ICD-10-CM

## 2021-02-05 DIAGNOSIS — M16.0 PRIMARY OSTEOARTHRITIS OF BOTH HIPS: ICD-10-CM

## 2021-02-05 DIAGNOSIS — M54.16 LUMBAR RADICULITIS: ICD-10-CM

## 2021-02-05 RX ORDER — CELECOXIB 200 MG/1
200 CAPSULE ORAL DAILY
Qty: 30 CAPSULE | Refills: 1 | Status: SHIPPED | OUTPATIENT
Start: 2021-02-05 | End: 2021-02-25 | Stop reason: SDUPTHER

## 2021-02-10 RX ORDER — BETAMETHASONE DIPROPIONATE 0.5 MG/G
CREAM TOPICAL
Qty: 60 G | Refills: 0 | Status: SHIPPED | OUTPATIENT
Start: 2021-02-10 | End: 2021-04-17

## 2021-02-12 ENCOUNTER — HOSPITAL ENCOUNTER (OUTPATIENT)
Dept: PHYSICAL THERAPY | Age: 61
Setting detail: THERAPIES SERIES
Discharge: HOME | End: 2021-02-12
Payer: COMMERCIAL

## 2021-02-12 PROCEDURE — 97162 PT EVAL MOD COMPLEX 30 MIN: CPT

## 2021-02-12 PROCEDURE — 97112 NEUROMUSCULAR REEDUCATION: CPT

## 2021-02-12 PROCEDURE — 97530 THERAPEUTIC ACTIVITIES: CPT

## 2021-02-12 PROCEDURE — 97161 PT EVAL LOW COMPLEX 20 MIN: CPT

## 2021-02-12 NOTE — FLOWSHEET NOTE
[address] [facility], [facility]  [address] [facility], [facility]  Phone: [phone]   Fax:     [phone]      Physical Therapy Treatment Note/ Progress Report:   Date:  2021    Patient Name:  Sunitha Lofton    :  1960  MRN: [id number]    Pertinent Medical History: Additional Pertinent Hx: HTN, OA, fibromyalgia, chronic pain syndrome, bipolar,depression, L achilles repair, B TKR, R shldr scope    Medical/Treatment Diagnosis Information:  · Diagnosis: BPPV of L ear  · Treatment Diagnosis: Vestibular impairment    Insurance/Certification information:  PT Insurance Information: [employer]  Physician Information:  Referring Practitioner: Tom Liang MD  Plan of care signed (Y/N): []  Yes [x]  No     Date of Patient follow up with Physician: CULLEN     Progress Report: []  Yes  [x]  No     Date Range for reporting period:  Beginnin2021  Ending:     Progress report due (10 Rx/or 30 days whichever is less):      Recertification due (POC duration/ or 90 days whichever is less):  3/12/21    Visit # Insurance Allowable Auth required? Date Range   1 1 [x]  Yes []  No      Latex Allergy:  [x]NO      []YES  Preferred Language for Healthcare:   [x]English       []other:    Functional Outcomes Measure:   Date Assessed: at eval  Test: [facility]  Score: 58    Dizziness level:  10/10     History of Injury: Patient reports onset of dizziness 4-5 months ago. Patient first noticed the dizziness when laying down in bed. States she felt like her eyes were moving. Patient notes increased dizziness rolling on to her side, turning her head, and sitting up in bed. Patient notes that when she is dizzy if she looks up at the ceiling she feels as if the ceiling is moving. Has been doing self epley 3-4 times/day as instructed by Dr. Marcella Opitz. Notes no improvement with PT thus far.       SUBJECTIVE:  See eval OBJECTIVE:  Oculomotor/Vestibular Examination:     Spontaneous nystagmus:       []? Left             []? Right           [x]? Absent  Gaze-Evoked nystagmus with fixation present:              Primary            []? Present       [x]? Absent              Right                []? Present       [x]? Absent              Left                  []? Present       [x]? Absent  VOR Head Thrust Test:          [x]? Normal       []? Abnormal    Comments:   VOR Cancellation:                  [x]? Normal       []? Abnormal    Comments:   Smooth Pursuit:                      [x]? Normal       []? Abnormal    Comments:   Saccades:                               []? Normal       [x]? Abnormal    Comments: (+) on L  Convergence:                          [x]? Normal       []? Abnormal    Comments:      Positional Testing  R Hallpike-[place] maneuver:               Nystagmus:     []? Yes             [x]? No               []? Duration:                                          []? Direction:                  Vertigo:            []? Yes             [x]? No               []? Duration:   L Hallpike-[place] maneuver:              Nystagmus:     [x]? Yes             []? No               [x]? Duration: 20 secs                                         [x]? Direction: upbeating to the left               Vertigo:            [x]? Yes             []? No               []? Duration:   Supine roll head right:              Nystagmus:     []? Yes             [x]? No               []? Duration:                                          []? Direction:                  Vertigo:            []? Yes             [x]? No               []? Duration:   Supine roll head left:              Nystagmus:     []? Yes             [x]? No               []? Duration:                                          []? Direction:                  Vertigo:            []? Yes             [x]?  No               []? Duration:      RESTRICTIONS/PRECAUTIONS: none    Exercises/Interventions: Therapeutic Exercises (52722) Min: Resistance/Reps Notes/Cues                            Therapeutic Activities (77412) Min: 10     Review of BPPV effects on ADLs/transfers x10 mins                        Neuromuscular Re-ed (21830) Min: 15     Canalith Repositioning Procedure For L posterior canalithiasis 2x Pt with (-) Hallpike-Caleb maneuver with no nystagmus following 1st CRP             Manual Intervention (19469) Min:                Modalities  Min:                      Other Therapeutic Activities: Pt was educated x 10 mins on PT POC, Diagnosis, Prognosis, pathomechanics as well as frequency and duration of scheduling future physical therapy appointments. Time was also taken on this day to answer all patient questions and participation in PT. Reviewed appointment policy in detail with patient and patient verbalized understanding. Home Exercise Program: Patient was instructed in the following for HEP:  Self epley for L posterior canalithiasis. Also discussed with patient 24 hour precautions following CRP. Patient verbalized/demonstrated understanding and was issued written handout. Therapeutic Exercise and NMR EXR  [] (90352) Provided verbal/tactile cueing for activities related to strengthening, flexibility, endurance, ROM for improvements in LE, proximal hip, and core control with self care, mobility, lifting, ambulation. [x] (33903) Provided verbal/tactile cueing for activities related to improving balance, coordination, kinesthetic sense, posture, motor skill, proprioception  to assist with LE, proximal hip, and core control in self care, mobility, lifting, ambulation and eccentric single leg control.      NMR and Therapeutic Activities: [x] (97362 or 12640) Provided verbal/tactile cueing for activities related to improving balance, coordination, kinesthetic sense, posture, motor skill, proprioception and motor activation to allow for proper function of core, proximal hip and LE with self care and ADLs and functional mobility.   [] (99824) Gait Re-education- Provided training and instruction to the patient for proper LE, core and proximal hip recruitment and positioning and eccentric body weight control with ambulation re-education including up and down stairs     Home Exercise Program:    [] (58105) Reviewed/Progressed HEP activities related to strengthening, flexibility, endurance, ROM of core, proximal hip and LE for functional self-care, mobility, lifting and ambulation/stair navigation   [x] (15698)Reviewed/Progressed HEP activities related to improving balance, coordination, kinesthetic sense, posture, motor skill, proprioception of core, proximal hip and LE for self care, mobility, lifting, and ambulation/stair navigation      Manual Treatments:  PROM / STM / Oscillations-Mobs:  G-I, II, III, IV (PA's, Inf., Post.)  [] (53095) Provided manual therapy to mobilize LE, proximal hip and/or LS spine soft tissue/joints for the purpose of modulating pain, promoting relaxation,  increasing ROM, reducing/eliminating soft tissue swelling/inflammation/restriction, improving soft tissue extensibility and allowing for proper ROM for normal function with self care, mobility, lifting and ambulation.      Charges:  Timed Code Treatment Minutes: 25   Total Treatment Minutes: 50      [] EVAL (LOW) 10804 (typically 20 minutes face-to-face)  [x] EVAL (MOD) 89855 (typically 30 minutes face-to-face)  [] EVAL (HIGH) 68287 (typically 45 minutes face-to-face)  [] RE-EVAL     [] VA(95351) x     [] Dry needle 1 or 2 Muscles (95720)  [x] NMR (54339) x     [] Dry needle 3+ Muscles (01514)  [] Manual (42241) x     [] Ultrasound (76111) x [x] TA (55421) x     [] [facility] Traction (20017)  [] ES(attended) (87859)     [] ES (un) (32251):   [] Vasopump (89686) [] Ionto (44431)   [] Other:    GOALS:  Patient stated goal: \"Get rid of my dizziness\"  []? Progressing: []? Met: []? Not Met: []? Adjusted     Therapist goals for Patient:   Short Term Goals: To be achieved in: 2 weeks  1. Independent in HEP and progression per patient tolerance, in order to prevent re-injury. []? Progressing: []? Met: []? Not Met: []? Adjusted  2. Patient will have a decrease in dizziness/imbalance/symptoms by 40% to facilitate improvement in movement, function, balance and ADLs as indicated by Functional Deficits. []? Progressing: []? Met: []? Not Met: []? Adjusted     Long Term Goals: To be achieved in:at discharge  1. Disability index score of 25% or less for the [facility] to assist with reaching prior level of function. []? Progressing: []? Met: []? Not Met: []? Adjusted  2. Patient will demonstrate negative oculomotor special testing/positional testing as indicated by patients Functional Deficits. []? Progressing: []? Met: []? Not Met: []? Adjusted  3. Patient will return to functional activities including Helen Gus over in bed without increased symptoms or restriction. []? Progressing: []? Met: []? Not Met: []? Adjusted  5. Patient will be able to do household duties without increased symptoms or restriction. []? Progressing: []? Met: []? Not Met: []? Adjusted          ASSESSMENT:  Patient presents to PT with s/s consistent with L posterior canalithiasis BPPV. Patient with good response to canalith repositioning procedure today. Able to resolve pt's c/o dizziness with transfers and nystagmus.       Treatment/Activity Tolerance:  [x] Patient tolerated treatment well [] Patient limited by fatique  [] Patient limited by pain  [] Patient limited by other medical complications  [] Other:     Overall Progression Towards Functional goals/ Treatment Progress Update: [] Patient is progressing as expected towards functional goals listed. [] Progression is slowed due to complexities/Impairments listed. [] Progression has been slowed due to co-morbidities. [x] Plan just implemented, too soon to assess goals progression <30days   [] Goals require adjustment due to lack of progress  [] Patient is not progressing as expected and requires additional follow up with physician  [] Other    Prognosis for POC: [x] Good [] Fair  [] Poor    Patient requires continued skilled intervention: [x] Yes  [] No        PLAN: Reassess positional testing   [] Continue per plan of care [] Alter current plan (see comments)  [x] Plan of care initiated [] Hold pending MD visit [] Discharge    Electronically signed by: Larry Long PT , OMT-C,  [id number]      Note: If patient does not return for scheduled/recommended follow up visits, this note will serve as a discharge from care along with the most recent update on progress.

## 2021-02-12 NOTE — PLAN OF CARE
[facility]  [address] [facility]  Phone: [phone]   Fax:     [phone]                                                       Physical Therapy Certification    Dear Referring Practitioner: Elda Srinivasan MD,    We had the pleasure of evaluating the following patient for physical therapy services at [address]. A summary of our findings can be found in the initial assessment below. This includes our plan of care. If you have any questions or concerns regarding these findings, please do not hesitate to contact me at the office phone number checked above. Thank you for the referral.       Physician Signature:_______________________________Date:__________________  By signing above (or electronic signature), therapists plan is approved by physician          Patient: Rere Rutledge   : 1960   MRN: [id number]  Referring Physician: Referring Practitioner: Elda Srinivasan MD      Evaluation Date: 2021        Medical Diagnosis Information:  Diagnosis: BPPV of L ear   Treatment Diagnosis: Vestibular impairment                                           Insurance information: PT Insurance Information: [employer]     Precautions/ Contra-indications: none  Latex Allergy:  [x]NO      []Y  Preferred Language for Healthcare:   [x]English       []other:    C-SSRS Triggered by Intake questionnaire (Past 2 wk assessment ):   [] No, Questionnaire did not trigger screening. [x] Yes, Patient intake triggered C-SSRS Screening - Pt currently under the care of a therapist for suicidal ideation. Patient states that she does not feel like she is an immediate threat to self. Patient declined additional help or to be seen by ER today. Patient states that she feels like her current treatment regiment is appropriate and effective.     [x] C-SSRS Screening completed  [x] PCP notified via Epic Cervical spine ROM:  [x]  WNL [] Impaired:    LE Strength: 5/5    Somatosensory:  Light touch:  [x] Normal [] Impaired Comments:    Coordination:  Rapid alternating movements: [x] Normal [] Dysdiadokinesia   Finger to Nose:   [x] Normal [] Dysmetric  Heel to Shin:    [x] Normal [] Ataxic    Postural Control Tests:  Clinical Test of Sensory Interaction for Balance (CTSIB) performed in Romberg stance  CONDITION TIME STRATEGY SWAY    Eyes open, firm surface x30 sec   none    Eyes closed, firm surface x30 sec ankle slight    Eyes open, foam surface x30 sec  ankle slight    Eyes closed, foam surface x5 sec step mod     Tandem stance: x5 secs    Gait: No major gait deviations noted       Oculomotor/Vestibular Examination:    Spontaneous nystagmus:  [] Left  [] Right [x] Absent  Gaze-Evoked nystagmus with fixation present:   Primary [] Present [x] Absent   Right  [] Present [x] Absent   Left  [] Present [x] Absent  VOR Head Thrust Test: [x] Normal [] Abnormal  Comments:   VOR Cancellation:  [x] Normal [] Abnormal Comments:   Smooth Pursuit:  [x] Normal [] Abnormal Comments:   Saccades:   [] Normal [x] Abnormal Comments: (+) on L  Convergence:   [x] Normal [] Abnormal Comments:     Positional Testing  R Hallpike-Caleb maneuver:    Nystagmus:  [] Yes  [x] No  [] Duration:       [] Direction:    Vertigo:  [] Yes  [x] No  [] Duration:   L Hallpike-Caleb maneuver:   Nystagmus:  [x] Yes  [] No  [x] Duration: 20 secs      [x] Direction: upbeating to the left    Vertigo:  [x] Yes  [] No  [] Duration:   Supine roll head right:   Nystagmus:  [] Yes  [x] No  [] Duration:       [] Direction:    Vertigo:  [] Yes  [x] No  [] Duration:   Supine roll head left:   Nystagmus:  [] Yes  [x] No  [] Duration:       [] Direction:    Vertigo:  [] Yes  [x] No  [] Duration:     Outcome Measures  Dizziness Handicap Index ([address] [place] [address])  58      [x] Patient history, allergies, meds reviewed. Medical chart reviewed. See intake form.      Review of Systems (ROS): [x]Performed Review of systems (Integumentary, Cardiopulmonary, Neurological) by intake and observation. Intake form has been scanned into medical record. Patient has been instructed to contact their primary care physician regarding ROS issues if not already being addressed at this time.       Co-morbidities/Complexities (which will affect course of rehabilitation):   []None           Arthritic conditions   []Rheumatoid arthritis (M05.9)  [x]Osteoarthritis (M19.91)   Cardiovascular conditions   [x]Hypertension (I10)  []Hyperlipidemia (E78.5)  []Angina pectoris (I20)  []Atherosclerosis (I70)   Musculoskeletal conditions   []Disc pathology   []Congenital spine pathologies   []Prior surgical intervention  []Osteoporosis (M81.8)  []Osteopenia (M85.8)   Endocrine conditions   []Hypothyroid (E03.9)  []Hyperthyroid Gastrointestinal conditions   []Constipation (C62.05)   Metabolic conditions   []Morbid obesity (E66.01)  []Diabetes type 1(E10.65) or 2 (E11.65)   []Neuropathy (G60.9)     Pulmonary conditions   []Asthma (J45)  []Coughing   []COPD (J44.9)   Psychological Disorders  [x]Anxiety (F41.9)  [x]Depression (F32.9)   [x]Other: bipolar   [x]Other:    Fibromyalgia  Chronic pain syndrome         Barriers to/and or personal factors that will affect rehab potential:              []Age  []Sex    []Smoker              []Motivation/Lack of Motivation                        [x]Co-Morbidities              []Cognitive Function, education/learning barriers              []Environmental, home barriers              []profession/work barriers  []past PT/medical experience  []other:  Justification:     ASSESSMENT:      Functional Impairments:  Problem List/Functional Limitations:   [x] BPPV    [] Right [x] Posterior Canal [x] Canalithiasis    [x] Left  [] Horizontal Canal [] Cupulolithiasis   [] Decreased Gaze Stabilization    [] Increased Motion Sensitivity   [] Unilateral Vestibular Hypofunction [] Bilateral Vestibular Hypofunction [] Gait Instability    [x] Decreased tolerance for ADLs    [] Decreased functional strength   [x] Reduced Balance/Proprioceptive control   [] Reduced ability to hear/focus   [] Noted cervical/thoracic/GHJ joint hypomobility   [] Noted cervical/thoracic/GHJ joint hypermobility   [] Decreased cervical/UE functional ROM   [] Noted Headache pain aggravated by neck movements with/without dizziness   [] Abnormal reflexes/sensation/myotomal/dermatomal deficits   [] Decreased DCF control or ability to hold head up   [] Decreased RC/scapular/core strength and neuromuscular control     Functional Activity Limitations (from functional questionnaire and intake)   []Reduced ability to tolerate prolonged functional positions   [x]Reduced ability or difficulty with changes of positions or transfers between positions  [x]Reduced ability to transfer in/out of bed or rolling in bed   []Reduced ability or tolerance with driving, reading and/or computer work   []Reduced ability to perform lifting, reaching, carrying tasks   []Reduced ability to forward bend   []Reduced ability to ambulate prolonged functional periods/distances/surfaces   []Reduced ability to ascend/descend stairs  []Reduced ability to concentrate/focus  [x]Reduced ability to turn/pitch head rapidly  [x]Reduced ability to self-correct for losses of balance []other:       Participation Restrictions   [x]Reduced participation in self-care activities   [x]Reduced participation in home management activities   []Reduced participation in work activities   []Reduced participation in social activities   []Reduced participation in sport/recreational activities    Classification:   [x]Signs/symptoms consistent with BPPV (benign paroxysmal positional vertigo)      []Signs/symptoms consistent with unilateral peripheral vestibulopathy (i.e., vestibular neuritis, labyrinthitis, acoustic neuroma)   []Signs/symptoms consistent with central vestibulopathy []Signs/symptoms consistent with migraine-related vestibulopathy  []Signs/symptoms consistent with Meniere's disease / post-traumatic hydrops  []Signs/symptoms consistent with perilymphatic fistula   []Signs/symptoms consistent with cervicogenic dizziness  []Signs/symptoms consistent with gait instability   []Signs/symptoms consistent with motion sensitivity    []signs/symptoms consistent with neck pain with mobility deficits     []signs/symptoms consistent with neck pain with movement coordinated impairments    []signs/symptoms consistent with neck pain with radiating pain    []signs/symptoms consistent with neck pain with headaches (cervicogenic)    []Signs/symptoms consistent with nerve root involvement including myotome & dermatome dysfunction   []sign/symptoms consistent with facet dysfunction of cervical and thoracic spine    []signs/symptoms consistent suggesting central cord compression/UMN syndromes   []signs/symptoms consistent with discogenic cervical pain   []signs/symptoms consistent with rib dysfunction   []signs/symptoms consistent with postural dysfunction   []signs/symptoms consistent with shoulder pathology    []signs/symptoms consistent with post-surgical status including decreased ROM, strength and function.    []signs/symptoms consistent with pathology which may benefit from Dry Needling  []signs/symptoms which may limit the use of advanced manual therapy techniques: (Elevated CV risk profile, recent trauma, intolerance to end range positions, prior TIA, visual issues, UE neurological compromise)   []other:      Prognosis/Rehab Potential:      [x]Excellent   []Good    []Fair   []Poor    Tolerance of evaluation/treatment:    [x]Excellent   []Good    []Fair   []Poor     Physical Therapy Evaluation Complexity Justification  [x] A history of present problem with:  [] no personal factors and/or comorbidities that impact the plan of care; []1-2 personal factors and/or comorbidities that impact the plan of care  [x]3 personal factors and/or comorbidities that impact the plan of care  [x] An examination of body systems using standardized tests and measures addressing any of the following: body structures and functions (impairments), activity limitations, and/or participation restrictions;:  [] a total of 1-2 or more elements   [x] a total of 3 or more elements   [] a total of 4 or more elements   [x] A clinical presentation with:  [] stable and/or uncomplicated characteristics   [x] evolving clinical presentation with changing characteristics  [] unstable and unpredictable characteristics;   [x] Clinical decision making of [] low [x] moderate, [] high complexity using standardized patient assessment instrument and/or measurable assessment of functional outcome. [] EVAL (LOW) 35763 (typically 15 minutes face-to-face)  [x] EVAL (MOD) 22090 (typically 30 minutes face-to-face)  [] EVAL (HIGH) 24859 (typically 45 minutes face-to-face)  [] RE-EVAL     HEP instruction: Written HEP instructions provided and reviewed. GOALS:  Patient stated goal: \"Get rid of my dizziness\"  [] Progressing: [] Met: [] Not Met: [] Adjusted    Therapist goals for Patient:   Short Term Goals: To be achieved in: 2 weeks  1. Independent in HEP and progression per patient tolerance, in order to prevent re-injury. [] Progressing: [] Met: [] Not Met: [] Adjusted  2. Patient will have a decrease in dizziness/imbalance/symptoms by 40% to facilitate improvement in movement, function, balance and ADLs as indicated by Functional Deficits. [] Progressing: [] Met: [] Not Met: [] Adjusted    Long Term Goals: To be achieved in:at discharge  1. Disability index score of 25% or less for the [address] [place] [address] to assist with reaching prior level of function.    [] Progressing: [] Met: [] Not Met: [] Adjusted 2. Patient will demonstrate negative oculomotor special testing/positional testing as indicated by patients Functional Deficits. [] Progressing: [] Met: [] Not Met: [] Adjusted  3. Patient will return to functional activities including Lorriane Govern over in bed without increased symptoms or restriction. [] Progressing: [] Met: [] Not Met: [] Adjusted  5. Patient will be able to do household duties without increased symptoms or restriction. [] Progressing: [] Met: [] Not Met: [] Adjusted     PLAN:   Today's Treatment:    [x] See flowsheet   [x] Patient treated with canalith repositioning maneuver   [x] Education materials provided on BPPV/Vestibular Dysfunction/Habituation   [x] Precautions provided and patient to follow precautions for next 24 hours in regards to BPPV management   [x] Written home exercise instructions   [] Other:    Frequency/Duration:  1-2 days per week for  4 Weeks:  Interventions:  [x]  Therapeutic exercise including: strength training, ROM, for LEs, cervical spine & UEs  [x]  NMR activation and proprioception for BLEs, vestibular training/habituation, balance, coordination  [x]  Manual therapy as indicated via: canalith repositioning maneuvers, Dry Needling/IASTM, STM, PROM, Gr I-IV mobilizations, manipulation. [x]  Modalities as needed that may include: thermal agents, E-stim, Biofeedback, US, iontophoresis as indicated  [x]  Gait training  [x]  Patient education on BPPV/vestibular function, balance, postural re-education, activity modification, progression of HEP. Electronically signed by:  Kevin Stone PT , OMT-C,  [id number]      Note: If patient does not return for scheduled/recommended follow up visits, this note will serve as a discharge from care along with the most recent update on progress.

## 2021-02-23 ENCOUNTER — APPOINTMENT (OUTPATIENT)
Dept: GENERAL RADIOLOGY | Age: 61
End: 2021-02-23
Payer: COMMERCIAL

## 2021-02-23 ENCOUNTER — HOSPITAL ENCOUNTER (EMERGENCY)
Age: 61
Discharge: HOME | End: 2021-02-23
Attending: EMERGENCY MEDICINE
Payer: COMMERCIAL

## 2021-02-23 VITALS
SYSTOLIC BLOOD PRESSURE: 156 MMHG | DIASTOLIC BLOOD PRESSURE: 74 MMHG | RESPIRATION RATE: 18 BRPM | HEIGHT: 64 IN | TEMPERATURE: 97.8 F | BODY MASS INDEX: 50.02 KG/M2 | HEART RATE: 73 BPM | WEIGHT: 293 LBS | OXYGEN SATURATION: 96 %

## 2021-02-23 DIAGNOSIS — R07.89 CHEST WALL PAIN: Primary | ICD-10-CM

## 2021-02-23 DIAGNOSIS — M79.7 FIBROMYALGIA: ICD-10-CM

## 2021-02-23 LAB
A/G RATIO: 1.4 (ref 1.1–2.2)
ALBUMIN SERPL-MCNC: 3.9 G/DL (ref 3.4–5)
ALP BLD-CCNC: 124 U/L (ref 40–129)
ALT SERPL-CCNC: 14 U/L (ref 10–40)
ANION GAP SERPL CALCULATED.3IONS-SCNC: 9 MMOL/L (ref 3–16)
AST SERPL-CCNC: 14 U/L (ref 15–37)
BASOPHILS ABSOLUTE: 0.1 K/UL (ref 0–0.2)
BASOPHILS RELATIVE PERCENT: 0.9 %
BILIRUB SERPL-MCNC: 0.7 MG/DL (ref 0–1)
BUN BLDV-MCNC: 16 MG/DL (ref 7–20)
CALCIUM SERPL-MCNC: 9.5 MG/DL (ref 8.3–10.6)
CHLORIDE BLD-SCNC: 103 MMOL/L (ref 99–110)
CO2: 29 MMOL/L (ref 21–32)
CREAT SERPL-MCNC: 0.7 MG/DL (ref 0.6–1.2)
EKG ATRIAL RATE: 73 BPM
EKG DIAGNOSIS: NORMAL
EKG P AXIS: 7 DEGREES
EKG P-R INTERVAL: 144 MS
EKG Q-T INTERVAL: 380 MS
EKG QRS DURATION: 76 MS
EKG QTC CALCULATION (BAZETT): 418 MS
EKG R AXIS: 17 DEGREES
EKG T AXIS: 18 DEGREES
EKG VENTRICULAR RATE: 73 BPM
EOSINOPHILS ABSOLUTE: 0.2 K/UL (ref 0–0.6)
EOSINOPHILS RELATIVE PERCENT: 3.8 %
GFR AFRICAN AMERICAN: >60
GFR NON-AFRICAN AMERICAN: >60
GLOBULIN: 2.7 G/DL
GLUCOSE BLD-MCNC: 100 MG/DL (ref 70–99)
HCT VFR BLD CALC: 38.9 % (ref 36–48)
HEMOGLOBIN: 12.5 G/DL (ref 12–16)
LYMPHOCYTES ABSOLUTE: 1.3 K/UL (ref 1–5.1)
LYMPHOCYTES RELATIVE PERCENT: 21.6 %
MCH RBC QN AUTO: 28.8 PG (ref 26–34)
MCHC RBC AUTO-ENTMCNC: 32.1 G/DL (ref 31–36)
MCV RBC AUTO: 90 FL (ref 80–100)
MONOCYTES ABSOLUTE: 0.5 K/UL (ref 0–1.3)
MONOCYTES RELATIVE PERCENT: 9.1 %
NEUTROPHILS ABSOLUTE: 3.9 K/UL (ref 1.7–7.7)
NEUTROPHILS RELATIVE PERCENT: 64.6 %
PDW BLD-RTO: 15.3 % (ref 12.4–15.4)
PLATELET # BLD: 261 K/UL (ref 135–450)
PMV BLD AUTO: 8.3 FL (ref 5–10.5)
POTASSIUM REFLEX MAGNESIUM: 3.8 MMOL/L (ref 3.5–5.1)
PRO-BNP: 262 PG/ML (ref 0–124)
RBC # BLD: 4.33 M/UL (ref 4–5.2)
SODIUM BLD-SCNC: 141 MMOL/L (ref 136–145)
TOTAL PROTEIN: 6.6 G/DL (ref 6.4–8.2)
TROPONIN: <0.01 NG/ML
TROPONIN: <0.01 NG/ML
WBC # BLD: 6 K/UL (ref 4–11)

## 2021-02-23 PROCEDURE — 71046 X-RAY EXAM CHEST 2 VIEWS: CPT

## 2021-02-23 PROCEDURE — 93005 ELECTROCARDIOGRAM TRACING: CPT | Performed by: EMERGENCY MEDICINE

## 2021-02-23 PROCEDURE — 83880 ASSAY OF NATRIURETIC PEPTIDE: CPT

## 2021-02-23 PROCEDURE — 80053 COMPREHEN METABOLIC PANEL: CPT

## 2021-02-23 PROCEDURE — 93010 ELECTROCARDIOGRAM REPORT: CPT | Performed by: INTERNAL MEDICINE

## 2021-02-23 PROCEDURE — 99282 EMERGENCY DEPT VISIT SF MDM: CPT

## 2021-02-23 PROCEDURE — 6370000000 HC RX 637 (ALT 250 FOR IP): Performed by: EMERGENCY MEDICINE

## 2021-02-23 PROCEDURE — 36415 COLL VENOUS BLD VENIPUNCTURE: CPT

## 2021-02-23 PROCEDURE — 84484 ASSAY OF TROPONIN QUANT: CPT

## 2021-02-23 PROCEDURE — 85025 COMPLETE CBC W/AUTO DIFF WBC: CPT

## 2021-02-23 RX ORDER — TRAMADOL HYDROCHLORIDE 50 MG/1
50 TABLET ORAL ONCE
Status: COMPLETED | OUTPATIENT
Start: 2021-02-23 | End: 2021-02-23

## 2021-02-23 RX ADMIN — TRAMADOL HYDROCHLORIDE 50 MG: 50 TABLET, COATED ORAL at 14:19

## 2021-02-23 ASSESSMENT — PAIN DESCRIPTION - LOCATION: LOCATION: CHEST

## 2021-02-23 ASSESSMENT — PAIN DESCRIPTION - PAIN TYPE: TYPE: ACUTE PAIN

## 2021-02-23 ASSESSMENT — PAIN SCALES - GENERAL: PAINLEVEL_OUTOF10: 7

## 2021-02-23 ASSESSMENT — PAIN DESCRIPTION - DESCRIPTORS: DESCRIPTORS: SHARP;DULL

## 2021-02-23 NOTE — ED PROVIDER NOTES
TRIAGE CHIEF COMPLAINT:   Chief Complaint   Patient presents with    Chest Pain       HPI: Andrés Mathews is a 63 y.o. female who presents to the emergency department with complaint of chest pain. She states the pain began yesterday at work. 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. She states the pain is somewhat like tightness in the mid chest area and is not dull or sharp. She states she feels it sometimes in the left neck and upper back. The pain is not ripping or tearing. It has not migrated. It was not maximum in severity immediately at onset. The pain has been constant since yesterday. It does not worsen with breathing, movement or ambulation. She states she has had some slight nausea but no vomiting or bowel complaint. Denies shortness of breath or diaphoresis. No palpitations. She has no abdominal pain. Denies cough or URI symptoms. She has no fever or chills. Denies any new leg swelling or any leg pain. Denies any belching or gas. The patient has a past history of major depression, self-mutilation behavior, atypical chest pain, fibromyalgia, chronic leg swelling and chronic pain syndrome. She had a negative Myoview stress test in October of last year at [facility] for evaluation of the same pain. Following that admission she was referred to pain management. She takes tramadol and Lyrica chronically. The patient has a history of hyperlipidemia on medication. She has a history of morbid obesity. Family history is positive for premature coronary disease. She has never smoked. Denies any other known risk factors for coronary disease. REVIEW OF SYSTEMS:   10 systems reviewed. Pertinent positives per HPI. Otherwise noted to be negative. I have reviewed the triage/nursing documentation and agree unless otherwise noted below.     PAST MEDICAL HISTORY:   Past Medical History:   Diagnosis Date    Acquired hyperlipoproteinemia     MARIO (acute kidney injury) ([facility] [address] 9/30/2019    Allergic rhinitis     Anxiety     Arthritis     Bilateral lower extremity edema     Bipolar 1 disorder (HCC)     Chronic pain syndrome     DDD (degenerative disc disease), lumbar     Depression     Depression     Disease of gallbladder     Dizziness     DJD (degenerative joint disease) of hip     Edema 12/29/2009    Elbow tendinitis     Essential hypertension 8/19/2019    Facet syndrome     Fibromyalgia     Fracture of nasal bone     GERD (gastroesophageal reflux disease)     Headache     Hiatal hernia     History of blood transfusion     anemia    Hyperlipidemia     Insomnia     Osteoarthritis     Prediabetes     Rash     Self mutilating behavior     cutter pt states she has not done in months- in therapy    Sleep apnea     no CPAP    Suicidal ideation     Thyroid disorder     hypothyroidism    Vertigo         CURRENT MEDICATIONS:   Patient's Medications   New Prescriptions    No medications on file   Previous Medications    ARIPIPRAZOLE (ABILIFY) 20 MG TABLET    Take 1 tablet by mouth daily    ATORVASTATIN (LIPITOR) 40 MG TABLET    TAKE 1 TABLET BY MOUTH DAILY    BETAMETHASONE DIPROPIONATE (DIPROLENE) 0.05 % CREAM    APPLY EXTERNALLY TO THE AFFECTED AREA TWICE DAILY    CELECOXIB (CELEBREX) 200 MG CAPSULE    TAKE 1 CAPSULE BY MOUTH DAILY    CHOLECALCIFEROL (VITAMIN D3) 125 MCG (5000 UT) TABS    Take 1 tablet by mouth 2 times daily    DULOXETINE (CYMBALTA) 60 MG EXTENDED RELEASE CAPSULE    Take 1 capsule by mouth 2 times daily    ESOMEPRAZOLE (NEXIUM) 40 MG DELAYED RELEASE CAPSULE    TAKE 1 CAPSULE BY MOUTH EVERY MORNING BEFORE BREAKFAST    LEVOTHYROXINE (SYNTHROID) 88 MCG TABLET    TAKE 1 TABLET BY MOUTH DAILY    MECLIZINE (ANTIVERT) 25 MG TABLET    Take 25 mg by mouth 3 times daily as needed    ONDANSETRON (ZOFRAN) 4 MG TABLET    TAKE 1 TABLET BY MOUTH EVERY 8 HOURS AS NEEDED FOR NAUSEA OR VOMITING    PREGABALIN (LYRICA) 100 MG CAPSULE    Take 1 capsule by mouth 3 times daily for 30 days.    PROPRANOLOL (INDERAL) 40 MG TABLET    Take 1 tablet by mouth 3 times daily anxiety    QUETIAPINE (SEROQUEL) 200 MG TABLET    Take 200 mg by mouth nightly    ROPINIROLE (REQUIP) 1 MG TABLET    TAKE 2 TABLETS BY MOUTH EVERY NIGHT    TRAMADOL (ULTRAM) 50 MG TABLET    Take 1 tablet by mouth every 6 hours as needed for Pain (Max 1-2 per day) for up to 35 days.    Modified Medications    No medications on file   Discontinued Medications    HYDROCHLOROTHIAZIDE (HYDRODIURIL) 25 MG TABLET    TAKE 1 TABLET BY MOUTH EVERY MORNING        SURGICAL HISTORY:       Procedure Laterality Date    ABDOMEN SURGERY  5/28/2014    LAPAROSCOPIC SLEEVE GASTRECTOMY, EXTENSIVE LYSIS OF ADHESIONS    ACHILLES TENDON SURGERY Left 3/9/2020    LEFT CALCANEOUS PARTIAL EXCISION, SECONDARY REPAIR OF ACHILLES TENDON performed by Davonte Townsend MD at [facility]      open , Ex-lap    CHOLECYSTECTOMY      open    COLONOSCOPY  04/16/2013    dr Edna Dixon 2018     DILATION AND CURETTAGE OF UTERUS      ENDOSCOPY, COLON, DIAGNOSTIC      FOOT SURGERY Right 05/27/2016    ARTHROPLASTY RIGHT 5TH DIGIT      HYSTEROSCOPY N/A 12-30-13    Hysteroscopy Dilitation and Curettage    JOINT REPLACEMENT Bilateral     knee    OTHER SURGICAL HISTORY  9/11/2015    ARTHROPLASTY 5TH DIGIT LEFT FOOT          SHOULDER SURGERY      right    SHOULDER SURGERY Right     SLEEVE GASTROPLASTY  May 28, 2014    [facility]    TOTAL KNEE ARTHROPLASTY  12/10/10    Right    TOTAL KNEE ARTHROPLASTY  3/30/10    Left    UPPER GASTROINTESTINAL ENDOSCOPY  4/16/2013    dr Annika Bagley ENDOSCOPY  3/20/2014    dr Su Musa        FAMILY HISTORY:       Problem Relation Age of Onset    Heart Disease Mother     Heart Failure Mother     High Cholesterol Mother     High Blood Pressure Mother     Stroke Mother     Birth Defects Mother     Other Mother         VV    High Blood Pressure Father     COPD Father     Diabetes Brother     Stroke Maternal Grandmother     Arthritis Maternal Grandmother     Heart Failure Brother     Stroke Brother     Birth Defects Maternal Grandfather     Arthritis Paternal Grandmother         SOCIAL HISTORY:    reports that she has never smoked. She has never used smokeless tobacco. She reports that she does not drink alcohol or use drugs. ALLERGIES:   Allergies   Allergen Reactions    Polysporin [Bacitracin-Polymyxin B] Other (See Comments) and Itching     Pt breaks out in blisters. Pt breaks out in blisters.  Lorazepam Other (See Comments)     \"went crazy\"    Neomycin-Polymyxin-Gramicidin     Sulfa Antibiotics Hives       PHYSICAL EXAM:  VITAL SIGNS: BP (!) 156/74   Pulse 73   Temp 97.8 °F (36.6 °C) (Oral)   Resp 18   Ht 5' 4\" (1.626 m)   Wt (!) 321 lb 3.2 oz (145.7 kg)   LMP 01/15/2014   SpO2 96%   BMI 55.13 kg/m²   Constitutional:  No acute distress, Non-toxic appearance. Not pale or diaphoretic. No respiratory distress. HENT: Normocephalic, Atraumatic Oropharynx moist, No oral exudates. TMs are normal.  Eyes:  PERRL, EOMI, Conjunctiva normal, No discharge. Neck: No tenderness, Supple, No lymphadenopathy, No stridor. Cardiovascular:  Normal heart rate, Normal rhythm, No murmurs, No rubs, No gallops. Pulmonary/Chest:  Normal breath sounds, No respiratory distress, No wheezing. Patient has tenderness to palpation of the lower mid chest area over the sternum which seems to mimic her pain. Abdomen:   Soft, No tenderness, No masses, No pulsatile masses  Back:  No tenderness, No CVA tenderness  Extremities:  Normal range of motion, Intact distal pulses. She has some chronic venous stasis changes with thickened skin on both legs. No definite pitting edema. Inconsistent mild tenderness to palpation in her calves. [place] is negative.   Neurologic:  Alert & oriented x 3, Speech is clear and appropriate, No upper extremity drift or lower extremity weakness, Normal sensory function, No facial asymmetry, no truncal or extremity ataxia. Normal gait. Skin:  Warm, Dry, No erythema, No rash  Psychiatric:  Affect normal, Mood normal      EKG:    EKG interpreted by myself. Normal sinus rhythm at a rate of 73. Axis is 17. There is no ischemia. No ectopy noted. QTC is 418. Radiology:  XR CHEST (2 VW)   Final Result   Impression: Bibasilar atelectasis. LAB  Labs Reviewed   COMPREHENSIVE METABOLIC PANEL W/ REFLEX TO MG FOR LOW K - Abnormal; Notable for the following components:       Result Value    Glucose 100 (*)     AST 14 (*)     All other components within normal limits    Narrative:     Performed at:  [facility]  [place]   Phone [phone]   BRAIN NATRIURETIC PEPTIDE - Abnormal; Notable for the following components:    Pro- (*)     All other components within normal limits    Narrative:     Performed at:  [facility]  [place]   Phone [phone]   CBC WITH AUTO DIFFERENTIAL    Narrative:     Performed at:  [facility]  [place]   Phone [phone]   TROPONIN    Narrative:     Performed at:  [facility]  [employer],  [place]   Phone [phone]   TROPONIN    Narrative:     Performed at:  [facility]  [place]   Phone [phone]       ED COURSE & MEDICAL DECISION MAKING:  Pertinent Labs & Imaging studies reviewed. (See chart for details)  60-year-old female presents with complaints of chest pain that started yesterday while she was working as a door . Pain is tightness which does not worsen with breathing or exertion. No associated shortness of breath or diaphoresis.   She feels some slight nausea but no vomiting. She states she also feels the pain in her upper back and neck but this sounds similar to the pain she had in October of last year when she had a negative Myoview stress test.  She has no cough or URI symptoms. No fever or chills. No convincing evidence for DVT. The patient's heart score is 3. She has some slight tenderness of the mid chest area to palpation. She does have a history of fibromyalgia and chronic pain syndrome. EKG shows no ischemia or arrhythmia. CMP was normal.  Initial troponin was normal.  proBNP was 262. CBC was normal.  Chest x-ray read by the radiologist and reviewed by myself shows bibasilar atelectasis with no other acute abnormality. Repeat 2-hour troponin was also normal.  Clinically the patient has noncardiac chest pain likely muscular chest wall pain perhaps related to fibromyalgia. There is nothing to suggest dissection, DVT/PE, pneumonia, pneumothorax or CHF. I recommended ice to the area of soreness. I suggested she continue with her current medications including Lyrica and tramadol. Advise follow-up with her primary care doctor. I discussed with Feroz Gamez the results of the evaluation in the Emergency Department, diagnosis, care, prognosis and the importance of follow-up. The patient is stable for discharge. The patient and/or family are in agreement with the plan and all questions have been answered. Specific discharge instructions were explained, including reasons to return to the emergency department.       (Please note that portions of this note may have been completed with a voice recognition program.  Efforts were made to edit the dictation but occasionally words are mis-transcribed)      FINAL IMPRESSION:  1 --chest wall pain  2 --fibromyalgia                Bebe Hernandez MD  02/23/21 9844

## 2021-02-23 NOTE — ED NOTES
Patient given  work note, discharge instructions verbal and written, patient verbalized understanding. Alert/oriented X4, Clear speech.   Patient exhibits no distress, ambulates with steady gait per self leaving unit, no further request.     Tiago Sol RN  02/23/21 2846

## 2021-02-25 ENCOUNTER — VIRTUAL VISIT (OUTPATIENT)
Dept: PAIN MANAGEMENT | Age: 61
End: 2021-02-25
Payer: COMMERCIAL

## 2021-02-25 DIAGNOSIS — G89.4 CHRONIC PAIN SYNDROME: ICD-10-CM

## 2021-02-25 DIAGNOSIS — M54.16 LUMBAR RADICULITIS: ICD-10-CM

## 2021-02-25 DIAGNOSIS — M51.36 DDD (DEGENERATIVE DISC DISEASE), LUMBAR: ICD-10-CM

## 2021-02-25 DIAGNOSIS — M77.8 ELBOW TENDINITIS: ICD-10-CM

## 2021-02-25 DIAGNOSIS — M47.899 FACET SYNDROME: ICD-10-CM

## 2021-02-25 DIAGNOSIS — M16.11 PRIMARY OSTEOARTHRITIS OF RIGHT HIP: ICD-10-CM

## 2021-02-25 DIAGNOSIS — M16.0 PRIMARY OSTEOARTHRITIS OF BOTH HIPS: ICD-10-CM

## 2021-02-25 DIAGNOSIS — M79.7 FIBROMYALGIA: ICD-10-CM

## 2021-02-25 PROCEDURE — 99213 OFFICE O/P EST LOW 20 MIN: CPT | Performed by: INTERNAL MEDICINE

## 2021-02-25 RX ORDER — TRAMADOL HYDROCHLORIDE 50 MG/1
50 TABLET ORAL EVERY 6 HOURS PRN
Qty: 70 TABLET | Refills: 0 | Status: SHIPPED | OUTPATIENT
Start: 2021-02-25 | End: 2021-04-23 | Stop reason: SDUPTHER

## 2021-02-25 RX ORDER — PREGABALIN 100 MG/1
100 CAPSULE ORAL 3 TIMES DAILY
Qty: 90 CAPSULE | Refills: 0 | Status: SHIPPED | OUTPATIENT
Start: 2021-02-25 | End: 2021-03-25 | Stop reason: SDUPTHER

## 2021-02-25 RX ORDER — CELECOXIB 200 MG/1
200 CAPSULE ORAL DAILY
Qty: 30 CAPSULE | Refills: 1 | Status: SHIPPED | OUTPATIENT
Start: 2021-02-25 | End: 2021-03-25 | Stop reason: SDUPTHER

## 2021-02-25 NOTE — PROGRESS NOTES
TELE HEALTH VISIT (AUDIO-VISUAL)    Pursuant to the emergency declaration under the [facility], Count includes the [facility] waiver authority and the [employer] Act, this Virtual  Visit was conducted, with patient's/legal guardian's consent, to reduce the patient's risk of exposure to COVID-19 and provide continuity of care for an established patient. Service is  provided through a video synchronous discussion virtually to substitute for in-person clinic visit. Due to this being a TeleHealth encounter (During OZFZU-65 public health emergency), evaluation of the following organ systems was limited: Vitals/Constitutional/EENT/Resp/CV/GI//MS/Neuro/Skin/Rsop-Jqcs-Cng. Rafaela Pair  1960  [id number]    Ms. David Hodges is being seen virtually for a follow up visit using one of the following techniques  Google Duo Face time Doxy. me  Informed verbal consent to the virtual visit was obtained from Ms. David Hodges. Risks associated with HIPPA compliance with the virtual visit was explained to the patient. Ms. David Hodges is at her residence and Dr. Domingo Naylor is in his office. HISTORY OF PRESENT ILLNESS:  Ms. David Hodges is a 63 y.o. female returns for a follow up visit for multiple medical problems. Her current presenting problems are   1. Chronic pain syndrome    2. Primary osteoarthritis of both hips    3. Fibromyalgia    4. Lumbar radiculitis    5. DDD (degenerative disc disease), lumbar    6. Elbow tendinitis    7. Facet syndrome    8. Plantar fasciitis, right    9. Primary osteoarthritis of right hip    10. Restless legs syndrome (RLS)    . As per information/history obtained from the PADT(patient assessment and documentation tool) - She complains of pain in the lower back with radiation to the na She rates the pain 7/10 and describes it as sharp. Pain is made worse by: nothing. Current treatment regimen has helped relieve about 50% of the pain.   She denies side effects from the current pain regimen. Patient reports that since the last follow up visit the physical functioning is unchanged, family/social relationships are unchanged, mood is unchanged and sleep patterns are unchanged, and that the overall functioning is unchanged. Patient denies neurological bowel or bladder. Patient denies misusing/abusing her narcotic pain medications or using any illegal drugs. There are No indicators for possible drug abuse, addiction or diversion problems. Upon obtaining the medical history from Ms. Zaida Burgess regarding today's office visit for her presenting problems, patient states she has been doing fair. She complains her back is hurting some more. She mentions she is working part time, and has to stand mostly at work. She reports she is trying to loose weight. She says she is using Ultram along with Celebrex and Lyrica. ALLERGIES: Patients list of allergies were reviewed     MEDICATIONS: Ms. Zaida Burgess list of medications were reviewed. Her current medications are   Outpatient Medications Prior to Visit   Medication Sig Dispense Refill    rOPINIRole (REQUIP) 1 MG tablet TAKE 2 TABLETS BY MOUTH EVERY NIGHT 180 tablet 0    betamethasone dipropionate (DIPROLENE) 0.05 % cream APPLY EXTERNALLY TO THE AFFECTED AREA TWICE DAILY 60 g 0    celecoxib (CELEBREX) 200 MG capsule TAKE 1 CAPSULE BY MOUTH DAILY 30 capsule 1    ondansetron (ZOFRAN) 4 MG tablet TAKE 1 TABLET BY MOUTH EVERY 8 HOURS AS NEEDED FOR NAUSEA OR VOMITING 60 tablet 0    traMADol (ULTRAM) 50 MG tablet Take 1 tablet by mouth every 6 hours as needed for Pain (Max 1-2 per day) for up to 35 days.  90 tablet 0    atorvastatin (LIPITOR) 40 MG tablet TAKE 1 TABLET BY MOUTH DAILY 90 tablet 0    meclizine (ANTIVERT) 25 MG tablet Take 25 mg by mouth 3 times daily as needed      levothyroxine (SYNTHROID) 88 MCG tablet TAKE 1 TABLET BY MOUTH DAILY 90 tablet 0    Cholecalciferol (VITAMIN D3) 125 MCG (5000 UT) TABS Take 1 tablet by mouth 2 times daily      DULoxetine (CYMBALTA) 60 MG extended release capsule Take 1 capsule by mouth 2 times daily      ARIPiprazole (ABILIFY) 20 MG tablet Take 1 tablet by mouth daily      propranolol (INDERAL) 40 MG tablet Take 1 tablet by mouth 3 times daily anxiety      esomeprazole (NEXIUM) 40 MG delayed release capsule TAKE 1 CAPSULE BY MOUTH EVERY MORNING BEFORE BREAKFAST 90 capsule 0    QUEtiapine (SEROQUEL) 200 MG tablet Take 200 mg by mouth nightly      pregabalin (LYRICA) 100 MG capsule Take 1 capsule by mouth 3 times daily for 30 days. 90 capsule 0     No facility-administered medications prior to visit. REVIEW OF SYSTEMS:    Respiratory: Negative for apnea, chest tightness and shortness of breath or change in baseline breathing. PHYSICAL EXAM:   Nursing note and vitals reviewed. LMP 01/15/2014   Constitutional: She appears well-developed and well-nourished. No acute distress. Cardiovascular: Normal rate, regular rhythm, normal heart sounds, and does not have murmur. Pulmonary/Chest: Effort normal. No respiratory distress. She does not have wheezes in the lung fields. She has no rales. Neurological/Psychiatric:She is alert and oriented to person, place, and time. Coordination is  normal.  Her mood isAppropriate and affect is Anxious . Her    IMPRESSION:   1. Chronic pain syndrome    2. Primary osteoarthritis of both hips    3. Fibromyalgia    4. Lumbar radiculitis    5. DDD (degenerative disc disease), lumbar    6. Facet syndrome    7. Primary osteoarthritis of right hip    8. Elbow tendinitis        PLAN:  Informed verbal consent was obtained  -[place] record was obtained and reviewed  for the last one year and no indicators of drug misuse  were found. Any other controlled substance prescriptions  seen on the record have been accounted for, I am aware of the patient receiving these medications. [facility]  [place] record will be rechecked as part of office protocol.    -back with Ms. Yap's chronic  medical problems which for this visit include:   Diagnoses of Chronic pain syndrome, Primary osteoarthritis of both hips, Fibromyalgia, Lumbar radiculitis, DDD (degenerative disc disease), lumbar, Elbow tendinitis, Facet syndrome, Plantar fasciitis, right, Primary osteoarthritis of right hip, and Restless legs syndrome (RLS) were pertinent to this visit. Risks and benefits of the medications and other alternative treatments  including no treatment were discussed with the patient. The common side effects of these medications were also explained to the patient. Informed verbal consent was obtained. Goals of current treatment regimen include improvement in pain, restoration of functioning- with focus on improvement in physical performance, general activity, work or disability,emotional distress, health care utilization and  decreased medication consumption. Will continue to monitor progress towards achieving/maintaining therapeutic goals with special emphasis on  1. Improvement in perceived interfernce  of pain with ADL's. Ability to do home exercises independently. Ability to do household chores indoor and/or outdoor work and social and leisure activities. Improve psychosocial and physical functioning. - she is showing progression towards this treatment goal with the current regimen. 2. Ability to focus/concentrate at work and perform the duties required of her at work  Sit through a workday without lower extremity symptoms. Stand 30-60 minutes without lower extremity symptoms. Ability to lift up to 10-20 lbs. Ability to go up and down stairs. Sit 30-60 minutes  Without having to stand up frequently. - she is maintaining/progressing towards her work related goals with the current regimen.      She was advised against drinking alcohol with the narcotic pain medicines, advised against driving or handling machinery while adjusting the dose of medicines or if having cognitive  issues related to the current medications. Risk of overdose and death, if medicines not taken as prescribed, were also discussed. If the patient develops new symptoms or if the symptoms worsen, the patient should call the office. While transcribing every attempt was made to maintain the accuracy of the note in terms of it's contents,there may have been some errors made inadvertently. Thank you for allowing me to participate in the care of this patient.     Rayray Engel MD.    Cc: Karo Garrido MD

## 2021-02-26 ENCOUNTER — HOSPITAL ENCOUNTER (OUTPATIENT)
Dept: PHYSICAL THERAPY | Age: 61
Setting detail: THERAPIES SERIES
Discharge: HOME | End: 2021-02-26
Payer: COMMERCIAL

## 2021-02-26 PROCEDURE — 97530 THERAPEUTIC ACTIVITIES: CPT

## 2021-02-26 PROCEDURE — 97112 NEUROMUSCULAR REEDUCATION: CPT

## 2021-02-26 NOTE — FLOWSHEET NOTE
[place] [facility], [facility]  [address] [facility] [place], [facility]  Phone: [phone]   Fax:     [phone]      Physical Therapy Treatment Note/ Progress Report:   Date:  2021    Patient Name:  Marcelle Severs    :  1960  MRN: [id number]    Pertinent Medical History: Additional Pertinent Hx: HTN, OA, fibromyalgia, chronic pain syndrome, bipolar,depression, L achilles repair, B TKR, R shldr scope    Medical/Treatment Diagnosis Information:  · Diagnosis: BPPV of L ear  · Treatment Diagnosis: Vestibular impairment    Insurance/Certification information:  PT Insurance Information: Formerly named [facility] [employer]  Physician Information:  Referring Practitioner: Chapin Pereira MD  Plan of care signed (Y/N): []  Yes [x]  No     Date of Patient follow up with Physician: CULLEN     Progress Report: []  Yes  [x]  No     Date Range for reporting period:  Beginnin2021  Ending:     Progress report due (10 Rx/or 30 days whichever is less):      Recertification due (POC duration/ or 90 days whichever is less):  3/12/21    Visit # Insurance Allowable Auth required?  Date Range   2  eval + 8 = 9 [x]  Yes []  No -21     Latex Allergy:  [x]NO      []YES  Preferred Language for Healthcare:   [x]English       []other:    Functional Outcomes Measure:   Date Assessed: at eval  Test: [facility]  Score: 58    Dizziness level:  8/10 at its worst History of Injury: Patient reports onset of dizziness 4-5 months ago. Patient first noticed the dizziness when laying down in bed. States she felt like her eyes were moving. Patient notes increased dizziness rolling on to her side, turning her head, and sitting up in bed. Patient notes that when she is dizzy if she looks up at the ceiling she feels as if the ceiling is moving. Has been doing self epley 3-4 times/day as instructed by Dr. Tory Hung. Notes no improvement with PT thus far. SUBJECTIVE:  2/26: Patient notes that she no longer has any dizziness when she lays down in bed or turns her head. Patient states that her dizziness now happens at other times. Patient can not pinpoint what brings on her dizziness now. Patient states when she stands up from sitting she has to stand there for a minute before moving. Patient also reports that when she sits up from laying down she feels \"blood rushing feeling in her ear. \"  She has to sit until that sensation ends. Patient reports she has fallen 3x since eval.  Dizziness is intermittent and lasts 35-40 secs when it comes. OBJECTIVE:  Oculomotor/Vestibular Examination:     Spontaneous nystagmus:       []? Left             []? Right           [x]? Absent  Gaze-Evoked nystagmus with fixation present:              Primary            []? Present       [x]? Absent              Right                []? Present       [x]? Absent              Left                  []? Present       [x]? Absent  VOR Head Thrust Test:          [x]? Normal       []? Abnormal    Comments:   VOR Cancellation:                  [x]? Normal       []? Abnormal    Comments:   Smooth Pursuit:                      [x]? Normal       []? Abnormal    Comments:   Saccades:                               []? Normal       [x]? Abnormal    Comments: (+) on L  Convergence:                          [x]? Normal       []?  Abnormal    Comments:      Positional Testing - Updated 2/26/21 R Hallpike-Blissfield maneuver:               Nystagmus:     []? Yes             [x]? No               []? Duration:                                          []? Direction:                  Vertigo:            []? Yes             [x]? No               []? Duration:   L Hallpike-Blissfield maneuver:              Nystagmus:     [x]? Yes             []? No               [x]? Duration: 10 secs                                         [x]? Direction: upbeating to the left - very mild              Vertigo:            [x]? Yes             []? No               []? Duration:   Supine roll head right:              Nystagmus:     []? Yes             [x]? No               []? Duration:                                          []? Direction:                  Vertigo:            []? Yes             [x]? No               []? Duration:   Supine roll head left:              Nystagmus:     []? Yes             [x]? No               []? Duration:                                          []? Direction:                  Vertigo:            []? Yes             [x]? No               []? Duration:      RESTRICTIONS/PRECAUTIONS: none    Exercises/Interventions:     Therapeutic Exercises (05211) Min: Resistance/Reps Notes/Cues                            Therapeutic Activities (09889) Min: 10     NBOS:  * with EC  * with head turns/nods   x1 min  x1 min each     Tandem stance x1 min L/R    1 foot on step balance  x1 min L/R              Neuromuscular Re-ed (49577) Min: 30     Reassessment of positional testing See above for details  x10 mins    Canalith Repositioning Procedure For L posterior canalithiasis 3x Pt with (-) Hallpike-Caleb maneuver with no nystagmus following 2nd CRP. Patient also reported a decrease in dizziness as well.     Krysta Handler Exercise x5 mins - HEP instruction         Manual Intervention (86145) Min:                Modalities  Min: Other Therapeutic Activities: Pt was educated x 10 mins on PT POC, Diagnosis, Prognosis, pathomechanics as well as frequency and duration of scheduling future physical therapy appointments. Time was also taken on this day to answer all patient questions and participation in PT. Reviewed appointment policy in detail with patient and patient verbalized understanding. Home Exercise Program: Patient was instructed in the following for HEP:  Self epley for L posterior canalithiasis. Also discussed with patient 24 hour precautions following CRP. Patient verbalized/demonstrated understanding and was issued written handout. Therapeutic Exercise and NMR EXR  [] (84412) Provided verbal/tactile cueing for activities related to strengthening, flexibility, endurance, ROM for improvements in LE, proximal hip, and core control with self care, mobility, lifting, ambulation. [x] (86009) Provided verbal/tactile cueing for activities related to improving balance, coordination, kinesthetic sense, posture, motor skill, proprioception  to assist with LE, proximal hip, and core control in self care, mobility, lifting, ambulation and eccentric single leg control.      NMR and Therapeutic Activities:    [x] (18827 or 16655) Provided verbal/tactile cueing for activities related to improving balance, coordination, kinesthetic sense, posture, motor skill, proprioception and motor activation to allow for proper function of core, proximal hip and LE with self care and ADLs and functional mobility.   [] (86690) Gait Re-education- Provided training and instruction to the patient for proper LE, core and proximal hip recruitment and positioning and eccentric body weight control with ambulation re-education including up and down stairs     Home Exercise Program: 2. Patient will have a decrease in dizziness/imbalance/symptoms by 40% to facilitate improvement in movement, function, balance and ADLs as indicated by Functional Deficits. []? Progressing: []? Met: []? Not Met: []? Adjusted     Long Term Goals: To be achieved in:at discharge  1. Disability index score of 25% or less for the [facility] to assist with reaching prior level of function. []? Progressing: []? Met: []? Not Met: []? Adjusted  2. Patient will demonstrate negative oculomotor special testing/positional testing as indicated by patients Functional Deficits. []? Progressing: []? Met: []? Not Met: []? Adjusted  3. Patient will return to functional activities including Suleman Labrum over in bed without increased symptoms or restriction. []? Progressing: []? Met: []? Not Met: []? Adjusted  5. Patient will be able to do household duties without increased symptoms or restriction. []? Progressing: []? Met: []? Not Met: []? Adjusted          ASSESSMENT:  Patient presents to PT with s/s consistent with L posterior canalithiasis BPPV. Patient with good response to canalith repositioning procedure today. Able to resolve pt's c/o dizziness with transfers and nystagmus. Treatment/Activity Tolerance:  [x] Patient tolerated treatment well [] Patient limited by fatique  [] Patient limited by pain  [] Patient limited by other medical complications  [] Other:     Overall Progression Towards Functional goals/ Treatment Progress Update:  [] Patient is progressing as expected towards functional goals listed. [] Progression is slowed due to complexities/Impairments listed. [] Progression has been slowed due to co-morbidities.   [x] Plan just implemented, too soon to assess goals progression <30days   [] Goals require adjustment due to lack of progress  [] Patient is not progressing as expected and requires additional follow up with physician  [] Other    Prognosis for POC: [x] Good [] Fair  [] Poor Patient requires continued skilled intervention: [x] Yes  [] No        PLAN: Reassess positional testing   [x] Continue per plan of care [] Alter current plan (see comments)  [] Plan of care initiated [] Hold pending MD visit [] Discharge    Electronically signed by: Sandeep Jaramillo PT , OMT-C,  [id number]      Note: If patient does not return for scheduled/recommended follow up visits, this note will serve as a discharge from care along with the most recent update on progress.

## 2021-03-03 ENCOUNTER — HOSPITAL ENCOUNTER (OUTPATIENT)
Dept: PHYSICAL THERAPY | Age: 61
Setting detail: THERAPIES SERIES
Discharge: HOME | End: 2021-03-03
Payer: COMMERCIAL

## 2021-03-03 PROCEDURE — 97530 THERAPEUTIC ACTIVITIES: CPT

## 2021-03-03 PROCEDURE — 97112 NEUROMUSCULAR REEDUCATION: CPT

## 2021-03-03 NOTE — FLOWSHEET NOTE
[address] [facility], [facility]  [address] [facility] [place], [facility]  Phone: [phone]   Fax:     [phone]      Physical Therapy Treatment Note/ Progress Report:   Date:  3/3/2021    Patient Name:  Fabian Cope    :  1960  MRN: [id number]    Pertinent Medical History: Additional Pertinent Hx: HTN, OA, fibromyalgia, chronic pain syndrome, bipolar,depression, L achilles repair, B TKR, R shldr scope    Medical/Treatment Diagnosis Information:  · Diagnosis: BPPV of L ear  · Treatment Diagnosis: Vestibular impairment    Insurance/Certification information:  PT Insurance Information: [employer]  Physician Information:  Referring Practitioner: Tae Torres MD  Plan of care signed (Y/N): []  Yes [x]  No     Date of Patient follow up with Physician: CULLEN     Progress Report: []  Yes  [x]  No     Date Range for reporting period:  Beginnin2021  Ending:     Progress report due (10 Rx/or 30 days whichever is less):      Recertification due (POC duration/ or 90 days whichever is less):  3/12/21    Visit # Insurance Allowable Auth required? Date Range   3  eval + 8 = 9 [x]  Yes []  No -21     Latex Allergy:  [x]NO      []YES  Preferred Language for Healthcare:   [x]English       []other:    Functional Outcomes Measure:   Date Assessed: at eval  Test: [facility]  Score: 58    Dizziness level:  8/10 at its worst     History of Injury: Patient reports onset of dizziness 4-5 months ago. Patient first noticed the dizziness when laying down in bed. States she felt like her eyes were moving. Patient notes increased dizziness rolling on to her side, turning her head, and sitting up in bed. Patient notes that when she is dizzy if she looks up at the ceiling she feels as if the ceiling is moving. Has been doing self epley 3-4 times/day as instructed by Dr. Estefanía Fleming. Notes no improvement with PT thus far. SUBJECTIVE:  2/26: Patient notes that she no longer has any dizziness when she lays down in bed or turns her head. Patient states that her dizziness now happens at other times. Patient can not pinpoint what brings on her dizziness now. Patient states when she stands up from sitting she has to stand there for a minute before moving. Patient also reports that when she sits up from laying down she feels \"blood rushing feeling in her ear. \"  She has to sit until that sensation ends. Patient reports she has fallen 3x since eval.  Dizziness is intermittent and lasts 35-40 secs when it comes. 3/3: feeling better since last visit. Notes she used the cane over the weekend due to feeling a little wobbly. Patient does state she went to ER over the weekend due to chest pain. Patient states the put her on a new med (hydroCHLOROthiazide - water pill) which has really helped. Patient states she is feeling really good and has not had any dizziness or balance issues since the weekend. Patient reports dizziness and balance are 100% improved per pt. OBJECTIVE:  Oculomotor/Vestibular Examination:     Spontaneous nystagmus:       []? Left             []? Right           [x]? Absent  Gaze-Evoked nystagmus with fixation present:              Primary            []? Present       [x]? Absent              Right                []? Present       [x]? Absent              Left                  []? Present       [x]? Absent  VOR Head Thrust Test:          [x]? Normal       []? Abnormal    Comments:   VOR Cancellation:                  [x]? Normal       []? Abnormal    Comments:   Smooth Pursuit:                      [x]? Normal       []? Abnormal    Comments:   Saccades:                               [x]? Normal       []? Abnormal    Comments:   Convergence:                          [x]? Normal       []?  Abnormal    Comments:      Positional Testing - Updated 3/3/21  R Hallpike-Caleb maneuver: Nystagmus:     []? Yes             [x]? No               []? Duration:                                          []? Direction:                  Vertigo:            []? Yes             [x]? No               []? Duration:   L Hallpike-Caleb maneuver:              Nystagmus:     []? Yes             [x]? No               []? Duration:                                          []? Direction:               Vertigo:            []? Yes             [x]? No               []? Duration:   Supine roll head right:              Nystagmus:     []? Yes             [x]? No               []? Duration:                                          []? Direction:                  Vertigo:            []? Yes             [x]? No               []? Duration:   Supine roll head left:              Nystagmus:     []? Yes             [x]? No               []? Duration:                                          []? Direction:                  Vertigo:            []? Yes             [x]?  No               []? Duration:      RESTRICTIONS/PRECAUTIONS: none    Exercises/Interventions:     Therapeutic Exercises (96850) Min: Resistance/Reps Notes/Cues                            Therapeutic Activities (34075) Min: 30     NBOS:  * with EC  * with head turns/nods   x1 min  x1 min each    With SBA   Tandem stance x1 min L/R    1 foot on step balance  x1 min L/R    Air Ex:  *NBOS w/ EO  *step up and down  *tap mat   x1 min  10x L/R each   10x L/R     With 1 finger support  With 1 finger support   Balance on gumdrops x1 min    Box step 5x L/R With SBA   Advanced Gait in II bars:  *marching  *retro  *tandem  *with head turns/nods   x1 lap  x1 lap  x1 lap  x1 lap With finger tip touch on bars        Neuromuscular Re-ed (71401) Min: 10     Reassessment of positional testing See above for details  x10 mins    Canalith Repositioning Procedure    Krysta Handler Exercise          Manual Intervention (27452) Min:                Modalities  Min: [] (52469) Reviewed/Progressed HEP activities related to strengthening, flexibility, endurance, ROM of core, proximal hip and LE for functional self-care, mobility, lifting and ambulation/stair navigation   [x] (11743)Reviewed/Progressed HEP activities related to improving balance, coordination, kinesthetic sense, posture, motor skill, proprioception of core, proximal hip and LE for self care, mobility, lifting, and ambulation/stair navigation      Manual Treatments:  PROM / STM / Oscillations-Mobs:  G-I, II, III, IV (PA's, Inf., Post.)  [] (15184) Provided manual therapy to mobilize LE, proximal hip and/or LS spine soft tissue/joints for the purpose of modulating pain, promoting relaxation,  increasing ROM, reducing/eliminating soft tissue swelling/inflammation/restriction, improving soft tissue extensibility and allowing for proper ROM for normal function with self care, mobility, lifting and ambulation. Charges:  Timed Code Treatment Minutes: 40   Total Treatment Minutes: 40      [] EVAL (LOW) 55161 (typically 20 minutes face-to-face)  [] EVAL (MOD) 48185 (typically 30 minutes face-to-face)  [] EVAL (HIGH) 77207 (typically 45 minutes face-to-face)  [] RE-EVAL     [] KP(22395) x     [] Dry needle 1 or 2 Muscles (05980)  [x] NMR (20030) x 1    [] Dry needle 3+ Muscles (85855)  [] Manual (11207) x     [] Ultrasound (89783) x  [x] TA (62718) x 2   [] Mech Traction (57242)  [] ES(attended) (57548)     [] ES (un) (40734):   [] Vasopump (90333)  [] Ionto (24386)   [] Other:    GOALS:  Patient stated goal: \"Get rid of my dizziness\"  []? Progressing: [x]? Met: []? Not Met: []? Adjusted     Therapist goals for Patient:   Short Term Goals: To be achieved in: 2 weeks  1. Independent in HEP and progression per patient tolerance, in order to prevent re-injury. []? Progressing: [x]? Met: []? Not Met: []?  Adjusted 2. Patient will have a decrease in dizziness/imbalance/symptoms by 40% to facilitate improvement in movement, function, balance and ADLs as indicated by Functional Deficits. []? Progressing: [x]? Met: []? Not Met: []? Adjusted     Long Term Goals: To be achieved in:at discharge  1. Disability index score of 25% or less for the [facility] to assist with reaching prior level of function. []? Progressing: []? Met: []? Not Met: []? Adjusted  2. Patient will demonstrate negative oculomotor special testing/positional testing as indicated by patients Functional Deficits. []? Progressing: [x]? Met: []? Not Met: []? Adjusted  3. Patient will return to functional activities including rolling over in bed without increased symptoms or restriction. []? Progressing: [x]? Met: []? Not Met: []? Adjusted  5. Patient will be able to do household duties without increased symptoms or restriction. []? Progressing: [x]? Met: []? Not Met: []? Adjusted          ASSESSMENT:  Patient demonstrated negative oculomotor and positional testing today. Patient's static and dynamic balance on hard surface and on foam are also WNL. Patient would benefit from 1 more PT visit to ensure resolution on L BPPV and issue balance HEP. Treatment/Activity Tolerance:  [x] Patient tolerated treatment well [] Patient limited by fatique  [] Patient limited by pain  [] Patient limited by other medical complications  [] Other:     Overall Progression Towards Functional goals/ Treatment Progress Update:  [] Patient is progressing as expected towards functional goals listed. [] Progression is slowed due to complexities/Impairments listed. [] Progression has been slowed due to co-morbidities.   [x] Plan just implemented, too soon to assess goals progression <30days   [] Goals require adjustment due to lack of progress  [] Patient is not progressing as expected and requires additional follow up with physician  [] Other Prognosis for POC: [x] Good [] Fair  [] Poor    Patient requires continued skilled intervention: [x] Yes  [] No        PLAN: Reassess positional testing. Issue updated HEP. Possible d/c next visit  [x] Continue per plan of care [] Alter current plan (see comments)  [] Plan of care initiated [] Hold pending MD visit [] Discharge    Electronically signed by: Sherri Cummings, PT , OMT-C,  [id number]      Note: If patient does not return for scheduled/recommended follow up visits, this note will serve as a discharge from care along with the most recent update on progress.

## 2021-03-05 ENCOUNTER — HOSPITAL ENCOUNTER (OUTPATIENT)
Dept: PHYSICAL THERAPY | Age: 61
Setting detail: THERAPIES SERIES
Discharge: HOME | End: 2021-03-05
Payer: COMMERCIAL

## 2021-03-05 PROCEDURE — 97112 NEUROMUSCULAR REEDUCATION: CPT

## 2021-03-05 NOTE — PLAN OF CARE
[place] [facility], [facility]  [address] [facility]  Phone: [phone]   Fax:     [phone]        Physical Therapy Re-Certification Plan of [place] [facility]      Dear Dr. Saul Keller,    We had the pleasure of treating the following patient for physical therapy services at [address]. A summary of our findings can be found in the updated assessment below. This includes our plan of care. If you have any questions or concerns regarding these findings, please do not hesitate to contact me at the office phone number checked above. Thank you for the referral.     Physician Signature:________________________________Date:__________________  By signing above (or electronic signature), therapists plan is approved by physician    Date Range Of Visits:2/12/21 to 3/5/21  Total Visits to Date:  4  Overall Response to Treatment:   [x]Patient is responding well to treatment and improvement is noted with regards  to goals   [x]Patient should continue to improve in reasonable time if they continue HEP   []Patient has plateaued and is no longer responding to skilled PT intervention    []Patient is getting worse and would benefit from return to referring MD   []Patient unable to adhere to initial POC   []Other:     ASSESSMENT:  Patient demonstrated negative oculomotor and positional testing today. Patient's static and dynamic balance on hard surface and on foam are also WNL. Patient to continue with HEP x1 week. Patient has been instructed in continued balance exercises, self epley maneuver for L post canalithiasis, and Garay-Daroff exercises. All goals met at this time. If s/s increase, we will resume PT as usual.  If s/s are under control with HEP, we will d/c with HEP. Recommendation:    []Continue PT     [x]Continue x1 week with HEP.   If s/s increase, we will resume PT as usual.  If s/s are now happens at other times. Patient can not pinpoint what brings on her dizziness now. Patient states when she stands up from sitting she has to stand there for a minute before moving. Patient also reports that when she sits up from laying down she feels \"blood rushing feeling in her ear. \"  She has to sit until that sensation ends. Patient reports she has fallen 3x since eval.  Dizziness is intermittent and lasts 35-40 secs when it comes. 3/3: feeling better since last visit. Notes she used the cane over the weekend due to feeling a little wobbly. Patient does state she went to ER over the weekend due to chest pain. Patient states the put her on a new med (hydroCHLOROthiazide - water pill) which has really helped. Patient states she is feeling really good and has not had any dizziness or balance issues since the weekend. Patient reports dizziness and balance are 100% improved per pt.  3/5: Patient has had no issues with her dizziness and balance since last visit. Notes she has been able to resume all activities without limitations. OBJECTIVE:  Oculomotor/Vestibular Examination:     Spontaneous nystagmus:       []? Left             []? Right           [x]? Absent  Gaze-Evoked nystagmus with fixation present:              Primary            []? Present       [x]? Absent              Right                []? Present       [x]? Absent              Left                  []? Present       [x]? Absent  VOR Head Thrust Test:          [x]? Normal       []? Abnormal    Comments:   VOR Cancellation:                  [x]? Normal       []? Abnormal    Comments:   Smooth Pursuit:                      [x]? Normal       []? Abnormal    Comments:   Saccades:                               [x]? Normal       []? Abnormal    Comments:   Convergence:                          [x]? Normal       []? Abnormal    Comments:      Positional Testing - Updated 3/5/21  R Hallpike-[place]heim maneuver:               Nystagmus:     []?  Yes [x]? No               []? Duration:                                          []? Direction:                  Vertigo:            []? Yes             [x]? No               []? Duration:   L Hallpike-[place] maneuver:              Nystagmus:     [x]? Yes             []? No               [x]? Duration: 3 secs                                         [x]? Direction: upbeating to the left very slight              Vertigo:            [x]? Yes             [x]? No               []? Duration:   Supine roll head right:              Nystagmus:     []? Yes             [x]? No               []? Duration:                                          []? Direction:                  Vertigo:            []? Yes             [x]? No               []? Duration:   Supine roll head left:              Nystagmus:     []? Yes             [x]? No               []? Duration:                                          []? Direction:                  Vertigo:            []? Yes             [x]?  No               []? Duration:     Postural Control Tests:  Clinical Test of Sensory Interaction for Balance (CTSIB) performed in Romberg stance  CONDITION TIME STRATEGY SWAY    Eyes open, firm surface x30 sec    none    Eyes closed, firm surface x30 sec  none    Eyes open, foam surface x30 sec  ankle slight    Eyes closed, foam surface x30 sec ankle slight         RESTRICTIONS/PRECAUTIONS: none    Exercises/Interventions:     Therapeutic Exercises (26372) Min: Resistance/Reps Notes/Cues                            Therapeutic Activities (41172) Min: 5     NBOS:  * with EC  * with head turns/nods   With SBA   Tandem stance    1 foot on step balance     Air Ex:  *NBOS w/ EO  *step up and down  *tap mat     With 1 finger support  With 1 finger support   Balance on gumdrops    Box step With SBA   Advanced Gait in II bars:  *marching  *retro  *tandem  *with head turns/nods With finger tip touch on bars        Balance Reassessment x5 mins    Neuromuscular Re-ed (42454) Min: 25     Reassessment of positional testing See above for details  x10 mins    Canalith Repositioning Procedure For L posterior canalithiasis 1x (-) [place]-Hallpike after CRP, no nystagmus noted   Cornelius Gunner Exercise     HEP instruction and progression x15 mins         Manual Intervention (90383) Min:                Modalities  Min:                      Other Therapeutic Activities: Pt was educated x 10 mins on PT POC, Diagnosis, Prognosis, pathomechanics as well as frequency and duration of scheduling future physical therapy appointments. Time was also taken on this day to answer all patient questions and participation in PT. Reviewed appointment policy in detail with patient and patient verbalized understanding. Home Exercise Program: Patient was instructed in the following for HEP:  Self epley for L posterior canalithiasis. Also discussed with patient 24 hour precautions following CRP. Patient verbalized/demonstrated understanding and was issued written handout. 3/5: Patient issued updated HEP to include: NBOS with EC, NBOS with head turns/nods, tandem stance, box step, and alt toe taps on step. Patient also issued Garay-Daroff exercises and instructed on progression. Patient verbalized/demonstrated understanding and was issued written handout. Therapeutic Exercise and NMR EXR  [] (78248) Provided verbal/tactile cueing for activities related to strengthening, flexibility, endurance, ROM for improvements in LE, proximal hip, and core control with self care, mobility, lifting, ambulation. [x] (89750) Provided verbal/tactile cueing for activities related to improving balance, coordination, kinesthetic sense, posture, motor skill, proprioception  to assist with LE, proximal hip, and core control in self care, mobility, lifting, ambulation and eccentric single leg control.      NMR and Therapeutic Activities:    [x] (72628 or 91889) Provided verbal/tactile cueing for activities related to (82621)  [] Bertha (29377)   [] Other:    GOALS:  Patient stated goal: \"Get rid of my dizziness\"  []? Progressing: [x]? Met: []? Not Met: []? Adjusted     Therapist goals for Patient:   Short Term Goals: To be achieved in: 2 weeks  1. Independent in HEP and progression per patient tolerance, in order to prevent re-injury. []? Progressing: [x]? Met: []? Not Met: []? Adjusted  2. Patient will have a decrease in dizziness/imbalance/symptoms by 40% to facilitate improvement in movement, function, balance and ADLs as indicated by Functional Deficits. []? Progressing: [x]? Met: []? Not Met: []? Adjusted     Long Term Goals: To be achieved in:at discharge  1. Disability index score of 25% or less for the [address] [place] [address] to assist with reaching prior level of function. []? Progressing: [x]? Met: []? Not Met: []? Adjusted  2. Patient will demonstrate negative oculomotor special testing/positional testing as indicated by patients Functional Deficits. []? Progressing: [x]? Met: []? Not Met: []? Adjusted  3. Patient will return to functional activities including rolling over in bed without increased symptoms or restriction. []? Progressing: [x]? Met: []? Not Met: []? Adjusted  5. Patient will be able to do household duties without increased symptoms or restriction. []? Progressing: [x]? Met: []? Not Met: []? Adjusted          ASSESSMENT:  Patient demonstrated negative oculomotor and positional testing today. Patient's static and dynamic balance on hard surface and on foam are also WNL. Patient to continue with HEP x1 week. Patient has been instructed in continued balance exercises, self epley maneuver for L post canalithiasis, and Garay-Daroff exercises. If s/s increase, we will resume PT as usual.  If s/s are under control with HEP, we will d/c with HEP.       Treatment/Activity Tolerance:  [x] Patient tolerated treatment well [] Patient limited by fatique  [] Patient limited by pain  [] Patient limited by other medical complications  [] Other:     Overall Progression Towards Functional goals/ Treatment Progress Update:  [x] Patient is progressing as expected towards functional goals listed. [] Progression is slowed due to complexities/Impairments listed. [] Progression has been slowed due to co-morbidities. [] Plan just implemented, too soon to assess goals progression <30days   [] Goals require adjustment due to lack of progress  [] Patient is not progressing as expected and requires additional follow up with physician  [] Other    Prognosis for POC: [x] Good [] Fair  [] Poor    Patient requires continued skilled intervention: [x] Yes  [] No        PLAN: Continue x1  weeks with HEP. If s/s increase, we will resume PT as usual.  If s/s are under control with HEP, we will d/c with HEP. [] Continue per plan of care [x] Alter current plan (see comments)  [] Plan of care initiated [] Hold pending MD visit [] Discharge    Electronically signed by: Douglas Frankel PT , OMT-C,  [id number]      Note: If patient does not return for scheduled/recommended follow up visits, this note will serve as a discharge from care along with the most recent update on progress.

## 2021-03-07 ENCOUNTER — APPOINTMENT (OUTPATIENT)
Dept: GENERAL RADIOLOGY | Age: 61
End: 2021-03-07
Payer: COMMERCIAL

## 2021-03-07 ENCOUNTER — HOSPITAL ENCOUNTER (OUTPATIENT)
Age: 61
Setting detail: OBSERVATION
Discharge: HOME | End: 2021-03-08
Attending: EMERGENCY MEDICINE | Admitting: INTERNAL MEDICINE
Payer: COMMERCIAL

## 2021-03-07 ENCOUNTER — APPOINTMENT (OUTPATIENT)
Dept: CT IMAGING | Age: 61
End: 2021-03-07
Payer: COMMERCIAL

## 2021-03-07 DIAGNOSIS — R07.9 CHEST PAIN, UNSPECIFIED TYPE: Primary | ICD-10-CM

## 2021-03-07 LAB
ANION GAP SERPL CALCULATED.3IONS-SCNC: 11 MMOL/L (ref 3–16)
BASOPHILS ABSOLUTE: 0.1 K/UL (ref 0–0.2)
BASOPHILS RELATIVE PERCENT: 0.8 %
BUN BLDV-MCNC: 24 MG/DL (ref 7–20)
CALCIUM SERPL-MCNC: 9.5 MG/DL (ref 8.3–10.6)
CHLORIDE BLD-SCNC: 100 MMOL/L (ref 99–110)
CO2: 28 MMOL/L (ref 21–32)
CREAT SERPL-MCNC: 0.9 MG/DL (ref 0.6–1.2)
EKG ATRIAL RATE: 72 BPM
EKG DIAGNOSIS: NORMAL
EKG P AXIS: 47 DEGREES
EKG P-R INTERVAL: 150 MS
EKG Q-T INTERVAL: 390 MS
EKG QRS DURATION: 80 MS
EKG QTC CALCULATION (BAZETT): 427 MS
EKG R AXIS: 18 DEGREES
EKG T AXIS: 16 DEGREES
EKG VENTRICULAR RATE: 72 BPM
EOSINOPHILS ABSOLUTE: 0.3 K/UL (ref 0–0.6)
EOSINOPHILS RELATIVE PERCENT: 3.8 %
GFR AFRICAN AMERICAN: >60
GFR NON-AFRICAN AMERICAN: >60
GLUCOSE BLD-MCNC: 105 MG/DL (ref 70–99)
GLUCOSE BLD-MCNC: 112 MG/DL (ref 70–99)
GLUCOSE BLD-MCNC: 86 MG/DL (ref 70–99)
HCT VFR BLD CALC: 42.2 % (ref 36–48)
HEMOGLOBIN: 13.8 G/DL (ref 12–16)
LYMPHOCYTES ABSOLUTE: 1.7 K/UL (ref 1–5.1)
LYMPHOCYTES RELATIVE PERCENT: 23.8 %
MCH RBC QN AUTO: 29.5 PG (ref 26–34)
MCHC RBC AUTO-ENTMCNC: 32.6 G/DL (ref 31–36)
MCV RBC AUTO: 90.4 FL (ref 80–100)
MONOCYTES ABSOLUTE: 0.5 K/UL (ref 0–1.3)
MONOCYTES RELATIVE PERCENT: 7.4 %
NEUTROPHILS ABSOLUTE: 4.7 K/UL (ref 1.7–7.7)
NEUTROPHILS RELATIVE PERCENT: 64.2 %
PDW BLD-RTO: 15 % (ref 12.4–15.4)
PERFORMED ON: ABNORMAL
PERFORMED ON: NORMAL
PLATELET # BLD: 229 K/UL (ref 135–450)
PMV BLD AUTO: 8.7 FL (ref 5–10.5)
POTASSIUM REFLEX MAGNESIUM: 3.9 MMOL/L (ref 3.5–5.1)
PRO-BNP: 232 PG/ML (ref 0–124)
RBC # BLD: 4.67 M/UL (ref 4–5.2)
SODIUM BLD-SCNC: 139 MMOL/L (ref 136–145)
TROPONIN: <0.01 NG/ML
WBC # BLD: 7.3 K/UL (ref 4–11)

## 2021-03-07 PROCEDURE — G0378 HOSPITAL OBSERVATION PER HR: HCPCS

## 2021-03-07 PROCEDURE — 84484 ASSAY OF TROPONIN QUANT: CPT

## 2021-03-07 PROCEDURE — 99203 OFFICE O/P NEW LOW 30 MIN: CPT | Performed by: INTERNAL MEDICINE

## 2021-03-07 PROCEDURE — 6360000004 HC RX CONTRAST MEDICATION: Performed by: EMERGENCY MEDICINE

## 2021-03-07 PROCEDURE — 71045 X-RAY EXAM CHEST 1 VIEW: CPT

## 2021-03-07 PROCEDURE — 36415 COLL VENOUS BLD VENIPUNCTURE: CPT

## 2021-03-07 PROCEDURE — 96372 THER/PROPH/DIAG INJ SC/IM: CPT

## 2021-03-07 PROCEDURE — 93005 ELECTROCARDIOGRAM TRACING: CPT | Performed by: STUDENT IN AN ORGANIZED HEALTH CARE EDUCATION/TRAINING PROGRAM

## 2021-03-07 PROCEDURE — 74175 CTA ABDOMEN W/CONTRAST: CPT

## 2021-03-07 PROCEDURE — 2580000003 HC RX 258: Performed by: INTERNAL MEDICINE

## 2021-03-07 PROCEDURE — 83036 HEMOGLOBIN GLYCOSYLATED A1C: CPT

## 2021-03-07 PROCEDURE — 6360000002 HC RX W HCPCS: Performed by: INTERNAL MEDICINE

## 2021-03-07 PROCEDURE — 74174 CTA ABD&PLVS W/CONTRAST: CPT

## 2021-03-07 PROCEDURE — 85025 COMPLETE CBC W/AUTO DIFF WBC: CPT

## 2021-03-07 PROCEDURE — 93005 ELECTROCARDIOGRAM TRACING: CPT | Performed by: INTERNAL MEDICINE

## 2021-03-07 PROCEDURE — 99284 EMERGENCY DEPT VISIT MOD MDM: CPT

## 2021-03-07 PROCEDURE — 80048 BASIC METABOLIC PNL TOTAL CA: CPT

## 2021-03-07 PROCEDURE — 83880 ASSAY OF NATRIURETIC PEPTIDE: CPT

## 2021-03-07 PROCEDURE — 6370000000 HC RX 637 (ALT 250 FOR IP): Performed by: INTERNAL MEDICINE

## 2021-03-07 PROCEDURE — 96374 THER/PROPH/DIAG INJ IV PUSH: CPT

## 2021-03-07 RX ORDER — QUETIAPINE FUMARATE 200 MG/1
200 TABLET, FILM COATED ORAL NIGHTLY
Status: DISCONTINUED | OUTPATIENT
Start: 2021-03-07 | End: 2021-03-08 | Stop reason: HOSPADM

## 2021-03-07 RX ORDER — ACETAMINOPHEN 650 MG/1
650 SUPPOSITORY RECTAL EVERY 6 HOURS PRN
Status: DISCONTINUED | OUTPATIENT
Start: 2021-03-07 | End: 2021-03-08 | Stop reason: HOSPADM

## 2021-03-07 RX ORDER — ONDANSETRON 2 MG/ML
4 INJECTION INTRAMUSCULAR; INTRAVENOUS EVERY 6 HOURS PRN
Status: DISCONTINUED | OUTPATIENT
Start: 2021-03-07 | End: 2021-03-08 | Stop reason: HOSPADM

## 2021-03-07 RX ORDER — FUROSEMIDE 10 MG/ML
20 INJECTION INTRAMUSCULAR; INTRAVENOUS ONCE
Status: COMPLETED | OUTPATIENT
Start: 2021-03-07 | End: 2021-03-07

## 2021-03-07 RX ORDER — ROPINIROLE 2 MG/1
2 TABLET, FILM COATED ORAL NIGHTLY
Status: DISCONTINUED | OUTPATIENT
Start: 2021-03-07 | End: 2021-03-08 | Stop reason: HOSPADM

## 2021-03-07 RX ORDER — LEVOTHYROXINE SODIUM 88 UG/1
88 TABLET ORAL DAILY
Status: DISCONTINUED | OUTPATIENT
Start: 2021-03-07 | End: 2021-03-08 | Stop reason: HOSPADM

## 2021-03-07 RX ORDER — SODIUM CHLORIDE 0.9 % (FLUSH) 0.9 %
10 SYRINGE (ML) INJECTION EVERY 12 HOURS SCHEDULED
Status: DISCONTINUED | OUTPATIENT
Start: 2021-03-07 | End: 2021-03-08 | Stop reason: HOSPADM

## 2021-03-07 RX ORDER — ASPIRIN 81 MG/1
81 TABLET, CHEWABLE ORAL DAILY
Status: DISCONTINUED | OUTPATIENT
Start: 2021-03-08 | End: 2021-03-08 | Stop reason: HOSPADM

## 2021-03-07 RX ORDER — PROMETHAZINE HYDROCHLORIDE 12.5 MG/1
12.5 TABLET ORAL EVERY 6 HOURS PRN
Status: DISCONTINUED | OUTPATIENT
Start: 2021-03-07 | End: 2021-03-08 | Stop reason: HOSPADM

## 2021-03-07 RX ORDER — MECLIZINE HYDROCHLORIDE 25 MG/1
25 TABLET ORAL 3 TIMES DAILY PRN
Status: DISCONTINUED | OUTPATIENT
Start: 2021-03-07 | End: 2021-03-08 | Stop reason: HOSPADM

## 2021-03-07 RX ORDER — POLYETHYLENE GLYCOL 3350 17 G/17G
17 POWDER, FOR SOLUTION ORAL DAILY PRN
Status: DISCONTINUED | OUTPATIENT
Start: 2021-03-07 | End: 2021-03-08 | Stop reason: HOSPADM

## 2021-03-07 RX ORDER — ATORVASTATIN CALCIUM 40 MG/1
1 TABLET, FILM COATED ORAL NIGHTLY
Status: DISCONTINUED | OUTPATIENT
Start: 2021-03-07 | End: 2021-03-07 | Stop reason: SDUPTHER

## 2021-03-07 RX ORDER — SODIUM CHLORIDE 0.9 % (FLUSH) 0.9 %
10 SYRINGE (ML) INJECTION PRN
Status: DISCONTINUED | OUTPATIENT
Start: 2021-03-07 | End: 2021-03-08 | Stop reason: HOSPADM

## 2021-03-07 RX ORDER — ACETAMINOPHEN 325 MG/1
650 TABLET ORAL EVERY 6 HOURS PRN
Status: DISCONTINUED | OUTPATIENT
Start: 2021-03-07 | End: 2021-03-08

## 2021-03-07 RX ORDER — DEXTROSE MONOHYDRATE 25 G/50ML
12.5 INJECTION, SOLUTION INTRAVENOUS PRN
Status: DISCONTINUED | OUTPATIENT
Start: 2021-03-07 | End: 2021-03-08 | Stop reason: HOSPADM

## 2021-03-07 RX ORDER — PANTOPRAZOLE SODIUM 40 MG/1
40 TABLET, DELAYED RELEASE ORAL
Status: DISCONTINUED | OUTPATIENT
Start: 2021-03-08 | End: 2021-03-08 | Stop reason: HOSPADM

## 2021-03-07 RX ORDER — PREGABALIN 50 MG/1
100 CAPSULE ORAL 3 TIMES DAILY
Status: DISCONTINUED | OUTPATIENT
Start: 2021-03-07 | End: 2021-03-08 | Stop reason: HOSPADM

## 2021-03-07 RX ORDER — NICOTINE POLACRILEX 4 MG
15 LOZENGE BUCCAL PRN
Status: DISCONTINUED | OUTPATIENT
Start: 2021-03-07 | End: 2021-03-08 | Stop reason: HOSPADM

## 2021-03-07 RX ORDER — DULOXETIN HYDROCHLORIDE 60 MG/1
60 CAPSULE, DELAYED RELEASE ORAL 2 TIMES DAILY
Status: DISCONTINUED | OUTPATIENT
Start: 2021-03-07 | End: 2021-03-08 | Stop reason: HOSPADM

## 2021-03-07 RX ORDER — INSULIN LISPRO 100 [IU]/ML
0-3 INJECTION, SOLUTION INTRAVENOUS; SUBCUTANEOUS NIGHTLY
Status: DISCONTINUED | OUTPATIENT
Start: 2021-03-07 | End: 2021-03-08 | Stop reason: HOSPADM

## 2021-03-07 RX ORDER — INSULIN LISPRO 100 [IU]/ML
0-6 INJECTION, SOLUTION INTRAVENOUS; SUBCUTANEOUS
Status: DISCONTINUED | OUTPATIENT
Start: 2021-03-07 | End: 2021-03-08 | Stop reason: HOSPADM

## 2021-03-07 RX ORDER — DEXTROSE MONOHYDRATE 50 MG/ML
100 INJECTION, SOLUTION INTRAVENOUS PRN
Status: DISCONTINUED | OUTPATIENT
Start: 2021-03-07 | End: 2021-03-08 | Stop reason: HOSPADM

## 2021-03-07 RX ORDER — ATORVASTATIN CALCIUM 40 MG/1
40 TABLET, FILM COATED ORAL NIGHTLY
Status: DISCONTINUED | OUTPATIENT
Start: 2021-03-07 | End: 2021-03-08 | Stop reason: HOSPADM

## 2021-03-07 RX ORDER — ARIPIPRAZOLE 5 MG/1
20 TABLET ORAL DAILY
Status: DISCONTINUED | OUTPATIENT
Start: 2021-03-07 | End: 2021-03-08 | Stop reason: HOSPADM

## 2021-03-07 RX ADMIN — FUROSEMIDE 20 MG: 20 INJECTION, SOLUTION INTRAMUSCULAR; INTRAVENOUS at 15:23

## 2021-03-07 RX ADMIN — ENOXAPARIN SODIUM 40 MG: 40 INJECTION SUBCUTANEOUS at 11:24

## 2021-03-07 RX ADMIN — Medication 10 ML: at 11:28

## 2021-03-07 RX ADMIN — QUETIAPINE FUMARATE 200 MG: 200 TABLET ORAL at 20:05

## 2021-03-07 RX ADMIN — ROPINIROLE HYDROCHLORIDE 2 MG: 2 TABLET, FILM COATED ORAL at 20:06

## 2021-03-07 RX ADMIN — ARIPIPRAZOLE 20 MG: 5 TABLET ORAL at 12:43

## 2021-03-07 RX ADMIN — PREGABALIN 100 MG: 50 CAPSULE ORAL at 12:44

## 2021-03-07 RX ADMIN — ATORVASTATIN CALCIUM 40 MG: 40 TABLET, FILM COATED ORAL at 20:06

## 2021-03-07 RX ADMIN — NITROGLYCERIN 0.5 INCH: 20 OINTMENT TOPICAL at 17:57

## 2021-03-07 RX ADMIN — IOPAMIDOL 80 ML: 755 INJECTION, SOLUTION INTRAVENOUS at 04:39

## 2021-03-07 RX ADMIN — PREGABALIN 100 MG: 50 CAPSULE ORAL at 20:06

## 2021-03-07 RX ADMIN — Medication 10 ML: at 20:06

## 2021-03-07 RX ADMIN — NITROGLYCERIN 0.5 INCH: 20 OINTMENT TOPICAL at 11:25

## 2021-03-07 RX ADMIN — ENOXAPARIN SODIUM 40 MG: 40 INJECTION SUBCUTANEOUS at 20:05

## 2021-03-07 RX ADMIN — DULOXETINE HYDROCHLORIDE 60 MG: 60 CAPSULE, DELAYED RELEASE ORAL at 12:44

## 2021-03-07 RX ADMIN — LEVOTHYROXINE SODIUM 88 MCG: 0.09 TABLET ORAL at 12:44

## 2021-03-07 RX ADMIN — NITROGLYCERIN 0.5 INCH: 20 OINTMENT TOPICAL at 23:46

## 2021-03-07 RX ADMIN — DULOXETINE HYDROCHLORIDE 60 MG: 60 CAPSULE, DELAYED RELEASE ORAL at 20:06

## 2021-03-07 ASSESSMENT — ENCOUNTER SYMPTOMS
ABDOMINAL PAIN: 0
PHOTOPHOBIA: 0
SHORTNESS OF BREATH: 1
SORE THROAT: 0
VOMITING: 0
RHINORRHEA: 0
NAUSEA: 1
COUGH: 0
DIARRHEA: 0

## 2021-03-07 ASSESSMENT — PAIN SCALES - GENERAL
PAINLEVEL_OUTOF10: 7
PAINLEVEL_OUTOF10: 0

## 2021-03-07 ASSESSMENT — PAIN DESCRIPTION - LOCATION: LOCATION: CHEST

## 2021-03-07 NOTE — PROGRESS NOTES
4 Eyes Admission Assessment     I agree as the admission nurse that 2 RN's have performed a thorough Head to Toe Skin Assessment on the patient. ALL assessment sites listed below have been assessed on admission. Areas assessed by both nurses:   [x]   Head, Face, and Ears   [x]   Shoulders, Back, and Chest  [x]   Arms, Elbows, and Hands   [x]   Coccyx, Sacrum, and Ischum  [x]   Legs, Feet, and Heels        Does the Patient have Skin Breakdown?   No     Redness BLE   Scattered bruising  Abrasions/scabs right arm and scattered BLE/BUE  Redness buttocks        Max Prevention initiated:  Yes   Wound Care Orders initiated:  No      WOC nurse consulted for Pressure Injury (Stage 3,4, Unstageable, DTI, NWPT, and Complex wounds):  No      Nurse 1 eSignature: Electronically signed by Jeff Nicolas RN on 3/7/21 at 12:00 PM EST    **SHARE this note so that the co-signing nurse is able to place an eSignature**    Nurse 2 eSignature: Electronically signed by Delisa Hickey RN on 3/7/21 at 5:40 PM EST

## 2021-03-07 NOTE — LETTER
[address]  Phone: [phone]    No name on file. March 8, 2021     Patient: Ajay Fernandez   YOB: 1960   Date of Visit: 3/7/2021       To Whom It May Concern: It is my medical opinion that Mikell Cushing may return to work on Friday 3/12/21. If you have any questions or concerns, please don't hesitate to call.     Sincerely,

## 2021-03-07 NOTE — CONSULTS
History of Present Illness:  Pablito Adan is a 63 y.o. patient whom we were asked to see for chest pain.  at [employer]. Noted onset of chest pain while standing. Had acute onset of chest pressure. Noted at least moderate at 8/10. Noted some sob and nausea. Had NTG from PCP. Used ntg with no change. In squad got ntg with some improvement. Pain lasted just over an hr. Not real active. No recent exertional chest pain. Of note she did go to ER last weekend also with chest pain. Comfortable this am.        Past Medical History:   has a past medical history of Acquired hyperlipoproteinemia, MARIO (acute kidney injury) ([facility] [address] Allergic rhinitis, Anxiety, Arthritis, Bilateral lower extremity edema, Bipolar 1 disorder ([facility] [address] Chronic pain syndrome, DDD (degenerative disc disease), lumbar, Depression, Depression, Disease of gallbladder, Dizziness, DJD (degenerative joint disease) of hip, Edema, Elbow tendinitis, Essential hypertension, Facet syndrome, Fibromyalgia, Fracture of nasal bone, GERD (gastroesophageal reflux disease), Headache, Hiatal hernia, History of blood transfusion, Hyperlipidemia, Insomnia, Osteoarthritis, Prediabetes, Rash, Self mutilating behavior, Sleep apnea, Suicidal ideation, Thyroid disorder, and Vertigo. Surgical History:   has a past surgical history that includes Ankle surgery; Appendectomy; Total knee arthroplasty (12/10/10); Total knee arthroplasty (3/30/10); shoulder surgery; Colonoscopy (04/16/2013); Upper gastrointestinal endoscopy (4/16/2013); Cholecystectomy; hysteroscopy (N/A, 12-30-13); Upper gastrointestinal endoscopy (3/20/2014); Dilation and curettage of uterus; joint replacement (Bilateral); Abdomen surgery (5/28/2014); Sleeve Gastroplasty (May 28, 2014); Endoscopy, colon, diagnostic; shoulder surgery (Right); Ankle surgery; other surgical history (9/11/2015); Foot surgery (Right, 05/27/2016); and Achilles tendon surgery (Left, 3/9/2020).      Social History: by mouth 2 times daily 8/27/20  Yes Historical Provider, MD   ARIPiprazole (ABILIFY) 20 MG tablet Take 1 tablet by mouth daily 9/4/20  Yes Historical Provider, MD   propranolol (INDERAL) 40 MG tablet Take 1 tablet by mouth 3 times daily anxiety 8/27/20  Yes Historical Provider, MD   esomeprazole ([address]) 40 MG delayed release capsule TAKE 1 CAPSULE BY MOUTH EVERY MORNING BEFORE BREAKFAST 7/14/20  Yes Isatu Salvador MD   QUEtiapine (SEROQUEL) 200 MG tablet Take 200 mg by mouth nightly   Yes Historical Provider, MD        Allergies:  Polysporin [bacitracin-polymyxin b], Lorazepam, Neomycin-polymyxin-gramicidin, and Sulfa antibiotics     Review of Systems:   All 12 point review of symptoms completed. Pertinent positives identified in the HPI, all other review of symptoms negative as below. · Constitutional: there has been no unanticipated weight loss. There's been no change in energy level, sleep pattern, or activity level. · Eyes: No visual changes or diplopia. No scleral icterus. · ENT: No Headaches, hearing loss or vertigo. No mouth sores or sore throat. · Cardiovascular: No loss of consciousness. No hemoptysis, pleuritic pain, or phlebitis. · Respiratory: No cough or wheezing, no sputum production. No hematemesis. · Gastrointestinal: No abdominal pain, appetite loss, blood in stools. No change in bowel or bladder habits. · Genitourinary: No dysuria, trouble voiding, or hematuria. · Musculoskeletal:  No gait disturbance, weakness or joint complaints. · Integumentary: No rash or pruritis. · All other systems reviewed negative as done.          Physical Examination:    Vitals:    03/07/21 0904   BP: (!) 165/97   Pulse: 84   Resp: 16   Temp: 97.4 °F (36.3 °C)   SpO2: 98%    Weight: (!) 325 lb (147.4 kg)         General Appearance:  Alert, cooperative, no distress, appears stated age   Head:  Normocephalic, without obvious abnormality, atraumatic   Eyes:  PERRL, conjunctiva/corneas clear       Nose: Nares normal, no drainage or sinus tenderness   Throat: Lips, mucosa, and tongue normal   Neck: Supple, symmetrical, trachea midline       Lungs:   Clear to auscultation bilaterally, respirations unlabored   Chest Wall:  No tenderness or deformity   Heart:  Regular rate and rhythm, S1, S2 normal, no murmur, rub or gallop   Abdomen:   Soft, non-tender, bowel sounds active all four quadrants           Extremities: Extremities normal, atraumatic, no cyanosis or edema       Skin: Skin color, texture, turgor normal, no rashes or lesions   Pysch: Normal mood and affect   Neurologic: Normal gross motor and sensory exam.         Labs  CBC:   Lab Results   Component Value Date    WBC 7.3 03/07/2021    RBC 4.67 03/07/2021    HGB 13.8 03/07/2021    HCT 42.2 03/07/2021    MCV 90.4 03/07/2021    RDW 15.0 03/07/2021     03/07/2021     CMP:    Lab Results   Component Value Date     03/07/2021    K 3.9 03/07/2021     03/07/2021    CO2 28 03/07/2021    BUN 24 03/07/2021    CREATININE 0.9 03/07/2021    GFRAA >60 03/07/2021    GFRAA >60 05/16/2013    AGRATIO 1.4 02/23/2021    LABGLOM >60 03/07/2021    GLUCOSE 105 03/07/2021    PROT 6.6 02/23/2021    PROT 7.0 02/13/2013    CALCIUM 9.5 03/07/2021    BILITOT 0.7 02/23/2021    ALKPHOS 124 02/23/2021    AST 14 02/23/2021    ALT 14 02/23/2021     PT/INR:  No results found for: PTINR  Lab Results   Component Value Date    CKTOTAL 59 08/07/2014    CKMB 0.69 01/16/2012    TROPONINI <0.01 03/07/2021       EKG:  I have reviewed EKG with the following interpretation:  Impression:  NSR, WNL    Assessment  Patient Active Problem List   Diagnosis    Primary localized osteoarthrosis, lower leg    Hyperlipidemia    Self mutilating behavior    Restless legs syndrome (RLS)    Skin growth    URTI (acute upper respiratory infection)    Posterior tibial tendonitis, bilateral    Suicidal ideation    Shoulder pain    Paresthesia    Vitamin B12 deficiency    Leg swelling    Bilateral lower extremity edema    Headache    Precordial pain    Left Lateral epicondylitis (tennis elbow)    Radial nerve entrapment    GERD (gastroesophageal reflux disease)    Diarrhea    Rotator cuff (capsule) sprain    Knee joint replacement by other means    Pain in limb    Venous (peripheral) insufficiency    Chronic pain syndrome    Elbow tendinitis    Fibromyalgia    Depression    Atypical chest pain    Hiatal hernia    Osteoarthritis of hip    DDD (degenerative disc disease), lumbar    Facet syndrome    Fatigue    Allergic rhinitis    Prediabetes    Plantar fasciitis, right    S/P laparoscopic sleeve gastrectomy    Nausea & vomiting    Weakness    Major depressive disorder, recurrent episode, severe (HCC)    Bilateral leg edema    Morbid obesity with BMI of 40.0-44.9, adult ([address]    Hypothyroid    Borderline personality disorder (HCC)    Light headed    Acute blood loss anemia    Anemia    Self-mutilation    Cellulitis    Shortness of breath    Vertigo    Scalp cyst    Primary insomnia    BPPV (benign paroxysmal positional vertigo)    Laceration of left upper extremity    Acute cystitis without hematuria    Open wound of left upper arm    Viral upper respiratory tract infection    Lumbar radiculitis    Leg skin lesion, left    Edema    Nasal trauma    Drug-induced constipation    Abdominal pain    History of total bilateral knee replacement    SOB (shortness of breath)    Back pain    Lumbar strain    Essential hypertension    MARIO (acute kidney injury) ([address]    Lacey's deformity of left heel    Achilles tendinitis of left lower extremity    Dysuria    Plantar fasciitis of left foot    Hypoproteinemia (HCC)    Bruising    Inflammatory spondylopathy ([address]    Chest pain         Plan:    Recurrent chest pain. ER visit now x2. Negative myoview 10/20. Had Cath 2016 with normal cors. Serial trop negative.   EKG normal.  Would increase activities. Keep NPO after MN. Dr Deidra Mata will consider options.

## 2021-03-07 NOTE — PROGRESS NOTES
Upon ambulating in hallway, HR increase to 125. Once at rest, c/o headache and mid/left sided chest pain. Describes it as heaviness that radiates to upper back. Also c/o difficulty catching her breath. Denies nausea, dizziness, jaw pain. /98 following ambulation and HR is now 95. SpO2 95% on room air. Nitro ointment in place. Notified Dr. Rama Red via perfect serve.

## 2021-03-07 NOTE — ED TRIAGE NOTES
Pt to ED with c.o sudden onset CP while at work tonight. Took 2 nitro of her own prior to calling EMS. sts that they \"helped a little bit. \" received 4ASA by EMS in route.

## 2021-03-07 NOTE — H&P
Hospital Medicine History & Physical      PCP: Sunshine Jordan MD    Date of Admission: 3/7/2021    Date of Service: Pt seen/examined on 03/7/2021 and Placed in Observation. Chief Complaint: Chest pain      History Of Present Illness:     63 y.o. female Velma Harmon with a history of hypothyroidism, sleep apnea, self mutilating behavior, GERD, hypertension, morbid obesity  Presented to ED with chest pain  Chest pain started last night around 7:45 PM, she was at work, works as a door  at [employer]. States she was standing and not exerting at the time. She took some nitroglycerin that was given to her by her primary care physician 2 to 3 days ago with did improve her pain from 7 out of 10 to 5 out of 10. He also endorses shortness of breath with exertion and lower extremity swelling for the last few days. Described pain as midsternal dull heavy, radiates to the back. She had a similar pain in 10/2020 and had a stress test done which was negative. In the emergency room she was afebrile, heart rate 80s to 90s, blood pressure 165/92. Labs unremarkable. proBNP of 232.     Past Medical History:          Diagnosis Date    Acquired hyperlipoproteinemia     MARIO (acute kidney injury) ([facility] [address] 9/30/2019    Allergic rhinitis     Anxiety     Arthritis     Bilateral lower extremity edema     Bipolar 1 disorder (HCC)     Chronic pain syndrome     DDD (degenerative disc disease), lumbar     Depression     Depression     Disease of gallbladder     Dizziness     DJD (degenerative joint disease) of hip     Edema 12/29/2009    Elbow tendinitis     Essential hypertension 8/19/2019    Facet syndrome     Fibromyalgia     Fracture of nasal bone     GERD (gastroesophageal reflux disease)     Headache     Hiatal hernia     History of blood transfusion     anemia    Hyperlipidemia     Insomnia     Osteoarthritis     Prediabetes     Rash     Self mutilating behavior     cutter pt states she has not done in months- in therapy    Sleep apnea     no CPAP    Suicidal ideation     Thyroid disorder     hypothyroidism    Vertigo        Past Surgical History:          Procedure Laterality Date    ABDOMEN SURGERY  5/28/2014    LAPAROSCOPIC SLEEVE GASTRECTOMY, EXTENSIVE LYSIS OF ADHESIONS    ACHILLES TENDON SURGERY Left 3/9/2020    LEFT CALCANEOUS PARTIAL EXCISION, SECONDARY REPAIR OF ACHILLES TENDON performed by Amanda Caceres MD at [facility]      open , Ex-lap    CHOLECYSTECTOMY      open    COLONOSCOPY  04/16/2013    dr Cindy Fernandez 2018     DILATION AND CURETTAGE OF UTERUS      ENDOSCOPY, COLON, DIAGNOSTIC      FOOT SURGERY Right 05/27/2016    ARTHROPLASTY RIGHT 5TH DIGIT      HYSTEROSCOPY N/A 12-30-13    Hysteroscopy Dilitation and Curettage    JOINT REPLACEMENT Bilateral     knee    OTHER SURGICAL HISTORY  9/11/2015    ARTHROPLASTY 5TH DIGIT LEFT FOOT          SHOULDER SURGERY      right    SHOULDER SURGERY Right     SLEEVE GASTROPLASTY  May 28, 2014    Dahman    TOTAL KNEE ARTHROPLASTY  12/10/10    Right    TOTAL KNEE ARTHROPLASTY  3/30/10    Left    UPPER GASTROINTESTINAL ENDOSCOPY  4/16/2013    dr Chi Quach ENDOSCOPY  3/20/2014    dr Army Jimenes       Medications Prior to Admission:      Prior to Admission medications    Medication Sig Start Date End Date Taking? Authorizing Provider   nitroGLYCERIN (NITROSTAT) 0.4 MG SL tablet Place 1 tablet under the tongue every 5 minutes as needed for Chest pain up to max of 3 total doses. If no relief after 1 dose, call 911. 3/5/21  Yes Anabell Platt MD   celecoxib (CELEBREX) 200 MG capsule Take 1 capsule by mouth daily 2/25/21  Yes Anthony Rocha MD   pregabalin (LYRICA) 100 MG capsule Take 1 capsule by mouth 3 times daily for 30 days.  2/25/21 3/27/21 Yes Anthony Rocha MD   traMADol (ULTRAM) 50 MG tablet Take 1 tablet by mouth every 6 hours as needed for Pain (Max 2-3 per day) for up to 28 days. 2/25/21 3/25/21 Yes Deyanira Austin MD   rOPINIRole (REQUIP) 1 MG tablet TAKE 2 TABLETS BY MOUTH EVERY NIGHT 2/15/21  Yes Carla Hutchinson MD   betamethasone dipropionate (DIPROLENE) 0.05 % cream APPLY EXTERNALLY TO THE AFFECTED AREA TWICE DAILY 2/10/21  Yes Carla Hutchinson MD   ondansetron (ZOFRAN) 4 MG tablet TAKE 1 TABLET BY MOUTH EVERY 8 HOURS AS NEEDED FOR NAUSEA OR VOMITING 1/26/21  Yes Carla Hutchinson MD   atorvastatin (LIPITOR) 40 MG tablet TAKE 1 TABLET BY MOUTH DAILY  Patient taking differently: TAKE 1 TABLET BY MOUTH NIGHTLY 1/12/21  Yes Carla Hutchinson MD   meclizine (ANTIVERT) 25 MG tablet Take 25 mg by mouth 3 times daily as needed   Yes Historical Provider, MD   levothyroxine (SYNTHROID) 88 MCG tablet TAKE 1 TABLET BY MOUTH DAILY 12/28/20  Yes Carla Hutchinson MD   Cholecalciferol (VITAMIN D3) 125 MCG (5000 UT) TABS Take 1 tablet by mouth 2 times daily 9/2/20  Yes Historical Provider, MD   DULoxetine (CYMBALTA) 60 MG extended release capsule Take 1 capsule by mouth 2 times daily 8/27/20  Yes Historical Provider, MD   ARIPiprazole (ABILIFY) 20 MG tablet Take 1 tablet by mouth daily 9/4/20  Yes Historical Provider, MD   propranolol (INDERAL) 40 MG tablet Take 1 tablet by mouth 3 times daily anxiety 8/27/20  Yes Historical Provider, MD   esomeprazole (NEXIUM) 40 MG delayed release capsule TAKE 1 CAPSULE BY MOUTH EVERY MORNING BEFORE BREAKFAST 7/14/20  Yes Carla Hutchinson MD   QUEtiapine (SEROQUEL) 200 MG tablet Take 200 mg by mouth nightly   Yes Historical Provider, MD       Allergies:  Polysporin [bacitracin-polymyxin b], Lorazepam, Neomycin-polymyxin-gramicidin, and Sulfa antibiotics    Social History:      The patient currently lives at home    TOBACCO:   reports that she has never smoked. She has never used smokeless tobacco.  ETOH:   reports no history of alcohol use.       Family History:      Reviewed        Problem Relation Age of Onset    Heart Disease Mother    Aleisha Heart Failure Mother     High Cholesterol Mother     High Blood Pressure Mother     Stroke Mother     Birth Defects Mother     Other Mother         VV    High Blood Pressure Father     COPD Father     Diabetes Brother     Stroke Maternal Grandmother     Arthritis Maternal Grandmother     Heart Failure Brother     Stroke Brother     Birth Defects Maternal Grandfather     Arthritis Paternal Grandmother        REVIEW OF SYSTEMS:   Pertinent positives as noted in the HPI. All other systems reviewed and negative. PHYSICAL EXAM PERFORMED:    BP (!) 181/98 Comment: following ambulation  Pulse 98   Temp 98.6 °F (37 °C) (Oral)   Resp 20   Ht 5' 4\" (1.626 m)   Wt (!) 325 lb (147.4 kg)   LMP 01/15/2014   SpO2 91%   BMI 55.79 kg/m²     General appearance:  No apparent distress, appears stated age and cooperative. HEENT:  Normal cephalic, atraumatic without obvious deformity. Pupils equal, round, and reactive to light. Extra ocular muscles intact. Conjunctivae/corneas clear. Neck: Supple, with full range of motion. No jugular venous distention. Trachea midline. Respiratory:  Normal respiratory effort. Clear to auscultation, bilaterally without Rales/Wheezes/Rhonchi. Cardiovascular:  Regular rate and rhythm with normal S1/S2 without murmurs, rubs or gallops. Abdomen: Soft, non-tender, non-distended with normal bowel sounds. Musculoskeletal:  No clubbing, cyanosis or edema bilaterally. Full range of motion without deformity. Skin: Skin color, texture, turgor normal.  No rashes or lesions. Neurologic:  Neurovascularly intact without any focal sensory/motor deficits.  Cranial nerves: II-XII intact, grossly non-focal.  Psychiatric:  Alert and oriented, thought content appropriate, normal insight  Capillary Refill: Brisk,< 3 seconds   Peripheral Pulses: +2 palpable, equal bilaterally       Labs:     Recent Labs     03/07/21  0143   WBC 7.3   HGB 13.8   HCT 42.2        Recent Labs     03/07/21 0143      K 3.9      CO2 28   BUN 24*   CREATININE 0.9   CALCIUM 9.5     No results for input(s): AST, ALT, BILIDIR, BILITOT, ALKPHOS in the last 72 hours. No results for input(s): INR in the last 72 hours. Recent Labs     03/07/21  0939 03/07/21  1217 03/07/21  1403   TROPONINI <0.01 <0.01 <0.01       Urinalysis:      Lab Results   Component Value Date    NITRU Negative 09/30/2019    WBCUA 3-5 12/21/2018    BACTERIA 2+ 12/21/2018    RBCUA None seen 12/21/2018    BLOODU neg 10/08/2019    BLOODU Negative 09/30/2019    SPECGRAV 1.030 10/08/2019    SPECGRAV 1.015 09/30/2019    GLUCOSEU neg 10/08/2019    GLUCOSEU Negative 09/30/2019    GLUCOSEU NEGATIVE 04/21/2012       Radiology:     CXR: I have reviewed the CXR with the following interpretation: No consolidation effusion or pneumothorax  EKG:  I have reviewed the EKG with the following interpretation: Normal sinus rhythm, normal axis, no acute ST or T wave changes to indicate ischemia or infarction    CTA ABDOMEN W WO CONTRAST   Final Result   1. The abdominal aorta is normal in caliber without dissection, aneurysm    or acute periaortic abnormality. 2.  Postsurgical changes along the greater curvature of the stomach are    consistent with gastric reduction surgery. The bowel loops in the    abdomen demonstrate no evidence for bowel obstruction or significant    focal mucosal asymmetry. No free intraperitoneal fluid or    pneumoperitoneum in the abdomen. The pelvis was not included. CTA CHEST ABDOMEN PELVIS W CONTRAST   Final Result   1. The thoracic aorta is normal in caliber without dissection or    aneurysm. No acute periaortic abnormality. Precontrast imaging    demonstrates no intimal wall abnormality. 2.  The cardiac chambers are normal in caliber. There is moderate    coronary artery calcification in the LAD distribution and mid to distal    circumflex. Detailed evaluation is limited.   The clinical significance    of this finding is indeterminate. 3.  Linear subsegmental changes in the inferior lingular segment are    noted, most consistent with linear atelectasis or scarring. No focal    consolidation. No pleural effusion or pneumothorax. XR CHEST PORTABLE   Final Result     No focal consolidation or acute cardiopulmonary process identified. ASSESSMENT/PLAN:    Active Hospital Problems    Diagnosis Date Noted    Chest pain [R07.9] 03/07/2021     Chest pain concern for ACS, described as a dull, along with dyspnea on exertion, she has multiple risk factors including prediabetes, morbid obesity, hypertension  EKG nonischemic, troponin trend negative,  She had a stress test in 10/2020, will consult cardiology for evaluation, possibly could benefit from left heart catheter for evaluation of symptoms did improve with nitroglycerin. We will check A1c, lipid profile  Continue aspirin, high intensity statin    Dyspnea on exertion  She walked the hallway and became tachycardic dyspneic and had recurrence of chest pain with stayed for 30 minutes  Repeat EKG showed normal sinus rhythm  Check echocardiogram  We will give a dose of Lasix    Anxiety/depression/psychiatric history (history of self mutilating, bipolar 1 disorder)  Continue home Cymbalta, Seroquel    Restless leg syndrome  Continue Requip nightly    Hypothyroidism  Continue Synthroid    Prediabetes  Last A1c was in 10/2017  Repeat A1c    Morbid obesity due to excess calories Body mass index is 55.79 kg/m². - Complicating assessment and treatment. Placing patient at risk for multiple co-morbidities as well as early death and contributing to the patient's presentation.  on weight loss when appropriate.       DVT Prophylaxis: n/a  Diet: DIET CARDIAC;   Diet NPO, After Midnight  Code Status: Full Code    PT/OT Eval Status: ordered    [address] for observation       Kenn Adkins MD   Hospitalist    Thank you Saul Augustin MD for the opportunity to be involved in this patient's care. If you have any questions or concerns please feel free to contact me at [phone].

## 2021-03-07 NOTE — PROGRESS NOTES
Pt to room 6325 from ED. Alert and oriented x4. Pt denies chest pain, headache, or palpitations at this time. C/o shortness of breath on exertion. Pt denies nausea at this time but state that she felt nauseous with onset of chest pain last night. Lungs diminished. Murmur noted upon auscultation. Placed on telemetry as ordered. Ambulate with steady gait and no ambulatory aids to bathroom. Dr. Les Beavers and Dr. Vania Mireles at bedside. Bed locked in lowest position with call light and personal belongings within reach.         Vitals:    03/07/21 0904   BP: (!) 165/97   Pulse: 84   Resp: 16   Temp: 97.4 °F (36.3 °C)   SpO2: 98%

## 2021-03-07 NOTE — ED PROVIDER NOTES
[address] [facility]          EM RESIDENT NOTE       Date of evaluation: 3/7/2021    Chief Complaint     Chest Pain      History of Present Illness     Feroz Gamez is a 63 y.o. female  With PMHx of HTN, HLD, anxiety, bipolar disorder, depression presents for evaluation of chest pain. 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 Pain radiates into her upper back. She had some associated shortness of breath and nausea with the pain. She took nitro (which she had been given by PCP a few weeks ago) which brought the pain down from an 8 to a 7. She called EMS and was given additional nitro and 4 ASA en route which improved pain to 6/10. She reports having exact same chest pain last week for which she was evaluated in the emergency room. At that time had negative evaluation including troponin x2, CXR, EKG. She has presented to OSH a couple additional times within last few weeks for similar sx. Review of Systems     Review of Systems   Constitutional: Negative for chills and fever. HENT: Negative for rhinorrhea and sore throat. Eyes: Negative for photophobia and visual disturbance. Respiratory: Positive for shortness of breath. Negative for cough. Cardiovascular: Positive for chest pain and leg swelling (Chronic). Negative for palpitations. Gastrointestinal: Positive for nausea. Negative for abdominal pain, diarrhea and vomiting. Genitourinary: Negative for dysuria and hematuria. Musculoskeletal: Negative for neck pain and neck stiffness. Skin: Negative for rash and wound. Neurological: Negative for dizziness and headaches.        Past Medical, Surgical, Family, and Social History     She has a past medical history of Acquired hyperlipoproteinemia, MARIO (acute kidney injury) ([facility] [address] Allergic rhinitis, Anxiety, Arthritis, Bilateral lower extremity edema, Bipolar 1 disorder ([facility] [address] Chronic pain syndrome, DDD (degenerative disc disease), lumbar, Depression, Depression, Disease of gallbladder, Dizziness, DJD (degenerative joint disease) of hip, Edema, Elbow tendinitis, Essential hypertension, Facet syndrome, Fibromyalgia, Fracture of nasal bone, GERD (gastroesophageal reflux disease), Headache, Hiatal hernia, History of blood transfusion, Hyperlipidemia, Insomnia, Osteoarthritis, Prediabetes, Rash, Self mutilating behavior, Sleep apnea, Suicidal ideation, Thyroid disorder, and Vertigo. She has a past surgical history that includes Ankle surgery; Appendectomy; Total knee arthroplasty (12/10/10); Total knee arthroplasty (3/30/10); shoulder surgery; Colonoscopy (04/16/2013); Upper gastrointestinal endoscopy (4/16/2013); Cholecystectomy; hysteroscopy (N/A, 12-30-13); Upper gastrointestinal endoscopy (3/20/2014); Dilation and curettage of uterus; joint replacement (Bilateral); Abdomen surgery (5/28/2014); Sleeve Gastroplasty (May 28, 2014); Endoscopy, colon, diagnostic; shoulder surgery (Right); Ankle surgery; other surgical history (9/11/2015); Foot surgery (Right, 05/27/2016); and Achilles tendon surgery (Left, 3/9/2020). Her family history includes Arthritis in her maternal grandmother and paternal grandmother; Birth Defects in her maternal grandfather and mother; COPD in her father; Diabetes in her brother; Heart Disease in her mother; Heart Failure in her brother and mother; High Blood Pressure in her father and mother; High Cholesterol in her mother; Other in her mother; Stroke in her brother, maternal grandmother, and mother. She reports that she has never smoked. She has never used smokeless tobacco. She reports that she does not drink alcohol or use drugs.     Medications     Previous Medications    ARIPIPRAZOLE (ABILIFY) 20 MG TABLET    Take 1 tablet by mouth daily    ATORVASTATIN (LIPITOR) 40 MG TABLET    TAKE 1 TABLET BY MOUTH DAILY    BETAMETHASONE DIPROPIONATE (DIPROLENE) 0.05 % CREAM    APPLY EXTERNALLY TO THE AFFECTED AREA TWICE DAILY    CELECOXIB (CELEBREX) 200 MG CAPSULE    Take 1 capsule by mouth daily    CHOLECALCIFEROL (VITAMIN D3) 125 MCG (5000 UT) TABS    Take 1 tablet by mouth 2 times daily    DULOXETINE (CYMBALTA) 60 MG EXTENDED RELEASE CAPSULE    Take 1 capsule by mouth 2 times daily    ESOMEPRAZOLE (NEXIUM) 40 MG DELAYED RELEASE CAPSULE    TAKE 1 CAPSULE BY MOUTH EVERY MORNING BEFORE BREAKFAST    LEVOTHYROXINE (SYNTHROID) 88 MCG TABLET    TAKE 1 TABLET BY MOUTH DAILY    MECLIZINE (ANTIVERT) 25 MG TABLET    Take 25 mg by mouth 3 times daily as needed    NITROGLYCERIN (NITROSTAT) 0.4 MG SL TABLET    Place 1 tablet under the tongue every 5 minutes as needed for Chest pain up to max of 3 total doses. If no relief after 1 dose, call 911. ONDANSETRON (ZOFRAN) 4 MG TABLET    TAKE 1 TABLET BY MOUTH EVERY 8 HOURS AS NEEDED FOR NAUSEA OR VOMITING    PREGABALIN (LYRICA) 100 MG CAPSULE    Take 1 capsule by mouth 3 times daily for 30 days. PROPRANOLOL (INDERAL) 40 MG TABLET    Take 1 tablet by mouth 3 times daily anxiety    QUETIAPINE (SEROQUEL) 200 MG TABLET    Take 200 mg by mouth nightly    ROPINIROLE (REQUIP) 1 MG TABLET    TAKE 2 TABLETS BY MOUTH EVERY NIGHT    TRAMADOL (ULTRAM) 50 MG TABLET    Take 1 tablet by mouth every 6 hours as needed for Pain (Max 2-3 per day) for up to 28 days. Allergies     She is allergic to polysporin [bacitracin-polymyxin b]; lorazepam; neomycin-polymyxin-gramicidin; and sulfa antibiotics. Physical Exam     INITIAL VITALS: BP: (!) 164/132, Temp: 97.5 °F (36.4 °C), Pulse: 88, Resp: 14, SpO2: 97 %   Physical Exam  Vitals signs and nursing note reviewed. Constitutional:       General: She is not in acute distress. HENT:      Head: Normocephalic and atraumatic. Mouth/Throat:      Mouth: Mucous membranes are moist.   Eyes:      Extraocular Movements: Extraocular movements intact.       Pupils: Pupils are Ref Range    WBC 7.3 4.0 - 11.0 K/uL    RBC 4.67 4.00 - 5.20 M/uL    Hemoglobin 13.8 12.0 - 16.0 g/dL    Hematocrit 42.2 36.0 - 48.0 %    MCV 90.4 80.0 - 100.0 fL    MCH 29.5 26.0 - 34.0 pg    MCHC 32.6 31.0 - 36.0 g/dL    RDW 15.0 12.4 - 15.4 %    Platelets 736 614 - 388 K/uL    MPV 8.7 5.0 - 10.5 fL    Neutrophils % 64.2 %    Lymphocytes % 23.8 %    Monocytes % 7.4 %    Eosinophils % 3.8 %    Basophils % 0.8 %    Neutrophils Absolute 4.7 1.7 - 7.7 K/uL    Lymphocytes Absolute 1.7 1.0 - 5.1 K/uL    Monocytes Absolute 0.5 0.0 - 1.3 K/uL    Eosinophils Absolute 0.3 0.0 - 0.6 K/uL    Basophils Absolute 0.1 0.0 - 0.2 K/uL   Troponin   Result Value Ref Range    Troponin <0.01 <0.01 ng/mL   Brain Natriuretic Peptide   Result Value Ref Range    Pro- (H) 0 - 124 pg/mL       RECENT VITALS:  BP: (!) 185/120, Temp: 97.5 °F (36.4 °C), Pulse: 71,Resp: 13, SpO2: 98 %     Procedures         ED Course     Nursing Notes, Past Medical Hx, Past Surgical Hx, Social Hx, Allergies, and Family Hx were reviewed. The patient was given the followingmedications:  No orders of the defined types were placed in this encounter. CONSULTS:  None    MEDICAL DECISION MAKING / ASSESSMENT / Raul Madisyn is a 63 y.o. female with PMHx of HTN, HLD, anxiety, bipolar disorder, depression presents for evaluation of chest pain. Pain started just PTA, substernal pressure with some associated SOB and nausea. Had nitro and ASA x4 with mild improvement in pain. Nitro seems to have been prescribed by PCP although no documented history of CAD. She had similar chest pain one week ago with negative ED evaluation including EKG, troponin x2, CXR. Of note, she had echo in 2018 which demonstrated moderate TR and elevated PA pressures of 47, no apparent formal dx of pulmonary HTN. On evaluation, she is in no acute distress. Hypertensive to 160s/130 with otherwise normal vital signs. Heart with systolic murmur, regular rate and rhythm.

## 2021-03-08 ENCOUNTER — APPOINTMENT (OUTPATIENT)
Dept: CARDIAC CATH/INVASIVE PROCEDURES | Age: 61
End: 2021-03-08
Payer: COMMERCIAL

## 2021-03-08 VITALS
SYSTOLIC BLOOD PRESSURE: 166 MMHG | RESPIRATION RATE: 16 BRPM | BODY MASS INDEX: 50.02 KG/M2 | TEMPERATURE: 97.9 F | WEIGHT: 293 LBS | HEIGHT: 64 IN | OXYGEN SATURATION: 94 % | DIASTOLIC BLOOD PRESSURE: 85 MMHG | HEART RATE: 101 BPM

## 2021-03-08 LAB
EKG ATRIAL RATE: 89 BPM
EKG ATRIAL RATE: 94 BPM
EKG DIAGNOSIS: NORMAL
EKG DIAGNOSIS: NORMAL
EKG P AXIS: 54 DEGREES
EKG P AXIS: 57 DEGREES
EKG P-R INTERVAL: 146 MS
EKG P-R INTERVAL: 152 MS
EKG Q-T INTERVAL: 354 MS
EKG Q-T INTERVAL: 362 MS
EKG QRS DURATION: 84 MS
EKG QRS DURATION: 84 MS
EKG QTC CALCULATION (BAZETT): 440 MS
EKG QTC CALCULATION (BAZETT): 442 MS
EKG R AXIS: 25 DEGREES
EKG R AXIS: 53 DEGREES
EKG T AXIS: 25 DEGREES
EKG T AXIS: 41 DEGREES
EKG VENTRICULAR RATE: 89 BPM
EKG VENTRICULAR RATE: 94 BPM
ESTIMATED AVERAGE GLUCOSE: 114 MG/DL
GLUCOSE BLD-MCNC: 89 MG/DL (ref 70–99)
GLUCOSE BLD-MCNC: 91 MG/DL (ref 70–99)
HBA1C MFR BLD: 5.6 %
HCT VFR BLD CALC: 37.7 % (ref 36–48)
HEMOGLOBIN: 12.2 G/DL (ref 12–16)
LEFT VENTRICULAR EJECTION FRACTION MODE: NORMAL
LV EF: 55 %
MCH RBC QN AUTO: 29.4 PG (ref 26–34)
MCHC RBC AUTO-ENTMCNC: 32.5 G/DL (ref 31–36)
MCV RBC AUTO: 90.4 FL (ref 80–100)
PDW BLD-RTO: 15.5 % (ref 12.4–15.4)
PERFORMED ON: NORMAL
PERFORMED ON: NORMAL
PLATELET # BLD: 170 K/UL (ref 135–450)
PMV BLD AUTO: 8.6 FL (ref 5–10.5)
RBC # BLD: 4.17 M/UL (ref 4–5.2)
WBC # BLD: 5.2 K/UL (ref 4–11)

## 2021-03-08 PROCEDURE — 2580000003 HC RX 258: Performed by: INTERNAL MEDICINE

## 2021-03-08 PROCEDURE — 84443 ASSAY THYROID STIM HORMONE: CPT

## 2021-03-08 PROCEDURE — C1894 INTRO/SHEATH, NON-LASER: HCPCS

## 2021-03-08 PROCEDURE — 2709999900 HC NON-CHARGEABLE SUPPLY

## 2021-03-08 PROCEDURE — G0378 HOSPITAL OBSERVATION PER HR: HCPCS

## 2021-03-08 PROCEDURE — 84439 ASSAY OF FREE THYROXINE: CPT

## 2021-03-08 PROCEDURE — 6370000000 HC RX 637 (ALT 250 FOR IP)

## 2021-03-08 PROCEDURE — 93010 ELECTROCARDIOGRAM REPORT: CPT | Performed by: INTERNAL MEDICINE

## 2021-03-08 PROCEDURE — 6360000002 HC RX W HCPCS

## 2021-03-08 PROCEDURE — 93458 L HRT ARTERY/VENTRICLE ANGIO: CPT | Performed by: INTERNAL MEDICINE

## 2021-03-08 PROCEDURE — C1769 GUIDE WIRE: HCPCS

## 2021-03-08 PROCEDURE — 80061 LIPID PANEL: CPT

## 2021-03-08 PROCEDURE — 93458 L HRT ARTERY/VENTRICLE ANGIO: CPT

## 2021-03-08 PROCEDURE — 85027 COMPLETE CBC AUTOMATED: CPT

## 2021-03-08 PROCEDURE — 36415 COLL VENOUS BLD VENIPUNCTURE: CPT

## 2021-03-08 PROCEDURE — 99152 MOD SED SAME PHYS/QHP 5/>YRS: CPT

## 2021-03-08 PROCEDURE — C1887 CATHETER, GUIDING: HCPCS

## 2021-03-08 PROCEDURE — 99024 POSTOP FOLLOW-UP VISIT: CPT | Performed by: INTERNAL MEDICINE

## 2021-03-08 PROCEDURE — 6370000000 HC RX 637 (ALT 250 FOR IP): Performed by: INTERNAL MEDICINE

## 2021-03-08 PROCEDURE — 93005 ELECTROCARDIOGRAM TRACING: CPT | Performed by: INTERNAL MEDICINE

## 2021-03-08 PROCEDURE — 6360000004 HC RX CONTRAST MEDICATION: Performed by: INTERNAL MEDICINE

## 2021-03-08 PROCEDURE — 2500000003 HC RX 250 WO HCPCS

## 2021-03-08 RX ORDER — FUROSEMIDE 20 MG/1
20 TABLET ORAL DAILY
Qty: 7 TABLET | Refills: 0 | Status: ON HOLD | OUTPATIENT
Start: 2021-03-08 | End: 2021-03-28 | Stop reason: HOSPADM

## 2021-03-08 RX ORDER — SODIUM CHLORIDE 9 MG/ML
INJECTION, SOLUTION INTRAVENOUS CONTINUOUS
Status: ACTIVE | OUTPATIENT
Start: 2021-03-08 | End: 2021-03-08

## 2021-03-08 RX ORDER — SODIUM CHLORIDE 9 MG/ML
INJECTION, SOLUTION INTRAVENOUS CONTINUOUS
Status: DISCONTINUED | OUTPATIENT
Start: 2021-03-08 | End: 2021-03-08 | Stop reason: HOSPADM

## 2021-03-08 RX ORDER — ACETAMINOPHEN 325 MG/1
650 TABLET ORAL EVERY 4 HOURS PRN
Status: DISCONTINUED | OUTPATIENT
Start: 2021-03-08 | End: 2021-03-08 | Stop reason: HOSPADM

## 2021-03-08 RX ADMIN — DULOXETINE HYDROCHLORIDE 60 MG: 60 CAPSULE, DELAYED RELEASE ORAL at 10:48

## 2021-03-08 RX ADMIN — Medication 10 ML: at 10:42

## 2021-03-08 RX ADMIN — LEVOTHYROXINE SODIUM 88 MCG: 0.09 TABLET ORAL at 10:48

## 2021-03-08 RX ADMIN — PREGABALIN 100 MG: 50 CAPSULE ORAL at 16:09

## 2021-03-08 RX ADMIN — NITROGLYCERIN 0.5 INCH: 20 OINTMENT TOPICAL at 06:29

## 2021-03-08 RX ADMIN — ARIPIPRAZOLE 20 MG: 5 TABLET ORAL at 10:48

## 2021-03-08 RX ADMIN — ASPIRIN 81 MG: 81 TABLET, CHEWABLE ORAL at 09:01

## 2021-03-08 RX ADMIN — PANTOPRAZOLE SODIUM 40 MG: 40 TABLET, DELAYED RELEASE ORAL at 06:28

## 2021-03-08 RX ADMIN — IOHEXOL 150 ML: 350 INJECTION, SOLUTION INTRAVENOUS at 10:02

## 2021-03-08 RX ADMIN — SODIUM CHLORIDE: 9 INJECTION, SOLUTION INTRAVENOUS at 10:30

## 2021-03-08 RX ADMIN — PREGABALIN 100 MG: 50 CAPSULE ORAL at 10:48

## 2021-03-08 ASSESSMENT — PAIN SCALES - GENERAL
PAINLEVEL_OUTOF10: 0

## 2021-03-08 ASSESSMENT — PAIN DESCRIPTION - PAIN TYPE: TYPE: ACUTE PAIN

## 2021-03-08 NOTE — ANESTHESIA PRE-OP
Brief Pre-Op Note/Sedation Assessment      Milly Saleh  1960  [id number]      [id number]  10:55 AM    Planned Procedure: Cardiac Catheterization Procedure    Post Procedure Plan: Return to same level of care    Consent: I have discussed with the patient and/or the patient representative the indication, alternatives, and the possible risks and/or complications of the planned procedure and the anesthesia methods. The patient and/or patient representative appear to understand and agree to proceed. Chief Complaint: Chest Pain/Pressure  Anginal Equivalent      Indications for Cath Procedure:  Suspected CAD  Anginal Classification within 2 weeks:  CCS II - Slight limitation, with angina only during vigorous physical activity  NYHA Heart Failure Class within 2 weeks: No symptoms  Is Cath Lab Visit Valve-related?: No  Surgical Risk: Intermediate  Functional Type: >= 4 METS without symptoms    Anti- Anginal Meds within 2 weeks:   Yes: Beta Blockers    Stress or Imaging Studies Performed (within 6 months):  None     Vital Signs:  BP (!) 165/90   Pulse 85   Temp 97.8 °F (36.6 °C) (Oral)   Resp 16   Ht 5' 4\" (1.626 m)   Wt (!) 319 lb 10.7 oz (145 kg)   LMP 01/15/2014   SpO2 92%   BMI 54.87 kg/m²     Allergies: Allergies   Allergen Reactions    Polysporin [Bacitracin-Polymyxin B] Other (See Comments) and Itching     Pt breaks out in blisters. Pt breaks out in blisters.     Lorazepam Other (See Comments)     \"went crazy\"    Neomycin-Polymyxin-Gramicidin     Sulfa Antibiotics Hives       Past Medical History:  Past Medical History:   Diagnosis Date    Acquired hyperlipoproteinemia     MARIO (acute kidney injury) ([facility] [address] 9/30/2019    Allergic rhinitis     Anxiety     Arthritis     Bilateral lower extremity edema     Bipolar 1 disorder (HCC)     Chronic pain syndrome     DDD (degenerative disc disease), lumbar     Depression     Depression     Disease of gallbladder     Dizziness     DJD (degenerative joint disease) of hip     Edema 12/29/2009    Elbow tendinitis     Essential hypertension 8/19/2019    Facet syndrome     Fibromyalgia     Fracture of nasal bone     GERD (gastroesophageal reflux disease)     Headache     Hiatal hernia     History of blood transfusion     anemia    Hyperlipidemia     Insomnia     Osteoarthritis     Prediabetes     Rash     Self mutilating behavior     cutter pt states she has not done in months- in therapy    Sleep apnea     no CPAP    Suicidal ideation     Thyroid disorder     hypothyroidism    Vertigo          Surgical History:  Past Surgical History:   Procedure Laterality Date    ABDOMEN SURGERY  5/28/2014    LAPAROSCOPIC SLEEVE GASTRECTOMY, EXTENSIVE LYSIS OF ADHESIONS    ACHILLES TENDON SURGERY Left 3/9/2020    LEFT CALCANEOUS PARTIAL EXCISION, SECONDARY REPAIR OF ACHILLES TENDON performed by Shayla Pal [facility] at [facility]      open , Ex-lap    CHOLECYSTECTOMY      open    COLONOSCOPY  04/16/2013    dr Angi Motley 2018     DILATION AND CURETTAGE OF UTERUS      ENDOSCOPY, COLON, DIAGNOSTIC      FOOT SURGERY Right 05/27/2016    ARTHROPLASTY RIGHT 5TH DIGIT      HYSTEROSCOPY N/A 12-30-13    Hysteroscopy Dilitation and Curettage    JOINT REPLACEMENT Bilateral     knee    OTHER SURGICAL HISTORY  9/11/2015    ARTHROPLASTY 5TH DIGIT LEFT FOOT          SHOULDER SURGERY      right    SHOULDER SURGERY Right     SLEEVE GASTROPLASTY  May 28, 2014    Rosi    TOTAL KNEE ARTHROPLASTY  12/10/10    Right    TOTAL KNEE ARTHROPLASTY  3/30/10    Left    UPPER GASTROINTESTINAL ENDOSCOPY  4/16/2013    dr Shaun Ortega    UPPER GASTROINTESTINAL ENDOSCOPY  3/20/2014    dr Shaun Ortega         Medications:  Current Facility-Administered Medications   Medication Dose Route Frequency Provider Last Rate Last Admin    0.9 % sodium chloride infusion   Intravenous Continuous Tiffany Goodrich  mL/hr at 03/08/21 1030 New Bag at 03/08/21 1030    ARIPiprazole (ABILIFY) tablet 20 mg  20 mg Oral Daily Amber Garcia MD   20 mg at 03/08/21 1048    DULoxetine (CYMBALTA) extended release capsule 60 mg  60 mg Oral BID Amber Garcia MD   60 mg at 03/08/21 1048    pantoprazole (PROTONIX) tablet 40 mg  40 mg Oral QAM AC Amber Garcia MD   40 mg at 03/08/21 6465    levothyroxine (SYNTHROID) tablet 88 mcg  88 mcg Oral Daily Amber Garcia MD   88 mcg at 03/08/21 1048    meclizine (ANTIVERT) tablet 25 mg  25 mg Oral TID PRN Amber Garcia MD        pregabalin (LYRICA) capsule 100 mg  100 mg Oral TID Amber Garcia MD   100 mg at 03/08/21 1048    QUEtiapine (SEROQUEL) tablet 200 mg  200 mg Oral Nightly Amber Garcia MD   200 mg at 03/07/21 2005    rOPINIRole (REQUIP) tablet 2 mg  2 mg Oral Nightly Amber Garcia MD   2 mg at 03/07/21 2006    sodium chloride flush 0.9 % injection 10 mL  10 mL Intravenous 2 times per day Abmer Garcia MD   10 mL at 03/08/21 1042    sodium chloride flush 0.9 % injection 10 mL  10 mL Intravenous PRN Amber Garcia MD        promethazine (PHENERGAN) tablet 12.5 mg  12.5 mg Oral Q6H PRN Amber Garcia MD        Or    [facility]ron [facility] injection 4 mg  4 mg Intravenous Q6H PRN Amber Garcia MD        acetaminophen (TYLENOL) tablet 650 mg  650 mg Oral Q6H PRN Amber Garcia MD        Or    acetaminophen (TYLENOL) suppository 650 mg  650 mg Rectal Q6H PRN Amber Garcia MD        polyethylene glycol [facility]) packet 17 g  17 g Oral Daily PRN Amber Garcia MD        aspirin chewable tablet 81 mg  81 mg Oral Daily Amber Garcia MD   81 mg at 03/08/21 0901    atorvastatin (LIPITOR) tablet 40 mg  40 mg Oral Nightly Amber Garcia MD   40 mg at 03/07/21 2006    nitroglycerin (NITRO-BID) 2 % ointment 0.5 inch  0.5 inch Topical 4 times per day Amber Garcia MD   0.5 inch at 03/08/21 0629    enoxaparin (LOVENOX) injection 40 mg  40 mg Subcutaneous BID Joselito Osorio MD   40 mg at 03/07/21 2005    perflutren lipid microspheres (DEFINITY) injection 1.65 mg  1.5 mL Intravenous ONCE PRN Joselito Osorio MD        glucose (GLUTOSE) 40 % oral gel 15 g  15 g Oral PRN Joselito Osorio MD        dextrose 50 % IV solution  12.5 g Intravenous PRN Joselito Osorio MD        glucagon (rDNA) injection 1 mg  1 mg Intramuscular PRN Joselito Osorio MD        dextrose 5 % solution  100 mL/hr Intravenous PRN Joselito Osorio MD        insulin lispro (1 Unit Dial) 0-6 Units  0-6 Units Subcutaneous TID WC Joselito Osorio MD        insulin lispro (1 Unit Dial) 0-3 Units  0-3 Units Subcutaneous Nightly Joselito Osorio MD               Pre-Sedation:    Pre-Sedation Documentation and Exam:  I have personally completed a history, physical exam & review of systems for this patient (see notes). Prior History of Anesthesia Complications:   none    Modified Mallampati:  I (soft palate, uvula, fauces, tonsillar pillars visible)    ASA Classification:  Class 2 - A normal healthy patient with mild systemic disease      Idalmis Scale: Activity:  2 - Able to move 4 extremities voluntarily on command  Respiration:  2 - Able to breathe deeply and cough freely  Circulation:  2 - BP+/- 20mmHg of normal  Consciousness:  2 - Fully awake  Oxygen Saturation (color):  2 - Able to maintain oxygen saturation >92% on room air    Sedation/Anesthesia Plan:  Guard the patient's safety and welfare. Minimize physical discomfort and pain. Minimize negative psychological responses to treatment by providing sedation and analgesia and maximize the potential amnesia. Patient to meet pre-procedure discharge plan.     Medication Planned:  Versed and fentanyl    Patient is an appropriate candidate for plan of sedation: yes      Electronically signed by Yeny Freed MD on 3/8/2021 at 10:55 AM

## 2021-03-08 NOTE — PROGRESS NOTES
Discharge paperwork discussed with patient, all questions answered. IV and telemetry box removed. Patient needs lyft home, supervisor arranging transport.

## 2021-03-08 NOTE — PLAN OF CARE
Problem: Pain:  Goal: Pain level will decrease  Description: Pain level will decrease  3/8/2021 1237 by Skip Elise RN  Outcome: Completed    Goal: Control of acute pain  Description: Control of acute pain  3/8/2021 1237 by Skip Elise RN  Outcome: Completed    Goal: Control of chronic pain  Description: Control of chronic pain  3/8/2021 1237 by Skip Elise RN  Outcome: Completed       Problem:  Activity:  Goal: Ability to tolerate increased activity will improve  Description: Ability to tolerate increased activity will improve  3/8/2021 1237 by Skip Elise RN  Outcome: Completed       Problem: Skin Integrity:  Goal: Will show no infection signs and symptoms  Description: Will show no infection signs and symptoms  3/8/2021 1237 by Skip Elise RN  Outcome: Completed    Goal: Absence of new skin breakdown  Description: Absence of new skin breakdown  3/8/2021 1237 by Skip Elise RN  Outcome: Completed

## 2021-03-08 NOTE — PLAN OF CARE
Problem: Pain:  Goal: Pain level will decrease  Description: Pain level will decrease  3/7/2021 2359 by Tricia Cowan RN  Outcome: Ongoing   Pt denies any chest pain.  Will continue to monitor alteration in comfort

## 2021-03-08 NOTE — PROGRESS NOTES
NPO after midnight as ordered. Vital signs stable, afebrile. RA sat 93%. Pt denies SOB at reat, but admits to mild SOB with ambulation. Will continue to monitor, breathing pattern.

## 2021-03-08 NOTE — CARE COORDINATION
Case Management Assessment           Initial Evaluation                Date / Time of Evaluation: 3/8/2021 12:14 PM                 Assessment Completed by: Mona Richards    Patient Name: Loki Zurita     YOB: 1960  Diagnosis: Chest pain [R07.9]     Date / Time: 3/7/2021  1:05 AM    Patient Admission Status: Inpatient    If patient is discharged prior to next notation, then this note serves as note for discharge by case management.      Current PCP: Maureen Araya MD  Clinic Patient: No    Chart Reviewed: Yes  Patient/ Family Interviewed: Yes    Initial assessment completed at bedside with: patient    Hospitalization in the last 30 days: No    Emergency Contacts:  Extended Emergency Contact Information  Primary Emergency Contact:  [address] Phone: [phone]  Mobile Phone: [phone]  Relation: Brother/Sister    Advance Directives:   Code Status: Full Code    Financial  Payor: Lucian Mattson / Plan: Hector Rodas / Product Type: *No Product type* /     Pre-cert required for SNF: Yes    Pharmacy    [id number] [address] [phone]  [place] [phone]  Phone: [phone] [facility]ax: [facility] [address] [phone]  f [phone]  [address] [place] [address] [address] [facility]  Phone: [phone] Fax: [phone]    [facility] [address] [phone]  [place] [address]  Phone: [phone] Fax: [address] [place], [address] [phone] Te[place] [phone] [address] [place] [address]  Phone: [phone] Fax: [phone]      Potential assistance Purchasing Medications: Potential Assistance Purchasing Medications: No  Does Patient want to participate in local refill/ meds

## 2021-03-08 NOTE — DISCHARGE SUMMARY
Hospital Medicine Discharge Summary    Patient ID: Rondal Rene      Patient's PCP: Patrice Landaverde MD    Admit Date: 3/7/2021     Discharge Date:   3/8/2021    Admitting Physician: Kathryn Knight MD     Discharge Physician: Delfina Guzman MD     Discharge Diagnoses: Active Hospital Problems    Diagnosis Date Noted    Chest pain [R07.9] 03/07/2021       The patient was seen and examined on day of discharge and this discharge summary is in conjunction with any daily progress note from day of discharge. Hospital Course: Patient is 44-year-old female with history of hypothyroid, sleep apnea, GERD, hypertension, obesity came into ER with a complaint of chest pain. Was hemodynamically stable. Cardiology recommended left heart cath normal LAD, left main, circumflex and RCA EF 55 to 60%. Seen and examined on day of discharge. Denies any chest pain, shortness of breath, nausea or vomiting. Medically stable to be discharged after clearance from cardiology. Will discharge on Lasix 20 mg for lower extremity swelling for 1 week. She is to follow-up with cardiology and PCP. Final diagnosis  #Chest pain resolved s/p left heart cath clean cath no CAD. #Anxiety/depression/psychiatric history of self mutilating, bipolar disorder  # restless leg syndrome  #Hypothyroid  # sleep apnea  #Obesity          Physical Exam Performed:     /76   Pulse 100   Temp 97.5 °F (36.4 °C) (Oral)   Resp 18   Ht 5' 4\" (1.626 m)   Wt (!) 319 lb 10.7 oz (145 kg)   LMP 01/15/2014   SpO2 94%   BMI 54.87 kg/m²       General appearance:  No apparent distress, appears stated age and cooperative. HEENT:  Normal cephalic, atraumatic without obvious deformity. Pupils equal, round, and reactive to light. Extra ocular muscles intact. Conjunctivae/corneas clear. Neck: Supple, with full range of motion. No jugular venous distention. Trachea midline. Respiratory:  Normal respiratory effort.  Clear to auscultation, There is moderate    coronary artery calcification in the LAD distribution and mid to distal    circumflex. Detailed evaluation is limited. The clinical significance    of this finding is indeterminate. 3.  Linear subsegmental changes in the inferior lingular segment are    noted, most consistent with linear atelectasis or scarring. No focal    consolidation. No pleural effusion or pneumothorax. XR CHEST PORTABLE   Final Result     No focal consolidation or acute cardiopulmonary process identified. Consults:     IP CONSULT TO HOSPITALIST  IP CONSULT TO CARDIOLOGY    Disposition:  home     Condition at Discharge: Stable    Discharge Instructions/Follow-up:  PCP and Cardiology    Code Status:  Full Code     Activity: activity as tolerated    Diet: cardiac diet      Discharge Medications:     Current Discharge Medication List           Details   furosemide (LASIX) 20 MG tablet Take 1 tablet by mouth daily for 7 days  Qty: 7 tablet, Refills: 0              Details   nitroGLYCERIN (NITROSTAT) 0.4 MG SL tablet Place 1 tablet under the tongue every 5 minutes as needed for Chest pain up to max of 3 total doses. If no relief after 1 dose, call 911. Qty: 25 tablet, Refills: 3      celecoxib (CELEBREX) 200 MG capsule Take 1 capsule by mouth daily  Qty: 30 capsule, Refills: 1    Associated Diagnoses: Chronic pain syndrome; Primary osteoarthritis of both hips; Fibromyalgia; Lumbar radiculitis; DDD (degenerative disc disease), lumbar; Elbow tendinitis; Facet syndrome; Primary osteoarthritis of right hip      pregabalin (LYRICA) 100 MG capsule Take 1 capsule by mouth 3 times daily for 30 days. Qty: 90 capsule, Refills: 0    Associated Diagnoses: Chronic pain syndrome; Lumbar radiculitis; Fibromyalgia;  Facet syndrome; DDD (degenerative disc disease), lumbar; Primary osteoarthritis of both hips; Elbow tendinitis; Primary osteoarthritis of right hip      traMADol (ULTRAM) 50 MG tablet Take 1 tablet by mouth every 6 hours as needed for Pain (Max 2-3 per day) for up to 28 days. Qty: 70 tablet, Refills: 0    Comments: Reduce doses taken as pain becomes manageable  Associated Diagnoses: Chronic pain syndrome; Primary osteoarthritis of both hips; Fibromyalgia; Lumbar radiculitis; DDD (degenerative disc disease), lumbar; Elbow tendinitis; Facet syndrome; Primary osteoarthritis of right hip      rOPINIRole (REQUIP) 1 MG tablet TAKE 2 TABLETS BY MOUTH EVERY NIGHT  Qty: 180 tablet, Refills: 0      betamethasone dipropionate (DIPROLENE) 0.05 % cream APPLY EXTERNALLY TO THE AFFECTED AREA TWICE DAILY  Qty: 60 g, Refills: 0      ondansetron (ZOFRAN) 4 MG tablet TAKE 1 TABLET BY MOUTH EVERY 8 HOURS AS NEEDED FOR NAUSEA OR VOMITING  Qty: 60 tablet, Refills: 0      atorvastatin (LIPITOR) 40 MG tablet TAKE 1 TABLET BY MOUTH DAILY  Qty: 90 tablet, Refills: 0      meclizine (ANTIVERT) 25 MG tablet Take 25 mg by mouth 3 times daily as needed      levothyroxine (SYNTHROID) 88 MCG tablet TAKE 1 TABLET BY MOUTH DAILY  Qty: 90 tablet, Refills: 0      Cholecalciferol (VITAMIN D3) 125 MCG (5000 UT) TABS Take 1 tablet by mouth 2 times daily      DULoxetine (CYMBALTA) 60 MG extended release capsule Take 1 capsule by mouth 2 times daily      ARIPiprazole (ABILIFY) 20 MG tablet Take 1 tablet by mouth daily      propranolol (INDERAL) 40 MG tablet Take 1 tablet by mouth 3 times daily anxiety      esomeprazole (NEXIUM) 40 MG delayed release capsule TAKE 1 CAPSULE BY MOUTH EVERY MORNING BEFORE BREAKFAST  Qty: 90 capsule, Refills: 0    Associated Diagnoses: Gastroesophageal reflux disease, esophagitis presence not specified      QUEtiapine (SEROQUEL) 200 MG tablet Take 200 mg by mouth nightly             Time Spent on discharge is more than 30 minutes in the examination, evaluation, counseling and review of medications and discharge plan.     This chart was likely completed using voice recognition technology and may contain unintended grammatical , phraseology,and/or punctuation errors    Signed:    Sumaya Alegria MD   3/8/2021      Thank you Viviana Kennedy MD for the opportunity to be involved in this patient's care. If you have any questions or concerns please feel free to contact me at [phone].

## 2021-03-08 NOTE — PROGRESS NOTES
Interventional cardiology progress note post cath    Patient admitted with chest pains somewhat suggestive of ischemia. Several recent emergency department visits. Cardiac cath done to assess current anatomy. Cardiac cath performed without complications. The coronary arteries are normal.  The LV function is normal.  LVEDP was 4-5. Her procedure was done from the radial approach. No complications. From a cardiac cath perspective she is clean from coronary disease and LV function is normal.  Can go home later today from a cardiac perspective. We do not anticipate any further evaluation.   David Foster MD, [facility]

## 2021-03-08 NOTE — PROCEDURES
[facility] CATH    David Eason   63 y.o., female  1960      3/8/2021    Procedure performed by Dr. Naa Howard MD, [facility]  Surgical assistants :none    Procedure  Selective Coronary Angiography  Cardiac Catheterization for Coronary Anatomy  Left Heart Catheterization  Left Ventriculogram  Radial artery access assisted by ultrasound  Arterial Access Right Radial Artery after a negative Rishabh test  TR Band    Indication:Cardiac cath to rule out ischemic CAD, Possible angioplasty, The procedure and risks described to patient including risk of CVA, MI, bleeding, emergency surgery, death, this patient with recurring pain in her chest heavy pressure and tightness. Has been to several emergency department's in the recent 3 months with this discomfort. Enzymes have been negative. Works as a  at [employer]. Or Consent signed  Unspecified Angina  Anesthesia: Moderate sedation with Versed and Fentanyl IV  Anesthesia Start time 9:25 AM  Anesthesia end time 9:35 AM  Estimated blood loss :minimal  Abnormal Stress Test      Procedure Description:  After written informed consent was obtained, the patient was   brought to the cardiac catheterization suite, where patient was prepped and   draped in the usual sterile fashion. Local anesthesia was achieved in the   right wrist with 2% lidocaine. A 5-Mauritanian hemostasis sheath was placed into   the right radial artery. The pre cocktail of heparin, verapamil and nitroglycerin was injected into the sheath. A 5-Mauritanian Abilio catheter was introduced; used to engage the left main   coronary artery. Radiographic  images were obtained.  The catheter was pulled back then removed    The JR4 catheter was introduced  to engage the right coronary   artery. Radiographic  images were obtained. The catheter was removed and exchanged over an exchange length 0.35 soft guide wire. The Abilio catheter was then positioned in the left ventricle. Left ventriculogram was performed by hand-injection. After these images were obtained. , the   catheter was flushed, pulled back across the aortic valve revealing no gradient. The catheter was removed. The hemostasis sheath was removed and hemostasis was achieved using a TR Band     There were no complications. Patient tolerated the procedure well. The   patient was transferred to the holding area in stable condition. Contrast consumed 75 cc  Flouro time 2.2 minutes    Results:  Left ventricular pressure 5 mmHg  Aortic pressure 125 mmHg    Coronary anatomy:   The left main coronary artery is normal.     Left anterior descending artery is normal    Circumflex artery is normal.    The right coronary artery is a dominant vessel and normal.     Left ventriculogram shows ejection fraction of 55-60 %. Normal wall motion.       Impression:  No evidence   coronary artery disease  False positive stress test  Normal LV size and function     Complications: none      Plan:  Primary prevention  Medical management      This note was likely completed using voice recognition technology and may contain unintended errors  Angie Dumas M.D., Daria Monroy; Sunshine Jordan MD

## 2021-03-09 ENCOUNTER — CARE COORDINATION (OUTPATIENT)
Dept: CASE MANAGEMENT | Age: 61
End: 2021-03-09

## 2021-03-09 LAB
CHOLESTEROL, TOTAL: 217 MG/DL (ref 0–199)
HDLC SERPL-MCNC: 60 MG/DL (ref 40–60)
LDL CHOLESTEROL CALCULATED: 129 MG/DL
T4 FREE: 1.6 NG/DL (ref 0.9–1.8)
TRIGL SERPL-MCNC: 141 MG/DL (ref 0–150)
TSH SERPL DL<=0.05 MIU/L-ACNC: 2.23 UIU/ML (ref 0.27–4.2)
VLDLC SERPL CALC-MCNC: 28 MG/DL

## 2021-03-09 NOTE — CARE COORDINATION
Advance Care Planning  People with COVID-19 may have no symptoms, mild symptoms, such as fever, cough, and shortness of breath or they may have more severe illness, developing severe and fatal pneumonia. As a result, Advance Care Planning with attention to naming a health care decision maker (someone you trust to make healthcare decisions for you if you could not speak for yourself) and sharing other health care preferences is important BEFORE a possible health crisis. Please contact your Primary Care Provider to discuss Advance Care Planning. Preventing the Spread of Coronavirus Disease 2019 in Homes and Residential Communities  For the most recent information go to [email or web address]    Prevention steps for People with confirmed or suspected COVID-19 (including persons under investigation) who do not need to be hospitalized  and   People with confirmed COVID-19 who were hospitalized and determined to be medically stable to go home    Your healthcare provider and public health staff will evaluate whether you can be cared for at home. If it is determined that you do not need to be hospitalized and can be isolated at home, you will be monitored by staff from your local or state health department. You should follow the prevention steps below until a healthcare provider or local or state health department says you can return to your normal activities. Stay home except to get medical care  People who are mildly ill with COVID-19 are able to isolate at home during their illness. You should restrict activities outside your home, except for getting medical care. Do not go to work, school, or public areas. Avoid using public transportation, ride-sharing, or taxis. Separate yourself from other people and animals in your home  People: As much as possible, you should stay in a specific room and away from other people in your home.  Also, you should use a separate bathroom, if available. Animals: You should restrict contact with pets and other animals while you are sick with COVID-19, just like you would around other people. Although there have not been reports of pets or other animals becoming sick with COVID-19, it is still recommended that people sick with COVID-19 limit contact with animals until more information is known about the virus. When possible, have another member of your household care for your animals while you are sick. If you are sick with COVID-19, avoid contact with your pet, including petting, snuggling, being kissed or licked, and sharing food. If you must care for your pet or be around animals while you are sick, wash your hands before and after you interact with pets and wear a facemask. Call ahead before visiting your doctor  If you have a medical appointment, call the healthcare provider and tell them that you have or may have COVID-19. This will help the healthcare providers office take steps to keep other people from getting infected or exposed. Wear a facemask  You should wear a facemask when you are around other people (e.g., sharing a room or vehicle) or pets and before you enter a healthcare providers office. If you are not able to wear a facemask (for example, because it causes trouble breathing), then people who live with you should not stay in the same room with you, or they should wear a facemask if they enter your room. Cover your coughs and sneezes  Cover your mouth and nose with a tissue when you cough or sneeze. Throw used tissues in a lined trash can. Immediately wash your hands with soap and water for at least 20 seconds or, if soap and water are not available, clean your hands with an alcohol-based hand  that contains at least 60% alcohol.   Clean your hands often  Wash your hands often with soap and water for at least 20 seconds, especially after blowing your nose, coughing, or sneezing; going to the bathroom; and Straight cane, Other  Other Patient DME: Flo Duverney you have support at home?: Alone  Do you feel like you have everything you need to keep you well at home?: Yes  Are you an active caregiver in your home?: No  Care Transitions Interventions  No Identified Needs       Summary  CTN spoke with patient this am for initial [facility] -19 Monitoring call. Patient states she is doing well, no more chest pain this am.  No reports of any fever, chills, nausea, vomiting, chest pain, SOB or cough. No congestion, pain, difficulty emptying bladder, LE edema, feeling lightheaded, dizziness, and heart palpitations. Patient states she still needs to  new medication from pharmacy, plans to do this today. No other issues or concerns. CTN encouraged patient to continue to monitor for any of the above s/s, reporting any that may present to MD immediately. CTC provided education on s/s that require medical attention and when to seek medical attention. CTC advised Pt of use urgent care or physicians 24 hr access line if assistance is needed after hours or on the weekend. Pt denies any needs or concerns and is agreeable with additional calls.     Follow Up  Future Appointments   Date Time Provider Marlin Diaz   3/10/2021 10:15 AM Cathy Jenifer, PT [facility] QC PT [facility]   3/12/2021 10:15 AM Cathy Jenifer, PT [facility] QC PT [facility]   3/15/2021  1:45 PM MIGUEL ÁNGEL Mooney - CNP [facility]   3/17/2021 10:15 AM Cathy Jenifer, PT [facility] QC PT [facility]   3/19/2021 10:00 AM Cathy Jenifer, PT [facility] QC PT [facility]   3/23/2021 10:15 AM Cathy Jenifer, PT [facility] QC PT [facility]   3/25/2021  8:45 AM Kelly Smiley MD R BANK PAIN [facility]   6/4/2021 10:00 AM Susan Yadav MD [facility]       Margaret Gann RN

## 2021-03-10 ENCOUNTER — HOSPITAL ENCOUNTER (OUTPATIENT)
Dept: PHYSICAL THERAPY | Age: 61
Setting detail: THERAPIES SERIES
End: 2021-03-10
Payer: COMMERCIAL

## 2021-03-12 ENCOUNTER — APPOINTMENT (OUTPATIENT)
Dept: PHYSICAL THERAPY | Age: 61
End: 2021-03-12
Payer: COMMERCIAL

## 2021-03-15 ENCOUNTER — OFFICE VISIT (OUTPATIENT)
Dept: CARDIOLOGY CLINIC | Age: 61
End: 2021-03-15
Payer: COMMERCIAL

## 2021-03-15 VITALS
SYSTOLIC BLOOD PRESSURE: 110 MMHG | TEMPERATURE: 96.8 F | DIASTOLIC BLOOD PRESSURE: 70 MMHG | BODY MASS INDEX: 50.02 KG/M2 | HEART RATE: 64 BPM | WEIGHT: 293 LBS | HEIGHT: 64 IN

## 2021-03-15 DIAGNOSIS — E55.9 VITAMIN D DEFICIENCY: Primary | ICD-10-CM

## 2021-03-15 DIAGNOSIS — E78.5 DYSLIPIDEMIA: ICD-10-CM

## 2021-03-15 DIAGNOSIS — I10 ESSENTIAL HYPERTENSION: ICD-10-CM

## 2021-03-15 DIAGNOSIS — Z92.89 H/O ANGIOGRAPHY: ICD-10-CM

## 2021-03-15 PROCEDURE — 99214 OFFICE O/P EST MOD 30 MIN: CPT | Performed by: NURSE PRACTITIONER

## 2021-03-15 NOTE — PROGRESS NOTES
[facility]  Office Visit           Shireen Landeros MD,  [address] APRN [address] [facility]       Cardiology             Caity Ramesh  1960    March 15, 2021    Primary Cardiologist:  Laurel Donohue     CC:Interval Hx: Ms Venice Obregon is here after [facility] completed 3/2021   No evidence   coronary artery disease  False positive stress test  Normal LV size and function  Right wrist no complications   Still has c/o chest discomfort /  discussed may be GI issues / and denies GI problems  usual SOB / occas palpations at rest no presyncope     PMH obesity stases had sleeve   HLD  elevated  hypothyroidism   RSS on requip and helps her   Denies  LAUREN   Manic depressive Seroquel   In 2016 Negative myoview 10/20. Had Cath 2016 with normal cors. Serial trop negative. EKG normal    I have examined pt and reviewed notes / any lab work EKGs stress test, angiograms, & images reviewed  I  have spent >20 minutes of face to face time with the patient with more than 50% spent counseling and coordinating care this patient. Review of Systems:  Constitutional: Denies  fatigue, weakness, night sweats or fever. HEENT: Denies new visual changes, ringing in ears, nosebleeds,nasal congestion  Respiratory: Denies new or change in SOB, PND, orthopnea or cough. Cardiovascular: see HPI  GI: Denies N/V, diarrhea, constipation, abdominal pain, change in bowel habits, melena or hematochezia  : Denies urinary frequency, urgency, incontinence, hematuria or dysuria. Skin: Denies rash, hives, or cyanosis  Musculoskeletal: Denies joint or muscle aches/pain  Neurological: Denies syncope or TIA-like symptoms.   Psychiatric: Denies anxiety, insomnia or depression     Past Medical History:   Diagnosis Date    Acquired hyperlipoproteinemia     MARIO (acute kidney injury) ([facility] [address] 9/30/2019    Allergic rhinitis     Anxiety     Arthritis     Bilateral lower extremity edema     Bipolar 1 disorder ([address]     Chronic pain syndrome     DDD (degenerative disc disease), lumbar     Depression     Depression     Disease of gallbladder     Dizziness     DJD (degenerative joint disease) of hip     Edema 12/29/2009    Elbow tendinitis     Essential hypertension 8/19/2019    Facet syndrome     Fibromyalgia     Fracture of nasal bone     GERD (gastroesophageal reflux disease)     Headache     Hiatal hernia     History of blood transfusion     anemia    Hyperlipidemia     Insomnia     Osteoarthritis     Prediabetes     Rash     Self mutilating behavior     cutter pt states she has not done in months- in therapy    Sleep apnea     no CPAP    Suicidal ideation     Thyroid disorder     hypothyroidism    Vertigo      Past Surgical History:   Procedure Laterality Date    ABDOMEN SURGERY  5/28/2014    LAPAROSCOPIC SLEEVE GASTRECTOMY, EXTENSIVE LYSIS OF ADHESIONS    ACHILLES TENDON SURGERY Left 3/9/2020    LEFT CALCANEOUS PARTIAL EXCISION, SECONDARY REPAIR OF ACHILLES TENDON performed by Misa Arreaga MD at [facility]      open , Ex-lap    CHOLECYSTECTOMY      open    COLONOSCOPY  04/16/2013    dr Rosa Jacob 2018     DILATION AND CURETTAGE OF UTERUS      ENDOSCOPY, COLON, DIAGNOSTIC      FOOT SURGERY Right 05/27/2016    ARTHROPLASTY RIGHT 5TH DIGIT      HYSTEROSCOPY N/A 12-30-13    Hysteroscopy Dilitation and Curettage    JOINT REPLACEMENT Bilateral     knee    OTHER SURGICAL HISTORY  9/11/2015    ARTHROPLASTY 5TH DIGIT LEFT FOOT          SHOULDER SURGERY      right    SHOULDER SURGERY Right     SLEEVE GASTROPLASTY  May 28, 2014    Rosi    TOTAL KNEE ARTHROPLASTY  12/10/10    Right    TOTAL KNEE ARTHROPLASTY  3/30/10    Left    UPPER GASTROINTESTINAL ENDOSCOPY  4/16/2013    dr Chandler Maradiaga    UPPER GASTROINTESTINAL ENDOSCOPY  3/20/2014    dr Chandler Maradiaga     Family History   Problem Relation Age of Onset    Heart Disease Mother     Heart Failure Mother     High Cholesterol Mother     High Blood Pressure Mother     Stroke Mother     Birth Defects Mother     Other Mother         VV    High Blood Pressure Father     COPD Father     Diabetes Brother     Stroke Maternal Grandmother     Arthritis Maternal Grandmother     Heart Failure Brother     Stroke Brother     Birth Defects Maternal Grandfather     Arthritis Paternal Grandmother      Social History     Tobacco Use    Smoking status: Never Smoker    Smokeless tobacco: Never Used    Tobacco comment: Never smoked   Substance Use Topics    Alcohol use: No     Alcohol/week: 0.0 standard drinks    Drug use: No       Allergies   Allergen Reactions    Polysporin [Bacitracin-Polymyxin B] Other (See Comments) and Itching     Pt breaks out in blisters. Pt breaks out in blisters.  Lorazepam Other (See Comments)     \"went crazy\"    Neomycin-Polymyxin-Gramicidin     Sulfa Antibiotics Hives     Current Outpatient Medications   Medication Sig Dispense Refill    furosemide (LASIX) 20 MG tablet Take 1 tablet by mouth daily for 7 days 7 tablet 0    nitroGLYCERIN (NITROSTAT) 0.4 MG SL tablet Place 1 tablet under the tongue every 5 minutes as needed for Chest pain up to max of 3 total doses. If no relief after 1 dose, call 911. 25 tablet 3    celecoxib (CELEBREX) 200 MG capsule Take 1 capsule by mouth daily 30 capsule 1    pregabalin (LYRICA) 100 MG capsule Take 1 capsule by mouth 3 times daily for 30 days. 90 capsule 0    traMADol (ULTRAM) 50 MG tablet Take 1 tablet by mouth every 6 hours as needed for Pain (Max 2-3 per day) for up to 28 days.  70 tablet 0    rOPINIRole (REQUIP) 1 MG tablet TAKE 2 TABLETS BY MOUTH EVERY NIGHT 180 tablet 0    betamethasone dipropionate (DIPROLENE) 0.05 % cream APPLY EXTERNALLY TO THE AFFECTED AREA TWICE DAILY 60 g 0    ondansetron (ZOFRAN) 4 MG tablet TAKE 1 TABLET BY MOUTH EVERY 8 HOURS AS NEEDED FOR NAUSEA OR VOMITING 60 tablet 0    atorvastatin (LIPITOR) 40 MG tablet TAKE 1 TABLET BY MOUTH DAILY (Patient taking differently: TAKE 1 TABLET BY MOUTH NIGHTLY) 90 tablet 0    meclizine (ANTIVERT) 25 MG tablet Take 25 mg by mouth 3 times daily as needed      levothyroxine (SYNTHROID) 88 MCG tablet TAKE 1 TABLET BY MOUTH DAILY 90 tablet 0    Cholecalciferol (VITAMIN D3) 125 MCG (5000 UT) TABS Take 1 tablet by mouth 2 times daily      DULoxetine (CYMBALTA) 60 MG extended release capsule Take 1 capsule by mouth 2 times daily      ARIPiprazole (ABILIFY) 20 MG tablet Take 1 tablet by mouth daily      propranolol (INDERAL) 40 MG tablet Take 1 tablet by mouth 3 times daily anxiety      esomeprazole (NEXIUM) 40 MG delayed release capsule TAKE 1 CAPSULE BY MOUTH EVERY MORNING BEFORE BREAKFAST 90 capsule 0    QUEtiapine (SEROQUEL) 200 MG tablet Take 200 mg by mouth nightly       No current facility-administered medications for this visit. Physical Exam:   /70   Pulse 64   Temp 96.8 °F (36 °C)   Ht 5' 4\" (1.626 m)   Wt (!) 321 lb (145.6 kg)   LMP 01/15/2014   BMI 55.10 kg/m²   BP Readings from Last 3 Encounters:   03/15/21 110/70   03/08/21 (!) 166/85   03/05/21 126/82     Pulse Readings from Last 3 Encounters:   03/15/21 64   03/08/21 101   03/05/21 70     Wt Readings from Last 3 Encounters:   03/15/21 (!) 321 lb (145.6 kg)   03/07/21 (!) 319 lb 10.7 oz (145 kg)   03/05/21 (!) 325 lb (147.4 kg)     Constitutional: She is oriented to person, place, and time. She appears well-developed and well-nourished. In no acute distress. HEENT: Normocephalic and atraumatic. Sclerae anicteric. No xanthelasmas. Conjunctiva white, no subconjunctival hemorrhage   External inspection of ears nose teeth & gums   Eyes:PERRLA EOM's intact. Neck: Neck supple. No JVD present. Carotids without bruits. No mass and no thyromegaly present. No lymphadenopathy present.   Cardiovascular: RRR, normal S1 and S2; no murmur/gallop or rub, PMI nondisplaced  Pulmonary/Chest: Effort normal. Lungs clear to auscultation. Chest wall nontender  Abdominal: soft, nontender, nondistended. + bowel sounds; no organomegaly or bruits. Aorta normal  Extremities: No edema, cyanosis, or clubbing. Pulses are 2+ radial/carotid/dorsalis pedis bilaterally. Cap refill brisk. Neurological: No cranial nerve deficit. Psychiatric: She has a normal mood and affect. Her speech is normal and behavior is normal.     Lab Review:   Lab Results   Component Value Date    TRIG 141 03/08/2021    HDL 60 03/08/2021    HDL 80 04/12/2012    LDLCALC 129 03/08/2021    LABVLDL 28 03/08/2021     Lab Results   Component Value Date     03/07/2021    K 3.9 03/07/2021     03/07/2021    CO2 28 03/07/2021    BUN 24 03/07/2021    CREATININE 0.9 03/07/2021    GLUCOSE 105 03/07/2021    CALCIUM 9.5 03/07/2021      Lab Results   Component Value Date    WBC 5.2 03/08/2021    HGB 12.2 03/08/2021    HCT 37.7 03/08/2021    MCV 90.4 03/08/2021     03/08/2021     I have reviewed any available labs, images, any stress test, LHC on this pt       Assessment:  cp  In 2016 Negative myoview 10/20. Had Cath 2016 with normal cors. Serial trop negative. EKG normal    LHC completed 3/2021   No evidence   coronary artery disease  False positive stress test  Normal LV size and function  Right wrist no complications     Still has c/o chest discomfort /  discussed may be GI issues / and denies GI problems  usual SOB / occas palpations at rest no presyncope     Obesity  Body mass index is 55.1 kg/m². HLD    elevated  On Lipitor /follow     hypothyroidism   TSH wnl     RSS   on requip and helps her     Manic depressive   Seroquel   In 2016 Negative myoview 10/20. Had Cath 2016 with normal cors. Serial trop negative.  EKG normal      Plan:  recommend GI work up / PCP fu  Fu in 6 months with blood work        FLYNN Morocho

## 2021-03-16 NOTE — ED PROVIDER NOTES
CHIEF COMPLAINT  Ankle Pain (left)      HISTORY OF PRESENT ILLNESS  Kamille Aaron is a 60 y.o. female, who presents to the ED with sudden onset while walking of 8/10 in severity left ankle/heel pain. No change in chronic leg swelling no fever. Pain is worse with ambulation. The patient has a history of Achilles tendinitis with pain similar to this. .  No other complaints,modifying factors or associated symptoms. Review of Systems    I have reviewed the following from the nursing documentation.     Past Medical History:   Diagnosis Date    Acquired hyperlipoproteinemia     MARIO (acute kidney injury) ([facility] [address] 9/30/2019    Allergic rhinitis     Anxiety     Arthritis     Bilateral lower extremity edema     Bipolar 1 disorder (HCC)     Chronic pain syndrome     DDD (degenerative disc disease), lumbar     Depression     Depression     Disease of gallbladder     Dizziness     DJD (degenerative joint disease) of hip     Edema 12/29/2009    Elbow tendinitis     Essential hypertension 8/19/2019    Facet syndrome     Fibromyalgia     Fracture of nasal bone     GERD (gastroesophageal reflux disease)     Headache     Hiatal hernia     History of blood transfusion     anemia    Hyperlipidemia     Insomnia     Osteoarthritis     Prediabetes     Rash     Self mutilating behavior     cutter    Sleep apnea     no CPAP    Suicidal ideation     Thyroid disorder     hypothyroidism     Past Surgical History:   Procedure Laterality Date    ABDOMEN SURGERY  5/28/2014    LAPAROSCOPIC SLEEVE GASTRECTOMY, EXTENSIVE LYSIS OF ADHESIONS    ANKLE SURGERY      ANKLE SURGERY      APPENDECTOMY      open , Ex-lap    CHOLECYSTECTOMY      open    COLONOSCOPY  04/16/2013    dr Claudeen Lites 2018     DILATION AND CURETTAGE OF UTERUS      ENDOSCOPY, COLON, DIAGNOSTIC      FOOT SURGERY Right 05/27/2016    ARTHROPLASTY RIGHT 5TH DIGIT      HYSTEROSCOPY N/A 12-30-13    Hysteroscopy Dilitation and Curettage    JOINT REPLACEMENT Bilateral     knee    OTHER SURGICAL HISTORY  9/11/2015    ARTHROPLASTY 5TH DIGIT LEFT FOOT          SHOULDER SURGERY      right    SHOULDER SURGERY Right     SLEEVE GASTROPLASTY  May 28, 2014    Rosi    TOTAL KNEE ARTHROPLASTY  12/10/10    Right    TOTAL KNEE ARTHROPLASTY  3/30/10    Left    UPPER GASTROINTESTINAL ENDOSCOPY  4/16/2013    dr Isabel Barahona UPPER GASTROINTESTINAL ENDOSCOPY  3/20/2014    dr Karrie Manning     Family History   Problem Relation Age of Onset    Heart Disease Mother     Heart Failure Mother     High Cholesterol Mother     High Blood Pressure Mother     Stroke Mother     Birth Defects Mother     Other Mother         VV    High Blood Pressure Father     COPD Father     Diabetes Brother     Stroke Brother     Stroke Maternal Grandmother     Arthritis Maternal Grandmother     Heart Failure Brother     Birth Defects Maternal Grandfather     Arthritis Paternal Grandmother      Social History     Socioeconomic History    Marital status: Single     Spouse name: Not on file    Number of children: 0    Years of education: 12    Highest education level: Not on file   Occupational History    Occupation: [employer]     Comment: unemployed   Social Needs    Financial resource strain: Not on file    Food insecurity:     Worry: Not on file     Inability: Not on file   Retsly needs:     Medical: Not on file     Non-medical: Not on file   Tobacco Use    Smoking status: Never Smoker    Smokeless tobacco: Never Used    Tobacco comment: Never smoked   Substance and Sexual Activity    Alcohol use: No     Alcohol/week: 0.0 standard drinks    Drug use: No    Sexual activity: Not Currently   Lifestyle    Physical activity:     Days per week: Not on file     Minutes per session: Not on file    Stress: Not on file   Relationships    Social connections:     Talks on phone: Not on file     Gets together: Not on file     Attends Latter day service: Not on file Active member of club or organization: Not on file     Attends meetings of clubs or organizations: Not on file     Relationship status: Not on file    Intimate partner violence:     Fear of current or ex partner: Not on file     Emotionally abused: Not on file     Physically abused: Not on file     Forced sexual activity: Not on file   Other Topics Concern    Not on file   Social History Narrative    Lives by herself with her cat        No current facility-administered medications for this encounter. Current Outpatient Medications   Medication Sig Dispense Refill    torsemide (DEMADEX) 20 MG tablet 2 po qam x 10 days 20 tablet 0    furosemide (LASIX) 20 MG tablet Take 1 tablet by mouth daily for 7 days 7 tablet 0    pregabalin (LYRICA) 100 MG capsule Take 1 capsule by mouth 3 times daily for 30 days. 90 capsule 0    HYDROcodone-acetaminophen (NORCO) 5-325 MG per tablet Take 0.5-1 tablets by mouth every 6 hours as needed for Pain (max 2-3 per day) for up to 35 days.  90 tablet 0    tiZANidine (ZANAFLEX) 4 MG tablet Take 1 tablet by mouth 2 times daily 60 tablet 0    levothyroxine (SYNTHROID) 88 MCG tablet TAKE 1 TABLET BY MOUTH DAILY 90 tablet 0    ferrous gluconate (FERGON) 240 (27 Fe) MG tablet TAKE 1 TABLET BY MOUTH TWICE DAILY 60 tablet 0    atorvastatin (LIPITOR) 40 MG tablet TAKE 1 TABLET BY MOUTH DAILY 90 tablet 0    betamethasone dipropionate (DIPROLENE) 0.05 % cream APPLY EXTERNALLY TO THE AFFECTED AREA TWICE DAILY 60 g 0    rOPINIRole (REQUIP) 1 MG tablet TAKE 2 TABLETS BY MOUTH EVERY NIGHT 180 tablet 0    propranolol (INDERAL) 20 MG tablet Take 20 mg by mouth 3 times daily      QUEtiapine (SEROQUEL) 200 MG tablet Take 200 mg by mouth nightly      lamoTRIgine (LAMICTAL) 150 MG tablet Take 150 mg by mouth nightly  0    FLUoxetine (PROZAC) 20 MG capsule Take 20 mg by mouth daily  3    atomoxetine (STRATTERA) 40 MG capsule Take 40 mg by mouth daily  3    meclizine (ANTIVERT) 25 MG tablet TAKE 1 TABLET BY MOUTH THREE TIMES DAILY AS NEEDED FOR DIZZINESS 30 tablet 0    esomeprazole (NEXIUM) 40 MG delayed release capsule TAKE 1 CAPSULE BY MOUTH EVERY MORNING BEFORE BREAKFAST 90 capsule 0    ondansetron (ZOFRAN) 4 MG tablet TAKE 1 TABLET BY MOUTH EVERY 8 HOURS AS NEEDED FOR NAUSEA OR VOMITING 60 tablet 0    ARIPiprazole (ABILIFY) 10 MG tablet TK 1 T PO QAM  3    vitamin D (ERGOCALCIFEROL) 08303 units CAPS capsule TAKE 1 CAPSULE BY MOUTH 1 TIME A WEEK 13 capsule 0     Allergies   Allergen Reactions    Polysporin [Bacitracin-Polymyxin B] Other (See Comments) and Itching     Pt breaks out in blisters. Pt breaks out in blisters.  Lorazepam Other (See Comments)     \"went crazy\"    Neomycin-Polymyxin-Gramicidin     Sulfa Antibiotics Hives     @[facility]@       PHYSICAL EXAM  BP (!) 163/92   Pulse 82   Temp 97.4 °F (36.3 °C) (Oral)   Resp 17   Wt (!) 139.7 kg (307 lb 14.4 oz)   LMP 01/15/2014   SpO2 98%   BMI 52.85 kg/m²   GENERAL APPEARANCE: Awake and alert. Cooperative. In no acute distress. EXTREMITIES: Examination of the lower extremities show 2+ bilateral edema. Examination of the left foot and ankle shows tenderness to palpation at the insertion of the Achilles tendon on the calcaneus. No defect noted negative Rahman's test no bony tenderness of the calcaneus, ankle, or foot there is full strength and range of motion full sensation sKIN: Warm and dry. Physical Exam    LABORATORY STUDIES:  Labs Reviewed - No data to display     RADIOLOGY  XR ANKLE LEFT (MIN 3 VIEWS)   Final Result      Diffuse soft tissue edema in the distal left lower leg. No acute bony findings. PROCEDURES  Procedures    ED COURSE/MDM  Patient seen and evaluated. Old records reviewed if pertinent. Labs and imaging reviewed and results discussed withpatient. I considered Achilles tendinitis, tendinopathy, other soft tissue injury, including strain, sprain.   Low likelihood of for fracture or dislocation. Note.screening questions for suicide were reviewed by nurse given that on disposition Tylenol suicide screening alert appeared. Patient has a history of recent evaluation at the [facility]. She does not complain of any suicidal ideation or intent at this time. Plan of care discussed with patient or family as appropriate. Patient or family in agreement with plan. If discharged, patient was given scripts for the following medications. New Prescriptions    No medications on file       CLINICAL IMPRESSION  1. Elevated blood pressure reading    2. Achilles tendinitis of left lower extremity        Blood pressure (!) 163/92, pulse 82, temperature 97.4 °F (36.3 °C), temperature source Oral, resp. rate 17, weight (!) 139.7 kg (307 lb 14.4 oz), last menstrual period 01/15/2014, SpO2 98 %, not currently breastfeeding. Conchis Izquierdo was discharged in stable condition.                      Val Phillips MD  12/31/19 Loyd Phillips MD  01/01/20 0544 Implemented All Universal Safety Interventions:  [place] to call system. Call bell, personal items and telephone within reach. Instruct patient to call for assistance. Room bathroom lighting operational. Non-slip footwear when patient is off stretcher. Physically safe environment: no spills, clutter or unnecessary equipment. Stretcher in lowest position, wheels locked, appropriate side rails in place.

## 2021-03-17 ENCOUNTER — APPOINTMENT (OUTPATIENT)
Dept: PHYSICAL THERAPY | Age: 61
End: 2021-03-17
Payer: COMMERCIAL

## 2021-03-19 ENCOUNTER — APPOINTMENT (OUTPATIENT)
Dept: PHYSICAL THERAPY | Age: 61
End: 2021-03-19
Payer: COMMERCIAL

## 2021-03-23 ENCOUNTER — HOSPITAL ENCOUNTER (OUTPATIENT)
Dept: PHYSICAL THERAPY | Age: 61
Setting detail: THERAPIES SERIES
End: 2021-03-23
Payer: COMMERCIAL

## 2021-03-23 ENCOUNTER — CARE COORDINATION (OUTPATIENT)
Dept: CASE MANAGEMENT | Age: 61
End: 2021-03-23

## 2021-03-23 NOTE — CARE COORDINATION
You Patient resolved from the Care Transitions episode on 03/23/2021    Patient/family has been provided the following resources and education related to COVID-19:                         Signs, symptoms and red flags related to COVID-19            CDC exposure and quarantine guidelines            Conduit exposure contact - [phone]            Contact for their local Department of Health                 Patient currently reports that the following symptoms have improved:  no new/worsening symptoms. Patient states she is doing okay, having some slight chest pain, spoke with MD, who referred her to GI MD.  Patient has scheduled appointment with GI MD, but isn't able to get in until mid April. CTN encouraged patient to continue to monitor for any worsening pain, and follow up with GI office to get on cancellation list, to try and get earlier appointment, if possible. No other issues or concerns, no new or changed medications at this time. No further outreach scheduled with this CTN/ACM. Episode of Care resolved. Patient has this CTN/ACM contact information if future needs arise.     Thank Marine Rodriguez RN  Care Transition Coordinator  Contact SHAREEGN:[phone]

## 2021-03-25 ENCOUNTER — VIRTUAL VISIT (OUTPATIENT)
Dept: PAIN MANAGEMENT | Age: 61
End: 2021-03-25
Payer: COMMERCIAL

## 2021-03-25 DIAGNOSIS — M47.899 FACET SYNDROME: ICD-10-CM

## 2021-03-25 DIAGNOSIS — G89.4 CHRONIC PAIN SYNDROME: ICD-10-CM

## 2021-03-25 DIAGNOSIS — M51.36 DDD (DEGENERATIVE DISC DISEASE), LUMBAR: ICD-10-CM

## 2021-03-25 DIAGNOSIS — M16.0 PRIMARY OSTEOARTHRITIS OF BOTH HIPS: ICD-10-CM

## 2021-03-25 DIAGNOSIS — M16.11 PRIMARY OSTEOARTHRITIS OF RIGHT HIP: ICD-10-CM

## 2021-03-25 DIAGNOSIS — M79.7 FIBROMYALGIA: ICD-10-CM

## 2021-03-25 DIAGNOSIS — M77.8 ELBOW TENDINITIS: ICD-10-CM

## 2021-03-25 DIAGNOSIS — M54.16 LUMBAR RADICULITIS: ICD-10-CM

## 2021-03-25 PROCEDURE — 99213 OFFICE O/P EST LOW 20 MIN: CPT | Performed by: INTERNAL MEDICINE

## 2021-03-25 RX ORDER — CELECOXIB 200 MG/1
200 CAPSULE ORAL DAILY
Qty: 30 CAPSULE | Refills: 1 | Status: SHIPPED | OUTPATIENT
Start: 2021-03-25 | End: 2021-04-23 | Stop reason: SDUPTHER

## 2021-03-25 RX ORDER — PREGABALIN 100 MG/1
100 CAPSULE ORAL 3 TIMES DAILY
Qty: 90 CAPSULE | Refills: 0 | Status: SHIPPED | OUTPATIENT
Start: 2021-03-25 | End: 2021-04-23 | Stop reason: SDUPTHER

## 2021-03-25 NOTE — PROGRESS NOTES
TELE HEALTH VISIT (AUDIO-VISUAL)    Pursuant to the emergency declaration under the [facility], [facility] waiver authority and the [employer] Act, this Virtual  Visit was conducted, with patient's/legal guardian's consent, to reduce the patient's risk of exposure to COVID-19 and provide continuity of care for an established patient. Service is  provided through a video synchronous discussion virtually to substitute for in-person clinic visit. Due to this being a TeleHealth encounter (During GEMAK-57 public health emergency), evaluation of the following organ systems was limited: Vitals/Constitutional/EENT/Resp/CV/GI//MS/Neuro/Skin/Xnal-Iuyu-Fpw. Keshawn Gautam  1960  [id number]    Ms. Perlita Lizarraga is being seen virtually for a follow up visit using one of the following techniques  Google Duo, Face time or Doxy. me  Informed verbal consent to the virtual visit was obtained from Ms. Perlita Lizarraga. Risks associated with HIPPA compliance with the virtual visit was explained to the patient. Ms. Perlita Lizarraga is at her residence and Dr. Ramiro Porter is in his office. HISTORY OF PRESENT ILLNESS:  Ms. Perlita Lizarraga is a 63 y.o. female returns for a follow up visit for multiple medical problems. Her current presenting problems are No diagnosis found. .    As per information/history obtained from the PADT(patient assessment and documentation tool) - She complains of pain in the shoulders Bilateral, upper back, mid back and lower back  She rates the pain 6/10 and describes it as sharp, annoying. Pain is made worse by: movement, walking, standing, sitting, bending, lifting. Current treatment regimen has helped relieve about 50% of the pain. She denies side effects from the current pain regimen. Patient reports that since the last follow up visit the physical functioning is unchanged, family/social relationships are unchanged, mood is worse and sleep patterns are unchanged, and that the overall functioning is unchanged. Patient denies neurological bowel or bladder. Patient denies misusing/abusing her narcotic pain medications or using any illegal drugs. There are No indicators for possible drug abuse, addiction or diversion problems. Upon obtaining the medical history from Ms. Tiesha Mohan regarding today's office visit for her presenting problems, patient states she has been doing about the same. She says her back still hurts a lot. Patient reports she is having chest pains also, she had a angiogram done, all was okay. She states she has possible GI issues. Ms. Motley Never reports she has been working part time still. She states she is using Ultram along with Lyrica and Celebrex. Patient denies any GERD symptoms. ALLERGIES: Patients list of allergies were reviewed     MEDICATIONS: Ms. Tiesha Mohan list of medications were reviewed. Her current medications are   Outpatient Medications Prior to Visit   Medication Sig Dispense Refill    nitroGLYCERIN (NITROSTAT) 0.4 MG SL tablet Place 1 tablet under the tongue every 5 minutes as needed for Chest pain up to max of 3 total doses. If no relief after 1 dose, call 911. 25 tablet 3    celecoxib (CELEBREX) 200 MG capsule Take 1 capsule by mouth daily 30 capsule 1    pregabalin (LYRICA) 100 MG capsule Take 1 capsule by mouth 3 times daily for 30 days. 90 capsule 0    traMADol (ULTRAM) 50 MG tablet Take 1 tablet by mouth every 6 hours as needed for Pain (Max 2-3 per day) for up to 28 days.  70 tablet 0    rOPINIRole (REQUIP) 1 MG tablet TAKE 2 TABLETS BY MOUTH EVERY NIGHT 180 tablet 0    betamethasone dipropionate (DIPROLENE) 0.05 % cream APPLY EXTERNALLY TO THE AFFECTED AREA TWICE DAILY 60 g 0    ondansetron (ZOFRAN) 4 MG tablet TAKE 1 TABLET BY MOUTH EVERY 8 HOURS AS NEEDED FOR NAUSEA OR VOMITING 60 tablet 0    atorvastatin (LIPITOR) 40 MG tablet TAKE 1 TABLET BY MOUTH DAILY (Patient taking differently: TAKE 1 TABLET BY MOUTH NIGHTLY) 90 tablet 0    meclizine (ANTIVERT) 25 MG tablet Take 25 mg by mouth 3 times daily as needed      levothyroxine (SYNTHROID) 88 MCG tablet TAKE 1 TABLET BY MOUTH DAILY 90 tablet 0    Cholecalciferol (VITAMIN D3) 125 MCG (5000 UT) TABS Take 1 tablet by mouth 2 times daily      DULoxetine (CYMBALTA) 60 MG extended release capsule Take 1 capsule by mouth 2 times daily      ARIPiprazole (ABILIFY) 20 MG tablet Take 1 tablet by mouth daily      propranolol (INDERAL) 40 MG tablet Take 1 tablet by mouth 3 times daily anxiety      esomeprazole (NEXIUM) 40 MG delayed release capsule TAKE 1 CAPSULE BY MOUTH EVERY MORNING BEFORE BREAKFAST 90 capsule 0    QUEtiapine (SEROQUEL) 200 MG tablet Take 200 mg by mouth nightly      furosemide (LASIX) 20 MG tablet Take 1 tablet by mouth daily for 7 days 7 tablet 0     No facility-administered medications prior to visit. REVIEW OF SYSTEMS:    Respiratory: Negative for apnea, chest tightness and shortness of breath or change in baseline breathing. PHYSICAL EXAM:   Nursing note and vitals reviewed. LMP 01/15/2014   Constitutional: She appears well-developed and well-nourished. No acute distress. Cardiovascular: Normal rate, regular rhythm, normal heart sounds, and does not have murmur. Pulmonary/Chest: Effort normal. No respiratory distress. She does not have wheezes in the lung fields. She has no rales. Neurological/Psychiatric:She is alert and oriented to person, place, and time. Coordination is  normal.  Her mood isAppropriate and affect is Flat/blunted . Her    IMPRESSION:   1. Chronic pain syndrome    2. DDD (degenerative disc disease), lumbar    3. Fibromyalgia    4. Primary osteoarthritis of both hips    5. Lumbar radiculitis    6. Elbow tendinitis    7. Primary osteoarthritis of right hip    8. Facet syndrome        PLAN:  Informed verbal consent was obtained  -continue with Ultram 1-2 per day, (she still has pills left from before).   -She was advised to increase fluids ( 5-7  glasses of fluid daily), limit caffeine, avoid cheese products, increase dietary fiber, increase activity and exercise as tolerated and relax regularly and enjoy meals   -walking/stretching exercises as advised    -Interm history reviewed       Current Outpatient Medications   Medication Sig Dispense Refill    nitroGLYCERIN (NITROSTAT) 0.4 MG SL tablet Place 1 tablet under the tongue every 5 minutes as needed for Chest pain up to max of 3 total doses. If no relief after 1 dose, call 911. 25 tablet 3    celecoxib (CELEBREX) 200 MG capsule Take 1 capsule by mouth daily 30 capsule 1    pregabalin (LYRICA) 100 MG capsule Take 1 capsule by mouth 3 times daily for 30 days. 90 capsule 0    traMADol (ULTRAM) 50 MG tablet Take 1 tablet by mouth every 6 hours as needed for Pain (Max 2-3 per day) for up to 28 days.  70 tablet 0    rOPINIRole (REQUIP) 1 MG tablet TAKE 2 TABLETS BY MOUTH EVERY NIGHT 180 tablet 0    betamethasone dipropionate (DIPROLENE) 0.05 % cream APPLY EXTERNALLY TO THE AFFECTED AREA TWICE DAILY 60 g 0    ondansetron (ZOFRAN) 4 MG tablet TAKE 1 TABLET BY MOUTH EVERY 8 HOURS AS NEEDED FOR NAUSEA OR VOMITING 60 tablet 0    atorvastatin (LIPITOR) 40 MG tablet TAKE 1 TABLET BY MOUTH DAILY (Patient taking differently: TAKE 1 TABLET BY MOUTH NIGHTLY) 90 tablet 0    meclizine (ANTIVERT) 25 MG tablet Take 25 mg by mouth 3 times daily as needed      levothyroxine (SYNTHROID) 88 MCG tablet TAKE 1 TABLET BY MOUTH DAILY 90 tablet 0    Cholecalciferol (VITAMIN D3) 125 MCG (5000 UT) TABS Take 1 tablet by mouth 2 times daily      DULoxetine (CYMBALTA) 60 MG extended release capsule Take 1 capsule by mouth 2 times daily      ARIPiprazole (ABILIFY) 20 MG tablet Take 1 tablet by mouth daily      propranolol (INDERAL) 40 MG tablet Take 1 tablet by mouth 3 times daily anxiety      esomeprazole (NEXIUM) 40 MG delayed release capsule TAKE 1 CAPSULE BY MOUTH EVERY MORNING BEFORE

## 2021-03-26 ENCOUNTER — APPOINTMENT (OUTPATIENT)
Dept: CT IMAGING | Age: 61
End: 2021-03-26
Payer: COMMERCIAL

## 2021-03-26 ENCOUNTER — HOSPITAL ENCOUNTER (OUTPATIENT)
Age: 61
Setting detail: OBSERVATION
Discharge: HOME | End: 2021-03-28
Attending: EMERGENCY MEDICINE | Admitting: INTERNAL MEDICINE
Payer: COMMERCIAL

## 2021-03-26 DIAGNOSIS — R07.9 CHEST PAIN, UNSPECIFIED TYPE: ICD-10-CM

## 2021-03-26 DIAGNOSIS — R42 DIZZINESS: Primary | ICD-10-CM

## 2021-03-26 DIAGNOSIS — R42 VERTIGO: ICD-10-CM

## 2021-03-26 LAB
A/G RATIO: 1.3 (ref 1.1–2.2)
ALBUMIN SERPL-MCNC: 3.8 G/DL (ref 3.4–5)
ALP BLD-CCNC: 123 U/L (ref 40–129)
ALT SERPL-CCNC: 13 U/L (ref 10–40)
ANION GAP SERPL CALCULATED.3IONS-SCNC: 10 MMOL/L (ref 3–16)
AST SERPL-CCNC: 15 U/L (ref 15–37)
BASOPHILS ABSOLUTE: 0.1 K/UL (ref 0–0.2)
BASOPHILS RELATIVE PERCENT: 1.4 %
BILIRUB SERPL-MCNC: 0.5 MG/DL (ref 0–1)
BUN BLDV-MCNC: 23 MG/DL (ref 7–20)
CALCIUM SERPL-MCNC: 9.8 MG/DL (ref 8.3–10.6)
CHLORIDE BLD-SCNC: 101 MMOL/L (ref 99–110)
CO2: 28 MMOL/L (ref 21–32)
CREAT SERPL-MCNC: 0.8 MG/DL (ref 0.6–1.2)
EOSINOPHILS ABSOLUTE: 0.3 K/UL (ref 0–0.6)
EOSINOPHILS RELATIVE PERCENT: 4 %
GFR AFRICAN AMERICAN: >60
GFR NON-AFRICAN AMERICAN: >60
GLOBULIN: 3 G/DL
GLUCOSE BLD-MCNC: 112 MG/DL (ref 70–99)
GLUCOSE BLD-MCNC: 90 MG/DL (ref 70–99)
HCT VFR BLD CALC: 39.9 % (ref 36–48)
HEMOGLOBIN: 12.8 G/DL (ref 12–16)
INR BLD: 0.97 (ref 0.86–1.14)
LYMPHOCYTES ABSOLUTE: 1.5 K/UL (ref 1–5.1)
LYMPHOCYTES RELATIVE PERCENT: 22.4 %
MCH RBC QN AUTO: 28.5 PG (ref 26–34)
MCHC RBC AUTO-ENTMCNC: 32 G/DL (ref 31–36)
MCV RBC AUTO: 89 FL (ref 80–100)
MONOCYTES ABSOLUTE: 0.7 K/UL (ref 0–1.3)
MONOCYTES RELATIVE PERCENT: 9.7 %
NEUTROPHILS ABSOLUTE: 4.3 K/UL (ref 1.7–7.7)
NEUTROPHILS RELATIVE PERCENT: 62.5 %
PDW BLD-RTO: 16.2 % (ref 12.4–15.4)
PERFORMED ON: NORMAL
PLATELET # BLD: 258 K/UL (ref 135–450)
PMV BLD AUTO: 9.2 FL (ref 5–10.5)
POTASSIUM SERPL-SCNC: 3.8 MMOL/L (ref 3.5–5.1)
PROTHROMBIN TIME: 11.2 SEC (ref 10–13.2)
RBC # BLD: 4.49 M/UL (ref 4–5.2)
SODIUM BLD-SCNC: 139 MMOL/L (ref 136–145)
TOTAL PROTEIN: 6.8 G/DL (ref 6.4–8.2)
TROPONIN: <0.01 NG/ML
WBC # BLD: 6.9 K/UL (ref 4–11)

## 2021-03-26 PROCEDURE — 70496 CT ANGIOGRAPHY HEAD: CPT

## 2021-03-26 PROCEDURE — 70450 CT HEAD/BRAIN W/O DYE: CPT

## 2021-03-26 PROCEDURE — 99282 EMERGENCY DEPT VISIT SF MDM: CPT

## 2021-03-26 PROCEDURE — 80053 COMPREHEN METABOLIC PANEL: CPT

## 2021-03-26 PROCEDURE — 6360000004 HC RX CONTRAST MEDICATION: Performed by: EMERGENCY MEDICINE

## 2021-03-26 PROCEDURE — 84484 ASSAY OF TROPONIN QUANT: CPT

## 2021-03-26 PROCEDURE — 93005 ELECTROCARDIOGRAM TRACING: CPT | Performed by: EMERGENCY MEDICINE

## 2021-03-26 PROCEDURE — 85610 PROTHROMBIN TIME: CPT

## 2021-03-26 PROCEDURE — 85025 COMPLETE CBC W/AUTO DIFF WBC: CPT

## 2021-03-26 RX ADMIN — IOPAMIDOL 80 ML: 755 INJECTION, SOLUTION INTRAVENOUS at 20:03

## 2021-03-26 NOTE — ED PROVIDER NOTES
[facility]  EMERGENCY DEPT VISIT      Patient Identification  Rafaela Lemus is a 63 y.o. female. Chief Complaint   Dizziness      History of Present Illness: This is a  63 y.o. female who presents ambulatory  to the ED with complaints of dizziness onset at 6:00pm patient states that she was walking back to her doctor check her emails when she firmly felt off balance like she was going to fall and dizzy. She did have a mild to moderate headache with this. No loss of vision or double vision reported although while doing her neurologic exam she complained of horizontal diplopia with far lateral gaze on both sides. Patient states that she started with midsternal chest pain which radiated through to the back and up the back of her neck about 30 minutes later. She did have some shortness of breath with this as well. Patient denies any slurred speech or trouble finding words. No numbness or weakness in her arms or legs. No nausea or vomiting. She states that she does have a history of vertigo but that this felt different. Typically her vertigo is when she lays back or turns her head from side to side and she feels like her eyes are twitching. She states that this time she is feels dizzy even at rest.  She was however able to drive herself here to the emergency department.     Past Medical History:   Diagnosis Date    Acquired hyperlipoproteinemia     MARIO (acute kidney injury) ([facility] [address] 9/30/2019    Allergic rhinitis     Anxiety     Arthritis     Bilateral lower extremity edema     Bipolar 1 disorder (HCC)     Chronic pain syndrome     DDD (degenerative disc disease), lumbar     Depression     Depression     Disease of gallbladder     Dizziness     DJD (degenerative joint disease) of hip     Edema 12/29/2009    Elbow tendinitis     Essential hypertension 8/19/2019    Facet syndrome     Fibromyalgia     Fracture of nasal bone     GERD (gastroesophageal reflux disease)     Headache     Hiatal hernia     History of blood transfusion     anemia    Hyperlipidemia     Insomnia     Osteoarthritis     Prediabetes     Rash     Self mutilating behavior     cutter pt states she has not done in months- in therapy    Sleep apnea     no CPAP    Suicidal ideation     Thyroid disorder     hypothyroidism    Vertigo        Past Surgical History:   Procedure Laterality Date    ABDOMEN SURGERY  5/28/2014    LAPAROSCOPIC SLEEVE GASTRECTOMY, EXTENSIVE LYSIS OF ADHESIONS    ACHILLES TENDON SURGERY Left 3/9/2020    LEFT CALCANEOUS PARTIAL EXCISION, SECONDARY REPAIR OF ACHILLES TENDON performed by Jethro Johnson MD at [facility]      open , Ex-lap    CHOLECYSTECTOMY      open    COLONOSCOPY  04/16/2013    dr Kendra Wong 2018     DILATION AND CURETTAGE OF UTERUS      ENDOSCOPY, COLON, DIAGNOSTIC      FOOT SURGERY Right 05/27/2016    ARTHROPLASTY RIGHT 5TH DIGIT      HYSTEROSCOPY N/A 12-30-13    Hysteroscopy Dilitation and Curettage    JOINT REPLACEMENT Bilateral     knee    OTHER SURGICAL HISTORY  9/11/2015    ARTHROPLASTY 5TH DIGIT LEFT FOOT          SHOULDER SURGERY      right    SHOULDER SURGERY Right     SLEEVE GASTROPLASTY  May 28, 2014    Daan    TOTAL KNEE ARTHROPLASTY  12/10/10    Right    TOTAL KNEE ARTHROPLASTY  3/30/10    Left    UPPER GASTROINTESTINAL ENDOSCOPY  4/16/2013    dr Elisa Nunez ENDOSCOPY  3/20/2014    dr Gene Santoyo         Current Facility-Administered Medications:     iopamidol (ISOVUE-370) 76 % injection 80 mL, 80 mL, Intravenous, ONCE PRN, Ritchie Perez MD    Current Outpatient Medications:     celecoxib (CELEBREX) 200 MG capsule, Take 1 capsule by mouth daily, Disp: 30 capsule, Rfl: 1    pregabalin (LYRICA) 100 MG capsule, Take 1 capsule by mouth 3 times daily for 30 days. , Disp: 90 capsule, Rfl: 0    furosemide (LASIX) 20 MG tablet, Take 1 tablet by mouth daily for 7 days, Disp: 7 tablet, Number of children: 0    Years of education: 12    Highest education level: Not on file   Occupational History    Occupation: [employer]     Comment: unemployed   Social Needs    Financial resource strain: Not on file    Food insecurity     Worry: Not on file     Inability: Not on file   [employer] needs     Medical: Not on file     Non-medical: Not on file   Tobacco Use    Smoking status: Never Smoker    Smokeless tobacco: Never Used    Tobacco comment: Never smoked   Substance and Sexual Activity    Alcohol use: No     Alcohol/week: 0.0 standard drinks    Drug use: No    Sexual activity: Not Currently   Lifestyle    Physical activity     Days per week: Not on file     Minutes per session: Not on file    Stress: Not on file   Relationships    Social connections     Talks on phone: Not on file     Gets together: Not on file     Attends [employer] service: Not on file     Active member of club or organization: Not on file     Attends meetings of clubs or organizations: Not on file     Relationship status: Not on file    Intimate partner violence     Fear of current or ex partner: Not on file     Emotionally abused: Not on file     Physically abused: Not on file     Forced sexual activity: Not on file   Other Topics Concern    Not on file   Social History Narrative    Lives by herself with her cat       Nursing Notes Reviewed      ROS:  GENERAL:  No fever, no chills, no diaphoresis, no appetite changes  EYES: no eye discharge, no eye redness, + visual changes  ENT: no nasal congestion, no sore throat  CARDIAC: + chest pain, no palpitations, no leg swelling  PULM: no cough, + shortness of breath  ABD: no abdominal pain, no nausea, no vomiting, no diarrhea, no melena or hematochezia  : no dysuria, no hematuria, no urgency, no frequency.  No flank pain  MUSCULOSKELETAL: no back pain, no arthralgias, no myalgias  NEURO: + headache, no lightheadedness, + dizziness, no numbness, no weakness, no syncope, no confusion, no speech difficulty  SKIN: no rashes, no erythema, no wounds, no ecchymosis      PHYSICAL EXAM:  GENERAL APPEARANCE: Tiffanie Joseph is in no acute respiratory distress. Awake and alert. VITAL SIGNS:   ED Triage Vitals   Enc Vitals Group      BP       Pulse       Resp       Temp       Temp src       SpO2       Weight       Height       Head Circumference       Peak Flow       Pain Score       Pain Loc       Pain Edu? Excl. in [address]? HEAD: Normocephalic, atraumatic. EYES:  Extraocular muscles are intact. Pupils equal round and reactive to light. Conjunctivas are pink. Negative scleral icterus. ENT:  Mucous membranes are moist.  Pharynx without erythema or exudates. NECK: Nontender and supple. No cervical adenopathy. CHEST:  Clear to auscultation bilaterally. No rales, rhonchi, or wheezing. HEART:  Regular rate and regular rhythm. No murmurs. Strong and equal pulses in the upper and lower extremities. ABDOMEN: Soft,  nondistended, positive bowel sounds. abdomen is nontender. No rebound. no guarding. MUSCULOSKELETAL: The calves are nontender to palpation. Active range of motion of the upper and lower extremities. No edema. NEUROLOGICAL: Awake, alert and oriented x 3. Power intact in the upper and lower extremities. Sensation is intact to light touch in the upper and lower extremities. Cranial Nerves 2-12 are intact. Complained of horizontal diplopia with lateral gaze but no gross dissymetry of EOM noted. No truncal ataxia. No dysarthria or aphasia. Normal finger to nose. NIH Stroke Scale     Interval: Baseline  Time: 1945  Person Administering Scale: Brijesh Hamilton MD       1a  Level of consciousness: 0=alert; keenly responsive   1b. LOC questions:  0=Performs both tasks correctly   1c. LOC commands: 0=Performs both tasks correctly   2. Best Gaze: 0=normal   3. Visual: 0=No visual loss   4. Facial Palsy: 0=Normal symmetric movement   5a.  Motor left arm: 0=No drift, limb holds 90 (or 45) degrees for full 10 seconds   5b. Motor right arm: 0=No drift, limb holds 90 (or 45) degrees for full 10 seconds   6a. motor left le=No drift, limb holds 90 (or 45) degrees for full 10 seconds   6b  Motor right le=No drift, limb holds 90 (or 45) degrees for full 10 seconds   7. Limb Ataxia: 0=Absent   8. Sensory: 0=Normal; no sensory loss   9. Best Language:  0=No aphasia, normal   10. Dysarthria: 0=Normal   11. Extinction and Inattention: 0=No abnormality   12. Distal motor function: 0=Normal    Total:  0   Able to ambulate with minimally unsteady gait with no assistance  DERMATOLOGIC: No petechiae, rashes, or ecchymoses. No erythema. PSYCH: normal mood and affect. Normal thought content. ED COURSE AND MEDICAL DECISION MAKING:  I have reviewed [facility] records which reveal the following pertinent information:      Cta Chest Abdomen Pelvis W Contrast    Result Date: 3/7/2021  Patient: Elia Schwarz  Time Out: 05:27 Exam(s): CTA CHEST With Contrast  EXAM:   CT Angiography Chest Without and With Intravenous Contrast  CLINICAL HISTORY:   eval for aortic dissection. TECHNIQUE:   Axial computed tomographic angiography images of the chest without and with intravenous contrast.  CTDI is 21.19 mGy and DLP is 1224.18 mGy-cm. All CT scans at this facility use dose modulation, iterative reconstruction, and/or weight based dosing when appropriate to reduce radiation dose to as low as reasonably achievable. MIP reconstructed images were created and reviewed. COMPARISON:   No relevant prior studies available. FINDINGS:   Pulmonary arteries:  Unremarkable. No pulmonary embolism. Aorta: The thoracic aorta is normal in caliber without dissection or aneurysm. No acute periaortic abnormality. Precontrast imaging demonstrates no intimal wall abnormality. Lungs:  Linear subsegmental changes in the inferior lingular segment are noted. No focal consolidation.    Pleural space:  Unremarkable. No significant effusion. No pneumothorax. Heart:  The cardiac chambers are normal in caliber. There is moderate coronary artery calcification in the LAD distribution and mid to distal circumflex. Detailed evaluation is limited. Trace pericardial effusion noted in the superior pericardial recess. Bones/joints:  Anterior hypertrophic osteophyte changes are noted at several levels of the thoracic spine. Bridging osteophytes are also identified. No acute osseous abnormality or significant abnormal alignment. Soft tissues:  No significant overlying soft tissue abnormality. Lymph nodes:  Unremarkable. No enlarged lymph nodes. Electronically signed by Fransisca Aldana MD on 03-07-21 at 8448    1. The thoracic aorta is normal in caliber without dissection or aneurysm. No acute periaortic abnormality. Precontrast imaging demonstrates no intimal wall abnormality. 2.  The cardiac chambers are normal in caliber. There is moderate coronary artery calcification in the LAD distribution and mid to distal circumflex. Detailed evaluation is limited. The clinical significance of this finding is indeterminate. 3.  Linear subsegmental changes in the inferior lingular segment are noted, most consistent with linear atelectasis or scarring. No focal consolidation. No pleural effusion or pneumothorax. Cardiac cath Results 3/8/21:  Left ventricular pressure 5 mmHg  Aortic pressure 125 mmHg     Coronary anatomy:   The left main coronary artery is normal.      Left anterior descending artery is normal     Circumflex artery is normal.     The right coronary artery is a dominant vessel and normal.      Left ventriculogram shows ejection fraction of 55-60 %.  Normal wall motion.        Impression:  No evidence   coronary artery disease  False positive stress test  Normal LV size and function      Complications: none        Plan:  Primary prevention  Medical management      EKG as interpreted by myself:  normal sinus rhythm with a rate of 75  Axis is   Normal  QTc is  normal  Intervals and Durations are unremarkable. No specific ST-T wave changes appreciated. No evidence of acute ischemia. Radiology:  Films have been read by radiologist as noted in chart unless otherwise stated. Other radiologic studies (i.e. CT, MRI, ultrasounds, etc ) have been interpreted by radiologist.     [address]   Final Result   1. Normal right and left internal carotid artery. .   2. No evidence of intracranial large vessel occlusion   3. Normal vertebral arteries. , Dominant left vertebral artery   4. Trigeminal origin of the right posterior cerebral artery   5. No abnormal intracranial enhancement or early draining veins      CT HEAD WO CONTRAST   Final Result   1.  Normal brain, no interval change from prior study          Labs:  Results for orders placed or performed during the hospital encounter of 03/26/21   CBC Auto Differential   Result Value Ref Range    WBC 6.9 4.0 - 11.0 K/uL    RBC 4.49 4.00 - 5.20 M/uL    Hemoglobin 12.8 12.0 - 16.0 g/dL    Hematocrit 39.9 36.0 - 48.0 %    MCV 89.0 80.0 - 100.0 fL    MCH 28.5 26.0 - 34.0 pg    MCHC 32.0 31.0 - 36.0 g/dL    RDW 16.2 (H) 12.4 - 15.4 %    Platelets 108 911 - 218 K/uL    MPV 9.2 5.0 - 10.5 fL    Neutrophils % 62.5 %    Lymphocytes % 22.4 %    Monocytes % 9.7 %    Eosinophils % 4.0 %    Basophils % 1.4 %    Neutrophils Absolute 4.3 1.7 - 7.7 K/uL    Lymphocytes Absolute 1.5 1.0 - 5.1 K/uL    Monocytes Absolute 0.7 0.0 - 1.3 K/uL    Eosinophils Absolute 0.3 0.0 - 0.6 K/uL    Basophils Absolute 0.1 0.0 - 0.2 K/uL   Comprehensive Metabolic Panel   Result Value Ref Range    Sodium 139 136 - 145 mmol/L    Potassium 3.8 3.5 - 5.1 mmol/L    Chloride 101 99 - 110 mmol/L    CO2 28 21 - 32 mmol/L    Anion Gap 10 3 - 16    Glucose 112 (H) 70 - 99 mg/dL    BUN 23 (H) 7 - 20 mg/dL    CREATININE 0.8 0.6 - 1.2 mg/dL    GFR Non-African American >60 >60    GFR African American >60 >60    Calcium 9.8 8.3 - 10.6 mg/dL    Total Protein 6.8 6.4 - 8.2 g/dL    Albumin 3.8 3.4 - 5.0 g/dL    Albumin/Globulin Ratio 1.3 1.1 - 2.2    Total Bilirubin 0.5 0.0 - 1.0 mg/dL    Alkaline Phosphatase 123 40 - 129 U/L    ALT 13 10 - 40 U/L    AST 15 15 - 37 U/L    Globulin 3.0 g/dL   Troponin   Result Value Ref Range    Troponin <0.01 <0.01 ng/mL   Protime-INR   Result Value Ref Range    Protime 11.2 10.0 - 13.2 sec    INR 0.97 0.86 - 1.14   POCT Glucose   Result Value Ref Range    POC Glucose 90 70 - 99 mg/dl    Performed on ACCU-CHEK        Treatment in the department:  Patient received the following while in the ED. Medications   iopamidol (ISOVUE-370) 76 % injection 80 mL (has no administration in time range)   iopamidol (ISOVUE-370) 76 % injection 80 mL (80 mLs Intravenous Given 3/26/21 2003)         Repeat exam shows patient still has diplopia with bilateral lateral gaze. Medical decision making:    TIMES:  Last Known Well: 1800  Arrival to ED: 1937  Patient placed in room: 1940  MD to bedside: 1944  Call to stroke team: 1954  Stroke Team: Case discussed with  Stroke Team, Dr. Silvina Laws at 2513. He does not recommend tpa based on mild symptomatology, NIHSS 0 and able to ambulate unassisted. CT First Slice: 4972          t-PA NOT given due to the following EXCLUSION CRITERIA (only those checked):  [] Pregnancy  [] Symptoms > 3 hours of onset  [x] Minor or isolated neurological signs  [] Rapid improvement of stroke symptoms  [] Seizure at the same time of stroke symptoms  [] Active bleeding or acute trauma (fracture)  [] Presentation consistent with acute MI or post-MI pericarditis  [] Known intracranial neoplasm, AV malformation or aneurysm  [] CT evidence of intracranial hemorrhage  [] Any prior history of intracranial hemorrhage  [] Symptoms suggestive of subarachnoid hemorrhage (even if head CT normal)  [] Persistent hypertension (SBP>185 or DBP>110)  [] Glucose < 50 or > 400. [] Bleeding diathesis, including but not limited to:   -Platelets < 782,533   -Heparin within 48 hours with PTT > normal range   -Current or recent use of anticoagulants (dabagtran, rivaroxaban, or warfarin with     INR > 1.7)  [] Lumbar puncture in past 7 days  [] Arterial puncture at a noncompressible site in past 7 days  [] Major surgery in past 14 days  [] Gastrointestinal or urinary tract hemorrhage in past 21 days  [] Myocardial infarction in past 3 months  [] Stroke, intracranial surgery or serious head trauma in past 3 months    t-PA given with the following INCLUSION CRITERIA verified (only those checked):  [] Age 25 years or older  [] Clinical diagnosis of ischemic stroke causing measurable neurological deficit  [] Administration of t-PA can be initiated within 3 hours of onset of symptoms  [] A patient or family members who understand the potential risks and benefits:   Of every 100 patients treated with tPA:   72 will have the same outcome   32 will have a better outcome   3 will have a worse outcome (with 1 being severely disabled or fatal) due to t-PA    Patient presents emergency department with dizziness. She does have a history of vertigo in the past and has actually been seeing vestibular physical therapy. She states that this feels different than usual as it is present even at rest.  Her NIH stroke scale is 0 she is able to ambulate without assistance. She complains of diplopia only with far lateral gaze bilaterally but was able to drive herself to the emergency department. She has not noticed the diplopia in the past.  For this reason posterior circulation event was entertained. She is not a candidate for TPA given the very low stroke scale. She is complaining of chest pain however EKG is nonischemic and she has recently undergone a CTA for dissection as well as a cardiac cath which showed no significant coronary artery disease.   Cardiac cath however could provide her a reason for embolic event.  She has not had a recent MRI of the brain. I spoke with Dr. Mariaelena Tejeda. We thoroughly discussed the history, physical exam, laboratory and imaging studies, as well as, emergency department course. Based upon that discussion, we've decided to admit Natalya Lane for further observation and evaluation of Andry Yap's CVA-like symptoms. As I have deemed necessary from their history, physical and studies, I have considered and evaluated Natalya Lane for the following diagnoses:  DIABETES, INTRACRANIAL HEMORRHAGE, MENINGITIS, SUBARACHNOID HEMORRHAGE, SUBDURAL HEMATOMA, & STROKE. Clinical Impression:  1. Dizziness    2. Vertigo    3. Chest pain, unspecified type        Dispo:  Patient will be admitted at this time. Patient was informed of this decision and agrees with plan. I have discussed lab and xray findings with patient and they understand. Questions were answered to the best of my ability. Discharge vitals:  Blood pressure (!) 148/85, pulse 73, resp. rate 15, height 5' 4\" (1.626 m), weight (!) 320 lb 8 oz (145.4 kg), last menstrual period 01/15/2014, SpO2 95 %, not currently breastfeeding. Prescriptions given:   New Prescriptions    No medications on file       This chart was created using Dragon voice recognition software.         Tremaine Guevara MD  03/27/21 1856

## 2021-03-27 ENCOUNTER — APPOINTMENT (OUTPATIENT)
Dept: MRI IMAGING | Age: 61
End: 2021-03-27
Payer: COMMERCIAL

## 2021-03-27 LAB
A/G RATIO: 1.1 (ref 1.1–2.2)
ALBUMIN SERPL-MCNC: 3.4 G/DL (ref 3.4–5)
ALP BLD-CCNC: 125 U/L (ref 40–129)
ALT SERPL-CCNC: 12 U/L (ref 10–40)
ANION GAP SERPL CALCULATED.3IONS-SCNC: 12 MMOL/L (ref 3–16)
AST SERPL-CCNC: 12 U/L (ref 15–37)
BASOPHILS ABSOLUTE: 0 K/UL (ref 0–0.2)
BASOPHILS RELATIVE PERCENT: 0.6 %
BILIRUB SERPL-MCNC: 0.6 MG/DL (ref 0–1)
BUN BLDV-MCNC: 18 MG/DL (ref 7–20)
CALCIUM SERPL-MCNC: 9.1 MG/DL (ref 8.3–10.6)
CHLORIDE BLD-SCNC: 104 MMOL/L (ref 99–110)
CO2: 24 MMOL/L (ref 21–32)
CORTISOL TOTAL: 0.9 UG/DL
CREAT SERPL-MCNC: 0.7 MG/DL (ref 0.6–1.2)
EOSINOPHILS ABSOLUTE: 0.1 K/UL (ref 0–0.6)
EOSINOPHILS RELATIVE PERCENT: 2.6 %
GFR AFRICAN AMERICAN: >60
GFR NON-AFRICAN AMERICAN: >60
GLOBULIN: 3 G/DL
GLUCOSE BLD-MCNC: 102 MG/DL (ref 70–99)
HCT VFR BLD CALC: 39 % (ref 36–48)
HEMOGLOBIN: 12.6 G/DL (ref 12–16)
LYMPHOCYTES ABSOLUTE: 1.1 K/UL (ref 1–5.1)
LYMPHOCYTES RELATIVE PERCENT: 19.6 %
MCH RBC QN AUTO: 29.2 PG (ref 26–34)
MCHC RBC AUTO-ENTMCNC: 32.3 G/DL (ref 31–36)
MCV RBC AUTO: 90.4 FL (ref 80–100)
MONOCYTES ABSOLUTE: 0.5 K/UL (ref 0–1.3)
MONOCYTES RELATIVE PERCENT: 8.2 %
NEUTROPHILS ABSOLUTE: 3.8 K/UL (ref 1.7–7.7)
NEUTROPHILS RELATIVE PERCENT: 69 %
PDW BLD-RTO: 16 % (ref 12.4–15.4)
PLATELET # BLD: 216 K/UL (ref 135–450)
PMV BLD AUTO: 9.6 FL (ref 5–10.5)
POTASSIUM REFLEX MAGNESIUM: 3.9 MMOL/L (ref 3.5–5.1)
RBC # BLD: 4.32 M/UL (ref 4–5.2)
SODIUM BLD-SCNC: 140 MMOL/L (ref 136–145)
TOTAL PROTEIN: 6.4 G/DL (ref 6.4–8.2)
TROPONIN: <0.01 NG/ML
WBC # BLD: 5.5 K/UL (ref 4–11)

## 2021-03-27 PROCEDURE — G0378 HOSPITAL OBSERVATION PER HR: HCPCS

## 2021-03-27 PROCEDURE — 36415 COLL VENOUS BLD VENIPUNCTURE: CPT

## 2021-03-27 PROCEDURE — 84484 ASSAY OF TROPONIN QUANT: CPT

## 2021-03-27 PROCEDURE — 80053 COMPREHEN METABOLIC PANEL: CPT

## 2021-03-27 PROCEDURE — 82533 TOTAL CORTISOL: CPT

## 2021-03-27 PROCEDURE — 70551 MRI BRAIN STEM W/O DYE: CPT

## 2021-03-27 PROCEDURE — 85025 COMPLETE CBC W/AUTO DIFF WBC: CPT

## 2021-03-27 PROCEDURE — 6370000000 HC RX 637 (ALT 250 FOR IP): Performed by: INTERNAL MEDICINE

## 2021-03-27 PROCEDURE — 2580000003 HC RX 258: Performed by: INTERNAL MEDICINE

## 2021-03-27 RX ORDER — ACETAMINOPHEN 325 MG/1
650 TABLET ORAL EVERY 6 HOURS PRN
Status: DISCONTINUED | OUTPATIENT
Start: 2021-03-27 | End: 2021-03-28 | Stop reason: HOSPADM

## 2021-03-27 RX ORDER — ROPINIROLE 1 MG/1
1 TABLET, FILM COATED ORAL NIGHTLY
Status: DISCONTINUED | OUTPATIENT
Start: 2021-03-27 | End: 2021-03-28 | Stop reason: HOSPADM

## 2021-03-27 RX ORDER — ACETAMINOPHEN 650 MG/1
650 SUPPOSITORY RECTAL EVERY 6 HOURS PRN
Status: DISCONTINUED | OUTPATIENT
Start: 2021-03-27 | End: 2021-03-28 | Stop reason: HOSPADM

## 2021-03-27 RX ORDER — QUETIAPINE FUMARATE 200 MG/1
200 TABLET, FILM COATED ORAL NIGHTLY
Status: DISCONTINUED | OUTPATIENT
Start: 2021-03-27 | End: 2021-03-28 | Stop reason: HOSPADM

## 2021-03-27 RX ORDER — SODIUM CHLORIDE 0.9 % (FLUSH) 0.9 %
10 SYRINGE (ML) INJECTION EVERY 12 HOURS SCHEDULED
Status: DISCONTINUED | OUTPATIENT
Start: 2021-03-27 | End: 2021-03-28 | Stop reason: HOSPADM

## 2021-03-27 RX ORDER — ARIPIPRAZOLE 5 MG/1
20 TABLET ORAL DAILY
Status: DISCONTINUED | OUTPATIENT
Start: 2021-03-27 | End: 2021-03-28 | Stop reason: HOSPADM

## 2021-03-27 RX ORDER — MAGNESIUM SULFATE IN WATER 40 MG/ML
2000 INJECTION, SOLUTION INTRAVENOUS PRN
Status: DISCONTINUED | OUTPATIENT
Start: 2021-03-27 | End: 2021-03-28 | Stop reason: HOSPADM

## 2021-03-27 RX ORDER — NYSTATIN 100000 U/G
OINTMENT TOPICAL 2 TIMES DAILY
Status: DISCONTINUED | OUTPATIENT
Start: 2021-03-27 | End: 2021-03-27 | Stop reason: CLARIF

## 2021-03-27 RX ORDER — LABETALOL HYDROCHLORIDE 5 MG/ML
10 INJECTION, SOLUTION INTRAVENOUS EVERY 4 HOURS PRN
Status: DISCONTINUED | OUTPATIENT
Start: 2021-03-27 | End: 2021-03-28 | Stop reason: HOSPADM

## 2021-03-27 RX ORDER — NYSTATIN 100000 U/G
CREAM TOPICAL 2 TIMES DAILY
Status: DISCONTINUED | OUTPATIENT
Start: 2021-03-27 | End: 2021-03-28 | Stop reason: HOSPADM

## 2021-03-27 RX ORDER — LEVOTHYROXINE SODIUM 88 UG/1
88 TABLET ORAL DAILY
Status: DISCONTINUED | OUTPATIENT
Start: 2021-03-27 | End: 2021-03-28 | Stop reason: HOSPADM

## 2021-03-27 RX ORDER — FAMOTIDINE 20 MG/1
20 TABLET, FILM COATED ORAL DAILY PRN
Status: DISCONTINUED | OUTPATIENT
Start: 2021-03-27 | End: 2021-03-28 | Stop reason: HOSPADM

## 2021-03-27 RX ORDER — ATORVASTATIN CALCIUM 40 MG/1
40 TABLET, FILM COATED ORAL NIGHTLY
Status: DISCONTINUED | OUTPATIENT
Start: 2021-03-27 | End: 2021-03-28 | Stop reason: HOSPADM

## 2021-03-27 RX ORDER — PROPRANOLOL HYDROCHLORIDE 20 MG/1
40 TABLET ORAL 3 TIMES DAILY
Status: DISCONTINUED | OUTPATIENT
Start: 2021-03-27 | End: 2021-03-28 | Stop reason: HOSPADM

## 2021-03-27 RX ORDER — PROMETHAZINE HYDROCHLORIDE 12.5 MG/1
12.5 TABLET ORAL EVERY 6 HOURS PRN
Status: DISCONTINUED | OUTPATIENT
Start: 2021-03-27 | End: 2021-03-28 | Stop reason: HOSPADM

## 2021-03-27 RX ORDER — MECLIZINE HYDROCHLORIDE 25 MG/1
25 TABLET ORAL 3 TIMES DAILY PRN
Status: DISCONTINUED | OUTPATIENT
Start: 2021-03-27 | End: 2021-03-28 | Stop reason: HOSPADM

## 2021-03-27 RX ORDER — SODIUM CHLORIDE 0.9 % (FLUSH) 0.9 %
10 SYRINGE (ML) INJECTION PRN
Status: DISCONTINUED | OUTPATIENT
Start: 2021-03-27 | End: 2021-03-28 | Stop reason: HOSPADM

## 2021-03-27 RX ORDER — ONDANSETRON 2 MG/ML
4 INJECTION INTRAMUSCULAR; INTRAVENOUS EVERY 6 HOURS PRN
Status: DISCONTINUED | OUTPATIENT
Start: 2021-03-27 | End: 2021-03-28 | Stop reason: HOSPADM

## 2021-03-27 RX ORDER — POTASSIUM CHLORIDE 7.45 MG/ML
10 INJECTION INTRAVENOUS PRN
Status: DISCONTINUED | OUTPATIENT
Start: 2021-03-27 | End: 2021-03-28 | Stop reason: HOSPADM

## 2021-03-27 RX ORDER — PANTOPRAZOLE SODIUM 40 MG/1
40 TABLET, DELAYED RELEASE ORAL
Refills: 0 | Status: DISCONTINUED | OUTPATIENT
Start: 2021-03-27 | End: 2021-03-28 | Stop reason: HOSPADM

## 2021-03-27 RX ORDER — SODIUM CHLORIDE 9 MG/ML
25 INJECTION, SOLUTION INTRAVENOUS PRN
Status: DISCONTINUED | OUTPATIENT
Start: 2021-03-27 | End: 2021-03-28 | Stop reason: HOSPADM

## 2021-03-27 RX ORDER — DULOXETIN HYDROCHLORIDE 60 MG/1
60 CAPSULE, DELAYED RELEASE ORAL 2 TIMES DAILY
Status: DISCONTINUED | OUTPATIENT
Start: 2021-03-27 | End: 2021-03-28 | Stop reason: HOSPADM

## 2021-03-27 RX ORDER — PREGABALIN 50 MG/1
100 CAPSULE ORAL 3 TIMES DAILY
Status: DISCONTINUED | OUTPATIENT
Start: 2021-03-27 | End: 2021-03-28 | Stop reason: HOSPADM

## 2021-03-27 RX ORDER — COSYNTROPIN 0.25 MG/ML
250 INJECTION, POWDER, FOR SOLUTION INTRAMUSCULAR; INTRAVENOUS ONCE
Status: COMPLETED | OUTPATIENT
Start: 2021-03-28 | End: 2021-03-28

## 2021-03-27 RX ADMIN — DULOXETINE HYDROCHLORIDE 60 MG: 60 CAPSULE, DELAYED RELEASE ORAL at 09:32

## 2021-03-27 RX ADMIN — QUETIAPINE FUMARATE 200 MG: 200 TABLET ORAL at 20:22

## 2021-03-27 RX ADMIN — Medication 10 ML: at 09:26

## 2021-03-27 RX ADMIN — PROPRANOLOL HYDROCHLORIDE 40 MG: 20 TABLET ORAL at 20:22

## 2021-03-27 RX ADMIN — NYSTATIN: 100000 CREAM TOPICAL at 22:42

## 2021-03-27 RX ADMIN — MECLIZINE HYDROCHLORIDE 25 MG: 25 TABLET ORAL at 04:46

## 2021-03-27 RX ADMIN — PROPRANOLOL HYDROCHLORIDE 40 MG: 20 TABLET ORAL at 14:42

## 2021-03-27 RX ADMIN — ROPINIROLE HYDROCHLORIDE 1 MG: 1 TABLET, FILM COATED ORAL at 20:24

## 2021-03-27 RX ADMIN — DULOXETINE HYDROCHLORIDE 60 MG: 60 CAPSULE, DELAYED RELEASE ORAL at 20:23

## 2021-03-27 RX ADMIN — ATORVASTATIN CALCIUM 40 MG: 40 TABLET, FILM COATED ORAL at 20:22

## 2021-03-27 RX ADMIN — PREGABALIN 100 MG: 50 CAPSULE ORAL at 20:23

## 2021-03-27 RX ADMIN — PREGABALIN 100 MG: 50 CAPSULE ORAL at 09:32

## 2021-03-27 RX ADMIN — ROPINIROLE HYDROCHLORIDE 1 MG: 1 TABLET, FILM COATED ORAL at 04:46

## 2021-03-27 RX ADMIN — PROPRANOLOL HYDROCHLORIDE 40 MG: 20 TABLET ORAL at 09:31

## 2021-03-27 RX ADMIN — LEVOTHYROXINE SODIUM 88 MCG: 0.09 TABLET ORAL at 06:11

## 2021-03-27 RX ADMIN — ARIPIPRAZOLE 20 MG: 5 TABLET ORAL at 09:32

## 2021-03-27 RX ADMIN — PANTOPRAZOLE SODIUM 40 MG: 40 TABLET, DELAYED RELEASE ORAL at 06:11

## 2021-03-27 RX ADMIN — Medication 10 ML: at 20:24

## 2021-03-27 RX ADMIN — PREGABALIN 100 MG: 50 CAPSULE ORAL at 14:42

## 2021-03-27 ASSESSMENT — PAIN SCALES - GENERAL: PAINLEVEL_OUTOF10: 0

## 2021-03-27 NOTE — PROGRESS NOTES
General Internal Medicine Attending    Chart and data reviewed. Patient seen and examined  Admitted earlier today by my colleague, Dr. Kendell Chaparro       Physical exam repeated. Labs and imaging studies reviewed. Assessment and plan as outlined above per Dr. Kendell Chaparro      MRI: No evidence of recent infarct. Continue monitoring    Control BP: Is she only on propranolol? She states she takes it for \"my mood\"Monitor BPs  May need additional meds. With low AM cortisol, will do CST in AM  Check TSH    Update B12 level.  Was 303 in 2017    Check UA      Rest as per H and P      PT/OT lupe Kyle MD

## 2021-03-27 NOTE — H&P
Hospital Medicine History & Physical      PCP: Sim Ruiz MD    Date of Admission: 3/26/2021    Date of Service: Pt seen/examined on 3/26/2021      Pt seen/examined face to face on and admitted as observation with expected LOS less than two midnights but that can change depending on respose to medical therapy and clinical progress. Chief Complaint:    Chief Complaint   Patient presents with    Dizziness        History Of Present Illness:      63 y.o. female who presented to [facility] with past medical history of depression, verbalized, edema, facet syndrome, fibromyalgia, prediabetes, class III obesity, vertigo, bipolar 1 disorder presented to the ED from [facility] for concern of central vertigo. Patient reported the dizziness started around 6 PM at work when she came in with checking her emails. Patient then noted that around 6 to feel very dizzy. Patient reported that it was not the room moving around more like headache with some chest discomfort where she was little bit anxious. .  Patient reports that the chest pain is same like last time but she had a normal  catheterization. No known alleviating or exacerbating factor. Patient reports that she when she normally does for lateral gaze she noticed that her vision becomes to pictures instead of 1 and has noted in the other side as well. Patient denies having any history of strokes no changes in medications. Patient currently does not take any blood thinners. Patient also does report history of vertigo where she got fixed before with a maneuver PT resolved. Patient reports this time is very different and that it occurs even at rest.  No association of falls loss of balance loss of conscious nausea vomiting fever chills night sweats neck pain or incontinence. .  Patient was reports there was no changes in medications, does admit sometimes easily bruising.       Past Medical History:          Diagnosis Date    Acquired hyperlipoproteinemia     MARIO (acute kidney injury) ([facility] [address] 9/30/2019    Allergic rhinitis     Anxiety     Arthritis     Bilateral lower extremity edema     Bipolar 1 disorder (HCC)     Chronic pain syndrome     DDD (degenerative disc disease), lumbar     Depression     Depression     Disease of gallbladder     Dizziness     DJD (degenerative joint disease) of hip     Edema 12/29/2009    Elbow tendinitis     Essential hypertension 8/19/2019    Facet syndrome     Fibromyalgia     Fracture of nasal bone     GERD (gastroesophageal reflux disease)     Headache     Hiatal hernia     History of blood transfusion     anemia    Hyperlipidemia     Insomnia     Osteoarthritis     Prediabetes     Rash     Self mutilating behavior     cutter pt states she has not done in months- in therapy    Sleep apnea     no CPAP    Suicidal ideation     Thyroid disorder     hypothyroidism    Vertigo        Past Surgical History:          Procedure Laterality Date    ABDOMEN SURGERY  5/28/2014    LAPAROSCOPIC SLEEVE GASTRECTOMY, EXTENSIVE LYSIS OF ADHESIONS    ACHILLES TENDON SURGERY Left 3/9/2020    LEFT CALCANEOUS PARTIAL EXCISION, SECONDARY REPAIR OF ACHILLES TENDON performed by Susan Jesus MD at [facility]      open , Ex-lap    CHOLECYSTECTOMY      open    COLONOSCOPY  04/16/2013    dr Gia Barton 2018     DILATION AND CURETTAGE OF UTERUS      ENDOSCOPY, COLON, DIAGNOSTIC      FOOT SURGERY Right 05/27/2016    ARTHROPLASTY RIGHT 5TH DIGIT      HYSTEROSCOPY N/A 12-30-13    Hysteroscopy Dilitation and Curettage    JOINT REPLACEMENT Bilateral     knee    OTHER SURGICAL HISTORY  9/11/2015    ARTHROPLASTY 5TH DIGIT LEFT FOOT          SHOULDER SURGERY      right    SHOULDER SURGERY Right     SLEEVE GASTROPLASTY  May 28, 2014    Rosi    TOTAL KNEE ARTHROPLASTY  12/10/10    Right    TOTAL KNEE ARTHROPLASTY  3/30/10    Left    UPPER GASTROINTESTINAL ENDOSCOPY  4/16/2013    dr Mireles Herbster ENDOSCOPY  3/20/2014    dr Army Jimenes       Medications Prior to Admission:      Prior to Admission medications    Medication Sig Start Date End Date Taking? Authorizing Provider   celecoxib (CELEBREX) 200 MG capsule Take 1 capsule by mouth daily 3/25/21  Yes Anthony Rocha MD   pregabalin (LYRICA) 100 MG capsule Take 1 capsule by mouth 3 times daily for 30 days. 3/25/21 4/24/21 Yes Anthoyn Rocha MD   rOPINIRole (REQUIP) 1 MG tablet TAKE 2 TABLETS BY MOUTH EVERY NIGHT 2/15/21  Yes Anabell Platt MD   betamethasone dipropionate (DIPROLENE) 0.05 % cream APPLY EXTERNALLY TO THE AFFECTED AREA TWICE DAILY 2/10/21  Yes Anabell Platt MD   atorvastatin (LIPITOR) 40 MG tablet TAKE 1 TABLET BY MOUTH DAILY  Patient taking differently: TAKE 1 TABLET BY MOUTH NIGHTLY 1/12/21  Yes Anabell Platt MD   levothyroxine (SYNTHROID) 88 MCG tablet TAKE 1 TABLET BY MOUTH DAILY 12/28/20  Yes Anabell Platt MD   Cholecalciferol (VITAMIN D3) 125 MCG (5000 UT) TABS Take 1 tablet by mouth 2 times daily 9/2/20  Yes Historical Provider, MD   DULoxetine (CYMBALTA) 60 MG extended release capsule Take 1 capsule by mouth 2 times daily 8/27/20  Yes Historical Provider, MD   ARIPiprazole (ABILIFY) 20 MG tablet Take 1 tablet by mouth daily 9/4/20  Yes Historical Provider, MD   propranolol (INDERAL) 40 MG tablet Take 1 tablet by mouth 3 times daily anxiety 8/27/20  Yes Historical Provider, MD   QUEtiapine (SEROQUEL) 200 MG tablet Take 200 mg by mouth nightly   Yes Historical Provider, MD   furosemide (LASIX) 20 MG tablet Take 1 tablet by mouth daily for 7 days 3/8/21 3/15/21  Sumaya Alegria MD   nitroGLYCERIN (NITROSTAT) 0.4 MG SL tablet Place 1 tablet under the tongue every 5 minutes as needed for Chest pain up to max of 3 total doses.  If no relief after 1 dose, call 911. 3/5/21   Anabell Platt MD   ondansetron (ZOFRAN) 4 MG tablet TAKE 1 TABLET BY MOUTH EVERY 8 HOURS AS NEEDED FOR NAUSEA OR VOMITING 1/26/21   Cheng Regan MD   meclizine (ANTIVERT) 25 MG tablet Take 25 mg by mouth 3 times daily as needed    Historical Provider, MD   esomeprazole (NEXIUM) 40 MG delayed release capsule TAKE 1 CAPSULE BY MOUTH EVERY MORNING BEFORE BREAKFAST 7/14/20   Cheng Regan MD       Allergies:  Polysporin [bacitracin-polymyxin b], Lorazepam, Neomycin-polymyxin-gramicidin, and Sulfa antibiotics    Social History:          TOBACCO:   reports that she has never smoked. She has never used smokeless tobacco.  ETOH:   reports no history of alcohol use. E-Cigarettes/Vaping Use     Questions Responses    E-Cigarette/Vaping Use Never User    Start Date     Passive Exposure     Quit Date     Counseling Given     Comments             Family History:      Reviewed in detail         Problem Relation Age of Onset    Heart Disease Mother     Heart Failure Mother     High Cholesterol Mother     High Blood Pressure Mother     Stroke Mother     Birth Defects Mother     Other Mother         VV    High Blood Pressure Father     COPD Father     Diabetes Brother     Stroke Maternal Grandmother     Arthritis Maternal Grandmother     Heart Failure Brother     Stroke Brother     Birth Defects Maternal Grandfather     Arthritis Paternal Grandmother        REVIEW OF SYSTEMS:     Constitutional:  No Fever, No Chills, No Night Sweats  ENT/Mouth:  No Nasal Congestion,  No Hoarseness, No new mouth lesion  Eyes:  No Eye Pain, No Redness, No Discharge  Cardiovascular: + Chest Pain, No Orthopnea, No Palpitations  Respiratory:  No Cough, No Sputum, No Dyspnea  Gastrointestinal: No Vomiting, No Diarrhea, No abdominal pain  Genitourinary: No Urinary Frequency, No Hematuria, No Urinary pain  Musculoskeletal:  No worsening Arthralgias, No worsening Myalgias  Skin:  No new Skin Lesions, No new skin rash  Neuro:  No new weakness, No new numbness.   Psych:  No suicial ideation, No Violence ideation    PHYSICAL EXAM PERFORMED:    BP (!) 187/100   Pulse 81   Temp 96.1 °F (35.6 °C) (Oral)   Resp 16   Ht 5' 4\" (1.626 m)   Wt (!) 324 lb 4.8 oz (147.1 kg)   LMP 01/15/2014   SpO2 92%   BMI 55.67 kg/m²     General appearance:  mild acute distress, appears older than stated age  HEENT:   atraumatic, sclera anicteric, Conjunctivae clear. Left gaze nystagmus  Neck: Supple,Trachea midline, no goiter performed buffalo hump  Respiratory:minimal accessory muscle usage, Normal respiratory effort. Clear to auscultation, bilaterally without wheezing  Cardiovascular:  Regular rate and rhythm, capillary refill 2 seconds  Abdomen: Soft, non-tender, non-distended with normal bowel sounds. Musculoskeletal:  No clubbing, cyanosis. trace edema LE bilaterally. Minimal bruising  Skin: turgor normal.  No new rashes or lesions. Neurologic: Alert and oriented x4, no new focal sensory/motor deficits. Labs:     Recent Labs     03/26/21 2005   WBC 6.9   HGB 12.8   HCT 39.9        Recent Labs     03/26/21 2005      K 3.8      CO2 28   BUN 23*   CREATININE 0.8   CALCIUM 9.8     Recent Labs     03/26/21 2005   AST 15   ALT 13   BILITOT 0.5   ALKPHOS 123     Recent Labs     03/26/21 2005   INR 0.97     Recent Labs     03/26/21 2005   Ladonna Route <0.01       Urinalysis:      Lab Results   Component Value Date    NITRU Negative 09/30/2019    WBCUA 3-5 12/21/2018    BACTERIA 2+ 12/21/2018    RBCUA None seen 12/21/2018    BLOODU neg 10/08/2019    BLOODU Negative 09/30/2019    SPECGRAV 1.030 10/08/2019    SPECGRAV 1.015 09/30/2019    GLUCOSEU neg 10/08/2019    GLUCOSEU Negative 09/30/2019    GLUCOSEU NEGATIVE 04/21/2012       Radiology:     CXR: I have reviewed the CXR with the following interpretation:   No acute process  EKG:  I have reviewed the EKG with the following interpretation:   Normal sinus rhythm QTc 442    CTA HEAD NECK W CONTRAST   Final Result   1. Normal right and left internal carotid artery. .   2. No evidence of intracranial large vessel occlusion   3. Normal vertebral arteries. , Dominant left vertebral artery   4. Trigeminal origin of the right posterior cerebral artery   5. No abnormal intracranial enhancement or early draining veins      CT HEAD WO CONTRAST   Final Result   1. Normal brain, no interval change from prior study      MRI BRAIN WO CONTRAST    (Results Pending)       ASSESSMENT AND PLAN:    Active Hospital Problems    Diagnosis Date Noted    Vertigo [R42] 09/21/2015     1. Dizziness: Suspected central vertigo-reported BL nystagmus  Noted left but not the right on my examination  MRI brain to rule out central etiology per recommended from stroke team  Neurochecks  CT head and CTA H/N negative    HTN Urgency:  IV labetalol ordered  Restarted home medications    Chronic medical conditions:  Arthritis: Held celecoxib  Depression: Continue home medication  Hypothyroid: Continue home medication  Hyperlipidemia: Continue home medication  Prediabetes: A1c pending  Class III obesity:Complicating assessment and treatment. Placing patient at risk for multiple co-morbidities as well as early death and contributing to the patient's presentation.  Education, and counseling    Diet: cardiac    DVT Prophylaxis: held    Dispo:   Expected LOS <than two Chetna 1153, DO

## 2021-03-27 NOTE — ED NOTES
Dr. Shen Avery in and evaluated pt and stroke team was called at [phone] [address], RN  03/26/21 2035

## 2021-03-27 NOTE — PROGRESS NOTES
4 Eyes Admission Assessment     I agree as the admission nurse that 2 RN's have performed a thorough Head to Toe Skin Assessment on the patient. ALL assessment sites listed below have been assessed on admission. Areas assessed by both nurses:   [x]   Head, Face, and Ears   [x]   Shoulders, Back, and Chest  [x]   Arms, Elbows, and Hands   [x]   Coccyx, Sacrum, and Ischium  [x]   Legs, Feet, and Heels        Does the Patient have Skin Breakdown? BLE redness, red/moist to bilateral breast folds and pako folds. Scattered abrasions/bruising to BUE.         Max Prevention initiated:  NA   Wound Care Orders initiated:  NA      WOC nurse consulted for Pressure Injury (Stage 3,4, Unstageable, DTI, NWPT, and Complex wounds) or Max score 18 or lower:  NA      Nurse 1 eSignature: Electronically signed by Merle Cohen RN on 3/27/21 at 4:36 AM EDT    **SHARE this note so that the co-signing nurse is able to place an eSignature**    Nurse 2 eSignature: Electronically signed by Purvi Ogden RN on 3/27/21 at 5:01 AM EDT

## 2021-03-27 NOTE — ED NOTES
Pt states her last well known was at 1800 states she felt dizzy when standing, on arrival we ambulated pt and she did well she was steady on her feet     Jameson Durham RN  03/26/21 2033

## 2021-03-27 NOTE — ED NOTES
Report called to Erika Campos on 5 tower and [facility] here to transport pt to [place] [id number]     [facility]  03/27/21 1313

## 2021-03-27 NOTE — PROGRESS NOTES
Stroke Admission    I agree as the admission nurse that I have completed a thorough stroke assessment and completed the admission on the patient. ALL assessment areas listed below have been addressed and completed. Presentation: TIA    Unable to complete handoff w/transferring RN. Patient transferred in from [facility] ED. Current NIHSS 0.     [x]   Education Assessment  [x]   Individualized Stroke/TIA Education template added, including patient specific risk factors: Hypertension, High Cholesterol, Overweight, Lack of exercise and Personal history of heart disease  [x]   Individualized Stroke/TIA Care Plan template added  [x]   Swallow screen completed using the [employer] Protocol, and documented on flowsheet PRIOR to oral intake: Pass  [x]   VTE Prophylaxis: SCDs ordered/addressed; SCDs: On           (As a reminder, ASA, Plavix and TPA are not VTE prophylaxis.)  [x]   Stroke education booklet given, and education initiated with patient and/or caregiver      Nurse eSignature: Electronically signed by Norma Nguyen RN on 3/27/21 at 4:13 AM EDT

## 2021-03-27 NOTE — PLAN OF CARE
Problem: Falls - Risk of:  Goal: Will remain free from falls  Description: Will remain free from falls  3/27/2021 0726 by Amrita Shrestha RN  Outcome: Ongoing  3/27/2021 0439 by Anastasia Kunz RN  Outcome: Ongoing  Note: Hourly rounding on patient for needs. Non-skid socks on, bed in lowest position and locked. Bedside table, personal belongs, and nurse call light within reach. Instructed patient to use call light for assistance. Bed alarm on. Floor clear of clutter. Patient remains free of falls at this time.        Problem: HEMODYNAMIC STATUS  Goal: Patient has stable vital signs and fluid balance  Outcome: Ongoing

## 2021-03-27 NOTE — PLAN OF CARE
Problem: Falls - Risk of:  Goal: Will remain free from falls  Description: Will remain free from falls  Outcome: Ongoing  Note: Hourly rounding on patient for needs. Non-skid socks on, bed in lowest position and locked. Bedside table, personal belongs, and nurse call light within reach. Instructed patient to use call light for assistance. Bed alarm on. Floor clear of clutter. Patient remains free of falls at this time. Problem: COMMUNICATION IMPAIRMENT  Goal: Ability to express needs and understand communication  Outcome: Ongoing  Note: Patient's speech is clear, no dysarthria/aphasia noted upon assessment, patient able to voice all needs appropriately. Problem: Pain:  Goal: Patient's pain/discomfort is manageable  Description: Patient's pain/discomfort is manageable  Outcome: Ongoing  Note: Patient denies pain at this time, patient exhibits no objective characteristics of pain, will continue to monitor.

## 2021-03-27 NOTE — ED NOTES
Pt taken for CT at [facility]  03/26/21 2031

## 2021-03-28 ENCOUNTER — TELEPHONE (OUTPATIENT)
Dept: ENDOCRINOLOGY | Age: 61
End: 2021-03-28

## 2021-03-28 VITALS
RESPIRATION RATE: 16 BRPM | WEIGHT: 293 LBS | HEIGHT: 64 IN | TEMPERATURE: 97.9 F | HEART RATE: 65 BPM | SYSTOLIC BLOOD PRESSURE: 146 MMHG | DIASTOLIC BLOOD PRESSURE: 81 MMHG | OXYGEN SATURATION: 94 % | BODY MASS INDEX: 50.02 KG/M2

## 2021-03-28 LAB
A/G RATIO: 0.8 (ref 1.1–2.2)
ALBUMIN SERPL-MCNC: 2.8 G/DL (ref 3.4–5)
ALP BLD-CCNC: 108 U/L (ref 40–129)
ALT SERPL-CCNC: 8 U/L (ref 10–40)
ANION GAP SERPL CALCULATED.3IONS-SCNC: 11 MMOL/L (ref 3–16)
AST SERPL-CCNC: 15 U/L (ref 15–37)
BASOPHILS ABSOLUTE: 0.1 K/UL (ref 0–0.2)
BASOPHILS RELATIVE PERCENT: 2.1 %
BILIRUB SERPL-MCNC: 0.4 MG/DL (ref 0–1)
BUN BLDV-MCNC: 20 MG/DL (ref 7–20)
CALCIUM SERPL-MCNC: 9.2 MG/DL (ref 8.3–10.6)
CHLORIDE BLD-SCNC: 104 MMOL/L (ref 99–110)
CO2: 20 MMOL/L (ref 21–32)
CORTISOL TOTAL: 14.3 UG/DL
CORTISOL TOTAL: 3.6 UG/DL
CREAT SERPL-MCNC: 0.7 MG/DL (ref 0.6–1.2)
EKG ATRIAL RATE: 72 BPM
EKG ATRIAL RATE: 75 BPM
EKG DIAGNOSIS: NORMAL
EKG DIAGNOSIS: NORMAL
EKG P AXIS: 48 DEGREES
EKG P AXIS: 7 DEGREES
EKG P-R INTERVAL: 146 MS
EKG P-R INTERVAL: 168 MS
EKG Q-T INTERVAL: 380 MS
EKG Q-T INTERVAL: 394 MS
EKG QRS DURATION: 82 MS
EKG QRS DURATION: 92 MS
EKG QTC CALCULATION (BAZETT): 424 MS
EKG QTC CALCULATION (BAZETT): 431 MS
EKG R AXIS: 15 DEGREES
EKG R AXIS: 19 DEGREES
EKG T AXIS: 12 DEGREES
EKG T AXIS: 19 DEGREES
EKG VENTRICULAR RATE: 72 BPM
EKG VENTRICULAR RATE: 75 BPM
EOSINOPHILS ABSOLUTE: 0.2 K/UL (ref 0–0.6)
EOSINOPHILS RELATIVE PERCENT: 2.9 %
GFR AFRICAN AMERICAN: >60
GFR NON-AFRICAN AMERICAN: >60
GLOBULIN: 3.3 G/DL
GLUCOSE BLD-MCNC: 95 MG/DL (ref 70–99)
HCT VFR BLD CALC: 39.8 % (ref 36–48)
HEMOGLOBIN: 13.1 G/DL (ref 12–16)
LYMPHOCYTES ABSOLUTE: 1.3 K/UL (ref 1–5.1)
LYMPHOCYTES RELATIVE PERCENT: 20.9 %
MCH RBC QN AUTO: 29.3 PG (ref 26–34)
MCHC RBC AUTO-ENTMCNC: 33 G/DL (ref 31–36)
MCV RBC AUTO: 88.8 FL (ref 80–100)
MONOCYTES ABSOLUTE: 0.4 K/UL (ref 0–1.3)
MONOCYTES RELATIVE PERCENT: 7 %
NEUTROPHILS ABSOLUTE: 4.2 K/UL (ref 1.7–7.7)
NEUTROPHILS RELATIVE PERCENT: 67.1 %
PDW BLD-RTO: 15.8 % (ref 12.4–15.4)
PLATELET # BLD: 182 K/UL (ref 135–450)
PMV BLD AUTO: 8.8 FL (ref 5–10.5)
POTASSIUM REFLEX MAGNESIUM: 4.3 MMOL/L (ref 3.5–5.1)
RBC # BLD: 4.48 M/UL (ref 4–5.2)
REASON FOR REJECTION: NORMAL
REJECTED TEST: NORMAL
SODIUM BLD-SCNC: 135 MMOL/L (ref 136–145)
TOTAL PROTEIN: 6.1 G/DL (ref 6.4–8.2)
TSH REFLEX: 0.75 UIU/ML (ref 0.27–4.2)
VITAMIN B-12: 167 PG/ML (ref 211–911)
WBC # BLD: 6.3 K/UL (ref 4–11)

## 2021-03-28 PROCEDURE — 82024 ASSAY OF ACTH: CPT

## 2021-03-28 PROCEDURE — 93005 ELECTROCARDIOGRAM TRACING: CPT | Performed by: INTERNAL MEDICINE

## 2021-03-28 PROCEDURE — 6370000000 HC RX 637 (ALT 250 FOR IP): Performed by: INTERNAL MEDICINE

## 2021-03-28 PROCEDURE — 82533 TOTAL CORTISOL: CPT

## 2021-03-28 PROCEDURE — 80053 COMPREHEN METABOLIC PANEL: CPT

## 2021-03-28 PROCEDURE — 36415 COLL VENOUS BLD VENIPUNCTURE: CPT

## 2021-03-28 PROCEDURE — 96374 THER/PROPH/DIAG INJ IV PUSH: CPT

## 2021-03-28 PROCEDURE — G0378 HOSPITAL OBSERVATION PER HR: HCPCS

## 2021-03-28 PROCEDURE — 97165 OT EVAL LOW COMPLEX 30 MIN: CPT

## 2021-03-28 PROCEDURE — 97161 PT EVAL LOW COMPLEX 20 MIN: CPT

## 2021-03-28 PROCEDURE — 2580000003 HC RX 258: Performed by: INTERNAL MEDICINE

## 2021-03-28 PROCEDURE — 97530 THERAPEUTIC ACTIVITIES: CPT

## 2021-03-28 PROCEDURE — 93010 ELECTROCARDIOGRAM REPORT: CPT | Performed by: INTERNAL MEDICINE

## 2021-03-28 PROCEDURE — 6360000002 HC RX W HCPCS: Performed by: INTERNAL MEDICINE

## 2021-03-28 PROCEDURE — 82607 VITAMIN B-12: CPT

## 2021-03-28 PROCEDURE — 84443 ASSAY THYROID STIM HORMONE: CPT

## 2021-03-28 PROCEDURE — 85025 COMPLETE CBC W/AUTO DIFF WBC: CPT

## 2021-03-28 RX ADMIN — PREGABALIN 100 MG: 50 CAPSULE ORAL at 14:04

## 2021-03-28 RX ADMIN — ARIPIPRAZOLE 20 MG: 5 TABLET ORAL at 08:20

## 2021-03-28 RX ADMIN — PROPRANOLOL HYDROCHLORIDE 40 MG: 20 TABLET ORAL at 14:04

## 2021-03-28 RX ADMIN — DULOXETINE HYDROCHLORIDE 60 MG: 60 CAPSULE, DELAYED RELEASE ORAL at 08:21

## 2021-03-28 RX ADMIN — PANTOPRAZOLE SODIUM 40 MG: 40 TABLET, DELAYED RELEASE ORAL at 06:16

## 2021-03-28 RX ADMIN — NYSTATIN: 100000 CREAM TOPICAL at 08:09

## 2021-03-28 RX ADMIN — PROPRANOLOL HYDROCHLORIDE 40 MG: 20 TABLET ORAL at 08:20

## 2021-03-28 RX ADMIN — LEVOTHYROXINE SODIUM 88 MCG: 0.09 TABLET ORAL at 06:16

## 2021-03-28 RX ADMIN — COSYNTROPIN 250 MCG: 0.25 INJECTION, POWDER, LYOPHILIZED, FOR SOLUTION INTRAMUSCULAR; INTRAVENOUS at 08:21

## 2021-03-28 RX ADMIN — Medication 10 ML: at 08:09

## 2021-03-28 RX ADMIN — PREGABALIN 100 MG: 50 CAPSULE ORAL at 08:20

## 2021-03-28 ASSESSMENT — PAIN SCALES - GENERAL: PAINLEVEL_OUTOF10: 0

## 2021-03-28 NOTE — PROGRESS NOTES
Patient currently resting in bed with eyes closed. Bed alarm on and fall precautions taken. Patient in no respiratory distress. Nurse will continue to monitor/reassess patient. Call light within reach.  Cain Dark

## 2021-03-28 NOTE — PROGRESS NOTES
Patient discharged. Taken to private vehicle via wheelchair. IV discontinued. Tip intact and 2 x 2 pressure dressing applied. Verbal and written discharge instructions given to patient. Patient had thought she was going to take steroids at discharge, but Dr. Jun Dc stated to follow up with endocronologist and PCP d/t pending lab results. Patient belongings sent with patient.  Liliana Richard

## 2021-03-28 NOTE — PROGRESS NOTES
Physical Therapy    Facility/Department: [facility] 5T ORTHO/NEURO  Initial Assessment/Treatment/Discharge    NAME: Ajay Fernandez  : 1960  MRN: [id number]    Date of Service: 3/28/2021    Discharge Recommendations:    Ajay Fernandez scored a 23/24 on the AM-PAC short mobility form. At this time, no further PT is recommended upon discharge. Recommend patient returns to prior setting with prior services. PT Equipment Recommendations  Equipment Needed: No    Assessment   Assessment: Pt functioning at baseline. Safe to return home upon D/C. No further inpt PT indicated. D/C PT. Decision Making: Low Complexity  PT Education: PT Role;General Safety  No Skilled PT: At baseline function  REQUIRES PT FOLLOW UP: No  Activity Tolerance  Activity Tolerance: Patient Tolerated treatment well       Patient Diagnosis(es): The primary encounter diagnosis was Dizziness. Diagnoses of Vertigo and Chest pain, unspecified type were also pertinent to this visit. has a past medical history of Acquired hyperlipoproteinemia, MARIO (acute kidney injury) ([facility] [address] Allergic rhinitis, Anxiety, Arthritis, Bilateral lower extremity edema, Bipolar 1 disorder (HCC), Chronic pain syndrome, DDD (degenerative disc disease), lumbar, Depression, Depression, Disease of gallbladder, Dizziness, DJD (degenerative joint disease) of hip, Edema, Elbow tendinitis, Essential hypertension, Facet syndrome, Fibromyalgia, Fracture of nasal bone, GERD (gastroesophageal reflux disease), Headache, Hiatal hernia, History of blood transfusion, Hyperlipidemia, Insomnia, Osteoarthritis, Prediabetes, Rash, Self mutilating behavior, Sleep apnea, Suicidal ideation, Thyroid disorder, and Vertigo. has a past surgical history that includes Ankle surgery; Appendectomy; Total knee arthroplasty (12/10/10); Total knee arthroplasty (3/30/10); shoulder surgery; Colonoscopy (2013); Upper gastrointestinal endoscopy (2013);  Cholecystectomy; hysteroscopy (N/A, Objective          AROM RLE (degrees)  RLE AROM: WNL  AROM LLE (degrees)  LLE AROM : WNL  Strength RLE  Strength RLE: WFL  Strength LLE  Strength LLE: WFL           Transfers  Sit to Stand: Independent  Stand to sit:  Independent  Ambulation  Ambulation?: Yes  Ambulation 1  Surface: level tile  Device: No Device  Assistance: Independent  Distance: 400 ft  Comments: no LOB  Stairs/Curb  Stairs?: No(N/A)     Balance  Sitting - Static: Good  Sitting - Dynamic: Good  Standing - Static: Good  Standing - Dynamic: Good        Plan   Safety Devices  Type of devices: Call light within reach, Chair alarm in place, Left in chair, Nurse notified                                                         AM-PAC Score  AM-PAC Inpatient Mobility Raw Score : 23 (03/28/21 1636)  AM-PAC Inpatient T-Scale Score : 56.93 (03/28/21 1636)  Mobility Inpatient CMS 0-100% Score: 11.2 (03/28/21 1636)  Mobility Inpatient CMS G-Code Modifier : CI (03/28/21 1636)              Therapy Time   Individual Concurrent Group Co-treatment   Time In 1516         Time Out 1541         Minutes 25                 Jewell Oneil, PT

## 2021-03-28 NOTE — PROGRESS NOTES
Hospital Medicine progress note    PCP: Foreign Vogel MD    Date of Admission: 3/26/2021    Date of Service: Pt seen/examined on 3/26/2021        Chief Complaint:    Chief Complaint   Patient presents with    Dizziness          63 y.o. female who presented to [facility] with past medical history of depression, verbalized, edema, facet syndrome, fibromyalgia, prediabetes, class III obesity, vertigo, bipolar 1 disorder presented to the ED from [facility] for concern of central vertigo. Patient reported the dizziness started around 6 PM at work when she came in with checking her emails. Patient then noted that around 6 to feel very dizzy. Patient reported that it was not the room moving around more like headache with some chest discomfort where she was little bit anxious. .  Patient reports that the chest pain is same like last time but she had a normal  catheterization. No known alleviating or exacerbating factor. Patient reports that she when she normally does for lateral gaze she noticed that her vision becomes to pictures instead of 1 and has noted in the other side as well. Patient denies having any history of strokes no changes in medications. Patient currently does not take any blood thinners. Patient also does report history of vertigo where she got fixed before with a maneuver PT resolved. Patient reports this time is very different and that it occurs even at rest.  No association of falls loss of balance loss of conscious nausea vomiting fever chills night sweats neck pain or incontinence. .  Patient was reports there was no changes in medications, does admit sometimes easily bruising.           Subjective  Denies new complaints today  No chest pain  No Shortness of breath  No Fever or chills        Medications : Reviewed        Allergies:  Polysporin [bacitracin-polymyxin b], Lorazepam, Neomycin-polymyxin-gramicidin, and Sulfa antibiotics        REVIEW OF SYSTEMS: Negative apart from as in HPI        PHYSICAL EXAM PERFORMED:    BP (!) 146/81   Pulse 65   Temp 97.9 °F (36.6 °C) (Oral)   Resp 16   Ht 5' 4\" (1.626 m)   Wt (!) 323 lb 6.6 oz (146.7 kg)   LMP 01/15/2014   SpO2 94%   BMI 55.51 kg/m²     General appearance:  mild acute distress, appears older than stated age  Respiratory:minimal accessory muscle usage, Normal respiratory effort. Clear to auscultation, bilaterally without wheezing  Cardiovascular:  Regular rate and rhythm, capillary refill 2 seconds  Abdomen: Soft, non-tender, non-distended with normal bowel sounds. Musculoskeletal:  No clubbing, cyanosis. trace edema LE bilaterally. Minimal bruising  Skin: turgor normal.  No new rashes or lesions. Neurologic: Alert and oriented x4, no new focal sensory/motor deficits.      Labs:     Recent Labs     03/26/21 2005 03/27/21 0513 03/28/21  0820   WBC 6.9 5.5 6.3   HGB 12.8 12.6 13.1   HCT 39.9 39.0 39.8    216 182     Recent Labs     03/26/21 2005 03/27/21  0513 03/28/21  0500    140 135*   K 3.8 3.9 4.3    104 104   CO2 28 24 20*   BUN 23* 18 20   CREATININE 0.8 0.7 0.7   CALCIUM 9.8 9.1 9.2     Recent Labs     03/26/21 2005 03/27/21  0513 03/28/21  0500   AST 15 12* 15   ALT 13 12 8*   BILITOT 0.5 0.6 0.4   ALKPHOS 123 125 108     Recent Labs     03/26/21 2005   INR 0.97     Recent Labs     03/26/21 2005 03/27/21  0513   TROPONINI <0.01 <0.01       Urinalysis:      Lab Results   Component Value Date    NITRU Negative 09/30/2019    WBCUA 3-5 12/21/2018    BACTERIA 2+ 12/21/2018    RBCUA None seen 12/21/2018    BLOODU neg 10/08/2019    BLOODU Negative 09/30/2019    SPECGRAV 1.030 10/08/2019    SPECGRAV 1.015 09/30/2019    GLUCOSEU neg 10/08/2019    GLUCOSEU Negative 09/30/2019    GLUCOSEU NEGATIVE 04/21/2012       Radiology:     CXR: I have reviewed the CXR with the following interpretation:   No acute process  EKG:  I have reviewed the EKG with the following interpretation:   Normal sinus rhythm QTc 442 MRI BRAIN WO CONTRAST   Final Result   Impression:   1. No evidence of recent infarct. 2. Mild cerebral parenchymal volume loss. 3. Mild T2 and FLAIR hyperintensity in the white matter likely related to chronic small vessel ischemic changes. CTA HEAD NECK W CONTRAST   Final Result   1. Normal right and left internal carotid artery. .   2. No evidence of intracranial large vessel occlusion   3. Normal vertebral arteries. , Dominant left vertebral artery   4. Trigeminal origin of the right posterior cerebral artery   5. No abnormal intracranial enhancement or early draining veins      CT HEAD WO CONTRAST   Final Result   1. Normal brain, no interval change from prior study          ASSESSMENT AND PLAN:      Dizziness/Vertigo  CT head and CTA H/N negative  MRI brain to rule out central etiology per recommended from stroke team: nil acute  Neurochecks  -Vitamin B12, TSH levels: Pending  -Urinalysis: Pending      Hypertensive Urgency  IV labetalol ordered  Restarted home medications: Satisfactory Bps      Possible adrenal insufficiency  Random. cortisol level was very low  CST: Does not appear to have been done appropriately, as ordered, but seems to suggest adrenal insufficiency  -Baseline ACTH level ordered, but not done: We will call lab to see if can be added  -Will need endocrinology evaluation        Chronic medical conditions:  Arthritis: Held celecoxib  Depression: Continue home medication  Hypothyroid: Continue home medication  Hyperlipidemia: Continue home medication  Prediabetes: A1c 5.6 recently  Class III obesity:Complicating assessment and treatment. Placing patient at risk for multiple co-morbidities as well as early death and contributing to the patient's presentation.  Education, and counseling      Diet: cardiac    DVT Prophylaxis: held    Disposition:   PT/OT eval  DC to home within 24 hrs           Jonatan Sullivan MD

## 2021-03-28 NOTE — PLAN OF CARE
Problem: Falls - Risk of:  Goal: Will remain free from falls  Description: Will remain free from falls  3/28/2021 0947 by Osmany Way RN  Outcome: Ongoing  3/28/2021 0032 by Larry Can RN  Note: Patient will remain free from falls. Patient will use call light to notify staff of needs prior to exiting the bed. Patient's bed will remain in lowest position with wheels locked and bed alarm engaged. Problem: ACTIVITY INTOLERANCE/IMPAIRED MOBILITY  Goal: Mobility/activity is maintained at optimum level for patient  3/28/2021 0947 by Osmany Way RN  Outcome: Ongoing  3/28/2021 0032 by Larry Can RN  Outcome: Ongoing  Note: Patient's mobility will remain at optimum levels. Patient will ambulate to the bathroom to void with a SBA.

## 2021-03-28 NOTE — PLAN OF CARE
Problem: ACTIVITY INTOLERANCE/IMPAIRED MOBILITY  Goal: Mobility/activity is maintained at optimum level for patient  Outcome: Ongoing  Note: Patient's mobility will remain at optimum levels. Patient will ambulate to the bathroom to void with a SBA. Problem: Falls - Risk of:  Goal: Will remain free from falls  Description: Will remain free from falls  Note: Patient will remain free from falls. Patient will use call light to notify staff of needs prior to exiting the bed. Patient's bed will remain in lowest position with wheels locked and bed alarm engaged.

## 2021-03-28 NOTE — CARE COORDINATION
Case Management Assessment            Discharge Note                    Date / Time of Note: 3/28/2021 3:48 PM                  Discharge Note Completed by: Chris Skinner    Patient Name: Aruna Bonner   YOB: 1960  Diagnosis: Vertigo [R42]   Date / Time: 3/26/2021  7:39 PM    Current PCP: Carol Neves MD  Clinic patient: No    Hospitalization in the last 30 days: Yes    Advance Directives:  Code Status: DNR-CCA  [facility] DNR form completed and on chart: yes    Financial:  Payor: Lauryn Bernal / Plan: Noemi Pamler / Product Type: *No Product type* /      Pharmacy:    [facility] #[id number] - [place], [facility] [phone] - F [address] [address]  Phone: [phone] [facility]ax: [facility] [address] [place] [address] [place] [address] [address] [place] [address]  Phone: [phone] Fax: [phone]    [facility] [address] [address] [phone]  [place] [address]  Phone: [phone] Fax: [phone]    [address] [phone] [facility] [phone] [address] [address] [address]  Phone: [phone] Fax: [phone]      Assistance purchasing medications?: Potential Assistance Purchasing Medications: No  Assistance provided by Case Management: None at this time    Does patient want to participate in local refill/ meds to beds program?: No    Meds To Beds General Rules:  1. Can ONLY be done Monday- Friday between 8:30am-5pm  2. Prescription(s) must be in pharmacy by 3pm to be filled same day  3. Copy of patient's insurance/ prescription drug card and patient face sheet must be sent along with the prescription(s)  4. Cost of Rx cannot be added to hospital bill.  If financial assistance is needed, please contact unit  or

## 2021-03-28 NOTE — PROGRESS NOTES
Patient is A/O x4, VSS, and denies pain at this time. Patient is ambulating to the bathroom to void with x1 SBA. Patient is tolerating PO liquids and diet well. Patient states that dizziness is improving slightly, only sees double vision when looking to the extreme left. Fall precautions remain in place, will continue to monitor and assess patient.

## 2021-03-28 NOTE — DISCHARGE SUMMARY
Hospital Discharge Summary    Patient's PCP: Ni Blevins MD  Admit Date: 3/26/2021   Discharge Date: 3/28/2021    Admitting Physician: Dr. Alannah Sylvester,   Discharge Physician: Dr. [place]: none      HPI:   63 y.o. female who presented to [facility] with past medical history of depression, verbalized, edema, facet syndrome, fibromyalgia, prediabetes, class III obesity, vertigo, bipolar 1 disorder presented to the ED from [address] [place] [address] for concern of central vertigo.     Patient reported the dizziness started around 6 PM at work when she came in with checking her emails. Patient then noted that around 6 to feel very dizzy. Patient reported that it was not the room moving around more like headache with some chest discomfort where she was little bit anxious. .  Patient reports that the chest pain is same like last time but she had a normal  catheterization. No known alleviating or exacerbating factor. Patient reports that she when she normally does for lateral gaze she noticed that her vision becomes to pictures instead of 1 and has noted in the other side as well. Patient denies having any history of strokes no changes in medications. Patient currently does not take any blood thinners. Patient also does report history of vertigo where she got fixed before with a maneuver PT resolved. Patient reports this time is very different and that it occurs even at rest.  No association of falls loss of balance loss of conscious nausea vomiting fever chills night sweats neck pain or incontinence. .  Patient was reports there was no changes in medications, does admit sometimes easily bruising.              Brief hospital course:  Given the concern of the patients presentation and the concern of the possible multi-factorial etiology contributing to patients symptomatology.  Patient was admitted and evaluated and found to have:             Discharge Diagnoses:   Dizziness/Vertigo  CT head and CTA H/N negative  MRI brain to rule out central etiology per recommended from stroke team: nil acute  Neurochecks: negative  - she is not orthostatic  -Vitamin B12, TSH levels: Pending  -Urinalysis: Pending  - States feeling better today and is requesting discharge  - O/p f/u with PCP  - May need Home Care. PT/OT eval        Hypertensive Urgency  IV labetalol ordered  Restarted home medications  Satisfactory Bps        Possible adrenal insufficiency  Random. cortisol level was very low  CST: Does not appear to have been done appropriately, as ordered, but seems to suggest adrenal insufficiency  -Baseline ACTH level ordered, but not done: We will call lab to see if can be added on.  -Will need endocrinology evaluation           Chronic medical conditions:  Arthritis: Held celecoxib  Depression: Continue home medication  Hypothyroid: Continue home medication  Hyperlipidemia: Continue home medication  Prediabetes: A1c 5.6 recently  Class III obesity:Complicating assessment and treatment. Placing patient at risk for multiple co-morbidities as well as early death and contributing to the patient's presentation. Education, and counseling              Physical Exam: BP (!) 146/81   Pulse 65   Temp 97.9 °F (36.6 °C) (Oral)   Resp 16   Ht 5' 4\" (1.626 m)   Wt (!) 323 lb 6.6 oz (146.7 kg)   LMP 01/15/2014   SpO2 94%   BMI 55.51 kg/m²     Recent Labs     03/26/21 2015   POCGLU 90       General appearance:  mild acute distress, appears older than stated age  Respiratory:minimal accessory muscle usage, Normal respiratory effort. Clear to auscultation, bilaterally without wheezing  Cardiovascular:  Regular rate and rhythm, capillary refill 2 seconds  Abdomen: Soft, non-tender, non-distended with normal bowel sounds. Musculoskeletal:  No clubbing, cyanosis. trace edema LE bilaterally. Minimal bruising  Skin: turgor normal.  No new rashes or lesions.   Neurologic: Alert and oriented x4, no new focal sensory/motor deficits.     LABS:  Recent Labs     03/28/21  0820   WBC 6.3   HGB 13.1         Recent Labs     03/28/21  0500   *   K 4.3      CO2 20*   BUN 20   CREATININE 0.7   GLUCOSE 95     Recent Labs     03/26/21 2005   INR 0.97           Discharge Medications:   Oleksandr Contras   Home Medication Instructions [id number]    Printed on:03/28/21 1666   Medication Information                      ARIPiprazole (ABILIFY) 20 MG tablet  Take 1 tablet by mouth daily             atorvastatin (LIPITOR) 40 MG tablet  TAKE 1 TABLET BY MOUTH DAILY             betamethasone dipropionate (DIPROLENE) 0.05 % cream  APPLY EXTERNALLY TO THE AFFECTED AREA TWICE DAILY             celecoxib (CELEBREX) 200 MG capsule  Take 1 capsule by mouth daily             Cholecalciferol (VITAMIN D3) 125 MCG (5000 UT) TABS  Take 1 tablet by mouth 2 times daily             DULoxetine (CYMBALTA) 60 MG extended release capsule  Take 1 capsule by mouth 2 times daily             esomeprazole (NEXIUM) 40 MG delayed release capsule  TAKE 1 CAPSULE BY MOUTH EVERY MORNING BEFORE BREAKFAST             levothyroxine (SYNTHROID) 88 MCG tablet  TAKE 1 TABLET BY MOUTH DAILY             meclizine (ANTIVERT) 25 MG tablet  Take 25 mg by mouth 3 times daily as needed             nitroGLYCERIN (NITROSTAT) 0.4 MG SL tablet  Place 1 tablet under the tongue every 5 minutes as needed for Chest pain up to max of 3 total doses. If no relief after 1 dose, call 911. ondansetron (ZOFRAN) 4 MG tablet  TAKE 1 TABLET BY MOUTH EVERY 8 HOURS AS NEEDED FOR NAUSEA OR VOMITING             pregabalin (LYRICA) 100 MG capsule  Take 1 capsule by mouth 3 times daily for 30 days.              propranolol (INDERAL) 40 MG tablet  Take 1 tablet by mouth 3 times daily anxiety             QUEtiapine (SEROQUEL) 200 MG tablet  Take 200 mg by mouth nightly             rOPINIRole (REQUIP) 1 MG tablet  TAKE 2 TABLETS BY MOUTH EVERY NIGHT                Activity: activity as tolerated  Diet: cardiac diet  Wound Care: none needed    Disposition: home  Discharged Condition: Stable  Follow Up: Primary Care Physician in one week    Total time spent on discharge, finalizing medications, referrals and arranging outpatient follow up was more than 30 minutes    Thank you Dr. Isha Mckee MD for the opportunity to be involved in this patients care. If you have any questions or concerns please feel free to contact me at [phone].

## 2021-03-28 NOTE — PROGRESS NOTES
Occupational Therapy   Occupational Therapy Initial Assessment/Tx/Discharge Note  Date: 3/28/2021   Patient Name: Sunitha Lofton  MRN: [id number]     : 1960    Date of Service: 3/28/2021  Assessment: Pt's symptoms have resolved. She feels she is very close to her normal. Demonstrates safe mobility and self care. No acute OT needs identified. Recommend home with prn assist.    Discharge Recommendations: Sunitha Lofton scored a 22/24 on the -[facility] ADL Inpatient form. At this time, no further OT is recommended upon discharge. Recommend patient returns to prior setting with prior services. OT Equipment Recommendations  Equipment Needed: No    Assessment   Decision Making: Low Complexity  No Skilled OT: No OT goals identified; At baseline function  REQUIRES OT FOLLOW UP: No  Activity Tolerance  Activity Tolerance: Patient Tolerated treatment well  Safety Devices  Safety Devices in place: Yes  Type of devices: Call light within reach; Chair alarm in place;Nurse notified; Left in chair               Restrictions  Position Activity Restriction  Other position/activity restrictions: up with assist    Subjective   General  Chart Reviewed: Yes  Additional Pertinent Hx: 63 y.o. F admitted 3/26 with c/o dizziness, chest pain, headache. Imaging unremarkable. +adrenal dysfunction. PMHx includes B TKA, vertigo, angiogram, depression, suicidal ideation  Family / Caregiver Present: No  Referring Practitioner: Jyotsna  Subjective  Subjective: Pt in chair on entry. Pleasant and cooperative. Feeling almost back to normal. Relieved to have an answer for her episodes.   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 Independent standing balance  Toilet Transfers  Toilet - Technique: Ambulating  Equipment Used: Standard toilet  Toilet Transfer: Modified independent  ADL  Feeding: Independent  Grooming: Independent  LE Dressing: Minimal assistance(pants; anticipate independent in home environment)  Toileting: Modified independent            Transfers  Sit to stand: Independent  Stand to sit: Independent  Vision - Basic Assessment  Patient Visual Report: No visual complaint reported.   Cognition  Overall Cognitive Status: WFL                                        Plan  - D/C OT services       G-Code     OutComes Score                                                  AM-PAC Score        AM-PAC Inpatient Daily Activity Raw Score: 22 (03/28/21 1548)  AM-PAC Inpatient ADL T-Scale Score : 47.1 (03/28/21 1548)  ADL Inpatient CMS 0-100% Score: 25.8 (03/28/21 1548)  ADL Inpatient CMS G-Code Modifier : CJ (03/28/21 1548)    Goals          Therapy Time   Individual Concurrent Group Co-treatment   Time In 1516         Time Out 1543         Minutes 27          Timed Code Tx Min: 12  Total Tx Time: 5360 Benjamín Goodrich, OT

## 2021-03-28 NOTE — PROGRESS NOTES
Cleansed patient's abdominal fold and under both breasts with soap and warm water, then applied ordered nystatin cream.  Patient had pale green weeping drainage within the abdominal fold. Will continue to monitor and assess patient.

## 2021-03-29 ENCOUNTER — CARE COORDINATION (OUTPATIENT)
Dept: CASE MANAGEMENT | Age: 61
End: 2021-03-29

## 2021-03-29 NOTE — CARE COORDINATION
Patient contacted regarding Chung Primer. Care Transition Nurse contacted the patient by telephone to perform post discharge assessment. Call within 2 business days of discharge: Yes. Verified name and  with patient as identifiers. Provided introduction to self, and explanation of the CTN/ACM role, and reason for call due to risk factors for infection and/or exposure to COVID-19. Symptoms reviewed with patient who verbalized the following symptoms: no new symptoms and no worsening symptoms. Due to no new or worsening symptoms encounter was not routed to provider for escalation. Discussed follow-up appointments. If no appointment was previously scheduled, appointment scheduling offered: Yes  [facility] follow up appointment(s):   Future Appointments   Date Time Provider Marlin Diaz   2021 10:00 AM Lincoln Olivo MD [facility]   2021 10:00 AM Deborah Casarez MD [facility]     [facility](s): 2021    Non-face-to-face services provided:  Obtained and reviewed discharge summary and/or continuity of care documents  Education of patient/family/caregiver/guardian to support self-management-. Assessment and support for treatment adherence and medication management-. Advance Care Planning:   Does patient have an Advance Directive:  reviewed and current. Patient has following risk factors of: diabetes and chronic kidney disease. CTN reviewed discharge instructions, medical action plan and red flags such as increased shortness of breath, increasing fever and signs of decompensation with patient who verbalized understanding. Discussed exposure protocols and quarantine with CDC Guidelines What to do if you are sick with coronavirus disease .  Patient was given an opportunity for questions and concerns.  The patient agrees to contact the [facility] exposure line [phone], [facility] department [place] [facility]: ([phone]) and PCP office for questions related to their healthcare. CTN provided contact information for future needs. Reviewed and educated patient on any new and changed medications related to discharge diagnosis     Was patient discharged with a pulse oximeter? No Discussed and confirmed pulse oximeter discharge instructions and when to notify provider or seek emergency care. Summary  CTN spoke with patient this am for initial COVID -19 call. Patient states she is doing well, reporting no complaints of any fever, chills, nausea, vomiting, chest pain, SOB or cough. No congestion, pain, difficulty emptying bladder, LE edema, feeling lightheaded, dizziness, and heart palpitations. CTN encouraged patient to continue to monitor herself for any of the above s/s, reporting any that may present to MD immediately. Patient states she is calling Endocrinologist today to schedule first appointment. Plan for follow-up call in 5-7 days based on severity of symptoms and risk factors.     Thank Leopoldo Patel RN  Care Transition Coordinator  Contact [facility]:[phone]

## 2021-03-31 LAB — ADRENOCORTICOTROPIC HORMONE: <5 PG/ML (ref 6–58)

## 2021-04-03 ENCOUNTER — HOSPITAL ENCOUNTER (EMERGENCY)
Age: 61
Discharge: HOME | End: 2021-04-03
Attending: EMERGENCY MEDICINE
Payer: COMMERCIAL

## 2021-04-03 ENCOUNTER — APPOINTMENT (OUTPATIENT)
Dept: GENERAL RADIOLOGY | Age: 61
End: 2021-04-03
Payer: COMMERCIAL

## 2021-04-03 VITALS
SYSTOLIC BLOOD PRESSURE: 147 MMHG | RESPIRATION RATE: 18 BRPM | WEIGHT: 293 LBS | HEIGHT: 64 IN | OXYGEN SATURATION: 99 % | HEART RATE: 99 BPM | BODY MASS INDEX: 50.02 KG/M2 | TEMPERATURE: 97.6 F | DIASTOLIC BLOOD PRESSURE: 88 MMHG

## 2021-04-03 DIAGNOSIS — R07.89 ATYPICAL CHEST PAIN: Primary | ICD-10-CM

## 2021-04-03 LAB
ANION GAP SERPL CALCULATED.3IONS-SCNC: 9 MMOL/L (ref 3–16)
BASOPHILS ABSOLUTE: 0.1 K/UL (ref 0–0.2)
BASOPHILS RELATIVE PERCENT: 1 %
BUN BLDV-MCNC: 16 MG/DL (ref 7–20)
CALCIUM SERPL-MCNC: 9.7 MG/DL (ref 8.3–10.6)
CHLORIDE BLD-SCNC: 102 MMOL/L (ref 99–110)
CO2: 26 MMOL/L (ref 21–32)
CREAT SERPL-MCNC: 1 MG/DL (ref 0.6–1.2)
EKG ATRIAL RATE: 98 BPM
EKG DIAGNOSIS: NORMAL
EKG P AXIS: 52 DEGREES
EKG P-R INTERVAL: 152 MS
EKG Q-T INTERVAL: 346 MS
EKG QRS DURATION: 78 MS
EKG QTC CALCULATION (BAZETT): 441 MS
EKG R AXIS: 19 DEGREES
EKG T AXIS: 9 DEGREES
EKG VENTRICULAR RATE: 98 BPM
EOSINOPHILS ABSOLUTE: 0.3 K/UL (ref 0–0.6)
EOSINOPHILS RELATIVE PERCENT: 4.4 %
GFR AFRICAN AMERICAN: >60
GFR NON-AFRICAN AMERICAN: 56
GLUCOSE BLD-MCNC: 123 MG/DL (ref 70–99)
HCT VFR BLD CALC: 39.7 % (ref 36–48)
HEMOGLOBIN: 13 G/DL (ref 12–16)
LYMPHOCYTES ABSOLUTE: 1.8 K/UL (ref 1–5.1)
LYMPHOCYTES RELATIVE PERCENT: 26 %
MCH RBC QN AUTO: 29.1 PG (ref 26–34)
MCHC RBC AUTO-ENTMCNC: 32.8 G/DL (ref 31–36)
MCV RBC AUTO: 88.9 FL (ref 80–100)
MONOCYTES ABSOLUTE: 0.5 K/UL (ref 0–1.3)
MONOCYTES RELATIVE PERCENT: 7.4 %
NEUTROPHILS ABSOLUTE: 4.3 K/UL (ref 1.7–7.7)
NEUTROPHILS RELATIVE PERCENT: 61.2 %
PDW BLD-RTO: 16.2 % (ref 12.4–15.4)
PLATELET # BLD: 237 K/UL (ref 135–450)
PMV BLD AUTO: 8.5 FL (ref 5–10.5)
POTASSIUM REFLEX MAGNESIUM: 3.8 MMOL/L (ref 3.5–5.1)
PRO-BNP: 230 PG/ML (ref 0–124)
RBC # BLD: 4.46 M/UL (ref 4–5.2)
SODIUM BLD-SCNC: 137 MMOL/L (ref 136–145)
TROPONIN: <0.01 NG/ML
WBC # BLD: 7 K/UL (ref 4–11)

## 2021-04-03 PROCEDURE — 83880 ASSAY OF NATRIURETIC PEPTIDE: CPT

## 2021-04-03 PROCEDURE — 93005 ELECTROCARDIOGRAM TRACING: CPT | Performed by: PHYSICIAN ASSISTANT

## 2021-04-03 PROCEDURE — 84484 ASSAY OF TROPONIN QUANT: CPT

## 2021-04-03 PROCEDURE — 71045 X-RAY EXAM CHEST 1 VIEW: CPT

## 2021-04-03 PROCEDURE — 99284 EMERGENCY DEPT VISIT MOD MDM: CPT

## 2021-04-03 PROCEDURE — 85025 COMPLETE CBC W/AUTO DIFF WBC: CPT

## 2021-04-03 PROCEDURE — 80048 BASIC METABOLIC PNL TOTAL CA: CPT

## 2021-04-03 ASSESSMENT — PAIN DESCRIPTION - FREQUENCY: FREQUENCY: INTERMITTENT

## 2021-04-03 ASSESSMENT — PAIN DESCRIPTION - DESCRIPTORS: DESCRIPTORS: DISCOMFORT

## 2021-04-03 ASSESSMENT — PAIN DESCRIPTION - ONSET: ONSET: ON-GOING

## 2021-04-03 ASSESSMENT — PAIN DESCRIPTION - LOCATION: LOCATION: CHEST

## 2021-04-03 NOTE — ED PROVIDER NOTES
[id number]          ATTENDING PHYSICIAN NOTE       Date of evaluation: 4/3/2021    Chief Complaint     Chest Pain (mid sternal chest pain, started tonight while sitting, here a week ago for same type of pain)      History of Present Illness     Carl Cedeño is a 63 y.o. female with a past medical history notable for depression, fibromyalgia, obesity, LAUREN, GERD, and vertigo recently admitted for vertigo who presents and hypertensive urgency as well as admission in early March with cardiac catheterization demonstrating EF of 55-60% with no evidence of coronary artery disease who presented to the emergency department with a chief complaint of chest pressure. The patient reports intermittent episodes that have been present for the past 2 months. She reported pressure started while watching TV and radiated to her back. Some associated nausea and shortness of breath. No vomiting. She also gets dizzy and lightheaded with these episodes. No abdominal pain. She has no additional complaints. Review of Systems     Please refer to the HPI for pertinent positive and negative review of systems. All other systems reviewed and negative unless stated in the HPI.     Past Medical, Surgical, Family, and Social History     She has a past medical history of Acquired hyperlipoproteinemia, MARIO (acute kidney injury) ([address] Allergic rhinitis, Anxiety, Arthritis, Bilateral lower extremity edema, Bipolar 1 disorder ([address] Chronic pain syndrome, DDD (degenerative disc disease), lumbar, Depression, Depression, Disease of gallbladder, Dizziness, DJD (degenerative joint disease) of hip, Edema, Elbow tendinitis, Essential hypertension, Facet syndrome, Fibromyalgia, Fracture of nasal bone, GERD (gastroesophageal reflux disease), Headache, Hiatal hernia, History of blood transfusion, Hyperlipidemia, Insomnia, Osteoarthritis, Prediabetes, Rash, Self mutilating behavior, Sleep apnea, Suicidal ideation, Thyroid disorder, and Vertigo. She has a past surgical history that includes Ankle surgery; Appendectomy; Total knee arthroplasty (12/10/10); Total knee arthroplasty (3/30/10); shoulder surgery; Colonoscopy (04/16/2013); Upper gastrointestinal endoscopy (4/16/2013); Cholecystectomy; hysteroscopy (N/A, 12-30-13); Upper gastrointestinal endoscopy (3/20/2014); Dilation and curettage of uterus; joint replacement (Bilateral); Abdomen surgery (5/28/2014); Sleeve Gastroplasty (May 28, 2014); Endoscopy, colon, diagnostic; shoulder surgery (Right); Ankle surgery; other surgical history (9/11/2015); Foot surgery (Right, 05/27/2016); and Achilles tendon surgery (Left, 3/9/2020). Her family history includes Arthritis in her maternal grandmother and paternal grandmother; Birth Defects in her maternal grandfather and mother; COPD in her father; Diabetes in her brother; Heart Disease in her mother; Heart Failure in her brother and mother; High Blood Pressure in her father and mother; High Cholesterol in her mother; Other in her mother; Stroke in her brother, maternal grandmother, and mother. She reports that she has never smoked. She has never used smokeless tobacco. She reports that she does not drink alcohol or use drugs.     Medications     Previous Medications    ARIPIPRAZOLE (ABILIFY) 20 MG TABLET    Take 1 tablet by mouth daily    ATORVASTATIN (LIPITOR) 40 MG TABLET    TAKE 1 TABLET BY MOUTH DAILY    BETAMETHASONE DIPROPIONATE (DIPROLENE) 0.05 % CREAM    APPLY EXTERNALLY TO THE AFFECTED AREA TWICE DAILY    CELECOXIB (CELEBREX) 200 MG CAPSULE    Take 1 capsule by mouth daily    CHOLECALCIFEROL (VITAMIN D3) 125 MCG (5000 UT) TABS    Take 1 tablet by mouth 2 times daily    DULOXETINE (CYMBALTA) 60 MG EXTENDED RELEASE CAPSULE    Take 1 capsule by mouth 2 times daily    ESOMEPRAZOLE (NEXIUM) 40 MG DELAYED RELEASE CAPSULE    TAKE 1 CAPSULE BY MOUTH EVERY MORNING BEFORE BREAKFAST    LEVOTHYROXINE (SYNTHROID) 88 MCG encounter of 04/03/21   CBC auto differential   Result Value Ref Range    WBC 7.0 4.0 - 11.0 K/uL    RBC 4.46 4.00 - 5.20 M/uL    Hemoglobin 13.0 12.0 - 16.0 g/dL    Hematocrit 39.7 36.0 - 48.0 %    MCV 88.9 80.0 - 100.0 fL    MCH 29.1 26.0 - 34.0 pg    MCHC 32.8 31.0 - 36.0 g/dL    RDW 16.2 (H) 12.4 - 15.4 %    Platelets 174 819 - 922 K/uL    MPV 8.5 5.0 - 10.5 fL    Neutrophils % 61.2 %    Lymphocytes % 26.0 %    Monocytes % 7.4 %    Eosinophils % 4.4 %    Basophils % 1.0 %    Neutrophils Absolute 4.3 1.7 - 7.7 K/uL    Lymphocytes Absolute 1.8 1.0 - 5.1 K/uL    Monocytes Absolute 0.5 0.0 - 1.3 K/uL    Eosinophils Absolute 0.3 0.0 - 0.6 K/uL    Basophils Absolute 0.1 0.0 - 0.2 K/uL   Basic Metabolic Panel w/ Reflex to MG   Result Value Ref Range    Sodium 137 136 - 145 mmol/L    Potassium reflex Magnesium 3.8 3.5 - 5.1 mmol/L    Chloride 102 99 - 110 mmol/L    CO2 26 21 - 32 mmol/L    Anion Gap 9 3 - 16    Glucose 123 (H) 70 - 99 mg/dL    BUN 16 7 - 20 mg/dL    CREATININE 1.0 0.6 - 1.2 mg/dL    GFR Non- 56 (A) >60    GFR African American >60 >60    Calcium 9.7 8.3 - 10.6 mg/dL   Troponin (lab)   Result Value Ref Range    Troponin <0.01 <0.01 ng/mL   Brain Natriuretic Peptide   Result Value Ref Range    Pro- (H) 0 - 124 pg/mL       RECENT VITALS:  BP: (!) 147/88, Temp: 97.6 °F (36.4 °C), Pulse: 99, Resp: 18, SpO2: 99 %       ED Course     Nursing Notes, Past Medical Hx, Past Surgical Hx, Social Hx, Allergies, and Family Hx were reviewed. The patient was given the following medications:  No orders of the defined types were placed in this encounter. CONSULTS:  None    MEDICAL DECISION MAKING / ASSESSMENT / Daun Bianca is a 63 y.o. female who presented to the Emergency Department for chest pain. On my evaluation she was noted to be well appearing in no acute distress.   She has had multiple recent hospital admissions for chest pain as well as vertigo type symptoms with negative work-up including CTA, MRI, and cardiac catheterization without CAD. She had no hypoxia or tachycardia and her symptoms did not seem consistent with pulmonary embolism. CXR showed no evidence of widened mediastinum to suggest dissection. No evidence of pneumonia. Her EKG showed no ischemic findings and her troponin was negative. Given her recent normal cardiac catheterization I have a low suspicion for ACS. At this time she is well appearing and I feel she can be discharged home. She does have lower extremity edema but no pulmonary edema and her BNP is not significantly elevated. She is also scheduled to follow-up with an endocrinologist for possible adrenal insufficiency. She has no hemodynamic instability here. No hyperkalemia or hyponatremia. At this time I feel that she can be discharged home to follow-up with her Cardiologist and PCP. I did suggest that she should get a repeat echocardiogram as an outpatient. Strict return precautions were provided. Clinical Impression     1.  Atypical chest pain        Disposition     PATIENT REFERRED TO:  Kindra Victor MD  [facility] [address] [place] [address]  [phone]    Schedule an appointment as soon as possible for a visit       Juvenal Smith MD  [address] [place] [address]  [phone]    Schedule an appointment as soon as possible for a visit         DISCHARGE MEDICATIONS:  New Prescriptions    No medications on file       DISPOSITION            David Rodriguez MD  04/03/21 0899

## 2021-04-08 RX ORDER — LEVOTHYROXINE SODIUM 88 UG/1
TABLET ORAL
Qty: 90 TABLET | Refills: 0 | Status: SHIPPED | OUTPATIENT
Start: 2021-04-08 | End: 2021-08-13

## 2021-04-09 ENCOUNTER — OFFICE VISIT (OUTPATIENT)
Dept: ORTHOPEDIC SURGERY | Age: 61
End: 2021-04-09
Payer: COMMERCIAL

## 2021-04-09 VITALS — WEIGHT: 293 LBS | BODY MASS INDEX: 50.02 KG/M2 | HEIGHT: 64 IN

## 2021-04-09 DIAGNOSIS — M92.62 HAGLUND'S DEFORMITY OF LEFT HEEL: Primary | ICD-10-CM

## 2021-04-09 DIAGNOSIS — M72.2 PLANTAR FASCIITIS OF LEFT FOOT: ICD-10-CM

## 2021-04-09 PROCEDURE — 20551 NJX 1 TENDON ORIGIN/INSJ: CPT | Performed by: ORTHOPAEDIC SURGERY

## 2021-04-09 PROCEDURE — 99214 OFFICE O/P EST MOD 30 MIN: CPT | Performed by: ORTHOPAEDIC SURGERY

## 2021-04-09 RX ORDER — TRIAMCINOLONE ACETONIDE 40 MG/ML
40 INJECTION, SUSPENSION INTRA-ARTICULAR; INTRAMUSCULAR ONCE
Status: COMPLETED | OUTPATIENT
Start: 2021-04-09 | End: 2021-04-09

## 2021-04-09 RX ORDER — LIDOCAINE HYDROCHLORIDE 10 MG/ML
2 INJECTION, SOLUTION INFILTRATION; PERINEURAL ONCE
Status: COMPLETED | OUTPATIENT
Start: 2021-04-09 | End: 2021-04-09

## 2021-04-09 RX ADMIN — LIDOCAINE HYDROCHLORIDE 2 ML: 10 INJECTION, SOLUTION INFILTRATION; PERINEURAL at 11:17

## 2021-04-09 RX ADMIN — TRIAMCINOLONE ACETONIDE 40 MG: 40 INJECTION, SUSPENSION INTRA-ARTICULAR; INTRAMUSCULAR at 11:18

## 2021-04-11 NOTE — PROGRESS NOTES
DIAGNOSIS:    1-Left foot partal Lacey's calcaneus excision, 2ry repair Achillis tendon. 2-Left heel pain/ plantar fasciitis    DATE OF SURGERY: 3/9/2020, Suture bridge    HISTORY OF PRESENT ILLNESS:  Nolan Calix 63 y.o.  female who came in today for worsening left heel pain. She had left heel cortisone injection on 10/21/2020 with good improvement. She has been doing the plantar fascia stretching exercises and reports not much improvement. She has completed PT and WB with good improvement in her achilles pain. She rates pain a 0/10 VAS at her surgical site, but rates pain a 10/10 VAS on the bottom of her heel. Pain is worse first steps in the morning and dull achy by the end of the day. She is very happy with the surgery. No numbness or tingling sensation. No fever or Chills. She recently started a new job standing, and [employer]. Since she started working, her pain and the bottom of her left foot is worse.     Past Medical History:   Diagnosis Date    Acquired hyperlipoproteinemia     [address]IO (acute kidney [address]) ([facility] [address] 9/30/2019    Allergic rhinitis     Anxiety     Arthritis     Bilateral lower extremity edema     Bipolar 1 disorder (HCC)     Chronic pain syndrome     DDD (degenerative disc disease), lumbar     Depression     Depression     Disease of gallbladder     Dizziness     DJD (degenerative joint disease) of hip     Edema 12/29/2009    Elbow tendinitis     Essential hypertension 8/19/2019    Facet syndrome     Fibromyalgia     Fracture of nasal bone     GERD (gastroesophageal reflux disease)     Headache     Hiatal hernia     History of blood transfusion     anemia    Hyperlipidemia     Insomnia     Osteoarthritis     Prediabetes     Rash     Self mutilating behavior     cutter pt states she has not done in months- in therapy    Sleep apnea     no CPAP    Suicidal ideation     Thyroid disorder     hypothyroidism    Vertigo        Past Surgical History: Procedure Laterality Date    ABDOMEN SURGERY  5/28/2014    LAPAROSCOPIC SLEEVE GASTRECTOMY, EXTENSIVE LYSIS OF ADHESIONS    ACHILLES TENDON SURGERY Left 3/9/2020    LEFT CALCANEOUS PARTIAL EXCISION, SECONDARY REPAIR OF ACHILLES TENDON performed by Lee Concepcoin [facility] at [facility]      open , Ex-lap    CHOLECYSTECTOMY      open    COLONOSCOPY  04/16/2013    dr Franky Ochoa 2018     DILATION AND CURETTAGE OF UTERUS      ENDOSCOPY, COLON, DIAGNOSTIC      FOOT SURGERY Right 05/27/2016    ARTHROPLASTY RIGHT 5TH DIGIT      HYSTEROSCOPY N/A 12-30-13    Hysteroscopy Dilitation and Curettage    JOINT REPLACEMENT Bilateral     knee    OTHER SURGICAL HISTORY  9/11/2015    ARTHROPLASTY 5TH DIGIT LEFT FOOT          SHOULDER SURGERY      right    SHOULDER SURGERY Right     SLEEVE GASTROPLASTY  May 28, 2014    Dahman    TOTAL KNEE ARTHROPLASTY  12/10/10    Right    TOTAL KNEE ARTHROPLASTY  3/30/10    Left    UPPER GASTROINTESTINAL ENDOSCOPY  4/16/2013    dr Oni Ramirez ENDOSCOPY  3/20/2014    dr Bowen Peralta History     Socioeconomic History    Marital status: Single     Spouse name: Not on file    Number of children: 0    Years of education: 12    Highest education level: Not on file   Occupational History    Occupation: [employer]     Comment: unemployed   Social Needs    Financial resource strain: Not on file    Food insecurity     Worry: Not on file     Inability: Not on file   [employer] needs     Medical: Not on file     Non-medical: Not on file   Tobacco Use    Smoking status: Never Smoker    Smokeless tobacco: Never Used    Tobacco comment: Never smoked   Substance and Sexual Activity    Alcohol use: No     Alcohol/week: 0.0 standard drinks    Drug use: No    Sexual activity: Not Currently   Lifestyle    Physical activity     Days per week: Not on file     Minutes per session: Not on file    Stress: Not on file   Relationships    Social connections     Talks on phone: Not on file     Gets together: Not on file     Attends [employer] service: Not on file     Active member of club or organization: Not on file     Attends meetings of clubs or organizations: Not on file     Relationship status: Not on file    Intimate partner violence     Fear of current or ex partner: Not on file     Emotionally abused: Not on file     Physically abused: Not on file     Forced sexual activity: Not on file   Other Topics Concern    Not on file   Social History Narrative    Lives by herself with her cat       Family History   Problem Relation Age of Onset    Heart Disease Mother     Heart Failure Mother     High Cholesterol Mother     High Blood Pressure Mother     Stroke Mother     Birth Defects Mother     Other Mother         VV    High Blood Pressure Father     COPD Father     Diabetes Brother     Stroke Maternal Grandmother     Arthritis Maternal Grandmother     Heart Failure Brother     Stroke Brother     Birth Defects Maternal Grandfather     Arthritis Paternal Grandmother        Current Outpatient Medications on File Prior to Visit   Medication Sig Dispense Refill    levothyroxine (SYNTHROID) 88 MCG tablet TAKE 1 TABLET BY MOUTH DAILY 90 tablet 0    tamsulosin (FLOMAX) 0.4 MG capsule TAKE ONE CAPSULE BY MOUTH DAILY AS NEEDED FOR KIDNEY STONE--only needs 30 pills --JJL 30 capsule 0    hydrocortisone (CORTEF) 10 MG tablet Take 1 tablet daily as needed for episodes of weakness and chest pain--use sparingly 30 tablet 0    celecoxib (CELEBREX) 200 MG capsule Take 1 capsule by mouth daily 30 capsule 1    pregabalin (LYRICA) 100 MG capsule Take 1 capsule by mouth 3 times daily for 30 days. 90 capsule 0    nitroGLYCERIN (NITROSTAT) 0.4 MG SL tablet Place 1 tablet under the tongue every 5 minutes as needed for Chest pain up to max of 3 total doses.  If no relief after 1 dose, call 911. 25 tablet 3    rOPINIRole

## 2021-04-13 ENCOUNTER — CARE COORDINATION (OUTPATIENT)
Dept: CASE MANAGEMENT | Age: 61
End: 2021-04-13

## 2021-04-15 ENCOUNTER — APPOINTMENT (OUTPATIENT)
Dept: GENERAL RADIOLOGY | Age: 61
End: 2021-04-15
Payer: COMMERCIAL

## 2021-04-15 ENCOUNTER — HOSPITAL ENCOUNTER (EMERGENCY)
Age: 61
Discharge: HOME | End: 2021-04-15
Attending: EMERGENCY MEDICINE
Payer: COMMERCIAL

## 2021-04-15 VITALS
RESPIRATION RATE: 16 BRPM | DIASTOLIC BLOOD PRESSURE: 86 MMHG | BODY MASS INDEX: 50.02 KG/M2 | HEART RATE: 70 BPM | HEIGHT: 64 IN | TEMPERATURE: 97.7 F | WEIGHT: 293 LBS | SYSTOLIC BLOOD PRESSURE: 158 MMHG | OXYGEN SATURATION: 95 %

## 2021-04-15 DIAGNOSIS — R07.9 CHEST PAIN, UNSPECIFIED TYPE: Primary | ICD-10-CM

## 2021-04-15 DIAGNOSIS — R42 LIGHTHEADEDNESS: ICD-10-CM

## 2021-04-15 LAB
ALBUMIN SERPL-MCNC: 3.5 G/DL (ref 3.4–5)
ALP BLD-CCNC: 115 U/L (ref 40–129)
ALT SERPL-CCNC: 12 U/L (ref 10–40)
ANION GAP SERPL CALCULATED.3IONS-SCNC: 8 MMOL/L (ref 3–16)
AST SERPL-CCNC: 11 U/L (ref 15–37)
BACTERIA: ABNORMAL /HPF
BASE EXCESS VENOUS: 6.1 MMOL/L (ref -2–3)
BASOPHILS ABSOLUTE: 0 K/UL (ref 0–0.2)
BASOPHILS RELATIVE PERCENT: 0.5 %
BILIRUB SERPL-MCNC: 0.3 MG/DL (ref 0–1)
BILIRUBIN DIRECT: <0.2 MG/DL (ref 0–0.3)
BILIRUBIN URINE: NEGATIVE
BILIRUBIN, INDIRECT: ABNORMAL MG/DL (ref 0–1)
BLOOD, URINE: NEGATIVE
BUN BLDV-MCNC: 17 MG/DL (ref 7–20)
CALCIUM SERPL-MCNC: 9.3 MG/DL (ref 8.3–10.6)
CARBOXYHEMOGLOBIN: 0.9 % (ref 0–1.5)
CHLORIDE BLD-SCNC: 104 MMOL/L (ref 99–110)
CLARITY: CLEAR
CO2: 28 MMOL/L (ref 21–32)
COLOR: YELLOW
CREAT SERPL-MCNC: 0.9 MG/DL (ref 0.6–1.2)
EOSINOPHILS ABSOLUTE: 0.1 K/UL (ref 0–0.6)
EOSINOPHILS RELATIVE PERCENT: 2.1 %
EPITHELIAL CELLS, UA: ABNORMAL /HPF (ref 0–5)
GFR AFRICAN AMERICAN: >60
GFR NON-AFRICAN AMERICAN: >60
GLUCOSE BLD-MCNC: 100 MG/DL (ref 70–99)
GLUCOSE URINE: NEGATIVE MG/DL
HCO3 VENOUS: 32.7 MMOL/L (ref 24–28)
HCT VFR BLD CALC: 37.2 % (ref 36–48)
HEMOGLOBIN, VEN, REDUCED: 47 %
HEMOGLOBIN: 12 G/DL (ref 12–16)
KETONES, URINE: NEGATIVE MG/DL
LEUKOCYTE ESTERASE, URINE: ABNORMAL
LYMPHOCYTES ABSOLUTE: 1.2 K/UL (ref 1–5.1)
LYMPHOCYTES RELATIVE PERCENT: 19.8 %
MCH RBC QN AUTO: 28.8 PG (ref 26–34)
MCHC RBC AUTO-ENTMCNC: 32.2 G/DL (ref 31–36)
MCV RBC AUTO: 89.5 FL (ref 80–100)
METHEMOGLOBIN VENOUS: 0.3 % (ref 0–1.5)
MICROSCOPIC EXAMINATION: YES
MONOCYTES ABSOLUTE: 0.5 K/UL (ref 0–1.3)
MONOCYTES RELATIVE PERCENT: 8.7 %
NEUTROPHILS ABSOLUTE: 4.1 K/UL (ref 1.7–7.7)
NEUTROPHILS RELATIVE PERCENT: 68.9 %
NITRITE, URINE: NEGATIVE
O2 SAT, VEN: 52 %
PCO2, VEN: 56 MMHG (ref 41–51)
PDW BLD-RTO: 16.8 % (ref 12.4–15.4)
PH UA: 6.5 (ref 5–8)
PH VENOUS: 7.38 (ref 7.35–7.45)
PLATELET # BLD: 253 K/UL (ref 135–450)
PMV BLD AUTO: 8.3 FL (ref 5–10.5)
PO2, VEN: <30 MMHG (ref 25–40)
POTASSIUM REFLEX MAGNESIUM: 4.5 MMOL/L (ref 3.5–5.1)
PRO-BNP: 603 PG/ML (ref 0–124)
PROTEIN UA: NEGATIVE MG/DL
RBC # BLD: 4.16 M/UL (ref 4–5.2)
RBC UA: ABNORMAL /HPF (ref 0–4)
SODIUM BLD-SCNC: 140 MMOL/L (ref 136–145)
SPECIFIC GRAVITY UA: 1.01 (ref 1–1.03)
TCO2 CALC VENOUS: 35 MMOL/L
TOTAL PROTEIN: 6.6 G/DL (ref 6.4–8.2)
TROPONIN: <0.01 NG/ML
URINE TYPE: ABNORMAL
UROBILINOGEN, URINE: 0.2 E.U./DL
WBC # BLD: 6 K/UL (ref 4–11)
WBC UA: ABNORMAL /HPF (ref 0–5)

## 2021-04-15 PROCEDURE — 71045 X-RAY EXAM CHEST 1 VIEW: CPT

## 2021-04-15 PROCEDURE — 82803 BLOOD GASES ANY COMBINATION: CPT

## 2021-04-15 PROCEDURE — 80048 BASIC METABOLIC PNL TOTAL CA: CPT

## 2021-04-15 PROCEDURE — 93005 ELECTROCARDIOGRAM TRACING: CPT | Performed by: EMERGENCY MEDICINE

## 2021-04-15 PROCEDURE — 81001 URINALYSIS AUTO W/SCOPE: CPT

## 2021-04-15 PROCEDURE — 84484 ASSAY OF TROPONIN QUANT: CPT

## 2021-04-15 PROCEDURE — 80076 HEPATIC FUNCTION PANEL: CPT

## 2021-04-15 PROCEDURE — 99283 EMERGENCY DEPT VISIT LOW MDM: CPT

## 2021-04-15 PROCEDURE — 85025 COMPLETE CBC W/AUTO DIFF WBC: CPT

## 2021-04-15 PROCEDURE — 83880 ASSAY OF NATRIURETIC PEPTIDE: CPT

## 2021-04-15 ASSESSMENT — ENCOUNTER SYMPTOMS
CHEST TIGHTNESS: 1
NAUSEA: 0
VOMITING: 0
SHORTNESS OF BREATH: 1
COUGH: 0
BACK PAIN: 0
WHEEZING: 0
ANAL BLEEDING: 0

## 2021-04-15 NOTE — ED NOTES
Bed: A01-01  Expected date:   Expected time:   Means of arrival:   Comments:  SHANTE Shepard 52, RN  04/15/21 4090

## 2021-04-15 NOTE — ED PROVIDER NOTES
[address] [facility]          PHYSICIAN ASSISTANT NOTE       Date of evaluation: 4/15/2021    Chief Complaint     Dizziness (Dizziness and chest pain that started at noon. Pt states she has adrenal insuff and was prescribed hydrocortisone and it is not helping) and Chest Pain      History of Present Illness     Kena Andrea is a 63 y.o. female with a past medical history of vertigo, sleep apnea, GERD, depression, hypertension, and currently being worked up for adrenal insufficiency presenting to the emergency department for evaluation of chest pain, shortness of breath and dizziness. Symptoms started acutely around 1230 this afternoon and have been constant since onset. She has been told that these episodes may be due to underlying adrenal insufficiency. She has plans to see endocrinology in July. Her primary care provider has been managing this and she was recently prescribed Cortef to take when she has severe episodes. She took a dose of Cortef this afternoon for the first time and saw no improvement in symptoms so she decided to come to the emergency department for evaluation. Chest pain is described as tightness, with mild associated shortness of breath at rest and with exertion. She also has dizziness described as lightheadedness when she moves her eyes side to side. He has been having these episodes 2 or 3 times a week for the last 4 months. She had a normal cardiac cath on 3/7/2021 with EF of 55 to 60%. She also is reporting swelling of both of her lower legs. She is not on a diuretic with no history of heart failure. She has been compliant with taking her thyroid medications as prescribed for hypothyroidism. She cannot identify any aggravating or relieving features. Review of Systems     Review of Systems   Constitutional: Negative for chills, diaphoresis, fatigue and fever. Eyes: Negative for visual disturbance.    Respiratory: Positive for chest tightness and shortness of breath. Negative for cough and wheezing. Cardiovascular: Negative for chest pain, palpitations and leg swelling. Gastrointestinal: Negative for anal bleeding, nausea and vomiting. Musculoskeletal: Negative for back pain and neck pain. Neurological: Positive for dizziness and light-headedness. Negative for weakness and headaches. Psychiatric/Behavioral: Negative for confusion. The patient is not nervous/anxious. Past Medical, Surgical, Family, and Social History     She has a past medical history of Acquired hyperlipoproteinemia, MARIO (acute kidney injury) ([facility] [address] Allergic rhinitis, Anxiety, Arthritis, Bilateral lower extremity edema, Bipolar 1 disorder ([facility] [address] Chronic pain syndrome, DDD (degenerative disc disease), lumbar, Depression, Depression, Disease of gallbladder, Dizziness, DJD (degenerative joint disease) of hip, Edema, Elbow tendinitis, Essential hypertension, Facet syndrome, Fibromyalgia, Fracture of nasal bone, GERD (gastroesophageal reflux disease), Headache, Hiatal hernia, History of blood transfusion, Hyperlipidemia, Insomnia, Osteoarthritis, Prediabetes, Rash, Self mutilating behavior, Sleep apnea, Suicidal ideation, Thyroid disorder, and Vertigo. She has a past surgical history that includes Ankle surgery; Appendectomy; Total knee arthroplasty (12/10/10); Total knee arthroplasty (3/30/10); shoulder surgery; Colonoscopy (04/16/2013); Upper gastrointestinal endoscopy (4/16/2013); Cholecystectomy; hysteroscopy (N/A, 12-30-13); Upper gastrointestinal endoscopy (3/20/2014); Dilation and curettage of uterus; joint replacement (Bilateral); Abdomen surgery (5/28/2014); Sleeve Gastroplasty (May 28, 2014); Endoscopy, colon, diagnostic; shoulder surgery (Right); Ankle surgery; other surgical history (9/11/2015); Foot surgery (Right, 05/27/2016); and Achilles tendon surgery (Left, 3/9/2020).   Her family history includes Arthritis in her maternal grandmother and paternal grandmother; Birth Defects in her maternal grandfather and mother; COPD in her father; Diabetes in her brother; Heart Disease in her mother; Heart Failure in her brother and mother; High Blood Pressure in her father and mother; High Cholesterol in her mother; Other in her mother; Stroke in her brother, maternal grandmother, and mother. She reports that she has never smoked. She has never used smokeless tobacco. She reports that she does not drink alcohol or use drugs. Medications     Discharge Medication List as of 4/15/2021  7:30 PM      CONTINUE these medications which have NOT CHANGED    Details   levothyroxine (SYNTHROID) 88 MCG tablet TAKE 1 TABLET BY MOUTH DAILY, Disp-90 tablet, R-0Normal      tamsulosin (FLOMAX) 0.4 MG capsule TAKE ONE CAPSULE BY MOUTH DAILY AS NEEDED FOR KIDNEY STONE--only needs 30 pills --FARIDA, Disp-30 capsule, R-0**Patient requests 90 days supply**Normal      hydrocortisone (CORTEF) 10 MG tablet Take 1 tablet daily as needed for episodes of weakness and chest pain--use sparingly, Disp-30 tablet, R-0Normal      celecoxib (CELEBREX) 200 MG capsule Take 1 capsule by mouth daily, Disp-30 capsule, R-1Normal      pregabalin (LYRICA) 100 MG capsule Take 1 capsule by mouth 3 times daily for 30 days. , Disp-90 capsule, R-0Normal      nitroGLYCERIN (NITROSTAT) 0.4 MG SL tablet Place 1 tablet under the tongue every 5 minutes as needed for Chest pain up to max of 3 total doses.  If no relief after 1 dose, call 911., Disp-25 tablet, R-3Normal      rOPINIRole (REQUIP) 1 MG tablet TAKE 2 TABLETS BY MOUTH EVERY NIGHT, Disp-180 tablet, R-0Normal      betamethasone dipropionate (DIPROLENE) 0.05 % cream APPLY EXTERNALLY TO THE AFFECTED AREA TWICE DAILY, Disp-60 g, R-0, Normal      ondansetron (ZOFRAN) 4 MG tablet TAKE 1 TABLET BY MOUTH EVERY 8 HOURS AS NEEDED FOR NAUSEA OR VOMITING, Disp-60 tablet, R-0Normal      atorvastatin (LIPITOR) 40 MG tablet TAKE 1 TABLET BY MOUTH DAILY, Disp-90 tablet, R-0Normal meclizine (ANTIVERT) 25 MG tablet Take 25 mg by mouth 3 times daily as neededHistorical Med      Cholecalciferol (VITAMIN D3) 125 MCG (5000 UT) TABS Take 1 tablet by mouth 2 times dailyHistorical Med      DULoxetine (CYMBALTA) 60 MG extended release capsule Take 1 capsule by mouth 2 times dailyHistorical Med      ARIPiprazole (ABILIFY) 20 MG tablet Take 1 tablet by mouth dailyHistorical Med      propranolol (INDERAL) 40 MG tablet Take 1 tablet by mouth 3 times daily anxietyHistorical Med      esomeprazole (NEXIUM) 40 MG delayed release capsule TAKE 1 CAPSULE BY MOUTH EVERY MORNING BEFORE BREAKFAST, Disp-90 capsule,R-0Normal      QUEtiapine (SEROQUEL) 200 MG tablet Take 200 mg by mouth nightlyHistorical Med             Allergies     She is allergic to polysporin [bacitracin-polymyxin b]; lorazepam; neomycin-polymyxin-gramicidin; and sulfa antibiotics. Physical Exam     INITIAL VITALS: BP: (!) 147/103, Temp: 97.7 °F (36.5 °C), Pulse: 72, Resp: 16, SpO2: 95 %  Physical Exam    Diagnostic Results     EKG   Interpreted in conjunction with emergency department physician Sarahi Ramirez MD  Rhythm: normal sinus   Rate: normal  Axis: normal  Ectopy: none  Conduction: normal  ST Segments: normal  T Waves: no acute change  Q Waves:none  Clinical Impression: no acute changes  Comparison:  4/3/21    RADIOLOGY:  XR CHEST PORTABLE   Final Result      No acute pulmonary consolidation.            LABS:   Results for orders placed or performed during the hospital encounter of 04/15/21   CBC Auto Differential   Result Value Ref Range    WBC 6.0 4.0 - 11.0 K/uL    RBC 4.16 4.00 - 5.20 M/uL    Hemoglobin 12.0 12.0 - 16.0 g/dL    Hematocrit 37.2 36.0 - 48.0 %    MCV 89.5 80.0 - 100.0 fL    MCH 28.8 26.0 - 34.0 pg    MCHC 32.2 31.0 - 36.0 g/dL    RDW 16.8 (H) 12.4 - 15.4 %    Platelets 030 311 - 726 K/uL    MPV 8.3 5.0 - 10.5 fL    Neutrophils % 68.9 %    Lymphocytes % 19.8 %    Monocytes % 8.7 %    Eosinophils % 2.1 %    Basophils % 0.5 %    Neutrophils Absolute 4.1 1.7 - 7.7 K/uL    Lymphocytes Absolute 1.2 1.0 - 5.1 K/uL    Monocytes Absolute 0.5 0.0 - 1.3 K/uL    Eosinophils Absolute 0.1 0.0 - 0.6 K/uL    Basophils Absolute 0.0 0.0 - 0.2 K/uL   Basic Metabolic Panel w/ Reflex to MG   Result Value Ref Range    Sodium 140 136 - 145 mmol/L    Potassium reflex Magnesium 4.5 3.5 - 5.1 mmol/L    Chloride 104 99 - 110 mmol/L    CO2 28 21 - 32 mmol/L    Anion Gap 8 3 - 16    Glucose 100 (H) 70 - 99 mg/dL    BUN 17 7 - 20 mg/dL    CREATININE 0.9 0.6 - 1.2 mg/dL    GFR Non-African American >60 >60    GFR African American >60 >60    Calcium 9.3 8.3 - 10.6 mg/dL   Troponin   Result Value Ref Range    Troponin <0.01 <0.01 ng/mL   Brain Natriuretic Peptide   Result Value Ref Range    Pro- (H) 0 - 124 pg/mL   Urinalysis, reflex to microscopic   Result Value Ref Range    Color, UA Yellow Straw/Yellow    Clarity, UA Clear Clear    Glucose, Ur Negative Negative mg/dL    Bilirubin Urine Negative Negative    Ketones, Urine Negative Negative mg/dL    Specific Gravity, UA 1.015 1.005 - 1.030    Blood, Urine Negative Negative    pH, UA 6.5 5.0 - 8.0    Protein, UA Negative Negative mg/dL    Urobilinogen, Urine 0.2 <2.0 E.U./dL    Nitrite, Urine Negative Negative    Leukocyte Esterase, Urine TRACE (A) Negative    Microscopic Examination YES     Urine Type Voided    Hepatic Function Panel   Result Value Ref Range    Total Protein 6.6 6.4 - 8.2 g/dL    Albumin 3.5 3.4 - 5.0 g/dL    Alkaline Phosphatase 115 40 - 129 U/L    ALT 12 10 - 40 U/L    AST 11 (L) 15 - 37 U/L    Total Bilirubin 0.3 0.0 - 1.0 mg/dL    Bilirubin, Direct <0.2 0.0 - 0.3 mg/dL    Bilirubin, Indirect see below 0.0 - 1.0 mg/dL   Blood gas, venous (Lab)   Result Value Ref Range    pH, William 7.375 7.350 - 7.450    pCO2, William 56.0 (H) 41.0 - 51.0 mmHg    pO2, William <30.0 25 - 40 mmHg    HCO3, Venous 32.7 (H) 24.0 - 28.0 mmol/L    Base Excess, William 6.1 (H) -2.0 - 3.0 mmol/L    O2 Sat, William 52 Not established %    Carboxyhemoglobin 0.9 0.0 - 1.5 %    MetHgb, William 0.3 0.0 - 1.5 %    TC02 (Calc), William 35 mmol/L    Hemoglobin, William, Reduced 47.00 %   Microscopic Urinalysis   Result Value Ref Range    WBC, UA 3-5 0 - 5 /HPF    RBC, UA None seen 0 - 4 /HPF    Epithelial Cells, UA 2-5 0 - 5 /HPF    Bacteria, UA 1+ (A) None Seen /HPF       ED BEDSIDE ULTRASOUND:  None    RECENT VITALS:  BP: (!) 158/86, Temp: 97.7 °F (36.5 °C), Pulse: 70, Resp: 16, SpO2: 95 %     Procedures   None    ED Course     Nursing Notes, Past Medical Hx,Past Surgical Hx, Social Hx, Allergies, and Family Hx were reviewed. The patient was given the following medications:  No orders of the defined types were placed in this encounter. CONSULTS:  None    MEDICAL DECISION MAKING / ASSESSMENT / Ebb Corwin is a 63 y.o. female presenting to the emergency department for chest pain, shortness of breath and reported lightheadedness when she moves her eyes side to side. She has had numerous similar episodes in the past over the last few months and is being worked up for this by her primary care provider with possible underlying adrenal insufficiency. She took a single dose of Cortef prior to coming to the ED with no improvement in symptoms. Low suspicion for adrenal crisis today with normal blood pressure, no electrolyte abnormalities on BMP, no nausea or vomiting. He has follow-up scheduled with an endocrinologist and was encouraged to discuss possible adrenal insufficiency further with her primary care provider. Do not feel that she requires stress dose steroids today. EKG completed showing normal sinus rhythm, no acute changes to indicate ischemia or infarction. Single troponin drawn and normal, low suspicion for ACS with normal cardiac cath in March of this year. proBNP was slightly elevated from baseline at 603, no pulmonary edema seen on chest x-ray and ejection fraction was reported as normal on recent cardiac cath.

## 2021-04-15 NOTE — ED PROVIDER NOTES
ED Attending Attestation Note     Date of evaluation: 4/15/2021    This patient was seen by the advance practice provider. I have seen and examined the patient, agree with the workup, evaluation, management and diagnosis. The care plan has been discussed. I have reviewed the ECG and concur with the SYDNEE's interpretation. My assessment reveals patient is awake, alert, no respirator stress as I walk in the room has significant bilateral edema of the lower extremities, symmetrical, no signs of infection.      Radha Calle MD  04/15/21 0037

## 2021-04-15 NOTE — ED NOTES
Patient discharged to home alone. Patient verbalized understanding of discharge instructions. Advised of when to return to ED and when to call 911. Advised of follow up with PCP. No questions from patient to this RN. Patient left ED without incident.      Amie Mcnamara RN  04/15/21 1936

## 2021-04-16 LAB
EKG ATRIAL RATE: 71 BPM
EKG DIAGNOSIS: NORMAL
EKG P AXIS: 41 DEGREES
EKG P-R INTERVAL: 144 MS
EKG Q-T INTERVAL: 378 MS
EKG QRS DURATION: 78 MS
EKG QTC CALCULATION (BAZETT): 410 MS
EKG R AXIS: 19 DEGREES
EKG T AXIS: 24 DEGREES
EKG VENTRICULAR RATE: 71 BPM

## 2021-04-17 RX ORDER — BETAMETHASONE DIPROPIONATE 0.5 MG/G
CREAM TOPICAL
Qty: 60 G | Refills: 0 | Status: SHIPPED | OUTPATIENT
Start: 2021-04-17 | End: 2021-07-06

## 2021-04-23 ENCOUNTER — VIRTUAL VISIT (OUTPATIENT)
Dept: PAIN MANAGEMENT | Age: 61
End: 2021-04-23
Payer: COMMERCIAL

## 2021-04-23 DIAGNOSIS — M79.7 FIBROMYALGIA: ICD-10-CM

## 2021-04-23 DIAGNOSIS — M16.0 PRIMARY OSTEOARTHRITIS OF BOTH HIPS: ICD-10-CM

## 2021-04-23 DIAGNOSIS — M47.899 FACET SYNDROME: ICD-10-CM

## 2021-04-23 DIAGNOSIS — M51.36 DDD (DEGENERATIVE DISC DISEASE), LUMBAR: ICD-10-CM

## 2021-04-23 DIAGNOSIS — M54.16 LUMBAR RADICULITIS: ICD-10-CM

## 2021-04-23 DIAGNOSIS — G89.4 CHRONIC PAIN SYNDROME: ICD-10-CM

## 2021-04-23 DIAGNOSIS — M77.8 ELBOW TENDINITIS: ICD-10-CM

## 2021-04-23 DIAGNOSIS — M16.11 PRIMARY OSTEOARTHRITIS OF RIGHT HIP: ICD-10-CM

## 2021-04-23 PROCEDURE — 99213 OFFICE O/P EST LOW 20 MIN: CPT | Performed by: INTERNAL MEDICINE

## 2021-04-23 RX ORDER — CELECOXIB 200 MG/1
200 CAPSULE ORAL DAILY
Qty: 30 CAPSULE | Refills: 1 | Status: SHIPPED | OUTPATIENT
Start: 2021-04-23 | End: 2021-05-28 | Stop reason: SDUPTHER

## 2021-04-23 RX ORDER — PREGABALIN 100 MG/1
100 CAPSULE ORAL 3 TIMES DAILY
Qty: 90 CAPSULE | Refills: 0 | Status: SHIPPED | OUTPATIENT
Start: 2021-04-23 | End: 2021-05-28 | Stop reason: SDUPTHER

## 2021-04-23 RX ORDER — TRAMADOL HYDROCHLORIDE 50 MG/1
50 TABLET ORAL EVERY 6 HOURS PRN
Qty: 70 TABLET | Refills: 0 | Status: SHIPPED | OUTPATIENT
Start: 2021-04-23 | End: 2021-07-02 | Stop reason: SDUPTHER

## 2021-04-23 NOTE — PROGRESS NOTES
TELE HEALTH VISIT (AUDIO-VISUAL)    Pursuant to the emergency declaration under the [facility], [facility] waiver authority and the [employer] Act, this Virtual  Visit was conducted, with patient's/legal guardian's consent, to reduce the patient's risk of exposure to COVID-19 and provide continuity of care for an established patient. Service is  provided through a video synchronous discussion virtually to substitute for in-person clinic visit. Due to this being a TeleHealth encounter (During [facility] public health emergency), evaluation of the following organ systems was limited: Vitals/Constitutional/EENT/Resp/CV/GI//MS/Neuro/Skin/Hshh-Ofio-Phn. [facility]  1960  [id number]    Ms. Radha Yanez is being seen virtually for a follow up visit using one of the following techniques  Google Duo, Face time or Doxy. me  Informed verbal consent to the virtual visit was obtained from Ms. Radha Yanez. Risks associated with HIPPA compliance with the virtual visit was explained to the patient. Ms. Radha Yanez is at her residence and Dr. Tish Golden is in his office. HISTORY OF PRESENT ILLNESS:  Ms. Radha Yanez is a 63 y.o. female returns for a follow up visit for multiple medical problems. Her current presenting problems are   1. Chronic pain syndrome    2. Primary osteoarthritis of both hips    3. Fibromyalgia    4. Elbow tendinitis    5. DDD (degenerative disc disease), lumbar    6. Lumbar radiculitis    7. Facet syndrome    8. Primary osteoarthritis of right hip    . As per information/history obtained from the PADT(patient assessment and documentation tool) - She complains of pain in the lower back She rates the pain 1/10 and describes it as sharp. Pain is made worse by: movement. Current treatment regimen has helped relieve about 70% of the pain. She denies side effects from the current pain regimen. Patient reports that since the last follow up visit the physical functioning is unchanged, family/social relationships are unchanged, mood is unchanged and sleep patterns are unchanged, and that the overall functioning is unchanged. Patient denies neurological bowel or bladder. Patient denies misusing/abusing her narcotic pain medications or using any illegal drugs. There are No indicators for possible drug abuse, addiction or diversion problems. Upon obtaining the medical history from Ms. Nisha Mccarty regarding today's office visit for her presenting problems, patient states she has been working 20+hours per week but gets difficult. She mentions she is using Ultram 2-3 per day. Patient denies any constipation symptoms. Patient reports her weight has been stable. Patient reports she has been managing her ADL's. ALLERGIES: Patients list of allergies were reviewed     MEDICATIONS: Ms. Nisha Mccarty list of medications were reviewed. Her current medications are   Outpatient Medications Prior to Visit   Medication Sig Dispense Refill    meclizine (ANTIVERT) 25 MG tablet Take 1 tablet by mouth 3 times daily for 10 days 30 tablet 0    betamethasone dipropionate (DIPROLENE) 0.05 % cream APPLY EXTERNALLY TO THE AFFECTED AREA TWICE DAILY 60 g 0    levothyroxine (SYNTHROID) 88 MCG tablet TAKE 1 TABLET BY MOUTH DAILY 90 tablet 0    tamsulosin (FLOMAX) 0.4 MG capsule TAKE ONE CAPSULE BY MOUTH DAILY AS NEEDED FOR KIDNEY STONE--only needs 30 pills --JJL 30 capsule 0    hydrocortisone (CORTEF) 10 MG tablet Take 1 tablet daily as needed for episodes of weakness and chest pain--use sparingly 30 tablet 0    celecoxib (CELEBREX) 200 MG capsule Take 1 capsule by mouth daily 30 capsule 1    pregabalin (LYRICA) 100 MG capsule Take 1 capsule by mouth 3 times daily for 30 days. 90 capsule 0    nitroGLYCERIN (NITROSTAT) 0.4 MG SL tablet Place 1 tablet under the tongue every 5 minutes as needed for Chest pain up to max of 3 total doses.  If no relief after 1 dose, call [phone]. 53 tablet 3    rOPINIRole (REQUIP) 1 MG tablet TAKE 2 TABLETS BY MOUTH EVERY NIGHT 180 tablet 0    ondansetron (ZOFRAN) 4 MG tablet TAKE 1 TABLET BY MOUTH EVERY 8 HOURS AS NEEDED FOR NAUSEA OR VOMITING 60 tablet 0    atorvastatin (LIPITOR) 40 MG tablet TAKE 1 TABLET BY MOUTH DAILY (Patient taking differently: TAKE 1 TABLET BY MOUTH NIGHTLY) 90 tablet 0    meclizine (ANTIVERT) 25 MG tablet Take 25 mg by mouth 3 times daily as needed      Cholecalciferol (VITAMIN D3) 125 MCG (5000 UT) TABS Take 1 tablet by mouth 2 times daily      DULoxetine (CYMBALTA) 60 MG extended release capsule Take 1 capsule by mouth 2 times daily      ARIPiprazole (ABILIFY) 20 MG tablet Take 1 tablet by mouth daily      propranolol (INDERAL) 40 MG tablet Take 1 tablet by mouth 3 times daily anxiety      esomeprazole (NEXIUM) 40 MG delayed release capsule TAKE 1 CAPSULE BY MOUTH EVERY MORNING BEFORE BREAKFAST 90 capsule 0    QUEtiapine (SEROQUEL) 200 MG tablet Take 200 mg by mouth nightly       No facility-administered medications prior to visit. REVIEW OF SYSTEMS:    Respiratory: Negative for apnea, chest tightness and shortness of breath or change in baseline breathing. PHYSICAL EXAM:   Nursing note and vitals reviewed. LMP 01/15/2014   Constitutional: She appears well-developed and well-nourished. No acute distress. Cardiovascular: Normal rate, regular rhythm, normal heart sounds, and does not have murmur. Pulmonary/Chest: Effort normal. No respiratory distress. She does not have wheezes in the lung fields. She has no rales. Neurological/Psychiatric:She is alert and oriented to person, place, and time. Coordination is  normal.  Her mood isAppropriate and affect is Neutral/Euthymic(normal) . Her    IMPRESSION:   1. Chronic pain syndrome    2. Primary osteoarthritis of both hips    3. Fibromyalgia    4. Elbow tendinitis    5. DDD (degenerative disc disease), lumbar    6. Lumbar radiculitis    7.  Facet syndrome 8. Primary osteoarthritis of right hip        PLAN:  Informed verbal consent was obtained  -continue with current opioid regimen Ultram 2-3 per day  -walking as tolerated   -She was advised weight reduction, diet changes- 800-1200 jessica diet, diet diary, exercising, nutritional  consult increased physical activity as tolerated   -Continue with all other adjuvant medications as before      Current Outpatient Medications   Medication Sig Dispense Refill    meclizine (ANTIVERT) 25 MG tablet Take 1 tablet by mouth 3 times daily for 10 days 30 tablet 0    betamethasone dipropionate (DIPROLENE) 0.05 % cream APPLY EXTERNALLY TO THE AFFECTED AREA TWICE DAILY 60 g 0    levothyroxine (SYNTHROID) 88 MCG tablet TAKE 1 TABLET BY MOUTH DAILY 90 tablet 0    tamsulosin (FLOMAX) 0.4 MG capsule TAKE ONE CAPSULE BY MOUTH DAILY AS NEEDED FOR KIDNEY STONE--only needs 30 pills --JJL 30 capsule 0    hydrocortisone (CORTEF) 10 MG tablet Take 1 tablet daily as needed for episodes of weakness and chest pain--use sparingly 30 tablet 0    celecoxib (CELEBREX) 200 MG capsule Take 1 capsule by mouth daily 30 capsule 1    pregabalin (LYRICA) 100 MG capsule Take 1 capsule by mouth 3 times daily for 30 days. 90 capsule 0    nitroGLYCERIN (NITROSTAT) 0.4 MG SL tablet Place 1 tablet under the tongue every 5 minutes as needed for Chest pain up to max of 3 total doses.  If no relief after 1 dose, call 911. 25 tablet 3    rOPINIRole (REQUIP) 1 MG tablet TAKE 2 TABLETS BY MOUTH EVERY NIGHT 180 tablet 0    ondansetron (ZOFRAN) 4 MG tablet TAKE 1 TABLET BY MOUTH EVERY 8 HOURS AS NEEDED FOR NAUSEA OR VOMITING 60 tablet 0    atorvastatin (LIPITOR) 40 MG tablet TAKE 1 TABLET BY MOUTH DAILY (Patient taking differently: TAKE 1 TABLET BY MOUTH NIGHTLY) 90 tablet 0    meclizine (ANTIVERT) 25 MG tablet Take 25 mg by mouth 3 times daily as needed      Cholecalciferol (VITAMIN D3) 125 MCG (5000 UT) TABS Take 1 tablet by mouth 2 times daily      DULoxetine (CYMBALTA) 60 MG extended release capsule Take 1 capsule by mouth 2 times daily      ARIPiprazole (ABILIFY) 20 MG tablet Take 1 tablet by mouth daily      propranolol (INDERAL) 40 MG tablet Take 1 tablet by mouth 3 times daily anxiety      esomeprazole (NEXIUM) 40 MG delayed release capsule TAKE 1 CAPSULE BY MOUTH EVERY MORNING BEFORE BREAKFAST 90 capsule 0    QUEtiapine (SEROQUEL) 200 MG tablet Take 200 mg by mouth nightly       No current facility-administered medications for this visit. I will continue her current medication regimen  which is part of the above treatment schedule. It has been helping with Ms. Yap's chronic  medical problems which for this visit include:   Diagnoses of Chronic pain syndrome, Primary osteoarthritis of both hips, Fibromyalgia, Elbow tendinitis, DDD (degenerative disc disease), lumbar, Lumbar radiculitis, Facet syndrome, and Primary osteoarthritis of right hip were pertinent to this visit. Risks and benefits of the medications and other alternative treatments  including no treatment were discussed with the patient. The common side effects of these medications were also explained to the patient. Informed verbal consent was obtained. Goals of current treatment regimen include improvement in pain, restoration of functioning- with focus on improvement in physical performance, general activity, work or disability,emotional distress, health care utilization and  decreased medication consumption. Will continue to monitor progress towards achieving/maintaining therapeutic goals with special emphasis on  1. Improvement in perceived interfernce  of pain with ADL's. Ability to do home exercises independently. Ability to do household chores indoor and/or outdoor work and social and leisure activities. Improve psychosocial and physical functioning. - she is showing progression towards this treatment goal with the current regimen.      She was advised against drinking alcohol with the narcotic pain medicines, advised against driving or handling machinery while adjusting the dose of medicines or if having cognitive  issues related to the current medications. Risk of overdose and death, if medicines not taken as prescribed, were also discussed. If the patient develops new symptoms or if the symptoms worsen, the patient should call the office. While transcribing every attempt was made to maintain the accuracy of the note in terms of it's contents,there may have been some errors made inadvertently. Thank you for allowing me to participate in the care of this patient.     Prosper Crump MD.    Cc: Roxane Ortiz MD

## 2021-05-04 RX ORDER — ONDANSETRON 4 MG/1
TABLET, FILM COATED ORAL
Qty: 60 TABLET | Refills: 0 | Status: SHIPPED | OUTPATIENT
Start: 2021-05-04 | End: 2021-07-01

## 2021-05-10 ENCOUNTER — HOSPITAL ENCOUNTER (EMERGENCY)
Age: 61
Discharge: HOME | End: 2021-05-11
Attending: EMERGENCY MEDICINE
Payer: COMMERCIAL

## 2021-05-10 DIAGNOSIS — R07.9 CHEST PAIN, UNSPECIFIED TYPE: Primary | ICD-10-CM

## 2021-05-10 PROCEDURE — 93005 ELECTROCARDIOGRAM TRACING: CPT | Performed by: EMERGENCY MEDICINE

## 2021-05-10 PROCEDURE — 99285 EMERGENCY DEPT VISIT HI MDM: CPT

## 2021-05-10 ASSESSMENT — PAIN DESCRIPTION - LOCATION: LOCATION: CHEST

## 2021-05-10 ASSESSMENT — PAIN DESCRIPTION - ORIENTATION: ORIENTATION: MID

## 2021-05-10 ASSESSMENT — PAIN DESCRIPTION - DESCRIPTORS: DESCRIPTORS: PRESSURE

## 2021-05-10 ASSESSMENT — PAIN DESCRIPTION - PAIN TYPE: TYPE: ACUTE PAIN

## 2021-05-10 ASSESSMENT — PAIN SCALES - GENERAL: PAINLEVEL_OUTOF10: 3

## 2021-05-11 ENCOUNTER — APPOINTMENT (OUTPATIENT)
Dept: GENERAL RADIOLOGY | Age: 61
End: 2021-05-11
Payer: COMMERCIAL

## 2021-05-11 VITALS
BODY MASS INDEX: 47.09 KG/M2 | RESPIRATION RATE: 17 BRPM | HEART RATE: 82 BPM | HEIGHT: 66 IN | OXYGEN SATURATION: 95 % | TEMPERATURE: 98.1 F | SYSTOLIC BLOOD PRESSURE: 133 MMHG | DIASTOLIC BLOOD PRESSURE: 70 MMHG | WEIGHT: 293 LBS

## 2021-05-11 LAB
ANION GAP SERPL CALCULATED.3IONS-SCNC: 10 MMOL/L (ref 3–16)
BASOPHILS ABSOLUTE: 0.1 K/UL (ref 0–0.2)
BASOPHILS RELATIVE PERCENT: 1 %
BUN BLDV-MCNC: 21 MG/DL (ref 7–20)
CALCIUM SERPL-MCNC: 9.3 MG/DL (ref 8.3–10.6)
CHLORIDE BLD-SCNC: 103 MMOL/L (ref 99–110)
CO2: 26 MMOL/L (ref 21–32)
CREAT SERPL-MCNC: 0.9 MG/DL (ref 0.6–1.2)
EKG ATRIAL RATE: 77 BPM
EKG DIAGNOSIS: NORMAL
EKG P AXIS: 49 DEGREES
EKG P-R INTERVAL: 150 MS
EKG Q-T INTERVAL: 376 MS
EKG QRS DURATION: 78 MS
EKG QTC CALCULATION (BAZETT): 425 MS
EKG R AXIS: 13 DEGREES
EKG T AXIS: 16 DEGREES
EKG VENTRICULAR RATE: 77 BPM
EOSINOPHILS ABSOLUTE: 0.2 K/UL (ref 0–0.6)
EOSINOPHILS RELATIVE PERCENT: 3.3 %
GFR AFRICAN AMERICAN: >60
GFR NON-AFRICAN AMERICAN: >60
GLUCOSE BLD-MCNC: 146 MG/DL (ref 70–99)
HCT VFR BLD CALC: 37.5 % (ref 36–48)
HEMOGLOBIN: 12.4 G/DL (ref 12–16)
LYMPHOCYTES ABSOLUTE: 1.9 K/UL (ref 1–5.1)
LYMPHOCYTES RELATIVE PERCENT: 27.1 %
MCH RBC QN AUTO: 29.3 PG (ref 26–34)
MCHC RBC AUTO-ENTMCNC: 33.1 G/DL (ref 31–36)
MCV RBC AUTO: 88.3 FL (ref 80–100)
MONOCYTES ABSOLUTE: 0.5 K/UL (ref 0–1.3)
MONOCYTES RELATIVE PERCENT: 7.2 %
NEUTROPHILS ABSOLUTE: 4.4 K/UL (ref 1.7–7.7)
NEUTROPHILS RELATIVE PERCENT: 61.4 %
PDW BLD-RTO: 17.1 % (ref 12.4–15.4)
PLATELET # BLD: 229 K/UL (ref 135–450)
PMV BLD AUTO: 8.2 FL (ref 5–10.5)
POTASSIUM SERPL-SCNC: 4.3 MMOL/L (ref 3.5–5.1)
PRO-BNP: 246 PG/ML (ref 0–124)
RBC # BLD: 4.25 M/UL (ref 4–5.2)
SODIUM BLD-SCNC: 139 MMOL/L (ref 136–145)
TROPONIN: <0.01 NG/ML
TROPONIN: <0.01 NG/ML
WBC # BLD: 7.1 K/UL (ref 4–11)

## 2021-05-11 PROCEDURE — 85025 COMPLETE CBC W/AUTO DIFF WBC: CPT

## 2021-05-11 PROCEDURE — 83880 ASSAY OF NATRIURETIC PEPTIDE: CPT

## 2021-05-11 PROCEDURE — 84484 ASSAY OF TROPONIN QUANT: CPT

## 2021-05-11 PROCEDURE — 80048 BASIC METABOLIC PNL TOTAL CA: CPT

## 2021-05-11 PROCEDURE — 71046 X-RAY EXAM CHEST 2 VIEWS: CPT

## 2021-05-11 NOTE — ED NOTES
Pt presents to the ed via ems with c/o a sudden onset of chest pressure at approx 2200 after getting off of work. Pt states she was sitting on the couch playing with her cat when the pain started. Pt report hc of adrenal insufficiency but is not on any medication as of yet due to waiting for an endocrinology appt. Pt is a/o x4, answering questions appropriately, no s/s of distress noted. Pt given 324 of aspiring and 125mg of solumedrol iv en route by ems.       Chase Wilde RN  05/10/21 [id number]

## 2021-05-11 NOTE — ED NOTES
Bed: A04-04  Expected date:   Expected time:   Means of arrival:   Comments:  [facility] [address], [facility]  05/10/21 [id number]

## 2021-05-11 NOTE — ED PROVIDER NOTES
Date of evaluation: 5/10/2021    Chief Complaint   Chest Pain (sudden onset of mid cp at 2200 given 324 asa and 125 of medrol per ems enroute)    Nursing Notes, Past Medical Hx, Past Surgical Hx, Social Hx, Allergies, and Family Hx were reviewed. History of Present Illness     Rusty Lackey is a 63 y.o. female with past medical history notable for hypertension, nonobstructive coronary artery disease, adrenal insufficiency, psychiatric illness who presents with chest pain, onset 2200, s/p aspirin and 125 of solumedrol per EMS. Chest pain occurred at rest when she was watching TV. She had associated dizziness. She called EMS and was given Solumedrol out of concern for adrenal crisis (BP was high not low per patient) as well as  mg. Denies associated diaphoresis, vomiting, exertional chest pain or SOB. She has chronic LE edema that is at her baseline. The patient is frustrating that she still is having these episodes. Blood glucose was not checked prior to arrival in the ED. Dizziness is a lightheadeness exacerbated by standing. She has an endocrinology appointment this week. [facility] in March admission: \" left heart cath normal LAD, left main, circumflex and RCA EF 55 to 60%\" new cardiac murmumr noted at that time. Echo ordered but not completed prior to discharge for unclear reasons. Review of Systems     Review of Systems   Constitutional: Positive for fatigue. Negative for activity change, appetite change and fever. Respiratory: Negative for cough and shortness of breath. Cardiovascular: Positive for chest pain and palpitations (chronic, at baseline). Gastrointestinal: Negative for abdominal pain, constipation, diarrhea, nausea and vomiting. Neurological: Positive for dizziness and light-headedness. Negative for headaches. All other systems reviewed and are negative.   see HPI    Past Medical, Surgical, Family, and Social History     She has a past medical history of Acquired hyperlipoproteinemia, MARIO (acute kidney injury) ([facility] [address] Allergic rhinitis, Anxiety, Arthritis, Bilateral lower extremity edema, Bipolar 1 disorder (HCC), Chronic pain syndrome, DDD (degenerative disc disease), lumbar, Depression, Depression, Disease of gallbladder, Dizziness, DJD (degenerative joint disease) of hip, Edema, Elbow tendinitis, Essential hypertension, Facet syndrome, Fibromyalgia, Fracture of nasal bone, GERD (gastroesophageal reflux disease), Headache, Hiatal hernia, History of blood transfusion, Hyperlipidemia, Insomnia, Osteoarthritis, Prediabetes, Rash, Self mutilating behavior, Sleep apnea, Suicidal ideation, Thyroid disorder, and Vertigo. She has a past surgical history that includes Ankle surgery; Appendectomy; Total knee arthroplasty (12/10/10); Total knee arthroplasty (3/30/10); shoulder surgery; Colonoscopy (04/16/2013); Upper gastrointestinal endoscopy (4/16/2013); Cholecystectomy; hysteroscopy (N/A, 12-30-13); Upper gastrointestinal endoscopy (3/20/2014); Dilation and curettage of uterus; joint replacement (Bilateral); Abdomen surgery (5/28/2014); Sleeve Gastroplasty (May 28, 2014); Endoscopy, colon, diagnostic; shoulder surgery (Right); Ankle surgery; other surgical history (9/11/2015); Foot surgery (Right, 05/27/2016); and Achilles tendon surgery (Left, 3/9/2020). Her family history includes Arthritis in her maternal grandmother and paternal grandmother; Birth Defects in her maternal grandfather and mother; COPD in her father; Diabetes in her brother; Heart Disease in her mother; Heart Failure in her brother and mother; High Blood Pressure in her father and mother; High Cholesterol in her mother; Other in her mother; Stroke in her brother, maternal grandmother, and mother. She reports that she has never smoked. She has never used smokeless tobacco. She reports that she does not drink alcohol or use drugs.     Medications     Discharge Medication List as of 5/11/2021  4:15 AM CONTINUE these medications which have NOT CHANGED    Details   rOPINIRole (REQUIP) 1 MG tablet TAKE 2 TABLETS BY MOUTH EVERY NIGHT, Disp-180 tablet, R-0Normal      ondansetron (ZOFRAN) 4 MG tablet TAKE 1 TABLET BY MOUTH EVERY 8 HOURS AS NEEDED FOR NAUSEA OR VOMITING, Disp-60 tablet, R-0Normal      vitamin B-12 (CYANOCOBALAMIN) 1000 MCG tablet Take 1 tablet by mouth daily, Disp-30 tablet, R-0Normal      celecoxib (CELEBREX) 200 MG capsule Take 1 capsule by mouth daily, Disp-30 capsule, R-1Normal      pregabalin (LYRICA) 100 MG capsule Take 1 capsule by mouth 3 times daily for 30 days. , Disp-90 capsule, R-0Normal      traMADol (ULTRAM) 50 MG tablet Take 1 tablet by mouth every 6 hours as needed for Pain (Max 2-3 per day) for up to 28 days. , Disp-70 tablet, R-0Normal      betamethasone dipropionate (DIPROLENE) 0.05 % cream APPLY EXTERNALLY TO THE AFFECTED AREA TWICE DAILY, Disp-60 g, R-0, Normal      levothyroxine (SYNTHROID) 88 MCG tablet TAKE 1 TABLET BY MOUTH DAILY, Disp-90 tablet, R-0Normal      tamsulosin (FLOMAX) 0.4 MG capsule TAKE ONE CAPSULE BY MOUTH DAILY AS NEEDED FOR KIDNEY STONE--only needs 30 pills --KOFFIL, Disp-30 capsule, R-0**Patient requests 90 days supply**Normal      hydrocortisone (CORTEF) 10 MG tablet Take 1 tablet daily as needed for episodes of weakness and chest pain--use sparingly, Disp-30 tablet, R-0Normal      nitroGLYCERIN (NITROSTAT) 0.4 MG SL tablet Place 1 tablet under the tongue every 5 minutes as needed for Chest pain up to max of 3 total doses.  If no relief after 1 dose, call 911., Disp-25 tablet, R-3Normal      atorvastatin (LIPITOR) 40 MG tablet TAKE 1 TABLET BY MOUTH DAILY, Disp-90 tablet, R-0Normal      meclizine (ANTIVERT) 25 MG tablet Take 25 mg by mouth 3 times daily as neededHistorical Med      Cholecalciferol (VITAMIN D3) 125 MCG (5000 UT) TABS Take 1 tablet by mouth 2 times dailyHistorical Med      DULoxetine (CYMBALTA) 60 MG extended release capsule Take 1 capsule by mouth 2 times dailyHistorical Med      ARIPiprazole (ABILIFY) 20 MG tablet Take 1 tablet by mouth dailyHistorical Med      propranolol (INDERAL) 40 MG tablet Take 1 tablet by mouth 3 times daily anxietyHistorical Med      esomeprazole (NEXIUM) 40 MG delayed release capsule TAKE 1 CAPSULE BY MOUTH EVERY MORNING BEFORE BREAKFAST, Disp-90 capsule,R-0Normal      QUEtiapine (SEROQUEL) 200 MG tablet Take 200 mg by mouth nightlyHistorical Med             Allergies     She is allergic to polysporin [bacitracin-polymyxin b]; lorazepam; neomycin-polymyxin-gramicidin; and sulfa antibiotics. Physical Exam     INITIAL VITALS: /70   Pulse 82   Temp 98.1 °F (36.7 °C) (Oral)   Resp 17   Ht 5' 6\" (1.676 m)   Wt (!) 320 lb (145.2 kg)   LMP 01/15/2014   SpO2 95%   BMI 51.65 kg/m²    Physical Exam  Vitals signs and nursing note reviewed. Constitutional:       General: She is not in acute distress. Appearance: Normal appearance. She is not ill-appearing, toxic-appearing or diaphoretic. HENT:      Head: Normocephalic and atraumatic. Nose: No congestion. Mouth/Throat:      Mouth: Mucous membranes are moist.   Eyes:      Extraocular Movements: Extraocular movements intact. Pupils: Pupils are equal, round, and reactive to light. Cardiovascular:      Rate and Rhythm: Normal rate and regular rhythm. Pulses: Normal pulses. Heart sounds: Murmur (systolic, unchanged since 3/7/21) present. Pulmonary:      Effort: Pulmonary effort is normal. No respiratory distress. Breath sounds: Normal breath sounds. No stridor. No wheezing, rhonchi or rales. Abdominal:      General: There is no distension. Palpations: Abdomen is soft. Tenderness: There is no abdominal tenderness. There is no guarding. Musculoskeletal:         General: No deformity or signs of injury. Right lower leg: Edema present. Left lower leg: Edema present.       Comments: Symmetric LE edema with chronic skin

## 2021-05-12 ASSESSMENT — ENCOUNTER SYMPTOMS
SHORTNESS OF BREATH: 0
ABDOMINAL PAIN: 0
VOMITING: 0
NAUSEA: 0
CONSTIPATION: 0
COUGH: 0
DIARRHEA: 0

## 2021-05-28 ENCOUNTER — VIRTUAL VISIT (OUTPATIENT)
Dept: PAIN MANAGEMENT | Age: 61
End: 2021-05-28
Payer: COMMERCIAL

## 2021-05-28 DIAGNOSIS — M47.899 FACET SYNDROME: ICD-10-CM

## 2021-05-28 DIAGNOSIS — M77.8 ELBOW TENDINITIS: ICD-10-CM

## 2021-05-28 DIAGNOSIS — G89.4 CHRONIC PAIN SYNDROME: ICD-10-CM

## 2021-05-28 DIAGNOSIS — M79.7 FIBROMYALGIA: ICD-10-CM

## 2021-05-28 DIAGNOSIS — M16.11 PRIMARY OSTEOARTHRITIS OF RIGHT HIP: ICD-10-CM

## 2021-05-28 DIAGNOSIS — M54.16 LUMBAR RADICULITIS: ICD-10-CM

## 2021-05-28 DIAGNOSIS — M51.36 DDD (DEGENERATIVE DISC DISEASE), LUMBAR: ICD-10-CM

## 2021-05-28 DIAGNOSIS — M16.0 PRIMARY OSTEOARTHRITIS OF BOTH HIPS: ICD-10-CM

## 2021-05-28 PROCEDURE — 99213 OFFICE O/P EST LOW 20 MIN: CPT | Performed by: INTERNAL MEDICINE

## 2021-05-28 RX ORDER — PREGABALIN 100 MG/1
100 CAPSULE ORAL 3 TIMES DAILY
Qty: 90 CAPSULE | Refills: 0 | Status: SHIPPED | OUTPATIENT
Start: 2021-05-28 | End: 2021-07-02 | Stop reason: SDUPTHER

## 2021-05-28 RX ORDER — CELECOXIB 200 MG/1
200 CAPSULE ORAL DAILY
Qty: 30 CAPSULE | Refills: 1 | Status: ON HOLD | OUTPATIENT
Start: 2021-05-28 | End: 2021-08-03

## 2021-05-28 NOTE — PROGRESS NOTES
TELE HEALTH VISIT (AUDIO-VISUAL)    Pursuant to the emergency declaration under the [facility] waiver authority and the [employer] Act, this Virtual  Visit was conducted, with patient's/legal guardian's consent, to reduce the patient's risk of exposure to COVID-19 and provide continuity of care for an established patient. Service is  provided through a video synchronous discussion virtually to substitute for in-person clinic visit. Due to this being a TeleHealth encounter (During SESAO-05 public health emergency), evaluation of the following organ systems was limited: Vitals/Constitutional/EENT/Resp/CV/GI//MS/Neuro/Skin/Jhkn-[id number]-Rtm. Rhoda Layer  1960  [id number]    Ms. Satish Grant is being seen virtually for a follow up visit using one of the following techniques  Google Duo, Face time or Doxy. me  Informed verbal consent to the virtual visit was obtained from Ms. Satish Grant. Risks associated with HIPPA compliance with the virtual visit was explained to the patient. Ms. Satish Grant is at her residence and Dr. Ed Washington is in his office. HISTORY OF PRESENT ILLNESS:  Ms. Satish Grant is a 63 y.o. female returns for a follow up visit for multiple medical problems. Her current presenting problems are   1. Chronic pain syndrome    2. Primary osteoarthritis of both hips    3. Fibromyalgia    4. Lumbar radiculitis    5. DDD (degenerative disc disease), lumbar    6. Elbow tendinitis    7. Facet syndrome    8. Primary osteoarthritis of right hip    . As per information/history obtained from the PADT(patient assessment and documentation tool) - She complains of pain in the mid back and lower back with radiation to chest She rates the pain 10/10 and describes it as sharp, aching. Pain is made worse by: movement, walking, standing, sitting, bending, lifting. Current treatment regimen has helped relieve about 10% of the pain.   She denies side effects from the current pain regimen. Patient reports that since the last follow up visit the physical functioning is unchanged, family/social relationships are unchanged, mood is unchanged and sleep patterns are unchanged, and that the overall functioning is unchanged. Patient denies neurological bowel or bladder. Patient denies misusing/abusing her narcotic pain medications or using any illegal drugs. There are No indicators for possible drug abuse, addiction or diversion problems. Upon obtaining the medical history from Ms. Ilia Zee regarding today's office visit for her presenting problems, patient states she is having increase pain in the mid and lower back, she has been hurting. Patient reports she is working part time still. She mentions she is using Celebrex along with Ultram PRN and Lyrica. Patient reports her weight has been stable. Patient denies any constipation symptoms. Patient states she still has some Ultram left from before. ALLERGIES: Patients list of allergies were reviewed     MEDICATIONS: Ms. Ilia Zee list of medications were reviewed. Her current medications are   Outpatient Medications Prior to Visit   Medication Sig Dispense Refill    Compression Stockings MISC by Does not apply route 20mmHg- knee high, closed toe. wear daily.  Fit to size 1 each 0    rOPINIRole (REQUIP) 1 MG tablet TAKE 2 TABLETS BY MOUTH EVERY NIGHT 180 tablet 0    ondansetron (ZOFRAN) 4 MG tablet TAKE 1 TABLET BY MOUTH EVERY 8 HOURS AS NEEDED FOR NAUSEA OR VOMITING 60 tablet 0    vitamin B-12 (CYANOCOBALAMIN) 1000 MCG tablet Take 1 tablet by mouth daily 30 tablet 0    celecoxib (CELEBREX) 200 MG capsule Take 1 capsule by mouth daily 30 capsule 1    betamethasone dipropionate (DIPROLENE) 0.05 % cream APPLY EXTERNALLY TO THE AFFECTED AREA TWICE DAILY 60 g 0    levothyroxine (SYNTHROID) 88 MCG tablet TAKE 1 TABLET BY MOUTH DAILY 90 tablet 0    tamsulosin (FLOMAX) 0.4 MG capsule TAKE ONE CAPSULE BY MOUTH DAILY AS NEEDED FOR KIDNEY STONE--only needs 30 pills --JJL 30 capsule 0    hydrocortisone (CORTEF) 10 MG tablet Take 1 tablet daily as needed for episodes of weakness and chest pain--use sparingly 30 tablet 0    nitroGLYCERIN (NITROSTAT) 0.4 MG SL tablet Place 1 tablet under the tongue every 5 minutes as needed for Chest pain up to max of 3 total doses. If no relief after 1 dose, call 911. 25 tablet 3    atorvastatin (LIPITOR) 40 MG tablet TAKE 1 TABLET BY MOUTH DAILY (Patient taking differently: TAKE 1 TABLET BY MOUTH NIGHTLY) 90 tablet 0    meclizine (ANTIVERT) 25 MG tablet Take 25 mg by mouth 3 times daily as needed      Cholecalciferol (VITAMIN D3) 125 MCG (5000 UT) TABS Take 1 tablet by mouth 2 times daily      DULoxetine (CYMBALTA) 60 MG extended release capsule Take 1 capsule by mouth 2 times daily      ARIPiprazole (ABILIFY) 20 MG tablet Take 1 tablet by mouth daily      propranolol (INDERAL) 40 MG tablet Take 1 tablet by mouth 3 times daily anxiety      esomeprazole (NEXIUM) 40 MG delayed release capsule TAKE 1 CAPSULE BY MOUTH EVERY MORNING BEFORE BREAKFAST 90 capsule 0    QUEtiapine (SEROQUEL) 200 MG tablet Take 200 mg by mouth nightly      pregabalin (LYRICA) 100 MG capsule Take 1 capsule by mouth 3 times daily for 30 days. 90 capsule 0     No facility-administered medications prior to visit. REVIEW OF SYSTEMS:    Respiratory: Negative for apnea, chest tightness and shortness of breath or change in baseline breathing. PHYSICAL EXAM:   Nursing note and vitals reviewed. LMP 01/15/2014   Constitutional: She appears well-developed and well-nourished. No acute distress. Cardiovascular: Normal rate, regular rhythm, normal heart sounds, and does not have murmur. Pulmonary/Chest: Effort normal. No respiratory distress. She does not have wheezes in the lung fields. She has no rales. Neurological/Psychiatric:She is alert and oriented to person, place, and time.  Coordination is normal.  Her mood isAppropriate and affect is Neutral/Euthymic(normal) . Her    IMPRESSION:   1. Chronic pain syndrome    2. Primary osteoarthritis of both hips    3. Fibromyalgia    4. Lumbar radiculitis    5. DDD (degenerative disc disease), lumbar    6. Elbow tendinitis    7. Facet syndrome    8. Primary osteoarthritis of right hip        PLAN:  Informed verbal consent was obtained  -Back stretching exercises as advised  -She was advised to increase fluids ( 5-7  glasses of fluid daily), limit caffeine, avoid cheese products, increase dietary fiber, increase activity and exercise as tolerated and relax regularly and enjoy meals  -continue with current opioid regimen, Ultram PRN and other adjuvants  -patient is refusing injections  -start Tens Unit and warm compressions     Current Outpatient Medications   Medication Sig Dispense Refill    Compression Stockings MISC by Does not apply route 20mmHg- knee high, closed toe. wear daily.  Fit to size 1 each 0    rOPINIRole (REQUIP) 1 MG tablet TAKE 2 TABLETS BY MOUTH EVERY NIGHT 180 tablet 0    ondansetron (ZOFRAN) 4 MG tablet TAKE 1 TABLET BY MOUTH EVERY 8 HOURS AS NEEDED FOR NAUSEA OR VOMITING 60 tablet 0    vitamin B-12 (CYANOCOBALAMIN) 1000 MCG tablet Take 1 tablet by mouth daily 30 tablet 0    celecoxib (CELEBREX) 200 MG capsule Take 1 capsule by mouth daily 30 capsule 1    betamethasone dipropionate (DIPROLENE) 0.05 % cream APPLY EXTERNALLY TO THE AFFECTED AREA TWICE DAILY 60 g 0    levothyroxine (SYNTHROID) 88 MCG tablet TAKE 1 TABLET BY MOUTH DAILY 90 tablet 0    tamsulosin (FLOMAX) 0.4 MG capsule TAKE ONE CAPSULE BY MOUTH DAILY AS NEEDED FOR KIDNEY STONE--only needs 30 pills --JJL 30 capsule 0    hydrocortisone (CORTEF) 10 MG tablet Take 1 tablet daily as needed for episodes of weakness and chest pain--use sparingly 30 tablet 0    nitroGLYCERIN (NITROSTAT) 0.4 MG SL tablet Place 1 tablet under the tongue every 5 minutes as needed for Chest pain up to max of 3 total doses. If no relief after 1 dose, call 911. 25 tablet 3    atorvastatin (LIPITOR) 40 MG tablet TAKE 1 TABLET BY MOUTH DAILY (Patient taking differently: TAKE 1 TABLET BY MOUTH NIGHTLY) 90 tablet 0    meclizine (ANTIVERT) 25 MG tablet Take 25 mg by mouth 3 times daily as needed      Cholecalciferol (VITAMIN D3) 125 MCG (5000 UT) TABS Take 1 tablet by mouth 2 times daily      DULoxetine (CYMBALTA) 60 MG extended release capsule Take 1 capsule by mouth 2 times daily      ARIPiprazole (ABILIFY) 20 MG tablet Take 1 tablet by mouth daily      propranolol (INDERAL) 40 MG tablet Take 1 tablet by mouth 3 times daily anxiety      esomeprazole (NEXIUM) 40 MG delayed release capsule TAKE 1 CAPSULE BY MOUTH EVERY MORNING BEFORE BREAKFAST 90 capsule 0    QUEtiapine (SEROQUEL) 200 MG tablet Take 200 mg by mouth nightly      pregabalin (LYRICA) 100 MG capsule Take 1 capsule by mouth 3 times daily for 30 days. 90 capsule 0     No current facility-administered medications for this visit. I will continue her current medication regimen  which is part of the above treatment schedule. It has been helping with Ms. Yap's chronic  medical problems which for this visit include:   Diagnoses of Chronic pain syndrome, Primary osteoarthritis of both hips, Fibromyalgia, Lumbar radiculitis, DDD (degenerative disc disease), lumbar, Elbow tendinitis, Facet syndrome, and Primary osteoarthritis of right hip were pertinent to this visit. Risks and benefits of the medications and other alternative treatments  including no treatment were discussed with the patient. The common side effects of these medications were also explained to the patient. Informed verbal consent was obtained.    Goals of current treatment regimen include improvement in pain, restoration of functioning- with focus on improvement in physical performance, general activity, work or disability,emotional distress, health care utilization and  decreased medication consumption. Will continue to monitor progress towards achieving/maintaining therapeutic goals with special emphasis on  1. Improvement in perceived interfernce  of pain with ADL's. Ability to do home exercises independently. Ability to do household chores indoor and/or outdoor work and social and leisure activities. Improve psychosocial and physical functioning. - she is showing progression towards this treatment goal with the current regimen. She was advised against drinking alcohol with the narcotic pain medicines, advised against driving or handling machinery while adjusting the dose of medicines or if having cognitive  issues related to the current medications. Risk of overdose and death, if medicines not taken as prescribed, were also discussed. If the patient develops new symptoms or if the symptoms worsen, the patient should call the office. While transcribing every attempt was made to maintain the accuracy of the note in terms of it's contents,there may have been some errors made inadvertently. Thank you for allowing me to participate in the care of this patient.     Edy Pacheco MD.    Cc: Loulou Frazier MD

## 2021-06-09 ENCOUNTER — TELEPHONE (OUTPATIENT)
Dept: ORTHOPEDIC SURGERY | Age: 61
End: 2021-06-09

## 2021-06-09 NOTE — TELEPHONE ENCOUNTER
Spoke with patient, she states that she is having increased pain in her heel. She states that the stretches, the injections etc have not helped. Patient has been scheduled for [address] [place] [address] on 06/16 for follow up appt.

## 2021-06-09 NOTE — TELEPHONE ENCOUNTER
General Question     Subject: LT HEEL  Patient and /or Facility Request: Marifer Jin  Contact Number: [phone]    PATIENT WAS IN TO SEE DR. BROWN  ON April. PATIENT HAD A CORTISONE INJECTION AT THAT TIME    PATIENT STATED THAT HER PAIN LEVEL IS AT A 10, AND WANTS TO KNOW WHAT CAN SHE DO. PLEASE CALL BACK PATIENT AT THE ABOVE NUMBER. THE CORTISONE INJECTIONS DID NOT WORK.

## 2021-06-10 ENCOUNTER — TELEPHONE (OUTPATIENT)
Dept: ORTHOPEDIC SURGERY | Age: 61
End: 2021-06-10

## 2021-06-10 NOTE — TELEPHONE ENCOUNTER
Spoke with patient. Soonest available appt is Tuesday at [facility]. Patient declined and states that she will wait until Wednesday. Patient states injection did not help and having 10/10 pain.

## 2021-06-11 ENCOUNTER — TELEPHONE (OUTPATIENT)
Dept: ORTHOPEDIC SURGERY | Age: 61
End: 2021-06-11

## 2021-06-11 ENCOUNTER — OFFICE VISIT (OUTPATIENT)
Dept: ORTHOPEDIC SURGERY | Age: 61
End: 2021-06-11
Payer: COMMERCIAL

## 2021-06-11 VITALS — WEIGHT: 293 LBS | BODY MASS INDEX: 47.09 KG/M2 | HEIGHT: 66 IN

## 2021-06-11 DIAGNOSIS — M92.62 HAGLUND'S DEFORMITY OF LEFT HEEL: ICD-10-CM

## 2021-06-11 DIAGNOSIS — M72.2 PLANTAR FASCIITIS OF LEFT FOOT: Primary | ICD-10-CM

## 2021-06-11 PROCEDURE — 99214 OFFICE O/P EST MOD 30 MIN: CPT | Performed by: ORTHOPAEDIC SURGERY

## 2021-06-11 PROCEDURE — L3170 FOOT PLAS HEEL STABI PRE OTS: HCPCS | Performed by: ORTHOPAEDIC SURGERY

## 2021-06-11 RX ORDER — NAPROXEN 500 MG/1
500 TABLET ORAL 2 TIMES DAILY WITH MEALS
Qty: 60 TABLET | Refills: 0 | Status: SHIPPED | OUTPATIENT
Start: 2021-06-11 | End: 2021-06-18

## 2021-06-11 NOTE — TELEPHONE ENCOUNTER
Appointment Request     Patient requesting earlier appointment: Yes  Appointment offered to patient: PATIENT WOULD LIKE TO BE SEEN TODAY. SHE HAS AN APPT FOR WED. STATES SHE'S IN A LOT OF PAIN.  HEEL PAIN  Patient Contact Number: [phone]

## 2021-06-14 ENCOUNTER — APPOINTMENT (OUTPATIENT)
Dept: GENERAL RADIOLOGY | Age: 61
End: 2021-06-14
Payer: COMMERCIAL

## 2021-06-14 ENCOUNTER — HOSPITAL ENCOUNTER (EMERGENCY)
Age: 61
Discharge: HOME | End: 2021-06-14
Attending: EMERGENCY MEDICINE
Payer: COMMERCIAL

## 2021-06-14 VITALS
OXYGEN SATURATION: 96 % | HEIGHT: 64 IN | HEART RATE: 75 BPM | RESPIRATION RATE: 16 BRPM | DIASTOLIC BLOOD PRESSURE: 70 MMHG | TEMPERATURE: 97.8 F | SYSTOLIC BLOOD PRESSURE: 131 MMHG | WEIGHT: 293 LBS | BODY MASS INDEX: 50.02 KG/M2

## 2021-06-14 DIAGNOSIS — M72.2 PLANTAR FASCIITIS OF LEFT FOOT: Primary | ICD-10-CM

## 2021-06-14 PROCEDURE — 99283 EMERGENCY DEPT VISIT LOW MDM: CPT

## 2021-06-14 PROCEDURE — 73630 X-RAY EXAM OF FOOT: CPT

## 2021-06-14 ASSESSMENT — PAIN DESCRIPTION - LOCATION: LOCATION: FOOT

## 2021-06-14 ASSESSMENT — PAIN DESCRIPTION - ORIENTATION: ORIENTATION: LEFT

## 2021-06-14 ASSESSMENT — PAIN SCALES - GENERAL: PAINLEVEL_OUTOF10: 10

## 2021-06-14 ASSESSMENT — PAIN DESCRIPTION - DESCRIPTORS: DESCRIPTORS: SHARP

## 2021-06-14 ASSESSMENT — PAIN DESCRIPTION - PAIN TYPE: TYPE: ACUTE PAIN

## 2021-06-14 NOTE — ED PROVIDER NOTES
disease), lumbar     Depression     Depression     Disease of gallbladder     Dizziness     DJD (degenerative joint disease) of hip     Edema 12/29/2009    Elbow tendinitis     Essential hypertension 8/19/2019    Facet syndrome     Fibromyalgia     Fracture of nasal bone     GERD (gastroesophageal reflux disease)     Headache     Hiatal hernia     History of blood transfusion     anemia    Hyperlipidemia     Insomnia     Osteoarthritis     Prediabetes     Rash     Self mutilating behavior     cutter pt states she has not done in months- in therapy    Sleep apnea     no CPAP    Suicidal ideation     Thyroid disorder     hypothyroidism    Vertigo          SURGICAL HISTORY       Past Surgical History:   Procedure Laterality Date    ABDOMEN SURGERY  5/28/2014    LAPAROSCOPIC SLEEVE GASTRECTOMY, EXTENSIVE LYSIS OF ADHESIONS    ACHILLES TENDON SURGERY Left 3/9/2020    LEFT CALCANEOUS PARTIAL EXCISION, SECONDARY REPAIR OF ACHILLES TENDON performed by Romero Coronado [facility] at [facility]      open , Ex-lap    CHOLECYSTECTOMY      open    COLONOSCOPY  04/16/2013    dr Arabella Cardona 2018     DILATION AND CURETTAGE OF UTERUS      ENDOSCOPY, COLON, DIAGNOSTIC      FOOT SURGERY Right 05/27/2016    ARTHROPLASTY RIGHT 5TH DIGIT      HYSTEROSCOPY N/A 12-30-13    Hysteroscopy Dilitation and Curettage    JOINT REPLACEMENT Bilateral     knee    OTHER SURGICAL HISTORY  9/11/2015    ARTHROPLASTY 5TH DIGIT LEFT FOOT          SHOULDER SURGERY      right    SHOULDER SURGERY Right     SLEEVE GASTROPLASTY  May 28, 2014    Rosi    TOTAL KNEE ARTHROPLASTY  12/10/10    Right    TOTAL KNEE ARTHROPLASTY  3/30/10    Left    UPPER GASTROINTESTINAL ENDOSCOPY  4/16/2013    dr Maria R Espinal    UPPER GASTROINTESTINAL ENDOSCOPY  3/20/2014    dr Sterling Ozuna       Previous Medications    ARIPIPRAZOLE (ABILIFY) 20 MG TABLET    Take 1 tablet by mouth Neomycin-polymyxin-gramicidin, and Sulfa antibiotics    FAMILY HISTORY       Family History   Problem Relation Age of Onset    Heart Disease Mother     Heart Failure Mother     High Cholesterol Mother     High Blood Pressure Mother     Stroke Mother     Birth Defects Mother     Other Mother         VV    High Blood Pressure Father     COPD Father     Diabetes Brother     Stroke Maternal Grandmother     Arthritis Maternal Grandmother     Heart Failure Brother     Stroke Brother     Birth Defects Maternal Grandfather     Arthritis Paternal Grandmother           SOCIAL HISTORY       Social History     Socioeconomic History    Marital status: Single     Spouse name: None    Number of children: 0    Years of education: 12    Highest education level: None   Occupational History    Occupation: [employer]     Comment: unemployed   Tobacco Use    Smoking status: Never Smoker    Smokeless tobacco: Never Used    Tobacco comment: Never smoked   Vaping Use    Vaping Use: Never used   Substance and Sexual Activity    Alcohol use: No     Alcohol/week: 0.0 standard drinks    Drug use: No    Sexual activity: Not Currently   Other Topics Concern    None   Social History Narrative    Lives by herself with her cat     Social Determinants of Health     Financial Resource Strain:     Difficulty of Paying Living Expenses:    Food Insecurity:     Worried About Running Out of Food in the Last Year:     Ran Out of Food in the Last Year:    Transportation Needs:     Lack of Transportation (Medical):      Lack of Transportation (Non-Medical):    Physical Activity:     Days of Exercise per Week:     Minutes of Exercise per Session:    Stress:     Feeling of Stress :    Social Connections:     Frequency of Communication with Friends and Family:     Frequency of Social Gatherings with Friends and Family:     Attends Yazdanism Services:     Active Member of Clubs or Organizations:     Attends Club or Organization Meetings:     Marital Status:    Intimate Partner Violence:     Fear of Current or Ex-Partner:     Emotionally Abused:     Physically Abused:     Sexually Abused:        SCREENINGS      @FLOW([id number])@      PHYSICAL EXAM    (up to 7 for level 4, 8 or more for level 5)     ED Triage Vitals [06/14/21 0016]   BP Temp Temp Source Pulse Resp SpO2 Height Weight   131/70 97.8 °F (36.6 °C) Oral 75 16 96 % 5' 4\" (1.626 m) (!) 336 lb 2 oz (152.5 kg)       Physical Exam      General Appearance:  Alert, cooperative, no distress, appears stated age. Head:  Normocephalic, without obviousabnormality, atraumatic. Eyes:  conjunctiva/corneas clear, EOM's intact. Sclera anicteric. ENT: Mucous membranes moist.   Neck: Supple, symmetrical, trachea midline, no adenopathy. No jugular venous distention. Lungs:   Clear to auscultation bilaterally, respirationsunlabored. No rales, rhonchi or wheezes. Chest Wall:  No tenderness. Heart:  Regular rate and rhythm, S1 and S2 normal, no murmur, rub or gallop. Abdomen:   Soft, non-tender, bowel sounds active,   no masses, no organomegaly. Extremities: No edema, cords or calf tenderness. No signs of erythema no ulcers no pressure sores no point tenderness Achilles appears to be intact   Pulses: 2+ and symmetric   Skin: Turgor is normal, no rashes or lesions. Neurologic: Alert and oriented X 3. No focal findings. Motor grossly normal.  Speech clear, no drift, CN III-XII grossly intact,        DIAGNOSTIC RESULTS   LABS:    Labs Reviewed - No data to display    All other labs were within normal range or not returned as of this dictation. EKG: All EKG's are interpreted by the Emergency Department Physician who eithersigns or Co-signs this chart in the absence of a cardiologist.        RADIOLOGY:   Non-plain film images such as CT, Ultrasound and MRI are read by the radiologist. Plain radiographic images are visualized by myself.       *    Interpretation per

## 2021-06-18 DIAGNOSIS — R60.0 LEG EDEMA: ICD-10-CM

## 2021-06-18 DIAGNOSIS — R06.09 DYSPNEA ON EXERTION: ICD-10-CM

## 2021-06-18 LAB
ANION GAP SERPL CALCULATED.3IONS-SCNC: 8 MMOL/L (ref 3–16)
BUN BLDV-MCNC: 16 MG/DL (ref 7–20)
CALCIUM SERPL-MCNC: 9.2 MG/DL (ref 8.3–10.6)
CHLORIDE BLD-SCNC: 105 MMOL/L (ref 99–110)
CO2: 29 MMOL/L (ref 21–32)
CREAT SERPL-MCNC: 0.8 MG/DL (ref 0.6–1.2)
GFR AFRICAN AMERICAN: >60
GFR NON-AFRICAN AMERICAN: >60
GLUCOSE BLD-MCNC: 105 MG/DL (ref 70–99)
POTASSIUM SERPL-SCNC: 4.1 MMOL/L (ref 3.5–5.1)
PRO-BNP: 337 PG/ML (ref 0–124)
SODIUM BLD-SCNC: 142 MMOL/L (ref 136–145)

## 2021-06-19 NOTE — PROGRESS NOTES
DIAGNOSIS:    1-Left foot partal Lacey's calcaneus excision, 2ry repair Achillis tendon. 2-Left heel pain/ plantar fasciitis    DATE OF SURGERY: 3/9/2020, Suture bridge    HISTORY OF PRESENT ILLNESS:  Kena Andrea 63 y.o.  female who came in today for worsening left heel pain. She had left heel cortisone injection on 4/9/2021 with no improvement. She had left heel cortisone injection on 10/21/2020 with good improvement. She has been doing the plantar fascia stretching exercises and reports not much improvement. She has completed PT and WB with good improvement in her achilles pain. She rates pain a 0/10 VAS at her surgical site, but rates pain a 10/10 VAS on the bottom of her heel. Pain is worse first steps in the morning and dull achy by the end of the day. She is very happy with the surgery. No numbness or tingling sensation. No fever or Chills. She recently started a new job standing, and [employer]. Since she started working, her pain and the bottom of her left foot is worse.     Past Medical History:   Diagnosis Date    Acquired hyperlipoproteinemia     MARIO (acute kidney injury) ([facility] [address] 9/30/2019    Allergic rhinitis     Anxiety     Arthritis     Bilateral lower extremity edema     Bipolar 1 disorder (HCC)     Chronic pain syndrome     DDD (degenerative disc disease), lumbar     Depression     Depression     Disease of gallbladder     Dizziness     DJD (degenerative joint disease) of hip     Edema 12/29/2009    Elbow tendinitis     Essential hypertension 8/19/2019    Facet syndrome     Fibromyalgia     Fracture of nasal bone     GERD (gastroesophageal reflux disease)     Headache     Hiatal hernia     History of blood transfusion     anemia    Hyperlipidemia     Insomnia     Osteoarthritis     Prediabetes     Rash     Self mutilating behavior     cutter pt states she has not done in months- in therapy    Sleep apnea     no CPAP    Suicidal ideation     Thyroid disorder     hypothyroidism    Vertigo        Past Surgical History:   Procedure Laterality Date    ABDOMEN SURGERY  5/28/2014    LAPAROSCOPIC SLEEVE GASTRECTOMY, EXTENSIVE LYSIS OF ADHESIONS    ACHILLES TENDON SURGERY Left 3/9/2020    LEFT CALCANEOUS PARTIAL EXCISION, SECONDARY REPAIR OF ACHILLES TENDON performed by [facility]      open , Ex-lap    CHOLECYSTECTOMY      open    COLONOSCOPY  04/16/2013    dr Naif Estes 2018     DILATION AND CURETTAGE OF UTERUS      ENDOSCOPY, COLON, DIAGNOSTIC      FOOT SURGERY Right 05/27/2016    ARTHROPLASTY RIGHT 5TH DIGIT      HYSTEROSCOPY N/A 12-30-13    Hysteroscopy Dilitation and Curettage    JOINT REPLACEMENT Bilateral     knee    OTHER SURGICAL HISTORY  9/11/2015    ARTHROPLASTY 5TH DIGIT LEFT FOOT          SHOULDER SURGERY      right    SHOULDER SURGERY Right     SLEEVE GASTROPLASTY  May 28, 2014    Dahman    TOTAL KNEE ARTHROPLASTY  12/10/10    Right    TOTAL KNEE ARTHROPLASTY  3/30/10    Left    UPPER GASTROINTESTINAL ENDOSCOPY  4/16/2013    dr Malka Alvarez ENDOSCOPY  3/20/2014    dr Halima Farias       Social History     Socioeconomic History    Marital status: Single     Spouse name: Not on file    Number of children: 0    Years of education: 12    Highest education level: Not on file   Occupational History    Occupation: [employer]     Comment: unemployed   Tobacco Use    Smoking status: Never Smoker    Smokeless tobacco: Never Used    Tobacco comment: Never smoked   Vaping Use    Vaping Use: Never used   Substance and Sexual Activity    Alcohol use: No     Alcohol/week: 0.0 standard drinks    Drug use: No    Sexual activity: Not Currently   Other Topics Concern    Not on file   Social History Narrative    Lives by herself with her cat     Social Determinants of Health     Financial Resource Strain: Low Risk     Difficulty of Paying Living Expenses: Not very hard   Food Insecurity: No Food Insecurity    Worried About Running Out of Food in the Last Year: Never true    Louis of Food in the Last Year: Never true   Transportation Needs:     Lack of Transportation (Medical):  Lack of Transportation (Non-Medical):    Physical Activity:     Days of Exercise per Week:     Minutes of Exercise per Session:    Stress:     Feeling of Stress :    Social Connections:     Frequency of Communication with Friends and Family:     Frequency of Social Gatherings with Friends and Family:     Attends Baptism Services:     Active Member of Clubs or Organizations:     Attends Club or Organization Meetings:     Marital Status:    Intimate Partner Violence:     Fear of Current or Ex-Partner:     Emotionally Abused:     Physically Abused:     Sexually Abused:        Family History   Problem Relation Age of Onset    Heart Disease Mother     Heart Failure Mother     High Cholesterol Mother     High Blood Pressure Mother     Stroke Mother     Birth Defects Mother     Other Mother         VV    High Blood Pressure Father     COPD Father     Diabetes Brother     Stroke Maternal Grandmother     Arthritis Maternal Grandmother     Heart Failure Brother     Stroke Brother     Birth Defects Maternal Grandfather     Arthritis Paternal Grandmother        Current Outpatient Medications on File Prior to Visit   Medication Sig Dispense Refill    celecoxib (CELEBREX) 200 MG capsule Take 1 capsule by mouth daily 30 capsule 1    pregabalin (LYRICA) 100 MG capsule Take 1 capsule by mouth 3 times daily for 30 days. 90 capsule 0    Compression Stockings MISC by Does not apply route 20mmHg- knee high, closed toe. wear daily.  Fit to size 1 each 0    rOPINIRole (REQUIP) 1 MG tablet TAKE 2 TABLETS BY MOUTH EVERY NIGHT 180 tablet 0    ondansetron (ZOFRAN) 4 MG tablet TAKE 1 TABLET BY MOUTH EVERY 8 HOURS AS NEEDED FOR NAUSEA OR VOMITING 60 tablet 0    vitamin B-12 (CYANOCOBALAMIN) 1000 MCG tablet Take 1 tablet by mouth daily 30 tablet 0    betamethasone dipropionate (DIPROLENE) 0.05 % cream APPLY EXTERNALLY TO THE AFFECTED AREA TWICE DAILY 60 g 0    levothyroxine (SYNTHROID) 88 MCG tablet TAKE 1 TABLET BY MOUTH DAILY 90 tablet 0    tamsulosin (FLOMAX) 0.4 MG capsule TAKE ONE CAPSULE BY MOUTH DAILY AS NEEDED FOR KIDNEY STONE--only needs 30 pills --JJL 30 capsule 0    hydrocortisone (CORTEF) 10 MG tablet Take 1 tablet daily as needed for episodes of weakness and chest pain--use sparingly 30 tablet 0    nitroGLYCERIN (NITROSTAT) 0.4 MG SL tablet Place 1 tablet under the tongue every 5 minutes as needed for Chest pain up to max of 3 total doses. If no relief after 1 dose, call 911. 25 tablet 3    atorvastatin (LIPITOR) 40 MG tablet TAKE 1 TABLET BY MOUTH DAILY (Patient taking differently: TAKE 1 TABLET BY MOUTH NIGHTLY) 90 tablet 0    meclizine (ANTIVERT) 25 MG tablet Take 25 mg by mouth 3 times daily as needed      Cholecalciferol (VITAMIN D3) 125 MCG (5000 UT) TABS Take 1 tablet by mouth 2 times daily      DULoxetine (CYMBALTA) 60 MG extended release capsule Take 1 capsule by mouth 2 times daily      ARIPiprazole (ABILIFY) 20 MG tablet Take 1 tablet by mouth daily      propranolol (INDERAL) 40 MG tablet Take 1 tablet by mouth 3 times daily anxiety      esomeprazole (NEXIUM) 40 MG delayed release capsule TAKE 1 CAPSULE BY MOUTH EVERY MORNING BEFORE BREAKFAST 90 capsule 0    QUEtiapine (SEROQUEL) 200 MG tablet Take 200 mg by mouth nightly       No current facility-administered medications on file prior to visit. Pertinent items are noted in HPI  Review of systems reviewed from Patient History Form dated on 4/9/2021and available in the patient's chart under the Media tab. No change noted. PHYSICAL EXAMINATION:  Ms. Geraldo Montero is a very pleasant 63 y.o.  female who presents today in no acute distress, awake, alert, and oriented.   She is well dressed, nourished and  groomed. Patient with normal affect. Height is  5' 6\" (1.676 m), weight is (!) 326 lb (147.9 kg), Body mass index is 52.62 kg/m². Resting respiratory rate is 16. The patient walks with no limp. The incision healed well . No signs of any erythema or drainage, no swelling. She has no pain with the active or passive range of motion of the left ankle and subtalar, but decrease ROM. She has intact sensation distally, and she is neurovascularly intact. She has good strength in all four planes, including eversion, and has moderate tenderness on deep palpation over the medial calcaneal tubercle, compared to the other side. The ankles are stable to drawer test bilaterally, equally. Good strength, and no instability both upper and lower extremities. IMAGING:  Xray, 2 views left calcaneus taken 10/21/2020 in the officeshowed removal of Lacey's, no acute fracture. IMPRESSION:    1-Left foot partal Lacey's calcaneus excision, 2ry repair Achillis tendon, and doing very well. 2-Left plantar fasciitis. PLAN: She will be WBAT and was instructed to continue to work on ROM and strengthening exercises. We instructed her to stretch the plantar fascia, which was redemonstrated to her today in office. Use soft orthotics, heel cup. Naprosyn. The patient will come back for a follow up in 3-4 months or as needed. Procedures    Visco N Heel Shoe Inserts     Patient was prescribed a Visco N Heel Shoe Insert. The left heel  will require protection / support from this orthosis to improve their function. The orthosis will assist in protecting the affected area, provide functional support and facilitate healing. The patient was educated and fit by a healthcare professional with expert knowledge and specialization in brace application while under the direct supervision of the treating physician.   Verbal and written instructions for the use of and application of this item were provided. They were instructed to contact the office immediately should the brace result in increased pain, decreased sensation, increased swelling or worsening of the condition.        Lizet Mccray MD

## 2021-06-28 DIAGNOSIS — E03.4 HYPOTHYROIDISM DUE TO ACQUIRED ATROPHY OF THYROID: ICD-10-CM

## 2021-06-28 DIAGNOSIS — E78.5 HYPERLIPIDEMIA, UNSPECIFIED HYPERLIPIDEMIA TYPE: ICD-10-CM

## 2021-06-28 DIAGNOSIS — Z00.00 ROUTINE GENERAL MEDICAL EXAMINATION AT A HEALTH CARE FACILITY: ICD-10-CM

## 2021-06-28 LAB
A/G RATIO: 1.6 (ref 1.1–2.2)
ALBUMIN SERPL-MCNC: 4 G/DL (ref 3.4–5)
ALP BLD-CCNC: 142 U/L (ref 40–129)
ALT SERPL-CCNC: 11 U/L (ref 10–40)
ANION GAP SERPL CALCULATED.3IONS-SCNC: 11 MMOL/L (ref 3–16)
AST SERPL-CCNC: 14 U/L (ref 15–37)
BILIRUB SERPL-MCNC: 0.8 MG/DL (ref 0–1)
BUN BLDV-MCNC: 20 MG/DL (ref 7–20)
CALCIUM SERPL-MCNC: 9.4 MG/DL (ref 8.3–10.6)
CHLORIDE BLD-SCNC: 97 MMOL/L (ref 99–110)
CHOLESTEROL, TOTAL: 210 MG/DL (ref 0–199)
CO2: 31 MMOL/L (ref 21–32)
CREAT SERPL-MCNC: 0.8 MG/DL (ref 0.6–1.2)
GFR AFRICAN AMERICAN: >60
GFR NON-AFRICAN AMERICAN: >60
GLOBULIN: 2.5 G/DL
GLUCOSE BLD-MCNC: 94 MG/DL (ref 70–99)
HDLC SERPL-MCNC: 78 MG/DL (ref 40–60)
HEPATITIS C ANTIBODY INTERPRETATION: NORMAL
LDL CHOLESTEROL CALCULATED: 110 MG/DL
POTASSIUM SERPL-SCNC: 3.2 MMOL/L (ref 3.5–5.1)
SODIUM BLD-SCNC: 139 MMOL/L (ref 136–145)
T4 FREE: 1.4 NG/DL (ref 0.9–1.8)
TOTAL PROTEIN: 6.5 G/DL (ref 6.4–8.2)
TRIGL SERPL-MCNC: 108 MG/DL (ref 0–150)
TSH SERPL DL<=0.05 MIU/L-ACNC: 2.89 UIU/ML (ref 0.27–4.2)
VLDLC SERPL CALC-MCNC: 22 MG/DL

## 2021-06-29 LAB
HIV AG/AB: NORMAL
HIV ANTIGEN: NORMAL
HIV-1 ANTIBODY: NORMAL
HIV-2 AB: NORMAL

## 2021-07-01 RX ORDER — ONDANSETRON 4 MG/1
TABLET, FILM COATED ORAL
Qty: 60 TABLET | Refills: 0 | Status: SHIPPED | OUTPATIENT
Start: 2021-07-01 | End: 2021-09-13

## 2021-07-02 ENCOUNTER — OFFICE VISIT (OUTPATIENT)
Dept: PAIN MANAGEMENT | Age: 61
End: 2021-07-02
Payer: COMMERCIAL

## 2021-07-02 VITALS
BODY MASS INDEX: 54.79 KG/M2 | SYSTOLIC BLOOD PRESSURE: 143 MMHG | WEIGHT: 293 LBS | HEART RATE: 85 BPM | TEMPERATURE: 97.3 F | DIASTOLIC BLOOD PRESSURE: 93 MMHG | OXYGEN SATURATION: 95 %

## 2021-07-02 DIAGNOSIS — M54.16 LUMBAR RADICULITIS: ICD-10-CM

## 2021-07-02 DIAGNOSIS — M79.7 FIBROMYALGIA: ICD-10-CM

## 2021-07-02 DIAGNOSIS — M16.0 PRIMARY OSTEOARTHRITIS OF BOTH HIPS: ICD-10-CM

## 2021-07-02 DIAGNOSIS — M47.899 FACET SYNDROME: ICD-10-CM

## 2021-07-02 DIAGNOSIS — M16.11 PRIMARY OSTEOARTHRITIS OF RIGHT HIP: ICD-10-CM

## 2021-07-02 DIAGNOSIS — M77.8 ELBOW TENDINITIS: ICD-10-CM

## 2021-07-02 DIAGNOSIS — M51.36 DDD (DEGENERATIVE DISC DISEASE), LUMBAR: ICD-10-CM

## 2021-07-02 DIAGNOSIS — G89.4 CHRONIC PAIN SYNDROME: ICD-10-CM

## 2021-07-02 PROCEDURE — 99213 OFFICE O/P EST LOW 20 MIN: CPT | Performed by: INTERNAL MEDICINE

## 2021-07-02 RX ORDER — PREGABALIN 100 MG/1
100 CAPSULE ORAL 3 TIMES DAILY
Qty: 90 CAPSULE | Refills: 0 | Status: SHIPPED | OUTPATIENT
Start: 2021-07-02 | End: 2021-07-30 | Stop reason: SDUPTHER

## 2021-07-02 RX ORDER — TRAMADOL HYDROCHLORIDE 50 MG/1
50 TABLET ORAL EVERY 6 HOURS PRN
Qty: 60 TABLET | Refills: 0 | Status: SHIPPED | OUTPATIENT
Start: 2021-07-02 | End: 2021-09-03 | Stop reason: SDUPTHER

## 2021-07-02 NOTE — PROGRESS NOTES
Viji Carnes  1960  [id number]      HISTORY OF PRESENT ILLNESS:  Ms. Abdi Elizondo is a 63 y.o. female returns for a follow up visit for pain management  She has a diagnosis of   1. Chronic pain syndrome    2. Primary osteoarthritis of both hips    3. Fibromyalgia    4. Lumbar radiculitis    5. DDD (degenerative disc disease), lumbar    6. Primary osteoarthritis of right hip    . She complains of pain in the left shoulder, lower back She rates the pain 6/10 and describes it as sharp. Current treatment regimen has helped relieve about 40% of the pain. She denies any side effects from the current pain regimen. Patient reports that since the last follow up visit the physical functioning is unchanged, family/social relationships are unchanged, mood is worse sleep patterns are unchanged, and that the overall functioning is unchanged. Patient denies misusing/abusing her narcotic pain medications or using any illegal drugs. There are No indicators for possible drug abuse, addiction or diversion problems. Patient reports she has been working part time still. She complains her back and left ankle hurts the most. She mentions she is standing mostly at work. She says she is using Ultram as needed along with Lyrica and Celebrex. She denies any side effects with the medication. She states she is has been managing her house chores. ALLERGIES: Patients list of allergies were reviewed     MEDICATIONS: Ms. Abdi Elizondo list of medications were reviewed. Her current medications are   Outpatient Medications Prior to Visit   Medication Sig Dispense Refill    ondansetron (ZOFRAN) 4 MG tablet TAKE 1 TABLET BY MOUTH EVERY 8 HOURS AS NEEDED FOR NAUSEA OR VOMITING 60 tablet 0    potassium chloride (KLOR-CON M) 20 MEQ extended release tablet Take 1 tablet by mouth daily 90 tablet 1    torsemide (DEMADEX) 20 MG tablet TAKE 1 TABLET BY MOUTH TWICE DAILY BEFORE MEALS 180 tablet 0    furosemide (LASIX) 20 MG tablet Take 1 tablet by mouth daily 5 tablet 0    triamcinolone (KENALOG) 0.025 % cream Apply Topically 2x daily 80 g 0    celecoxib (CELEBREX) 200 MG capsule Take 1 capsule by mouth daily 30 capsule 1    Compression Stockings MISC by Does not apply route 20mmHg- knee high, closed toe. wear daily. Fit to size 1 each 0    rOPINIRole (REQUIP) 1 MG tablet TAKE 2 TABLETS BY MOUTH EVERY NIGHT 180 tablet 0    vitamin B-12 (CYANOCOBALAMIN) 1000 MCG tablet Take 1 tablet by mouth daily 30 tablet 0    betamethasone dipropionate (DIPROLENE) 0.05 % cream APPLY EXTERNALLY TO THE AFFECTED AREA TWICE DAILY 60 g 0    levothyroxine (SYNTHROID) 88 MCG tablet TAKE 1 TABLET BY MOUTH DAILY 90 tablet 0    tamsulosin (FLOMAX) 0.4 MG capsule TAKE ONE CAPSULE BY MOUTH DAILY AS NEEDED FOR KIDNEY STONE--only needs 30 pills --JJL 30 capsule 0    hydrocortisone (CORTEF) 10 MG tablet Take 1 tablet daily as needed for episodes of weakness and chest pain--use sparingly 30 tablet 0    nitroGLYCERIN (NITROSTAT) 0.4 MG SL tablet Place 1 tablet under the tongue every 5 minutes as needed for Chest pain up to max of 3 total doses.  If no relief after 1 dose, call 911. 25 tablet 3    atorvastatin (LIPITOR) 40 MG tablet TAKE 1 TABLET BY MOUTH DAILY (Patient taking differently: TAKE 1 TABLET BY MOUTH NIGHTLY) 90 tablet 0    meclizine (ANTIVERT) 25 MG tablet Take 25 mg by mouth 3 times daily as needed      Cholecalciferol (VITAMIN D3) 125 MCG (5000 UT) TABS Take 1 tablet by mouth 2 times daily      DULoxetine (CYMBALTA) 60 MG extended release capsule Take 1 capsule by mouth 2 times daily      ARIPiprazole (ABILIFY) 20 MG tablet Take 1 tablet by mouth daily      propranolol (INDERAL) 40 MG tablet Take 1 tablet by mouth 3 times daily anxiety      esomeprazole (NEXIUM) 40 MG delayed release capsule TAKE 1 CAPSULE BY MOUTH EVERY MORNING BEFORE BREAKFAST 90 capsule 0    QUEtiapine (SEROQUEL) 200 MG tablet Take 200 mg by mouth nightly      pregabalin (LYRICA) 100 MG capsule Take 1 capsule by mouth 3 times daily for 30 days. 90 capsule 0     No facility-administered medications prior to visit. REVIEW OF SYSTEMS:    Respiratory: Negative for apnea, chest tightness and shortness of breath or change in baseline breathing. PHYSICAL EXAM:   Nursing note and vitals reviewed. BP (!) 170/92   Pulse 85   Temp 97.3 °F (36.3 °C) (Temporal)   Wt (!) 319 lb 3.2 oz (144.8 kg)   LMP 01/15/2014   SpO2 95%   BMI 54.79 kg/m²   Constitutional: She appears well-developed and well-nourished. No acute distress. Cardiovascular: Normal rate, regular rhythm, normal heart sounds, and does not have murmur. Pulmonary/Chest: Effort normal. No respiratory distress. She does not have wheezes in the lung fields. She has no rales. Neurological/Psychiatric:She is alert and oriented to person, place, and time. Coordination is  normal.  Her mood isAppropriate and affect is Neutral/Euthymic(normal) . Other: + Erythema LE, + 1 edema  IMPRESSION:   1. Chronic pain syndrome    2. Primary osteoarthritis of both hips    3. Fibromyalgia    4. Lumbar radiculitis    5. DDD (degenerative disc disease), lumbar    6. Primary osteoarthritis of right hip    7. Elbow tendinitis    8. Facet syndrome        PLAN:  Informed verbal consent was obtained  -[place] record was obtained and reviewed  for the last one year and no indicators of drug misuse  were found. Any other controlled substance prescriptions  seen on the record have been accounted for, I am aware of the patient receiving these medications. [place]  [place] record will be rechecked as part of office protocol.    -Continue with Ultram 1-2 per day   -She was advised to increase fluids ( 5-7  glasses of fluid daily), limit caffeine, avoid cheese products, increase dietary fiber, increase activity and exercise as tolerated and relax regularly and enjoy meals   -She was advised weight reduction, diet changes- 800-1200 jessica diet, diet diary, exercising, nutritional  consult increased physical activity as tolerated   -Walking/Stretching exercises as advised   -advised compression stockings to LE   -Urine drug screen with GC/MS for opiates and drugs of abuse was ordered and will follow up on results. Current Outpatient Medications   Medication Sig Dispense Refill    ondansetron (ZOFRAN) 4 MG tablet TAKE 1 TABLET BY MOUTH EVERY 8 HOURS AS NEEDED FOR NAUSEA OR VOMITING 60 tablet 0    potassium chloride (KLOR-CON M) 20 MEQ extended release tablet Take 1 tablet by mouth daily 90 tablet 1    torsemide (DEMADEX) 20 MG tablet TAKE 1 TABLET BY MOUTH TWICE DAILY BEFORE MEALS 180 tablet 0    furosemide (LASIX) 20 MG tablet Take 1 tablet by mouth daily 5 tablet 0    triamcinolone (KENALOG) 0.025 % cream Apply Topically 2x daily 80 g 0    celecoxib (CELEBREX) 200 MG capsule Take 1 capsule by mouth daily 30 capsule 1    Compression Stockings MISC by Does not apply route 20mmHg- knee high, closed toe. wear daily. Fit to size 1 each 0    rOPINIRole (REQUIP) 1 MG tablet TAKE 2 TABLETS BY MOUTH EVERY NIGHT 180 tablet 0    vitamin B-12 (CYANOCOBALAMIN) 1000 MCG tablet Take 1 tablet by mouth daily 30 tablet 0    betamethasone dipropionate (DIPROLENE) 0.05 % cream APPLY EXTERNALLY TO THE AFFECTED AREA TWICE DAILY 60 g 0    levothyroxine (SYNTHROID) 88 MCG tablet TAKE 1 TABLET BY MOUTH DAILY 90 tablet 0    tamsulosin (FLOMAX) 0.4 MG capsule TAKE ONE CAPSULE BY MOUTH DAILY AS NEEDED FOR KIDNEY STONE--only needs 30 pills --JJL 30 capsule 0    hydrocortisone (CORTEF) 10 MG tablet Take 1 tablet daily as needed for episodes of weakness and chest pain--use sparingly 30 tablet 0    nitroGLYCERIN (NITROSTAT) 0.4 MG SL tablet Place 1 tablet under the tongue every 5 minutes as needed for Chest pain up to max of 3 total doses.  If no relief after 1 dose, call 911. 25 tablet 3    atorvastatin (LIPITOR) 40 MG tablet TAKE 1 TABLET BY MOUTH DAILY (Patient taking differently: TAKE 1 TABLET BY MOUTH NIGHTLY) 90 tablet 0    meclizine (ANTIVERT) 25 MG tablet Take 25 mg by mouth 3 times daily as needed      Cholecalciferol (VITAMIN D3) 125 MCG (5000 UT) TABS Take 1 tablet by mouth 2 times daily      DULoxetine (CYMBALTA) 60 MG extended release capsule Take 1 capsule by mouth 2 times daily      ARIPiprazole (ABILIFY) 20 MG tablet Take 1 tablet by mouth daily      propranolol (INDERAL) 40 MG tablet Take 1 tablet by mouth 3 times daily anxiety      esomeprazole (NEXIUM) 40 MG delayed release capsule TAKE 1 CAPSULE BY MOUTH EVERY MORNING BEFORE BREAKFAST 90 capsule 0    QUEtiapine (SEROQUEL) 200 MG tablet Take 200 mg by mouth nightly      pregabalin (LYRICA) 100 MG capsule Take 1 capsule by mouth 3 times daily for 30 days. 90 capsule 0     No current facility-administered medications for this visit. I will continue her current medication regimen  which is part of the above treatment schedule. It has been helping with Ms. Yap's chronic  medical problems which for this visit include:   Diagnoses of Chronic pain syndrome, Primary osteoarthritis of both hips, Fibromyalgia, Lumbar radiculitis, DDD (degenerative disc disease), lumbar, and Primary osteoarthritis of right hip were pertinent to this visit. Risks and benefits of the medications and other alternative treatments  including no treatment were discussed with the patient. The common side effects of these medications were also explained to the patient. Informed verbal consent was obtained. Goals of current treatment regimen include improvement in pain, restoration of functioning- with focus on improvement in physical performance, general activity, work or disability,emotional distress, health care utilization and  decreased medication consumption. Will continue to monitor progress towards achieving/maintaining therapeutic goals with special emphasis on  1. Improvement in perceived interfernce  of pain with ADL's.  Ability to do home exercises independently. Ability to do household chores indoor and/or outdoor work and social and leisure activities. Improve psychosocial and physical functioning. - she is showing progression towards this treatment goal with the current regimen. She was advised against drinking alcohol with the narcotic pain medicines, advised against driving or handling machinery while adjusting the dose of medicines or if having cognitive  issues related to the current medications. Risk of overdose and death, if medicines not taken as prescribed, were also discussed. If the patient develops new symptoms or if the symptoms worsen, the patient should call the office. While transcribing every attempt was made to maintain the accuracy of the note in terms of it's contents,there may have been some errors made inadvertently. Thank you for allowing me to participate in the care of this patient.     Елена Ferguson MD.    Cc: Wilmer Gupta MD

## 2021-07-06 RX ORDER — BETAMETHASONE DIPROPIONATE 0.5 MG/G
CREAM TOPICAL
Qty: 60 G | Refills: 0 | Status: SHIPPED | OUTPATIENT
Start: 2021-07-06 | End: 2021-09-27

## 2021-07-25 ENCOUNTER — HOSPITAL ENCOUNTER (EMERGENCY)
Age: 61
Discharge: HOME | End: 2021-07-25
Attending: EMERGENCY MEDICINE
Payer: COMMERCIAL

## 2021-07-25 ENCOUNTER — APPOINTMENT (OUTPATIENT)
Dept: GENERAL RADIOLOGY | Age: 61
End: 2021-07-25
Payer: COMMERCIAL

## 2021-07-25 VITALS
WEIGHT: 293 LBS | RESPIRATION RATE: 16 BRPM | DIASTOLIC BLOOD PRESSURE: 101 MMHG | SYSTOLIC BLOOD PRESSURE: 191 MMHG | OXYGEN SATURATION: 97 % | HEART RATE: 88 BPM | TEMPERATURE: 98.2 F | BODY MASS INDEX: 56.37 KG/M2

## 2021-07-25 DIAGNOSIS — M79.641 HAND PAIN, RIGHT: Primary | ICD-10-CM

## 2021-07-25 PROCEDURE — 6370000000 HC RX 637 (ALT 250 FOR IP): Performed by: EMERGENCY MEDICINE

## 2021-07-25 PROCEDURE — 99283 EMERGENCY DEPT VISIT LOW MDM: CPT

## 2021-07-25 PROCEDURE — 73130 X-RAY EXAM OF HAND: CPT

## 2021-07-25 RX ORDER — NAPROXEN 500 MG/1
500 TABLET ORAL ONCE
Status: COMPLETED | OUTPATIENT
Start: 2021-07-25 | End: 2021-07-25

## 2021-07-25 RX ADMIN — NAPROXEN 500 MG: 500 TABLET ORAL at 01:05

## 2021-07-25 ASSESSMENT — PAIN DESCRIPTION - LOCATION: LOCATION: HAND

## 2021-07-25 ASSESSMENT — PAIN DESCRIPTION - PAIN TYPE: TYPE: ACUTE PAIN

## 2021-07-25 ASSESSMENT — PAIN DESCRIPTION - ORIENTATION: ORIENTATION: RIGHT

## 2021-07-25 ASSESSMENT — PAIN SCALES - GENERAL: PAINLEVEL_OUTOF10: 6

## 2021-07-25 NOTE — ED PROVIDER NOTES
[address] [facility]  eMERGENCY dEPARTMENT eNCOUnter      Pt Name: Aliya Fox  MRN: [id number]  [place] 1960  Date of evaluation: 7/25/2021  Provider: Bashir Monroy MD  PCP: Anisha Mcmahon MD      CHIEF COMPLAINT       Chief Complaint   Patient presents with    Hand Pain     c/o \"hard callousy thing that floats around my hand\". c/o Pain to Middle right knuckle for the past 2 weeks. Pain has gotten worse. Possibly hit the wall when she was sleeping. Has been using cold compresses at home. HISTORY OFPRESENT ILLNESS   (Location/Symptom, Timing/Onset, Context/Setting, Quality, Duration, Modifying Factors,Severity)  Note limiting factors. Aliya Fox is a 63 y.o. female complains of a hard callus he thing that floats around in her middle finger knuckle and she complains of pain to that for the past 2 weeks says is gotten worse she possibly hit the wall when she was sleeping she has been using Gold compresses at home and intermittently taking Naprosyn she does have history of fibromyalgia    Nursing Notes were all reviewed and agreed with or any disagreements were addressed  in the HPI. REVIEW OF SYSTEMS    (2-9 systems for level 4, 10 or more for level 5)     Review of Systems    Positives and Pertinent negatives as per HPI. Except as noted above in the ROS, all other systems were reviewed andnegative.        PASTMEDICAL HISTORY     Past Medical History:   Diagnosis Date    Acquired hyperlipoproteinemia     MARIO [facility]acute kidney [facility]) ([facility] [address] 9/30/2019    Allergic rhinitis     Anxiety     Arthritis     Bilateral lower extremity edema     Bipolar 1 disorder (HCC)     Chronic pain syndrome     DDD (degenerative disc disease), lumbar     Depression     Depression     Disease of gallbladder     Dizziness     DJD (degenerative joint disease) of hip     Edema 12/29/2009    Elbow tendinitis     Essential hypertension 8/19/2019    Facet syndrome  Fibromyalgia     Fracture of nasal bone     GERD (gastroesophageal reflux disease)     Headache     Hiatal hernia     History of blood transfusion     anemia    Hyperlipidemia     Insomnia     Osteoarthritis     Prediabetes     Rash     Self mutilating behavior     cutter pt states she has not done in months- in therapy    Sleep apnea     no CPAP    Suicidal ideation     Thyroid disorder     hypothyroidism    Vertigo          SURGICAL HISTORY       Past Surgical History:   Procedure Laterality Date    ABDOMEN SURGERY  5/28/2014    LAPAROSCOPIC SLEEVE GASTRECTOMY, EXTENSIVE LYSIS OF ADHESIONS    ACHILLES TENDON SURGERY Left 3/9/2020    LEFT CALCANEOUS PARTIAL EXCISION, SECONDARY REPAIR OF ACHILLES TENDON performed by Heddy Sacks, [facility] at [facility]      open , Ex-lap    CHOLECYSTECTOMY      open    COLONOSCOPY  04/16/2013    dr Ruth Mcneill 2018     DILATION AND CURETTAGE OF UTERUS      ENDOSCOPY, COLON, DIAGNOSTIC      FOOT SURGERY Right 05/27/2016    ARTHROPLASTY RIGHT 5TH DIGIT      HYSTEROSCOPY N/A 12-30-13    Hysteroscopy Dilitation and Curettage    JOINT REPLACEMENT Bilateral     knee    OTHER SURGICAL HISTORY  9/11/2015    ARTHROPLASTY 5TH DIGIT LEFT FOOT          SHOULDER SURGERY      right    SHOULDER SURGERY Right     SLEEVE GASTROPLASTY  May 28, 2014    [facility]    TOTAL KNEE ARTHROPLASTY  12/10/10    Right    TOTAL KNEE ARTHROPLASTY  3/30/10    Left    UPPER GASTROINTESTINAL ENDOSCOPY  4/16/2013    dr Maryanne Domingo ENDOSCOPY  3/20/2014    dr Toyin Matthews       Discharge Medication List as of 7/25/2021  1:01 AM      CONTINUE these medications which have NOT CHANGED    Details   betamethasone dipropionate (DIPROLENE) 0.05 % cream APPLY EXTERNALLY TO THE AFFECTED AREA TWICE DAILY, Disp-60 g, R-0, Normal      pregabalin (LYRICA) 100 MG capsule Take 1 capsule by mouth 3 times daily for 30 days. , Disp-90 capsule, R-0Print      traMADol (ULTRAM) 50 MG tablet Take 1 tablet by mouth every 6 hours as needed for Pain (Max 1-2 per day) for up to 28 days. , Disp-60 tablet, R-0Print      ondansetron (ZOFRAN) 4 MG tablet TAKE 1 TABLET BY MOUTH EVERY 8 HOURS AS NEEDED FOR NAUSEA OR VOMITING, Disp-60 tablet, R-0Normal      potassium chloride (KLOR-CON M) 20 MEQ extended release tablet Take 1 tablet by mouth daily, Disp-90 tablet, R-1Normal      torsemide (DEMADEX) 20 MG tablet TAKE 1 TABLET BY MOUTH TWICE DAILY BEFORE MEALS, Disp-180 tablet, R-0**Patient requests 90 days supply**Normal      furosemide (LASIX) 20 MG tablet Take 1 tablet by mouth daily, Disp-5 tablet, R-0Normal      triamcinolone (KENALOG) 0.025 % cream Apply Topically 2x daily, Disp-80 g, R-0, Normal      celecoxib (CELEBREX) 200 MG capsule Take 1 capsule by mouth daily, Disp-30 capsule, R-1Normal      Compression Stockings MISC Starting Tue 5/25/2021, Disp-1 each, R-0, [id number]- knee high, closed toe. wear daily. Fit to size      rOPINIRole (REQUIP) 1 MG tablet TAKE 2 TABLETS BY MOUTH EVERY NIGHT, Disp-180 tablet, R-0Normal      vitamin B-12 (CYANOCOBALAMIN) 1000 MCG tablet Take 1 tablet by mouth daily, Disp-30 tablet, R-0Normal      levothyroxine (SYNTHROID) 88 MCG tablet TAKE 1 TABLET BY MOUTH DAILY, Disp-90 tablet, R-0Normal      tamsulosin (FLOMAX) 0.4 MG capsule TAKE ONE CAPSULE BY MOUTH DAILY AS NEEDED FOR KIDNEY STONE--only needs 30 pills --JJL, Disp-30 capsule, R-0**Patient requests 90 days supply**Normal      hydrocortisone (CORTEF) 10 MG tablet Take 1 tablet daily as needed for episodes of weakness and chest pain--use sparingly, Disp-30 tablet, R-0Normal      nitroGLYCERIN (NITROSTAT) 0.4 MG SL tablet Place 1 tablet under the tongue every 5 minutes as needed for Chest pain up to max of 3 total doses.  If no relief after 1 dose, call 911., Disp-25 tablet, R-3Normal      atorvastatin (LIPITOR) 40 MG tablet TAKE 1 TABLET BY MOUTH Pulses: 2+ and symmetric   Skin: Turgor is normal, no rashes or lesions. Neurologic: Alert and oriented X 3. No focal findings. Motor grossly normal.  Speech clear, no drift, CN III-XII grossly intact,        DIAGNOSTIC RESULTS   LABS:    Labs Reviewed - No data to display    All other labs were within normal range or not returned as of this dictation. EKG: All EKG's are interpreted by the Emergency Department Physician who eithersigns or Co-signs this chart in the absence of a cardiologist.        RADIOLOGY:   Non-plain film images such as CT, Ultrasound and MRI are read by the radiologist. Plain radiographic images are visualized by myself. *    Interpretation per the Radiologist below, if available at the time of this note:    XR HAND RIGHT (MIN 3 VIEWS)   Final Result     Tiny calcific opacity adjacent to the DIP joint of the second and third    fingers. Compare to prior imaging if available. PROCEDURES   Unless otherwise noted below, none     Procedures    *    CRITICAL CARE TIME   N/A      EMERGENCY DEPARTMENT COURSE and DIFFERENTIALDIAGNOSIS/MDM:   Vitals:    Vitals:    07/25/21 0033   BP: (!) 191/101   Pulse: 88   Resp: 16   Temp: 98.2 °F (36.8 °C)   TempSrc: Oral   SpO2: 97%   Weight: (!) 328 lb 6.4 oz (149 kg)       Patient was given thefollowing medications:  Medications   naproxen (NAPROSYN) tablet 500 mg (500 mg Oral Given 7/25/21 0105)           The patient tolerated their visit well. The patient and / or the familywere informed of the results of any tests, a time was given to answer questions. FINAL IMPRESSION      1.  Hand pain, right          DISPOSITION/PLAN   DISPOSITION Decision To Discharge 07/25/2021 12:55:36 AM  Radiographic  findings not consistent with the area of the patient's discomfort    PATIENT REFERRED TO:  Breonna Card MD  [facility]  [phone]    In 3 days        DISCHARGE MEDICATIONS:  Discharge Medication List as of 7/25/2021  1:01 AM          DISCONTINUED MEDICATIONS:  Discharge Medication List as of 7/25/2021  1:01 AM                 (Please note that portions of this note were completed with a voice recognition program.  Efforts were made to edit the dictations but occasionally words are mis-transcribed.)    Kailee Ross MD (electronically signed)     Kailee Ross MD  07/25/21 [address] [facility] [address] [facility] [address], [place]  07/25/21 [id number] [facility] Faith Hylton MD  07/25/21 [id number]

## 2021-07-26 ENCOUNTER — CARE COORDINATION (OUTPATIENT)
Dept: CARE COORDINATION | Age: 61
End: 2021-07-26

## 2021-07-26 NOTE — CARE COORDINATION
Ambulatory Care Coordination  ED Follow up Call    Reason for ED visit:  Hand pain   Status:     not changed    Did you call your PCP prior to going to the ED? No      Did you receive a discharge instructions from the Emergency Room? Yes  Review of Instructions:     Understands what to report/when to return?:  Yes   Understands discharge instructions?:  Yes   Following discharge instructions?:  Yes   If not why? Will call ortho tomorrow, also has appt on 7/28 w PCP     Are there any new complaints of pain? No  New Pain Meds? N/A    Constipation prophylaxis needed? N/A    If you have a wound is the dressing clean, dry, and intact? N/A  Understands wound care regimen? No    Are there any other complaints/concerns that you wish to tell your provider? FU appts/Provider:    Future Appointments   Date Time Provider Marlin Diaz   7/28/2021  1:00 PM Wander Carnes MD [facility] [place] [facility]   7/30/2021  8:30 AM Rohit Sykes MD [facility]           New Medications?:   No      Medication Reconciliation by phone - No  Understands Medications? Yes  Taking Medications? Yes  Can you swallow your pills? Yes    Any further needs in the home i.e. Equipment?   Not Applicable    Link to services in community?:  N/A   Which services:      Declined ACM

## 2021-07-28 DIAGNOSIS — H54.62 VISION LOSS OF LEFT EYE: ICD-10-CM

## 2021-07-28 DIAGNOSIS — G45.9 TIA (TRANSIENT ISCHEMIC ATTACK): ICD-10-CM

## 2021-07-28 DIAGNOSIS — I10 ESSENTIAL HYPERTENSION: ICD-10-CM

## 2021-07-28 DIAGNOSIS — R60.0 BILATERAL LEG EDEMA: ICD-10-CM

## 2021-07-28 LAB
ANION GAP SERPL CALCULATED.3IONS-SCNC: 11 MMOL/L (ref 3–16)
BUN BLDV-MCNC: 21 MG/DL (ref 7–20)
C-REACTIVE PROTEIN: <3 MG/L (ref 0–5.1)
CALCIUM SERPL-MCNC: 9.2 MG/DL (ref 8.3–10.6)
CHLORIDE BLD-SCNC: 103 MMOL/L (ref 99–110)
CO2: 29 MMOL/L (ref 21–32)
CREAT SERPL-MCNC: 0.9 MG/DL (ref 0.6–1.2)
GFR AFRICAN AMERICAN: >60
GFR NON-AFRICAN AMERICAN: >60
GLUCOSE BLD-MCNC: 91 MG/DL (ref 70–99)
POTASSIUM SERPL-SCNC: 3.9 MMOL/L (ref 3.5–5.1)
SEDIMENTATION RATE, ERYTHROCYTE: 39 MM/HR (ref 0–30)
SODIUM BLD-SCNC: 143 MMOL/L (ref 136–145)

## 2021-07-30 ENCOUNTER — APPOINTMENT (OUTPATIENT)
Dept: CT IMAGING | Age: 61
End: 2021-07-30
Payer: COMMERCIAL

## 2021-07-30 ENCOUNTER — OFFICE VISIT (OUTPATIENT)
Dept: PAIN MANAGEMENT | Age: 61
End: 2021-07-30
Payer: COMMERCIAL

## 2021-07-30 ENCOUNTER — HOSPITAL ENCOUNTER (EMERGENCY)
Age: 61
Discharge: HOME | End: 2021-07-30
Attending: STUDENT IN AN ORGANIZED HEALTH CARE EDUCATION/TRAINING PROGRAM
Payer: COMMERCIAL

## 2021-07-30 VITALS
WEIGHT: 293 LBS | TEMPERATURE: 97.2 F | DIASTOLIC BLOOD PRESSURE: 91 MMHG | SYSTOLIC BLOOD PRESSURE: 164 MMHG | BODY MASS INDEX: 56.64 KG/M2 | HEART RATE: 74 BPM

## 2021-07-30 VITALS
TEMPERATURE: 97.9 F | OXYGEN SATURATION: 96 % | SYSTOLIC BLOOD PRESSURE: 154 MMHG | BODY MASS INDEX: 50.02 KG/M2 | HEIGHT: 64 IN | HEART RATE: 82 BPM | RESPIRATION RATE: 17 BRPM | DIASTOLIC BLOOD PRESSURE: 75 MMHG | WEIGHT: 293 LBS

## 2021-07-30 DIAGNOSIS — G89.4 CHRONIC PAIN SYNDROME: ICD-10-CM

## 2021-07-30 DIAGNOSIS — R89.9 ABNORMAL LABORATORY TEST RESULT: ICD-10-CM

## 2021-07-30 DIAGNOSIS — M72.2 PLANTAR FASCIITIS, RIGHT: ICD-10-CM

## 2021-07-30 DIAGNOSIS — M16.11 PRIMARY OSTEOARTHRITIS OF RIGHT HIP: ICD-10-CM

## 2021-07-30 DIAGNOSIS — M54.16 LUMBAR RADICULITIS: ICD-10-CM

## 2021-07-30 DIAGNOSIS — M47.899 FACET SYNDROME: ICD-10-CM

## 2021-07-30 DIAGNOSIS — M77.8 ELBOW TENDINITIS: ICD-10-CM

## 2021-07-30 DIAGNOSIS — H53.9 VISUAL DISTURBANCE: Primary | ICD-10-CM

## 2021-07-30 DIAGNOSIS — M16.0 PRIMARY OSTEOARTHRITIS OF BOTH HIPS: ICD-10-CM

## 2021-07-30 DIAGNOSIS — M79.7 FIBROMYALGIA: ICD-10-CM

## 2021-07-30 DIAGNOSIS — M51.36 DDD (DEGENERATIVE DISC DISEASE), LUMBAR: ICD-10-CM

## 2021-07-30 LAB
ANION GAP SERPL CALCULATED.3IONS-SCNC: 6 MMOL/L (ref 3–16)
BASOPHILS ABSOLUTE: 0.1 K/UL (ref 0–0.2)
BASOPHILS RELATIVE PERCENT: 0.7 %
BUN BLDV-MCNC: 23 MG/DL (ref 7–20)
CALCIUM SERPL-MCNC: 9.4 MG/DL (ref 8.3–10.6)
CHLORIDE BLD-SCNC: 104 MMOL/L (ref 99–110)
CO2: 31 MMOL/L (ref 21–32)
CREAT SERPL-MCNC: 0.9 MG/DL (ref 0.6–1.2)
EKG ATRIAL RATE: 67 BPM
EKG DIAGNOSIS: NORMAL
EKG P AXIS: 46 DEGREES
EKG P-R INTERVAL: 158 MS
EKG Q-T INTERVAL: 412 MS
EKG QRS DURATION: 82 MS
EKG QTC CALCULATION (BAZETT): 435 MS
EKG R AXIS: 29 DEGREES
EKG T AXIS: 28 DEGREES
EKG VENTRICULAR RATE: 67 BPM
EOSINOPHILS ABSOLUTE: 0.3 K/UL (ref 0–0.6)
EOSINOPHILS RELATIVE PERCENT: 3.2 %
GFR AFRICAN AMERICAN: >60
GFR NON-AFRICAN AMERICAN: >60
GLUCOSE BLD-MCNC: 99 MG/DL (ref 70–99)
HCT VFR BLD CALC: 36.4 % (ref 36–48)
HEMOGLOBIN: 11.7 G/DL (ref 12–16)
LYMPHOCYTES ABSOLUTE: 1.8 K/UL (ref 1–5.1)
LYMPHOCYTES RELATIVE PERCENT: 23 %
MCH RBC QN AUTO: 29.8 PG (ref 26–34)
MCHC RBC AUTO-ENTMCNC: 32.1 G/DL (ref 31–36)
MCV RBC AUTO: 92.9 FL (ref 80–100)
MONOCYTES ABSOLUTE: 0.5 K/UL (ref 0–1.3)
MONOCYTES RELATIVE PERCENT: 5.6 %
NEUTROPHILS ABSOLUTE: 5.4 K/UL (ref 1.7–7.7)
NEUTROPHILS RELATIVE PERCENT: 67.5 %
PDW BLD-RTO: 18 % (ref 12.4–15.4)
PLATELET # BLD: 217 K/UL (ref 135–450)
PMV BLD AUTO: 8.6 FL (ref 5–10.5)
POTASSIUM REFLEX MAGNESIUM: 4.2 MMOL/L (ref 3.5–5.1)
RBC # BLD: 3.91 M/UL (ref 4–5.2)
SODIUM BLD-SCNC: 141 MMOL/L (ref 136–145)
WBC # BLD: 8 K/UL (ref 4–11)

## 2021-07-30 PROCEDURE — 80048 BASIC METABOLIC PNL TOTAL CA: CPT

## 2021-07-30 PROCEDURE — 6360000004 HC RX CONTRAST MEDICATION: Performed by: STUDENT IN AN ORGANIZED HEALTH CARE EDUCATION/TRAINING PROGRAM

## 2021-07-30 PROCEDURE — 99213 OFFICE O/P EST LOW 20 MIN: CPT | Performed by: INTERNAL MEDICINE

## 2021-07-30 PROCEDURE — 93005 ELECTROCARDIOGRAM TRACING: CPT | Performed by: STUDENT IN AN ORGANIZED HEALTH CARE EDUCATION/TRAINING PROGRAM

## 2021-07-30 PROCEDURE — 36415 COLL VENOUS BLD VENIPUNCTURE: CPT

## 2021-07-30 PROCEDURE — 99283 EMERGENCY DEPT VISIT LOW MDM: CPT

## 2021-07-30 PROCEDURE — 85025 COMPLETE CBC W/AUTO DIFF WBC: CPT

## 2021-07-30 PROCEDURE — 70496 CT ANGIOGRAPHY HEAD: CPT

## 2021-07-30 PROCEDURE — 70450 CT HEAD/BRAIN W/O DYE: CPT

## 2021-07-30 RX ORDER — PREGABALIN 100 MG/1
100 CAPSULE ORAL 3 TIMES DAILY
Qty: 90 CAPSULE | Refills: 0 | Status: SHIPPED | OUTPATIENT
Start: 2021-07-30 | End: 2021-09-03 | Stop reason: SDUPTHER

## 2021-07-30 RX ADMIN — IOPAMIDOL 80 ML: 755 INJECTION, SOLUTION INTRAVENOUS at 20:36

## 2021-07-30 ASSESSMENT — ENCOUNTER SYMPTOMS
SHORTNESS OF BREATH: 0
VOICE CHANGE: 0
PHOTOPHOBIA: 0
EYE PAIN: 0
NAUSEA: 0
COUGH: 0
VOMITING: 0
DIARRHEA: 0

## 2021-07-30 NOTE — ED PROVIDER NOTES
[address] [facility] RESIDENT NOTE       Date of evaluation: 7/30/2021    Chief Complaint     Vision disturbances    of Present Illness     Elinor Mccord is a 63 y.o. female with PMHx of HTN, hypothyroidism who presented to ED for evaluation of left eye visual disturbances. Pt reports for past week she has had intermittent episodes of left medial vision \"going out\" where it \"looks like skin\". The rest of her vision in the left eye gets blurred with this as well. This lasts for a few minutes then resolves completely. She has had a few episodes over past week then had 2 episodes today. No preceding trauma or foreign bodies. She was evaluated at PCP who obtained ESR (39), CRP (nl), and ordered echo. She also saw ophthalmology who was concerned for possible TIA and advised to go to ED if if her episodes worsened. Pt relates she had some tingling in her right arm transiently with one of the episodes. Denies any other focal neuro deficits. She had some mild frontal HA intermittently but no exacerbation with chewing/jaw usage. Review of Systems     Review of Systems   Constitutional: Negative for chills and fever. HENT: Negative for congestion and voice change. Eyes: Positive for visual disturbance. Negative for photophobia and pain. Respiratory: Negative for cough and shortness of breath. Cardiovascular: Negative for chest pain and leg swelling. Gastrointestinal: Negative for diarrhea, nausea and vomiting. Endocrine: Negative for polydipsia and polyuria. Genitourinary: Negative for dysuria, enuresis and flank pain. Musculoskeletal: Negative for neck pain. Skin: Negative for rash and wound. Neurological: Positive for headaches. Negative for syncope, weakness and light-headedness. A complete ROS was performed and is otherwise negative.    Past Medical, Surgical, Family, and Social History     She has a past medical history of Acquired hyperlipoproteinemia, MARIO (acute kidney injury) ([facility] [address] Allergic rhinitis, Anxiety, Arthritis, Bilateral lower extremity edema, Bipolar 1 disorder (HCC), Chronic pain syndrome, DDD (degenerative disc disease), lumbar, Depression, Depression, Disease of gallbladder, Dizziness, DJD (degenerative joint disease) of hip, Edema, Elbow tendinitis, Essential hypertension, Facet syndrome, Fibromyalgia, Fracture of nasal bone, GERD (gastroesophageal reflux disease), Headache, Hiatal hernia, History of blood transfusion, Hyperlipidemia, Insomnia, Osteoarthritis, Prediabetes, Rash, Self mutilating behavior, Sleep apnea, Suicidal ideation, Thyroid disorder, and Vertigo. She has a past surgical history that includes Ankle surgery; Appendectomy; Total knee arthroplasty (12/10/10); Total knee arthroplasty (3/30/10); shoulder surgery; Colonoscopy (04/16/2013); Upper gastrointestinal endoscopy (4/16/2013); Cholecystectomy; hysteroscopy (N/A, 12-30-13); Upper gastrointestinal endoscopy (3/20/2014); Dilation and curettage of uterus; joint replacement (Bilateral); Abdomen surgery (5/28/2014); Sleeve Gastroplasty (May 28, 2014); Endoscopy, colon, diagnostic; shoulder surgery (Right); Ankle surgery; other surgical history (9/11/2015); Foot surgery (Right, 05/27/2016); and Achilles tendon surgery (Left, 3/9/2020). Her family history includes Arthritis in her maternal grandmother and paternal grandmother; Birth Defects in her maternal grandfather and mother; COPD in her father; Diabetes in her brother; Heart Disease in her mother; Heart Failure in her brother and mother; High Blood Pressure in her father and mother; High Cholesterol in her mother; Other in her mother; Stroke in her brother, maternal grandmother, and mother. She reports that she has never smoked. She has never used smokeless tobacco. She reports that she does not drink alcohol and does not use drugs.     Medications     Previous Medications    ARIPIPRAZOLE (ABILIFY) 20 MG TABLET    Take 1 tablet by mouth daily    ATORVASTATIN (LIPITOR) 40 MG TABLET    TAKE 1 TABLET BY MOUTH DAILY    BETAMETHASONE DIPROPIONATE (DIPROLENE) 0.05 % CREAM    APPLY EXTERNALLY TO THE AFFECTED AREA TWICE DAILY    CELECOXIB (CELEBREX) 200 MG CAPSULE    Take 1 capsule by mouth daily    CHOLECALCIFEROL (VITAMIN D3) 125 MCG (5000 UT) TABS    Take 1 tablet by mouth 2 times daily    COMPRESSION STOCKINGS MISC    by Does not apply route 20mmHg- knee high, closed toe. wear daily. Fit to size    DULOXETINE (CYMBALTA) 60 MG EXTENDED RELEASE CAPSULE    Take 1 capsule by mouth 2 times daily    ESOMEPRAZOLE (NEXIUM) 40 MG DELAYED RELEASE CAPSULE    TAKE 1 CAPSULE BY MOUTH EVERY MORNING BEFORE BREAKFAST    FUROSEMIDE (LASIX) 20 MG TABLET    Take 1 tablet by mouth daily    HYDROCORTISONE (CORTEF) 10 MG TABLET    Take 1 tablet daily as needed for episodes of weakness and chest pain--use sparingly    LEVOTHYROXINE (SYNTHROID) 88 MCG TABLET    TAKE 1 TABLET BY MOUTH DAILY    MECLIZINE (ANTIVERT) 25 MG TABLET    Take 25 mg by mouth 3 times daily as needed    NITROGLYCERIN (NITROSTAT) 0.4 MG SL TABLET    Place 1 tablet under the tongue every 5 minutes as needed for Chest pain up to max of 3 total doses. If no relief after 1 dose, call 911. ONDANSETRON (ZOFRAN) 4 MG TABLET    TAKE 1 TABLET BY MOUTH EVERY 8 HOURS AS NEEDED FOR NAUSEA OR VOMITING    POTASSIUM CHLORIDE (KLOR-CON M) 20 MEQ EXTENDED RELEASE TABLET    Take 1 tablet by mouth daily    PREGABALIN (LYRICA) 100 MG CAPSULE    Take 1 capsule by mouth 3 times daily for 30 days.     PROPRANOLOL (INDERAL) 40 MG TABLET    Take 1 tablet by mouth 3 times daily anxiety    QUETIAPINE (SEROQUEL) 200 MG TABLET    Take 200 mg by mouth nightly    ROPINIROLE (REQUIP) 1 MG TABLET    TAKE 2 TABLETS BY MOUTH EVERY NIGHT    TAMSULOSIN (FLOMAX) 0.4 MG CAPSULE    TAKE ONE CAPSULE BY MOUTH DAILY AS NEEDED FOR KIDNEY STONE--only needs 30 pills --JJL    TORSEMIDE (DEMADEX) 20 MG TABLET    TAKE 1 TABLET BY MOUTH TWICE DAILY BEFORE MEALS    TRAMADOL (ULTRAM) 50 MG TABLET    Take 1 tablet by mouth every 6 hours as needed for Pain (Max 1-2 per day) for up to 28 days. TRIAMCINOLONE (KENALOG) 0.025 % CREAM    Apply Topically 2x daily    VITAMIN B-12 (CYANOCOBALAMIN) 1000 MCG TABLET    Take 1 tablet by mouth daily       Allergies     She is allergic to polysporin [bacitracin-polymyxin b], lorazepam, neomycin-polymyxin-gramicidin, and sulfa antibiotics. Physical Exam     INITIAL VITALS: BP: (!) 154/75, Temp: 97.9 °F (36.6 °C), Pulse: 82, Resp: 17, SpO2: 96 %   Physical Exam  Vitals and nursing note reviewed. Constitutional:       General: She is not in acute distress. HENT:      Head: Normocephalic and atraumatic. Nose: Nose normal.      Mouth/Throat:      Mouth: Mucous membranes are moist.   Eyes:      Extraocular Movements: Extraocular movements intact. Conjunctiva/sclera: Conjunctivae normal.      Pupils: Pupils are equal, round, and reactive to light. Comments: No conjunctival erythema or chemosis. Cardiovascular:      Rate and Rhythm: Normal rate and regular rhythm. Heart sounds: Normal heart sounds. No murmur heard. Pulmonary:      Effort: Pulmonary effort is normal. No respiratory distress. Breath sounds: Normal breath sounds. No wheezing or rales. Abdominal:      General: There is no distension. Palpations: Abdomen is soft. Tenderness: There is no abdominal tenderness. There is no guarding. Musculoskeletal:      Right lower leg: No edema. Left lower leg: No edema. Skin:     General: Skin is warm and dry. Neurological:      General: No focal deficit present. Mental Status: She is alert and oriented to person, place, and time. Cranial Nerves: No cranial nerve deficit. Sensory: No sensory deficit. Motor: No weakness.       Coordination: Coordination normal.         DiagnosticResults     EKG   Interpreted in conjunction with emergencydepartment physician No att. providers found  NSR, rate 67. Normal axis. Normal intervals. No ST/T wave changes. RADIOLOGY:  CT HEAD WO CONTRAST    (Results Pending)   CTA HEAD NECK W CONTRAST    (Results Pending)       LABS:   Results for orders placed or performed during the hospital encounter of 07/30/21   CBC Auto Differential   Result Value Ref Range    WBC 8.0 4.0 - 11.0 K/uL    RBC 3.91 (L) 4.00 - 5.20 M/uL    Hemoglobin 11.7 (L) 12.0 - 16.0 g/dL    Hematocrit 36.4 36.0 - 48.0 %    MCV 92.9 80.0 - 100.0 fL    MCH 29.8 26.0 - 34.0 pg    MCHC 32.1 31.0 - 36.0 g/dL    RDW 18.0 (H) 12.4 - 15.4 %    Platelets 008 017 - 708 K/uL    MPV 8.6 5.0 - 10.5 fL    Neutrophils % 67.5 %    Lymphocytes % 23.0 %    Monocytes % 5.6 %    Eosinophils % 3.2 %    Basophils % 0.7 %    Neutrophils Absolute 5.4 1.7 - 7.7 K/uL    Lymphocytes Absolute 1.8 1.0 - 5.1 K/uL    Monocytes Absolute 0.5 0.0 - 1.3 K/uL    Eosinophils Absolute 0.3 0.0 - 0.6 K/uL    Basophils Absolute 0.1 0.0 - 0.2 K/uL   Basic Metabolic Panel w/ Reflex to MG   Result Value Ref Range    Sodium 141 136 - 145 mmol/L    Potassium reflex Magnesium 4.2 3.5 - 5.1 mmol/L    Chloride 104 99 - 110 mmol/L    CO2 31 21 - 32 mmol/L    Anion Gap 6 3 - 16    Glucose 99 70 - 99 mg/dL    BUN 23 (H) 7 - 20 mg/dL    CREATININE 0.9 0.6 - 1.2 mg/dL    GFR Non-African American >60 >60    GFR African American >60 >60    Calcium 9.4 8.3 - 10.6 mg/dL   EKG 12 Lead   Result Value Ref Range    Ventricular Rate 67 BPM    Atrial Rate 67 BPM    P-R Interval 158 ms    QRS Duration 82 ms    Q-T Interval 412 ms    QTc Calculation (Bazett) 435 ms    P Axis 46 degrees    R Axis 29 degrees    T Axis 28 degrees    Diagnosis       EKG performed in ER and to be interpreted by ER physician. Confirmed by MD, ER ([id number]),  Tila Ho ([id number]) on 7/30/2021 7:18:48 PM           RECENT VITALS:  BP: (!) 154/75, Temp: 97.9 °F (36.6 °C), Pulse: 82,Resp: 17, SpO2: 96 %       ED Course

## 2021-07-30 NOTE — PROGRESS NOTES
needed for Pain (Max 1-2 per day) for up to 28 days. 60 tablet 0    ondansetron (ZOFRAN) 4 MG tablet TAKE 1 TABLET BY MOUTH EVERY 8 HOURS AS NEEDED FOR NAUSEA OR VOMITING 60 tablet 0    potassium chloride (KLOR-CON M) 20 MEQ extended release tablet Take 1 tablet by mouth daily 90 tablet 1    torsemide (DEMADEX) 20 MG tablet TAKE 1 TABLET BY MOUTH TWICE DAILY BEFORE MEALS 180 tablet 0    furosemide (LASIX) 20 MG tablet Take 1 tablet by mouth daily 5 tablet 0    triamcinolone (KENALOG) 0.025 % cream Apply Topically 2x daily 80 g 0    celecoxib (CELEBREX) 200 MG capsule Take 1 capsule by mouth daily 30 capsule 1    Compression Stockings MISC by Does not apply route 20mmHg- knee high, closed toe. wear daily. Fit to size 1 each 0    rOPINIRole (REQUIP) 1 MG tablet TAKE 2 TABLETS BY MOUTH EVERY NIGHT 180 tablet 0    vitamin B-12 (CYANOCOBALAMIN) 1000 MCG tablet Take 1 tablet by mouth daily 30 tablet 0    levothyroxine (SYNTHROID) 88 MCG tablet TAKE 1 TABLET BY MOUTH DAILY 90 tablet 0    tamsulosin (FLOMAX) 0.4 MG capsule TAKE ONE CAPSULE BY MOUTH DAILY AS NEEDED FOR KIDNEY STONE--only needs 30 pills --JJL 30 capsule 0    hydrocortisone (CORTEF) 10 MG tablet Take 1 tablet daily as needed for episodes of weakness and chest pain--use sparingly 30 tablet 0    nitroGLYCERIN (NITROSTAT) 0.4 MG SL tablet Place 1 tablet under the tongue every 5 minutes as needed for Chest pain up to max of 3 total doses.  If no relief after 1 dose, call 911. 25 tablet 3    atorvastatin (LIPITOR) 40 MG tablet TAKE 1 TABLET BY MOUTH DAILY (Patient taking differently: TAKE 1 TABLET BY MOUTH NIGHTLY) 90 tablet 0    meclizine (ANTIVERT) 25 MG tablet Take 25 mg by mouth 3 times daily as needed      Cholecalciferol (VITAMIN D3) 125 MCG (5000 UT) TABS Take 1 tablet by mouth 2 times daily      DULoxetine (CYMBALTA) 60 MG extended release capsule Take 1 capsule by mouth 2 times daily      ARIPiprazole (ABILIFY) 20 MG tablet Take 1 tablet by mouth daily      propranolol (INDERAL) 40 MG tablet Take 1 tablet by mouth 3 times daily anxiety      esomeprazole (NEXIUM) 40 MG delayed release capsule TAKE 1 CAPSULE BY MOUTH EVERY MORNING BEFORE BREAKFAST 90 capsule 0    QUEtiapine (SEROQUEL) 200 MG tablet Take 200 mg by mouth nightly       No facility-administered medications prior to visit. REVIEW OF SYSTEMS:    Respiratory: Negative for apnea, chest tightness and shortness of breath or change in baseline breathing. PHYSICAL EXAM:   Nursing note and vitals reviewed. BP (!) 164/91   Pulse 74   Temp 97.2 °F (36.2 °C)   Wt (!) 330 lb (149.7 kg)   LMP 01/15/2014   BMI 56.64 kg/m²   Constitutional: She appears well-developed and well-nourished. No acute distress. Cardiovascular: Normal rate, regular rhythm, normal heart sounds, and does not have murmur. Pulmonary/Chest: Effort normal. No respiratory distress. She does not have wheezes in the lung fields. She has no rales. Neurological/Psychiatric:She is alert and oriented to person, place, and time. Coordination is  normal.  Her mood isAppropriate and affect is Neutral/Euthymic(normal) . Her  Other: trace edema     IMPRESSION:   1. Chronic pain syndrome    2. Primary osteoarthritis of both hips    3. Fibromyalgia    4. Primary osteoarthritis of right hip    5. Plantar fasciitis, right    6. Facet syndrome    7. DDD (degenerative disc disease), lumbar    8. Lumbar radiculitis    9. Elbow tendinitis        PLAN:  Informed verbal consent was obtained  -Interm history reviewed    -Labs reviewed   -Hold Celebrex due too Vascular issues  -She states she has some Ultram left from before, uses it very seldom  -Will call if she runs out of Tramadol   -Urine drug screen with GC/MS confirmation for opiates and drugs of abuse was reviewed and the results are negative for drugs of abuse and the prescribed medications were absent.   She uses it PRN only  -[place] record was obtained and reviewed for the last one year and no indicators of drug misuse  were found. Any other controlled substance prescriptions  seen on the record have been accounted for, I am aware of the patient receiving these medications. Raul PIMENTEL record will be rechecked as part of office protocol. Current Outpatient Medications   Medication Sig Dispense Refill    betamethasone dipropionate (DIPROLENE) 0.05 % cream APPLY EXTERNALLY TO THE AFFECTED AREA TWICE DAILY 60 g 0    pregabalin (LYRICA) 100 MG capsule Take 1 capsule by mouth 3 times daily for 30 days. 90 capsule 0    traMADol (ULTRAM) 50 MG tablet Take 1 tablet by mouth every 6 hours as needed for Pain (Max 1-2 per day) for up to 28 days. 60 tablet 0    ondansetron (ZOFRAN) 4 MG tablet TAKE 1 TABLET BY MOUTH EVERY 8 HOURS AS NEEDED FOR NAUSEA OR VOMITING 60 tablet 0    potassium chloride (KLOR-CON M) 20 MEQ extended release tablet Take 1 tablet by mouth daily 90 tablet 1    torsemide (DEMADEX) 20 MG tablet TAKE 1 TABLET BY MOUTH TWICE DAILY BEFORE MEALS 180 tablet 0    furosemide (LASIX) 20 MG tablet Take 1 tablet by mouth daily 5 tablet 0    triamcinolone (KENALOG) 0.025 % cream Apply Topically 2x daily 80 g 0    celecoxib (CELEBREX) 200 MG capsule Take 1 capsule by mouth daily 30 capsule 1    Compression Stockings MISC by Does not apply route 20mmHg- knee high, closed toe. wear daily.  Fit to size 1 each 0    rOPINIRole (REQUIP) 1 MG tablet TAKE 2 TABLETS BY MOUTH EVERY NIGHT 180 tablet 0    vitamin B-12 (CYANOCOBALAMIN) 1000 MCG tablet Take 1 tablet by mouth daily 30 tablet 0    levothyroxine (SYNTHROID) 88 MCG tablet TAKE 1 TABLET BY MOUTH DAILY 90 tablet 0    tamsulosin (FLOMAX) 0.4 MG capsule TAKE ONE CAPSULE BY MOUTH DAILY AS NEEDED FOR KIDNEY STONE--only needs 30 pills --JJL 30 capsule 0    hydrocortisone (CORTEF) 10 MG tablet Take 1 tablet daily as needed for episodes of weakness and chest pain--use sparingly 30 tablet 0    nitroGLYCERIN (NITROSTAT) 0.4 MG decreased medication consumption. Will continue to monitor progress towards achieving/maintaining therapeutic goals with special emphasis on  1. Improvement in perceived interfernce  of pain with ADL's. Ability to do home exercises independently. Ability to do household chores indoor and/or outdoor work and social and leisure activities. Improve psychosocial and physical functioning. - she is showing progression towards this treatment goal with the current regimen. She was advised against drinking alcohol with the narcotic pain medicines, advised against driving or handling machinery while adjusting the dose of medicines or if having cognitive  issues related to the current medications. Risk of overdose and death, if medicines not taken as prescribed, were also discussed. If the patient develops new symptoms or if the symptoms worsen, the patient should call the office. While transcribing every attempt was made to maintain the accuracy of the note in terms of it's contents,there may have been some errors made inadvertently. Thank you for allowing me to participate in the care of this patient.     Ying Payne MD.    Cc: Imelda Bermudez MD

## 2021-07-31 NOTE — ED NOTES
Patient prepared for and ready to be discharged. Patient discharged at this time in no acute distress after verbalizing understanding of discharge instructions. Patient left after receiving After Visit Summary instructions.       Leoncio Lucas RN  07/30/21 0828

## 2021-08-02 ENCOUNTER — APPOINTMENT (OUTPATIENT)
Dept: CT IMAGING | Age: 61
End: 2021-08-02
Payer: COMMERCIAL

## 2021-08-02 ENCOUNTER — CARE COORDINATION (OUTPATIENT)
Dept: CARE COORDINATION | Age: 61
End: 2021-08-02

## 2021-08-02 ENCOUNTER — APPOINTMENT (OUTPATIENT)
Dept: GENERAL RADIOLOGY | Age: 61
End: 2021-08-02
Payer: COMMERCIAL

## 2021-08-02 ENCOUNTER — HOSPITAL ENCOUNTER (OUTPATIENT)
Age: 61
Setting detail: OBSERVATION
Discharge: HOME | End: 2021-08-04
Attending: EMERGENCY MEDICINE | Admitting: INTERNAL MEDICINE
Payer: COMMERCIAL

## 2021-08-02 DIAGNOSIS — I63.9 CEREBROVASCULAR ACCIDENT (CVA), UNSPECIFIED MECHANISM (HCC): Primary | ICD-10-CM

## 2021-08-02 LAB
A/G RATIO: 1.3 (ref 1.1–2.2)
ALBUMIN SERPL-MCNC: 3.7 G/DL (ref 3.4–5)
ALP BLD-CCNC: 140 U/L (ref 40–129)
ALT SERPL-CCNC: 15 U/L (ref 10–40)
ANION GAP SERPL CALCULATED.3IONS-SCNC: 12 MMOL/L (ref 3–16)
AST SERPL-CCNC: 16 U/L (ref 15–37)
BASOPHILS ABSOLUTE: 0 K/UL (ref 0–0.2)
BASOPHILS RELATIVE PERCENT: 0.5 %
BILIRUB SERPL-MCNC: 0.4 MG/DL (ref 0–1)
BUN BLDV-MCNC: 16 MG/DL (ref 7–20)
CALCIUM SERPL-MCNC: 8.7 MG/DL (ref 8.3–10.6)
CHLORIDE BLD-SCNC: 98 MMOL/L (ref 99–110)
CO2: 27 MMOL/L (ref 21–32)
CREAT SERPL-MCNC: 0.9 MG/DL (ref 0.6–1.2)
EOSINOPHILS ABSOLUTE: 0.3 K/UL (ref 0–0.6)
EOSINOPHILS RELATIVE PERCENT: 4.3 %
GFR AFRICAN AMERICAN: >60
GFR NON-AFRICAN AMERICAN: >60
GLOBULIN: 2.8 G/DL
GLUCOSE BLD-MCNC: 112 MG/DL (ref 70–99)
HCT VFR BLD CALC: 35.5 % (ref 36–48)
HEMOGLOBIN: 11.6 G/DL (ref 12–16)
INR BLD: 0.95 (ref 0.88–1.12)
LYMPHOCYTES ABSOLUTE: 1.5 K/UL (ref 1–5.1)
LYMPHOCYTES RELATIVE PERCENT: 19.9 %
MCH RBC QN AUTO: 29.8 PG (ref 26–34)
MCHC RBC AUTO-ENTMCNC: 32.7 G/DL (ref 31–36)
MCV RBC AUTO: 91.2 FL (ref 80–100)
MONOCYTES ABSOLUTE: 0.6 K/UL (ref 0–1.3)
MONOCYTES RELATIVE PERCENT: 7.4 %
NEUTROPHILS ABSOLUTE: 5.2 K/UL (ref 1.7–7.7)
NEUTROPHILS RELATIVE PERCENT: 67.9 %
PDW BLD-RTO: 17.8 % (ref 12.4–15.4)
PLATELET # BLD: 217 K/UL (ref 135–450)
PMV BLD AUTO: 9 FL (ref 5–10.5)
POTASSIUM SERPL-SCNC: 3.8 MMOL/L (ref 3.5–5.1)
PROTHROMBIN TIME: 10.7 SEC (ref 9.9–12.7)
RBC # BLD: 3.9 M/UL (ref 4–5.2)
SODIUM BLD-SCNC: 137 MMOL/L (ref 136–145)
TOTAL PROTEIN: 6.5 G/DL (ref 6.4–8.2)
TROPONIN: <0.01 NG/ML
WBC # BLD: 7.6 K/UL (ref 4–11)

## 2021-08-02 PROCEDURE — 6360000004 HC RX CONTRAST MEDICATION: Performed by: EMERGENCY MEDICINE

## 2021-08-02 PROCEDURE — 85025 COMPLETE CBC W/AUTO DIFF WBC: CPT

## 2021-08-02 PROCEDURE — 99283 EMERGENCY DEPT VISIT LOW MDM: CPT

## 2021-08-02 PROCEDURE — 80053 COMPREHEN METABOLIC PANEL: CPT

## 2021-08-02 PROCEDURE — 85610 PROTHROMBIN TIME: CPT

## 2021-08-02 PROCEDURE — 36415 COLL VENOUS BLD VENIPUNCTURE: CPT

## 2021-08-02 PROCEDURE — 93005 ELECTROCARDIOGRAM TRACING: CPT | Performed by: EMERGENCY MEDICINE

## 2021-08-02 PROCEDURE — 70450 CT HEAD/BRAIN W/O DYE: CPT

## 2021-08-02 PROCEDURE — 70496 CT ANGIOGRAPHY HEAD: CPT

## 2021-08-02 PROCEDURE — 84484 ASSAY OF TROPONIN QUANT: CPT

## 2021-08-02 PROCEDURE — 71045 X-RAY EXAM CHEST 1 VIEW: CPT

## 2021-08-02 RX ADMIN — IOPAMIDOL 75 ML: 755 INJECTION, SOLUTION INTRAVENOUS at 21:49

## 2021-08-02 ASSESSMENT — PAIN SCALES - GENERAL: PAINLEVEL_OUTOF10: 4

## 2021-08-02 ASSESSMENT — PAIN DESCRIPTION - ORIENTATION: ORIENTATION: RIGHT

## 2021-08-02 ASSESSMENT — PAIN DESCRIPTION - LOCATION: LOCATION: SHOULDER

## 2021-08-02 ASSESSMENT — PAIN DESCRIPTION - PAIN TYPE: TYPE: ACUTE PAIN

## 2021-08-02 ASSESSMENT — PAIN DESCRIPTION - DESCRIPTORS: DESCRIPTORS: ACHING

## 2021-08-02 NOTE — CARE COORDINATION
ACM spoke with Ha Mac to follow up on [facility] ED visit on 2021 dx:Visual disturbance  Abnormal laboratory test result  AVS:   Follow-Ups: Call Ba Oden MD (Internal Medicine); For Next available ED follow up visit on Monday AM.    CT head w/o contrast: 2021:  No acute intracranial hemorrhage or mass effect.         CTA  2021:  CTA Head:   No significant arterial steno-occlusive disease or evidence of aneurysm.       CTA Neck:   No significant arterial steno-occlusive disease or evidence of dissection.         ACM spoke with Ha Mac. She reports that she has not had any episodes since her ED visit. She plans to contact office today but wants to wait until she talks with her CM at [facility]. She denies any new symptoms today. Denies numbness, tingling, HA and blurred vision. Reviewed imaging. Encouraged to call with any questions or concerns. Advised to call if needed assistance with an appointment scheduling. MIRZA Riley and Romulo Barajas    Patient contacted regarding COVID-19 exposure, diagnosis, pulse oximeter ordered at discharge and monoclonal antibody infusion follow up. Discussed COVID-19 related testing which was not done at this time. Test results were not done. Patient informed of results, if available? No.      Ambulatory Care Manager contacted the patient by telephone to perform post discharge assessment. Call within 2 business days of discharge: Yes. Verified name and  with patient as identifiers. Provided introduction to self, and explanation of the CTN/ACM role, and reason for call due to risk factors for infection and/or exposure to COVID-19. Symptoms reviewed with patient who verbalized the following symptoms: no new symptoms and no worsening symptoms. Due to no new or worsening symptoms encounter was not routed to provider for escalation. Discussed follow-up appointments.  If no appointment was previously scheduled, appointment scheduling offered: Yes.  [facility] follow up appointment(s):   Future Appointments   Date Time Provider Marlin Diaz   8/10/2021  9:00 AM SCHEDULE, Dawit Fast PLAIN ECHO RM [facility] ECHO [facility] HOD   9/3/2021  8:30 AM Lion Ferguson MD R BANK PAIN MMA   1/28/2022 10:00 AM Genevieve Schmidt MD [facility] - DYD     Non-[facility] follow up appointment(s):     Non-face-to-face services provided:  Scheduled appointment with PCP-pt declined  Obtained and reviewed discharge summary and/or continuity of care documents  Reviewed and followed up on pending diagnostic tests and treatments-reviewed imaging and follow up plan     Advance Care Planning:   Does patient have an Advance Directive:  reviewed and current. Educated patient about risk for severe COVID-19 due to risk factors according to CDC guidelines. ACM reviewed discharge instructions, medical action plan and red flag symptoms with the patient who verbalized understanding. Discussed COVID vaccination status: Yes. Education provided on COVID-19 vaccination as appropriate. . Patient was given an opportunity to verbalize any questions and concerns and agrees to contact ACM or health care provider for questions related to their healthcare. Reviewed and educated patient on any new and changed medications related to discharge diagnosis     Was patient discharged with a pulse oximeter? No Discussed and confirmed pulse oximeter discharge instructions and when to notify provider or seek emergency care. ACM provided contact information. No further follow-up call identified based on severity of symptoms and risk factors.

## 2021-08-03 ENCOUNTER — APPOINTMENT (OUTPATIENT)
Dept: MRI IMAGING | Age: 61
End: 2021-08-03
Payer: COMMERCIAL

## 2021-08-03 LAB
C-REACTIVE PROTEIN: 17.6 MG/L (ref 0–5.1)
EKG ATRIAL RATE: 79 BPM
EKG DIAGNOSIS: NORMAL
EKG P AXIS: 41 DEGREES
EKG P-R INTERVAL: 154 MS
EKG Q-T INTERVAL: 380 MS
EKG QRS DURATION: 80 MS
EKG QTC CALCULATION (BAZETT): 435 MS
EKG R AXIS: 13 DEGREES
EKG T AXIS: 9 DEGREES
EKG VENTRICULAR RATE: 79 BPM
LV EF: 58 %
LVEF MODALITY: NORMAL
SEDIMENTATION RATE, ERYTHROCYTE: 39 MM/HR (ref 0–30)

## 2021-08-03 PROCEDURE — 97161 PT EVAL LOW COMPLEX 20 MIN: CPT | Performed by: PHYSICAL THERAPIST

## 2021-08-03 PROCEDURE — G0378 HOSPITAL OBSERVATION PER HR: HCPCS

## 2021-08-03 PROCEDURE — 70551 MRI BRAIN STEM W/O DYE: CPT

## 2021-08-03 PROCEDURE — 93306 TTE W/DOPPLER COMPLETE: CPT

## 2021-08-03 PROCEDURE — 97530 THERAPEUTIC ACTIVITIES: CPT

## 2021-08-03 PROCEDURE — 96372 THER/PROPH/DIAG INJ SC/IM: CPT

## 2021-08-03 PROCEDURE — 6360000002 HC RX W HCPCS: Performed by: INTERNAL MEDICINE

## 2021-08-03 PROCEDURE — 93010 ELECTROCARDIOGRAM REPORT: CPT | Performed by: INTERNAL MEDICINE

## 2021-08-03 PROCEDURE — 2060000000 HC ICU INTERMEDIATE R&B

## 2021-08-03 PROCEDURE — 97165 OT EVAL LOW COMPLEX 30 MIN: CPT

## 2021-08-03 PROCEDURE — 92610 EVALUATE SWALLOWING FUNCTION: CPT

## 2021-08-03 PROCEDURE — A9577 INJ MULTIHANCE: HCPCS | Performed by: NURSE PRACTITIONER

## 2021-08-03 PROCEDURE — 36415 COLL VENOUS BLD VENIPUNCTURE: CPT

## 2021-08-03 PROCEDURE — 97116 GAIT TRAINING THERAPY: CPT | Performed by: PHYSICAL THERAPIST

## 2021-08-03 PROCEDURE — 72156 MRI NECK SPINE W/O & W/DYE: CPT

## 2021-08-03 PROCEDURE — 94761 N-INVAS EAR/PLS OXIMETRY MLT: CPT

## 2021-08-03 PROCEDURE — 6370000000 HC RX 637 (ALT 250 FOR IP): Performed by: INTERNAL MEDICINE

## 2021-08-03 PROCEDURE — 2580000003 HC RX 258: Performed by: INTERNAL MEDICINE

## 2021-08-03 PROCEDURE — 70552 MRI BRAIN STEM W/DYE: CPT

## 2021-08-03 PROCEDURE — 86140 C-REACTIVE PROTEIN: CPT

## 2021-08-03 PROCEDURE — 99223 1ST HOSP IP/OBS HIGH 75: CPT | Performed by: INTERNAL MEDICINE

## 2021-08-03 PROCEDURE — 85652 RBC SED RATE AUTOMATED: CPT

## 2021-08-03 PROCEDURE — 6360000004 HC RX CONTRAST MEDICATION: Performed by: NURSE PRACTITIONER

## 2021-08-03 RX ORDER — LEVOTHYROXINE SODIUM 88 UG/1
88 TABLET ORAL DAILY
Status: DISCONTINUED | OUTPATIENT
Start: 2021-08-03 | End: 2021-08-04 | Stop reason: HOSPADM

## 2021-08-03 RX ORDER — QUETIAPINE FUMARATE 100 MG/1
200 TABLET, FILM COATED ORAL NIGHTLY
Status: DISCONTINUED | OUTPATIENT
Start: 2021-08-03 | End: 2021-08-04 | Stop reason: HOSPADM

## 2021-08-03 RX ORDER — ROPINIROLE 1 MG/1
2 TABLET, FILM COATED ORAL NIGHTLY
Status: DISCONTINUED | OUTPATIENT
Start: 2021-08-03 | End: 2021-08-04 | Stop reason: HOSPADM

## 2021-08-03 RX ORDER — SODIUM CHLORIDE 0.9 % (FLUSH) 0.9 %
5-40 SYRINGE (ML) INJECTION EVERY 12 HOURS SCHEDULED
Status: DISCONTINUED | OUTPATIENT
Start: 2021-08-03 | End: 2021-08-04 | Stop reason: HOSPADM

## 2021-08-03 RX ORDER — ROSUVASTATIN CALCIUM 40 MG/1
40 TABLET, COATED ORAL NIGHTLY
Status: DISCONTINUED | OUTPATIENT
Start: 2021-08-03 | End: 2021-08-03

## 2021-08-03 RX ORDER — SODIUM CHLORIDE 0.9 % (FLUSH) 0.9 %
5-40 SYRINGE (ML) INJECTION PRN
Status: DISCONTINUED | OUTPATIENT
Start: 2021-08-03 | End: 2021-08-04 | Stop reason: HOSPADM

## 2021-08-03 RX ORDER — POLYETHYLENE GLYCOL 3350 17 G/17G
17 POWDER, FOR SOLUTION ORAL DAILY PRN
Status: DISCONTINUED | OUTPATIENT
Start: 2021-08-03 | End: 2021-08-04 | Stop reason: HOSPADM

## 2021-08-03 RX ORDER — PROPRANOLOL HYDROCHLORIDE 40 MG/1
40 TABLET ORAL 3 TIMES DAILY
Status: DISCONTINUED | OUTPATIENT
Start: 2021-08-03 | End: 2021-08-04 | Stop reason: HOSPADM

## 2021-08-03 RX ORDER — CLOPIDOGREL BISULFATE 75 MG/1
75 TABLET ORAL DAILY
Status: DISCONTINUED | OUTPATIENT
Start: 2021-08-03 | End: 2021-08-04 | Stop reason: HOSPADM

## 2021-08-03 RX ORDER — ASPIRIN 81 MG/1
81 TABLET ORAL DAILY
Status: DISCONTINUED | OUTPATIENT
Start: 2021-08-03 | End: 2021-08-04 | Stop reason: HOSPADM

## 2021-08-03 RX ORDER — POTASSIUM CHLORIDE 20 MEQ/1
20 TABLET, EXTENDED RELEASE ORAL DAILY
Status: DISCONTINUED | OUTPATIENT
Start: 2021-08-03 | End: 2021-08-04 | Stop reason: HOSPADM

## 2021-08-03 RX ORDER — PANTOPRAZOLE SODIUM 40 MG/1
40 TABLET, DELAYED RELEASE ORAL
Status: DISCONTINUED | OUTPATIENT
Start: 2021-08-03 | End: 2021-08-04 | Stop reason: HOSPADM

## 2021-08-03 RX ORDER — NITROGLYCERIN 0.4 MG/1
0.4 TABLET SUBLINGUAL EVERY 5 MIN PRN
Status: DISCONTINUED | OUTPATIENT
Start: 2021-08-03 | End: 2021-08-04 | Stop reason: HOSPADM

## 2021-08-03 RX ORDER — FUROSEMIDE 20 MG/1
20 TABLET ORAL DAILY
Status: DISCONTINUED | OUTPATIENT
Start: 2021-08-03 | End: 2021-08-04 | Stop reason: HOSPADM

## 2021-08-03 RX ORDER — SODIUM CHLORIDE 9 MG/ML
25 INJECTION, SOLUTION INTRAVENOUS PRN
Status: DISCONTINUED | OUTPATIENT
Start: 2021-08-03 | End: 2021-08-04 | Stop reason: HOSPADM

## 2021-08-03 RX ORDER — ONDANSETRON 2 MG/ML
4 INJECTION INTRAMUSCULAR; INTRAVENOUS EVERY 6 HOURS PRN
Status: DISCONTINUED | OUTPATIENT
Start: 2021-08-03 | End: 2021-08-04 | Stop reason: HOSPADM

## 2021-08-03 RX ORDER — ONDANSETRON 4 MG/1
4 TABLET, ORALLY DISINTEGRATING ORAL EVERY 8 HOURS PRN
Status: DISCONTINUED | OUTPATIENT
Start: 2021-08-03 | End: 2021-08-04 | Stop reason: HOSPADM

## 2021-08-03 RX ORDER — ARIPIPRAZOLE 10 MG/1
30 TABLET ORAL DAILY
Status: DISCONTINUED | OUTPATIENT
Start: 2021-08-03 | End: 2021-08-04 | Stop reason: HOSPADM

## 2021-08-03 RX ORDER — PANTOPRAZOLE SODIUM 40 MG/1
40 TABLET, DELAYED RELEASE ORAL DAILY
COMMUNITY
End: 2021-10-28 | Stop reason: SDUPTHER

## 2021-08-03 RX ORDER — ATORVASTATIN CALCIUM 40 MG/1
40 TABLET, FILM COATED ORAL DAILY
Status: DISCONTINUED | OUTPATIENT
Start: 2021-08-03 | End: 2021-08-04 | Stop reason: HOSPADM

## 2021-08-03 RX ORDER — PREGABALIN 100 MG/1
100 CAPSULE ORAL 3 TIMES DAILY
Status: DISCONTINUED | OUTPATIENT
Start: 2021-08-03 | End: 2021-08-04 | Stop reason: HOSPADM

## 2021-08-03 RX ORDER — DULOXETIN HYDROCHLORIDE 60 MG/1
60 CAPSULE, DELAYED RELEASE ORAL 2 TIMES DAILY
Status: DISCONTINUED | OUTPATIENT
Start: 2021-08-03 | End: 2021-08-04 | Stop reason: HOSPADM

## 2021-08-03 RX ADMIN — QUETIAPINE FUMARATE 200 MG: 100 TABLET ORAL at 02:09

## 2021-08-03 RX ADMIN — Medication 5000 UNITS: at 22:00

## 2021-08-03 RX ADMIN — POTASSIUM CHLORIDE 20 MEQ: 20 TABLET, EXTENDED RELEASE ORAL at 10:03

## 2021-08-03 RX ADMIN — Medication 10 ML: at 22:02

## 2021-08-03 RX ADMIN — PANTOPRAZOLE SODIUM 40 MG: 40 TABLET, DELAYED RELEASE ORAL at 06:26

## 2021-08-03 RX ADMIN — PROPRANOLOL HYDROCHLORIDE 40 MG: 40 TABLET ORAL at 21:59

## 2021-08-03 RX ADMIN — ARIPIPRAZOLE 30 MG: 10 TABLET ORAL at 10:13

## 2021-08-03 RX ADMIN — QUETIAPINE FUMARATE 200 MG: 100 TABLET ORAL at 22:00

## 2021-08-03 RX ADMIN — PROPRANOLOL HYDROCHLORIDE 40 MG: 40 TABLET ORAL at 10:03

## 2021-08-03 RX ADMIN — ROPINIROLE HYDROCHLORIDE 2 MG: 1 TABLET, FILM COATED ORAL at 02:09

## 2021-08-03 RX ADMIN — DULOXETINE HYDROCHLORIDE 60 MG: 60 CAPSULE, DELAYED RELEASE ORAL at 10:04

## 2021-08-03 RX ADMIN — DULOXETINE HYDROCHLORIDE 60 MG: 60 CAPSULE, DELAYED RELEASE ORAL at 02:09

## 2021-08-03 RX ADMIN — Medication 5000 UNITS: at 10:02

## 2021-08-03 RX ADMIN — GADOBENATE DIMEGLUMINE 20 ML: 529 INJECTION, SOLUTION INTRAVENOUS at 19:46

## 2021-08-03 RX ADMIN — ROPINIROLE HYDROCHLORIDE 2 MG: 1 TABLET, FILM COATED ORAL at 22:00

## 2021-08-03 RX ADMIN — DULOXETINE HYDROCHLORIDE 60 MG: 60 CAPSULE, DELAYED RELEASE ORAL at 22:00

## 2021-08-03 RX ADMIN — LEVOTHYROXINE SODIUM 88 MCG: 0.09 TABLET ORAL at 06:26

## 2021-08-03 RX ADMIN — PREGABALIN 100 MG: 100 CAPSULE ORAL at 22:00

## 2021-08-03 RX ADMIN — Medication 10 ML: at 10:05

## 2021-08-03 RX ADMIN — PREGABALIN 100 MG: 100 CAPSULE ORAL at 14:39

## 2021-08-03 RX ADMIN — PREGABALIN 100 MG: 100 CAPSULE ORAL at 10:03

## 2021-08-03 RX ADMIN — ASPIRIN 81 MG: 81 TABLET, FILM COATED ORAL at 10:02

## 2021-08-03 RX ADMIN — PROPRANOLOL HYDROCHLORIDE 40 MG: 40 TABLET ORAL at 14:39

## 2021-08-03 RX ADMIN — ATORVASTATIN CALCIUM 40 MG: 40 TABLET, FILM COATED ORAL at 10:03

## 2021-08-03 RX ADMIN — FUROSEMIDE 20 MG: 20 TABLET ORAL at 10:04

## 2021-08-03 RX ADMIN — CLOPIDOGREL BISULFATE 75 MG: 75 TABLET ORAL at 10:03

## 2021-08-03 RX ADMIN — ENOXAPARIN SODIUM 40 MG: 40 INJECTION SUBCUTANEOUS at 10:02

## 2021-08-03 ASSESSMENT — PAIN SCALES - GENERAL
PAINLEVEL_OUTOF10: 0

## 2021-08-03 NOTE — H&P
She is followed by  the bariatric service on an intermittent basis. ADMISSION MEDICATIONS:  Include Abilify, atorvastatin, Celebrex, vitamin  D, duloxetine, esomeprazole, Lasix, furosemide, levothyroxine,  potassium, Lyrica, propranolol, quetiapine, ReQuip, tamsulosin. ALLERGIES:  Include POLYSPORIN, LORAZEPAM, and SULFA ANTIBIOTICS. FAMILY HISTORY:  Remarkable for heart disease, high blood pressure in  the mother, high blood pressure in the father. SOCIAL HISTORY:  She is not a smoker. She does not drink alcohol. REVIEW OF SYSTEMS:  Positive for decreasing vision in her left eye that  seems to come and go. It is negative for chest pain. Negative for  shortness of breath. Negative for abdominal pain. Positive for chronic  leg swelling. Positive for right arm numbness that seems intermittent. Negative for dysphasia or dysarthria. Ten-point review of systems  otherwise performed was negative. PHYSICAL EXAMINATION:  VITAL SIGNS:  In the ER, blood pressure 134/68, pulse 76, respirations  16, O2 sats 97% on room air. GENERAL:  She is an obese white female sitting in bed while visited by  Dr. Pankaj Contreras, in no acute distress. HEENT:  Head is normocephalic, atraumatic. Pupils are equal, round,  reactive to light and accommodation. Extraocular muscles intact. There  was a tad of right facial droop with smiling. The right side seemed to  droop just a little compared to the left. NECK:  Supple without JVD. No carotid bruits were noted. CHEST:  Clear to auscultation and percussion. CARDIOVASCULAR:  Regular rate and rhythm. ABDOMEN:  Obese, nontender without mass or organomegaly. EXTREMITIES:  No evidence of clubbing, cyanosis or edema. NEUROLOGIC:  There was no deficit noted to sensation in the right  extremity. There were no motor deficits noted. There was a right  facial droop that was mild but seemed to be persisting in the right  corner of the mouth.     LABORATORY DATA:  Included BUN and creatinine of 16 and 0.9, sodium 137,  potassium 3.8. Troponin less than 0.01. White count 7600, hemoglobin  11.6, platelet count of 714,406. ProTime is normal.    She had CT of the head without contrast through ER that was  unremarkable. She had a CTA of the brain that showed no significant arterial  abnormality of the head and neck. There was some question of mild  enhancement of the left parotid gland, consider possibly a left mild  parotitis. EKG showed normal sinus rhythm. Chest x-ray was unremarkable without cardiomegaly. IMPRESSION:  1. TIA, transient ischemic attack, right arm numbness, associated left  eye vision change. Symptoms for about a week, recurrent, and normal CT  and CTA of the head. 2.  Major depressive disorder. 3.  Hyperlipidemia. 4.  Chronic pain syndrome. 5.  Degenerative disk disease of the lumbar spine. 6.  Obesity. 7.  Hypothyroidism. PLAN:  Her symptoms are recurrent. She has been seen in the ER and in  the ophthalmologist's office within the past week. With question of a  new right facial droop and persisting right arm numbness and left eye  vision changes, she will be admitted. MRI of the brain will be  obtained. Neurology will be asked to consult. Also check an  echocardiogram.  It should be mentioned, she was evaluated by the Stroke  service in the ER who declined treatment due to her lack of time of her  symptoms.         Ari Valdez MD    D: 08/03/2021 9:09:21       T: 08/03/2021 9:18:32     [email or web address]  Job#: [id number]     Doc#: [id number]    CC:  Rossy Silverio MD

## 2021-08-03 NOTE — H&P
H&P dictated--IMP: Principal Problem:    TIA (transient ischemic attack)--rt arm numbness and lt eye vision change--recc sx x 1 week --nl ct and cta of head --  Active Problems:    Major depressive disorder, recurrent episode, severe (HCC)--on rx --longstanding hx --also assoc self mutilating behavior     Hyperlipidemia--Continue current therapy      Chronic pain syndrome--sees pain mgmt     Osteoarthritis of hip    DDD (degenerative disc disease), lumbar--chr pain syndrome     Morbid obesity with BMI of 40.0-44.9, adult ([facility] [address]    Hypothyroid--Continue current therapy    Resolved Problems:    * No resolved hospital problems. *    Recc sx--seen in Er in past week--now with ? ? New rt facial droop and persisting rt arm numbness and lt eye vision changes ---admit--MRI of brain--neurology to see --dilip Bains MD

## 2021-08-03 NOTE — ED PROVIDER NOTES
[facility]  eMERGENCY dEPARTMENT eNCOUnter      Pt Name: Kvng Travis  MRN: [id number]  [place] 1960  Date of evaluation: 8/2/2021  Provider: Yolanda Crump MD    [address] [place] [address]       Chief Complaint   Patient presents with    Numbness     R arm, started today around 2000 and lasted about 5 minutes    Dizziness     Started around 2000 as well         CRITICAL CARE TIME   Total Critical Care time was a minimum of 30 minutes, excluding separately reportable procedures. There was a high probability of clinically significant/life threatening deterioration in the patient's condition which required my urgent intervention. Critical care time includes my initial evaluation, ongoing reassessment, review of laboratory, EKG, chest x-ray, consultation with the stroke team physician, consultation with radiology, consultation with the primary care physician for admission. No procedure time was included. HISTORY OF PRESENT ILLNESS  (Location/Symptom, Timing/Onset, Context/Setting, Quality, Duration, Modifying Factors, Severity.)   Kvng Travis is a 63 y.o. female who presents to the emergency department complaining of right numbness and dizziness that started at 8 PM tonight. She describes as a lightheaded sensation. No vertigo or spinning. She states she feels a little off balance and considered using her cane but she did not. She has been able to ambulate. She states she had some right arm numbness which lasted about 5 minutes and resolved. She is been able to use her arms normally. No incoordination. She was actually at [facility] on 7/30/2021. She has been having some intermittent visual problems in her left eye for about a week. She had been seen by her primary care physician and had some laboratory tests done. She been referred to an ophthalmologist.  The ophthalmologist apparently sent her to the ER to rule out TIAs.   She had a CT head and CT angiogram done which showed no acute findings. She was discharged home. Since being discharged she said she had one episode where she felt like she had a flash of light going into her eye but it was transient and resolved. She had no other visual symptoms. Nursing Notes were reviewed and I agree. REVIEW OF SYSTEMS    (2-9 systems for level 4, 10 or more for level 5)     HEENT: Visual symptoms as above, none currently. Cardiovascular: No chest pain or palpitation. Pulmonary: No shortness of breath. No cough. GI: No abdominal pain nausea vomiting. Musculoskeletal: Chronic leg swelling. Neuro: Pressure in her head, no other head pain. Intermittent visual problems last week, none currently. Right arm numbness lasted 5 minutes resolved. Lightheaded sensation, felt off balance but is able to walk without difficulty without assistance of her cane. Except as noted above the remainder of the review of systems was reviewed and negative.        PAST MEDICAL HISTORY     Past Medical History:   Diagnosis Date    Acquired hyperlipoproteinemia     MARIO (acute kidney injury) ([facility] [address] 9/30/2019    Allergic rhinitis     Anxiety     Arthritis     Bilateral lower extremity edema     Bipolar 1 disorder (HCC)     Chronic pain syndrome     DDD (degenerative disc disease), lumbar     Depression     Depression     Disease of gallbladder     Dizziness     DJD (degenerative joint disease) of hip     Edema 12/29/2009    Elbow tendinitis     Essential hypertension 8/19/2019    Facet syndrome     Fibromyalgia     Fracture of nasal bone     GERD (gastroesophageal reflux disease)     Headache     Hiatal hernia     History of blood transfusion     anemia    Hyperlipidemia     Insomnia     Osteoarthritis     Prediabetes     Rash     Self mutilating behavior     cutter pt states she has not done in months- in therapy    Sleep apnea     no CPAP    Suicidal ideation     Thyroid disorder hypothyroidism    Vertigo          SURGICAL HISTORY       Past Surgical History:   Procedure Laterality Date    ABDOMEN SURGERY  5/28/2014    LAPAROSCOPIC SLEEVE GASTRECTOMY, EXTENSIVE LYSIS OF ADHESIONS    ACHILLES TENDON SURGERY Left 3/9/2020    LEFT CALCANEOUS PARTIAL EXCISION, SECONDARY REPAIR OF ACHILLES TENDON performed by Heddy Sacks, [facility] at [facility]      open , Ex-lap    CHOLECYSTECTOMY      open    COLONOSCOPY  04/16/2013    dr Ruth Mcneill 2018     DILATION AND CURETTAGE OF UTERUS      ENDOSCOPY, COLON, DIAGNOSTIC      FOOT SURGERY Right 05/27/2016    ARTHROPLASTY RIGHT 5TH DIGIT      HYSTEROSCOPY N/A 12-30-13    Hysteroscopy Dilitation and Curettage    JOINT REPLACEMENT Bilateral     knee    OTHER SURGICAL HISTORY  9/11/2015    ARTHROPLASTY 5TH DIGIT LEFT FOOT          SHOULDER SURGERY      right    SHOULDER SURGERY Right     SLEEVE GASTROPLASTY  May 28, 2014    Dahman    TOTAL KNEE ARTHROPLASTY  12/10/10    Right    TOTAL KNEE ARTHROPLASTY  3/30/10    Left    UPPER GASTROINTESTINAL ENDOSCOPY  4/16/2013    dr Maryanne Domingo ENDOSCOPY  3/20/2014    dr Toyin Matthews       Previous Medications    ARIPIPRAZOLE (ABILIFY) 20 MG TABLET    Take 1 tablet by mouth daily    ATORVASTATIN (LIPITOR) 40 MG TABLET    TAKE 1 TABLET BY MOUTH DAILY    BETAMETHASONE DIPROPIONATE (DIPROLENE) 0.05 % CREAM    APPLY EXTERNALLY TO THE AFFECTED AREA TWICE DAILY    CELECOXIB (CELEBREX) 200 MG CAPSULE    Take 1 capsule by mouth daily    CHOLECALCIFEROL (VITAMIN D3) 125 MCG (5000 UT) TABS    Take 1 tablet by mouth 2 times daily    COMPRESSION STOCKINGS MISC    by Does not apply route 20mmHg- knee high, closed toe. wear daily.  Fit to size    DULOXETINE (CYMBALTA) 60 MG EXTENDED RELEASE CAPSULE    Take 1 capsule by mouth 2 times daily    ESOMEPRAZOLE (NEXIUM) 40 MG DELAYED RELEASE CAPSULE    TAKE 1 CAPSULE BY MOUTH EVERY MORNING BEFORE BREAKFAST    FUROSEMIDE (LASIX) 20 MG TABLET    Take 1 tablet by mouth daily    HYDROCORTISONE (CORTEF) 10 MG TABLET    Take 1 tablet daily as needed for episodes of weakness and chest pain--use sparingly    LEVOTHYROXINE (SYNTHROID) 88 MCG TABLET    TAKE 1 TABLET BY MOUTH DAILY    MECLIZINE (ANTIVERT) 25 MG TABLET    Take 25 mg by mouth 3 times daily as needed    NITROGLYCERIN (NITROSTAT) 0.4 MG SL TABLET    Place 1 tablet under the tongue every 5 minutes as needed for Chest pain up to max of 3 total doses. If no relief after 1 dose, call 911. ONDANSETRON (ZOFRAN) 4 MG TABLET    TAKE 1 TABLET BY MOUTH EVERY 8 HOURS AS NEEDED FOR NAUSEA OR VOMITING    POTASSIUM CHLORIDE (KLOR-CON M) 20 MEQ EXTENDED RELEASE TABLET    Take 1 tablet by mouth daily    PREGABALIN (LYRICA) 100 MG CAPSULE    Take 1 capsule by mouth 3 times daily for 30 days.     PROPRANOLOL (INDERAL) 40 MG TABLET    Take 1 tablet by mouth 3 times daily anxiety    QUETIAPINE (SEROQUEL) 200 MG TABLET    Take 200 mg by mouth nightly    ROPINIROLE (REQUIP) 1 MG TABLET    TAKE 2 TABLETS BY MOUTH EVERY NIGHT    TAMSULOSIN (FLOMAX) 0.4 MG CAPSULE    TAKE ONE CAPSULE BY MOUTH DAILY AS NEEDED FOR KIDNEY STONE--only needs 30 pills --JJL    TORSEMIDE (DEMADEX) 20 MG TABLET    TAKE 1 TABLET BY MOUTH TWICE DAILY BEFORE MEALS    TRIAMCINOLONE (KENALOG) 0.025 % CREAM    Apply Topically 2x daily    VITAMIN B-12 (CYANOCOBALAMIN) 1000 MCG TABLET    Take 1 tablet by mouth daily       ALLERGIES     Polysporin [bacitracin-polymyxin b], Lorazepam, Neomycin-polymyxin-gramicidin, and Sulfa antibiotics    FAMILY HISTORY       Family History   Problem Relation Age of Onset    Heart Disease Mother     Heart Failure Mother     High Cholesterol Mother     High Blood Pressure Mother     Stroke Mother     Birth Defects Mother     Other Mother         VV    High Blood Pressure Father     COPD Father     Diabetes Brother     Stroke Maternal Grandmother     Arthritis Maternal Grandmother     Heart Failure Brother     Stroke Brother     Birth Defects Maternal Grandfather     Arthritis Paternal Grandmother           SOCIAL HISTORY       Social History     Socioeconomic History    Marital status: Single     Spouse name: None    Number of children: 0    Years of education: 12    Highest education level: None   Occupational History    Occupation: [employer]     Comment: unemployed   Tobacco Use    Smoking status: Never Smoker    Smokeless tobacco: Never Used    Tobacco comment: Never smoked   Vaping Use    Vaping Use: Never used   Substance and Sexual Activity    Alcohol use: No     Alcohol/week: 0.0 standard drinks    Drug use: No    Sexual activity: Not Currently   Other Topics Concern    None   Social History Narrative    Lives by herself with her cat     Social Determinants of Health     Financial Resource Strain: Low Risk     Difficulty of Paying Living Expenses: Not very hard   Food Insecurity: No Food Insecurity    Worried About Running Out of Food in the Last Year: Never true    Louis of Food in the Last Year: Never true   Transportation Needs:     Lack of Transportation (Medical):      Lack of Transportation (Non-Medical):    Physical Activity:     Days of Exercise per Week:     Minutes of Exercise per Session:    Stress:     Feeling of Stress :    Social Connections:     Frequency of Communication with Friends and Family:     Frequency of Social Gatherings with Friends and Family:     Attends Hoahaoism Services:     Active Member of Clubs or Organizations:     Attends Club or Organization Meetings:     Marital Status:    Intimate Partner Violence:     Fear of Current or Ex-Partner:     Emotionally Abused:     Physically Abused:     Sexually Abused:          PHYSICAL EXAM    (up to 7 for level 4, 8 or more for level 5)     ED Triage Vitals [08/02/21 2117]   BP Temp Temp Source Pulse Resp SpO2 Height Weight   (!) 178/101 98.4 °F (36.9 °C) Oral 85 18 94 % 5' 4\" (1.626 m) (!) 326 lb 8 oz (148.1 kg)       General: Alert morbidly obese white female no acute distress. Head: Atraumatic and normocephalic. Eyes: No conjunctival injection. Pupils equal round reactive. Extraocular movements are intact. No nystagmus. ENT: Mirta Score is clear. Oropharynx is negative. Right lower facial droop. Neck: Supple, nontender, no adenopathy. Heart: Regular rate and rhythm. No murmurs or gallops noted. Lungs: Breath sounds equal bilaterally and clear. Abdomen: Obese, soft, nontender. No mass organomegaly. Bowel sounds are normal.  Musculoskeletal: 3+ nonpitting edema bilateral lower extremities. Lower extremities are warm to touch with good capillary refill. I cannot palpate distal pulses. Radial pulses are intact and symmetrical.  Neuro: Awake, alert, oriented. Symmetrical reactive pupils. Intact extraocular movements. No nystagmus. Right lower facial droop. No drift of the upper or lower extremities. No limb ataxia upper or lower extremities. She complains of some slight decrease sensation to touch in the right upper extremity. Her speech is clear and understandable. No expressive aphasia. No dysarthria. No visual deficits. Her gait is normal.      DIFFERENTIAL DIAGNOSIS   Differential includes but is not limited to hypoglycemia, thrombotic stroke, embolic stroke, intracerebral hemorrhage, TIA, electrolyte abnormality, other. DIAGNOSTIC RESULTS     EKG: All EKG's are interpreted by Brenda Downey MD in the absence of a cardiologist.    Normal sinus rhythm, rate of 79, no acute ST-T wave changes. Rhythm strip shows sinus rhythm with a rate of 79, AL interval 154 ms, QRS of 80 ms with no other ectopy as interpreted by me. No change compared to 7/30/2021.     RADIOLOGY:   Non-plain film images such as CT, Ultrasound and MRI are read by the radiologist. Plain radiographic images are visualized and preliminarily interpreted Boris Dick MD with the below findings:      Interpretation per the Radiologist below, if available at the time of this note:    XR CHEST PORTABLE   Final Result   Negative portable chest.         CTA HEAD NECK W CONTRAST   Final Result   No significant arterial abnormality of the head and neck. Mild increased enhancement of left parotid relative to the right. Correlate   clinically for possible mild left parotiditis. This scan was analyzed using Viz. ai contact LVO. Identification of suspected   findings is not for diagnostic use beyond notification. Viz LVO is limited to   analysis of imaging data and should not be used in-lieu of full patient   evaluation or relied upon to make or confirm diagnosis. CT HEAD WO CONTRAST   Final Result   No acute intracranial abnormality. Stroke alert results were called by Dr. Milly Renae MD to Boris Dick on 8/2/2021 at 22:04.                ED BEDSIDE ULTRASOUND:   Performed by ED Physician - none    LABS:  Labs Reviewed   CBC WITH AUTO DIFFERENTIAL - Abnormal; Notable for the following components:       Result Value    RBC 3.90 (*)     Hemoglobin 11.6 (*)     Hematocrit 35.5 (*)     RDW 17.8 (*)     All other components within normal limits    Narrative:     Performed at:  [address] [address]   Phone [phone]   COMPREHENSIVE METABOLIC PANEL - Abnormal; Notable for the following components:    Chloride 98 (*)     Glucose 112 (*)     Alkaline Phosphatase 140 (*)     All other components within normal limits    Narrative:     Performed at:  [address] [employer] [address]   Phone [phone]   TROPONIN    Narrative:     Performed at:  [address] [employer] [address]   Phone [phone]   PROTIME-INR    Narrative:     Performed at:  [facility] treatment plan were discussed with the patient. She understands the treatment plan and need for admission and is agreeable. 2357:  Discussed with Dr. Priyanka Story. He will admit. CONSULTS:  IP CONSULT TO STROKE TEAM  IP CONSULT TO INTERNAL MEDICINE    PROCEDURES:  None    FINAL IMPRESSION      1. Cerebrovascular accident (CVA), unspecified mechanism ([facility] [address]          DISPOSITION/PLAN   DISPOSITION Decision To Admit 08/02/2021 11:46:21 PM      PATIENT REFERRED TO:  No follow-up provider specified.     DISCHARGE MEDICATIONS:  New Prescriptions    No medications on file       (Please note that portions of this note were completed with a voice recognition program.  Efforts were made to edit the dictations but occasionally words are mis-transcribed.)    Tomasa Washington MD  Attending Emergency Physician        Gracie Cushing, MD  08/02/21 [id number]

## 2021-08-03 NOTE — PROGRESS NOTES
Occupational Therapy   Occupational Therapy Initial Assessment and Discharge    Date: 8/3/2021   Patient Name: Jesusita Alaniz  MRN: [id number]     : 1960    Date of Service: 8/3/2021    Discharge Recommendations: Jesusita Alaniz scored a 24/24 on the [facility] ADL Inpatient form. At this time, no further OT is recommended upon discharge due to anticipate pt functioning at/close to baseline. Recommend patient returns to prior setting with prior services. Home with assist PRN  OT Equipment Recommendations  Equipment Needed: No    Assessment   Assessment: Jesusita Alaniz is a 63 y.o. female who presents to the emergency department complaining of right numbness and dizziness that started at 8 PM tonight. She describes as a lightheaded sensation. No vertigo or spinning. She states she feels a little off balance and considered using her cane but she did not. She has been able to ambulate. She states she had some right arm numbness which lasted about 5 minutes and resolved. She is been able to use her arms normally. No incoordination. She was actually at [facility] on 2021. She has been having some intermittent visual problems in her left eye for about a week. She had been seen by her primary care physician and had some laboratory tests done. She been referred to an ophthalmologist.  The ophthalmologist apparently sent her to the ER to rule out TIAs. She had a CT head and CT angiogram done which showed no acute findings. She was discharged home. Since being discharged she said she had one episode where she felt like she had a flash of light going into her eye but it was transient and resolved. She had no other visual symptoms. PTA pt from home alone where pt was Ind with mobility and ADLs. Pt currently functioning at/close to baseline completing mobility and transfers with supervision/Ind. Anticipate pt Ind with all ADLs. Pt reports some altered sensation in L UE and R side of face. Restrictions  Restrictions/Precautions  Restrictions/Precautions: Fall Risk (intermittent dizziness which is not new per pt report)    Subjective   General  Chart Reviewed: Yes  Patient assessed for rehabilitation services?: Yes  Additional Pertinent Hx: per ED note, Fernando Elise is a 63 y.o. female who presents to the emergency department complaining of right numbness and dizziness that started at 8 PM tonight. She describes as a lightheaded sensation. No vertigo or spinning. She states she feels a little off balance and considered using her cane but she did not. She has been able to ambulate. She states she had some right arm numbness which lasted about 5 minutes and resolved. She is been able to use her arms normally. No incoordination. She was actually at [facility] on 7/30/2021. She has been having some intermittent visual problems in her left eye for about a week. She had been seen by her primary care physician and had some laboratory tests done. She been referred to an ophthalmologist.  The ophthalmologist apparently sent her to the ER to rule out TIAs. She had a CT head and CT angiogram done which showed no acute findings. She was discharged home. Since being discharged she said she had one episode where she felt like she had a flash of light going into her eye but it was transient and resolved. She had no other visual symptoms. Family / Caregiver Present: No  Referring Practitioner: Shauna Lerner MD  Subjective  Subjective: Pt agreeable to OT evaluation. Pt reports no pain. General Comment  Comments: okay for therapy per RN.   Oxygen Therapy  SpO2: 96 %  Pulse Oximeter Device Mode: Intermittent  Pulse Oximeter Device Location: Finger  O2 Device: None (Room air)  Social/Functional History  Social/Functional History  Lives With: Alone ((cat))  Type of Home: Apartment  Home Layout: One level  Home Access: Elevator  Bathroom Shower/Tub: Tub/Shower unit  Bathroom Toilet: Handicap height  Bathroom Equipment: Tub transfer bench, Grab bars in shower  Home Equipment: Cane, Crutches, Rolling walker (hurry cane)  ADL Assistance: [address]: Independent  Homemaking Responsibilities: Yes  Ambulation Assistance: Independent  Transfer Assistance: Independent  Active : Yes       Objective   Vision: Impaired  Vision Exceptions: Wears glasses at all times  Hearing: Within functional limits    Orientation  Overall Orientation Status: Within Functional Limits     Balance  Sitting Balance: Independent  Standing Balance: Independent  Functional Mobility  Functional - Mobility Device: No device  Activity: Other (household distances in room and hallway)  Assist Level: Supervision  Functional Mobility Comments: no overt LOB; no reports of dizziness or light headedness  Wheelchair Bed Transfers  Wheelchair/Bed - Technique: Ambulating  Equipment Used: Bed  Level of Asssistance: Independent  ADL  Additional Comments: Anticipate pt Ind with all ADLs including dressing, bathing, and toileting based on ROM, strength, and balance  Tone RUE  RUE Tone: Normotonic  Tone LUE  LUE Tone: Normotonic  Coordination  Movements Are Fluid And Coordinated: Yes     Bed mobility  Supine to Sit: Independent  Sit to Supine: Independent  Scooting: Independent  Transfers  Sit to stand: Independent  Stand to sit:  Independent     Cognition  Overall Cognitive Status: WFL        Sensation  Overall Sensation Status: Impaired  Light Touch: Partial deficits in the RUE        LUE AROM (degrees)  LUE AROM : WFL  RUE AROM (degrees)  RUE AROM : WFL  LUE Strength  Gross LUE Strength: WFL  RUE Strength  Gross RUE Strength: WFL                   Plan   Plan  Times per week: D/C from OT      AM-PAC Score        AM-PAC Inpatient Daily Activity Raw Score: 24 (08/03/21 0810)  AM-PAC Inpatient ADL T-Scale Score : 57.54 (08/03/21 0810)  ADL Inpatient CMS 0-100% Score: 0 (08/03/21 0810)  ADL Inpatient CMS G-Code Modifier : [address] (08/03/21 0810)    Goals  Short term goals  Time Frame for Short term goals: no ongoing OT goals  Short term goal 1: complete functional transfers and ADLs Ind- goal met 8/3  Patient Goals   Patient goals : return home       Therapy Time   Individual Concurrent Group Co-treatment   Time In 0742         Time Out 0810         Minutes 28         Timed Code Treatment Minutes: 13 Minutes (15 minute eval)       Angelique Giordano, OTR/L

## 2021-08-03 NOTE — PLAN OF CARE
Report called by Yi Plunkett prior to pt arrival. Pt noted to have a NIHSS 2 upon arrival for slight R facial drop and decreased sensation on her R face and RUE. Pt oriented to new environment. Call light and bed controls reviewed with the pt. Admission packet and stroke hand book provided and contents reviewed. Pt is a high fall risk. Bed in locked, low position with side rails up X 2 and an active bed alarm. Pt instructed to call and wait for assistance before getting OOB. Pt VU. Fall risk sign, socks, and bracelet in place. MRI checklist completed and placed in hard chart. MRI compatible gown placed at bedside. Pt VU that she must change into the gown by morning. Pt is on CMU and will need an order to discontinue telemetry while off the unit in MRI.

## 2021-08-03 NOTE — PROGRESS NOTES
4 Eyes Skin Assessment     NAME:  Viji Carnes  YOB: 1960  MEDICAL RECORD NUMBER:  [id number]    The patient is being assess for  Admission    I agree that 2 RN's have performed a thorough Head to Toe Skin Assessment on the patient. ALL assessment sites listed below have been assessed. Areas assessed by both nurses:    Head, Face, Ears, Shoulders, Back, Chest, Arms, Elbows, Hands, Sacrum. Buttock, Coccyx, Ischium and Legs. Feet and Heels        Does the Patient have a Wound? Yes wound(s) were present on assessment. LDA wound assessment was Initiated and completed      open, dry blister on L heel  scattered abrasions on RLE  BLE redness  redness/odor in her pannus fold   bruising scattered on her R side.         Max Prevention initiated:  Yes   Wound Care Orders initiated:  NA    Pressure Injury (Stage 3,4, Unstageable, DTI, NWPT, and Complex wounds) if present place consult order under [de-identified] NA    New and Established Ostomies if present place consult order under : NA      Nurse 1 eSignature: Electronically signed by Yamileth Trotter RN on 8/3/21 at 2:55 AM EDT    **SHARE this note so that the co-signing nurse is able to place an eSignature**    Nurse 2 eSignature: Electronically signed by Talisha Higgins RN on 8/3/21 at 2:58 AM EDT

## 2021-08-03 NOTE — PLAN OF CARE
Problem: Falls - Risk of:  Goal: Will remain free from falls  Description: Will remain free from falls  8/3/2021 1542 by Chris Clemente RN  Outcome: Ongoing  Note: Bed alarm on, bed in lowest position, wheels locked. Fall risk assessment completed every shift. Nonskid socks on, fall sign posted. Pt educated on use of call light. Problem: Falls - Risk of:  Goal: Absence of physical injury  Description: Absence of physical injury  8/3/2021 1542 by Chris Clemente RN  Outcome: Ongoing  Note: Pt remains free of physical injury. Problem: HEMODYNAMIC STATUS  Goal: Patient has stable vital signs and fluid balance  Outcome: Ongoing  Note: Vitals signs remain stable. Pt taking lasix to manage fluid balance. Problem: ACTIVITY INTOLERANCE/IMPAIRED MOBILITY  Goal: Mobility/activity is maintained at optimum level for patient  Outcome: Ongoing  Note: Pt encouraged to perform self care as independently as possible. Pt able to ambulate to bathroom and chair with stand by from staff. Problem: COMMUNICATION IMPAIRMENT  Goal: Ability to express needs and understand communication  Outcome: Ongoing  Note: Pt. Able to express needs and communicate feelings.

## 2021-08-03 NOTE — ED NOTES
NIH scale assessed with a score of 1. Pt reports right lower arm numbness with sensory difference when compared to left arm.      Lou Garcia RN  08/02/21 7112

## 2021-08-03 NOTE — PROGRESS NOTES
I attempted to see patient for Neurology consult but she was off unit at this time. Will revisit this afternoon. Thanks  Cristin Martinez, [facility]  Neurology.

## 2021-08-03 NOTE — PROGRESS NOTES
Speech Language Pathology  Facility/Department: [facility] PROGRESSIVE CARE   CLINICAL BEDSIDE SWALLOW EVALUATION    NAME: Danika Lucia  : 1960  MRN: [id number]    ADMISSION DATE: 2021  ADMITTING DIAGNOSIS: TIA (transient ischemic attack) [G399]    63year old female has Primary localized osteoarthrosis, lower leg; Hyperlipidemia; Self mutilating behavior; Restless legs syndrome (RLS); Skin growth; URTI (acute upper respiratory infection); Posterior tibial tendonitis, bilateral; Suicidal ideation; Shoulder pain; Paresthesia; Vitamin B12 deficiency; Leg swelling; Bilateral lower extremity edema; Headache; Precordial pain; Left Lateral epicondylitis (tennis elbow); Radial nerve entrapment; GERD (gastroesophageal reflux disease); Diarrhea; Rotator cuff (capsule) sprain; Knee joint replacement by other means; Pain in limb; Venous (peripheral) insufficiency; Chronic pain syndrome; Elbow tendinitis; Fibromyalgia; Depression; Atypical chest pain; Hiatal hernia; Osteoarthritis of hip; DDD (degenerative disc disease), lumbar; Facet syndrome; Fatigue; Allergic rhinitis; Prediabetes; Plantar fasciitis, right; S/P laparoscopic sleeve gastrectomy; Nausea & vomiting; Weakness; Major depressive disorder, recurrent episode, severe ([address] Bilateral leg edema; Morbid obesity with BMI of 40.0-44.9, adult ([address] Hypothyroid; Borderline personality disorder ([address] Light headed; Acute blood loss anemia; Anemia; Self-mutilation; Cellulitis; Shortness of breath; Vertigo; Scalp cyst; Primary insomnia; BPPV (benign paroxysmal positional vertigo); Laceration of left upper extremity; Acute cystitis without hematuria; Open wound of left upper arm; Viral upper respiratory tract infection; Lumbar radiculitis; Leg skin lesion, left; Edema; Nasal trauma; Drug-induced constipation; Abdominal pain;  History of total bilateral knee replacement; SOB (shortness of breath); Back pain; Lumbar strain; Essential hypertension; MARIO (acute kidney injury) ([facility] Lacey's deformity of left heel; Achilles tendinitis of left lower extremity; Dysuria; Plantar fasciitis of left foot; Hypoproteinemia ([address] Bruising; Inflammatory spondylopathy ([facility] Chest pain; H/O angiography; and TIA (transient ischemic attack) on their problem list.    ONSET DATE: 8/2/2021    CHART REVIEW: H&P Kvng Travis is a 63 y.o. female who presents to the emergency department complaining of right numbness and dizziness that started at 8 PM tonight. She describes as a lightheaded sensation. No vertigo or spinning. She states she feels a little off balance and considered using her cane but she did not. She has been able to ambulate. She states she had some right arm numbness which lasted about 5 minutes and resolved. She is been able to use her arms normally. No incoordination. She was actually at [facility] on 7/30/2021. She has been having some intermittent visual problems in her left eye for about a week. She had been seen by her primary care physician and had some laboratory tests done. She been referred to an ophthalmologist.  The ophthalmologist apparently sent her to the ER to rule out TIAs. She had a CT head and CT angiogram done which showed no acute findings. She was discharged home. Since being discharged she said she had one episode where she felt like she had a flash of light going into her eye but it was transient and resolved.   She had no other visual symptoms    CT HEAD 8/2/21  Impression   No acute intracranial abnormality     CHEST X-RAY 8/2/21  Impression   Negative portable chest.       MRI SCHEDULED 8/3/21      Date of Eval: 8/3/2021  Evaluating Therapist: FREDDY De Dios    Current Diet level:  Current Diet : Regular  Current Liquid Diet : Thin      Primary Complaint  Patient Complaint: Patient denies any complaints with swallowing, RN denies concerns    Pain:  Pain Assessment  Pain Assessment: 0-10  Pain Level: 0  Patient's Stated Pain Goal: No pain  Pain Type: Acute pain  Pain Location: Shoulder  Pain Orientation: Right  Pain Descriptors: Aching    Reason for Referral  Fernando Elise was referred for a bedside swallow evaluation to assess the efficiency of her swallow function, identify signs and symptoms of aspiration and make recommendations regarding safe dietary consistencies, effective compensatory strategies, and safe eating environment. Impression  Dysphagia Diagnosis: Swallow function appears grossly intact  Dysphagia Impression :   · Patient accepted and tolerated evaluation at bedside. · Patient oriented x4, verbally responsive and able to follow complex dx. Patient denies speech and language evaluation denying cognitive changes. Oral motor exam indicates impaired right labial symmetry and reduced R labial strength with right facial droop. This did not impact mastication or clearance of bolus as oral phase appears grossly WFL with no pocketing noted. · Patient's pharyngeal stage appears WNL. Patient tolerated with adequate swallow initiation and laryngeal elevation appears WNL. Patient tolerated thin liquids via straw and cup's edge, puree and regular textures with no overt s/s of aspiration or penetration. · Follow up 1-2x for diet tolerance and labial strengthening exercises pending MRI results. Dysphagia Outcome Severity Scale: Level 6: Within functional limits/Modified independence     Treatment Plan  Requires SLP Intervention: Yes  Duration/Frequency of Treatment: 1-2x/ follow up          Recommended Diet and Intervention  Diet Solids Recommendation: Regular  Liquid Consistency Recommendation:  Thin  Recommended Form of Meds: PO          Compensatory Swallowing Strategies  Compensatory Swallowing Strategies: Upright as possible for all oral intake;Remain upright for 30-45 minutes after meals;Small bites/sips    Treatment/Goals  Long-term Goals  Goal 1: The patient will complete oral motor exercises with 90% accuracy given min verbal cues  Dysphagia Goals: The patient will tolerate recommended diet without observed clinical signs of aspiration; The patient/caregiver will demonstrate understanding of compensatory strategies for improved swallowing safety. General  Chart Reviewed: Yes  Subjective  Subjective: Patient pleasant and cooperative  Behavior/Cognition: Alert; Cooperative;Pleasant mood  Communication Observation: Functional  Follows Directions: Complex  Dentition: Adequate  Patient Positioning: Upright in bed  Prior Dysphagia History: None documented  Consistencies Administered: Reg solid; Dysphagia Pureed (Dysphagia I); Dysphagia Soft and Bite-Sized (Dysphagia III); Thin - straw; Thin - cup      Vision/Hearing  Vision: Impaired  Vision Exceptions: Wears glasses for reading;Wears glasses for distance  Hearing: Within functional limits    Oral Motor Deficits  Oral/Motor  Oral Motor: Exceptions to [facility]  Labial ROM: Reduced right  Labial Symmetry: Abnormal symmetry right  Labial Strength: Reduced    Oral Phase Dysfunction  Oral Phase  Oral Phase: WFL  Oral Phase  Oral Phase - Comment: Oral phase appears WFL. Patient tolerated regular textures with adequate mastication and adequate clearance of bolus with no pocketing noted post swallow. Indicators of Pharyngeal Phase Dysfunction   Pharyngeal Phase  Pharyngeal Phase: WNL  Pharyngeal Phase   Pharyngeal: Patient's pharyngeal stage appears WNL. Patient tolerated with adequate swallow initiation and laryngeal elevation appears WNL.  Patient tolerated thin liquids via straw and cup's edge, puree and regular textures with no overt s/s of aspiration or penetration    Prognosis  Prognosis  Prognosis for safe diet advancement: good  Individuals consulted  Consulted and agree with results and recommendations: Patient    Education  Patient Education: Patient educated on purpose of ST services and goals that will be adderssed  Patient Education Response: Verbalizes understanding             Therapy Time  SLP Individual Minutes  Time In: 4254  Time Out: 7364  Minutes: [address], [address] [facility], [address] [place] [address]  Speech-Language Pathologist  On 08/03/21 at 9:05 AM

## 2021-08-03 NOTE — PLAN OF CARE
STROKE TEAM PLAN OF CARE    Name:  Zaida Navarrete (57 y.o., female)  : 1960  MRN:  [id number]  Today's Date: 2021    Stroke team contacted at: 21:31  History obtained from: emergency physician    Zaida Navarrete is a 63 y.o. female who presented with facial droop and decreased sensation. Briefly, pt has been having intermittent visual symptoms for the past week. Ophthalmology evaluated and recommended consideration for TIA. She was admitted earlier in the week and had vessel imaging then. At around 8pm, pt started having numbness in the right arm that lasted appx five minutes, which was followed by lightheadedness. I understand she was able to ambulate without difficulty. In the ED, lower facial droop and decreased sensation in the right arm were noted, but no other deficits reported. CTA HEAD NECK W CONTRAST   Final Result   No significant arterial abnormality of the head and neck. Mild increased enhancement of left parotid relative to the right. Correlate   clinically for possible mild left parotiditis. This scan was analyzed using [employer]. ai contact LVO. Identification of suspected   findings is not for diagnostic use beyond notification. Viz LVO is limited to   analysis of imaging data and should not be used in-lieu of full patient   evaluation or relied upon to make or confirm diagnosis. CT HEAD WO CONTRAST   Final Result   No acute intracranial abnormality. Stroke alert results were called by Dr. Bucky Mcconnell MD to Juan Velazquez on 2021 at 22:04. Acute Ischemic Stroke Clinical Decision Making:    (1) Intravenous tPA:    The patient is not a candidate for iv TPA based on:  Symptoms nondisabling ; additionally suspicious for some degree of symptoms over the past appx week. (2) Angiography / Thrombectomy:  The patient does not have evidence of a proximal large vessel occlusion on CTA, and therefore is not an EVT candidate.      For any additional questions regarding acute stroke care, please feel free to contact the  Stroke Team at [phone].      MD Kd   Stroke Team   8/2/2021 11:09 PM

## 2021-08-03 NOTE — PROGRESS NOTES
Pt assisted with changing into a MRI compatible gown. Pt wanting to keep her shorts and underwear on but VU that they must be removed along with her glasses prior to going down to MRI.

## 2021-08-03 NOTE — PROGRESS NOTES
NAME:  Andrew Gonzales  YOB: 1960  MEDICAL RECORD NUMBER:  [id number]  TODAYS DATE:  8/3/2021    Discussed personal risk factors for Stroke /TIA with patient/family, and ways to reduce the risk for a recurrent stroke. Patient's personal risk factors which were identified are:     [] Alcohol Abuse: check with your physician before any alcohol consumption. [] Atrial fibrillation: may cause blood clots. [] Drug Abuse: Seek help, talk with your doctor  [] Clotting Disorder  [] Diabetes  [x] Family history of stroke or heart disease  [x] High Blood Pressure/Hypertension: work with your physician.  [] High cholesterol: monitor cholesterol levels with your physician. [x] Overweight/Obesity: work with your physician for your ideal body weight. [x] Physical Inactivity: get regular exercise as directed by your physician. [] Personal history of previous TIA or stroke  [] Poor Diet; decrease salt (sodium) in your diet, follow diet directed by physician. [] Smoking: Cigarette/Cigar: stop smoking. Advised pt. that you can reduce your risk for stroke/TIA by modifying/controlling the risk factors that you have. Pt.advised to take the medications as prescribed, which will be detailed in the discharge instructions, and to not stop taking them without consulting their physician. In addition, pt. advised to maintain a healthy diet, exercise regularly and to not smoke. [facility]'s Stroke treatment and prevention, Managing your recovery  notebook  provided and/or reviewed  with patient/family. The notebook includes, but not limited to, sections addressing warning signs & symptoms of a stroke, which are: sudden numbness or weakness especially on one side of the body, sudden confusion, difficulty speaking or understanding, sudden changes in vision, sudden dizziness or loss of balance/ coordination, sudden severe headache, syncope and seizure.   The need to call EMS (911) immediately if signs & symptoms occur is emphasized . The notebook also provides education on Stroke community resources and stroke advocacy. The need for follow-up after discharge was highlighted with patient/family with them being able to repeat understanding of the importance of this.       Electronically signed by Sanjuana Pop RN on 8/3/2021 at 5:02 PM

## 2021-08-03 NOTE — PROGRESS NOTES
Physical Therapy    Facility/Department: [address] PROGRESSIVE CARE  Initial Assessment    NAME: Marva Castaneda  : 1960  MRN: [id number]    Date of Service: 8/3/2021    Discharge Recommendations:  Home independently   PT Equipment Recommendations  Equipment Needed: No  Marva Castaneda scored a 21/24 on the AM-PAC short mobility form. At this time, no further PT is recommended upon discharge  Recommend patient returns to prior setting with prior services. If patient is discharged prior to the next physical therapy visit;  Please see last written PT note for discharge status. Assessment   Body structures, Functions, Activity limitations: Decreased functional mobility   Assessment: pt is a 62 yo female who was adm to hosp with L facial droop and numbness in L side of face and UE; pt appears close to her baseline for functional mobility tasks however will follow while in hosp to decrease secondary effects of immobility. Pt will be safe to return home without any further therapy  Prognosis: Good  Decision Making: Low Complexity  PT Education: PT Role;General Safety  Barriers to Learning: none  No Skilled PT: Safe to return home  REQUIRES PT FOLLOW UP: Yes  Activity Tolerance  Activity Tolerance: Patient Tolerated treatment well       Patient Diagnosis(es): The encounter diagnosis was Cerebrovascular accident (CVA), unspecified mechanism ([address]      has a past medical history of Acquired hyperlipoproteinemia, MARIO (acute kidney injury) ([address] Allergic rhinitis, Anxiety, Arthritis, Bilateral lower extremity edema, Bipolar 1 disorder (HCC), Chronic pain syndrome, DDD (degenerative disc disease), lumbar, Depression, Depression, Disease of gallbladder, Dizziness, DJD (degenerative joint disease) of hip, Edema, Elbow tendinitis, Essential hypertension, Facet syndrome, Fibromyalgia, Fracture of nasal bone, GERD (gastroesophageal reflux disease), Headache, Hiatal hernia, History of blood transfusion, Hyperlipidemia, Insomnia, Osteoarthritis, Prediabetes, Rash, Self mutilating behavior, Sleep apnea, Suicidal ideation, Thyroid disorder, and Vertigo. has a past surgical history that includes Ankle surgery; Appendectomy; Total knee arthroplasty (12/10/10); Total knee arthroplasty (3/30/10); shoulder surgery; Colonoscopy (04/16/2013); Upper gastrointestinal endoscopy (4/16/2013); Cholecystectomy; hysteroscopy (N/A, 12-30-13); Upper gastrointestinal endoscopy (3/20/2014); Dilation and curettage of uterus; joint replacement (Bilateral); Abdomen surgery (5/28/2014); Sleeve Gastroplasty (May 28, 2014); Endoscopy, colon, diagnostic; shoulder surgery (Right); Ankle surgery; other surgical history (9/11/2015); Foot surgery (Right, 05/27/2016); and Achilles tendon surgery (Left, 3/9/2020). Restrictions  Restrictions/Precautions  Restrictions/Precautions: Fall Risk (intermittent dizziness which is not new per pt report)  Vision/Hearing  Vision: Impaired  Vision Exceptions: Wears glasses at all times  Hearing: Within functional limits     Subjective  General  Chart Reviewed: Yes  Patient assessed for rehabilitation services?: Yes  Additional Pertinent Hx: Maria L Iniguez is a 63 y.o. female who presents to the emergency department complaining of right numbness and dizziness that started at 8 PM tonight. She describes as a lightheaded sensation. No vertigo or spinning. She states she feels a little off balance and considered using her cane but she did not. She has been able to ambulate. She states she had some right arm numbness which lasted about 5 minutes and resolved. She is been able to use her arms normally. No incoordination. She was actually at [facility] on 7/30/2021. She has been having some intermittent visual problems in her left eye for about a week. She had been seen by her primary care physician and had some laboratory tests done.   She been referred to an ophthalmologist.  The ophthalmologist apparently sent her to the ER to rule out TIAs. She had a CT head and CT angiogram done which showed no acute findings. She was discharged home. Since being discharged she said she had one episode where she felt like she had a flash of light going into her eye but it was transient and resolved. She had no other visual symptoms.   Response To Previous Treatment: Not applicable  Family / Caregiver Present: No  Referring Practitioner: Dr Jones April  Referral Date : 08/03/21  Follows Commands: Within Functional Limits  Subjective  Subjective: pt very pleasant and agreeable to working with therapy  Pain Screening  Patient Currently in Pain: Denies          Orientation  Orientation  Overall Orientation Status: Within Functional Limits  Social/Functional History  Social/Functional History  Lives With: Alone ((cat))  Type of Home: Apartment  Home Layout: One level  Home Access: Elevator  Bathroom Shower/Tub: Tub/Shower unit  Bathroom Toilet: Handicap height  Bathroom Equipment: Tub transfer bench, Grab bars in shower  Home Equipment: Cane, Crutches, Rolling walker (hurry cane)  ADL Assistance: Independent  Homemaking Assistance: Independent  Homemaking Responsibilities: Yes  Ambulation Assistance: Independent  Transfer Assistance: Independent  Active : Yes  Cognition   Cognition  Overall Cognitive Status: WFL    Objective          AROM RLE (degrees)  RLE AROM: WFL  AROM LLE (degrees)  LLE AROM : WFL  Strength RLE  Strength RLE: WFL  Strength LLE  Strength LLE: WFL  Comment: slightly weaker in hip flex but not significantly     Sensation  Overall Sensation Status: Impaired  Light Touch: Partial deficits in the RUE  Bed mobility  Supine to Sit: Independent  Sit to Supine: Independent  Scooting: Independent  Transfers  Sit to Stand: Supervision;Modified independent  Stand to sit: Supervision;Modified independent  Ambulation  Ambulation?: Yes  Ambulation 1  Surface: level tile  Device: No Device  Assistance: Stand by assistance;Supervision  Quality of Gait: increased lateral sway due to body habitus; no LOB or dizziness noted  Distance: 60'     Balance  Sitting - Static: Good  Sitting - Dynamic: Good  Standing - Static: Good  Standing - Dynamic: Good        Plan   Plan  Times per week: 1-2 more visits  Current Treatment Recommendations: Balance Training, Functional Mobility Training  Safety Devices  Type of devices: Call light within reach, Bed alarm in place, Left in bed, Nurse notified    G-Code       OutComes Score                                                  AM-PAC Score  AM-PAC Inpatient Mobility Raw Score : 21 (08/03/21 0814)  AM-PAC Inpatient T-Scale Score : 50.25 (08/03/21 0814)  Mobility Inpatient CMS 0-100% Score: 28.97 (08/03/21 0814)  Mobility Inpatient CMS G-Code Modifier : Carter Laws (08/03/21 5537)          Goals  Short term goals  Time Frame for Short term goals: by discharge  Short term goal 1: amb 100-200' without AD MI without feeling dizzy  Patient Goals   Patient goals : to go hoome       Therapy Time   Individual Concurrent Group Co-treatment   Time In 0742         Time Out 0814         Minutes 32                 SIDRA SALCIDO PT    Electronically signed by SIDRA SALCIDO PT on 8/3/2021 at 8:14 AM

## 2021-08-03 NOTE — ED NOTES
Report called to [place], RN on 5W. Denies further questions.      Julianne Guardado RN  08/03/21 2917

## 2021-08-03 NOTE — PROGRESS NOTES
Sitter at bedside for patient safety. Constant observation flowsheet initiated. Pt. in chair resting comfortably. No needs expressed at this time.      Electronically signed by Kelsy Maddox on 8/3/2021 at 5:14 PM

## 2021-08-03 NOTE — CONSULTS
Neurology Consult Note  Reason for Consult: left eye vision changes, right arm numbness     Chief complaint: \"vision changes and RUE paresthesias. \"    Dr Genevieve Schmidt MD asked me to see Aliya Fox in consultation for evaluation of left eye vision changes, right arm numbness    History of Present Illness:  I obtained my information via the patient, supplemented with chart review. Aliya Fox is a 63 y.o. female with hx of bipolar 1, chronic pain, DDD, dizziness/vertigo, HTN, fibromyalgia,  Headaches, HLD, pre diabetes, Suicidal ideations with self mutilating behavior  who presented to the ED on 8/2/21 for evaluation of dizziness and transient right arm numbness. Per my encounter the patient tells me that last week while at work she had transient left eye-inner, upper quadrant transient vision loss described as flesh colored with associated right eye blurry vision which lasted 10 minutes. She has had multiple spells, about 5 or so in the last week including 2 event on one day of identical presentation. She had seen her opthalmologist for which she reports they were concerned she was having TIA's of her eye and started her on 81mg ASA. She was also evaluated at [facility] ED on 7/30. She had plans for follow up TTE with her PCP. On the night of admission the patient had acute onset RUE tingling and numbness for which she notified her PCP who advised she come to the ED for further evaluation. She has had some mild mid frontal intermittent headache but denies any weakness, speech changes, chest pain, shortness of breath, coordination difficulty, temporal or scalp tenderness or painful chewing. No recent illness or known wounds. She denies any correlation of her RUE symptoms with her left eye visual symptoms. No correlation with her headache, she does have chronic headaches. Upon arrival to the ED BP was 178/101, vitals otherwise stable.  NIHSS 2, right facial droop was noted, decreased right face and RUE sensation. Stroke team was consulted for which patient was not a candidate for TPA. CT head w/o and CTA head & neck was completed and without acute findings, LVO or significant stenosis. She was admitted for further evaluation. MRI Brain was completed for which did not show acute stroke. She does have some elevation to inflammatory markers. She reports that since admission her RUE numbness and tingling has grossly resolved though she reports asymmetric sensation to touch. Her transient eye symptoms have also been present this admission. Home medications do include statin therapy. She denies any hx of stroke, diabetes, known inflammatory or autoimmune conditions. Nothing like this has happened before. Upon conclusion of encounter when addressing review of symptoms patient did report worsening desire of suicidal ideations and worsening depression. These are chronic issues. She has no plan but states that if she were to she would cut herself for which she also has hx. I did notify nursing upon conclusion of encounter.          Medical History:  Past Medical History:   Diagnosis Date    Acquired hyperlipoproteinemia     [address]IO (acute kidney [facility]) ([facility] 9/30/2019    Allergic rhinitis     Anxiety     Arthritis     Bilateral lower extremity edema     Bipolar 1 disorder (HCC)     Chronic pain syndrome     DDD (degenerative disc disease), lumbar     Depression     Depression     Disease of gallbladder     Dizziness     DJD (degenerative joint disease) of hip     Edema 12/29/2009    Elbow tendinitis     Essential hypertension 8/19/2019    Facet syndrome     Fibromyalgia     Fracture of nasal bone     GERD (gastroesophageal reflux disease)     Headache     Hiatal hernia     History of blood transfusion     anemia    Hyperlipidemia     Insomnia     Osteoarthritis     Prediabetes     Rash     Self mutilating behavior     cutter pt states she has not done in months- in therapy    Sleep apnea     no CPAP    Suicidal ideation     Thyroid disorder     hypothyroidism    Vertigo      Past Surgical History:   Procedure Laterality Date    ABDOMEN SURGERY  5/28/2014    LAPAROSCOPIC SLEEVE GASTRECTOMY, EXTENSIVE LYSIS OF ADHESIONS    ACHILLES TENDON SURGERY Left 3/9/2020    LEFT CALCANEOUS PARTIAL EXCISION, SECONDARY REPAIR OF ACHILLES TENDON performed by Xavier Reilly [facility] at [facility]      open , Ex-lap    CHOLECYSTECTOMY      open    COLONOSCOPY  04/16/2013    dr Tereza Duque 2018     DILATION AND CURETTAGE OF UTERUS      ENDOSCOPY, COLON, DIAGNOSTIC      FOOT SURGERY Right 05/27/2016    ARTHROPLASTY RIGHT 5TH DIGIT      HYSTEROSCOPY N/A 12-30-13    Hysteroscopy Dilitation and Curettage    JOINT REPLACEMENT Bilateral     knee    OTHER SURGICAL HISTORY  9/11/2015    ARTHROPLASTY 5TH DIGIT LEFT FOOT          SHOULDER SURGERY      right    SHOULDER SURGERY Right     SLEEVE GASTROPLASTY  May 28, 2014    Dasybil    TOTAL KNEE ARTHROPLASTY  12/10/10    Right    TOTAL KNEE ARTHROPLASTY  3/30/10    Left    UPPER GASTROINTESTINAL ENDOSCOPY  4/16/2013    dr Sarabia Taylor ENDOSCOPY  3/20/2014    dr Tori Shabazz     Scheduled Meds:   ARIPiprazole  30 mg Oral Daily    atorvastatin  40 mg Oral Daily    vitamin D  5,000 Units Oral BID    DULoxetine  60 mg Oral BID    pantoprazole  40 mg Oral QAM AC    furosemide  20 mg Oral Daily    levothyroxine  88 mcg Oral Daily    potassium chloride  20 mEq Oral Daily    pregabalin  100 mg Oral TID    propranolol  40 mg Oral TID    QUEtiapine  200 mg Oral Nightly    rOPINIRole  2 mg Oral Nightly    sodium chloride flush  5-40 mL Intravenous 2 times per day    enoxaparin  40 mg Subcutaneous Daily    clopidogrel  75 mg Oral Daily    aspirin  81 mg Oral Daily     Continuous Infusions:   sodium chloride       PRN Meds:.nitroGLYCERIN, sodium chloride flush, sodium chloride, ondansetron **OR** ondansetron, polyethylene glycol, perflutren lipid microspheres    Medications Prior to Admission: pantoprazole (PROTONIX) 40 MG tablet, Take 40 mg by mouth daily  pregabalin (LYRICA) 100 MG capsule, Take 1 capsule by mouth 3 times daily for 30 days. betamethasone dipropionate (DIPROLENE) 0.05 % cream, APPLY EXTERNALLY TO THE AFFECTED AREA TWICE DAILY  ondansetron (ZOFRAN) 4 MG tablet, TAKE 1 TABLET BY MOUTH EVERY 8 HOURS AS NEEDED FOR NAUSEA OR VOMITING  potassium chloride (KLOR-CON M) 20 MEQ extended release tablet, Take 1 tablet by mouth daily  Compression Stockings MISC, by Does not apply route 20mmHg- knee high, closed toe. wear daily.  Fit to size  rOPINIRole (REQUIP) 1 MG tablet, TAKE 2 TABLETS BY MOUTH EVERY NIGHT  vitamin B-12 (CYANOCOBALAMIN) 1000 MCG tablet, Take 1 tablet by mouth daily  levothyroxine (SYNTHROID) 88 MCG tablet, TAKE 1 TABLET BY MOUTH DAILY  atorvastatin (LIPITOR) 40 MG tablet, TAKE 1 TABLET BY MOUTH DAILY (Patient taking differently: TAKE 1 TABLET BY MOUTH NIGHTLY)  Cholecalciferol (VITAMIN D3) 125 MCG (5000 UT) TABS, Take 1 tablet by mouth 2 times daily  DULoxetine (CYMBALTA) 60 MG extended release capsule, Take 1 capsule by mouth 2 times daily  ARIPiprazole (ABILIFY) 30 MG tablet, Take 30 mg by mouth daily   propranolol (INDERAL) 40 MG tablet, Take 1 tablet by mouth 3 times daily anxiety  QUEtiapine (SEROQUEL) 200 MG tablet, Take 200 mg by mouth nightly  torsemide (DEMADEX) 20 MG tablet, TAKE 1 TABLET BY MOUTH TWICE DAILY BEFORE MEALS  [DISCONTINUED] triamcinolone (KENALOG) 0.025 % cream, Apply Topically 2x daily  [DISCONTINUED] celecoxib (CELEBREX) 200 MG capsule, Take 1 capsule by mouth daily  [DISCONTINUED] tamsulosin (FLOMAX) 0.4 MG capsule, TAKE ONE CAPSULE BY MOUTH DAILY AS NEEDED FOR KIDNEY STONE--only needs 30 pills --JJL  hydrocortisone (CORTEF) 10 MG tablet, Take 1 tablet daily as needed for episodes of weakness and chest pain--use sparingly  [DISCONTINUED] nitroGLYCERIN (NITROSTAT) 0.4 MG SL tablet, Place 1 tablet under the tongue every 5 minutes as needed for Chest pain up to max of 3 total doses. If no relief after 1 dose, call 911. meclizine (ANTIVERT) 25 MG tablet, Take 25 mg by mouth 3 times daily as needed    Allergies   Allergen Reactions    Polysporin [Bacitracin-Polymyxin B] Other (See Comments) and Itching     Pt breaks out in blisters. Pt breaks out in blisters.  Lorazepam Other (See Comments)     \"went crazy\"    Neomycin-Polymyxin-Gramicidin     Sulfa Antibiotics Hives       Family History   Problem Relation Age of Onset    Heart Disease Mother     Heart Failure Mother     High Cholesterol Mother     High Blood Pressure Mother     Stroke Mother     Birth Defects Mother     Other Mother         VV    High Blood Pressure Father     COPD Father     Diabetes Brother     Stroke Maternal Grandmother     Arthritis Maternal Grandmother     Heart Failure Brother     Stroke Brother     Birth Defects Maternal Grandfather     Arthritis Paternal Grandmother        Social History     Tobacco Use   Smoking Status Never Smoker   Smokeless Tobacco Never Used   Tobacco Comment    Never smoked     Social History     Substance and Sexual Activity   Drug Use No     Social History     Substance and Sexual Activity   Alcohol Use No    Alcohol/week: 0.0 standard drinks       ROS:  Constitutional- No weight loss or fevers  Eyes- No diplopia. No photophobia. +vision loss. + blurry vision. Ears/nose/throat- No dysphagia. No Dysarthria  Cardiovascular- No palpitations. No chest pain  Respiratory- No dyspnea. No Cough  Gastrointestinal- No Abdominal pain. No Vomiting. Genitourinary- No incontinence. No urinary retention  Musculoskeletal- No myalgia. + arthralgia bilateral knees. Skin- No rash. No easy bruising. Psychiatric- + depression. + anxiety +suicidal ideations. Endocrine- No diabetes. + thyroid issues.   Hematologic- No bleeding difficulty. No fatigue  Neurologic- No weakness. + mild Headache. Exam:  Vitals:    08/03/21 0104 08/03/21 0352 08/03/21 0735 08/03/21 0852   BP: 139/82 118/75  (!) 154/88   Pulse: 74 82  74   Resp: 18 16  18   Temp: 98.7 °F (37.1 °C) 97.8 °F (36.6 °C)  97.8 °F (36.6 °C)   TempSrc: Oral Oral  Oral   SpO2: 96% 94% 96% 94%   Weight: (!) 328 lb 0.7 oz (148.8 kg)      Height: 5' 4\" (1.626 m)         Constitutional    Vital signs: BP, HR, and RR reviewed   General Alert, no distress, well-nourished  Eyes: unable to visualize the fundi  Cardiovascular: pulses symmetric in all 4 extremities. + BLE edema, redness and scaling. Psychiatric: cooperative with examination, no  psychotic behavior noted. +expresses worsening depression and  suicidal ideations. Neurologic  Mental status:   orientation to person, place, time and situation   General fund of knowledge:  grossly intact   Memory: Short term and long term intact   Attention intact as able to attend well to the exam     Language fluent in conversation   Comprehension intact; follows simple commands  Cranial nerves:   CN2: Visual Fields full w/o extinction on confrontational testing,   CN 3,4,6: extraocular muscles intact, PERRLA 4mm. Right eyelid droop which seemed to be more prominent on conclusion of exam. Activates well. CN5: V1: V2: V3: intact to pinprick sensation bilaterally  CN7: On initial encounter no notable facial asymmetry with speech or smile on formal testing. Upon conclusion of encounter appeared to have right facial droop. Activates well. CN8: hearing grossly intact to conversation. CN9/10: palate elevated symmetrically  CN11: trap full strength on shoulder shrug bilaterally, SCM without weakness. CN12: tongue midline with protrusion. Able to move tongue side to side. Strength: Grasp: BUE 5/5 . RUE: 5/5 Shoulder abduction, elbow flexion, elbow extension. LUE: 5/5  Shoulder abduction, elbow flexion, elbow extension.     RLE: 5/5 Inflammatory markers are elevated which is non specific. MRI Brain was without cause. Etiology is unclear. Difficult to localize and possibly 2 different causes. Cannot fully exclude recurrent TIA vs. Other central lesion, underlying inflammatory cause. Vs. Other. Recommendations  - Repeat MRI brain w/ and MRI cervical spine w/ w/o (ordered)  - Agree with trending CRP and Sed rate. Consider investigation into BLE redness and edema per primary team   - Awaiting TTE read. - Agree with initiation of 81mg ASA. Continue statin therapy. - Agree with checking LDL, HbA1c.    - Maintain good control of secondary vascular risk factors with LDL < 70, HbA1c <7, Long term BP goal < 140/90  - Psych consultation.   - Will consider additional work up pending the above.      Juanjo Bachelor, [address] Neurology    A copy of this note was provided for Dr Trice Pederson MD

## 2021-08-03 NOTE — PROGRESS NOTES
NAME:  Andrew Gonzales  YOB: 1960  MEDICAL RECORD NUMBER:  [id number]  TODAYS DATE:  8/3/2021    Discussed personal risk factors for Stroke /TIA with patient/family, and ways to reduce the risk for a recurrent stroke. Patient's personal risk factors which were identified are:     [] Alcohol Abuse: check with your physician before any alcohol consumption. [] Atrial fibrillation: may cause blood clots. [] Drug Abuse: Seek help, talk with your doctor  [] Clotting Disorder  [] Diabetes  [x] Family history of stroke or heart disease  [] High Blood Pressure/Hypertension: work with your physician. [x] High cholesterol: monitor cholesterol levels with your physician. [x] Overweight/Obesity: work with your physician for your ideal body weight. [x] Physical Inactivity: get regular exercise as directed by your physician. [x] Personal history of previous TIA or stroke  [x] Poor Diet; decrease salt (sodium) in your diet, follow diet directed by physician. [] Smoking: Cigarette/Cigar: stop smoking. Advised pt. that you can reduce your risk for stroke/TIA by modifying/controlling the risk factors that you have. Pt.advised to take the medications as prescribed, which will be detailed in the discharge instructions, and to not stop taking them without consulting their physician. In addition, pt. advised to maintain a healthy diet, exercise regularly and to not smoke. [facility]'s Stroke treatment and prevention, Managing your recovery  notebook  provided and/or reviewed  with patient/family. The notebook includes, but not limited to, sections addressing warning signs & symptoms of a stroke, which are: sudden numbness or weakness especially on one side of the body, sudden confusion, difficulty speaking or understanding, sudden changes in vision, sudden dizziness or loss of balance/ coordination, sudden severe headache, syncope and seizure.   The need to call EMS (911) immediately if signs & symptoms occur is emphasized . The notebook also provides education on Stroke community resources and stroke advocacy. The need for follow-up after discharge was highlighted with patient/family with them being able to repeat understanding of the importance of this.       Electronically signed by Ludy Power RN on 8/3/2021 at 2:24 AM

## 2021-08-04 VITALS
TEMPERATURE: 98.4 F | SYSTOLIC BLOOD PRESSURE: 121 MMHG | BODY MASS INDEX: 50.02 KG/M2 | RESPIRATION RATE: 14 BRPM | HEIGHT: 64 IN | HEART RATE: 74 BPM | OXYGEN SATURATION: 92 % | DIASTOLIC BLOOD PRESSURE: 60 MMHG | WEIGHT: 293 LBS

## 2021-08-04 LAB
ANION GAP SERPL CALCULATED.3IONS-SCNC: 17 MMOL/L (ref 3–16)
BUN BLDV-MCNC: 18 MG/DL (ref 7–20)
CALCIUM SERPL-MCNC: 9.6 MG/DL (ref 8.3–10.6)
CHLORIDE BLD-SCNC: 108 MMOL/L (ref 99–110)
CHOLESTEROL, TOTAL: 268 MG/DL (ref 0–199)
CO2: 22 MMOL/L (ref 21–32)
CREAT SERPL-MCNC: 0.8 MG/DL (ref 0.6–1.2)
ESTIMATED AVERAGE GLUCOSE: 125.5 MG/DL
GFR AFRICAN AMERICAN: >60
GFR NON-AFRICAN AMERICAN: >60
GLUCOSE BLD-MCNC: 108 MG/DL (ref 70–99)
HBA1C MFR BLD: 6 %
HCT VFR BLD CALC: 37 % (ref 36–48)
HDLC SERPL-MCNC: 70 MG/DL (ref 40–60)
HEMOGLOBIN: 11.9 G/DL (ref 12–16)
LDL CHOLESTEROL CALCULATED: 169 MG/DL
MCH RBC QN AUTO: 29.8 PG (ref 26–34)
MCHC RBC AUTO-ENTMCNC: 32.3 G/DL (ref 31–36)
MCV RBC AUTO: 92.1 FL (ref 80–100)
PDW BLD-RTO: 18.1 % (ref 12.4–15.4)
PLATELET # BLD: 186 K/UL (ref 135–450)
PLATELET SLIDE REVIEW: ADEQUATE
PMV BLD AUTO: 9.6 FL (ref 5–10.5)
POTASSIUM SERPL-SCNC: 4.1 MMOL/L (ref 3.5–5.1)
RBC # BLD: 4.01 M/UL (ref 4–5.2)
SLIDE REVIEW: ABNORMAL
SODIUM BLD-SCNC: 147 MMOL/L (ref 136–145)
TRIGL SERPL-MCNC: 147 MG/DL (ref 0–150)
VLDLC SERPL CALC-MCNC: 29 MG/DL
WBC # BLD: 9.1 K/UL (ref 4–11)

## 2021-08-04 PROCEDURE — 6370000000 HC RX 637 (ALT 250 FOR IP): Performed by: INTERNAL MEDICINE

## 2021-08-04 PROCEDURE — 2580000003 HC RX 258: Performed by: INTERNAL MEDICINE

## 2021-08-04 PROCEDURE — 99239 HOSP IP/OBS DSCHRG MGMT >30: CPT | Performed by: INTERNAL MEDICINE

## 2021-08-04 PROCEDURE — G0378 HOSPITAL OBSERVATION PER HR: HCPCS

## 2021-08-04 PROCEDURE — 80061 LIPID PANEL: CPT

## 2021-08-04 PROCEDURE — 36415 COLL VENOUS BLD VENIPUNCTURE: CPT

## 2021-08-04 PROCEDURE — 96372 THER/PROPH/DIAG INJ SC/IM: CPT

## 2021-08-04 PROCEDURE — 83036 HEMOGLOBIN GLYCOSYLATED A1C: CPT

## 2021-08-04 PROCEDURE — 85027 COMPLETE CBC AUTOMATED: CPT

## 2021-08-04 PROCEDURE — 94760 N-INVAS EAR/PLS OXIMETRY 1: CPT

## 2021-08-04 PROCEDURE — 80048 BASIC METABOLIC PNL TOTAL CA: CPT

## 2021-08-04 PROCEDURE — 6360000002 HC RX W HCPCS: Performed by: INTERNAL MEDICINE

## 2021-08-04 RX ORDER — ASPIRIN 81 MG/1
81 TABLET ORAL DAILY
Qty: 30 TABLET | Refills: 3 | Status: SHIPPED | OUTPATIENT
Start: 2021-08-04

## 2021-08-04 RX ORDER — CLOPIDOGREL BISULFATE 75 MG/1
75 TABLET ORAL DAILY
Qty: 30 TABLET | Refills: 3 | Status: SHIPPED | OUTPATIENT
Start: 2021-08-04 | End: 2021-09-03

## 2021-08-04 RX ORDER — ATORVASTATIN CALCIUM 40 MG/1
80 TABLET, FILM COATED ORAL DAILY
Qty: 90 TABLET | Refills: 0 | Status: SHIPPED | OUTPATIENT
Start: 2021-08-04 | End: 2021-12-14

## 2021-08-04 RX ADMIN — CLOPIDOGREL BISULFATE 75 MG: 75 TABLET ORAL at 08:42

## 2021-08-04 RX ADMIN — LEVOTHYROXINE SODIUM 88 MCG: 0.09 TABLET ORAL at 05:54

## 2021-08-04 RX ADMIN — PREGABALIN 100 MG: 100 CAPSULE ORAL at 08:41

## 2021-08-04 RX ADMIN — POTASSIUM CHLORIDE 20 MEQ: 20 TABLET, EXTENDED RELEASE ORAL at 08:41

## 2021-08-04 RX ADMIN — Medication 10 ML: at 08:52

## 2021-08-04 RX ADMIN — PROPRANOLOL HYDROCHLORIDE 40 MG: 40 TABLET ORAL at 08:41

## 2021-08-04 RX ADMIN — Medication 5000 UNITS: at 08:41

## 2021-08-04 RX ADMIN — PANTOPRAZOLE SODIUM 40 MG: 40 TABLET, DELAYED RELEASE ORAL at 05:54

## 2021-08-04 RX ADMIN — ENOXAPARIN SODIUM 40 MG: 40 INJECTION SUBCUTANEOUS at 08:47

## 2021-08-04 RX ADMIN — ASPIRIN 81 MG: 81 TABLET, FILM COATED ORAL at 08:41

## 2021-08-04 RX ADMIN — DULOXETINE HYDROCHLORIDE 60 MG: 60 CAPSULE, DELAYED RELEASE ORAL at 08:42

## 2021-08-04 RX ADMIN — ARIPIPRAZOLE 30 MG: 10 TABLET ORAL at 08:51

## 2021-08-04 RX ADMIN — FUROSEMIDE 20 MG: 20 TABLET ORAL at 08:42

## 2021-08-04 RX ADMIN — ATORVASTATIN CALCIUM 40 MG: 40 TABLET, FILM COATED ORAL at 08:42

## 2021-08-04 ASSESSMENT — PAIN SCALES - GENERAL
PAINLEVEL_OUTOF10: 0

## 2021-08-04 NOTE — PROGRESS NOTES
Neurology Progress Note - 08/04/21      Interval History  - no new events of vision loss. RUE is tingling now. Numbness is the same as yesterday. She denies headache.     Past Medical History:   Diagnosis Date    Acquired hyperlipoproteinemia     MARIO (acute kidney injury) ([facility] [address] 9/30/2019    Allergic rhinitis     Anxiety     Arthritis     Bilateral lower extremity edema     Bipolar 1 disorder (HCC)     Chronic pain syndrome     DDD (degenerative disc disease), lumbar     Depression     Depression     Disease of gallbladder     Dizziness     DJD (degenerative joint disease) of hip     Edema 12/29/2009    Elbow tendinitis     Essential hypertension 8/19/2019    Facet syndrome     Fibromyalgia     Fracture of nasal bone     GERD (gastroesophageal reflux disease)     Headache     Hiatal hernia     History of blood transfusion     anemia    Hyperlipidemia     Insomnia     Osteoarthritis     Prediabetes     Rash     Self mutilating behavior     cutter pt states she has not done in months- in therapy    Sleep apnea     no CPAP    Suicidal ideation     Thyroid disorder     hypothyroidism    Vertigo        Past Surgical History:   Procedure Laterality Date    ABDOMEN SURGERY  5/28/2014    LAPAROSCOPIC SLEEVE GASTRECTOMY, EXTENSIVE LYSIS OF ADHESIONS    ACHILLES TENDON SURGERY Left 3/9/2020    LEFT CALCANEOUS PARTIAL EXCISION, SECONDARY REPAIR OF ACHILLES TENDON performed by Eulogio Armenta MD at [facility]      open , Ex-lap    CHOLECYSTECTOMY      open    COLONOSCOPY  04/16/2013    dr Eliud Mayo 2018     DILATION AND CURETTAGE OF UTERUS      ENDOSCOPY, COLON, DIAGNOSTIC      FOOT SURGERY Right 05/27/2016    ARTHROPLASTY RIGHT 5TH DIGIT      HYSTEROSCOPY N/A 12-30-13    Hysteroscopy Dilitation and Curettage    JOINT REPLACEMENT Bilateral     knee    OTHER SURGICAL HISTORY  9/11/2015    ARTHROPLASTY 5TH DIGIT LEFT FOOT  SHOULDER SURGERY      right    SHOULDER SURGERY Right     SLEEVE GASTROPLASTY  May 28, 2014    Rosi    TOTAL KNEE ARTHROPLASTY  12/10/10    Right    TOTAL KNEE ARTHROPLASTY  3/30/10    Left    UPPER GASTROINTESTINAL ENDOSCOPY  4/16/2013    dr Leila Cassidy ENDOSCOPY  3/20/2014    dr Dereck Felty       Patient  reports that she has never smoked. She has never used smokeless tobacco. She reports that she does not drink alcohol and does not use drugs. Patient's family history includes Arthritis in her maternal grandmother and paternal grandmother; Birth Defects in her maternal grandfather and mother; COPD in her father; Diabetes in her brother; Heart Disease in her mother; Heart Failure in her brother and mother; High Blood Pressure in her father and mother; High Cholesterol in her mother; Other in her mother; Stroke in her brother, maternal grandmother, and mother. Patient is allergic to polysporin [bacitracin-polymyxin b], lorazepam, neomycin-polymyxin-gramicidin, and sulfa antibiotics.       Current Facility-Administered Medications:     ARIPiprazole (ABILIFY) tablet 30 mg, 30 mg, Oral, Daily, Amanda Hayden MD, 30 mg at 08/04/21 0851    atorvastatin (LIPITOR) tablet 40 mg, 40 mg, Oral, Daily, Amanda Hayden MD, 40 mg at 08/04/21 7948    vitamin D (CHOLECALCIFEROL) capsule 5,000 Units, 5,000 Units, Oral, BID, Amanda Hayden MD, 5,000 Units at 08/04/21 0841    DULoxetine (CYMBALTA) extended release capsule 60 mg, 60 mg, Oral, BID, Amanda Hayden MD, 60 mg at 08/04/21 0842    pantoprazole (PROTONIX) tablet 40 mg, 40 mg, Oral, QAM AC, Amanda Hayden MD, 40 mg at 08/04/21 0554    furosemide (LASIX) tablet 20 mg, 20 mg, Oral, Daily, Amanda Hayden MD, 20 mg at 08/04/21 1205    levothyroxine (SYNTHROID) tablet 88 mcg, 88 mcg, Oral, Daily, Amanda Hayden MD, 88 mcg at 08/04/21 0554    nitroGLYCERIN (NITROSTAT) SL tablet kg/m²     Awake, alert, oriented x3. Visual fields intact, EOMI, right facial droop that activates, facial LT intact,  no dysarthria, tongue midline  Strength bilateral UE and LE 5/5  LT decreased RUE  FTN WNL. Recent Results (from the past 24 hour(s))   CBC    Collection Time: 08/04/21  4:57 AM   Result Value Ref Range    RBC 4.01 4.00 - 5.20 M/uL    Hemoglobin 11.9 (L) 12.0 - 16.0 g/dL    Hematocrit 37.0 36.0 - 48.0 %    MCV 92.1 80.0 - 100.0 fL    MCH 29.8 26.0 - 34.0 pg    MCHC 32.3 31.0 - 36.0 g/dL    RDW 18.1 (H) 12.4 - 15.4 %   Lipid panel - fasting    Collection Time: 08/04/21  4:57 AM   Result Value Ref Range    Cholesterol, Total 268 (H) 0 - 199 mg/dL    Triglycerides 147 0 - 150 mg/dL    HDL 70 (H) 40 - 60 mg/dL    LDL Calculated 169 (H) <100 mg/dL    VLDL Cholesterol Calculated 29 Not Established mg/dL   Basic Metabolic Panel    Collection Time: 08/04/21  4:57 AM   Result Value Ref Range    Sodium 147 (H) 136 - 145 mmol/L    Potassium 4.1 3.5 - 5.1 mmol/L    Chloride 108 99 - 110 mmol/L    CO2 22 21 - 32 mmol/L    Anion Gap 17 (H) 3 - 16    Glucose 108 (H) 70 - 99 mg/dL    BUN 18 7 - 20 mg/dL    CREATININE 0.8 0.6 - 1.2 mg/dL    GFR Non-African American >60 >60    GFR African American >60 >60    Calcium 9.6 8.3 - 10.6 mg/dL       Studies    [id number] in process    MRI Brain W 8-3-2021     Impression   No abnormal enhancement in the visualized brain parenchyma.  Refer to the MRI   report from earlier in the day for the brain parenchymal findings. MRI Cervical spine W/WO 8-3-2021  Impression   Disc and osteophytes cause narrowing of the left neural foramina greater than   the right at C5-6 and C6-7 as discussed above.  No stenosis of the thecal sac   in the cervical region. MRI Brain w/o 8/3/21: Independently reviewed. Minimal chronic small vessel ischemic disease.  No acute brain parenchymal abnormality.      CT Head w/o 8/2/21: Independently reviewed.   No acute intracranial abnormality.     CTA Head & Neck w/ 8/2/21: Reviewed the read. No significant arterial abnormality of the head and neck.   Mild increased enhancement of left parotid relative to the right.  Correlate clinically for possible mild left parotiditis.      TTE 8/3/21:  EF 55-60%, no shunt, left atrium normal in size     EKG 8/2/21: NSR       Impression  1. Acute RUE paresthesias with residual decreased sensation  2. Reoccurring spells of right medial superior vision loss with associated blurry vision. 3. Elevated inflammatory markers.      Patient had acute RUE paresthesias with residual decreased sensation. MRI Brain and c spine did not show cause. If continues to persist, will considered for EMG. Not sure this is related to transient left eye vision abnormalities.     Patient has had intermittent vision change in left eye. Concern for TIA.     Recommendations  - ASA 81 mg and Plavix 75 mg for 21 days, then ASA 81 mg monotherapy. - high intensity statin. - Maintain good control of secondary vascular risk factors with LDL < 70, HbA1c <7, Long term BP goal < 140/90  - can consider EMG for RUE if abnormal sensation in RUE persists  - Follow up with neurology in 1 month    Neurology will sign off. Please call back with questions.     Electronically signed by Andreina Serna MD on 8/4/2021 at 10:20 AM    [facility]

## 2021-08-04 NOTE — PROGRESS NOTES
[facility] Progress Note  8/4/2021 8:05 AM  Subjective:   Admit Date: 8/2/2021      Chief Complaint: I     Interval History: As per neuro--no foal deficits   CRP/ESR--min incr--nonspecific finding  MRI--no acute stroke and no enhancement  Patient did mention incr deprression--no plan--sitter at bedside and psyche consulted --dwp--she related chr depression--sees counselor weekly and tells me she has no plans or intent--I used to cut myself but I don't do that anymore   Chol is incr--off atorva--will restart at 80 mg   Neuro has recc plavis/asa x 3 weeks then asa 81 mg daily thereafter   Echo--mild as--will follow   Diet: ADULT DIET; Regular; Safety Tray; Safety Tray (Disposables)  Medications:   Scheduled Meds:   ARIPiprazole  30 mg Oral Daily    atorvastatin  40 mg Oral Daily    vitamin D  5,000 Units Oral BID    DULoxetine  60 mg Oral BID    pantoprazole  40 mg Oral QAM AC    furosemide  20 mg Oral Daily    levothyroxine  88 mcg Oral Daily    potassium chloride  20 mEq Oral Daily    pregabalin  100 mg Oral TID    propranolol  40 mg Oral TID    QUEtiapine  200 mg Oral Nightly    rOPINIRole  2 mg Oral Nightly    sodium chloride flush  5-40 mL Intravenous 2 times per day    enoxaparin  40 mg Subcutaneous Daily    clopidogrel  75 mg Oral Daily    aspirin  81 mg Oral Daily     Continuous Infusions:   sodium chloride         Review of Systems -   General ROS: afebrile  Respiratory ROS: no cough, shortness of breath or wheezing  Cardiovascular ROS: no chest pain  Musculoskeletal ROS:positive for - :joint pain  Neurological ROS: no ark paresthesia     Objective:   Vitals: /60   Pulse 74   Temp 98.4 °F (36.9 °C) (Oral)   Resp 14   Ht 5' 4\" (1.626 m)   Wt (!) 326 lb 15.1 oz (148.3 kg)   LMP 01/15/2014   SpO2 92%   BMI 56.12 kg/m²   General appearance: alert and cooperative with exam  HEENT: Head: Normocephalic, no lesions, without obvious abnormality.   Neck: no adenopathy, no carotid bruit, no JVD, supple, symmetrical, trachea midline and thyroid not enlarged, symmetric, no tenderness/mass/nodules  Lungs: clear to auscultation bilaterally  Heart: regular rate and rhythm, S1, S2 normal, no murmur, click, rub or gallop  Abdomen: soft, non-tender; bowel sounds normal; no masses,  no organomegaly  Extremities: extremities normal, atraumatic, no cyanosis or edema  Neurologic: Mental status: alert--not suicidal--conversant--    Admission on 08/02/2021   Component Date Value Ref Range Status    WBC 08/02/2021 7.6  4.0 - 11.0 K/uL Final    RBC 08/02/2021 3.90* 4.00 - 5.20 M/uL Final    Hemoglobin 08/02/2021 11.6* 12.0 - 16.0 g/dL Final    Hematocrit 08/02/2021 35.5* 36.0 - 48.0 % Final    MCV 08/02/2021 91.2  80.0 - 100.0 fL Final    MCH 08/02/2021 29.8  26.0 - 34.0 pg Final    MCHC 08/02/2021 32.7  31.0 - 36.0 g/dL Final    RDW 08/02/2021 17.8* 12.4 - 15.4 % Final    Platelets 35/85/9599 217  135 - 450 K/uL Final    MPV 08/02/2021 9.0  5.0 - 10.5 fL Final    Neutrophils % 08/02/2021 67.9  % Final    Lymphocytes % 08/02/2021 19.9  % Final    Monocytes % 08/02/2021 7.4  % Final    Eosinophils % 08/02/2021 4.3  % Final    Basophils % 08/02/2021 0.5  % Final    Neutrophils Absolute 08/02/2021 5.2  1.7 - 7.7 K/uL Final    Lymphocytes Absolute 08/02/2021 1.5  1.0 - 5.1 K/uL Final    Monocytes Absolute 08/02/2021 0.6  0.0 - 1.3 K/uL Final    Eosinophils Absolute 08/02/2021 0.3  0.0 - 0.6 K/uL Final    Basophils Absolute 08/02/2021 0.0  0.0 - 0.2 K/uL Final    Sodium 08/02/2021 137  136 - 145 mmol/L Final    Potassium 08/02/2021 3.8  3.5 - 5.1 mmol/L Final    Chloride 08/02/2021 98* 99 - 110 mmol/L Final    CO2 08/02/2021 27  21 - 32 mmol/L Final    Anion Gap 08/02/2021 12  3 - 16 Final    Glucose 08/02/2021 112* 70 - 99 mg/dL Final    BUN 08/02/2021 16  7 - 20 mg/dL Final    CREATININE 08/02/2021 0.9  0.6 - 1.2 mg/dL Final    GFR Non- 08/02/2021 >60  >60 Final    Comment: >60 mL/min/1.73m2 EGFR, calc. for ages 25 and older using the  MDRD formula (not corrected for weight), is valid for stable  renal function.  GFR  08/02/2021 >60  >60 Final    Comment: Chronic Kidney Disease: less than 60 ml/min/1.73 sq.m. Kidney Failure: less than 15 ml/min/1.73 sq.m. Results valid for patients 18 years and older.  Calcium 08/02/2021 8.7  8.3 - 10.6 mg/dL Final    Total Protein 08/02/2021 6.5  6.4 - 8.2 g/dL Final    Albumin 08/02/2021 3.7  3.4 - 5.0 g/dL Final    Albumin/Globulin Ratio 08/02/2021 1.3  1.1 - 2.2 Final    Total Bilirubin 08/02/2021 0.4  0.0 - 1.0 mg/dL Final    Alkaline Phosphatase 08/02/2021 140* 40 - 129 U/L Final    ALT 08/02/2021 15  10 - 40 U/L Final    AST 08/02/2021 16  15 - 37 U/L Final    Comment: Specimen hemolysis has exceeded the interference as defined by Roche. Value may be falsely increased. Suggest recollection if clinically  indicated.  Globulin 08/02/2021 2.8  g/dL Final    Troponin 08/02/2021 <0.01  <0.01 ng/mL Final    Methodology by Troponin T    Protime 08/02/2021 10.7  9.9 - 12.7 sec Final    Comment: Effective 6-30-21 09:00am EST  Please note reference ranges have  changed for PT and INR Testing.  INR 08/02/2021 0.95  0.88 - 1.12 Final    Comment: Effective 6/30/21 at 09:00am EST    Normal: 0.88 - 1.12  Therapeutic: 2.0 - 3.0  Pros.  Valve: 2.5 - 3.5  AMI: 2.0 - 3.0      Ventricular Rate 08/02/2021 79  BPM Final    Atrial Rate 08/02/2021 79  BPM Final    P-R Interval 08/02/2021 154  ms Final    QRS Duration 08/02/2021 80  ms Final    Q-T Interval 08/02/2021 380  ms Final    QTc Calculation (Bazett) 08/02/2021 435  ms Final    P Axis 08/02/2021 41  degrees Final    R Axis 08/02/2021 13  degrees Final    T Axis 08/02/2021 9  degrees Final    Diagnosis 08/02/2021 Normal sinus rhythmNormal ECGWhen compared with ECG of 30-JUL-2021 18:59,No significant change was foundConfirmed by Michelle Esposito MD, Kath Garcia ([id number]) on 8/3/2021 11:56:51 AM   Final    CRP 2021 17.6* 0.0 - 5.1 mg/L Final    Comment: WR-CRP Reference range:  30D-199Y    <5.1  <30D        Not established    CRP is used in the detection and evaluation of infection,  tissue injury, inflammatory disorders, and associated  disease. Increases in CRP values are non-specific and  should not be interpreted without a complete clinical  evaluation.  reference ranges have not been  established and values should be interpreted within clinical  context and with serial measurements, if clinically  appropriate.  Sed Rate 2021 39* 0 - 30 mm/Hr Final    Cholesterol, Total 2021 268* 0 - 199 mg/dL Final    Triglycerides 2021 147  0 - 150 mg/dL Final    HDL 2021 70* 40 - 60 mg/dL Final    LDL Calculated 2021 169* <100 mg/dL Final    VLDL Cholesterol Calculated 2021 29  Not Established mg/dL Final    Sodium 2021 147* 136 - 145 mmol/L Final    Potassium 2021 4.1  3.5 - 5.1 mmol/L Final    Chloride 2021 108  99 - 110 mmol/L Final    CO2 2021 22  21 - 32 mmol/L Final    Anion Gap 2021 17* 3 - 16 Final    Glucose 2021 108* 70 - 99 mg/dL Final    BUN 2021 18  7 - 20 mg/dL Final    CREATININE 2021 0.8  0.6 - 1.2 mg/dL Final    GFR Non- 2021 >60  >60 Final    Comment: >60 mL/min/1.73m2 EGFR, calc. for ages 25 and older using the  MDRD formula (not corrected for weight), is valid for stable  renal function.  GFR  2021 >60  >60 Final    Comment: Chronic Kidney Disease: less than 60 ml/min/1.73 sq.m. Kidney Failure: less than 15 ml/min/1.73 sq.m. Results valid for patients 18 years and older.       Calcium 2021 9.6  8.3 - 10.6 mg/dL Final         Assessment & Plan:   Principal Problem:    TIA (transient ischemic attack) --OK for Dc--cont plavix/asa x 3 weeks then asa only as per neuro  Active Problems: Major depressive disorder, recurrent episode, severe (HCC)--not suicidal--she has o/p counselor which she will cont to see     Hyperlipidemia--incr atorva to 80 mg     Chronic pain syndrome    Osteoarthritis of hip    DDD (degenerative disc disease), lumbar    Morbid obesity with BMI of 40.0-44.9, adult ([facility] [address]    Hypothyroid  Resolved Problems:    * No resolved hospital problems.  *  DC to home today--see em in 1 week   CONDIT ON DC---STABLE   DS dictated   Please note that over 35 minutes was spent in evaluating the patient, review of records and results, discussion with staff/family, etc.    Tarun Dawkins MD

## 2021-08-04 NOTE — PROGRESS NOTES
Pt had a constant observer at the bedside from [id number] although the Q15 minute checks are not documented. RN followed up with new constant observer to ensure that Q15 minute checks were being documented in the flow sheets starting from her time of arrival at 2330.

## 2021-08-04 NOTE — PROGRESS NOTES
CLINICAL PHARMACY NOTE: MEDS TO BEDS    Total # of Prescriptions Filled: 3   The following medications were delivered to the patient:  Current Discharge Medication List      START taking these medications    Details   aspirin 81 MG EC tablet Take 1 tablet by mouth daily  Qty: 30 tablet, Refills: 3      clopidogrel (PLAVIX) 75 MG tablet Take 1 tablet by mouth daily  Qty: 30 tablet, Refills: 3         ·   ·     Additional Documentation:

## 2021-08-04 NOTE — DISCHARGE SUMMARY
[address]                               DISCHARGE SUMMARY    PATIENT NAME: Ro Silva                    :        1960  MED REC NO:   [id number]                          ROOM:       [id number]  ACCOUNT NO:   [de-identified]                           ADMIT DATE: 2021  PROVIDER:     Fernie Geiger MD                  DISCHARGE DATE:  2021        ADMISSION DIAGNOSIS:  TIA. DISCHARGE DIAGNOSES:  1. Transient ischemic attack. 2.  Chronic pain syndrome. 3.  Major depressive disorder, severe with recurrent episodes. 4.  Morbid obesity. 5.  Hyperlipidemia. 6.  Hypothyroidism. HISTORY OF PRESENT ILLNESS:  The patient is a 19-year-old white female  admitted through emergency. She presented with a history of right arm  numbness that persisted for several hours, also some decreased vision in  her left eye. She has been able to use her arm normally, there was no  incoordination, she just felt like they were numb. She was seen in  [facility] several days ago. CT of the head and CTA of the brain  were normal.  She was discharged home, seen by an ophthalmologist  several days prior to this. He recommended testing for stroke to the  eye. CRP was done at that time that was slightly elevated. However, on  repeat at [facility] her CRP was normal.  She has a longstanding  history of depression and has been known to self-mutilate and cut her  arms. She says that her depression is persisting but she does not feel  the need to do that currently and she feels that she is stable seeing  her therapist and outpatient counselors. Please see the HPI for further  details. HOSPITAL COURSE:  Neurologically, there was no focal deficit, just the  history as described. She was admitted. She had a CT of the head again  that was unremarkable.   CTA of the brain showed question of mild  enhancement of the left parotid gland. She had no tenderness on exam  and no complaints of pain over the parotid area to palpation. She was  admitted. She was seen by Neurology and Neurology reviewed her case and  she has had a total of four episodes of left eye vision change that last  10 minutes. She was begun on aspirin 81 mg daily. The neurologist felt  that she had right upper extremity paresthesias with residual decreased  sensation. The MRI did not show cause and MRI of the C-spine did not  show any abnormalities other than mild degenerative change. An MRI of  the brain with contrast did not show any uptake. They felt that her eye  symptoms were concerning for TIA. They felt that her inflammatory  markers were very minimally elevated but nonspecific. They recommended  aspirin and Plavix for 21 days and aspirin 81 mg daily continually. They recommended high-dose high-intensity statin and to trend her CRP  and sed rate and an echocardiogram.  The echocardiogram was performed  the day prior to admission. It showed very mild aortic stenosis but no  evidence of cardiac emboli. Clinically, she had improved. She did  mention to the Neurology staff that she is chronically depressed and  that seemed to be increased. She did not admit to suicidal intentions,  said in the past she has been a cutter. Dr. Jacquelin Rai did review it with  her the morning of discharge and she stated that she is not intent with  that. She does note her ongoing depression but she sees counselors on a  regular basis on a weekly basis. She reiterated to me she does not feel  suicidal and she feels stable and she could be discharged to home. Accordingly, plan is for her to be discharged home today. LABORATORY DATA:  While in the hospital included an echocardiogram.   Again, the echo showed normal left ventricular size, wall thickness and  systolic function. Mild aortic regurgitation.   Mild aortic stenosis  with a peak velocity of 2.7 meters per second. Her vascular studies included MRI of the brain with contrast that showed  no abnormal enhancement of the visualized brain parenchyma. She also  had an MRI of the cervical spine. MRI of the cervical spine showed disk  and osteophytes causing narrowing on the left neural foramen greater  than the right at C5-C6 and C6-C7. There is no stenosis of the thecal  sac. MRI of the brain without contrast was also performed and it showed  minimal chronic small vessel change. No acute brain parenchymal  abnormality. Her CRP was 17.6 which was minimally elevated, sed rate was 39. Chest x-ray shows no acute cardiopulmonary abnormalities. Near discharge, potassium is 3.8, BUN and creatinine 16 and 0.9. Her  white count 7600, hemoglobin 11.6, platelet count of 099,531. DISCHARGE PLAN:  1. Overall, I feel that her episodes are vague. It could possibly  represent TIA involving the visual field. However, she will be treated  with Plavix and aspirin recommended by Neurology. Her depression  persists but she is not suicidal.  She will be discharged to home today  to follow with her counselor. 2.  Hyperlipidemia. Her atorvastatin will be increased from 40 to 80  mg. This admission, her total cholesterol was 268 with an LDL of 147. She had been off her statin for several days prior to admission. That  will be increased from 40 to 80 mg. DISCHARGE MEDICATIONS:  She will go home on Plavix 75 mg a day for three  weeks along with aspirin 81 mg daily. Then, it will be tapered to just  aspirin 81 mg daily in 3 weeks. Additional medication will include  continuation of the aspirin 81 mg daily.   Other discharge meds will  include pantoprazole 40 mg daily, Lyrica 100 mg t.i.d., KCl 20 mEq  daily, ReQuip 1 mg tablet, 2 mg at bedtime; Synthroid 88 mcg daily,  duloxetine 60 mg b.i.d., Abilify 30 mg daily, propranolol 40 mg t.i.d.,  Seroquel 200 mg nightly, and torsemide 20 mg b.i.d. DISCHARGE INSTRUCTIONS:  Told to follow with Dr. Laura Abbasi in a week. She is to follow with her mental health counselor within this week and  medications as directed here.     Cristian Santiago MD    D: 08/04/2021 8:41:27       T: 08/04/2021 16:38:32     [email or web address]  Job#: [id number]     Doc#: [id number]    CC:  Fernie Geiger MD       Mental Healthcare Providr

## 2021-08-04 NOTE — PROGRESS NOTES
Patient awaiting transportation to be discharged to home alone via wheelchair. Discharge instructions reviewed with patient who verbalized understanding. Meds to beds delivered. Telemetry and IV removed with no complications. Instructed patient to follow up in one week with Dr. Jesus Manuel Eubanks and Neurology instructed patient to follow up in one month.   Electronically signed by Amado Wells RN on 8/4/2021 at 1:14 PM

## 2021-08-04 NOTE — PROGRESS NOTES
NAME:  Daisy Ayoub  YOB: 1960  MEDICAL RECORD NUMBER:  [id number]  TODAYS DATE:  8/3/2021    Discussed personal risk factors for Stroke /TIA with patient/family, and ways to reduce the risk for a recurrent stroke. Patient's personal risk factors which were identified are:     [] Alcohol Abuse: check with your physician before any alcohol consumption. [] Atrial fibrillation: may cause blood clots. [] Drug Abuse: Seek help, talk with your doctor  [] Clotting Disorder  [] Diabetes  [x] Family history of stroke or heart disease  [x] High Blood Pressure/Hypertension: work with your physician. [x] High cholesterol: monitor cholesterol levels with your physician. [x] Overweight/Obesity: work with your physician for your ideal body weight. [x] Physical Inactivity: get regular exercise as directed by your physician. [] Personal history of previous TIA or stroke  [x] Poor Diet; decrease salt (sodium) in your diet, follow diet directed by physician. [] Smoking: Cigarette/Cigar: stop smoking. Advised pt. that you can reduce your risk for stroke/TIA by modifying/controlling the risk factors that you have. Pt.advised to take the medications as prescribed, which will be detailed in the discharge instructions, and to not stop taking them without consulting their physician. In addition, pt. advised to maintain a healthy diet, exercise regularly and to not smoke. [facility]'s Stroke treatment and prevention, Managing your recovery  notebook  provided and/or reviewed  with patient/family. The notebook includes, but not limited to, sections addressing warning signs & symptoms of a stroke, which are: sudden numbness or weakness especially on one side of the body, sudden confusion, difficulty speaking or understanding, sudden changes in vision, sudden dizziness or loss of balance/ coordination, sudden severe headache, syncope and seizure.   The need to call EMS (911) immediately if signs &

## 2021-08-05 ENCOUNTER — CARE COORDINATION (OUTPATIENT)
Dept: CASE MANAGEMENT | Age: 61
End: 2021-08-05

## 2021-08-05 DIAGNOSIS — G45.9 TIA (TRANSIENT ISCHEMIC ATTACK): Primary | ICD-10-CM

## 2021-08-05 PROCEDURE — 1111F DSCHRG MED/CURRENT MED MERGE: CPT | Performed by: INTERNAL MEDICINE

## 2021-08-05 NOTE — CARE COORDINATION
[facility] Transitions Initial Follow Up Call    Call within 2 business days of discharge: Yes    Patient: Pierre Lopez Patient : 1960   MRN: [id number]  Reason for Admission: TIA  Discharge Date: 21 RARS: Readmission Risk Score: 32      Last Discharge [address] [id number]       Complaint Diagnosis Description Type Department Provider    21 Numbness; Dizziness Cerebrovascular accident (CVA), unspecified mechanism [facility]) ED to Hosp-Admission (Discharged) (ADMITTED) [facility] 5W Kelly Carrera MD; Denise Briggs Will. .. Sp[facility] with:  [facility]    Non-face-to-face services provided:  Obtained and reviewed discharge summary and/or continuity of care documents     Transitions of Care Initial Call    Was this an external facility discharge? No Discharge Facility: NA    Challenges to be reviewed by the provider   Additional needs identified to be addressed with provider: No  none             Method of communication with provider : phone      Advance Care Planning:   Does patient have an Advance Directive: not on file. Was this a readmission? No  Patient stated reason for admission: arm numbness  Patients top risk factors for readmission: medical condition-TIA    Care Transition Nurse (CTN) contacted the patient by telephone to perform post hospital discharge assessment. Verified name and  with patient as identifiers. Provided introduction to self, and explanation of the CTN role. CTN reviewed discharge instructions, medical action plan and red flags with patient who verbalized understanding. Patient given an opportunity to ask questions and does not have any further questions or concerns at this time. Were discharge instructions available to patient? Yes. Reviewed appropriate site of care based on symptoms and resources available to patient including: PCP. The patient agrees to contact the PCP office for questions related to their healthcare.      Medication reconciliation was performed with patient, who verbalizes understanding of administration of home medications. Advised obtaining a 90-day supply of all daily and as-needed medications. Covid Risk Education  Completed J&J 3/10/21     Educated patient about risk for severe COVID-19 due to risk factors according to CDC guidelines. LPN CC reviewed discharge instructions, medical action plan and red flag symptoms with the patient who verbalized understanding. Discussed COVID vaccination status: Yes. Education provided on COVID-19 vaccination as appropriate. Discussed exposure protocols and quarantine with CDC Guidelines. Patient was given an opportunity to verbalize any questions and concerns and agrees to contact LPN CC or health care provider for questions related to their healthcare. Reviewed and educated patient on any new and changed medications related to discharge diagnosis. Was patient discharged with a pulse oximeter? No Discussed and confirmed pulse oximeter discharge instructions and when to notify provider or seek emergency care. Patient verified  and was agreeable to transition calls. Patient stated she had only a few minutes to talk d/t expecting phone appt with therapist. Denies vision changes, headaches, pain, or mobility issues. States arm numbness and tingling persists. Depression same as it was in hospital. States she has some edema to BLE. Does not weigh self daily. LPN CC encouraged patient to weigh self daily and to report weight changes of 2-3 lb in one day or 5 lb in one week to cardiology or PCP in order to prevent cardiac or lung issues. Patient verbalized understanding. LPN CC discussed importance of 48-64 oz fluid restriction daily. Patient states she does not do that. Full medication reconciliation and 1111f completed. Patient questions importance or necessity of PCP f/u.  LPN CC encouraged patient to call and discuss with patient and educated that checking her heart, lungs, vitals, medications, etc could be very helpful in preventing further incidents. Patient verbalized understanding. No PCP f/u scheduled at this time. LPN CC provided contact information. Plan for follow-up call in 5-7 days based on severity of symptoms and risk factors.   Kadie Griffiths  [facility]  [phone]    Care Transitions 24 Hour Call    Do you have any ongoing symptoms?: No  Do you have a copy of your discharge instructions?: Yes  Do you have all of your prescriptions and are they filled?: Yes  Have you been contacted by a [address]?: No  Have you scheduled your follow up appointment?: No  Were you discharged with any Home Care or Post Acute Services: No  Patient DME: Talat Murcia, Straight cane, Other  Other Patient DME: Parrish Nunez you have support at home?: Alone  Do you feel like you have everything you need to keep you well at home?: Yes  Are you an active caregiver in your home?: No  Care Transitions Interventions         Follow Up  Future Appointments   Date Time Provider Marlin Diaz   9/3/2021  8:30 AM Miller Malcolm MD  [facility]   1/28/2022 10:00 AM Lisa Wheatley MD [facility] LPN

## 2021-08-13 RX ORDER — LEVOTHYROXINE SODIUM 88 UG/1
TABLET ORAL
Qty: 90 TABLET | Refills: 0 | Status: SHIPPED | OUTPATIENT
Start: 2021-08-13 | End: 2021-11-07

## 2021-08-15 ENCOUNTER — APPOINTMENT (OUTPATIENT)
Dept: GENERAL RADIOLOGY | Age: 61
End: 2021-08-15
Payer: COMMERCIAL

## 2021-08-15 ENCOUNTER — HOSPITAL ENCOUNTER (EMERGENCY)
Age: 61
Discharge: HOME | End: 2021-08-15
Attending: EMERGENCY MEDICINE
Payer: COMMERCIAL

## 2021-08-15 VITALS
HEART RATE: 73 BPM | TEMPERATURE: 97.8 F | DIASTOLIC BLOOD PRESSURE: 80 MMHG | WEIGHT: 293 LBS | BODY MASS INDEX: 50.02 KG/M2 | SYSTOLIC BLOOD PRESSURE: 161 MMHG | OXYGEN SATURATION: 98 % | RESPIRATION RATE: 18 BRPM | HEIGHT: 64 IN

## 2021-08-15 DIAGNOSIS — S93.602A FOOT SPRAIN, LEFT, INITIAL ENCOUNTER: ICD-10-CM
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
## 2021-08-15 PROCEDURE — 99283 EMERGENCY DEPT VISIT LOW MDM: CPT

## 2021-08-15 PROCEDURE — 73630 X-RAY EXAM OF FOOT: CPT

## 2021-08-15 ASSESSMENT — PAIN DESCRIPTION - ORIENTATION: ORIENTATION: LEFT

## 2021-08-15 ASSESSMENT — PAIN SCALES - GENERAL: PAINLEVEL_OUTOF10: 6

## 2021-08-15 ASSESSMENT — PAIN DESCRIPTION - PAIN TYPE: TYPE: ACUTE PAIN

## 2021-08-15 NOTE — ED PROVIDER NOTES
[facility] EMERGENCY DEPT VISIT      Patient Identification  Som Quinn is a 63 y.o. female. Chief Complaint   Fall      History of Present Illness: This is a  63 y.o. female who presents ambulatory  to the ED with complaints of a fall that occurred this morning. Patient states that the power went out in her building and she was coming down the stairs. Her foot caught on the step causing her to fall approximately 3 steps. She did not hit her head nor did she lose consciousness. She was not syncopal.  She sustained a skin tear to her left forearm and believes that she twisted her left foot. She complains of pain along the lateral heel and outer aspect of the foot which is worse with weightbearing. She tried going to work but it was too painful to do so. She is on tramadol regularly for chronic pain. Her last tetanus shot is up-to-date. She denies hitting her head. No headache or neck pain. She has chronic back pain which is unchanged.     Past Medical History:   Diagnosis Date    Acquired hyperlipoproteinemia     MARIO (acute kidney injury) ([facility] [address] 9/30/2019    Allergic rhinitis     Anxiety     Arthritis     Bilateral lower extremity edema     Bipolar 1 disorder (HCC)     Chronic pain syndrome     DDD (degenerative disc disease), lumbar     Depression     Depression     Disease of gallbladder     Dizziness     DJD (degenerative joint disease) of hip     Edema 12/29/2009    Elbow tendinitis     Essential hypertension 8/19/2019    Facet syndrome     Fibromyalgia     Fracture of nasal bone     GERD (gastroesophageal reflux disease)     Headache     Hiatal hernia     History of blood transfusion     anemia    Hyperlipidemia     Insomnia     Osteoarthritis     Prediabetes     Rash     Self mutilating behavior     cutter pt states she has not done in months- in therapy    Sleep apnea     no CPAP    Suicidal ideation     Thyroid disorder     hypothyroidism    Vertigo        Past Surgical History:   Procedure Laterality Date    ABDOMEN SURGERY  5/28/2014    LAPAROSCOPIC SLEEVE GASTRECTOMY, EXTENSIVE LYSIS OF ADHESIONS    ACHILLES TENDON SURGERY Left 3/9/2020    LEFT CALCANEOUS PARTIAL EXCISION, SECONDARY REPAIR OF ACHILLES TENDON performed by Junior [facility]el MD at [facility]      open , Ex-lap    CHOLECYSTECTOMY      open    COLONOSCOPY  04/16/2013    dr Roddy Valdes 2018     DILATION AND CURETTAGE OF UTERUS      ENDOSCOPY, COLON, DIAGNOSTIC      FOOT SURGERY Right 05/27/2016    ARTHROPLASTY RIGHT 5TH DIGIT      HYSTEROSCOPY N/A 12-30-13    Hysteroscopy Dilitation and Curettage    JOINT REPLACEMENT Bilateral     knee    OTHER SURGICAL HISTORY  9/11/2015    ARTHROPLASTY 5TH DIGIT LEFT FOOT          SHOULDER SURGERY      right    SHOULDER SURGERY Right     SLEEVE GASTROPLASTY  May 28, 2014    Dahman    TOTAL KNEE ARTHROPLASTY  12/10/10    Right    TOTAL KNEE ARTHROPLASTY  3/30/10    Left    UPPER GASTROINTESTINAL ENDOSCOPY  4/16/2013    dr Albino Swanson ENDOSCOPY  3/20/2014    dr Mitra Danielle       No current facility-administered medications for this encounter. Current Outpatient Medications:     levothyroxine (SYNTHROID) 88 MCG tablet, TAKE 1 TABLET BY MOUTH DAILY, Disp: 90 tablet, Rfl: 0    rOPINIRole (REQUIP) 1 MG tablet, TAKE 2 TABLETS BY MOUTH EVERY NIGHT, Disp: 180 tablet, Rfl: 0    aspirin 81 MG EC tablet, Take 1 tablet by mouth daily, Disp: 30 tablet, Rfl: 3    atorvastatin (LIPITOR) 40 MG tablet, Take 2 tablets by mouth daily, Disp: 90 tablet, Rfl: 0    clopidogrel (PLAVIX) 75 MG tablet, Take 1 tablet by mouth daily, Disp: 30 tablet, Rfl: 3    pantoprazole (PROTONIX) 40 MG tablet, Take 40 mg by mouth daily, Disp: , Rfl:     pregabalin (LYRICA) 100 MG capsule, Take 1 capsule by mouth 3 times daily for 30 days. , Disp: 90 capsule, Rfl: 0    betamethasone dipropionate (DIPROLENE) 0.05 % cream, APPLY EXTERNALLY TO THE AFFECTED AREA TWICE DAILY, Disp: 60 g, Rfl: 0    ondansetron (ZOFRAN) 4 MG tablet, TAKE 1 TABLET BY MOUTH EVERY 8 HOURS AS NEEDED FOR NAUSEA OR VOMITING, Disp: 60 tablet, Rfl: 0    potassium chloride (KLOR-CON M) 20 MEQ extended release tablet, Take 1 tablet by mouth daily, Disp: 90 tablet, Rfl: 1    torsemide (DEMADEX) 20 MG tablet, TAKE 1 TABLET BY MOUTH TWICE DAILY BEFORE MEALS, Disp: 180 tablet, Rfl: 0    Compression Stockings MISC, by Does not apply route 20mmHg- knee high, closed toe. wear daily. Fit to size, Disp: 1 each, Rfl: 0    vitamin B-12 (CYANOCOBALAMIN) 1000 MCG tablet, Take 1 tablet by mouth daily, Disp: 30 tablet, Rfl: 0    meclizine (ANTIVERT) 25 MG tablet, Take 25 mg by mouth 3 times daily as needed, Disp: , Rfl:     Cholecalciferol (VITAMIN D3) 125 MCG (5000 UT) TABS, Take 1 tablet by mouth 2 times daily, Disp: , Rfl:     DULoxetine (CYMBALTA) 60 MG extended release capsule, Take 1 capsule by mouth 2 times daily, Disp: , Rfl:     ARIPiprazole (ABILIFY) 30 MG tablet, Take 30 mg by mouth daily , Disp: , Rfl:     propranolol (INDERAL) 40 MG tablet, Take 1 tablet by mouth 3 times daily anxiety, Disp: , Rfl:     QUEtiapine (SEROQUEL) 200 MG tablet, Take 200 mg by mouth nightly, Disp: , Rfl:     Allergies   Allergen Reactions    Polysporin [Bacitracin-Polymyxin B] Other (See Comments) and Itching     Pt breaks out in blisters. Pt breaks out in blisters.     Lorazepam Other (See Comments)     \"went crazy\"    Neomycin-Polymyxin-Gramicidin     Sulfa Antibiotics Hives       Social History     Socioeconomic History    Marital status: Single     Spouse name: Not on file    Number of children: 0    Years of education: 12    Highest education level: Not on file   Occupational History    Occupation: [employer]     Comment: unemployed   Tobacco Use    Smoking status: Never Smoker    Smokeless tobacco: Never Used    Tobacco comment: Never smoked   Vaping Use    Vaping Use: Never used   Substance and Sexual Activity    Alcohol use: No     Alcohol/week: 0.0 standard drinks    Drug use: No    Sexual activity: Not Currently   Other Topics Concern    Not on file   Social History Narrative    Lives by herself with her cat     Social Determinants of Health     Financial Resource Strain: Low Risk     Difficulty of Paying Living Expenses: Not very hard   Food Insecurity: No Food Insecurity    Worried About Running Out of Food in the Last Year: Never true    Louis of Food in the Last Year: Never true   Transportation Needs:     Lack of Transportation (Medical):  Lack of Transportation (Non-Medical):    Physical Activity:     Days of Exercise per Week:     Minutes of Exercise per Session:    Stress:     Feeling of Stress :    Social Connections:     Frequency of Communication with Friends and Family:     Frequency of Social Gatherings with Friends and Family:     Attends [employer] Services:     Active Member of Clubs or Organizations:     Attends Club or Organization Meetings:     Marital Status:    Intimate Partner Violence:     Fear of Current or Ex-Partner:     Emotionally Abused:     Physically Abused:     Sexually Abused:        Nursing Notes Reviewed      ROS:  General: no fever  ENT: no sinus congestion, no sore throat  RESP: no cough, no shortness of breath  CARDIAC: no chest pain  GI: no abdominal pain, no vomiting, no diarrhea  Musculoskeletal: + arthralgia, no myalgia, no back pain,  no joint swelling  NEURO: no headache, no numbness, no weakness, no dizziness  DERM: no rash, no erythema, no ecchymosis, + wounds      PHYSICAL EXAM:  GENERAL APPEARANCE: Andrew Gonzales is in no acute respiratory distress. Awake and alert.   VITAL SIGNS:   ED Triage Vitals [08/15/21 1653]   Enc Vitals Group      BP (!) 161/80      Pulse 73      Resp 18      Temp 97.8 °F (36.6 °C)      Temp Source Oral      SpO2 98 %      Weight (!) 328 lb 6.4 oz (149 kg)      Height 5' 4\" (1.626 m)      Head Circumference       Peak Flow       Pain Score       Pain Loc       Pain Edu? Excl. in [address]? HEAD: Normocephalic, atraumatic. EYES:  Extraocular muscles are intact. Conjunctivas are pink. Negative scleral icterus. ENT:  Mucous membranes are moist.  Pharynx without erythema or exudates. NECK: Nontender and supple. CHEST: Clear to auscultation bilaterally. No rales, rhonchi, or wheezing. HEART:  Regular rate and rhythm. No murmurs. Strong and equal pulses in the upper and lower extremities. MUSCULOSKELETAL:  Active range of motion of the upper and lower extremities. No edema. Tenderness over left lateral heel and base of fifth metatarsal. No medial or lateral malleolar tenderness. Strong DP and PT pulses. NEUROLOGICAL: Awake, alert and oriented x 3. Power intact in the upper and lower extremities. DERMATOLOGIC: No petechiae, rashes, or ecchymoses. 1cm skin tear left forearm      ED COURSE AND MEDICAL DECISION MAKING:      Radiology:  All plain films have been evaluated by myself. They may have been overread by radiologist as noted in chart. Other radiologic studies (i.e. CT, MRI, ultrasounds, etc ) have been interpreted by radiologist.     XR FOOT LEFT (MIN 3 VIEWS)   Final Result   1. No acute osseous abnormality. 2. Calcaneal enthesopathy. Labs:  No results found for this visit on 08/15/21. Treatment in the department:  Patient receive no meds while in ED    Medical decision making:    I estimate there is LOW risk for FRACTURE, DISLOCATION, COMPARTMENT SYNDROME, DEEP VENOUS THROMBOSIS, SEPTIC ARTHRITIS, OSTEOMYELITIS, TENDON OR NEUROVASCULAR INJURY, thus I consider the discharge disposition reasonable.  Ki Dowd and I have discussed the diagnosis and risks, and we agree with discharging home to follow-up with their primary doctor or the referral orthopedist. We also discussed returning to the Emergency Department immediately if new or worsening

## 2021-08-16 ENCOUNTER — CARE COORDINATION (OUTPATIENT)
Dept: CASE MANAGEMENT | Age: 61
End: 2021-08-16

## 2021-08-16 NOTE — CARE COORDINATION
[facility] Transitions Follow Up Call    2021    Patient: Ian De La Rosa  Patient : 1960   MRN: <[id number]>  Reason for Admission: TIA/Fall    Discharge Date: 8/15/21 RARS: Readmission Risk Score: 32    Spoke with: Ian De La Rosa who states that she is doing ok. Patient seen in ED 8/15/21 for fall and skin tear. Patient reports that left foot is doing better and she is able to put weight on it. States that is still hurts a bit though but it will be ok. Instructed to use ice pack for swelling patient verbalized understanding. Patient reports that skin tear with no s/s of infection noted. She reports that she cleaned it in the shower and it is doing good. States that when she bent arm area re-opens. Instructed to keep clean and cover with band-aid. Patient denied any s/s of TIA/CVA. Denies any numbness or tingling in right arm. Reports that Ophthalmologist said that vision changes could be ocular migraines. Patient denies any dizziness, headache, edema (wearing her stocking), cp,sob, n/v, fever, or chill. Patient reports good appetite, and fluid intake and no problems with bowel and bladder. Patient denies any concerns or issues at this time. Patient agreeable to continued follow up calls. Care Transitions Follow Up Call    Needs to be reviewed by the provider   Additional needs identified to be addressed with provider: No  none             Method of communication with provider : none      Care Transition Nurse (CTN) contacted the patient by telephone to follow up after admission on 8/15/21 ED visit and f/u 21. Verified name and  with patient as identifiers. Addressed changes since last contact: none and ED visit for a fall. Discussed follow-up appointments. If no appointment was previously scheduled, appointment scheduling offered: Patient seen by PCP on 21 for HFU. Is follow up appointment scheduled within 7 days of discharge? Yes.     CTN reviewed discharge instructions, medical action plan and red flags with patient and discussed any barriers to care and/or understanding of plan of care after discharge. Discussed appropriate site of care based on symptoms and resources available to patient including: PCP and Urgent care clinics. The patient agrees to contact the PCP office for questions related to their healthcare. Interventions to address risk factors: Obtained and reviewed discharge summary and/or continuity of care documents and Education of patient/family/caregiver/guardian to support self-management-Reviewed Covid 19 precautions. Reviewed s/s of infection      CTN provided contact information for future needs. Patient agreeable to follow up calls. based on severity of symptoms and risk factors. Plan for next call: self management-symptom management        Care Transitions Subsequent and Final Call    Subsequent and Final Calls  Care Transitions Interventions  Other Interventions:            Follow Up  Future Appointments   Date Time Provider Marlin Diaz   9/3/2021  8:30 AM Emely Crowley MD [facility]   10/25/2021  9:40 AM Arely Nagel MD [facility]   1/28/2022 10:00 AM Arely Nagel MD [facility] [place] [facility] LPN

## 2021-08-24 ENCOUNTER — CARE COORDINATION (OUTPATIENT)
Dept: CASE MANAGEMENT | Age: 61
End: 2021-08-24

## 2021-08-24 NOTE — CARE COORDINATION
[facility] Transitions Follow Up Call    2021    Patient: Daisy Ayoub  Patient : 1960   MRN: <[id number]>  Reason for Admission: TIA/Fall  Discharge Date: 8/15/21 RARS: Readmission Risk Score: 32    Spoke with: Daisy Ayoub who states that she is doing ok. Patient reports that left foot is doing better and she is able to put weight on it. States that is still hurts a bit though but it will be ok. Patient states that she is a  at [employer] so she is on feet all shift but when at home she uses ice and elevation. Reports that she can tell it is getting better. Patient reports that skin tear with no s/s of infection noted. She reports that she keep bumping into things re-injuring area. She states that it is open to air. Instruct to keep clean and cover with band-aid for protection. Patient reports 4 episodes of  her left eye losing vision in upper right corner of left eye then entire eye goes blurry with some dizziness then after about 10 mins it clears up. Patient states that she has reported these incidents to her ophthalmologist and she has an appointment with him next week. . Denies any numbness or tingling in right arm. Patient denies any headache, edema (wearing her stocking), cp,sob, n/v, fever, or chill. Patient reports fair appetite, and fluid intake and no problems with bowel and bladder. Patient denies any concerns or issues at this time. Patient agreeable to continued follow up calls    Care Transitions Subsequent and Final Call    Subsequent and Final Calls  Care Transitions Interventions  Other Interventions:            Follow Up  Future Appointments   Date Time Provider Marlin Diaz   9/3/2021  8:30 AM Michelle Tolentino MD [facility]   10/25/2021  9:40 AM Rae Steele MD [facility] [place] [facility]   2022 10:00 AM Rae Steele MD [facility] [place] [facility]       Derik Lopez LPN

## 2021-09-02 ENCOUNTER — CARE COORDINATION (OUTPATIENT)
Dept: CASE MANAGEMENT | Age: 61
End: 2021-09-02

## 2021-09-02 NOTE — CARE COORDINATION
[facility] Transitions Follow Up Call    2021    Patient: Brianna Vides  Patient : 1960   MRN: [id number]  Reason for Admission: TIA/Fall  Discharge Date: 8/15/21 RARS: Readmission Risk Score: 32         Spoke with: Yair [facility] [place] [facility] Follow Up Call    Needs to be reviewed by the provider   Additional needs identified to be addressed with provider: No  none             Method of communication with provider : phone      Care Transition Nurse (CTN) contacted the patient by telephone to follow up after admission on 2021. Verified name and  with patient as identifiers. Addressed changes since last contact: Jaja Thomas Dr on 2021  Discussed follow-up appointments. If no appointment was previously scheduled, appointment scheduling offered: Yes. Is follow up appointment scheduled within 7 days of discharge? Yes. Advance Care Planning:   Does patient have an Advance Directive: On file  CTN reviewed discharge instructions, medical action plan and red flags with patient and discussed any barriers to care and/or understanding of plan of care after discharge. Discussed appropriate site of care based on symptoms and resources available to patient including: PCP, Specialist, Urgent care clinics and When to call 911. The patient agrees to contact the PCP office for questions related to their healthcare. Patients top risk factors for readmission: ineffective coping  Interventions to address risk factors: Obtained and reviewed discharge summary and/or continuity of care documents          CTN provided contact information for future needs. No further follow-up call indicated based on severity of symptoms and risk factors. Plan for next call: None     This [facility] spoke with pt and pt stated that she was doing \"ok. \" Pt stated that she saw her Ophthalmologist on 2021 and did blood work.  Pt stated that the doctor thought it may be temporal swelling around the left eye and stated that it would be an easy fix if that were the case. Patient denied any worsening symptoms. Denied fever, chills, N/V and any difficulty breathing at this time. Denied difficulty with urination, BMs or appetite. Advised pt to immediately report any worsening symptoms to the PCP. Patient verbalized understanding and agreed. Last contact per pt monitoring to end on 09/05/2021 and pt denying any further needs at this time. Latanya Guzman LPN, [facility]  PH: [phone]               Care Transitions Subsequent and Final Call    Schedule Follow Up Appointment with PCP: Completed  Subsequent and Final Calls  Do you have any ongoing symptoms?: Yes  Onset of Patient-reported symptoms: Other  Patient-reported symptoms: Pain  Interventions for patient-reported symptoms: Other  Have your medications changed?: No  Do you have any questions related to your medications?: No  Do you currently have any active services?: No  Do you have any needs or concerns that I can assist you with?: No  Identified Barriers: None  Care Transitions Interventions  No Identified Needs  Other Interventions:            Follow Up  Future Appointments   Date Time Provider Marlin Diaz   9/3/2021  8:30 AM Santiago Yap MD R BANK PAIN [facility]   9/8/2021 10:00 AM Riki Ann MD W ORTHO [facility]   9/10/2021  9:30 AM Mike Grover MD Wiliam Semen [facility]   10/25/2021  9:40 AM Dedra Bosworth, MD [facility]   1/28/2022 10:00 AM Dedra Bosworth, MD [facility]       Latanya Guzman LPN

## 2021-09-03 ENCOUNTER — OFFICE VISIT (OUTPATIENT)
Dept: PAIN MANAGEMENT | Age: 61
End: 2021-09-03
Payer: COMMERCIAL

## 2021-09-03 VITALS
WEIGHT: 293 LBS | TEMPERATURE: 97.3 F | DIASTOLIC BLOOD PRESSURE: 74 MMHG | BODY MASS INDEX: 56.23 KG/M2 | SYSTOLIC BLOOD PRESSURE: 138 MMHG | HEART RATE: 72 BPM | OXYGEN SATURATION: 97 %

## 2021-09-03 DIAGNOSIS — M47.899 FACET SYNDROME: ICD-10-CM

## 2021-09-03 DIAGNOSIS — M51.36 DDD (DEGENERATIVE DISC DISEASE), LUMBAR: ICD-10-CM

## 2021-09-03 DIAGNOSIS — M72.2 PLANTAR FASCIITIS, RIGHT: ICD-10-CM

## 2021-09-03 DIAGNOSIS — M16.0 PRIMARY OSTEOARTHRITIS OF BOTH HIPS: ICD-10-CM

## 2021-09-03 DIAGNOSIS — M79.7 FIBROMYALGIA: ICD-10-CM

## 2021-09-03 DIAGNOSIS — M54.16 LUMBAR RADICULITIS: ICD-10-CM

## 2021-09-03 DIAGNOSIS — M16.11 PRIMARY OSTEOARTHRITIS OF RIGHT HIP: ICD-10-CM

## 2021-09-03 DIAGNOSIS — M77.8 ELBOW TENDINITIS: ICD-10-CM

## 2021-09-03 DIAGNOSIS — G89.4 CHRONIC PAIN SYNDROME: ICD-10-CM

## 2021-09-03 PROCEDURE — 20553 NJX 1/MLT TRIGGER POINTS 3/>: CPT | Performed by: INTERNAL MEDICINE

## 2021-09-03 PROCEDURE — 99213 OFFICE O/P EST LOW 20 MIN: CPT | Performed by: INTERNAL MEDICINE

## 2021-09-03 RX ORDER — PREGABALIN 100 MG/1
100 CAPSULE ORAL 3 TIMES DAILY
Qty: 90 CAPSULE | Refills: 0 | Status: SHIPPED | OUTPATIENT
Start: 2021-09-03 | End: 2021-10-01 | Stop reason: SDUPTHER

## 2021-09-03 RX ORDER — TRIAMCINOLONE ACETONIDE 40 MG/ML
40 INJECTION, SUSPENSION INTRA-ARTICULAR; INTRAMUSCULAR ONCE
Status: COMPLETED | OUTPATIENT
Start: 2021-09-03 | End: 2021-09-03

## 2021-09-03 RX ORDER — TRAMADOL HYDROCHLORIDE 50 MG/1
50 TABLET ORAL EVERY 6 HOURS PRN
Qty: 60 TABLET | Refills: 0 | Status: SHIPPED | OUTPATIENT
Start: 2021-09-03 | End: 2021-10-29 | Stop reason: SDUPTHER

## 2021-09-03 RX ADMIN — TRIAMCINOLONE ACETONIDE 40 MG: 40 INJECTION, SUSPENSION INTRA-ARTICULAR; INTRAMUSCULAR at 09:48

## 2021-09-03 NOTE — PROGRESS NOTES
mouth daily 90 tablet 0    clopidogrel (PLAVIX) 75 MG tablet Take 1 tablet by mouth daily 30 tablet 3    pantoprazole (PROTONIX) 40 MG tablet Take 40 mg by mouth daily      betamethasone dipropionate (DIPROLENE) 0.05 % cream APPLY EXTERNALLY TO THE AFFECTED AREA TWICE DAILY 60 g 0    ondansetron (ZOFRAN) 4 MG tablet TAKE 1 TABLET BY MOUTH EVERY 8 HOURS AS NEEDED FOR NAUSEA OR VOMITING 60 tablet 0    potassium chloride (KLOR-CON M) 20 MEQ extended release tablet Take 1 tablet by mouth daily 90 tablet 1    torsemide (DEMADEX) 20 MG tablet TAKE 1 TABLET BY MOUTH TWICE DAILY BEFORE MEALS 180 tablet 0    Compression Stockings MISC by Does not apply route 20mmHg- knee high, closed toe. wear daily. Fit to size 1 each 0    vitamin B-12 (CYANOCOBALAMIN) 1000 MCG tablet Take 1 tablet by mouth daily 30 tablet 0    meclizine (ANTIVERT) 25 MG tablet Take 25 mg by mouth 3 times daily as needed      Cholecalciferol (VITAMIN D3) 125 MCG (5000 UT) TABS Take 1 tablet by mouth 2 times daily      DULoxetine (CYMBALTA) 60 MG extended release capsule Take 1 capsule by mouth 2 times daily      ARIPiprazole (ABILIFY) 30 MG tablet Take 30 mg by mouth daily       propranolol (INDERAL) 40 MG tablet Take 1 tablet by mouth 3 times daily anxiety      QUEtiapine (SEROQUEL) 200 MG tablet Take 200 mg by mouth nightly      pregabalin (LYRICA) 100 MG capsule Take 1 capsule by mouth 3 times daily for 30 days. 90 capsule 0     No facility-administered medications prior to visit. REVIEW OF SYSTEMS:    Respiratory: Negative for apnea, chest tightness and shortness of breath or change in baseline breathing. PHYSICAL EXAM:   Nursing note and vitals reviewed. BP (!) 163/86   Pulse 72   Temp 97.3 °F (36.3 °C) (Temporal)   Wt (!) 327 lb 9.6 oz (148.6 kg)   LMP 01/15/2014   SpO2 97%   BMI 56.23 kg/m²   Constitutional: She appears well-developed and well-nourished. No acute distress.    Cardiovascular: Normal rate, regular rhythm, normal heart sounds, and does not have murmur. Pulmonary/Chest: Effort normal. No respiratory distress. She does not have wheezes in the lung fields. She has no rales. Neurological/Psychiatric:She is alert and oriented to person, place, and time. Coordination is  normal.  Her mood isAppropriate and affect is Neutral/Euthymic(normal) . Her  Other: Back: +taunt bands with TP's, decrease ROM      IMPRESSION:   1. Chronic pain syndrome    2. Primary osteoarthritis of both hips    3. Fibromyalgia    4. Primary osteoarthritis of right hip    5. Plantar fasciitis, right    6. Facet syndrome    7. DDD (degenerative disc disease), lumbar    8. Lumbar radiculitis    9. Elbow tendinitis        PLAN:  Informed verbal consent was obtained  -Back stretching exercises as advised  -She was advised to increase fluids ( 5-7  glasses of fluid daily), limit caffeine, avoid cheese products, increase dietary fiber, increase activity and exercise as tolerated and relax regularly and enjoy meals   -Informed verbal consent was obtained from the patient. Risks and benefits of the procedure were explained. Complications of the procedure and side effects of kenalog/ lidocaine were discussed with patient. Using 0.25% marcaine and 1cc of kenalog, the the tender trigger point areas in the  bilateral paraspinal lumbar muscles -erector spinae and longgissmus muscles were injected under aseptic condition. Mobilization attempted by stretching. Patient tolerated procedure well.  Adv to apply ice today.   -Continue with Ultram 1-2 per day along with Lyrica   -Walking as tolerated      Current Outpatient Medications   Medication Sig Dispense Refill    levothyroxine (SYNTHROID) 88 MCG tablet TAKE 1 TABLET BY MOUTH DAILY 90 tablet 0    rOPINIRole (REQUIP) 1 MG tablet TAKE 2 TABLETS BY MOUTH EVERY NIGHT 180 tablet 0    aspirin 81 MG EC tablet Take 1 tablet by mouth daily 30 tablet 3    atorvastatin (LIPITOR) 40 MG tablet Take 2 tablets by mouth daily 90 tablet 0    clopidogrel (PLAVIX) 75 MG tablet Take 1 tablet by mouth daily 30 tablet 3    pantoprazole (PROTONIX) 40 MG tablet Take 40 mg by mouth daily      betamethasone dipropionate (DIPROLENE) 0.05 % cream APPLY EXTERNALLY TO THE AFFECTED AREA TWICE DAILY 60 g 0    ondansetron (ZOFRAN) 4 MG tablet TAKE 1 TABLET BY MOUTH EVERY 8 HOURS AS NEEDED FOR NAUSEA OR VOMITING 60 tablet 0    potassium chloride (KLOR-CON M) 20 MEQ extended release tablet Take 1 tablet by mouth daily 90 tablet 1    torsemide (DEMADEX) 20 MG tablet TAKE 1 TABLET BY MOUTH TWICE DAILY BEFORE MEALS 180 tablet 0    Compression Stockings MISC by Does not apply route 20mmHg- knee high, closed toe. wear daily. Fit to size 1 each 0    vitamin B-12 (CYANOCOBALAMIN) 1000 MCG tablet Take 1 tablet by mouth daily 30 tablet 0    meclizine (ANTIVERT) 25 MG tablet Take 25 mg by mouth 3 times daily as needed      Cholecalciferol (VITAMIN D3) 125 MCG (5000 UT) TABS Take 1 tablet by mouth 2 times daily      DULoxetine (CYMBALTA) 60 MG extended release capsule Take 1 capsule by mouth 2 times daily      ARIPiprazole (ABILIFY) 30 MG tablet Take 30 mg by mouth daily       propranolol (INDERAL) 40 MG tablet Take 1 tablet by mouth 3 times daily anxiety      QUEtiapine (SEROQUEL) 200 MG tablet Take 200 mg by mouth nightly      pregabalin (LYRICA) 100 MG capsule Take 1 capsule by mouth 3 times daily for 30 days. 90 capsule 0     No current facility-administered medications for this visit. I will continue her current medication regimen  which is part of the above treatment schedule. It has been helping with Ms. Yap's chronic  medical problems which for this visit include:   Diagnoses of Chronic pain syndrome, Primary osteoarthritis of both hips, Fibromyalgia, Primary osteoarthritis of right hip, Plantar fasciitis, right, Facet syndrome, DDD (degenerative disc disease), lumbar, Lumbar radiculitis, and Elbow tendinitis were pertinent to this visit. Risks and benefits of the medications and other alternative treatments  including no treatment were discussed with the patient. The common side effects of these medications were also explained to the patient. Informed verbal consent was obtained. Goals of current treatment regimen include improvement in pain, restoration of functioning- with focus on improvement in physical performance, general activity, work or disability,emotional distress, health care utilization and  decreased medication consumption. Will continue to monitor progress towards achieving/maintaining therapeutic goals with special emphasis on  1. Improvement in perceived interfernce  of pain with ADL's. Ability to do home exercises independently. Ability to do household chores indoor and/or outdoor work and social and leisure activities. Improve psychosocial and physical functioning. - she is showing progression towards this treatment goal with the current regimen. She was advised against drinking alcohol with the narcotic pain medicines, advised against driving or handling machinery while adjusting the dose of medicines or if having cognitive  issues related to the current medications. Risk of overdose and death, if medicines not taken as prescribed, were also discussed. If the patient develops new symptoms or if the symptoms worsen, the patient should call the office. While transcribing every attempt was made to maintain the accuracy of the note in terms of it's contents,there may have been some errors made inadvertently. Thank you for allowing me to participate in the care of this patient.     Lisa Chairez MD.    Cc: Sheng Gomez MD

## 2021-09-08 ENCOUNTER — OFFICE VISIT (OUTPATIENT)
Dept: ORTHOPEDIC SURGERY | Age: 61
End: 2021-09-08
Payer: COMMERCIAL

## 2021-09-08 VITALS — BODY MASS INDEX: 50.02 KG/M2 | RESPIRATION RATE: 16 BRPM | WEIGHT: 293 LBS | HEIGHT: 64 IN

## 2021-09-08 DIAGNOSIS — M72.2 PLANTAR FASCIITIS OF RIGHT FOOT: Primary | ICD-10-CM

## 2021-09-08 DIAGNOSIS — M79.671 RIGHT FOOT PAIN: ICD-10-CM

## 2021-09-08 PROCEDURE — 99214 OFFICE O/P EST MOD 30 MIN: CPT | Performed by: ORTHOPAEDIC SURGERY

## 2021-09-08 NOTE — PROGRESS NOTES
CHIEF COMPLAINT: Left heel pain/plantar fasciitis. HISTORY:  Ms. Carla Be 63 y.o.  female presents today for the first visit for evaluation of left heel pain which started to worsen over the past few months with no injury. She is complaining of achy, intermittent tender, moderate pain 5/10 with activity and no pain with rest.  Pain is increase with standing and wallking. Pain is sharp early in the morning with first few steps, dull achy pain by the end of the day. No radiation and no numbness and tingling sensation. She has not had any treatment for this problem. She did buy new shoes with some improvement. No other complaint. She is well-known to me for left foot plantar fasciitis and left foot partial Lacey's calcaneus excision secondary repair of Achilles tendon on 3/9/2020 and is doing well with that. Denies smoking.     Past Medical History:   Diagnosis Date    Acquired hyperlipoproteinemia     MARIO (acute kidney injury) ([facility] [address] 9/30/2019    Allergic rhinitis     Anxiety     Arthritis     Bilateral lower extremity edema     Bipolar 1 disorder (HCC)     Chronic pain syndrome     DDD (degenerative disc disease), lumbar     Depression     Depression     Disease of gallbladder     Dizziness     DJD (degenerative joint disease) of hip     Edema 12/29/2009    Elbow tendinitis     Essential hypertension 8/19/2019    Facet syndrome     Fibromyalgia     Fracture of nasal bone     GERD (gastroesophageal reflux disease)     Headache     Hiatal hernia     History of blood transfusion     anemia    Hyperlipidemia     Insomnia     Osteoarthritis     Prediabetes     Rash     Self mutilating behavior     cutter pt states she has not done in months- in therapy    Sleep apnea     no CPAP    Suicidal ideation     Thyroid disorder     hypothyroidism    Vertigo        Past Surgical History:   Procedure Laterality Date    ABDOMEN SURGERY  5/28/2014    LAPAROSCOPIC SLEEVE GASTRECTOMY, EXTENSIVE LYSIS OF ADHESIONS    ACHILLES TENDON SURGERY Left 3/9/2020    LEFT CALCANEOUS PARTIAL EXCISION, SECONDARY REPAIR OF ACHILLES TENDON performed by [facility] at [facility]      open , Ex-lap    CHOLECYSTECTOMY      open    COLONOSCOPY  04/16/2013    dr Shoshana Cosme 2018     DILATION AND CURETTAGE OF UTERUS      ENDOSCOPY, COLON, DIAGNOSTIC      FOOT SURGERY Right 05/27/2016    ARTHROPLASTY RIGHT 5TH DIGIT      HYSTEROSCOPY N/A 12-30-13    Hysteroscopy Dilitation and Curettage    JOINT REPLACEMENT Bilateral     knee    OTHER SURGICAL HISTORY  9/11/2015    ARTHROPLASTY 5TH DIGIT LEFT FOOT          SHOULDER SURGERY      right    SHOULDER SURGERY Right     SLEEVE GASTROPLASTY  May 28, 2014    [facility]man    TOTAL KNEE ARTHROPLASTY  12/10/10    Right    TOTAL KNEE ARTHROPLASTY  3/30/10    Left    UPPER GASTROINTESTINAL ENDOSCOPY  4/16/2013    dr Kaz Martínez ENDOSCOPY  3/20/2014    dr Segura Ree History     Socioeconomic History    Marital status: Single     Spouse name: Not on file    Number of children: 0    Years of education: 12    Highest education level: Not on file   Occupational History    Occupation: [employer]     Comment: unemployed   Tobacco Use    Smoking status: Never Smoker    Smokeless tobacco: Never Used    Tobacco comment: Never smoked   Vaping Use    Vaping Use: Never used   Substance and Sexual Activity    Alcohol use: No     Alcohol/week: 0.0 standard drinks    Drug use: No    Sexual activity: Not Currently   Other Topics Concern    Not on file   Social History Narrative    Lives by herself with her cat     Social Determinants of Health     Financial Resource Strain: Low Risk     Difficulty of Paying Living Expenses: Not very hard   Food Insecurity: No Food Insecurity    Worried About Running Out of Food in the Last Year: Never true    Louis of Food in the Last Year: Never true   Transportation Needs:     Lack of Transportation (Medical):  Lack of Transportation (Non-Medical):    Physical Activity:     Days of Exercise per Week:     Minutes of Exercise per Session:    Stress:     Feeling of Stress :    Social Connections:     Frequency of Communication with Friends and Family:     Frequency of Social Gatherings with Friends and Family:     Attends Sikh Services:     Active Member of Clubs or Organizations:     Attends Club or Organization Meetings:     Marital Status:    Intimate Partner Violence:     Fear of Current or Ex-Partner:     Emotionally Abused:     Physically Abused:     Sexually Abused:        Family History   Problem Relation Age of Onset    Heart Disease Mother     Heart Failure Mother     High Cholesterol Mother     High Blood Pressure Mother     Stroke Mother     Birth Defects Mother     Other Mother         VV    High Blood Pressure Father     COPD Father     Diabetes Brother     Stroke Maternal Grandmother     Arthritis Maternal Grandmother     Heart Failure Brother     Stroke Brother     Birth Defects Maternal Grandfather     Arthritis Paternal Grandmother        Current Outpatient Medications on File Prior to Visit   Medication Sig Dispense Refill    pregabalin (LYRICA) 100 MG capsule Take 1 capsule by mouth 3 times daily for 30 days. 90 capsule 0    traMADol (ULTRAM) 50 MG tablet Take 1 tablet by mouth every 6 hours as needed for Pain (Max 1-2 per day) for up to 28 days.  60 tablet 0    levothyroxine (SYNTHROID) 88 MCG tablet TAKE 1 TABLET BY MOUTH DAILY 90 tablet 0    rOPINIRole (REQUIP) 1 MG tablet TAKE 2 TABLETS BY MOUTH EVERY NIGHT 180 tablet 0    aspirin 81 MG EC tablet Take 1 tablet by mouth daily 30 tablet 3    atorvastatin (LIPITOR) 40 MG tablet Take 2 tablets by mouth daily 90 tablet 0    pantoprazole (PROTONIX) 40 MG tablet Take 40 mg by mouth daily      betamethasone dipropionate (DIPROLENE) 0.05 % cream APPLY EXTERNALLY TO THE AFFECTED AREA TWICE DAILY 60 g 0    ondansetron (ZOFRAN) 4 MG tablet TAKE 1 TABLET BY MOUTH EVERY 8 HOURS AS NEEDED FOR NAUSEA OR VOMITING 60 tablet 0    potassium chloride (KLOR-CON M) 20 MEQ extended release tablet Take 1 tablet by mouth daily 90 tablet 1    torsemide (DEMADEX) 20 MG tablet TAKE 1 TABLET BY MOUTH TWICE DAILY BEFORE MEALS 180 tablet 0    Compression Stockings MISC by Does not apply route 20mmHg- knee high, closed toe. wear daily. Fit to size 1 each 0    vitamin B-12 (CYANOCOBALAMIN) 1000 MCG tablet Take 1 tablet by mouth daily 30 tablet 0    meclizine (ANTIVERT) 25 MG tablet Take 25 mg by mouth 3 times daily as needed      Cholecalciferol (VITAMIN D3) 125 MCG (5000 UT) TABS Take 1 tablet by mouth 2 times daily      DULoxetine (CYMBALTA) 60 MG extended release capsule Take 1 capsule by mouth 2 times daily      ARIPiprazole (ABILIFY) 30 MG tablet Take 30 mg by mouth daily       propranolol (INDERAL) 40 MG tablet Take 1 tablet by mouth 3 times daily anxiety      QUEtiapine (SEROQUEL) 200 MG tablet Take 200 mg by mouth nightly       No current facility-administered medications on file prior to visit. Pertinent items are noted in HPI  Review of systems reviewed from Patient History Form dated on 4/9/2021 and available in the patient's chart under the Media tab. No change. PHYSICAL EXAMINATION:  Ms. Tunde Smyth is a very pleasant 63 y.o.  female who presents today in no acute distress, awake, alert, and oriented. She is well dressed, nourished and  groomed. Patient with normal affect. Height is  5' 4\" (1.626 m), weight is (!) 327 lb (148.3 kg), Body mass index is 56.13 kg/m². Resting respiratory rate is 16. Examination of the gait, showed that the patient walks heel-toe with a non-antalgic gait and no limp. Examination of both ankles showing a good range of motion.   She has dorsiflexion to about 10 degrees bilaterally, which increased with knee flexion. She has intact sensation and good pedal pulses. @ has good strength in all four planes, including eversion, and has mild tenderness on deep palpation over the medial calcaneal tubercle, compared to the other side. The ankles are stable to drawer test bilaterally, equally. IMAGING:Xray's were reviewed, 3 views of the right foot taken in office today, and showed no acute fracture. No other abnormality. There is mild calcification posterior Achilles tendon insertion site at the calcaneus. IMPRESSION: Right plantar fasciitis. PLAN: I discussed with the patient the treatment options. We recommended stretching exercises of the calf as well as stretching of the plantar fascia which was taught to the patient today. She will take NSAIDS as needed, Rx sent. Use silicone heel pad that she had from previous. F/u in 6 weeks, PT if needed. She understands that this may take up to 6 months for the pain to resolve.        Evangelina Plascencia MD

## 2021-09-10 ENCOUNTER — OFFICE VISIT (OUTPATIENT)
Dept: ORTHOPEDIC SURGERY | Age: 61
End: 2021-09-10
Payer: COMMERCIAL

## 2021-09-10 VITALS — HEIGHT: 64 IN | RESPIRATION RATE: 16 BRPM | WEIGHT: 293 LBS | BODY MASS INDEX: 50.02 KG/M2

## 2021-09-10 DIAGNOSIS — Z96.653 HISTORY OF TOTAL BILATERAL KNEE REPLACEMENT: Primary | ICD-10-CM

## 2021-09-10 PROCEDURE — 99213 OFFICE O/P EST LOW 20 MIN: CPT | Performed by: ORTHOPAEDIC SURGERY

## 2021-09-10 NOTE — PROGRESS NOTES
[facility]  Office Visit    Chief Complaint: Follow-up s/p bilateral total knee arthroplasty    HPI:  Elinor Mccord is a 63 y.o. who is here in follow-up of bilateral total knee arthroplasty performed in 2010 by Dr. Lorraine Jackson. Left knee was done in March 2010 right knee in December 2010. She is doing well overall. She walks without assistive device. She has swelling and sensitivity in her lower legs and over the front of the knee joints. However, she is able to walk without pain aside from occasional aches. She is doing well overall.       Patient Active Problem List   Diagnosis    Primary localized osteoarthrosis, lower leg    Hyperlipidemia    Self mutilating behavior    Restless legs syndrome (RLS)    Skin growth    URTI (acute upper respiratory infection)    Posterior tibial tendonitis, bilateral    Suicidal ideation    Shoulder pain    Paresthesia    Vitamin B12 deficiency    Leg swelling    Bilateral lower extremity edema    Headache    Precordial pain    Left Lateral epicondylitis (tennis elbow)    Radial nerve entrapment    GERD (gastroesophageal reflux disease)    Diarrhea    Rotator cuff (capsule) sprain    Knee joint replacement by other means    Pain in limb    Venous (peripheral) insufficiency    Chronic pain syndrome    Elbow tendinitis    Fibromyalgia    Depression    Atypical chest pain    Hiatal hernia    Osteoarthritis of hip    DDD (degenerative disc disease), lumbar    Facet syndrome    Fatigue    Allergic rhinitis    Prediabetes    Plantar fasciitis, right    S/P laparoscopic sleeve gastrectomy    Nausea & vomiting    Weakness    Major depressive disorder, recurrent episode, severe (HCC)    Bilateral leg edema    Morbid obesity with BMI of 40.0-44.9, adult ([address]    Hypothyroid    Borderline personality disorder (HCC)    Light headed    Acute blood loss anemia    Anemia    Self-mutilation    Cellulitis    Shortness of breath    Vertigo    Scalp cyst    Primary insomnia    BPPV (benign paroxysmal positional vertigo)    Laceration of left upper extremity    Acute cystitis without hematuria    Open wound of left upper arm    Viral upper respiratory tract infection    Lumbar radiculitis    Leg skin lesion, left    Edema    Nasal trauma    Drug-induced constipation    Abdominal pain    History of total bilateral knee replacement    SOB (shortness of breath)    Back pain    Lumbar strain    Essential hypertension    [facility]IO [facility] injury) ([facility] [address]    Lacey's deformity of left heel    Achilles tendinitis of left lower extremity    Dysuria    Plantar fasciitis of left foot    Hypoproteinemia (HCC)    Bruising    Inflammatory spondylopathy (HCC)    Chest pain    H/O angiography    TIA (transient ischemic attack)       ROS:  Constitutional: denies fever, chills, weight loss  MSK: denies pain in other joints, muscle aches  Neurological: denies numbness, tingling, weakness    Exam:  Resp. rate 16, height 5' 4\" (1.626 m), weight (!) 327 lb (148.3 kg), last menstrual period 01/15/2014, not currently breastfeeding. Appearance: sitting in exam room chair, appears to be in no acute distress, awake and alert  Resp: unlabored breathing on room air  Skin: warm, dry and intact with out erythema or significant increased temperature  Neuro: grossly intact both lower extremities. Intact sensation to light touch. Motor exam 4+ to 5/5 in all major motor groups. Bilateral knees: Incisions are healed. Range of motion is 0 to 120 degrees stable to varus and valgus stress at full extension and anterior and posterior drawer at 90 degrees of flexion. .  Sensation is intact light touch. There is brisk capillary refill. Dorsalis pedis and posterior tibial pulses are intact. There is 5/5 muscle strength in all muscle groups. Imaging:  3 views of bilateral knees were performed and reviewed today.  Significant for bilateral total knee arthroplasty prosthesis in place with no signs of osteolysis, loosening, fracture, or dislocation. Assessment:  S/p bilateral total knee arthroplasty    Plan:  She is doing well 11 years out from bilateral total knee arthroplasty with Dr. Krista John. She will continue activity as tolerated. We discussed with the patient today the use of antibiotic prophylaxis prior to any dental procedures. We discussed that the antibiotic prophylaxis would be used to help prevent periprosthetic joint infections. If they were not offered antibiotics by the physician performing the procedure, they should contact our office that we may prescribe them antibiotics. Follow-up in 1 to 2 years for repeat assessment or sooner if needed. Total time spent on today's encounter was at least 24 minutes. This time included reviewing prior notes, radiographs, and lab results when available, reviewing history obtained by medical assistant, performing history and physical exam, reviewing tests/radiographs with the patient, counseling the patient, ordering medications or tests, documentation in the electronic health record, and coordination of care. This dictation was done with Dragon dictation and may contain mechanical errors related to translation.

## 2021-09-13 RX ORDER — ONDANSETRON 4 MG/1
TABLET, FILM COATED ORAL
Qty: 60 TABLET | Refills: 0 | Status: SHIPPED | OUTPATIENT
Start: 2021-09-13

## 2021-09-27 RX ORDER — BETAMETHASONE DIPROPIONATE 0.5 MG/G
CREAM TOPICAL
Qty: 60 G | Refills: 0 | Status: SHIPPED | OUTPATIENT
Start: 2021-09-27 | End: 2021-12-03

## 2021-10-01 ENCOUNTER — OFFICE VISIT (OUTPATIENT)
Dept: PAIN MANAGEMENT | Age: 61
End: 2021-10-01
Payer: COMMERCIAL

## 2021-10-01 VITALS
TEMPERATURE: 97.2 F | WEIGHT: 293 LBS | HEART RATE: 73 BPM | SYSTOLIC BLOOD PRESSURE: 141 MMHG | DIASTOLIC BLOOD PRESSURE: 85 MMHG | BODY MASS INDEX: 56.82 KG/M2

## 2021-10-01 DIAGNOSIS — M54.16 LUMBAR RADICULITIS: ICD-10-CM

## 2021-10-01 DIAGNOSIS — M79.7 FIBROMYALGIA: ICD-10-CM

## 2021-10-01 DIAGNOSIS — M16.0 PRIMARY OSTEOARTHRITIS OF BOTH HIPS: ICD-10-CM

## 2021-10-01 DIAGNOSIS — M47.899 FACET SYNDROME: ICD-10-CM

## 2021-10-01 DIAGNOSIS — G89.4 CHRONIC PAIN SYNDROME: ICD-10-CM

## 2021-10-01 DIAGNOSIS — M72.2 PLANTAR FASCIITIS, RIGHT: ICD-10-CM

## 2021-10-01 DIAGNOSIS — M77.8 ELBOW TENDINITIS: ICD-10-CM

## 2021-10-01 DIAGNOSIS — M16.11 PRIMARY OSTEOARTHRITIS OF RIGHT HIP: ICD-10-CM

## 2021-10-01 DIAGNOSIS — M51.36 DDD (DEGENERATIVE DISC DISEASE), LUMBAR: ICD-10-CM

## 2021-10-01 PROCEDURE — 99213 OFFICE O/P EST LOW 20 MIN: CPT | Performed by: INTERNAL MEDICINE

## 2021-10-01 RX ORDER — PREGABALIN 100 MG/1
100 CAPSULE ORAL 3 TIMES DAILY
Qty: 90 CAPSULE | Refills: 0 | Status: SHIPPED | OUTPATIENT
Start: 2021-10-01 | End: 2021-10-29 | Stop reason: SDUPTHER

## 2021-10-01 NOTE — PROGRESS NOTES
Lisa Urrutia  1960  [id number]      HISTORY OF PRESENT ILLNESS:  Ms. Jaspreet Johnson is a 57 y.o. female returns for a follow up visit for pain management  She has a diagnosis of   1. Chronic pain syndrome    2. Primary osteoarthritis of both hips    3. Fibromyalgia    4. Primary osteoarthritis of right hip    5. Plantar fasciitis, right    6. Facet syndrome    7. DDD (degenerative disc disease), lumbar    8. Lumbar radiculitis    9. Elbow tendinitis    . She complains of pain in the neck, right shoulder, lower back She rates the pain 8/10 and describes it as sharp. Current treatment regimen has helped relieve about 50% of the pain. She denies any side effects from the current pain regimen. Patient reports that since the last follow up visit the physical functioning is worse, family/social relationships are unchanged, mood is worse sleep patterns are worse, and that the overall functioning is unchanged. Patient denies misusing/abusing her narcotic pain medications or using any illegal drugs. There are No indicators for possible drug abuse, addiction or diversion problems. Patient states she has been doing fair, pain has been manageable. She denies nay side effects . She reports she is working 24 hrs per week. She states she is using Lyrica a long with ultram 1-2  X per day. She denies any constipation symptoms. ALLERGIES: Patients list of allergies were reviewed     MEDICATIONS: Ms. Jaspreet Johnson list of medications were reviewed. Her current medications are   Outpatient Medications Prior to Visit   Medication Sig Dispense Refill    betamethasone dipropionate 0.05 % cream APPLY EXTERNALLY TO THE AFFECTED AREA TWICE DAILY 60 g 0    ondansetron (ZOFRAN) 4 MG tablet TAKE 1 TABLET BY MOUTH EVERY 8 HOURS AS NEEDED FOR NAUSEA OR VOMITING 60 tablet 0    pregabalin (LYRICA) 100 MG capsule Take 1 capsule by mouth 3 times daily for 30 days.  90 capsule 0    traMADol (ULTRAM) 50 MG tablet Take 1 tablet by mouth every 6 hours as needed for Pain (Max 1-2 per day) for up to 28 days. 60 tablet 0    levothyroxine (SYNTHROID) 88 MCG tablet TAKE 1 TABLET BY MOUTH DAILY 90 tablet 0    rOPINIRole (REQUIP) 1 MG tablet TAKE 2 TABLETS BY MOUTH EVERY NIGHT 180 tablet 0    aspirin 81 MG EC tablet Take 1 tablet by mouth daily 30 tablet 3    atorvastatin (LIPITOR) 40 MG tablet Take 2 tablets by mouth daily 90 tablet 0    pantoprazole (PROTONIX) 40 MG tablet Take 40 mg by mouth daily      potassium chloride (KLOR-CON M) 20 MEQ extended release tablet Take 1 tablet by mouth daily 90 tablet 1    torsemide (DEMADEX) 20 MG tablet TAKE 1 TABLET BY MOUTH TWICE DAILY BEFORE MEALS 180 tablet 0    Compression Stockings MISC by Does not apply route 20mmHg- knee high, closed toe. wear daily. Fit to size 1 each 0    vitamin B-12 (CYANOCOBALAMIN) 1000 MCG tablet Take 1 tablet by mouth daily 30 tablet 0    meclizine (ANTIVERT) 25 MG tablet Take 25 mg by mouth 3 times daily as needed      Cholecalciferol (VITAMIN D3) 125 MCG (5000 UT) TABS Take 1 tablet by mouth 2 times daily      DULoxetine (CYMBALTA) 60 MG extended release capsule Take 1 capsule by mouth 2 times daily      ARIPiprazole (ABILIFY) 30 MG tablet Take 30 mg by mouth daily       propranolol (INDERAL) 40 MG tablet Take 1 tablet by mouth 3 times daily anxiety      QUEtiapine (SEROQUEL) 200 MG tablet Take 200 mg by mouth nightly       No facility-administered medications prior to visit. REVIEW OF SYSTEMS:    Respiratory: Negative for apnea, chest tightness and shortness of breath or change in baseline breathing. PHYSICAL EXAM:   Nursing note and vitals reviewed. BP (!) 141/85   Pulse 73   Temp 97.2 °F (36.2 °C) (Temporal)   Wt (!) 331 lb (150.1 kg)   LMP 01/15/2014   Breastfeeding No   BMI 56.82 kg/m²   Constitutional: She appears well-developed and well-nourished. No acute distress.    Cardiovascular: Normal rate, regular rhythm, normal heart sounds, and does not have murmur. Pulmonary/Chest: Effort normal. No respiratory distress. She does not have wheezes in the lung fields. She has no rales. Neurological/Psychiatric:She is alert and oriented to person, place, and time. Coordination is  normal.  Her mood isAppropriate and affect is Neutral/Euthymic(normal) . IMPRESSION:   1. Chronic pain syndrome    2. Primary osteoarthritis of both hips    3. Fibromyalgia    4. Primary osteoarthritis of right hip    5. Facet syndrome    6. DDD (degenerative disc disease), lumbar    7. Lumbar radiculitis        PLAN:  Informed verbal consent was obtained  -ROM/Stretching exercises   -Continue with Ultram 1-2  Per day, has pills left from before   -She was advised weight reduction, diet changes- 800-1200 jessica diet, diet diary, exercising, nutritional  consult increased physical activity as tolerated   -Walk/stretching exercises as advised    Current Outpatient Medications   Medication Sig Dispense Refill    betamethasone dipropionate 0.05 % cream APPLY EXTERNALLY TO THE AFFECTED AREA TWICE DAILY 60 g 0    ondansetron (ZOFRAN) 4 MG tablet TAKE 1 TABLET BY MOUTH EVERY 8 HOURS AS NEEDED FOR NAUSEA OR VOMITING 60 tablet 0    pregabalin (LYRICA) 100 MG capsule Take 1 capsule by mouth 3 times daily for 30 days. 90 capsule 0    traMADol (ULTRAM) 50 MG tablet Take 1 tablet by mouth every 6 hours as needed for Pain (Max 1-2 per day) for up to 28 days.  60 tablet 0    levothyroxine (SYNTHROID) 88 MCG tablet TAKE 1 TABLET BY MOUTH DAILY 90 tablet 0    rOPINIRole (REQUIP) 1 MG tablet TAKE 2 TABLETS BY MOUTH EVERY NIGHT 180 tablet 0    aspirin 81 MG EC tablet Take 1 tablet by mouth daily 30 tablet 3    atorvastatin (LIPITOR) 40 MG tablet Take 2 tablets by mouth daily 90 tablet 0    pantoprazole (PROTONIX) 40 MG tablet Take 40 mg by mouth daily      potassium chloride (KLOR-CON M) 20 MEQ extended release tablet Take 1 tablet by mouth daily 90 tablet 1    torsemide (DEMADEX) 20 MG tablet TAKE 1 TABLET BY MOUTH TWICE DAILY BEFORE MEALS 180 tablet 0    Compression Stockings MISC by Does not apply route 20mmHg- knee high, closed toe. wear daily. Fit to size 1 each 0    vitamin B-12 (CYANOCOBALAMIN) 1000 MCG tablet Take 1 tablet by mouth daily 30 tablet 0    meclizine (ANTIVERT) 25 MG tablet Take 25 mg by mouth 3 times daily as needed      Cholecalciferol (VITAMIN D3) 125 MCG (5000 UT) TABS Take 1 tablet by mouth 2 times daily      DULoxetine (CYMBALTA) 60 MG extended release capsule Take 1 capsule by mouth 2 times daily      ARIPiprazole (ABILIFY) 30 MG tablet Take 30 mg by mouth daily       propranolol (INDERAL) 40 MG tablet Take 1 tablet by mouth 3 times daily anxiety      QUEtiapine (SEROQUEL) 200 MG tablet Take 200 mg by mouth nightly       No current facility-administered medications for this visit. I will continue her current medication regimen  which is part of the above treatment schedule. It has been helping with Ms. Yap's chronic  medical problems which for this visit include:   Diagnoses of Chronic pain syndrome, Primary osteoarthritis of both hips, Fibromyalgia, Primary osteoarthritis of right hip, Plantar fasciitis, right, Facet syndrome, DDD (degenerative disc disease), lumbar, Lumbar radiculitis, and Elbow tendinitis were pertinent to this visit. Risks and benefits of the medications and other alternative treatments  including no treatment were discussed with the patient. The common side effects of these medications were also explained to the patient. Informed verbal consent was obtained. Goals of current treatment regimen include improvement in pain, restoration of functioning- with focus on improvement in physical performance, general activity, work or disability,emotional distress, health care utilization and  decreased medication consumption. Will continue to monitor progress towards achieving/maintaining therapeutic goals with special emphasis on  1.  Improvement in perceived interfernce  of pain with ADL's. Ability to do home exercises independently. Ability to do household chores indoor and/or outdoor work and social and leisure activities. Improve psychosocial and physical functioning. - she is showing progression towards this treatment goal with the current regimen. She was advised against drinking alcohol with the narcotic pain medicines, advised against driving or handling machinery while adjusting the dose of medicines or if having cognitive  issues related to the current medications. Risk of overdose and death, if medicines not taken as prescribed, were also discussed. If the patient develops new symptoms or if the symptoms worsen, the patient should call the office. While transcribing every attempt was made to maintain the accuracy of the note in terms of it's contents,there may have been some errors made inadvertently. Thank you for allowing me to participate in the care of this patient.     Jose Soto MD.    Cc: Augusto Naylor MD

## 2021-10-04 NOTE — TELEPHONE ENCOUNTER
Patient called stating she talked to rsm on Friday and he was willing to write her a letter for a walker w/a seat for work.     She forgot to  her script for Lyrica   Need a refill     [address] [place] [address]

## 2021-10-12 ENCOUNTER — OFFICE VISIT (OUTPATIENT)
Dept: ORTHOPEDIC SURGERY | Age: 61
End: 2021-10-12
Payer: COMMERCIAL

## 2021-10-12 VITALS — HEIGHT: 64 IN | WEIGHT: 293 LBS | BODY MASS INDEX: 50.02 KG/M2

## 2021-10-12 DIAGNOSIS — M54.50 LUMBAR PAIN: ICD-10-CM

## 2021-10-12 DIAGNOSIS — M51.36 DDD (DEGENERATIVE DISC DISEASE), LUMBAR: Primary | ICD-10-CM

## 2021-10-12 PROCEDURE — 99214 OFFICE O/P EST MOD 30 MIN: CPT | Performed by: ORTHOPAEDIC SURGERY

## 2021-10-12 NOTE — PROGRESS NOTES
New Patient: LUMBAR SPINE    Referring Provider:  Darek Chaudhary MD    CHIEF COMPLAINT:    Chief Complaint   Patient presents with    Back Pain     lumbar       HISTORY OF PRESENT ILLNESS:    Ms. Parminder Dumas  is a pleasant 57 y.o. female referred by Dr. David Segundo who presents for evaluation of low back pain. She notes her symptoms began insidiously about a year ago. She rates the intensity of her back pain 10/10. Her pain has steadily increased since that time. She denies numbness tingling tingling of her lower extremities. She notes urinary urgency and difficulty with her balance. She denies saddle anesthesia and bowel dysfunction. Her pain is decreased with leaning forward and increased with standing.       Current/Past Treatment:   · Physical Therapy: None  · Chiropractic: None  · Injection: None  · Medications: Lyrica    Past Medical History:   Past Medical History:   Diagnosis Date    Acquired hyperlipoproteinemia     MARIO (acute kidney injury) ([facility] [address] 9/30/2019    Allergic rhinitis     Anxiety     Arthritis     Bilateral lower extremity edema     Bipolar 1 disorder (HCC)     Chronic pain syndrome     DDD (degenerative disc disease), lumbar     Depression     Depression     Disease of gallbladder     Dizziness     DJD (degenerative joint disease) of hip     Edema 12/29/2009    Elbow tendinitis     Essential hypertension 8/19/2019    Facet syndrome     Fibromyalgia     Fracture of nasal bone     GERD (gastroesophageal reflux disease)     Headache     Hiatal hernia     History of blood transfusion     anemia    Hyperlipidemia     Insomnia     Osteoarthritis     Prediabetes     Rash     Self mutilating behavior     cutter pt states she has not done in months- in therapy    Sleep apnea     no CPAP    Suicidal ideation     Thyroid disorder     hypothyroidism    Vertigo       Past Surgical History:     Past Surgical History:   Procedure Laterality Date    ABDOMEN SURGERY  5/28/2014    LAPAROSCOPIC SLEEVE GASTRECTOMY, EXTENSIVE LYSIS OF ADHESIONS    ACHILLES TENDON SURGERY Left 3/9/2020    LEFT CALCANEOUS PARTIAL EXCISION, SECONDARY REPAIR OF ACHILLES TENDON performed by Lolis [facility] at [facility]      open , Ex-lap    CHOLECYSTECTOMY      open    COLONOSCOPY  04/16/2013    dr Gasper Hurtado 2018     DILATION AND CURETTAGE OF UTERUS      ENDOSCOPY, COLON, DIAGNOSTIC      FOOT SURGERY Right 05/27/2016    ARTHROPLASTY RIGHT 5TH DIGIT      HYSTEROSCOPY N/A 12-30-13    Hysteroscopy Dilitation and Curettage    JOINT REPLACEMENT Bilateral     knee    OTHER SURGICAL HISTORY  9/11/2015    ARTHROPLASTY 5TH DIGIT LEFT FOOT          SHOULDER SURGERY      right    SHOULDER SURGERY Right     SLEEVE GASTROPLASTY  May 28, 2014    [facility]    TOTAL KNEE ARTHROPLASTY  12/10/10    Right    TOTAL KNEE ARTHROPLASTY  3/30/10    Left    UPPER GASTROINTESTINAL ENDOSCOPY  4/16/2013    dr Truman Pelayo ENDOSCOPY  3/20/2014    dr Matt Little     Current Medications:     Current Outpatient Medications:     pregabalin (LYRICA) 100 MG capsule, Take 1 capsule by mouth 3 times daily for 30 days. , Disp: 90 capsule, Rfl: 0    cephALEXin (KEFLEX) 500 MG capsule, Take 1 capsule by mouth 4 times daily, Disp: 20 capsule, Rfl: 0    betamethasone dipropionate 0.05 % cream, APPLY EXTERNALLY TO THE AFFECTED AREA TWICE DAILY, Disp: 60 g, Rfl: 0    ondansetron (ZOFRAN) 4 MG tablet, TAKE 1 TABLET BY MOUTH EVERY 8 HOURS AS NEEDED FOR NAUSEA OR VOMITING, Disp: 60 tablet, Rfl: 0    levothyroxine (SYNTHROID) 88 MCG tablet, TAKE 1 TABLET BY MOUTH DAILY, Disp: 90 tablet, Rfl: 0    rOPINIRole (REQUIP) 1 MG tablet, TAKE 2 TABLETS BY MOUTH EVERY NIGHT, Disp: 180 tablet, Rfl: 0    aspirin 81 MG EC tablet, Take 1 tablet by mouth daily, Disp: 30 tablet, Rfl: 3    atorvastatin (LIPITOR) 40 MG tablet, Take 2 tablets by mouth daily, Disp: 90 tablet, Rfl: 0    pantoprazole (PROTONIX) 40 MG tablet, Take 40 mg by mouth daily, Disp: , Rfl:     potassium chloride (KLOR-CON M) 20 MEQ extended release tablet, Take 1 tablet by mouth daily, Disp: 90 tablet, Rfl: 1    torsemide (DEMADEX) 20 MG tablet, TAKE 1 TABLET BY MOUTH TWICE DAILY BEFORE MEALS, Disp: 180 tablet, Rfl: 0    Compression Stockings MISC, by Does not apply route 20mmHg- knee high, closed toe. wear daily. Fit to size, Disp: 1 each, Rfl: 0    vitamin B-12 (CYANOCOBALAMIN) 1000 MCG tablet, Take 1 tablet by mouth daily, Disp: 30 tablet, Rfl: 0    meclizine (ANTIVERT) 25 MG tablet, Take 25 mg by mouth 3 times daily as needed, Disp: , Rfl:     Cholecalciferol (VITAMIN D3) 125 MCG (5000 UT) TABS, Take 1 tablet by mouth 2 times daily, Disp: , Rfl:     DULoxetine (CYMBALTA) 60 MG extended release capsule, Take 1 capsule by mouth 2 times daily, Disp: , Rfl:     ARIPiprazole (ABILIFY) 30 MG tablet, Take 30 mg by mouth daily , Disp: , Rfl:     propranolol (INDERAL) 40 MG tablet, Take 1 tablet by mouth 3 times daily anxiety, Disp: , Rfl:     QUEtiapine (SEROQUEL) 200 MG tablet, Take 200 mg by mouth nightly, Disp: , Rfl:   Allergies:  Polysporin [bacitracin-polymyxin b], Lorazepam, Neomycin-polymyxin-gramicidin, and Sulfa antibiotics  Social History:    reports that she has never smoked. She has never used smokeless tobacco. She reports that she does not drink alcohol and does not use drugs.   Family History:   Family History   Problem Relation Age of Onset    Heart Disease Mother     Heart Failure Mother     High Cholesterol Mother     High Blood Pressure Mother     Stroke Mother     Birth Defects Mother     Other Mother         VV    High Blood Pressure Father     COPD Father     Diabetes Brother     Stroke Maternal Grandmother     Arthritis Maternal Grandmother     Heart Failure Brother     Stroke Brother     Birth Defects Maternal Grandfather     Arthritis Paternal Grandmother REVIEW OF SYSTEMS: Full ROS noted & scanned   CONSTITUTIONAL: Denies unexplained weight loss, fevers, chills or fatigue  NEUROLOGICAL: Denies unsteady gait or progressive weakness  MUSCULOSKELETAL: Denies joint swelling or redness  PSYCHOLOGICAL: Denies anxiety, depression   SKIN: Denies skin changes, delayed healing, rash, itching   HEMATOLOGIC: Denies easy bleeding or bruising  ENDOCRINE: Denies excessive thirst, urination, heat/cold  RESPIRATORY: Denies current dyspnea, cough  GI: Denies nausea, vomiting, diarrhea   : Denies bowel or bladder issues      PHYSICAL EXAM:    Vitals: Height 5' 4.02\" (1.626 m), weight (!) 331 lb (150.1 kg), last menstrual period 01/15/2014, not currently breastfeeding. GENERAL EXAM:  · General Apparence: Patient is adequately groomed with no evidence of malnutrition. · Orientation: The patient is oriented to time, place and person. · Mood & Affect:The patient's mood and affect are appropriate. · Vascular: Examination reveals no swelling tenderness in upper or lower extremities. Good capillary refill. · Lymphatic: The lymphatic examination bilaterally reveals all areas to be without enlargement or induration  · Sensation: Sensation is intact without deficit  · Coordination/Balance: Good coordination     LUMBAR/SACRAL EXAMINATION:  · Inspection: Local inspection shows no step-off or bruising. Lumbar alignment is normal.  Sagittal and Coronal balance is neutral.      · Palpation:   No evidence of tenderness at the midline. No tenderness bilaterally at the paraspinal or trochanters. There is no step-off or paraspinal spasm. · Range of Motion: Lumbar flexion, extension and rotation are mildly limited due to pain. · Strength:   Strength testing is 5/5 in all muscle groups tested. · Special Tests:   Straight leg raise and crossed SLR negative. Leg length and pelvis level. · Skin: There are no rashes, ulcerations or lesions.   · Reflexes: Reflexes are symmetrically 2+ at the patellar and ankle tendons. Clonus absent bilaterally at the feet. · Gait & station: normal, patient ambulates without assistance    · Additional Examinations:   · RIGHT LOWER EXTREMITY: Inspection/examination of the right lower extremity does not show any tenderness, deformity or injury. Range of motion is unremarkable. There is no gross instability. There are no rashes, ulcerations or lesions. Strength and tone are normal.    · LEFT LOWER EXTREMITY:  Inspection/examination of the left lower extremity does not show any tenderness, deformity or injury. Range of motion is unremarkable. There is no gross instability. There are no rashes, ulcerations or lesions. Strength and tone are normal.    Diagnostic Testing:    I reviewed AP and lateral x-rays of her lumbar spine were obtained in the office today. Those show significant lumbar degenerative disc disease and that her left leg is slightly shorter than her right leg    Impression:   Lumbar degenerative disc disease    Plan:    Discussed treatment options including observation, physical therapy, epidural injection, and additional imaging. She would like to proceed with physical therapy. Will call to schedule a lumbar MRI if her symptoms persist after that.

## 2021-10-20 ENCOUNTER — OFFICE VISIT (OUTPATIENT)
Dept: ORTHOPEDIC SURGERY | Age: 61
End: 2021-10-20
Payer: COMMERCIAL

## 2021-10-20 VITALS — HEIGHT: 64 IN | WEIGHT: 293 LBS | BODY MASS INDEX: 50.02 KG/M2

## 2021-10-20 DIAGNOSIS — M72.2 PLANTAR FASCIITIS OF LEFT FOOT: Primary | ICD-10-CM

## 2021-10-20 PROCEDURE — 20550 NJX 1 TENDON SHEATH/LIGAMENT: CPT | Performed by: ORTHOPAEDIC SURGERY

## 2021-10-20 PROCEDURE — 99214 OFFICE O/P EST MOD 30 MIN: CPT | Performed by: ORTHOPAEDIC SURGERY

## 2021-10-20 RX ORDER — LIDOCAINE HYDROCHLORIDE 10 MG/ML
20 INJECTION, SOLUTION INFILTRATION; PERINEURAL ONCE
Status: COMPLETED | OUTPATIENT
Start: 2021-10-20 | End: 2021-10-20

## 2021-10-20 RX ORDER — TRIAMCINOLONE ACETONIDE 40 MG/ML
40 INJECTION, SUSPENSION INTRA-ARTICULAR; INTRAMUSCULAR ONCE
Status: COMPLETED | OUTPATIENT
Start: 2021-10-20 | End: 2021-10-20

## 2021-10-20 RX ADMIN — TRIAMCINOLONE ACETONIDE 40 MG: 40 INJECTION, SUSPENSION INTRA-ARTICULAR; INTRAMUSCULAR at 10:34

## 2021-10-20 RX ADMIN — LIDOCAINE HYDROCHLORIDE 20 MG: 10 INJECTION, SOLUTION INFILTRATION; PERINEURAL at 10:33

## 2021-10-20 NOTE — PROGRESS NOTES
Thyroid disorder     hypothyroidism    Vertigo        Past Surgical History:   Procedure Laterality Date    ABDOMEN SURGERY  5/28/2014    LAPAROSCOPIC SLEEVE GASTRECTOMY, EXTENSIVE LYSIS OF ADHESIONS    ACHILLES TENDON SURGERY Left 3/9/2020    LEFT CALCANEOUS PARTIAL EXCISION, SECONDARY REPAIR OF ACHILLES TENDON performed by Em Torres MD at [facility]      open , Ex-lap    CHOLECYSTECTOMY      open    COLONOSCOPY  04/16/2013    dr Daniel Ross 2018     DILATION AND CURETTAGE OF UTERUS      ENDOSCOPY, COLON, DIAGNOSTIC      FOOT SURGERY Right 05/27/2016    ARTHROPLASTY RIGHT 5TH DIGIT      HYSTEROSCOPY N/A 12-30-13    Hysteroscopy Dilitation and Curettage    JOINT REPLACEMENT Bilateral     knee    OTHER SURGICAL HISTORY  9/11/2015    ARTHROPLASTY 5TH DIGIT LEFT FOOT          SHOULDER SURGERY      right    SHOULDER SURGERY Right     SLEEVE GASTROPLASTY  May 28, 2014    Dahman    TOTAL KNEE ARTHROPLASTY  12/10/10    Right    TOTAL KNEE ARTHROPLASTY  3/30/10    Left    UPPER GASTROINTESTINAL ENDOSCOPY  4/16/2013    dr White Ranjana ENDOSCOPY  3/20/2014    dr Amy Reddy       Social History     Socioeconomic History    Marital status: Single     Spouse name: Not on file    Number of children: 0    Years of education: 12    Highest education level: Not on file   Occupational History    Occupation: [employer]     Comment: unemployed   Tobacco Use    Smoking status: Never Smoker    Smokeless tobacco: Never Used    Tobacco comment: Never smoked   Vaping Use    Vaping Use: Never used   Substance and Sexual Activity    Alcohol use: No     Alcohol/week: 0.0 standard drinks    Drug use: No    Sexual activity: Not Currently   Other Topics Concern    Not on file   Social History Narrative    Lives by herself with her cat     Social Determinants of Health     Financial Resource Strain: Low Risk     Difficulty of Paying Living Expenses: Not very hard   Food Insecurity: No Food Insecurity    Worried About Running Out of Food in the Last Year: Never true    Louis of Food in the Last Year: Never true   Transportation Needs:     Lack of Transportation (Medical):  Lack of Transportation (Non-Medical):    Physical Activity:     Days of Exercise per Week:     Minutes of Exercise per Session:    Stress:     Feeling of Stress :    Social Connections:     Frequency of Communication with Friends and Family:     Frequency of Social Gatherings with Friends and Family:     Attends Synagogue Services:     Active Member of Clubs or Organizations:     Attends Club or Organization Meetings:     Marital Status:    Intimate Partner Violence:     Fear of Current or Ex-Partner:     Emotionally Abused:     Physically Abused:     Sexually Abused:        Family History   Problem Relation Age of Onset    Heart Disease Mother     Heart Failure Mother     High Cholesterol Mother     High Blood Pressure Mother     Stroke Mother     Birth Defects Mother     Other Mother         VV    High Blood Pressure Father     COPD Father     Diabetes Brother     Stroke Maternal Grandmother     Arthritis Maternal Grandmother     Heart Failure Brother     Stroke Brother     Birth Defects Maternal Grandfather     Arthritis Paternal Grandmother        Current Outpatient Medications on File Prior to Visit   Medication Sig Dispense Refill    cefdinir (OMNICEF) 300 MG capsule Take 1 capsule by mouth 2 times daily for 5 days 10 capsule 0    pregabalin (LYRICA) 100 MG capsule Take 1 capsule by mouth 3 times daily for 30 days.  90 capsule 0    betamethasone dipropionate 0.05 % cream APPLY EXTERNALLY TO THE AFFECTED AREA TWICE DAILY 60 g 0    ondansetron (ZOFRAN) 4 MG tablet TAKE 1 TABLET BY MOUTH EVERY 8 HOURS AS NEEDED FOR NAUSEA OR VOMITING 60 tablet 0    levothyroxine (SYNTHROID) 88 MCG tablet TAKE 1 TABLET BY MOUTH DAILY 90 tablet 0    both ankles showing a good range of motion. She has dorsiflexion to about 10 degrees bilaterally, which increased with knee flexion. She has intact sensation and good pedal pulses. @ has good strength in all four planes, including eversion, and has mild tenderness on deep palpation over the medial calcaneal tubercle, compared to the other side. The ankles are stable to drawer test bilaterally, equally. IMAGING:Xray's were reviewed, 3 views of the left foot taken in office 9/8/2021, and showed no acute fracture. No other abnormality. There is mild calcification posterior Achilles tendon insertion site at the calcaneus. IMPRESSION: Right plantar fasciitis. PLAN: I discussed with the patient the treatment options. We recommended stretching exercises of the calf as well as stretching of the plantar fascia which was taught to the patient today. She will take NSAIDS as needed. Use silicone heel pad that she had from previous. I discussed with her that she may benefit from a cortisone injection and she agreed to proceed. PROCEDURE:    With the patient's permission, and under sterile condition, the left medial calcaneal area was prepared and draped with alcohol and planter fascia was injected with a mixture of 1 ml Kenalog 40mg and 2 ml of 1% lidocaine. The patient tolerated the procedure well. A band-aid was applied and the patient was advised to ice the area for 15-20 minutes to relieve any injection site related pain. She reported a good improvement immediatly after the injection. She understands that this may take up to 6 months for the pain to resolve. F/U in 3-4 months, PT if needed.       Aurelio Darden MD

## 2021-10-27 ENCOUNTER — HOSPITAL ENCOUNTER (OUTPATIENT)
Dept: PHYSICAL THERAPY | Age: 61
Setting detail: THERAPIES SERIES
Discharge: HOME | End: 2021-10-27
Payer: COMMERCIAL

## 2021-10-27 PROCEDURE — 97110 THERAPEUTIC EXERCISES: CPT

## 2021-10-27 PROCEDURE — 97162 PT EVAL MOD COMPLEX 30 MIN: CPT

## 2021-10-27 NOTE — PLAN OF CARE
[facility]  Phone: [phone]   Fax:     [phone]                                                       Physical Therapy Certification    Dear Referring Practitioner: Bernardo Barba MD,    We had the pleasure of evaluating the following patient for physical therapy services at [facility]. A summary of our findings can be found in the initial assessment below. This includes our plan of care. If you have any questions or concerns regarding these findings, please do not hesitate to contact me at the office phone number checked above. Thank you for the referral.       Physician Signature:_______________________________Date:__________________  By signing above (or electronic signature), therapists plan is approved by physician                  Patient: Yomaira Martinez   : 1960   MRN: [id number]  Referring Physician: Referring Practitioner: Bernardo Barba MD      Evaluation Date: 10/27/2021      Medical Diagnosis Information:  Diagnosis: M51.36 (ICD-10-CM) - DDD (degenerative disc disease), lumbar                                             Insurance information:  [employer] - precert      Precautions/ Contra-indications:   Latex Allergy:  [x]NO      []YES  Preferred Language for Healthcare:   [x]English       []other:    C-SSRS Triggered by Intake questionnaire (Past 2 wk assessment ):   [] No, Questionnaire did not trigger screening. [x] Yes, Patient intake triggered C-SSRS Screening      [] C-SSRS Screening completed (  [x] PCP notified via Epic     SUBJECTIVE: Patient stated complaint: Pt here for evaluation of LBP, denies any radiating n/t or radicular symptoms. 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  She has received numerous epidurals for NORMAL      Hip Abd      Hip Ext      Hip flexion (L1-L2 femoral) [] []    Knee extension (L2-L4 femoral) [] []    Knee flexion (S1 sciatic)      Dorsiflexion (L4-L5 deep peroneal) [] []    Great Toe Ext (L5 deep peroneal nerve) [] []    Ankle Eversion (S1-S2 super peroneal) [] []    Ankle PF(S1-S2 tibial) [] []    Multifidus [] []    Transverse Ab [] []      Dermatomes Normal Abnormal Comments   []ALL NORMAL            inguinal area (L1)  [] []    anterior mid-thigh (L2) [] []    distal ant thigh/med knee (L3) [] []    medial lower leg and foot (L4) [] []    lateral lower leg and foot (L5) [] []    posterior calf (S1) [] []    medial calcaneus (S2) [] []      Reflexes Normal Abnormal Comments   []ALL NORMAL            S1-2 Seated achilles [] []    S1-2 Prone knee bend [] []    L3-4 Patellar tendon [] []    C5-6 Biceps [] []    C6 Brachioradialis [] []    C7-8 Triceps [] []    Clonus [] []    Babinski [] []    Lucas's [] []      Joint mobility: n/t   []Normal    []Hypo   []Hyper    Neurodynamics: n/t    Orthopedic Special Tests:   - 30 sec sit to stand: 8 reps (norm = 12)  - tandem stance (no hands): unable to perform     Neural dynamic tension testing Normal Abnormal Comments   Slump Test  - Degree of knee flexion:  [] []    SLR  [] []    0-30 [] []    30-70 [] []    Femoral nerve (L2-4) [] []       Normal Abnormal N/A Comments   Fwd Bend-aberrant or innominate mvmt) [] [] []    Trendelenburg [] [] []    Kemps/Quadrant [] [] []    Levonia Mantel [] [] []    MICHELLE/Andres [] [] []    Hip scour [] [] []    Supine to sit [] [] []    Prone knee bend [] [] []           Hip thrust [] [] []    SI distraction/compression [] [] []    Sacral Spring/thrust [] [] []               [x] Patient history, allergies, meds reviewed. Medical chart reviewed. See intake form. Review Of Systems (ROS):  [x]Performed Review of systems (Integumentary, CardioPulmonary, Neurological) by intake and observation.  Intake form has been scanned into medical record. Patient has been instructed to contact their primary care physician regarding ROS issues if not already being addressed at this time. Co-morbidities/Complexities (which will affect course of rehabilitation):   []None           Arthritic conditions   []Rheumatoid arthritis (M05.9)  [x]Osteoarthritis (M19.91)   Cardiovascular conditions   [x]Hypertension (I10)  [x]Hyperlipidemia (E78.5)  [x]Angina pectoris (I20)  []Atherosclerosis (I70)  []CVA Musculoskeletal conditions   []Disc pathology   []Congenital spine pathologies   []Prior surgical intervention  []Osteoporosis (M81.8)  []Osteopenia (M85.8)   Endocrine conditions   []Hypothyroid (E03.9)  []Hyperthyroid Gastrointestinal conditions   []Constipation (N43.83)   Metabolic conditions   [x]Morbid obesity (E66.01)  []Diabetes type 1(E10.65) or 2 (E11.65)   [x]Neuropathy (G60.9)     Pulmonary conditions   []Asthma (J45)  []Coughing   []COPD (J44.9)   Psychological Disorders  [x]Anxiety (F41.9)  [x]Depression (F32.9)   []Other:   []Other:           Barriers to/and or personal factors that will affect rehab potential:              []Age  []Sex    []Smoker              []Motivation/Lack of Motivation                        [x]Co-Morbidities              []Cognitive Function, education/learning barriers              []Environmental, home barriers              []profession/work barriers  []past PT/medical experience  []other:  Justification:     Falls Risk Assessment (30 days):   [x] Falls Risk assessed and no intervention required.   [] Falls Risk assessed and Patient requires intervention due to being higher risk   TUG score (>12s at risk):     [] Falls education provided, including:         ASSESSMENT:   Functional Impairments:     []Noted lumbar/proximal hip hypomobility   []Noted lumbosacral and/or generalized hypermobility   [x]Decreased Lumbosacral/hip/LE functional ROM   [x]Decreased core/proximal hip strength and neuromuscular control [x]Decreased LE functional strength    []Abnormal reflexes/sensation/myotomal/dermatomal deficits  [x]Reduced balance/proprioceptive control    []other:      Functional Activity Limitations (from functional questionnaire and intake)   [x]Reduced ability to tolerate prolonged functional positions   []Reduced ability or difficulty with changes of positions or transfers between positions   [x]Reduced ability to maintain good posture and demonstrate good body mechanics with sitting, bending, and lifting   [x]Reduced ability to sleep   [] Reduced ability or tolerance with driving and/or computer work   [x]Reduced ability to perform lifting, reaching, carrying tasks   [x]Reduced ability to squat   [x]Reduced ability to forward bend   [x]Reduced ability to ambulate prolonged functional periods/distances/surfaces   [x]Reduced ability to ascend/descend stairs   []other:       Participation Restrictions   []Reduced participation in self care activities   []Reduced participation in home management activities   [x]Reduced participation in work activities   [x]Reduced participation in social activities. []Reduced participation in sport/recreational activities. Classification:   []Signs/symptoms consistent with Lumbar instability/stabilization subgroup. []Signs/symptoms consistent with Lumbar mobilization/manipulation subgroup, myotomes and dermatomes intact. Meets manipulation criteria. []Signs/symptoms consistent with Lumbar direction specific/centralization subgroup   []Signs/symptoms consistent with Lumbar traction subgroup       []Signs/symptoms consistent with lumbar facet dysfunction   []Signs/symptoms consistent with lumbar stenosis type dysfunction   []Signs/symptoms consistent with nerve root involvement including myotome & dermatome dysfunction   []Signs/symptoms consistent with post-surgical status including: decreased ROM, strength and function.    []signs/symptoms consistent with pathology which may benefit from Dry needling     [x]other:  S/s consistent w/ Chronic low back pain with associated lower quarter strength deficits and functional independence/ mobility deficits    Prognosis/Rehab Potential:      []Excellent   [x]Good    []Fair   []Poor    Tolerance of evaluation/treatment:    []Excellent   [x]Good    []Fair   []Poor     Physical Therapy Evaluation Complexity Justification  [x] A history of present problem with:  [] no personal factors and/or comorbidities that impact the plan of care;  []1-2 personal factors and/or comorbidities that impact the plan of care  [x]3 personal factors and/or comorbidities that impact the plan of care  [x] An examination of body systems using standardized tests and measures addressing any of the following: body structures and functions (impairments), activity limitations, and/or participation restrictions;:  [x] a total of 1-2 or more elements   [] a total of 3 or more elements   [] a total of 4 or more elements   [x] A clinical presentation with:  [] stable and/or uncomplicated characteristics   [x] evolving clinical presentation with changing characteristics  [] unstable and unpredictable characteristics;   [x] Clinical decision making of [] low, [x] moderate, [] high complexity using standardized patient assessment instrument and/or measurable assessment of functional outcome.     [] EVAL (LOW) 64293 (typically 20 minutes face-to-face)  [x] EVAL (MOD) 45315 (typically 30 minutes face-to-face)  [] EVAL (HIGH) 47562 (typically 45 minutes face-to-face)  [] RE-EVAL     PLAN: Begin PT focusing on: proximal hip mobilizations, LB mobs, LB core activation, proximal hip activation, and HEP    Frequency/Duration:  Up to 2 days per week for up to 8-10 Weeks:  Interventions:  [x]  Therapeutic exercise including: strength training, ROM, for LE, Glutes and core   [x]  NMR activation and proprioception for glutes , LE and Core   [x]  Manual therapy as indicated for Hip complex, LE and spine to include: Dry Needling/IASTM, STM, PROM, Gr I-IV mobilizations, manipulation. [x]  Modalities as needed that may include: thermal agents, E-stim, Biofeedback, US, iontophoresis as indicated  [x]  Patient education on joint protection, postural re-education, activity modification, progression of HEP. HEP instruction: see flowsheet    GOALS:  Patient stated goal: able to stand for at least 4 hours at work w/o need to sit. [] Progressing: [] Met: [] Not Met: [] Adjusted  Therapist goals for Patient:   Short Term Goals: To be achieved in: 2 weeks  1. Independent in HEP and progression per patient tolerance, in order to prevent re-injury. [] Progressing: [] Met: [] Not Met: [] Adjusted  2. Patient will have a decrease in pain to facilitate improvement in movement, function, and ADLs as indicated by Functional Deficits. [] Progressing: [] Met: [] Not Met: [] Adjusted    Long Term Goals: To be achieved in: up to 10 weeks  1. Disability index score from 39% to 25% or less for the [place] to assist with reaching prior level of function. [] Progressing: [] Met: [] Not Met: [] Adjusted  2. Patient will demonstrate increased AROM to WNL, good LS mobility, good hip ROM to allow for proper joint functioning as indicated by patients Functional Deficits. [] Progressing: [] Met: [] Not Met: [] Adjusted  3. Patient will demonstrate an increase in Strength to be able to perform at least 12 reps during 30 sec sit to stand test.    [] Progressing: [] Met: [] Not Met: [] Adjusted  4. Patient will be able to walk for exercise for at least 30 min w/o increased symptoms or restriction. [] Progressing: [] Met: [] Not Met: [] Adjusted       Electronically signed by: Whitney Homans, PT , DPT, OCS #[id number]          Note: If patient does not return for scheduled/recommended follow up visits, this note will serve as a discharge from care along with the most recent update on progress.

## 2021-10-27 NOTE — FLOWSHEET NOTE
[address] [facility] [place]. [address]  Phone: [phone]   Fax:     [phone]    Physical Therapy Treatment Note/ Progress Report:     Date:  10/27/2021    Patient Name:  Alyse Ralph    :  1960  MRN: [id number]    Pertinent Medical History: Additional Pertinent Hx: chronic LBP, multiple ER visits: TIA, chest pain, + PHQ-2 questions    Medical/Treatment Diagnosis Information:  Diagnosis: M51.36 (ICD-10-CM) - DDD (degenerative disc disease), lumbar  Treatment Diagnosis: chronic LBP, functional lower quarter weakness, functional decline    Insurance/Certification information:   [employer]etna  Physician Information:  Referring Practitioner: Sher Mccray MD  Plan of care signed (Y/N):     Date of Patient follow up with Physician:      Progress Report: []  Yes  [x]  No     Date Range for reporting period:  Beginning: 10/27/2021  Ending:    Progress report due (10 Rx/or 30 days whichever is less):      Recertification due (POC duration/ or 90 days whichever is less):     Visit # POC/ Insurance Allowable Auth Needed   1 aetna [x]Yes    []No     Functional Scale:       Date Assessed: at eval  Test: [place]  Score:      Pain level:  See eval /10     History of Injury: Pt here for evaluation of LBP, denies any radiating n/t or radicular symptoms. 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 She has received numerous epidurals for many years and states she would typically get about a week of relief. 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 Within the past 3 weeks, she has been sitting at work due to increased back pain. She reports her current walking tolerance is about 30 min and she can stand a few hours before needing to sit and rest. She averages about 6-7 hours of sleep at night.  She was told by spine surgeon that she has arthritis and DDD and states she needs to attend PT before they can get an MRI approved. She is planning to move to [place] near the end of December and [place] to leave [place] to be where the weather is warmer. *Pt was filling out intake forms for about 15 min into her 45 min appmt, so eval will be somewhat limited.        SUBJECTIVE:  See eval    OBJECTIVE:    Observation:    Test measurements:      RESTRICTIONS/PRECAUTIONS:      Exercises/Interventions:     Therapeutic Ex (14241)   Min: 10' Resistance/Reps Notes/Cues   Aerobic ex warm up Walking program         Sit to stand 30 sec (8 reps)    Tandem balance 20s x2 @ counter w/ Hina        Step up >    Standing heel raise Assisted>         EOB hip ext >              Manual Intervention (27879) Min:               NMR re-education (52937)   Min:     Mf Activation- re-ed     TrA Re-ed activation     Glute Max re-ed activation          Therapeutic Activity (49465) Min: 5'     Pt edu See below. Modalities  Min:             Other Therapeutic Activities:  Pt was educated on PT POC, Diagnosis, Prognosis, pathomechanics as well as frequency and duration of scheduling future physical therapy appointments. Time was also taken on this day to answer all patient questions and participation in PT. Reviewed appointment policy in detail with patient and patient verbalized understanding. Home Exercise Program:   10/27: pelvic tilt, sit to stand, tandem balance, walking program    Therapeutic Exercise and NMR EXR  [x] (21475) Provided verbal/tactile cueing for activities related to strengthening, flexibility, endurance, ROM  for improvements in proximal hip and core control with self care, mobility, lifting and ambulation.   [x] (89803) Provided verbal/tactile cueing for activities related to improving balance, coordination, kinesthetic sense, posture, motor skill, proprioception  to assist with core control in self care, mobility, lifting, and ambulation. Therapeutic Activities:    [x] (20601 or 73857) Provided verbal/tactile cueing for activities related to improving balance, coordination, kinesthetic sense, posture, motor skill, proprioception and motor activation to allow for proper function  with self care and ADLs  [x] (17405) Provided training and instruction to the patient for proper core and proximal hip recruitment and positioning with ambulation re-education     Home Exercise Program:    [x] (38515) Reviewed/Progressed HEP activities related to strengthening, flexibility, endurance, ROM of core, proximal hip and LE for functional self-care, mobility, lifting and ambulation   [x] (31038) Reviewed/Progressed HEP activities related to improving balance, coordination, kinesthetic sense, posture, motor skill, proprioception of core, proximal hip and LE for self care, mobility, lifting, and ambulation      Manual Treatments:  PROM / STM / Oscillations-Mobs:  G-I, II, III, IV (PA's, Inf., Post.)  [x] (09128) Provided manual therapy to mobilize proximal hip and LS spine soft tissue/joints for the purpose of modulating pain, promoting relaxation,  increasing ROM, reducing/eliminating soft tissue swelling/inflammation/restriction, improving soft tissue extensibility and allowing for proper ROM for normal function with self care, mobility, lifting and ambulation.      Approval Dates:  CPT Code Units Approved Units Used  Date Updated:                     Charges:  Timed Code Treatment Minutes: 15   Total Treatment Minutes: 35     [] EVAL (LOW) 95576 (typically 20 minutes face-to-face)  [x] EVAL (MOD) 52347 (typically 30 minutes face-to-face)  [] EVAL (HIGH) 73403 (typically 45 minutes face-to-face)  [] RE-EVAL     [x] IC(44373) x     [] Dry needle 1 or 2 Muscles (47583)  [] NMR (24174) x     [] Dry needle 3+ Muscles (08922)  [] Manual (04250) x     [] Ultrasound (51530) x  [] TA (71837) x     [] Mech Traction (02546)  [] ES(attended) (37756) [] ES (un) (14049):   [] Vasopump (34821) [] Ionto (91263)   [] Other:    GOALS:  Patient stated goal: able to stand for at least 4 hours at work w/o need to sit. []? Progressing: []? Met: []? Not Met: []? Adjusted  Therapist goals for Patient:   Short Term Goals: To be achieved in: 2 weeks  1. Independent in HEP and progression per patient tolerance, in order to prevent re-injury. []? Progressing: []? Met: []? Not Met: []? Adjusted  2. Patient will have a decrease in pain to facilitate improvement in movement, function, and ADLs as indicated by Functional Deficits. []? Progressing: []? Met: []? Not Met: []? Adjusted     Long Term Goals: To be achieved in: up to 10 weeks  1. Disability index score from 39% to 25% or less for the [place] to assist with reaching prior level of function. []? Progressing: []? Met: []? Not Met: []? Adjusted  2. Patient will demonstrate increased AROM to WNL, good LS mobility, good hip ROM to allow for proper joint functioning as indicated by patients Functional Deficits. []? Progressing: []? Met: []? Not Met: []? Adjusted  3. Patient will demonstrate an increase in Strength to be able to perform at least 12 reps during 30 sec sit to stand test.    []? Progressing: []? Met: []? Not Met: []? Adjusted  4. Patient will be able to walk for exercise for at least 30 min w/o increased symptoms or restriction. []? Progressing: []? Met: []? Not Met: []? Adjusted                  ASSESSMENT:  See eval    Treatment/Activity Tolerance:  [x] Patient tolerated treatment well [] Patient limited by fatique  [] Patient limited by pain  [] Patient limited by other medical complications  [] Other:     Overall Progression Towards Functional goals/ Treatment Progress Update:  [] Patient is progressing as expected towards functional goals listed. [] Progression is slowed due to complexities/Impairments listed. [] Progression has been slowed due to co-morbidities.   [x] Plan just implemented, too soon to assess goals progression <30days   [] Goals require adjustment due to lack of progress  [] Patient is not progressing as expected and requires additional follow up with physician  [] Other:    Prognosis for POC: [x] Good [] Fair  [] Poor    Patient requires continued skilled intervention: [x] Yes  [] No        PLAN: See eval  [] Continue per plan of care [] Alter current plan (see comments)  [x] Plan of care initiated [] Hold pending MD visit [] Discharge    Electronically signed by: Chen Delacruz PT , DPT, OCS #[id number]      Note: If patient does not return for scheduled/recommended follow up visits, this note will serve as a discharge from care along with the most recent update on progress.

## 2021-10-29 ENCOUNTER — OFFICE VISIT (OUTPATIENT)
Dept: PAIN MANAGEMENT | Age: 61
End: 2021-10-29
Payer: COMMERCIAL

## 2021-10-29 VITALS
HEART RATE: 81 BPM | WEIGHT: 293 LBS | SYSTOLIC BLOOD PRESSURE: 153 MMHG | BODY MASS INDEX: 55.13 KG/M2 | DIASTOLIC BLOOD PRESSURE: 91 MMHG | OXYGEN SATURATION: 96 %

## 2021-10-29 DIAGNOSIS — M16.11 PRIMARY OSTEOARTHRITIS OF RIGHT HIP: ICD-10-CM

## 2021-10-29 DIAGNOSIS — M47.899 FACET SYNDROME: ICD-10-CM

## 2021-10-29 DIAGNOSIS — M54.16 LUMBAR RADICULITIS: ICD-10-CM

## 2021-10-29 DIAGNOSIS — G89.4 CHRONIC PAIN SYNDROME: ICD-10-CM

## 2021-10-29 DIAGNOSIS — M16.0 PRIMARY OSTEOARTHRITIS OF BOTH HIPS: ICD-10-CM

## 2021-10-29 DIAGNOSIS — M79.7 FIBROMYALGIA: ICD-10-CM

## 2021-10-29 DIAGNOSIS — M77.8 ELBOW TENDINITIS: ICD-10-CM

## 2021-10-29 DIAGNOSIS — M51.36 DDD (DEGENERATIVE DISC DISEASE), LUMBAR: ICD-10-CM

## 2021-10-29 DIAGNOSIS — M72.2 PLANTAR FASCIITIS, RIGHT: ICD-10-CM

## 2021-10-29 PROCEDURE — 99213 OFFICE O/P EST LOW 20 MIN: CPT | Performed by: INTERNAL MEDICINE

## 2021-10-29 RX ORDER — TRAMADOL HYDROCHLORIDE 50 MG/1
50 TABLET ORAL EVERY 6 HOURS PRN
Qty: 60 TABLET | Refills: 0 | Status: SHIPPED | OUTPATIENT
Start: 2021-10-29 | End: 2021-12-10 | Stop reason: SDUPTHER

## 2021-10-29 RX ORDER — PREGABALIN 100 MG/1
100 CAPSULE ORAL 3 TIMES DAILY
Qty: 90 CAPSULE | Refills: 0 | Status: SHIPPED | OUTPATIENT
Start: 2021-10-29 | End: 2021-12-10 | Stop reason: SDUPTHER

## 2021-10-29 NOTE — PROGRESS NOTES
Emma Galvez  1960  [id number]    HISTORY OF PRESENT ILLNESS:  Ms. Abiola Marina is a 57 y.o. female returns for a follow up visit for multiple medical problems. Her current presenting problems are   1. Chronic pain syndrome    2. Primary osteoarthritis of right hip    3. Lumbar radiculitis    4. Primary osteoarthritis of both hips    5. Facet syndrome    6. Plantar fasciitis, right    7. Fibromyalgia    8. DDD (degenerative disc disease), lumbar    9. Elbow tendinitis    . As per information/history obtained from the PADT(patient assessment and documentation tool) - She complains of pain in the lower back with radiation to the feet Left She rates the pain 7/10 and describes it as sharp, numbness. Pain is made worse by: nothing. Current treatment regimen has helped relieve about 40% of the pain. She denies side effects from the current pain regimen. Patient reports that since the last follow up visit the physical functioning is unchanged, family/social relationships are unchanged, mood is worse and sleep patterns are unchanged, and that the overall functioning is unchanged. Patient denies neurological bowel or bladder. Patient denies misusing/abusing her narcotic pain medications or using any illegal drugs. There are No indicators for possible drug abuse, addiction or diversion problems. Upon obtaining the medical history from Ms. Abiola Marina regarding today's office visit for her presenting problems, patient states she had a fall in the yard, she twisted her foot and is having increase pain and swelling in the left arm. Ms. Abiola Marina mentions she is using Ultram along with Lyrica. Patient denies any side effects with the medications. She reports she is working part time still. ALLERGIES: Patients list of allergies were reviewed     MEDICATIONS: Ms. Abiola Marina list of medications were reviewed. Her current medications are   Outpatient Medications Prior to Visit   Medication Sig Dispense Refill    pantoprazole (PROTONIX) 40 MG tablet TAKE 1 TABLET BY MOUTH DAILY 90 tablet 0    pregabalin (LYRICA) 100 MG capsule Take 1 capsule by mouth 3 times daily for 30 days. 90 capsule 0    betamethasone dipropionate 0.05 % cream APPLY EXTERNALLY TO THE AFFECTED AREA TWICE DAILY 60 g 0    ondansetron (ZOFRAN) 4 MG tablet TAKE 1 TABLET BY MOUTH EVERY 8 HOURS AS NEEDED FOR NAUSEA OR VOMITING 60 tablet 0    levothyroxine (SYNTHROID) 88 MCG tablet TAKE 1 TABLET BY MOUTH DAILY 90 tablet 0    rOPINIRole (REQUIP) 1 MG tablet TAKE 2 TABLETS BY MOUTH EVERY NIGHT 180 tablet 0    aspirin 81 MG EC tablet Take 1 tablet by mouth daily 30 tablet 3    atorvastatin (LIPITOR) 40 MG tablet Take 2 tablets by mouth daily 90 tablet 0    potassium chloride (KLOR-CON M) 20 MEQ extended release tablet Take 1 tablet by mouth daily 90 tablet 1    torsemide (DEMADEX) 20 MG tablet TAKE 1 TABLET BY MOUTH TWICE DAILY BEFORE MEALS 180 tablet 0    Compression Stockings MISC by Does not apply route 20mmHg- knee high, closed toe. wear daily. Fit to size 1 each 0    vitamin B-12 (CYANOCOBALAMIN) 1000 MCG tablet Take 1 tablet by mouth daily 30 tablet 0    meclizine (ANTIVERT) 25 MG tablet Take 25 mg by mouth 3 times daily as needed      Cholecalciferol (VITAMIN D3) 125 MCG (5000 UT) TABS Take 1 tablet by mouth 2 times daily      DULoxetine (CYMBALTA) 60 MG extended release capsule Take 1 capsule by mouth 2 times daily      ARIPiprazole (ABILIFY) 30 MG tablet Take 30 mg by mouth daily       propranolol (INDERAL) 40 MG tablet Take 1 tablet by mouth 3 times daily anxiety      QUEtiapine (SEROQUEL) 200 MG tablet Take 200 mg by mouth nightly       No facility-administered medications prior to visit. REVIEW OF SYSTEMS:    Respiratory: Negative for apnea, chest tightness and shortness of breath or change in baseline breathing. PHYSICAL EXAM:   Nursing note and vitals reviewed.  BP (!) 153/91   Pulse 81   Wt (!) 321 lb 3.2 oz (145.7 kg)   LMP 01/15/2014   SpO2 96%   BMI 55.13 kg/m²   Constitutional: She appears well-developed and well-nourished. No acute distress. Cardiovascular: Normal rate, regular rhythm, normal heart sounds, and does not have murmur. Pulmonary/Chest: Effort normal. No respiratory distress. She does not have wheezes in the lung fields. She has no rales. Neurological/Psychiatric:She is alert and oriented to person, place, and time. Coordination is  normal.  Her mood isAppropriate and affect is Neutral/Euthymic(normal) . Her    IMPRESSION:   1. Chronic pain syndrome    2. Primary osteoarthritis of right hip    3. Lumbar radiculitis    4. Primary osteoarthritis of both hips    5. Facet syndrome    6. Plantar fasciitis, right    7. Fibromyalgia    8. DDD (degenerative disc disease), lumbar    9. Elbow tendinitis        PLAN:  Informed verbal consent was obtained  -[place] record was obtained and reviewed  for the last one year and no indicators of drug misuse  were found. Any other controlled substance prescriptions  seen on the record have been accounted for, I am aware of the patient receiving these medications. Clyde Davies [facility] record will be rechecked as part of office protocol. -ROM/Stretching exercises as advised   -She was advised to increase fluids ( 5-7  glasses of fluid daily), limit caffeine, avoid cheese products, increase dietary fiber, increase activity and exercise as tolerated and relax regularly and enjoy meals   -She was advised weight reduction, diet changes- 800-1200 jessica diet, diet diary, exercising, nutritional  consult increased physical activity as tolerated   -Walking 20-30 minutes daily as tolerated   -Continue with Ultram 1-2 per day     Current Outpatient Medications   Medication Sig Dispense Refill    pantoprazole (PROTONIX) 40 MG tablet TAKE 1 TABLET BY MOUTH DAILY 90 tablet 0    pregabalin (LYRICA) 100 MG capsule Take 1 capsule by mouth 3 times daily for 30 days.  90 capsule 0    betamethasone dipropionate 0.05 % cream APPLY EXTERNALLY TO THE AFFECTED AREA TWICE DAILY 60 g 0    ondansetron (ZOFRAN) 4 MG tablet TAKE 1 TABLET BY MOUTH EVERY 8 HOURS AS NEEDED FOR NAUSEA OR VOMITING 60 tablet 0    levothyroxine (SYNTHROID) 88 MCG tablet TAKE 1 TABLET BY MOUTH DAILY 90 tablet 0    rOPINIRole (REQUIP) 1 MG tablet TAKE 2 TABLETS BY MOUTH EVERY NIGHT 180 tablet 0    aspirin 81 MG EC tablet Take 1 tablet by mouth daily 30 tablet 3    atorvastatin (LIPITOR) 40 MG tablet Take 2 tablets by mouth daily 90 tablet 0    potassium chloride (KLOR-CON M) 20 MEQ extended release tablet Take 1 tablet by mouth daily 90 tablet 1    torsemide (DEMADEX) 20 MG tablet TAKE 1 TABLET BY MOUTH TWICE DAILY BEFORE MEALS 180 tablet 0    Compression Stockings MISC by Does not apply route 20mmHg- knee high, closed toe. wear daily. Fit to size 1 each 0    vitamin B-12 (CYANOCOBALAMIN) 1000 MCG tablet Take 1 tablet by mouth daily 30 tablet 0    meclizine (ANTIVERT) 25 MG tablet Take 25 mg by mouth 3 times daily as needed      Cholecalciferol (VITAMIN D3) 125 MCG (5000 UT) TABS Take 1 tablet by mouth 2 times daily      DULoxetine (CYMBALTA) 60 MG extended release capsule Take 1 capsule by mouth 2 times daily      ARIPiprazole (ABILIFY) 30 MG tablet Take 30 mg by mouth daily       propranolol (INDERAL) 40 MG tablet Take 1 tablet by mouth 3 times daily anxiety      QUEtiapine (SEROQUEL) 200 MG tablet Take 200 mg by mouth nightly       No current facility-administered medications for this visit. I will continue her current medication regimen  which is part of the above treatment schedule. It has been helping with Ms. Yap's chronic  medical problems which for this visit include:   Diagnoses of Chronic pain syndrome, Primary osteoarthritis of right hip, Lumbar radiculitis, Primary osteoarthritis of both hips, Facet syndrome, Plantar fasciitis, right, Fibromyalgia, DDD (degenerative disc disease), lumbar, and Elbow tendinitis were pertinent to this visit. Risks and benefits of the medications and other alternative treatments  including no treatment were discussed with the patient. The common side effects of these medications were also explained to the patient. Informed verbal consent was obtained. Goals of current treatment regimen include improvement in pain, restoration of functioning- with focus on improvement in physical performance, general activity, work or disability,emotional distress, health care utilization and  decreased medication consumption. Will continue to monitor progress towards achieving/maintaining therapeutic goals with special emphasis on  1. Improvement in perceived interfernce  of pain with ADL's. Ability to do home exercises independently. Ability to do household chores indoor and/or outdoor work and social and leisure activities. Improve psychosocial and physical functioning. - she is showing progression towards this treatment goal with the current regimen. She was advised against drinking alcohol with the narcotic pain medicines, advised against driving or handling machinery while adjusting the dose of medicines or if having cognitive  issues related to the current medications. Risk of overdose and death, if medicines not taken as prescribed, were also discussed. If the patient develops new symptoms or if the symptoms worsen, the patient should call the office. While transcribing every attempt was made to maintain the accuracy of the note in terms of it's contents,there may have been some errors made inadvertently. Thank you for allowing me to participate in the care of this patient.     Chacha Colin MD.    Cc: Ashli Hi MD

## 2021-11-02 PROBLEM — R29.6 FALLS FREQUENTLY: Status: ACTIVE | Noted: 2021-11-02

## 2021-11-07 RX ORDER — ROPINIROLE 1 MG/1
TABLET, FILM COATED ORAL
Qty: 180 TABLET | Refills: 0 | Status: SHIPPED | OUTPATIENT
Start: 2021-11-07 | End: 2022-02-21

## 2021-11-07 RX ORDER — LEVOTHYROXINE SODIUM 88 UG/1
TABLET ORAL
Qty: 90 TABLET | Refills: 0 | Status: SHIPPED | OUTPATIENT
Start: 2021-11-07 | End: 2022-02-21

## 2021-11-10 ENCOUNTER — HOSPITAL ENCOUNTER (OUTPATIENT)
Dept: PHYSICAL THERAPY | Age: 61
Setting detail: THERAPIES SERIES
Discharge: HOME | End: 2021-11-10
Payer: COMMERCIAL

## 2021-11-10 PROCEDURE — 97140 MANUAL THERAPY 1/> REGIONS: CPT

## 2021-11-10 PROCEDURE — 97530 THERAPEUTIC ACTIVITIES: CPT

## 2021-11-10 PROCEDURE — 97112 NEUROMUSCULAR REEDUCATION: CPT

## 2021-11-10 NOTE — FLOWSHEET NOTE
[address]  Phone: [phone]   Fax:     [phone]    Physical Therapy Treatment Note/ Progress Report:     Date:  11/10/2021    Patient Name:  Debbie Suarez    :  1960  MRN: [id number]    Pertinent Medical History: Additional Pertinent Hx: chronic LBP, multiple ER visits: TIA, chest pain, + PHQ-2 questions    Medical/Treatment Diagnosis Information:  Diagnosis: M51.36 (ICD-10-CM) - DDD (degenerative disc disease), lumbar  Treatment Diagnosis: chronic LBP, functional lower quarter weakness, functional decline    Insurance/Certification information:   Aetna  Physician Information:  Referring Practitioner: Gurvinder Lundberg MD  Plan of care signed (Y/N):     Date of Patient follow up with Physician:      Progress Report: []  Yes  [x]  No     Date Range for reporting period:  Beginning: 10/27/2021  Ending:    Progress report due (10 Rx/or 30 days whichever is less):      Recertification due (POC duration/ or 90 days whichever is less):     Visit # POC/ Insurance Allowable Auth Needed   2 Aetna: 12 visits thru  []Yes    []No     Functional Scale:       Date Assessed: at eval  Test: [place]  Score:      Pain level:  4/10 (at rest)     History of Injury: Pt here for evaluation of chronic, worsening LBP, worse over the past year or so. She denies any radiating n/t or radicular symptoms. 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 Has hx of bilat knee replacements (Left leg stronger per her report). She has received numerous epidurals for many years and states she would typically get about a week of relief. 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 Within the past 3 weeks, she has been using a rollator at work and sitting at work due to increased back pain. She reports her current walking tolerance is about 30 min and she can stand a few hours before needing to sit and rest. Typically does not use AD, but occasionally will use a cane. She averages about 6-7 hours of sleep at night. She was told by spine surgeon that she has arthritis and DDD and states she needs to attend PT before they can get an MRI approved. She is planning to move to [place] near the end of December (27th?) and [place] to leave [place] to be where the weather is warmer. *Pt was filling out intake forms for about 15 min into her 45 min appmt, so eval will be somewhat limited. 11/10/21: additional history: pt has 4 hospital admissions since July (2 for TIA/ CVA, 1 fall, 1 chest pain). Pt reports cont'd drooping of mouth since TIA/ CVA incidents, but no speech therapy.) Also gets \"dizzy\" (room spinning) when laying supine that usually resolves within a minute.        SUBJECTIVE:    11/10/21: Pt has another fall 2 wks ago (10/27/21) when she was walking on uneven ground (no assistive device) at her sisters house. They called paramedics who helped her get up. She has neck pain w/ HA and R sided lateral trunk pain (no pain with deep breath). Denies any new visual changes or worsening speech problems and HA is not progressively worsening. She reports return of \"dizzyness\" symptoms over the past few months when she lays supine. She did see a vertigo PT at our [address] [place] [address] office x 1 visit and it helped for a while, but she states its back now. OBJECTIVE:    Observation:    Test measurements:      PALPATION   n/t    ROM/ MOBILITY   Hip/ knee/ ankle PROM WFL globally    STRENGTH   Knee ext 5/5 bilat   Knee flex 5/5 bilat   Deficit w/ heel raise L>R   Ankle DF 5/5   Functional strength: difficult to ascend step using RLE. SPECIAL TESTS   Neg hoffmans   Unable to elicit patellar reflex    OTHER         RESTRICTIONS/PRECAUTIONS:  (hx TIA's/ CVA? , vertigo?, fall risk, hx L achilles repair Spring 2020)    Exercises/Interventions:     Therapeutic Ex (83481)   Min: 0 Resistance/Reps Notes/Cues   Aerobic ex warm up >prn         Sit to stand     Tandem balance  @ counter w/ Hina                       EOB hip ext >              Manual Intervention (48428) Min: 10'     Supine hip manual Low grade Hip PROM, HS str R,L         NMR re-education (51229)   Min: 20'     Mf Activation- re-ed     TrA Re-ed activation     Glute Max re-ed activation     hookly pelvic tilt 2x10    hookly PPT + bridge 2x8         Step up 6\" fwd, bilat A x 10 R,L    ST heel/ toe raise 2x10 @ counter, bilat                   Therapeutic Activity (81656) Min: 10'     Pt edu Role of PT, post fall assessment, possible BPPV treatment in future (@ QC location), use of AD to reduce fall risk, updated HEP- issued handout         Modalities  Min:             Other Therapeutic Activities:  Pt was educated on PT POC, Diagnosis, Prognosis, pathomechanics as well as frequency and duration of scheduling future physical therapy appointments. Time was also taken on this day to answer all patient questions and participation in PT. Reviewed appointment policy in detail with patient and patient verbalized understanding. Home Exercise Program:   10/27: pelvic tilt, sit to stand, tandem balance, walking program    11/10/21: Access Code: [email or web address]  URL: [email or web address]  Prepared by: Ligia Dubois    Exercises  Supine Posterior Pelvic Tilt - 2-3 x daily - 4-6 x weekly - 2-3 sets - 10-15 reps  Pilates Bridge - 2-3 x daily - 4-6 x weekly - 2-3 sets - 10-15 reps  Heel Toe Raises with Counter Support - 2-3 x daily - 4-6 x weekly - 2-3 sets - 10-15 reps  Forward Step Up - 2-3 x daily - 4-6 x weekly - 2-3 sets - 10-15 reps      Therapeutic Exercise and NMR EXR  [x] (43073) Provided verbal/tactile cueing for activities related to strengthening, flexibility, endurance, ROM  for improvements in proximal hip and core control with self care, mobility, lifting and ambulation. [x] (33548) Provided verbal/tactile cueing for activities related to improving balance, coordination, kinesthetic sense, posture, motor skill, proprioception  to assist with core control in self care, mobility, lifting, and ambulation. Therapeutic Activities:    [x] (82660 or 03697) Provided verbal/tactile cueing for activities related to improving balance, coordination, kinesthetic sense, posture, motor skill, proprioception and motor activation to allow for proper function  with self care and ADLs  [x] (41453) Provided training and instruction to the patient for proper core and proximal hip recruitment and positioning with ambulation re-education     Home Exercise Program:    [x] (17413) Reviewed/Progressed HEP activities related to strengthening, flexibility, endurance, ROM of core, proximal hip and LE for functional self-care, mobility, lifting and ambulation   [x] (71922) Reviewed/Progressed HEP activities related to improving balance, coordination, kinesthetic sense, posture, motor skill, proprioception of core, proximal hip and LE for self care, mobility, lifting, and ambulation      Manual Treatments:  PROM / STM / Oscillations-Mobs:  G-I, II, III, IV (PA's, Inf., Post.)  [x] (49753) Provided manual therapy to mobilize proximal hip and LS spine soft tissue/joints for the purpose of modulating pain, promoting relaxation,  increasing ROM, reducing/eliminating soft tissue swelling/inflammation/restriction, improving soft tissue extensibility and allowing for proper ROM for normal function with self care, mobility, lifting and ambulation.      Approval Dates:  CPT Code Units Approved Units Used  Date Updated:                     Charges:  Timed Code Treatment Minutes: 40   Total Treatment Minutes: 40     [] EVAL (LOW) 03735 (typically 20 minutes face-to-face)  [] EVAL (MOD) 06769 (typically 30 minutes face-to-face)  [] EVAL (HIGH) 60129 (typically 45 minutes face-to-face)  [] RE-EVAL     [] XC(61386) x     [] Dry needle 1 or 2 Muscles (82844)  [x] NMR (75473) x     [] Dry needle 3+ Muscles (04398)  [x] Manual (25952) x     [] Ultrasound (97057) x  [x] TA (22241) x     [] Mech Traction (40846)  [] ES(attended) (62884)     [] ES (un) (24348):   [] Vasopump (40873) [] Ionto (17815)   [] Other:    GOALS:  Patient stated goal: able to stand for at least 4 hours at work w/o need to sit. []? Progressing: []? Met: []? Not Met: []? Adjusted  Therapist goals for Patient:   Short Term Goals: To be achieved in: 2 weeks  1. Independent in HEP and progression per patient tolerance, in order to prevent re-injury. []? Progressing: []? Met: []? Not Met: []? Adjusted  2. Patient will have a decrease in pain to facilitate improvement in movement, function, and ADLs as indicated by Functional Deficits. []? Progressing: []? Met: []? Not Met: []? Adjusted     Long Term Goals: To be achieved in: up to 10 weeks  1. Disability index score from 39% to 25% or less for the [place] to assist with reaching prior level of function. []? Progressing: []? Met: []? Not Met: []? Adjusted  2. Patient will demonstrate increased AROM to WNL, good LS mobility, good hip ROM to allow for proper joint functioning as indicated by patients Functional Deficits. []? Progressing: []? Met: []? Not Met: []? Adjusted  3. Patient will demonstrate an increase in Strength to be able to perform at least 12 reps during 30 sec sit to stand test.    []? Progressing: []? Met: []? Not Met: []? Adjusted  4. Patient will be able to walk for exercise for at least 30 min w/o increased symptoms or restriction. []? Progressing: []? Met: []? Not Met: []? Adjusted                  ASSESSMENT:  Pt does not seem to have any acute head/ neck injuries from fall. R lateral rib region sore, but no pain with inspiration. Knee flex/ ext strength is good.  Needs work on functional LE strength/ balance to improve her ability to ambulate

## 2021-11-12 ENCOUNTER — HOSPITAL ENCOUNTER (OUTPATIENT)
Dept: PHYSICAL THERAPY | Age: 61
Setting detail: THERAPIES SERIES
Discharge: HOME | End: 2021-11-12
Payer: COMMERCIAL

## 2021-11-12 PROCEDURE — 97140 MANUAL THERAPY 1/> REGIONS: CPT

## 2021-11-12 PROCEDURE — 97112 NEUROMUSCULAR REEDUCATION: CPT

## 2021-11-12 PROCEDURE — 97110 THERAPEUTIC EXERCISES: CPT

## 2021-11-12 NOTE — FLOWSHEET NOTE
[address]  Phone: [phone]   Fax:     [phone]    Physical Therapy Treatment Note/ Progress Report:     Date:  2021    Patient Name:  Luigi Kaplan    :  1960  MRN: [id number]    Pertinent Medical History: Additional Pertinent Hx: chronic LBP, multiple ER visits: TIA, chest pain, + PHQ-2 questions    Medical/Treatment Diagnosis Information:  Diagnosis: M51.36 (ICD-10-CM) - DDD (degenerative disc disease), lumbar  Treatment Diagnosis: chronic LBP, functional lower quarter weakness, functional decline    Insurance/Certification information:   Aetna  Physician Information:  Referring Practitioner: Linda Levi MD  Plan of care signed (Y/N):     Date of Patient follow up with Physician:      Progress Report: []  Yes  [x]  No     Date Range for reporting period:  Beginning: 10/27/2021  Ending:    Progress report due (10 Rx/or 30 days whichever is less):      Recertification due (POC duration/ or 90 days whichever is less):     Visit # POC/ Insurance Allowable Auth Needed   3 Aetna: 12 visits thru  []Yes    []No     Functional Scale:       Date Assessed: at eval  Test: [place]  Score:      Pain level:  4/10 (at rest)     History of Injury: Pt here for evaluation of chronic, worsening LBP, worse over the past year or so. She denies any radiating n/t or radicular symptoms. 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 Has hx of bilat knee replacements (Left leg stronger per her report). She has received numerous epidurals for many years and states she would typically get about a week of relief. 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 Within the past 3 weeks, she has been using a rollator at work and sitting at work due to increased back pain. She reports her current walking tolerance is about 30 min and she can stand a few hours before needing to sit and rest. Typically does not use AD, but occasionally will use a cane. She averages about 6-7 hours of sleep at night. She was told by spine surgeon that she has arthritis and DDD and states she needs to attend PT before they can get an MRI approved. She is planning to move to [place] near the end of December (27th?) and [place] to leave [place] to be where the weather is warmer. *Pt was filling out intake forms for about 15 min into her 45 min appmt, so eval will be somewhat limited. 11/10/21: additional history: pt has 4 hospital admissions since July (2 for TIA/ CVA, 1 fall, 1 chest pain). Pt reports cont'd drooping of mouth since TIA/ CVA incidents, but no speech therapy.) Also gets \"dizzy\" (room spinning) when laying supine that usually resolves within a minute.        SUBJECTIVE:    11/10/21: Pt has another fall 2 wks ago (10/27/21) when she was walking on uneven ground (no assistive device) at her sisters house. They called paramedics who helped her get up. She has neck pain w/ HA and R sided lateral trunk pain (no pain with deep breath). Denies any new visual changes or worsening speech problems and HA is not progressively worsening. She reports return of \"dizzyness\" symptoms over the past few months when she lays supine. She did see a vertigo PT at our [address] [place] [address] office x 1 visit and it helped for a while, but she states its back now. 11/12/21: Pt reports the PPT exercise makes her back feel better. She reports no soreness after last PT session. OBJECTIVE:    Observation:    Test measurements:      PALPATION   n/t    ROM/ MOBILITY   Hip/ knee/ ankle PROM WFL globally    STRENGTH   Knee ext 5/5 bilat   Knee flex 5/5 bilat   Deficit w/ heel raise L>R   Ankle DF 5/5   Functional strength: difficult to ascend step using RLE.      SPECIAL TESTS   Neg hoffmans   Unable to elicit patellar reflex    OTHER         RESTRICTIONS/PRECAUTIONS:  (hx TIA's/ CVA? , vertigo?, fall risk, hx L achilles repair Spring 2020)    Exercises/Interventions:     Therapeutic Ex (33010)   Min: 10' Resistance/Reps Notes/Cues   Aerobic ex warm up >prn         Sit to stand     Tandem balance  @ counter w/ Hina   BKFO x10 each leg    LTR x20 total               EOB hip ext >              Manual Intervention (66520) Min: 10'     Supine hip manual Low grade Hip PROM, HS str R,L         NMR re-education (65964)   Min: 20'     Mf Activation- re-ed     TrA Re-ed activation     Glute Max re-ed activation     hookly pelvic tilt 2x10 Relief    hookly PPT + bridge 2x8         Step up 6\" fwd, bilat A x 10 R,L    ST heel/ toe raise 2x10 @ counter, bilat                   Therapeutic Activity (19060) Min: 10'     Pt edu Role of PT, post fall assessment, possible BPPV treatment in future (@ QC location), use of AD to reduce fall risk, updated HEP- issued handout Not performed on 11/12/21        Modalities  Min:             Other Therapeutic Activities:  Pt was educated on PT POC, Diagnosis, Prognosis, pathomechanics as well as frequency and duration of scheduling future physical therapy appointments. Time was also taken on this day to answer all patient questions and participation in PT. Reviewed appointment policy in detail with patient and patient verbalized understanding. Home Exercise Program:   10/27: pelvic tilt, sit to stand, tandem balance, walking program    11/10/21: Access Code: [email or web address]  URL: [email or web address]  Prepared by: Boaz Olson    Exercises  Supine Posterior Pelvic Tilt - 2-3 x daily - 4-6 x weekly - 2-3 sets - 10-15 reps  Pilates Bridge - 2-3 x daily - 4-6 x weekly - 2-3 sets - 10-15 reps  Heel Toe Raises with Counter Support - 2-3 x daily - 4-6 x weekly - 2-3 sets - 10-15 reps  Forward Step Up - 2-3 x daily - 4-6 x weekly - 2-3 sets - 10-15 reps      Therapeutic Exercise and NMR EXR  [x] (78040) Provided verbal/tactile cueing for activities related to strengthening, flexibility, endurance, ROM  for improvements in proximal hip and core control with self care, mobility, lifting and ambulation. [x] (78266) Provided verbal/tactile cueing for activities related to improving balance, coordination, kinesthetic sense, posture, motor skill, proprioception  to assist with core control in self care, mobility, lifting, and ambulation. Therapeutic Activities:    [x] (14738 or 65182) Provided verbal/tactile cueing for activities related to improving balance, coordination, kinesthetic sense, posture, motor skill, proprioception and motor activation to allow for proper function  with self care and ADLs  [x] (03565) Provided training and instruction to the patient for proper core and proximal hip recruitment and positioning with ambulation re-education     Home Exercise Program:    [x] (25824) Reviewed/Progressed HEP activities related to strengthening, flexibility, endurance, ROM of core, proximal hip and LE for functional self-care, mobility, lifting and ambulation   [x] (18678) Reviewed/Progressed HEP activities related to improving balance, coordination, kinesthetic sense, posture, motor skill, proprioception of core, proximal hip and LE for self care, mobility, lifting, and ambulation      Manual Treatments:  PROM / STM / Oscillations-Mobs:  G-I, II, III, IV (PA's, Inf., Post.)  [x] (43161) Provided manual therapy to mobilize proximal hip and LS spine soft tissue/joints for the purpose of modulating pain, promoting relaxation,  increasing ROM, reducing/eliminating soft tissue swelling/inflammation/restriction, improving soft tissue extensibility and allowing for proper ROM for normal function with self care, mobility, lifting and ambulation.      Approval Dates:  CPT Code Units Approved Units Used  Date Updated:                     Charges:  Timed Code Treatment Minutes: 40   Total Treatment Minutes: 39 [] EVAL (LOW) 89573 (typically 20 minutes face-to-face)  [] EVAL (MOD) 66917 (typically 30 minutes face-to-face)  [] EVAL (HIGH) 60767 (typically 45 minutes face-to-face)  [] RE-EVAL     [x] TJ(38732) x     [] Dry needle 1 or 2 Muscles (95103)  [x] NMR (49939) x     [] Dry needle 3+ Muscles (77491)  [x] Manual (67078) x     [] Ultrasound (38198) x  [] TA (29822) x     [] Mech Traction (22752)  [] ES(attended) (95839)     [] ES (un) (06545):   [] Vasopump (57952) [] Ionto (69889)   [] Other:    GOALS:  Patient stated goal: able to stand for at least 4 hours at work w/o need to sit. []? Progressing: []? Met: []? Not Met: []? Adjusted  Therapist goals for Patient:   Short Term Goals: To be achieved in: 2 weeks  1. Independent in HEP and progression per patient tolerance, in order to prevent re-injury. []? Progressing: []? Met: []? Not Met: []? Adjusted  2. Patient will have a decrease in pain to facilitate improvement in movement, function, and ADLs as indicated by Functional Deficits. []? Progressing: []? Met: []? Not Met: []? Adjusted     Long Term Goals: To be achieved in: up to 10 weeks  1. Disability index score from 39% to 25% or less for the [place] to assist with reaching prior level of function. []? Progressing: []? Met: []? Not Met: []? Adjusted  2. Patient will demonstrate increased AROM to WNL, good LS mobility, good hip ROM to allow for proper joint functioning as indicated by patients Functional Deficits. []? Progressing: []? Met: []? Not Met: []? Adjusted  3. Patient will demonstrate an increase in Strength to be able to perform at least 12 reps during 30 sec sit to stand test.    []? Progressing: []? Met: []? Not Met: []? Adjusted  4. Patient will be able to walk for exercise for at least 30 min w/o increased symptoms or restriction. []? Progressing: []? Met: []? Not Met: []? Adjusted                  ASSESSMENT:  Pt demonstrates good tolerance to PT session today.  Continue to progress core, glute, and LE strength. Progress balance and functional activities as tolerated. With regard to dizzyness symptoms, with reporting symptoms as \"spinning\" and seems to be positional in nature, she may benefit from seeing a PT specialist, but given history of CVA/ TIA in the past year or so, would need to make sure she is appropriate and safe for positioning, and I highly recommended she consult w/ PCP regarding this at her visit later today. Treatment/Activity Tolerance:  [x] Patient tolerated treatment well [] Patient limited by fatique  [] Patient limited by pain  [] Patient limited by other medical complications  [] Other:     Overall Progression Towards Functional goals/ Treatment Progress Update:  [] Patient is progressing as expected towards functional goals listed. [] Progression is slowed due to complexities/Impairments listed. [] Progression has been slowed due to co-morbidities. [x] Plan just implemented, too soon to assess goals progression <30days   [] Goals require adjustment due to lack of progress  [] Patient is not progressing as expected and requires additional follow up with physician  [] Other:    Prognosis for POC: [x] Good [] Fair  [] Poor    Patient requires continued skilled intervention: [x] Yes  [] No        PLAN: See eval  [] Continue per plan of care [] Alter current plan (see comments)  [x] Plan of care initiated [] Hold pending MD visit [] Discharge    Electronically signed by: Adolfo Bae PT , DPT, OCS #[id number]      Note: If patient does not return for scheduled/recommended follow up visits, this note will serve as a discharge from care along with the most recent update on progress.

## 2021-11-17 ENCOUNTER — HOSPITAL ENCOUNTER (OUTPATIENT)
Dept: PHYSICAL THERAPY | Age: 61
Setting detail: THERAPIES SERIES
Discharge: HOME | End: 2021-11-17
Payer: COMMERCIAL

## 2021-11-17 PROCEDURE — 97112 NEUROMUSCULAR REEDUCATION: CPT

## 2021-11-17 PROCEDURE — 97110 THERAPEUTIC EXERCISES: CPT

## 2021-11-17 PROCEDURE — 97140 MANUAL THERAPY 1/> REGIONS: CPT

## 2021-11-17 NOTE — FLOWSHEET NOTE
Derek. [address] [place] [address]  Phone: [phone]   Fax:     [phone]    Physical Therapy Treatment Note/ Progress Report:     Date:  2021    Patient Name:  Coni Costa    :  1960  MRN: [id number]    Pertinent Medical History: Additional Pertinent Hx: chronic LBP, multiple ER visits: TIA, chest pain, + PHQ-2 questions    Medical/Treatment Diagnosis Information:  Diagnosis: M51.36 (ICD-10-CM) - DDD (degenerative disc disease), lumbar  Treatment Diagnosis: chronic LBP, functional lower quarter weakness, functional decline    Insurance/Certification information:   [employer]etna  Physician Information:  Referring Practitioner: Shelby Morales MD  Plan of care signed (Y/N):     Date of Patient follow up with Physician:      Progress Report: []  Yes  [x]  No     Date Range for reporting period:  Beginning: 10/27/2021  Ending:    Progress report due (10 Rx/or 30 days whichever is less):      Recertification due (POC duration/ or 90 days whichever is less):     Visit # POC/ Insurance Allowable Auth Needed   4 Aetna: 12 visits thru  []Yes    []No     Functional Scale:       Date Assessed: at eval  Test: [place]  Score:      Pain level:  4/10 (at rest)     History of Injury: Pt here for evaluation of chronic, worsening LBP, worse over the past year or so. She denies any radiating n/t or radicular symptoms. 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 Has hx of bilat knee replacements (Left leg stronger per her report). She has received numerous epidurals for many years and states she would typically get about a week of relief. 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 Within the past 3 weeks, she has been using a rollator at work and sitting at work due to increased back pain. 5/5 bilat   Deficit w/ heel raise L>R   Ankle DF 5/5   Functional strength: difficult to ascend step using RLE. SPECIAL TESTS   Neg hoffmans   Unable to elicit patellar reflex    OTHER         RESTRICTIONS/PRECAUTIONS:  (hx TIA's/ CVA? , vertigo?, fall risk, hx L achilles repair Spring 2020)    Exercises/Interventions:     Therapeutic Ex (18740)   Min: 10' Resistance/Reps Notes/Cues   Aerobic ex warm up >prn         Sit to stand     Tandem balance  @ counter w/ Hina   BKFO x10 each leg    LTR x20 total     Standing Hip Abd 2x10 each side         EOB hip ext >              Manual Intervention (28338) Min: 10'     Supine hip manual Low grade Hip PROM    HS str L and R    NMR re-education (69834)   Min: 25'     Mf Activation- re-ed     TrA Re-ed activation     Glute Max re-ed activation     hookly pelvic tilt 2x10x5 sec Relief    hookly PPT + bridge 2x8         Step up 6\" fwd, bilat A x 10 R,L    ST heel/ toe raise 2x10 @ counter, bilat                   Therapeutic Activity (85534) Min: 10'     Pt edu Role of PT, post fall assessment, possible BPPV treatment in future (@ QC location), use of AD to reduce fall risk, updated HEP- issued handout Not performed on 11/12/21        Modalities  Min:             Other Therapeutic Activities:  Pt was educated on PT POC, Diagnosis, Prognosis, pathomechanics as well as frequency and duration of scheduling future physical therapy appointments. Time was also taken on this day to answer all patient questions and participation in PT. Reviewed appointment policy in detail with patient and patient verbalized understanding. Home Exercise Program:   10/27: pelvic tilt, sit to stand, tandem balance, walking program    11/10/21: Access Code: [email or web address]  URL: [email or web address]  Prepared by: Nic Travis    Exercises  Supine Posterior Pelvic Tilt - 2-3 x daily - 4-6 x weekly - 2-3 sets - 10-15 reps  Pilates Bridge - 2-3 x daily - 4-6 x weekly - 2-3 sets - 10-15 reps  Heel Toe Raises with Counter Support - 2-3 x daily - 4-6 x weekly - 2-3 sets - 10-15 reps  Forward Step Up - 2-3 x daily - 4-6 x weekly - 2-3 sets - 10-15 reps      Therapeutic Exercise and NMR EXR  [x] (31360) Provided verbal/tactile cueing for activities related to strengthening, flexibility, endurance, ROM  for improvements in proximal hip and core control with self care, mobility, lifting and ambulation. [x] (89953) Provided verbal/tactile cueing for activities related to improving balance, coordination, kinesthetic sense, posture, motor skill, proprioception  to assist with core control in self care, mobility, lifting, and ambulation.      Therapeutic Activities:    [x] (29090 or 85935) Provided verbal/tactile cueing for activities related to improving balance, coordination, kinesthetic sense, posture, motor skill, proprioception and motor activation to allow for proper function  with self care and ADLs  [x] (86274) Provided training and instruction to the patient for proper core and proximal hip recruitment and positioning with ambulation re-education     Home Exercise Program:    [x] (12270) Reviewed/Progressed HEP activities related to strengthening, flexibility, endurance, ROM of core, proximal hip and LE for functional self-care, mobility, lifting and ambulation   [x] (97253) Reviewed/Progressed HEP activities related to improving balance, coordination, kinesthetic sense, posture, motor skill, proprioception of core, proximal hip and LE for self care, mobility, lifting, and ambulation      Manual Treatments:  PROM / STM / Oscillations-Mobs:  G-I, II, III, IV (PA's, Inf., Post.)  [x] (04449) Provided manual therapy to mobilize proximal hip and LS spine soft tissue/joints for the purpose of modulating pain, promoting relaxation,  increasing ROM, reducing/eliminating soft tissue swelling/inflammation/restriction, improving soft tissue extensibility and allowing for proper ROM for normal function with self care, mobility, lifting and ambulation. Approval Dates:  CPT Code Units Approved Units Used  Date Updated:                     Charges:  Timed Code Treatment Minutes: 45   Total Treatment Minutes: 45     [] EVAL (LOW) 39574 (typically 20 minutes face-to-face)  [] EVAL (MOD) 67487 (typically 30 minutes face-to-face)  [] EVAL (HIGH) 13279 (typically 45 minutes face-to-face)  [] RE-EVAL     [x] LX(16570) x     [] Dry needle 1 or 2 Muscles (27880)  [x] NMR (24622) x     [] Dry needle 3+ Muscles (93651)  [x] Manual (83806) x     [] Ultrasound (05410) x  [] TA (38582) x     [] Mech Traction (60725)  [] ES(attended) (47752)     [] ES (un) (17325):   [] Vasopump (69569) [] Ionto (56240)   [] Other:    GOALS:  Patient stated goal: able to stand for at least 4 hours at work w/o need to sit. []? Progressing: []? Met: []? Not Met: []? Adjusted  Therapist goals for Patient:   Short Term Goals: To be achieved in: 2 weeks  1. Independent in HEP and progression per patient tolerance, in order to prevent re-injury. []? Progressing: []? Met: []? Not Met: []? Adjusted  2. Patient will have a decrease in pain to facilitate improvement in movement, function, and ADLs as indicated by Functional Deficits. []? Progressing: []? Met: []? Not Met: []? Adjusted     Long Term Goals: To be achieved in: up to 10 weeks  1. Disability index score from 39% to 25% or less for the [place] to assist with reaching prior level of function. []? Progressing: []? Met: []? Not Met: []? Adjusted  2. Patient will demonstrate increased AROM to WNL, good LS mobility, good hip ROM to allow for proper joint functioning as indicated by patients Functional Deficits. []? Progressing: []? Met: []? Not Met: []? Adjusted  3. Patient will demonstrate an increase in Strength to be able to perform at least 12 reps during 30 sec sit to stand test.    []? Progressing: []? Met: []? Not Met: []? Adjusted  4.  Patient will be able to walk for exercise for at least 30 min w/o increased symptoms or restriction. []? Progressing: []? Met: []? Not Met: []? Adjusted                  ASSESSMENT:  Pt demonstrates good tolerance to PT session today. Pt displays weakness in R LE > L. Continue to progress core, glute, and LE strength. Progress balance and functional activities as tolerated. With regard to dizzyness symptoms, with reporting symptoms as \"spinning\" and seems to be positional in nature, she may benefit from seeing a PT specialist, but given history of CVA/ TIA in the past year or so, would need to make sure she is appropriate and safe for positioning, and I highly recommended she consult w/ PCP regarding this at her visit later today. Treatment/Activity Tolerance:  [x] Patient tolerated treatment well [] Patient limited by fatique  [] Patient limited by pain  [] Patient limited by other medical complications  [] Other:     Overall Progression Towards Functional goals/ Treatment Progress Update:  [] Patient is progressing as expected towards functional goals listed. [] Progression is slowed due to complexities/Impairments listed. [] Progression has been slowed due to co-morbidities. [x] Plan just implemented, too soon to assess goals progression <30days   [] Goals require adjustment due to lack of progress  [] Patient is not progressing as expected and requires additional follow up with physician  [] Other:    Prognosis for POC: [x] Good [] Fair  [] Poor    Patient requires continued skilled intervention: [x] Yes  [] No        PLAN: See eval  [] Continue per plan of care [] Alter current plan (see comments)  [x] Plan of care initiated [] Hold pending MD visit [] Discharge    Electronically signed by: Kristal Hobbs PT , DPT, [facility] #[id number]      Note: If patient does not return for scheduled/recommended follow up visits, this note will serve as a discharge from care along with the most recent update on progress.

## 2021-11-19 ENCOUNTER — HOSPITAL ENCOUNTER (OUTPATIENT)
Dept: PHYSICAL THERAPY | Age: 61
Setting detail: THERAPIES SERIES
Discharge: HOME | End: 2021-11-19
Payer: COMMERCIAL

## 2021-11-19 PROCEDURE — 97530 THERAPEUTIC ACTIVITIES: CPT

## 2021-11-19 PROCEDURE — 97110 THERAPEUTIC EXERCISES: CPT

## 2021-11-19 PROCEDURE — 97112 NEUROMUSCULAR REEDUCATION: CPT

## 2021-11-19 NOTE — FLOWSHEET NOTE
She reports her current walking tolerance is about 30 min and she can stand a few hours before needing to sit and rest. Typically does not use AD, but occasionally will use a cane. She averages about 6-7 hours of sleep at night. She was told by spine surgeon that she has arthritis and DDD and states she needs to attend PT before they can get an MRI approved. She is planning to move to [place] near the end of December (27th?) and wants to leave [address] [place] [address] to be where the weather is warmer. *Pt was filling out intake forms for about 15 min into her 45 min appmt, so eval will be somewhat limited. 11/10/21: additional history: pt has 4 hospital admissions since July (2 for TIA/ CVA, 1 fall, 1 chest pain). Pt reports cont'd drooping of mouth since TIA/ CVA incidents, but no speech therapy.) Also gets \"dizzy\" (room spinning) when laying supine that usually resolves within a minute.        SUBJECTIVE:    11/10/21: Pt has another fall 2 wks ago (10/27/21) when she was walking on uneven ground (no assistive device) at her sisters house. They called paramedics who helped her get up. She has neck pain w/ HA and R sided lateral trunk pain (no pain with deep breath). Denies any new visual changes or worsening speech problems and HA is not progressively worsening. She reports return of \"dizzyness\" symptoms over the past few months when she lays supine. She did see a vertigo PT at our [address] [place] [address] office x 1 visit and it helped for a while, but she states its back now. 11/12/21: Pt reports the PPT exercise makes her back feel better. She reports no soreness after last PT session. 11/17/21: Pt reports she felt good after last PT visit. She states her back is in a little bit more pain today. She reports no specific mechanism for the increase in pain today. 11/19/21: Pt reports her back is feeling better today. She reports is still achy, but overall better than last Wednesday.      OBJECTIVE:    Observation:    Test measurements: PALPATION   n/t    ROM/ MOBILITY   Hip/ knee/ ankle PROM WFL globally    STRENGTH   Knee ext 5/5 bilat   Knee flex 5/5 bilat   Deficit w/ heel raise L>R   Ankle DF 5/5   Functional strength: difficult to ascend step using RLE. SPECIAL TESTS   Neg hoffmans   Unable to elicit patellar reflex    OTHER         RESTRICTIONS/PRECAUTIONS:  (hx TIA's/ CVA? , vertigo?, fall risk, hx L achilles repair Spring 2020)    Exercises/Interventions:     Therapeutic Ex (53909)   Min: 10' Resistance/Reps Notes/Cues   Aerobic ex warm up >prn         Sit to stand     Tandem balance  @ counter w/ Hina   BKFO x10 each leg    LTR x20 total     Standing Hip Abd 2x10 each side    SLR 2x10 each side    EOB hip ext >              Manual Intervention (41584) Min: 5'     Supine hip manual     HS str L and R    NMR re-education (15280)   Min: 25'     Mf Activation- re-ed     TrA Re-ed activation     Glute Max re-ed activation     hookly pelvic tilt 2x10x5 sec Relief    hookly PPT + bridge 2x10              ST heel/ toe raise 2x10 @ counter, bilat    Tandem Balance 3x30 sec              Therapeutic Activity (11492) Min: 10'     Pt edu     Mini Squats 2x10  In parallel bars  Pt demonstrates weight shift to L   Step up  6\" fwd, bilat A x 10 R,L    Modalities  Min:             Other Therapeutic Activities:  Pt was educated on PT POC, Diagnosis, Prognosis, pathomechanics as well as frequency and duration of scheduling future physical therapy appointments. Time was also taken on this day to answer all patient questions and participation in PT. Reviewed appointment policy in detail with patient and patient verbalized understanding. Home Exercise Program:   10/27: pelvic tilt, sit to stand, tandem balance, walking program    11/10/21: Access Code: [email or web address]  URL: [email or web address]  Prepared by: Lorenda Riding    Exercises  Supine Posterior Pelvic Tilt - 2-3 x daily - 4-6 x weekly - 2-3 sets - 10-15 reps  Pilates Bridge - 2-3 x daily - 4-6 x weekly - 2-3 sets - 10-15 reps  Heel Toe Raises with Counter Support - 2-3 x daily - 4-6 x weekly - 2-3 sets - 10-15 reps  Forward Step Up - 2-3 x daily - 4-6 x weekly - 2-3 sets - 10-15 reps      Therapeutic Exercise and NMR EXR  [x] (48439) Provided verbal/tactile cueing for activities related to strengthening, flexibility, endurance, ROM  for improvements in proximal hip and core control with self care, mobility, lifting and ambulation. [x] (19628) Provided verbal/tactile cueing for activities related to improving balance, coordination, kinesthetic sense, posture, motor skill, proprioception  to assist with core control in self care, mobility, lifting, and ambulation.      Therapeutic Activities:    [x] (25707 or 91058) Provided verbal/tactile cueing for activities related to improving balance, coordination, kinesthetic sense, posture, motor skill, proprioception and motor activation to allow for proper function  with self care and ADLs  [x] (88831) Provided training and instruction to the patient for proper core and proximal hip recruitment and positioning with ambulation re-education     Home Exercise Program:    [x] (31198) Reviewed/Progressed HEP activities related to strengthening, flexibility, endurance, ROM of core, proximal hip and LE for functional self-care, mobility, lifting and ambulation   [x] (11252) Reviewed/Progressed HEP activities related to improving balance, coordination, kinesthetic sense, posture, motor skill, proprioception of core, proximal hip and LE for self care, mobility, lifting, and ambulation      Manual Treatments:  PROM / STM / Oscillations-Mobs:  G-I, II, III, IV (PA's, Inf., Post.)  [x] (20218) Provided manual therapy to mobilize proximal hip and LS spine soft tissue/joints for the purpose of modulating pain, promoting relaxation,  increasing ROM, reducing/eliminating soft tissue swelling/inflammation/restriction, improving soft tissue extensibility and allowing for proper ROM for normal function with self care, mobility, lifting and ambulation. Approval Dates:  CPT Code Units Approved Units Used  Date Updated:                     Charges:  Timed Code Treatment Minutes: 50   Total Treatment Minutes: 50     [] EVAL (LOW) 53972 (typically 20 minutes face-to-face)  [] EVAL (MOD) 36725 (typically 30 minutes face-to-face)  [] EVAL (HIGH) 21568 (typically 45 minutes face-to-face)  [] RE-EVAL     [x] MV(06417) x     [] Dry needle 1 or 2 Muscles (01037)  [x] NMR (31309) x     [] Dry needle 3+ Muscles (48199)  [x] Manual (66662) x     [] Ultrasound (07794) x  [] TA (36694) x     [] Mech Traction (48043)  [] ES(attended) (20440)     [] ES (un) (40228):   [] Vasopump (42365) [] Ionto (47947)   [] Other:    GOALS:  Patient stated goal: able to stand for at least 4 hours at work w/o need to sit. []? Progressing: []? Met: []? Not Met: []? Adjusted  Therapist goals for Patient:   Short Term Goals: To be achieved in: 2 weeks  1. Independent in HEP and progression per patient tolerance, in order to prevent re-injury. []? Progressing: []? Met: []? Not Met: []? Adjusted  2. Patient will have a decrease in pain to facilitate improvement in movement, function, and ADLs as indicated by Functional Deficits. []? Progressing: []? Met: []? Not Met: []? Adjusted     Long Term Goals: To be achieved in: up to 10 weeks  1. Disability index score from 39% to 25% or less for the [place] to assist with reaching prior level of function. []? Progressing: []? Met: []? Not Met: []? Adjusted  2. Patient will demonstrate increased AROM to WNL, good LS mobility, good hip ROM to allow for proper joint functioning as indicated by patients Functional Deficits. []? Progressing: []? Met: []? Not Met: []? Adjusted  3. Patient will demonstrate an increase in Strength to be able to perform at least 12 reps during 30 sec sit to stand test.    []? Progressing: []? Met: []? Not Met: []? Adjusted  4. Patient will be able to walk for exercise for at least 30 min w/o increased symptoms or restriction. []? Progressing: []? Met: []? Not Met: []? Adjusted                  ASSESSMENT:  Pt demonstrates good tolerance to PT session today. Pt requires increased UE support with steps ups on R LE. With squatting, pt demonstrates weight shift to the left. Continue to progress core, glute, and LE strength. Progress balance and functional activities as tolerated. With regard to dizzyness symptoms, with reporting symptoms as \"spinning\" and seems to be positional in nature, she may benefit from seeing a PT specialist, but given history of CVA/ TIA in the past year or so, would need to make sure she is appropriate and safe for positioning, and I highly recommended she consult w/ PCP regarding this at her visit later today. Treatment/Activity Tolerance:  [x] Patient tolerated treatment well [] Patient limited by fatique  [] Patient limited by pain  [] Patient limited by other medical complications  [] Other:     Overall Progression Towards Functional goals/ Treatment Progress Update:  [] Patient is progressing as expected towards functional goals listed. [] Progression is slowed due to complexities/Impairments listed. [] Progression has been slowed due to co-morbidities.   [x] Plan just implemented, too soon to assess goals progression <30days   [] Goals require adjustment due to lack of progress  [] Patient is not progressing as expected and requires additional follow up with physician  [] Other:    Prognosis for POC: [x] Good [] Fair  [] Poor    Patient requires continued skilled intervention: [x] Yes  [] No        PLAN: See eval  [] Continue per plan of care [] Alter current plan (see comments)  [x] Plan of care initiated [] Hold pending MD visit [] Discharge    Electronically signed by: Coy Anguiano, PT , DPT, [facility] #[id number]      Note: If patient does not return for scheduled/recommended follow up visits, this

## 2021-11-24 ENCOUNTER — HOSPITAL ENCOUNTER (OUTPATIENT)
Dept: PHYSICAL THERAPY | Age: 61
Setting detail: THERAPIES SERIES
Discharge: HOME | End: 2021-11-24
Payer: COMMERCIAL

## 2021-11-24 NOTE — FLOWSHEET NOTE
[address] [facility]. [address] [place] [address]  Phone: [phone]   Fax:     [phone]    Physical Therapy  Cancellation/No-show Note  Patient Name:  Cinda Zhao  :  1960   Date:  2021  Cancelled visits to date: 1  No-shows to date: 0    Patient status for today's appointment patient:  [x]  Cancelled  []  Rescheduled appointment  []  No-show     Reason given by patient:  []  Patient ill  []  Conflicting appointment  []  No transportation    []  Conflict with work  []  No reason given  [x]  Other:  Pt called and reported severe back pain. Comments:      Phone call information:   []  Phone call made today to patient at _ time at number provided:      []  Patient answered, conversation as follows:    []  Patient did not answer, message left as follows:  [x]  Phone call not made today. - Pt called the clinic stating she is cancelling her appointment due to severe back pain.     Electronically signed by:  Harpal Reynolds PT

## 2021-11-29 ENCOUNTER — TELEPHONE (OUTPATIENT)
Dept: ORTHOPEDIC SURGERY | Age: 61
End: 2021-11-29

## 2021-11-29 NOTE — TELEPHONE ENCOUNTER
General Question     Subject: RELOCATING TO [place]  Patient and /or Facility Request: Rossi Chand  Contact Number: [phone]    PATIENT IS   RELOCATING  TO [place] ON DECEMBER 28, 2021 DO PATIENT  NEED TO BE SEEN PRIOR TO HER MOVING

## 2021-11-30 ENCOUNTER — OFFICE VISIT (OUTPATIENT)
Dept: ORTHOPEDIC SURGERY | Age: 61
End: 2021-11-30
Payer: COMMERCIAL

## 2021-11-30 VITALS — HEIGHT: 64 IN | WEIGHT: 293 LBS | BODY MASS INDEX: 50.02 KG/M2

## 2021-11-30 DIAGNOSIS — M48.062 SPINAL STENOSIS OF LUMBAR REGION WITH NEUROGENIC CLAUDICATION: Primary | ICD-10-CM

## 2021-11-30 PROCEDURE — 99213 OFFICE O/P EST LOW 20 MIN: CPT | Performed by: ORTHOPAEDIC SURGERY

## 2021-11-30 NOTE — PROGRESS NOTES
New Patient: LUMBAR SPINE    Referring Provider:  No ref. provider found    CHIEF COMPLAINT:    Chief Complaint   Patient presents with    Back Pain     Patient is currently in PT and states that it is not helping her pain. HISTORY OF PRESENT ILLNESS:    Ms. Janey Salcido  is a pleasant 57 y.o. female referred by Dr. Sharon Corona who presents for evaluation of low back pain. She notes her symptoms began insidiously about a year ago. She rates the intensity of her back pain 10/10. Her pain has steadily increased since that time. She denies numbness tingling tingling of her lower extremities. She notes urinary urgency and difficulty with her balance. She denies saddle anesthesia and bowel dysfunction. Her pain is decreased with leaning forward and increased with standing.       Current/Past Treatment:   · Physical Therapy:  4 Visits, not helping  · Chiropractic: None  · Injection: None  · Medications: Lyrica    Past Medical History:   Past Medical History:   Diagnosis Date    Acquired hyperlipoproteinemia     MARIO (acute kidney injury) ([facility] [address] 9/30/2019    Allergic rhinitis     Anxiety     Arthritis     Bilateral lower extremity edema     Bipolar 1 disorder (HCC)     Chronic pain syndrome     DDD (degenerative disc disease), lumbar     Depression     Depression     Disease of gallbladder     Dizziness     DJD (degenerative joint disease) of hip     Edema 12/29/2009    Elbow tendinitis     Essential hypertension 8/19/2019    Facet syndrome     Fibromyalgia     Fracture of nasal bone     GERD (gastroesophageal reflux disease)     Headache     Hiatal hernia     History of blood transfusion     anemia    Hyperlipidemia     Insomnia     Osteoarthritis     Prediabetes     Rash     Self mutilating behavior     cutter pt states she has not done in months- in therapy    Sleep apnea     no CPAP    Suicidal ideation     Thyroid disorder     hypothyroidism    Vertigo       Past Surgical History:     Past Surgical History:   Procedure Laterality Date    ABDOMEN SURGERY  5/28/2014    LAPAROSCOPIC SLEEVE GASTRECTOMY, EXTENSIVE LYSIS OF ADHESIONS    ACHILLES TENDON SURGERY Left 3/9/2020    LEFT CALCANEOUS PARTIAL EXCISION, SECONDARY REPAIR OF ACHILLES TENDON performed by Hunter [facility] at [facility]      open , Ex-lap    CHOLECYSTECTOMY      open    COLONOSCOPY  04/16/2013    dr Barrera [place] 2018     DILATION AND CURETTAGE OF UTERUS      ENDOSCOPY, COLON, DIAGNOSTIC      FOOT SURGERY Right 05/27/2016    ARTHROPLASTY RIGHT 5TH DIGIT      HYSTEROSCOPY N/A 12-30-13    Hysteroscopy Dilitation and Curettage    JOINT REPLACEMENT Bilateral     knee    OTHER SURGICAL HISTORY  9/11/2015    ARTHROPLASTY 5TH DIGIT LEFT FOOT          SHOULDER SURGERY      right    SHOULDER SURGERY Right     SLEEVE GASTROPLASTY  May 28, 2014    [facility]    TOTAL KNEE ARTHROPLASTY  12/10/10    Right    TOTAL KNEE ARTHROPLASTY  3/30/10    Left    UPPER GASTROINTESTINAL ENDOSCOPY  4/16/2013    dr Michelle Patterson ENDOSCOPY  3/20/2014    dr Noam Germain     Current Medications:     Current Outpatient Medications:     levothyroxine (SYNTHROID) 88 MCG tablet, TAKE 1 TABLET BY MOUTH DAILY, Disp: 90 tablet, Rfl: 0    rOPINIRole (REQUIP) 1 MG tablet, TAKE 2 TABLETS BY MOUTH EVERY NIGHT, Disp: 180 tablet, Rfl: 0    pantoprazole (PROTONIX) 40 MG tablet, TAKE 1 TABLET BY MOUTH DAILY, Disp: 90 tablet, Rfl: 0    pregabalin (LYRICA) 100 MG capsule, Take 1 capsule by mouth 3 times daily for 30 days. , Disp: 90 capsule, Rfl: 0    betamethasone dipropionate 0.05 % cream, APPLY EXTERNALLY TO THE AFFECTED AREA TWICE DAILY, Disp: 60 g, Rfl: 0    ondansetron (ZOFRAN) 4 MG tablet, TAKE 1 TABLET BY MOUTH EVERY 8 HOURS AS NEEDED FOR NAUSEA OR VOMITING, Disp: 60 tablet, Rfl: 0    aspirin 81 MG EC tablet, Take 1 tablet by mouth daily, Disp: 30 tablet, Rfl: 3   atorvastatin (LIPITOR) 40 MG tablet, Take 2 tablets by mouth daily, Disp: 90 tablet, Rfl: 0    potassium chloride (KLOR-CON M) 20 MEQ extended release tablet, Take 1 tablet by mouth daily, Disp: 90 tablet, Rfl: 1    torsemide (DEMADEX) 20 MG tablet, TAKE 1 TABLET BY MOUTH TWICE DAILY BEFORE MEALS, Disp: 180 tablet, Rfl: 0    Compression Stockings MISC, by Does not apply route 20mmHg- knee high, closed toe. wear daily. Fit to size, Disp: 1 each, Rfl: 0    vitamin B-12 (CYANOCOBALAMIN) 1000 MCG tablet, Take 1 tablet by mouth daily, Disp: 30 tablet, Rfl: 0    meclizine (ANTIVERT) 25 MG tablet, Take 25 mg by mouth 3 times daily as needed, Disp: , Rfl:     Cholecalciferol (VITAMIN D3) 125 MCG (5000 UT) TABS, Take 1 tablet by mouth 2 times daily, Disp: , Rfl:     DULoxetine (CYMBALTA) 60 MG extended release capsule, Take 1 capsule by mouth 2 times daily, Disp: , Rfl:     ARIPiprazole (ABILIFY) 30 MG tablet, Take 30 mg by mouth daily , Disp: , Rfl:     propranolol (INDERAL) 40 MG tablet, Take 1 tablet by mouth 3 times daily anxiety, Disp: , Rfl:     QUEtiapine (SEROQUEL) 200 MG tablet, Take 200 mg by mouth nightly, Disp: , Rfl:   Allergies:  Polysporin [bacitracin-polymyxin b], Lorazepam, Neomycin-polymyxin-gramicidin, and Sulfa antibiotics  Social History:    reports that she has never smoked. She has never used smokeless tobacco. She reports that she does not drink alcohol and does not use drugs.   Family History:   Family History   Problem Relation Age of Onset    Heart Disease Mother     Heart Failure Mother     High Cholesterol Mother     High Blood Pressure Mother     Stroke Mother     Birth Defects Mother     Other Mother         VV    High Blood Pressure Father     COPD Father     Diabetes Brother     Stroke Maternal Grandmother     Arthritis Maternal Grandmother     Heart Failure Brother     Stroke Brother     Birth Defects Maternal Grandfather     Arthritis Paternal Grandmother REVIEW OF SYSTEMS: Full ROS noted & scanned   CONSTITUTIONAL: Denies unexplained weight loss, fevers, chills or fatigue  NEUROLOGICAL: Denies unsteady gait or progressive weakness  MUSCULOSKELETAL: Denies joint swelling or redness  PSYCHOLOGICAL: Denies anxiety, depression   SKIN: Denies skin changes, delayed healing, rash, itching   HEMATOLOGIC: Denies easy bleeding or bruising  ENDOCRINE: Denies excessive thirst, urination, heat/cold  RESPIRATORY: Denies current dyspnea, cough  GI: Denies nausea, vomiting, diarrhea   : Denies bowel or bladder issues      PHYSICAL EXAM:    Vitals: Height 5' 4\" (1.626 m), weight (!) 326 lb (147.9 kg), last menstrual period 01/15/2014, not currently breastfeeding. GENERAL EXAM:  · General Apparence: Patient is adequately groomed with no evidence of malnutrition. · Orientation: The patient is oriented to time, place and person. · Mood & Affect:The patient's mood and affect are appropriate. · Vascular: Examination reveals no swelling tenderness in upper or lower extremities. Good capillary refill. · Lymphatic: The lymphatic examination bilaterally reveals all areas to be without enlargement or induration  · Sensation: Sensation is intact without deficit  · Coordination/Balance: Good coordination     LUMBAR/SACRAL EXAMINATION:  · Inspection: Local inspection shows no step-off or bruising. Lumbar alignment is normal.  Sagittal and Coronal balance is neutral.      · Palpation:   No evidence of tenderness at the midline. No tenderness bilaterally at the paraspinal or trochanters. There is no step-off or paraspinal spasm. · Range of Motion: Lumbar flexion, extension and rotation are mildly limited due to pain. · Strength:   Strength testing is 5/5 in all muscle groups tested. · Special Tests:   Straight leg raise and crossed SLR negative. Leg length and pelvis level. · Skin: There are no rashes, ulcerations or lesions.   · Reflexes: Reflexes are symmetrically 2+ at the patellar and ankle tendons. Clonus absent bilaterally at the feet. · Gait & station: normal, patient ambulates without assistance    · Additional Examinations:   · RIGHT LOWER EXTREMITY: Inspection/examination of the right lower extremity does not show any tenderness, deformity or injury. Range of motion is unremarkable. There is no gross instability. There are no rashes, ulcerations or lesions. Strength and tone are normal.    · LEFT LOWER EXTREMITY:  Inspection/examination of the left lower extremity does not show any tenderness, deformity or injury. Range of motion is unremarkable. There is no gross instability. There are no rashes, ulcerations or lesions. Strength and tone are normal.    Diagnostic Testing:    I reviewed AP and lateral x-rays of her lumbar spine were obtained in the office during her last visit. Those show significant lumbar degenerative disc disease and that her left leg is slightly shorter than her right leg    Impression:   Lumbar degenerative disc disease    Plan:    Discussed treatment options including observation, physical therapy, epidural injection, and additional imaging. She would like to proceed with lumbar MRI.

## 2021-12-03 RX ORDER — BETAMETHASONE DIPROPIONATE 0.5 MG/G
CREAM TOPICAL
Qty: 60 G | Refills: 0 | Status: SHIPPED | OUTPATIENT
Start: 2021-12-03

## 2021-12-06 ENCOUNTER — HOSPITAL ENCOUNTER (OUTPATIENT)
Dept: MRI IMAGING | Age: 61
Discharge: HOME | End: 2021-12-06
Payer: COMMERCIAL

## 2021-12-06 DIAGNOSIS — M48.062 SPINAL STENOSIS OF LUMBAR REGION WITH NEUROGENIC CLAUDICATION: ICD-10-CM

## 2021-12-06 PROCEDURE — 72148 MRI LUMBAR SPINE W/O DYE: CPT

## 2021-12-07 ENCOUNTER — TELEPHONE (OUTPATIENT)
Dept: ORTHOPEDIC SURGERY | Age: 61
End: 2021-12-07

## 2021-12-08 ENCOUNTER — TELEPHONE (OUTPATIENT)
Dept: ORTHOPEDIC SURGERY | Age: 61
End: 2021-12-08

## 2021-12-08 NOTE — TELEPHONE ENCOUNTER
----- Message from Halle Morris MD sent at 12/7/2021  8:08 AM EST -----  Lumbar mri  Disc bulge and bone spur with mild nerve compression  Could try more PT or injection

## 2021-12-10 ENCOUNTER — OFFICE VISIT (OUTPATIENT)
Dept: PAIN MANAGEMENT | Age: 61
End: 2021-12-10
Payer: COMMERCIAL

## 2021-12-10 VITALS
SYSTOLIC BLOOD PRESSURE: 142 MMHG | DIASTOLIC BLOOD PRESSURE: 89 MMHG | OXYGEN SATURATION: 97 % | BODY MASS INDEX: 55.82 KG/M2 | WEIGHT: 293 LBS | HEART RATE: 72 BPM

## 2021-12-10 DIAGNOSIS — M16.0 PRIMARY OSTEOARTHRITIS OF BOTH HIPS: ICD-10-CM

## 2021-12-10 DIAGNOSIS — M16.11 PRIMARY OSTEOARTHRITIS OF RIGHT HIP: ICD-10-CM

## 2021-12-10 DIAGNOSIS — M51.36 DDD (DEGENERATIVE DISC DISEASE), LUMBAR: ICD-10-CM

## 2021-12-10 DIAGNOSIS — M77.8 ELBOW TENDINITIS: ICD-10-CM

## 2021-12-10 DIAGNOSIS — M47.899 FACET SYNDROME: ICD-10-CM

## 2021-12-10 DIAGNOSIS — M54.16 LUMBAR RADICULITIS: ICD-10-CM

## 2021-12-10 DIAGNOSIS — M72.2 PLANTAR FASCIITIS, RIGHT: ICD-10-CM

## 2021-12-10 DIAGNOSIS — G89.4 CHRONIC PAIN SYNDROME: ICD-10-CM

## 2021-12-10 DIAGNOSIS — M79.7 FIBROMYALGIA: ICD-10-CM

## 2021-12-10 PROCEDURE — 99213 OFFICE O/P EST LOW 20 MIN: CPT | Performed by: INTERNAL MEDICINE

## 2021-12-10 RX ORDER — PREGABALIN 100 MG/1
100 CAPSULE ORAL 3 TIMES DAILY
Qty: 90 CAPSULE | Refills: 1 | Status: SHIPPED | OUTPATIENT
Start: 2021-12-10 | End: 2022-01-09

## 2021-12-10 RX ORDER — TRAMADOL HYDROCHLORIDE 50 MG/1
50 TABLET ORAL EVERY 6 HOURS PRN
Qty: 90 TABLET | Refills: 0 | Status: SHIPPED | OUTPATIENT
Start: 2021-12-10 | End: 2022-01-09

## 2021-12-10 NOTE — PROGRESS NOTES
[facility]  1960  [id number]    HISTORY OF PRESENT ILLNESS:  Ms. Crystal Pedraza is a 57 y.o. female returns for a follow up visit for multiple medical problems. Her current presenting problems are   1. Chronic pain syndrome    2. Lumbar radiculitis    3. Facet syndrome    4. Fibromyalgia    5. Elbow tendinitis    6. Primary osteoarthritis of right hip    7. Primary osteoarthritis of both hips    8. Plantar fasciitis, right    9. DDD (degenerative disc disease), lumbar    . As per information/history obtained from the PADT(patient assessment and documentation tool) - She complains of pain in the lower back with radiation to the knees Bilateral and feet Left She rates the pain 8/10 and describes it as sharp. Pain is made worse by: nothing. Current treatment regimen has helped relieve about 40% of the pain. She denies side effects from the current pain regimen. Patient reports that since the last follow up visit the physical functioning is worse, family/social relationships are unchanged, mood is worse and sleep patterns are unchanged, and that the overall functioning is unchanged. Patient denies neurological bowel or bladder. Patient denies misusing/abusing her narcotic pain medications or using any illegal drugs. There are No indicators for possible drug abuse, addiction or diversion problems. Upon obtaining the medical history from Ms. Crystal Pedraza regarding today's office visit for her presenting problems, patient states she is moving to [place] at the end of this month. Ms. Crystal Pedraza states she has been compliant with her medications. She reports she is working part time still. Patient mentions she is using Ultram 1-2 per day along with Lyrica. Patient denies any constipation symptoms. She reports she has been managing her ADL's. ALLERGIES: Patients list of allergies were reviewed     MEDICATIONS: Ms. Crystal Pedraza list of medications were reviewed. Her current medications are   Outpatient Medications Prior to Visit Medication Sig Dispense Refill    betamethasone dipropionate 0.05 % cream APPLY EXTERNALLY TO THE AFFECTED AREA TWICE DAILY 60 g 0    methocarbamol (ROBAXIN) 500 MG tablet Take 1 tablet by mouth 2 times daily as needed (muscle spasm) 20 tablet 0    levothyroxine (SYNTHROID) 88 MCG tablet TAKE 1 TABLET BY MOUTH DAILY 90 tablet 0    rOPINIRole (REQUIP) 1 MG tablet TAKE 2 TABLETS BY MOUTH EVERY NIGHT 180 tablet 0    pantoprazole (PROTONIX) 40 MG tablet TAKE 1 TABLET BY MOUTH DAILY 90 tablet 0    ondansetron (ZOFRAN) 4 MG tablet TAKE 1 TABLET BY MOUTH EVERY 8 HOURS AS NEEDED FOR NAUSEA OR VOMITING 60 tablet 0    aspirin 81 MG EC tablet Take 1 tablet by mouth daily 30 tablet 3    atorvastatin (LIPITOR) 40 MG tablet Take 2 tablets by mouth daily 90 tablet 0    potassium chloride (KLOR-CON M) 20 MEQ extended release tablet Take 1 tablet by mouth daily 90 tablet 1    torsemide (DEMADEX) 20 MG tablet TAKE 1 TABLET BY MOUTH TWICE DAILY BEFORE MEALS 180 tablet 0    Compression Stockings MISC by Does not apply route 20mmHg- knee high, closed toe. wear daily. Fit to size 1 each 0    vitamin B-12 (CYANOCOBALAMIN) 1000 MCG tablet Take 1 tablet by mouth daily 30 tablet 0    meclizine (ANTIVERT) 25 MG tablet Take 25 mg by mouth 3 times daily as needed      Cholecalciferol (VITAMIN D3) 125 MCG (5000 UT) TABS Take 1 tablet by mouth 2 times daily      DULoxetine (CYMBALTA) 60 MG extended release capsule Take 1 capsule by mouth 2 times daily      ARIPiprazole (ABILIFY) 30 MG tablet Take 30 mg by mouth daily       propranolol (INDERAL) 40 MG tablet Take 1 tablet by mouth 3 times daily anxiety      QUEtiapine (SEROQUEL) 200 MG tablet Take 200 mg by mouth nightly      pregabalin (LYRICA) 100 MG capsule Take 1 capsule by mouth 3 times daily for 30 days. 90 capsule 0     No facility-administered medications prior to visit.         REVIEW OF SYSTEMS:    Respiratory: Negative for apnea, chest tightness and shortness of breath or change in baseline breathing. PHYSICAL EXAM:   Nursing note and vitals reviewed. BP (!) 142/89   Pulse 72   Wt (!) 325 lb 3.2 oz (147.5 kg)   LMP 01/15/2014   SpO2 97%   BMI 55.82 kg/m²   Constitutional: She appears well-developed and well-nourished. No acute distress. Cardiovascular: Normal rate, regular rhythm, normal heart sounds, and does not have murmur. Pulmonary/Chest: Effort normal. No respiratory distress. She does not have wheezes in the lung fields. She has no rales. Neurological/Psychiatric:She is alert and oriented to person, place, and time. Coordination is  normal.  Her mood isAppropriate and affect is Neutral/Euthymic(normal) . IMPRESSION:   1. Chronic pain syndrome    2. Lumbar radiculitis    3. Facet syndrome    4. Fibromyalgia    5. Elbow tendinitis    6. Primary osteoarthritis of right hip    7. Primary osteoarthritis of both hips    8. Plantar fasciitis, right    9.  DDD (degenerative disc disease), lumbar        PLAN:  Informed verbal consent was obtained  -ROM/Stretching exercises as advised   -She was advised to increase fluids ( 5-7  glasses of fluid daily), limit caffeine, avoid cheese products, increase dietary fiber, increase activity and exercise as tolerated and relax regularly and enjoy meals   -Continue with Lyrica along with Ultram 1-2 per day  -She was advised weight reduction, diet changes- 800-1200 jessica diet, diet diary, exercising, nutritional  consult increased physical activity as tolerated   -Continue with Lyrica 100 mg TID      Current Outpatient Medications   Medication Sig Dispense Refill    betamethasone dipropionate 0.05 % cream APPLY EXTERNALLY TO THE AFFECTED AREA TWICE DAILY 60 g 0    methocarbamol (ROBAXIN) 500 MG tablet Take 1 tablet by mouth 2 times daily as needed (muscle spasm) 20 tablet 0    levothyroxine (SYNTHROID) 88 MCG tablet TAKE 1 TABLET BY MOUTH DAILY 90 tablet 0    rOPINIRole (REQUIP) 1 MG tablet TAKE 2 TABLETS BY MOUTH EVERY NIGHT 180 tablet 0    pantoprazole (PROTONIX) 40 MG tablet TAKE 1 TABLET BY MOUTH DAILY 90 tablet 0    ondansetron (ZOFRAN) 4 MG tablet TAKE 1 TABLET BY MOUTH EVERY 8 HOURS AS NEEDED FOR NAUSEA OR VOMITING 60 tablet 0    aspirin 81 MG EC tablet Take 1 tablet by mouth daily 30 tablet 3    atorvastatin (LIPITOR) 40 MG tablet Take 2 tablets by mouth daily 90 tablet 0    potassium chloride (KLOR-CON M) 20 MEQ extended release tablet Take 1 tablet by mouth daily 90 tablet 1    torsemide (DEMADEX) 20 MG tablet TAKE 1 TABLET BY MOUTH TWICE DAILY BEFORE MEALS 180 tablet 0    Compression Stockings MISC by Does not apply route 20mmHg- knee high, closed toe. wear daily. Fit to size 1 each 0    vitamin B-12 (CYANOCOBALAMIN) 1000 MCG tablet Take 1 tablet by mouth daily 30 tablet 0    meclizine (ANTIVERT) 25 MG tablet Take 25 mg by mouth 3 times daily as needed      Cholecalciferol (VITAMIN D3) 125 MCG (5000 UT) TABS Take 1 tablet by mouth 2 times daily      DULoxetine (CYMBALTA) 60 MG extended release capsule Take 1 capsule by mouth 2 times daily      ARIPiprazole (ABILIFY) 30 MG tablet Take 30 mg by mouth daily       propranolol (INDERAL) 40 MG tablet Take 1 tablet by mouth 3 times daily anxiety      QUEtiapine (SEROQUEL) 200 MG tablet Take 200 mg by mouth nightly      pregabalin (LYRICA) 100 MG capsule Take 1 capsule by mouth 3 times daily for 30 days. 90 capsule 0     No current facility-administered medications for this visit. I will continue her current medication regimen  which is part of the above treatment schedule. It has been helping with Ms. Yap's chronic  medical problems which for this visit include:   Diagnoses of Chronic pain syndrome, Lumbar radiculitis, Facet syndrome, Fibromyalgia, Elbow tendinitis, Primary osteoarthritis of right hip, Primary osteoarthritis of both hips, Plantar fasciitis, right, and DDD (degenerative disc disease), lumbar were pertinent to this visit.    Risks and benefits of the medications and other alternative treatments  including no treatment were discussed with the patient. The common side effects of these medications were also explained to the patient. Informed verbal consent was obtained. Goals of current treatment regimen include improvement in pain, restoration of functioning- with focus on improvement in physical performance, general activity, work or disability,emotional distress, health care utilization and  decreased medication consumption. Will continue to monitor progress towards achieving/maintaining therapeutic goals with special emphasis on  1. Improvement in perceived interfernce  of pain with ADL's. Ability to do home exercises independently. Ability to do household chores indoor and/or outdoor work and social and leisure activities. Improve psychosocial and physical functioning. - she is showing progression towards this treatment goal with the current regimen. She was advised against drinking alcohol with the narcotic pain medicines, advised against driving or handling machinery while adjusting the dose of medicines or if having cognitive  issues related to the current medications. Risk of overdose and death, if medicines not taken as prescribed, were also discussed. If the patient develops new symptoms or if the symptoms worsen, the patient should call the office. While transcribing every attempt was made to maintain the accuracy of the note in terms of it's contents,there may have been some errors made inadvertently. Thank you for allowing me to participate in the care of this patient.     Romayne Amsterdam, MD.    Cc: Janice Hobbs MD

## 2021-12-14 RX ORDER — ATORVASTATIN CALCIUM 40 MG/1
TABLET, FILM COATED ORAL
Qty: 90 TABLET | Refills: 0 | Status: SHIPPED | OUTPATIENT
Start: 2021-12-14

## 2021-12-15 DIAGNOSIS — M48.062 SPINAL STENOSIS OF LUMBAR REGION WITH NEUROGENIC CLAUDICATION: Primary | ICD-10-CM

## 2022-04-21 NOTE — ANESTHESIA PRE PROCEDURE
APPENDECTOMY      open , Ex-lap    CHOLECYSTECTOMY      open    COLONOSCOPY  04/16/2013    dr Guevara Proper 2018     DILATION AND CURETTAGE OF UTERUS      ENDOSCOPY, COLON, DIAGNOSTIC      FOOT SURGERY Right 05/27/2016    ARTHROPLASTY RIGHT 5TH DIGIT      HYSTEROSCOPY N/A 12-30-13    Hysteroscopy Dilitation and Curettage    JOINT REPLACEMENT Bilateral     knee    OTHER SURGICAL HISTORY  9/11/2015    ARTHROPLASTY 5TH DIGIT LEFT FOOT          SHOULDER SURGERY      right    SHOULDER SURGERY Right     SLEEVE GASTROPLASTY  May 28, 2014    [facility]    TOTAL KNEE ARTHROPLASTY  12/10/10    Right    TOTAL KNEE ARTHROPLASTY  3/30/10    Left    UPPER GASTROINTESTINAL ENDOSCOPY  4/16/2013    dr Darien Hernandez ENDOSCOPY  3/20/2014    dr Paris Gay     Social History     Tobacco Use    Smoking status: Never Smoker    Smokeless tobacco: Never Used    Tobacco comment: Never smoked   Substance Use Topics    Alcohol use: No     Alcohol/week: 0.0 standard drinks    Drug use: No     Medications  No current facility-administered medications on file prior to encounter. Current Outpatient Medications on File Prior to Encounter   Medication Sig Dispense Refill    pregabalin (LYRICA) 100 MG capsule Take 1 capsule by mouth 3 times daily for 30 days. 90 capsule 1    celecoxib (CELEBREX) 200 MG capsule Take 1 capsule by mouth daily 30 capsule 1    ferrous gluconate (FERGON) 240 (27 Fe) MG tablet TAKE 1 TABLET BY MOUTH TWICE DAILY 60 tablet 0    rOPINIRole (REQUIP) 1 MG tablet TAKE 2 TABLETS BY MOUTH EVERY NIGHT 180 tablet 0    nystatin (MYCOSTATIN) 399718 UNIT/GM cream Apply topically 2 times daily.  30 g 0    ondansetron (ZOFRAN) 4 MG tablet TAKE 1 TABLET BY MOUTH EVERY 8 HOURS AS NEEDED FOR NAUSEA OR VOMITING 60 tablet 0    esomeprazole (NEXIUM) 40 MG delayed release capsule TAKE 1 CAPSULE BY MOUTH EVERY MORNING BEFORE BREAKFAST 90 capsule 0    vitamin D (ERGOCALCIFEROL) 1.25 MG (41111 UT) CAPS
crackles

## 2022-05-09 NOTE — ED PROVIDER NOTES
Would ou like patient to schedule an appointment for this    Fracture of nasal bone     GERD (gastroesophageal reflux disease)     Headache     Hiatal hernia     History of blood transfusion     anemia    Hyperlipidemia     Insomnia     Osteoarthritis     Prediabetes     Rash     Self mutilating behavior     cutter pt states she has not done in months- in therapy    Sleep apnea     no CPAP    Suicidal ideation     Thyroid disorder     hypothyroidism    Vertigo        Past Surgical History:   Procedure Laterality Date    ABDOMEN SURGERY  5/28/2014    LAPAROSCOPIC SLEEVE GASTRECTOMY, EXTENSIVE LYSIS OF ADHESIONS    ACHILLES TENDON SURGERY Left 3/9/2020    LEFT CALCANEOUS PARTIAL EXCISION, SECONDARY REPAIR OF ACHILLES TENDON performed by Andrew Charles MD at [facility]      open , Ex-lap    CHOLECYSTECTOMY      open    COLONOSCOPY  04/16/2013    dr Jens Jama 2018     DILATION AND CURETTAGE OF UTERUS      ENDOSCOPY, COLON, DIAGNOSTIC      FOOT SURGERY Right 05/27/2016    ARTHROPLASTY RIGHT 5TH DIGIT      HYSTEROSCOPY N/A 12-30-13    Hysteroscopy Dilitation and Curettage    JOINT REPLACEMENT Bilateral     knee    OTHER SURGICAL HISTORY  9/11/2015    ARTHROPLASTY 5TH DIGIT LEFT FOOT          SHOULDER SURGERY      right    SHOULDER SURGERY Right     SLEEVE GASTROPLASTY  May 28, 2014    Rosi    TOTAL KNEE ARTHROPLASTY  12/10/10    Right    TOTAL KNEE ARTHROPLASTY  3/30/10    Left    UPPER GASTROINTESTINAL ENDOSCOPY  4/16/2013    dr Ruben Amos ENDOSCOPY  3/20/2014    dr Denise Martinez       No current facility-administered medications for this encounter. Current Outpatient Medications:     meclizine (ANTIVERT) 25 MG tablet, Take 25 mg by mouth 3 times daily as needed, Disp: , Rfl:     diazePAM (VALIUM) 2 MG tablet, Take 0.5 tablets by mouth every 12 hours for 10 days. , Disp: 10 tablet, Rfl: 0   traMADol (ULTRAM) 50 MG tablet, Take 1 tablet by mouth every 8 hours as needed for Pain for up to 7 days. Intended supply: 7 days. Take lowest dose possible to manage pain, Disp: 21 tablet, Rfl: 0    hydroCHLOROthiazide (HYDRODIURIL) 25 MG tablet, TAKE 1 TABLET BY MOUTH EVERY MORNING, Disp: 15 tablet, Rfl: 0    ondansetron (ZOFRAN) 4 MG tablet, TAKE 1 TABLET BY MOUTH EVERY 8 HOURS AS NEEDED FOR NAUSEA OR VOMITING, Disp: 60 tablet, Rfl: 0    levothyroxine (SYNTHROID) 88 MCG tablet, TAKE 1 TABLET BY MOUTH DAILY, Disp: 90 tablet, Rfl: 0    pregabalin (LYRICA) 100 MG capsule, Take 1 capsule by mouth 3 times daily for 30 days. , Disp: 90 capsule, Rfl: 0    traMADol (ULTRAM) 50 MG tablet, Take 1 tablet by mouth every 6 hours as needed for Pain (Max 1-2 per day) for up to 35 days. , Disp: 90 tablet, Rfl: 0    betamethasone dipropionate (DIPROLENE) 0.05 % cream, APPLY EXTERNALLY TO THE AFFECTED AREA TWICE DAILY, Disp: 60 g, Rfl: 0    rOPINIRole (REQUIP) 1 MG tablet, TAKE 2 TABLETS BY MOUTH EVERY NIGHT, Disp: 180 tablet, Rfl: 0    celecoxib (CELEBREX) 200 MG capsule, Take 1 capsule by mouth daily, Disp: 30 capsule, Rfl: 1    Cholecalciferol (VITAMIN D3) 125 MCG (5000 UT) TABS, Take 1 tablet by mouth 2 times daily, Disp: , Rfl:     DULoxetine (CYMBALTA) 60 MG extended release capsule, Take 1 capsule by mouth 2 times daily, Disp: , Rfl:     ARIPiprazole (ABILIFY) 20 MG tablet, Take 1 tablet by mouth daily, Disp: , Rfl:     propranolol (INDERAL) 40 MG tablet, Take 1 tablet by mouth 3 times daily anxiety, Disp: , Rfl:     atorvastatin (LIPITOR) 40 MG tablet, TAKE 1 TABLET BY MOUTH DAILY, Disp: 90 tablet, Rfl: 0    esomeprazole (NEXIUM) 40 MG delayed release capsule, TAKE 1 CAPSULE BY MOUTH EVERY MORNING BEFORE BREAKFAST, Disp: 90 capsule, Rfl: 0    QUEtiapine (SEROQUEL) 200 MG tablet, Take 200 mg by mouth nightly, Disp: , Rfl:     Allergies   Allergen Reactions  Polysporin [Bacitracin-Polymyxin B] Other (See Comments) and Itching     Pt breaks out in blisters. Pt breaks out in blisters.     Lorazepam Other (See Comments)     \"went crazy\"    Neomycin-Polymyxin-Gramicidin     Sulfa Antibiotics Hives       Social History     Socioeconomic History    Marital status: Single     Spouse name: Not on file    Number of children: 0    Years of education: 12    Highest education level: Not on file   Occupational History    Occupation: [employer]     Comment: unemployed   Social Needs    Financial resource strain: Not on file    Food insecurity     Worry: Not on file     Inability: Not on file   [employer] needs     Medical: Not on file     Non-medical: Not on file   Tobacco Use    Smoking status: Never Smoker    Smokeless tobacco: Never Used    Tobacco comment: Never smoked   Substance and Sexual Activity    Alcohol use: No     Alcohol/week: 0.0 standard drinks    Drug use: No    Sexual activity: Not Currently   Lifestyle    Physical activity     Days per week: Not on file     Minutes per session: Not on file    Stress: Not on file   Relationships    Social connections     Talks on phone: Not on file     Gets together: Not on file     Attends Islam service: Not on file     Active member of club or organization: Not on file     Attends meetings of clubs or organizations: Not on file     Relationship status: Not on file    Intimate partner violence     Fear of current or ex partner: Not on file     Emotionally abused: Not on file     Physically abused: Not on file     Forced sexual activity: Not on file   Other Topics Concern    Not on file   Social History Narrative    Lives by herself with her cat       Nursing Notes Reviewed      ROS:  General: no fever  ENT: no sinus congestion, no sore throat  RESP: no cough, no shortness of breath  CARDIAC: no chest pain  GI: no abdominal pain, no vomiting, no diarrhea Musculoskeletal: + arthralgia, no myalgia, + back pain,  no joint swelling  NEURO: no headache, no numbness, no weakness,+ dizziness  DERM: no rash, no erythema, +ecchymosis, no wounds      PHYSICAL EXAM:  GENERAL APPEARANCE: Tarun Ho is in no acute respiratory distress. Awake and alert. VITAL SIGNS:   ED Triage Vitals [01/08/21 2130]   Enc Vitals Group      BP (!) 154/88      Pulse 75      Resp 12      Temp 98.2 °F (36.8 °C)      Temp Source Oral      SpO2 99 %      Weight (!) 330 lb (149.7 kg)      Height 5' 4\" (1.626 m)      Head Circumference       Peak Flow       Pain Score       Pain Loc       Pain Edu? Excl. in [address]? HEAD: Normocephalic, atraumatic. NO SCALP HEMATOMA  EYES:  Extraocular muscles are intact. Conjunctivas are pink. Negative scleral icterus. ENT:  Mucous membranes are moist.  Pharynx without erythema or exudates. NECK: Nontender and supple. CHEST: Clear to auscultation bilaterally. No rales, rhonchi, or wheezing. HEART:  Regular rate and rhythm. No murmurs. Strong and equal pulses in the upper and lower extremities. ABDOMEN: Soft,  nondistended, positive bowel sounds. abdomen is nontender. MUSCULOSKELETAL:  Active range of motion of the upper and lower extremities. No edema. Left knee with tenderness laterally and bruising over proximal fibula region. Good ROM at knee. Left ankle nontender. Left hip tender laterally but able to bear weight. No SI tenderness. Left elbow with bruising but no tenderness and good flexion and extension, supination and pronation. Left wrist mildly tender. Left shoulder nontender, good ROM. NEUROLOGICAL: Awake, alert and oriented x 3. Power intact in the upper and lower extremities. Sensation intact. Finger to nose intact bilaterally. No dysarthria. No aphasia. DERMATOLOGIC: No petechiae, rashes, or ecchymoses.       ED COURSE AND MEDICAL DECISION MAKING:      Radiology: All plain films have been evaluated by myself. They may have been overread by radiologist as noted in chart. Other radiologic studies (i.e. CT, MRI, ultrasounds, etc ) have been interpreted by radiologist.     XR KNEE LEFT (3 VIEWS)   Final Result      No acute findings in the left hip, left wrist, or left knee. XR HIP LEFT (2-3 VIEWS)   Final Result      No acute findings in the left hip, left wrist, or left knee. XR WRIST LEFT (MIN 3 VIEWS)   Final Result      No acute findings in the left hip, left wrist, or left knee. Labs:  No results found for this visit on 01/08/21. Treatment in the department:  Patient received no meds while in the ED. Medical decision making:    I estimate there is LOW risk for FRACTURE, DISLOCATION, COMPARTMENT SYNDROME, DEEP VENOUS THROMBOSIS, SEPTIC ARTHRITIS, OSTEOMYELITIS, TENDON OR NEUROVASCULAR INJURY, thus I consider the discharge disposition reasonable. Natalya Lane and I have discussed the diagnosis and risks, and we agree with discharging home to follow-up with their primary doctor or the referral orthopedist. We also discussed returning to the Emergency Department immediately if new or worsening symptoms occur. Clinical Impression:  1. Fall, initial encounter    2. Contusion of left knee and lower leg, initial encounter    3. Contusion of left hip, initial encounter        Dispo:  Patient will be discharged  at this time. Patient was informed of this decision and agrees with plan. I have discussed lab and xray findings with patient and they understand. Questions were answered to the best of my ability. Discharge vitals:  Blood pressure (!) 154/88, pulse 75, temperature 98.2 °F (36.8 °C), temperature source Oral, resp. rate 12, height 5' 4\" (1.626 m), weight (!) 330 lb (149.7 kg), last menstrual period 01/15/2014, SpO2 99 %, not currently breastfeeding.     Prescriptions given:   New Prescriptions    No medications on file This chart was created using dragon voice recognition software.         Tremaine Guevara MD  01/08/21 0342

## 2022-06-24 ENCOUNTER — TELEPHONE (OUTPATIENT)
Dept: OTHER | Facility: CLINIC | Age: 62
End: 2022-06-24

## 2022-06-24 NOTE — TELEPHONE ENCOUNTER
Pt was contacted today as part of [facility] to schedule a Mammogram.       I left a message reminding the patient that they have an open order from Gaurang Iglesias MD and to please contact me directly at [phone] to schedule a Mammogram.     Thanks,  Stefan Vera LPN

## 2023-03-20 NOTE — FLOWSHEET NOTE
Gerson Colin MD Pedina, Mickey, RN  Caller: Unspecified (3 days ago, 11:10 AM)  No we can watch for now, but he needs another RPR in 3 months.     I have a reminder set up in my Epic to check in on this in early June. I dont think he has syphilis just random fluctuation in RPR- if it had been 1:8 we would have needed to treat.     Best,   -Gerson       Physical Therapy Daily Treatment Note  Date:  2020    Patient Name:  Daneyl Blanco    :  1960  MRN: [id number]    Restrictions/Precautions: Position Activity Restriction  Other position/activity restrictions: mod fall risk - pt denies falls but reports LOB when amb without boot due to weakness    Pertinent Medical History: Additional Pertinent Hx: anxiety, bipolar, HTN, B TKR, R shoulder sx, fibromyalgia, depression/suicidal     Medical/Treatment Diagnosis Information:  · Diagnosis: L achilles tendonitis   · Treatment Diagnosis: L ankle pain, dec ROM, dec strength, dec balance, dec gait     Insurance/Certification information:  PT Insurance Information: [employer]al[employer] - requires precert after eval; auth 8 visits -20  Physician Information:  Referring Practitioner: Dr. Theron Rossi of care signed (Y/N):  Sent to inbox    Visit# / total visits:    Pain level: 3-4/10     Functional Outcomes Measure: at eval  Test: LEFS  Score: 34    History of Injury: Subjective  Subjective: Pt with L ankle surgery on 3/9/20 to remove bone spur and Achilles repair. She is currently amb with boot, has weaned off the lifts and is ok to wean out of the boot as tolerated. Pain is 1-2/10 at rest since weaning off lifts, 4/10 with walking in boot and without boot. Pt notes using B crutches right now with amb without boot; elevation daily, but not icing; denies N/T. Subjective:   A little more sore from recent new exercises. Pt saw PCP who agreed that B LE were showing signs of cellulitis and put her on antibiotic and diuretic. Objective:   Observation:    Test measurements:      ** Pt indicated feelings of depression/hopelessness on the initial PT intake form. Follow up was done with the Humphrey-Suicide Severity Rating Scale and pt was indicating a need for patient safety precautions.   Discussed this with pt and she notes that she currently sees a Psychiatrist every 3-4 months and speaks to a

## 2023-08-30 NOTE — FLOWSHEET NOTE
[facility], [address], [address]  Phone: [phone]   Fax:     [phone]        Physical Therapy Treatment Note/ Progress Report:           Date:  2020  Patient Name:  Karen Johnson    :  1960  MRN: [id number]  Restrictions/Precautions:    Medical/Treatment Diagnosis Information:  · Diagnosis: left heel pain - m79.672  · Treatment Diagnosis: left heel pain - R50.075  Insurance/Certification information:     Physician Information:  Referring Practitioner: dr Charletta Rinne   Has the plan of care been signed (Y/N):        []  Yes  [x]  No     Date of Patient follow up with Physician:     Is this a Progress Report:     []  Yes  [x]  No        If Yes:  Date Range for reporting period:  Beginning   Ending     Progress report will be due (10 Rx or 30 days whichever is less):        Recertification will be due (POC Duration  / 90 days whichever is less):        Visit # Insurance Allowable Auth Required   6 caresource (auth required)   []  Yes []  No        Functional Scale:     Date assessed:        Latex Allergy:  [x]NO      []YES  Preferred Language for Healthcare:   [x]English       []other:      Pain level:  10     SUBJECTIVE:    \"I am feeling a little better.   Still sore\"    OBJECTIVE:      Observation: moderate point tenderness    Test measurements:      RESTRICTIONS/PRECAUTIONS:      Exercises/Interventions:       Therapeutic Ex (28624) Sets/sec Reps Notes/CUES   Seated belt stretch   5x 10\"     Seated crossover belt stretch   5 x 20\"     slt brd   4x    Seated wobble boards   30x each     Heel slides   8x     Prone LL/LD   5'     Seated ll/ld  5'     Ankle 4 ways   30x mr     Bike   6'                       Manual Intervention (54037)      Prom/stm   15'                                                                                                                                             Therapeutic Gen: Well appearing in NAD  Head: NC/AT  Neck: trachea midline  Card: regular rate and rhythm  Resp:  distant breath sounds  Abd: morbidly obese, no tenderness  Ext: 3cm linear laceration R plantar foot. no tenderness, no purulence, minimal erythema surrounding. no streaking.   Neuro:  A&O, no motor or sensory deficits above reported baseline  Skin:  Warm and dry as visualized  Psych:  Normal affect and mood

## 2024-05-10 NOTE — TELEPHONE ENCOUNTER
Patient is in pain all the time it hurts to walk and hurts when she put ice on it.  Pain level  8    Requesting for steroids injection or pills      Please call back to discuss Attending Only

## (undated) DEVICE — GOWN SIRUS NONREIN XL W/TWL: Brand: MEDLINE INDUSTRIES, INC.

## (undated) DEVICE — STRIP,CLOSURE,WOUND,MEDI-STRIP,1/2X4: Brand: MEDLINE

## (undated) DEVICE — GOWN SIRUS NONREIN LG W/TWL: Brand: MEDLINE INDUSTRIES, INC.

## (undated) DEVICE — DRAPE,U/SHT,SPLIT,FILM,60X84,STERILE: Brand: MEDLINE

## (undated) DEVICE — INTENDED FOR TISSUE SEPARATION, AND OTHER PROCEDURES THAT REQUIRE A SHARP SURGICAL BLADE TO PUNCTURE OR CUT.: Brand: BARD-PARKER ® STAINLESS STEEL BLADES

## (undated) DEVICE — BLADE SAW SAG OSCIL LNG MED 9X31MM

## (undated) DEVICE — BASIC SINGLE BASIN 1-LF: Brand: MEDLINE INDUSTRIES, INC.

## (undated) DEVICE — 3M™ STERI-STRIP™ COMPOUND BENZOIN TINCTURE 40 BAGS/CARTON 4 CARTONS/CASE C1544: Brand: 3M™ STERI-STRIP™

## (undated) DEVICE — SHEET,DRAPE,53X77,STERILE: Brand: MEDLINE

## (undated) DEVICE — SOLUTION IV IRRIG POUR BRL 0.9% SODIUM CHL 2F7124

## (undated) DEVICE — MAJOR SET UP PK

## (undated) DEVICE — CANISTER, RIGID, 1200CC: Brand: MEDLINE INDUSTRIES, INC.

## (undated) DEVICE — BANDAGE COMPR W6INXL12FT SMOOTH FOR LIMB EXSANG ESMARCH

## (undated) DEVICE — SPONGE GZ W4XL4IN COT 12 PLY TYP VII WVN C FLD DSGN

## (undated) DEVICE — GLOVE SURG 9 PF CRM STRL SENSICARE PI MIC LF

## (undated) DEVICE — GLOVE SURG SZ 6 L12IN FNGR THK87MIL DK GRN LTX FREE ISOLEX

## (undated) DEVICE — CHLORAPREP 26ML ORANGE

## (undated) DEVICE — GLOVE SURG SZ 8 CRM LTX FREE POLYISOPRENE POLYMER BEAD ANTI

## (undated) DEVICE — ZIMMER® STERILE DISPOSABLE TOURNIQUET CUFF WITH PLC, DUAL PORT, SINGLE BLADDER, 34 IN. (86 CM)

## (undated) DEVICE — PADDING UNDERCAST W4INXL4YD 100% COT CRIMPED FINISH WBRL II

## (undated) DEVICE — SUTURE MCRYL SZ 4-0 L18IN ABSRB UD L19MM PS-2 3/8 CIR PRIM Y496G

## (undated) DEVICE — SUTURE VCRL SZ 3-0 L18IN ABSRB UD L26MM SH 1/2 CIR J864D

## (undated) DEVICE — BANDAGE COMPR W6INXL10YD ST M E WHITE/BEIGE

## (undated) DEVICE — GLOVE SURG SZ 6 CRM LTX FREE POLYISOPRENE POLYMER BEAD ANTI

## (undated) DEVICE — TOWEL,OR,DSP,ST,BLUE,STD,4/PK,20PK/CS: Brand: MEDLINE

## (undated) DEVICE — NEEDLE HYPO 23GA L1.5IN TURQ POLYPR HUB S STL THN WALL IM

## (undated) DEVICE — SYRINGE MED 10ML LUERLOCK TIP W/O SFTY DISP

## (undated) DEVICE — KIT OR ROOM TURNOVER W/STRAP

## (undated) DEVICE — ELECTRODE PT RET AD L9FT HI MOIST COND ADH HYDRGEL CORDED

## (undated) DEVICE — SUTURE VCRL SZ 2-0 L18IN ABSRB UD CT-1 L36MM 1/2 CIR J839D

## (undated) DEVICE — SUTURE VCRL SZ 0 L27IN ABSRB UD L36MM CT-1 1/2 CIR J260H

## (undated) DEVICE — Z DISCONTINUED USE 2275686 GLOVE SURG SZ 8 L12IN FNGR THK13MIL WHT ISOLEX POLYISOPRENE

## (undated) DEVICE — Z DISCONTINUED PER MEDLINE (LOW STOCK)  USE 2422770 DRAPE C ARM W54XL78IN FOR FLROSCN

## (undated) DEVICE — DRESSING,GAUZE,XEROFORM,CURAD,1"X8",ST: Brand: CURAD

## (undated) DEVICE — SUTURE VCRL SZ 0 L18IN ABSRB UD L36MM CT-1 1/2 CIR J840D

## (undated) DEVICE — T-DRAPE,EXTREMITY,STERILE: Brand: MEDLINE

## (undated) DEVICE — COTTON UNDERCAST PADDING,CRIMPED FINISH: Brand: WEBRIL

## (undated) DEVICE — COVER LT HNDL BLU PLAS